# Patient Record
Sex: FEMALE | Race: WHITE | Employment: OTHER | URBAN - METROPOLITAN AREA
[De-identification: names, ages, dates, MRNs, and addresses within clinical notes are randomized per-mention and may not be internally consistent; named-entity substitution may affect disease eponyms.]

---

## 2017-02-08 ENCOUNTER — OFFICE VISIT (OUTPATIENT)
Dept: FAMILY MEDICINE CLINIC | Age: 52
End: 2017-02-08

## 2017-02-08 VITALS
SYSTOLIC BLOOD PRESSURE: 138 MMHG | BODY MASS INDEX: 41.95 KG/M2 | RESPIRATION RATE: 16 BRPM | HEIGHT: 70 IN | DIASTOLIC BLOOD PRESSURE: 78 MMHG | WEIGHT: 293 LBS | TEMPERATURE: 98.4 F

## 2017-02-08 DIAGNOSIS — J45.41 MODERATE PERSISTENT ASTHMA WITH EXACERBATION: Primary | ICD-10-CM

## 2017-02-08 DIAGNOSIS — R60.0 BILATERAL LEG EDEMA: ICD-10-CM

## 2017-02-08 DIAGNOSIS — Z98.84 S/P GASTRIC BYPASS: ICD-10-CM

## 2017-02-08 DIAGNOSIS — D50.9 MICROCYTIC ANEMIA: ICD-10-CM

## 2017-02-08 PROCEDURE — 99213 OFFICE O/P EST LOW 20 MIN: CPT | Performed by: FAMILY MEDICINE

## 2017-02-08 RX ORDER — TRIAMTERENE AND HYDROCHLOROTHIAZIDE 75; 50 MG/1; MG/1
1 TABLET ORAL DAILY
Qty: 30 TABLET | Refills: 3 | Status: SHIPPED | OUTPATIENT
Start: 2017-02-08 | End: 2017-03-07 | Stop reason: SDUPTHER

## 2017-02-08 RX ORDER — ACETAMINOPHEN 160 MG
10000 TABLET,DISINTEGRATING ORAL DAILY
Qty: 150 CAPSULE | Refills: 3 | Status: ON HOLD | OUTPATIENT
Start: 2017-02-08 | End: 2019-08-25 | Stop reason: CLARIF

## 2017-02-08 RX ORDER — DIAZEPAM 10 MG/1
TABLET ORAL
Qty: 30 TABLET | Refills: 2 | Status: SHIPPED | OUTPATIENT
Start: 2017-02-08 | End: 2017-05-10 | Stop reason: SDUPTHER

## 2017-02-08 RX ORDER — ALBUTEROL SULFATE 90 UG/1
AEROSOL, METERED RESPIRATORY (INHALATION)
Qty: 18 G | Refills: 5 | Status: SHIPPED | OUTPATIENT
Start: 2017-02-08 | End: 2017-03-06 | Stop reason: SDUPTHER

## 2017-03-06 ENCOUNTER — OFFICE VISIT (OUTPATIENT)
Dept: PULMONOLOGY | Age: 52
End: 2017-03-06

## 2017-03-06 VITALS
OXYGEN SATURATION: 98 % | WEIGHT: 293 LBS | SYSTOLIC BLOOD PRESSURE: 134 MMHG | RESPIRATION RATE: 20 BRPM | DIASTOLIC BLOOD PRESSURE: 82 MMHG | HEIGHT: 70 IN | HEART RATE: 73 BPM | BODY MASS INDEX: 41.95 KG/M2

## 2017-03-06 DIAGNOSIS — J45.40 MODERATE PERSISTENT ASTHMA WITHOUT COMPLICATION: Primary | ICD-10-CM

## 2017-03-06 DIAGNOSIS — R09.82 POST-NASAL DRAINAGE: ICD-10-CM

## 2017-03-06 PROCEDURE — 99244 OFF/OP CNSLTJ NEW/EST MOD 40: CPT | Performed by: INTERNAL MEDICINE

## 2017-03-06 RX ORDER — ALBUTEROL SULFATE 90 UG/1
AEROSOL, METERED RESPIRATORY (INHALATION)
Qty: 2 INHALER | Refills: 5 | Status: SHIPPED | OUTPATIENT
Start: 2017-03-06 | End: 2017-03-07 | Stop reason: SDUPTHER

## 2017-03-06 RX ORDER — INHALER, ASSIST DEVICES
SPACER (EA) MISCELLANEOUS
Qty: 2 EACH | Refills: 3 | Status: ON HOLD | OUTPATIENT
Start: 2017-03-06 | End: 2019-08-25 | Stop reason: CLARIF

## 2017-03-07 ENCOUNTER — TELEPHONE (OUTPATIENT)
Dept: FAMILY MEDICINE CLINIC | Age: 52
End: 2017-03-07

## 2017-03-07 DIAGNOSIS — R60.0 BILATERAL LEG EDEMA: ICD-10-CM

## 2017-03-07 DIAGNOSIS — J45.40 MODERATE PERSISTENT ASTHMA WITHOUT COMPLICATION: ICD-10-CM

## 2017-03-08 RX ORDER — ALBUTEROL SULFATE 90 UG/1
AEROSOL, METERED RESPIRATORY (INHALATION)
Qty: 2 INHALER | Refills: 5 | Status: SHIPPED | OUTPATIENT
Start: 2017-03-08 | End: 2019-06-14 | Stop reason: SDUPTHER

## 2017-03-08 RX ORDER — TRIAMTERENE AND HYDROCHLOROTHIAZIDE 75; 50 MG/1; MG/1
1 TABLET ORAL DAILY
Qty: 90 TABLET | Refills: 1 | Status: SHIPPED | OUTPATIENT
Start: 2017-03-08 | End: 2017-05-10 | Stop reason: ALTCHOICE

## 2017-03-08 RX ORDER — DIAZEPAM 10 MG/1
TABLET ORAL
Qty: 30 TABLET | Refills: 2 | OUTPATIENT
Start: 2017-03-08

## 2017-03-10 ENCOUNTER — TELEPHONE (OUTPATIENT)
Dept: PULMONOLOGY | Age: 52
End: 2017-03-10

## 2017-03-15 ENCOUNTER — TELEPHONE (OUTPATIENT)
Dept: FAMILY MEDICINE CLINIC | Age: 52
End: 2017-03-15

## 2017-03-15 DIAGNOSIS — J45.40 MODERATE PERSISTENT ASTHMA WITHOUT COMPLICATION: Primary | ICD-10-CM

## 2017-03-15 RX ORDER — ALBUTEROL SULFATE 2.5 MG/3ML
2.5 SOLUTION RESPIRATORY (INHALATION) EVERY 4 HOURS PRN
Qty: 75 VIAL | Refills: 3 | Status: SHIPPED | OUTPATIENT
Start: 2017-03-15 | End: 2017-03-15 | Stop reason: SDUPTHER

## 2017-03-15 RX ORDER — ALBUTEROL SULFATE 2.5 MG/3ML
2.5 SOLUTION RESPIRATORY (INHALATION) EVERY 4 HOURS PRN
Qty: 75 VIAL | Refills: 3 | Status: SHIPPED | OUTPATIENT
Start: 2017-03-15 | End: 2017-07-07

## 2017-03-21 ENCOUNTER — TELEPHONE (OUTPATIENT)
Dept: BARIATRICS/WEIGHT MGMT | Age: 52
End: 2017-03-21

## 2017-03-24 DIAGNOSIS — J45.40 MODERATE PERSISTENT ASTHMA WITHOUT COMPLICATION: ICD-10-CM

## 2017-03-24 DIAGNOSIS — D50.9 MICROCYTIC ANEMIA: ICD-10-CM

## 2017-03-24 LAB
A/G RATIO: 1.6 (ref 1.1–2.2)
ALBUMIN SERPL-MCNC: 4.4 G/DL (ref 3.4–5)
ALP BLD-CCNC: 101 U/L (ref 40–129)
ALT SERPL-CCNC: 14 U/L (ref 10–40)
ANION GAP SERPL CALCULATED.3IONS-SCNC: 17 MMOL/L (ref 3–16)
AST SERPL-CCNC: 15 U/L (ref 15–37)
BILIRUB SERPL-MCNC: 0.9 MG/DL (ref 0–1)
BUN BLDV-MCNC: 14 MG/DL (ref 7–20)
CALCIUM SERPL-MCNC: 9.7 MG/DL (ref 8.3–10.6)
CHLORIDE BLD-SCNC: 103 MMOL/L (ref 99–110)
CHOLESTEROL, TOTAL: 182 MG/DL (ref 0–199)
CO2: 23 MMOL/L (ref 21–32)
CREAT SERPL-MCNC: 0.8 MG/DL (ref 0.6–1.1)
FERRITIN: 107.7 NG/ML (ref 15–150)
GFR AFRICAN AMERICAN: >60
GFR NON-AFRICAN AMERICAN: >60
GLOBULIN: 2.7 G/DL
GLUCOSE BLD-MCNC: 106 MG/DL (ref 70–99)
HCT VFR BLD CALC: 43.6 % (ref 36–48)
HDLC SERPL-MCNC: 99 MG/DL (ref 40–60)
HEMOGLOBIN: 14 G/DL (ref 12–16)
LDL CHOLESTEROL CALCULATED: 71 MG/DL
MAGNESIUM: 2.3 MG/DL (ref 1.8–2.4)
MCH RBC QN AUTO: 29.7 PG (ref 26–34)
MCHC RBC AUTO-ENTMCNC: 32.2 G/DL (ref 31–36)
MCV RBC AUTO: 92.3 FL (ref 80–100)
PDW BLD-RTO: 13.6 % (ref 12.4–15.4)
PLATELET # BLD: 291 K/UL (ref 135–450)
PMV BLD AUTO: 9.4 FL (ref 5–10.5)
POTASSIUM SERPL-SCNC: 4.4 MMOL/L (ref 3.5–5.1)
RBC # BLD: 4.72 M/UL (ref 4–5.2)
SODIUM BLD-SCNC: 143 MMOL/L (ref 136–145)
TOTAL PROTEIN: 7.1 G/DL (ref 6.4–8.2)
TRIGL SERPL-MCNC: 61 MG/DL (ref 0–150)
TSH REFLEX: 3.91 UIU/ML (ref 0.27–4.2)
VITAMIN D 25-HYDROXY: 33 NG/ML
VLDLC SERPL CALC-MCNC: 12 MG/DL
WBC # BLD: 6.5 K/UL (ref 4–11)

## 2017-03-28 ENCOUNTER — OFFICE VISIT (OUTPATIENT)
Dept: BARIATRICS/WEIGHT MGMT | Age: 52
End: 2017-03-28

## 2017-03-28 VITALS
HEART RATE: 72 BPM | SYSTOLIC BLOOD PRESSURE: 134 MMHG | DIASTOLIC BLOOD PRESSURE: 84 MMHG | HEIGHT: 70 IN | BODY MASS INDEX: 41.95 KG/M2 | WEIGHT: 293 LBS

## 2017-03-28 DIAGNOSIS — E66.01 MORBID OBESITY WITH BMI OF 45.0-49.9, ADULT (HCC): Primary | ICD-10-CM

## 2017-03-28 DIAGNOSIS — Z79.899 HIGH RISK MEDICATIONS (NOT ANTICOAGULANTS) LONG-TERM USE: ICD-10-CM

## 2017-03-28 DIAGNOSIS — Z98.84 H/O GASTRIC BYPASS: ICD-10-CM

## 2017-03-28 PROCEDURE — 99204 OFFICE O/P NEW MOD 45 MIN: CPT | Performed by: FAMILY MEDICINE

## 2017-03-28 RX ORDER — FEXOFENADINE HYDROCHLORIDE 60 MG/1
60 TABLET, FILM COATED ORAL DAILY
COMMUNITY
End: 2017-11-30

## 2017-03-28 RX ORDER — LANOLIN ALCOHOL/MO/W.PET/CERES
1000 CREAM (GRAM) TOPICAL DAILY
COMMUNITY
End: 2019-07-08

## 2017-03-28 ASSESSMENT — ENCOUNTER SYMPTOMS
GASTROINTESTINAL NEGATIVE: 1
RESPIRATORY NEGATIVE: 1
EYES NEGATIVE: 1

## 2017-03-29 LAB — IGE: 71 KU/L

## 2017-03-30 ENCOUNTER — OFFICE VISIT (OUTPATIENT)
Dept: PULMONOLOGY | Age: 52
End: 2017-03-30

## 2017-03-30 VITALS
OXYGEN SATURATION: 96 % | WEIGHT: 293 LBS | BODY MASS INDEX: 41.95 KG/M2 | HEART RATE: 75 BPM | DIASTOLIC BLOOD PRESSURE: 86 MMHG | SYSTOLIC BLOOD PRESSURE: 124 MMHG | RESPIRATION RATE: 20 BRPM | HEIGHT: 70 IN

## 2017-03-30 DIAGNOSIS — R09.82 POST-NASAL DRAINAGE: ICD-10-CM

## 2017-03-30 DIAGNOSIS — J45.40 MODERATE PERSISTENT ASTHMA WITHOUT COMPLICATION: Primary | ICD-10-CM

## 2017-03-30 PROCEDURE — 99214 OFFICE O/P EST MOD 30 MIN: CPT | Performed by: INTERNAL MEDICINE

## 2017-03-30 RX ORDER — FLUTICASONE PROPIONATE 50 MCG
1 SPRAY, SUSPENSION (ML) NASAL DAILY
Qty: 1 BOTTLE | Refills: 3 | Status: SHIPPED | OUTPATIENT
Start: 2017-03-30 | End: 2018-08-07

## 2017-05-10 ENCOUNTER — OFFICE VISIT (OUTPATIENT)
Dept: FAMILY MEDICINE CLINIC | Age: 52
End: 2017-05-10

## 2017-05-10 ENCOUNTER — TELEPHONE (OUTPATIENT)
Dept: FAMILY MEDICINE CLINIC | Age: 52
End: 2017-05-10

## 2017-05-10 VITALS
OXYGEN SATURATION: 95 % | DIASTOLIC BLOOD PRESSURE: 77 MMHG | RESPIRATION RATE: 16 BRPM | SYSTOLIC BLOOD PRESSURE: 122 MMHG | WEIGHT: 293 LBS | HEART RATE: 79 BPM | TEMPERATURE: 97.4 F | BODY MASS INDEX: 48.18 KG/M2

## 2017-05-10 DIAGNOSIS — R60.0 BILATERAL LEG EDEMA: Primary | ICD-10-CM

## 2017-05-10 DIAGNOSIS — Z98.84 S/P GASTRIC BYPASS: ICD-10-CM

## 2017-05-10 DIAGNOSIS — F41.9 ANXIETY: ICD-10-CM

## 2017-05-10 PROCEDURE — 99214 OFFICE O/P EST MOD 30 MIN: CPT | Performed by: FAMILY MEDICINE

## 2017-05-10 RX ORDER — DIAZEPAM 10 MG/1
TABLET ORAL
Qty: 60 TABLET | Refills: 2 | Status: SHIPPED | OUTPATIENT
Start: 2017-05-10 | End: 2017-11-20 | Stop reason: SDUPTHER

## 2017-05-10 RX ORDER — FUROSEMIDE 20 MG/1
TABLET ORAL
Qty: 60 TABLET | Refills: 2 | Status: ON HOLD | OUTPATIENT
Start: 2017-05-10 | End: 2017-06-04 | Stop reason: HOSPADM

## 2017-05-10 ASSESSMENT — PATIENT HEALTH QUESTIONNAIRE - PHQ9
2. FEELING DOWN, DEPRESSED OR HOPELESS: 0
SUM OF ALL RESPONSES TO PHQ QUESTIONS 1-9: 0
1. LITTLE INTEREST OR PLEASURE IN DOING THINGS: 0
SUM OF ALL RESPONSES TO PHQ9 QUESTIONS 1 & 2: 0

## 2017-05-12 ENCOUNTER — PATIENT MESSAGE (OUTPATIENT)
Dept: FAMILY MEDICINE CLINIC | Age: 52
End: 2017-05-12

## 2017-05-19 ENCOUNTER — TELEPHONE (OUTPATIENT)
Dept: FAMILY MEDICINE CLINIC | Age: 52
End: 2017-05-19

## 2017-05-22 ENCOUNTER — PATIENT MESSAGE (OUTPATIENT)
Dept: FAMILY MEDICINE CLINIC | Age: 52
End: 2017-05-22

## 2017-05-22 DIAGNOSIS — R22.43 LOCALIZED SWELLING OF BOTH LOWER LEGS: Primary | ICD-10-CM

## 2017-05-24 DIAGNOSIS — R60.0 BILATERAL LEG EDEMA: ICD-10-CM

## 2017-05-24 DIAGNOSIS — E66.01 MORBID OBESITY WITH BMI OF 45.0-49.9, ADULT (HCC): ICD-10-CM

## 2017-05-24 LAB
ANION GAP SERPL CALCULATED.3IONS-SCNC: 14 MMOL/L (ref 3–16)
BUN BLDV-MCNC: 19 MG/DL (ref 7–20)
CALCIUM SERPL-MCNC: 9.3 MG/DL (ref 8.3–10.6)
CHLORIDE BLD-SCNC: 100 MMOL/L (ref 99–110)
CO2: 28 MMOL/L (ref 21–32)
CREAT SERPL-MCNC: 0.7 MG/DL (ref 0.6–1.1)
FOLATE: >20 NG/ML (ref 4.78–24.2)
GFR AFRICAN AMERICAN: >60
GFR NON-AFRICAN AMERICAN: >60
GLUCOSE BLD-MCNC: 97 MG/DL (ref 70–99)
POTASSIUM SERPL-SCNC: 4.4 MMOL/L (ref 3.5–5.1)
PRO-BNP: 210 PG/ML (ref 0–124)
SODIUM BLD-SCNC: 142 MMOL/L (ref 136–145)
VITAMIN B-12: 785 PG/ML (ref 211–911)

## 2017-05-27 LAB — VITAMIN B1, PLASMA: 20 NMOL/L (ref 4–15)

## 2017-06-02 ENCOUNTER — TELEPHONE (OUTPATIENT)
Dept: FAMILY MEDICINE CLINIC | Age: 52
End: 2017-06-02

## 2017-06-02 DIAGNOSIS — R60.0 BILATERAL LEG EDEMA: ICD-10-CM

## 2017-06-02 PROBLEM — R07.9 CHEST PAIN OF UNCERTAIN ETIOLOGY: Status: ACTIVE | Noted: 2017-06-02

## 2017-06-02 PROBLEM — I50.9 CHF WITH UNKNOWN LVEF (HCC): Status: ACTIVE | Noted: 2017-06-02

## 2017-06-02 RX ORDER — FUROSEMIDE 20 MG/1
TABLET ORAL
Qty: 60 TABLET | Refills: 2 | Status: CANCELLED | OUTPATIENT
Start: 2017-06-02

## 2017-06-05 ENCOUNTER — OFFICE VISIT (OUTPATIENT)
Dept: BARIATRICS/WEIGHT MGMT | Age: 52
End: 2017-06-05

## 2017-06-05 ENCOUNTER — TELEPHONE (OUTPATIENT)
Dept: PHARMACY | Facility: CLINIC | Age: 52
End: 2017-06-05

## 2017-06-05 VITALS
WEIGHT: 293 LBS | DIASTOLIC BLOOD PRESSURE: 88 MMHG | HEART RATE: 76 BPM | HEIGHT: 70 IN | BODY MASS INDEX: 41.95 KG/M2 | SYSTOLIC BLOOD PRESSURE: 136 MMHG

## 2017-06-05 DIAGNOSIS — Z71.3 DIETARY COUNSELING AND SURVEILLANCE: ICD-10-CM

## 2017-06-05 DIAGNOSIS — E66.01 MORBID OBESITY WITH BMI OF 45.0-49.9, ADULT (HCC): Primary | ICD-10-CM

## 2017-06-05 PROCEDURE — 99213 OFFICE O/P EST LOW 20 MIN: CPT | Performed by: FAMILY MEDICINE

## 2017-06-06 ASSESSMENT — ENCOUNTER SYMPTOMS
GASTROINTESTINAL NEGATIVE: 1
EYES NEGATIVE: 1
RESPIRATORY NEGATIVE: 1

## 2017-06-09 ENCOUNTER — OFFICE VISIT (OUTPATIENT)
Dept: FAMILY MEDICINE CLINIC | Age: 52
End: 2017-06-09

## 2017-06-09 VITALS
TEMPERATURE: 96.8 F | RESPIRATION RATE: 20 BRPM | DIASTOLIC BLOOD PRESSURE: 63 MMHG | HEART RATE: 65 BPM | BODY MASS INDEX: 48.12 KG/M2 | SYSTOLIC BLOOD PRESSURE: 115 MMHG | WEIGHT: 293 LBS

## 2017-06-09 DIAGNOSIS — R60.0 EDEMA OF LOWER EXTREMITY, UNSPECIFIED LATERALITY: Primary | ICD-10-CM

## 2017-06-09 DIAGNOSIS — N28.1 CYST OF RIGHT KIDNEY: ICD-10-CM

## 2017-06-09 DIAGNOSIS — K86.9 PANCREATIC LESION: ICD-10-CM

## 2017-06-09 DIAGNOSIS — R60.0 EDEMA OF LOWER EXTREMITY, UNSPECIFIED LATERALITY: ICD-10-CM

## 2017-06-09 DIAGNOSIS — R60.9 BODY FLUID RETENTION: ICD-10-CM

## 2017-06-09 PROCEDURE — 99214 OFFICE O/P EST MOD 30 MIN: CPT | Performed by: FAMILY MEDICINE

## 2017-06-09 RX ORDER — FUROSEMIDE 40 MG/1
TABLET ORAL
Qty: 90 TABLET | Refills: 3 | Status: SHIPPED | OUTPATIENT
Start: 2017-06-09 | End: 2017-06-09 | Stop reason: SDUPTHER

## 2017-06-09 RX ORDER — FUROSEMIDE 40 MG/1
TABLET ORAL
Qty: 90 TABLET | Refills: 3 | Status: SHIPPED | OUTPATIENT
Start: 2017-06-09 | End: 2017-11-02 | Stop reason: SDUPTHER

## 2017-06-21 ENCOUNTER — HOSPITAL ENCOUNTER (OUTPATIENT)
Dept: MAMMOGRAPHY | Age: 52
Discharge: HOME | End: 2017-06-21
Attending: FAMILY MEDICINE | Admitting: FAMILY MEDICINE

## 2017-06-21 DIAGNOSIS — R60.0 EDEMA OF LOWER EXTREMITY, UNSPECIFIED LATERALITY: ICD-10-CM

## 2017-06-21 DIAGNOSIS — Z12.31 VISIT FOR SCREENING MAMMOGRAM: ICD-10-CM

## 2017-06-21 DIAGNOSIS — R60.0 LOCALIZED EDEMA: ICD-10-CM

## 2017-06-21 DIAGNOSIS — N28.1 CYST OF RIGHT KIDNEY: ICD-10-CM

## 2017-06-21 DIAGNOSIS — R60.9 PERIPHERAL EDEMA: Primary | ICD-10-CM

## 2017-06-21 DIAGNOSIS — R60.9 BODY FLUID RETENTION: ICD-10-CM

## 2017-06-21 DIAGNOSIS — K86.9 PANCREATIC LESION: ICD-10-CM

## 2017-06-21 LAB
ALBUMIN SERPL-MCNC: 4.4 G/DL (ref 3.4–5)
ANION GAP SERPL CALCULATED.3IONS-SCNC: 18 MMOL/L (ref 3–16)
BUN BLDV-MCNC: 15 MG/DL (ref 7–20)
CALCIUM SERPL-MCNC: 9.2 MG/DL (ref 8.3–10.6)
CHLORIDE BLD-SCNC: 102 MMOL/L (ref 99–110)
CO2: 24 MMOL/L (ref 21–32)
CREAT SERPL-MCNC: 0.8 MG/DL (ref 0.6–1.1)
GFR AFRICAN AMERICAN: >60
GFR NON-AFRICAN AMERICAN: >60
GLUCOSE BLD-MCNC: 93 MG/DL (ref 70–99)
MAGNESIUM: 2.1 MG/DL (ref 1.8–2.4)
PHOSPHORUS: 3.1 MG/DL (ref 2.5–4.9)
POTASSIUM SERPL-SCNC: 3.8 MMOL/L (ref 3.5–5.1)
SODIUM BLD-SCNC: 144 MMOL/L (ref 136–145)

## 2017-07-07 ENCOUNTER — OFFICE VISIT (OUTPATIENT)
Dept: CARDIOLOGY CLINIC | Age: 52
End: 2017-07-07

## 2017-07-07 VITALS
HEART RATE: 80 BPM | SYSTOLIC BLOOD PRESSURE: 130 MMHG | WEIGHT: 293 LBS | BODY MASS INDEX: 47.35 KG/M2 | DIASTOLIC BLOOD PRESSURE: 78 MMHG

## 2017-07-07 DIAGNOSIS — E87.70 HYPERVOLEMIA, UNSPECIFIED HYPERVOLEMIA TYPE: Primary | ICD-10-CM

## 2017-07-07 DIAGNOSIS — R60.0 LOCALIZED EDEMA: ICD-10-CM

## 2017-07-07 DIAGNOSIS — R07.9 CHEST PAIN, UNSPECIFIED TYPE: ICD-10-CM

## 2017-07-07 PROCEDURE — 99214 OFFICE O/P EST MOD 30 MIN: CPT | Performed by: INTERNAL MEDICINE

## 2017-07-07 ASSESSMENT — ENCOUNTER SYMPTOMS
COUGH: 0
CHOKING: 0
CHEST TIGHTNESS: 0
SHORTNESS OF BREATH: 0

## 2017-07-14 ENCOUNTER — TELEPHONE (OUTPATIENT)
Dept: PULMONOLOGY | Age: 52
End: 2017-07-14

## 2017-09-12 ENCOUNTER — TELEPHONE (OUTPATIENT)
Dept: FAMILY MEDICINE CLINIC | Age: 52
End: 2017-09-12

## 2017-09-13 ENCOUNTER — OFFICE VISIT (OUTPATIENT)
Dept: FAMILY MEDICINE CLINIC | Age: 52
End: 2017-09-13

## 2017-09-13 VITALS
SYSTOLIC BLOOD PRESSURE: 111 MMHG | DIASTOLIC BLOOD PRESSURE: 88 MMHG | HEART RATE: 73 BPM | TEMPERATURE: 96.7 F | BODY MASS INDEX: 49.7 KG/M2 | WEIGHT: 293 LBS | OXYGEN SATURATION: 95 %

## 2017-09-13 DIAGNOSIS — F51.04 PSYCHOPHYSIOLOGICAL INSOMNIA: ICD-10-CM

## 2017-09-13 DIAGNOSIS — F43.0 STRESS REACTION CAUSING MIXED DISTURBANCE OF EMOTION AND CONDUCT: ICD-10-CM

## 2017-09-13 DIAGNOSIS — I48.91 ATRIAL FIBRILLATION, UNSPECIFIED TYPE (HCC): Primary | ICD-10-CM

## 2017-09-13 PROCEDURE — 99214 OFFICE O/P EST MOD 30 MIN: CPT | Performed by: FAMILY MEDICINE

## 2017-09-13 RX ORDER — ZOLPIDEM TARTRATE 5 MG/1
5 TABLET ORAL NIGHTLY PRN
Qty: 14 TABLET | Refills: 1 | Status: SHIPPED | OUTPATIENT
Start: 2017-09-13 | End: 2017-09-13 | Stop reason: SDUPTHER

## 2017-09-13 RX ORDER — ZOLPIDEM TARTRATE 5 MG/1
5 TABLET ORAL NIGHTLY PRN
Qty: 14 TABLET | Refills: 1 | Status: SHIPPED | OUTPATIENT
Start: 2017-09-13 | End: 2019-07-18 | Stop reason: SDUPTHER

## 2017-09-13 RX ORDER — METOPROLOL SUCCINATE 50 MG/1
50 TABLET, EXTENDED RELEASE ORAL DAILY
Qty: 30 TABLET | Refills: 3 | Status: SHIPPED | OUTPATIENT
Start: 2017-09-13 | End: 2017-10-08 | Stop reason: DRUGHIGH

## 2017-10-09 ENCOUNTER — OFFICE VISIT (OUTPATIENT)
Dept: CARDIOLOGY CLINIC | Age: 52
End: 2017-10-09

## 2017-10-09 VITALS
SYSTOLIC BLOOD PRESSURE: 98 MMHG | HEART RATE: 68 BPM | WEIGHT: 293 LBS | DIASTOLIC BLOOD PRESSURE: 64 MMHG | BODY MASS INDEX: 48.35 KG/M2

## 2017-10-09 DIAGNOSIS — R07.9 CHEST PAIN, UNSPECIFIED TYPE: ICD-10-CM

## 2017-10-09 DIAGNOSIS — I50.32 CHRONIC DIASTOLIC CONGESTIVE HEART FAILURE (HCC): ICD-10-CM

## 2017-10-09 DIAGNOSIS — I48.92 ATRIAL FLUTTER, UNSPECIFIED TYPE (HCC): Primary | ICD-10-CM

## 2017-10-09 PROCEDURE — 99214 OFFICE O/P EST MOD 30 MIN: CPT | Performed by: INTERNAL MEDICINE

## 2017-10-09 ASSESSMENT — ENCOUNTER SYMPTOMS
CHEST TIGHTNESS: 0
SHORTNESS OF BREATH: 0
COUGH: 0
CHOKING: 0

## 2017-10-12 DIAGNOSIS — R60.0 EDEMA OF LOWER EXTREMITY, UNSPECIFIED LATERALITY: ICD-10-CM

## 2017-10-12 DIAGNOSIS — I48.91 ATRIAL FIBRILLATION, UNSPECIFIED TYPE (HCC): ICD-10-CM

## 2017-10-12 DIAGNOSIS — R60.9 BODY FLUID RETENTION: ICD-10-CM

## 2017-10-12 LAB
HCT VFR BLD CALC: 46.3 % (ref 36–48)
HEMOGLOBIN: 14.7 G/DL (ref 12–16)
MCH RBC QN AUTO: 30 PG (ref 26–34)
MCHC RBC AUTO-ENTMCNC: 31.8 G/DL (ref 31–36)
MCV RBC AUTO: 94.4 FL (ref 80–100)
PDW BLD-RTO: 14.9 % (ref 12.4–15.4)
PLATELET # BLD: 282 K/UL (ref 135–450)
PMV BLD AUTO: 10.3 FL (ref 5–10.5)
RBC # BLD: 4.9 M/UL (ref 4–5.2)
WBC # BLD: 7.1 K/UL (ref 4–11)

## 2017-10-13 ENCOUNTER — OFFICE VISIT (OUTPATIENT)
Dept: CARDIOLOGY CLINIC | Age: 52
End: 2017-10-13

## 2017-10-13 VITALS
BODY MASS INDEX: 49.19 KG/M2 | DIASTOLIC BLOOD PRESSURE: 80 MMHG | HEART RATE: 92 BPM | WEIGHT: 293 LBS | SYSTOLIC BLOOD PRESSURE: 120 MMHG

## 2017-10-13 DIAGNOSIS — I48.91 ATRIAL FIBRILLATION, UNSPECIFIED TYPE (HCC): ICD-10-CM

## 2017-10-13 DIAGNOSIS — I48.92 ATRIAL FLUTTER, UNSPECIFIED TYPE (HCC): ICD-10-CM

## 2017-10-13 DIAGNOSIS — R07.9 CHEST PAIN AT REST: Primary | ICD-10-CM

## 2017-10-13 LAB
A/G RATIO: 1.5 (ref 1.1–2.2)
ALBUMIN SERPL-MCNC: 4.1 G/DL (ref 3.4–5)
ALP BLD-CCNC: 105 U/L (ref 40–129)
ALT SERPL-CCNC: 17 U/L (ref 10–40)
ANION GAP SERPL CALCULATED.3IONS-SCNC: 14 MMOL/L (ref 3–16)
AST SERPL-CCNC: 15 U/L (ref 15–37)
BILIRUB SERPL-MCNC: 0.6 MG/DL (ref 0–1)
BUN BLDV-MCNC: 21 MG/DL (ref 7–20)
CALCIUM SERPL-MCNC: 9.2 MG/DL (ref 8.3–10.6)
CHLORIDE BLD-SCNC: 106 MMOL/L (ref 99–110)
CO2: 26 MMOL/L (ref 21–32)
CREAT SERPL-MCNC: 0.8 MG/DL (ref 0.6–1.1)
GFR AFRICAN AMERICAN: >60
GFR NON-AFRICAN AMERICAN: >60
GLOBULIN: 2.7 G/DL
GLUCOSE BLD-MCNC: 96 MG/DL (ref 70–99)
MAGNESIUM: 2.6 MG/DL (ref 1.8–2.4)
POTASSIUM SERPL-SCNC: 4.8 MMOL/L (ref 3.5–5.1)
SODIUM BLD-SCNC: 146 MMOL/L (ref 136–145)
TOTAL PROTEIN: 6.8 G/DL (ref 6.4–8.2)
TSH SERPL DL<=0.05 MIU/L-ACNC: 2.41 UIU/ML (ref 0.27–4.2)

## 2017-10-13 PROCEDURE — 93000 ELECTROCARDIOGRAM COMPLETE: CPT | Performed by: INTERNAL MEDICINE

## 2017-10-13 PROCEDURE — 99204 OFFICE O/P NEW MOD 45 MIN: CPT | Performed by: INTERNAL MEDICINE

## 2017-10-13 RX ORDER — METOPROLOL SUCCINATE 50 MG/1
75 TABLET, EXTENDED RELEASE ORAL DAILY
Qty: 45 TABLET | Refills: 5 | Status: SHIPPED | OUTPATIENT
Start: 2017-10-13 | End: 2017-10-25 | Stop reason: SDUPTHER

## 2017-10-13 RX ORDER — PROPAFENONE HYDROCHLORIDE 225 MG/1
225 CAPSULE, EXTENDED RELEASE ORAL 3 TIMES DAILY
Qty: 90 CAPSULE | Refills: 3 | Status: SHIPPED | OUTPATIENT
Start: 2017-10-13 | End: 2017-12-19 | Stop reason: CLARIF

## 2017-10-13 NOTE — PROGRESS NOTES
[facility]   Cardiac Consultation    Referring Provider:  Adriane Vyas DO     Chief Concerns: new onset AF, AFL    HPI:  Javon Ball is a 46 y.o. female is a new patient referred to me by Dr Zachariah Foster for Atrial fibrillation & Atrial flutter management. She has hx of asthma, fibromyalgia, GERD, dCHF, obesity s/p gastric bypass, recent dx with AF & AFL. She stated she has family history of AF (siblings) and she is recently under a lot of stress. She gets anxiety easily. She can tell if she is in irregular AF & AFL. She has felt palpitation for few month now and quite persistent since Sep 2017. She has felt fatigue, racing heart beats, lightheadedness, SOB, nervousness, and even in her sleep. On her recent ECGs:  9/12/17 showed AF, 10/3/17 showed AFL, 3/17 showed SR. She finally went to ER in early Oct 17 and has placed on Xarelto 20 mg daily for stroke prevention. She has tolerated well and showed no S/S of bleeding. Her PASHA-VASc: 2. She is currently taking 75 mg of Toprol in the morning for HR control. Recent labs: TSH: 2.41 (9/12/17), Cr: 0.8 (9/12/17). Last Echo (6/2/17) showed est LVEF 50-55%. Annalisa Myoview on 6/3/17 showed negative for acute ischemia. In 2014 she stated she had palpitation and 30 days event monitor was ordered by was not worn by her. The patient stated her palpitation was more than likely related to her anxiety instead actual palpitation. Today her ECG showed atrial flutter with heart rate 92. She is currently [employer] employee and denied CP, SOB, and ankle edema. She recently has encountered stressful life events and taking Valium to manage her anxiety. Past Medical History:   has a past medical history of Anxiety; Arthritis; Asthma; Atrial fibrillation ([address] Edema; Fibromyalgia; GERD (gastroesophageal reflux disease); and Hiatal hernia.     Surgical History:   has a past surgical history that includes Abdomen surgery (2001); hernia repair EKG showed AFL only. 2. dCHF- Echo 6/2/17 est LVEF: 50-55%. Plan:  1. Start Rythmol 225 mg q8h.  2. Change Toprol XL 50mg in am and 25 mg in pm.  3. F/U with NP-Mao in 2 weeks and 30 days event monitor . 4. If atrial flutter is recurrent/persistent, would consider catheter ablation. 5. F/U with me after event monitor completed.     Marissa Marquez M.D.

## 2017-10-25 ENCOUNTER — OFFICE VISIT (OUTPATIENT)
Dept: CARDIOLOGY CLINIC | Age: 52
End: 2017-10-25

## 2017-10-25 VITALS
HEART RATE: 60 BPM | WEIGHT: 293 LBS | BODY MASS INDEX: 50.22 KG/M2 | DIASTOLIC BLOOD PRESSURE: 80 MMHG | SYSTOLIC BLOOD PRESSURE: 126 MMHG

## 2017-10-25 DIAGNOSIS — R07.9 CHEST PAIN, UNSPECIFIED TYPE: ICD-10-CM

## 2017-10-25 DIAGNOSIS — I48.92 ATRIAL FLUTTER, UNSPECIFIED TYPE (HCC): ICD-10-CM

## 2017-10-25 DIAGNOSIS — I50.32 CHRONIC DIASTOLIC CONGESTIVE HEART FAILURE (HCC): Primary | ICD-10-CM

## 2017-10-25 PROCEDURE — 99214 OFFICE O/P EST MOD 30 MIN: CPT | Performed by: INTERNAL MEDICINE

## 2017-10-25 RX ORDER — METOPROLOL SUCCINATE 50 MG/1
75 TABLET, EXTENDED RELEASE ORAL DAILY
Qty: 90 TABLET | Refills: 3 | Status: SHIPPED | OUTPATIENT
Start: 2017-10-25 | End: 2018-02-28 | Stop reason: SDUPTHER

## 2017-10-25 RX ORDER — FUROSEMIDE 40 MG/1
40 TABLET ORAL 2 TIMES DAILY
Qty: 60 TABLET | Refills: 5 | Status: SHIPPED | OUTPATIENT
Start: 2017-10-25 | End: 2018-04-12 | Stop reason: SDUPTHER

## 2017-10-25 ASSESSMENT — ENCOUNTER SYMPTOMS
CHEST TIGHTNESS: 0
COUGH: 0
CHOKING: 0
SHORTNESS OF BREATH: 1

## 2017-10-25 NOTE — PROGRESS NOTES
Subjective:      Patient ID: Mauri Gotti is a 46 y.o. female. Shortness of Breath   Pertinent negatives include no chest pain or leg swelling. Here for follow up for aflutter/chf/chest pain. Increase in swelling. Gain in 8lbs since last visit. Increase in sob. Watching salt. Pushes fluids. Has  pnd and orthopnea. Stopped her lasix 1 month ago since she felt swelling was gone. Past Medical History:   Diagnosis Date    Anxiety     Arthritis     Asthma     Atrial fibrillation ([address]     new dx 4 weeks ago, first of  Sept 2017    Edema     Fibromyalgia     GERD (gastroesophageal reflux disease)     reflux    Hiatal hernia      Past Surgical History:   Procedure Laterality Date    ABDOMEN SURGERY  2001    gastric bypass--[place]    HERNIA REPAIR  2001    HYSTERECTOMY  2004    ARIAS-EN-Y GASTRIC BYPASS      TONSILLECTOMY AND ADENOIDECTOMY       Social History     Social History    Marital status:      Spouse name: N/A    Number of children: N/A    Years of education: N/A     Occupational History    Not on file. Social History Main Topics    Smoking status: Never Smoker    Smokeless tobacco: Never Used    Alcohol use Yes      Comment: \"Occasionally\"    Drug use: No    Sexual activity: Yes     Partners: Male     Other Topics Concern    Not on file     Social History Narrative    No narrative on file     [facility] reviewed. Review of Systems   Constitutional: Positive for fatigue. Negative for activity change and appetite change. Respiratory: Positive for shortness of breath. Negative for cough, choking and chest tightness. Cardiovascular: Positive for palpitations. Negative for chest pain and leg swelling. Denies PND or orthopnea. No tachycardia or syncope. Neurological: Negative for dizziness, syncope and light-headedness. Psychiatric/Behavioral: Negative for agitation, behavioral problems and confusion.        Objective:   Physical Exam   Constitutional:

## 2017-10-26 ENCOUNTER — OFFICE VISIT (OUTPATIENT)
Dept: FAMILY MEDICINE CLINIC | Age: 52
End: 2017-10-26

## 2017-10-26 VITALS
HEART RATE: 88 BPM | OXYGEN SATURATION: 96 % | TEMPERATURE: 96.3 F | SYSTOLIC BLOOD PRESSURE: 126 MMHG | WEIGHT: 293 LBS | DIASTOLIC BLOOD PRESSURE: 92 MMHG | BODY MASS INDEX: 49 KG/M2 | RESPIRATION RATE: 20 BRPM

## 2017-10-26 DIAGNOSIS — R10.9 BILATERAL FLANK PAIN: Primary | ICD-10-CM

## 2017-10-26 DIAGNOSIS — R07.81 RIB PAIN ON RIGHT SIDE: ICD-10-CM

## 2017-10-26 LAB
BILIRUBIN, POC: NORMAL
BLOOD URINE, POC: NORMAL
CLARITY, POC: CLEAR
COLOR, POC: YELLOW
GLUCOSE URINE, POC: NORMAL
KETONES, POC: NORMAL
LEUKOCYTE EST, POC: NORMAL
NITRITE, POC: NORMAL
PH, POC: 5
PROTEIN, POC: NORMAL
SPECIFIC GRAVITY, POC: 1
UROBILINOGEN, POC: 0.2

## 2017-10-26 PROCEDURE — 99213 OFFICE O/P EST LOW 20 MIN: CPT | Performed by: FAMILY MEDICINE

## 2017-10-26 PROCEDURE — 81002 URINALYSIS NONAUTO W/O SCOPE: CPT | Performed by: FAMILY MEDICINE

## 2017-10-26 RX ORDER — TRAMADOL HYDROCHLORIDE 50 MG/1
50 TABLET ORAL EVERY 6 HOURS PRN
Qty: 28 TABLET | Refills: 0 | Status: SHIPPED | OUTPATIENT
Start: 2017-10-26 | End: 2017-11-02

## 2017-10-26 NOTE — PROGRESS NOTES
(VENTOLIN HFA) 108 (90 BASE) MCG/ACT inhaler INHALE 2 PUFFS BY MOUTH EVERY 4 TO 6 HOURS AS NEEDED 2 Inhaler 5    Spacer/Aero-Holding Chambers (AEROCHAMBER PLUS-FLOW SIGNAL) MISC Use with MDI as instructed 2 each 3    Cholecalciferol (VITAMIN D3) 2000 UNITS CAPS Take 5 capsules by mouth daily 150 capsule 3      Patient Active Problem List   Diagnosis    Microcytic anemia    Asthma    GERD (gastroesophageal reflux disease)    Anxiety    Edema    S/P gastric bypass    Post-nasal drainage    BMI 45.0-49.9, adult ([facility])    Chest pain at rest    CHF with unknown LVEF ([facility])    Peripheral edema      PMH, PSH, SH, Problem list reviewed. Social History   Substance Use Topics    Smoking status: Never Smoker    Smokeless tobacco: Never Used    Alcohol use Yes      Comment: \"Occasionally\"      Allergies   Allergen Reactions    Latex Anaphylaxis    Aspirin      Swelling in lower legs    Demerol     Meperidine     Nsaids Swelling    Pcn [Penicillins]     Ranitidine Hcl     Sulfa Antibiotics Rash      Review of Systems    Objective:   Physical Exam  NAD  No respiratory distress. Skin is warm and dry. Well hydrated, no rash. Chest is clear, no wheezing or rales. Normal symmetric air entry throughout both lung fields. + right lateral and anterior lower rib tenderness. No deformity or bony tenderness. Heart regular with normal rate, no murmer or gallop  The abdomen is soft without tenderness, guarding, mass, rebound or organomegaly. Bowel sounds are normal. No CVA tenderness or inguinal adenopathy noted. Aorta, femoral, DP and PT pulses intact. No pedal edema  Lab Results   Component Value Date    COLORU yellow 10/26/2017    GLUCOSEU neg 10/26/2017    KETUA neg 10/26/2017    BILIRUBINUR neg 10/26/2017      Assessment:      1. Bilateral flank pain  POCT Urinalysis no Micro    Urine Culture   2.  Rib pain on right side  Lidocaine-Menthol 4-5 % PTCH    traMADol (ULTRAM) 50 MG tablet          Plan:

## 2017-10-29 LAB
ORGANISM: ABNORMAL
URINE CULTURE, ROUTINE: ABNORMAL
URINE CULTURE, ROUTINE: ABNORMAL

## 2017-10-29 RX ORDER — NITROFURANTOIN 25; 75 MG/1; MG/1
100 CAPSULE ORAL 2 TIMES DAILY
Qty: 14 CAPSULE | Refills: 0 | Status: SHIPPED | OUTPATIENT
Start: 2017-10-29 | End: 2017-11-05

## 2017-11-02 ENCOUNTER — OFFICE VISIT (OUTPATIENT)
Dept: CARDIOLOGY CLINIC | Age: 52
End: 2017-11-02

## 2017-11-02 VITALS
BODY MASS INDEX: 48.35 KG/M2 | DIASTOLIC BLOOD PRESSURE: 70 MMHG | HEART RATE: 94 BPM | SYSTOLIC BLOOD PRESSURE: 130 MMHG | WEIGHT: 293 LBS

## 2017-11-02 DIAGNOSIS — I48.91 ATRIAL FIBRILLATION, UNSPECIFIED TYPE (HCC): ICD-10-CM

## 2017-11-02 DIAGNOSIS — I48.92 ATRIAL FLUTTER, UNSPECIFIED TYPE (HCC): ICD-10-CM

## 2017-11-02 DIAGNOSIS — R07.9 CHEST PAIN AT REST: Primary | ICD-10-CM

## 2017-11-02 PROCEDURE — 99214 OFFICE O/P EST MOD 30 MIN: CPT | Performed by: NURSE PRACTITIONER

## 2017-11-02 PROCEDURE — 93000 ELECTROCARDIOGRAM COMPLETE: CPT | Performed by: NURSE PRACTITIONER

## 2017-11-03 ENCOUNTER — TELEPHONE (OUTPATIENT)
Dept: CARDIOLOGY CLINIC | Age: 52
End: 2017-11-03

## 2017-11-10 PROCEDURE — 93270 REMOTE 30 DAY ECG REV/REPORT: CPT | Performed by: INTERNAL MEDICINE

## 2017-11-20 DIAGNOSIS — F41.9 ANXIETY: ICD-10-CM

## 2017-11-20 DIAGNOSIS — F51.02 INSOMNIA DUE TO PSYCHOLOGICAL STRESS: Primary | ICD-10-CM

## 2017-11-21 RX ORDER — DIAZEPAM 10 MG/1
TABLET ORAL
Qty: 60 TABLET | Refills: 0 | Status: SHIPPED | OUTPATIENT
Start: 2017-11-21 | End: 2018-02-07 | Stop reason: SDUPTHER

## 2017-11-21 RX ORDER — ZOLPIDEM TARTRATE 5 MG/1
TABLET ORAL
Qty: 14 TABLET | Refills: 1 | Status: ON HOLD | OUTPATIENT
Start: 2017-11-21 | End: 2018-03-15 | Stop reason: HOSPADM

## 2017-11-21 NOTE — TELEPHONE ENCOUNTER
Please let patient know prescriptions sent to pharmacy. Please review pill amounts on each.   The right denies she has had a see number of doctors including the cardiologist but I need to see her every 3 months for these medications for renewal.

## 2017-11-30 ENCOUNTER — OFFICE VISIT (OUTPATIENT)
Dept: FAMILY MEDICINE CLINIC | Age: 52
End: 2017-11-30

## 2017-11-30 VITALS
SYSTOLIC BLOOD PRESSURE: 112 MMHG | DIASTOLIC BLOOD PRESSURE: 86 MMHG | OXYGEN SATURATION: 93 % | TEMPERATURE: 95.3 F | BODY MASS INDEX: 44.41 KG/M2 | WEIGHT: 293 LBS | HEIGHT: 68 IN | HEART RATE: 119 BPM

## 2017-11-30 DIAGNOSIS — L30.9 DERMATITIS: Primary | ICD-10-CM

## 2017-11-30 PROCEDURE — 99214 OFFICE O/P EST MOD 30 MIN: CPT | Performed by: FAMILY MEDICINE

## 2017-11-30 PROCEDURE — 96372 THER/PROPH/DIAG INJ SC/IM: CPT | Performed by: FAMILY MEDICINE

## 2017-11-30 RX ORDER — TRIAMCINOLONE ACETONIDE 40 MG/ML
40 INJECTION, SUSPENSION INTRA-ARTICULAR; INTRAMUSCULAR ONCE
Status: COMPLETED | OUTPATIENT
Start: 2017-11-30 | End: 2017-11-30

## 2017-11-30 RX ADMIN — TRIAMCINOLONE ACETONIDE 40 MG: 40 INJECTION, SUSPENSION INTRA-ARTICULAR; INTRAMUSCULAR at 15:23

## 2017-12-08 NOTE — PROGRESS NOTES
by mouth daily  Yes Historical Provider, MD   fluticasone (FLONASE) 50 MCG/ACT nasal spray 1 spray by Nasal route daily Yes George Hamilton MD   vitamin B-12 (CYANOCOBALAMIN) 1000 MCG tablet Take 1,000 mcg by mouth daily Yes Historical Provider, MD   Lysine 1000 MG TABS Take 1 tablet by mouth daily Yes Historical Provider, MD   mometasone-formoterol (DULERA) 200-5 MCG/ACT inhaler Inhale 1 puff into the lungs 2 times daily Yes Valiant Left Bingcang, DO   albuterol sulfate HFA (VENTOLIN HFA) 108 (90 BASE) MCG/ACT inhaler INHALE 2 PUFFS BY MOUTH EVERY 4 TO 6 HOURS AS NEEDED Yes Valiant Left Bingcang, DO   Spacer/Aero-Holding Chambers (AEROCHAMBER PLUS-FLOW SIGNAL) MISC Use with MDI as instructed Yes George Hamilton MD   Cholecalciferol (VITAMIN D3) 2000 UNITS CAPS Take 5 capsules by mouth daily Yes Valiant Left Bingcang, DO          Vitals:    11/30/17 1450   BP: 112/86   Pulse: 119   Temp: 81.8781617104532 °F (35.2 °C)   TempSrc: Oral   SpO2: 93%   Weight: (!) 342 lb (155.1 kg)   Height: 5' 7.92\" (1.725 m)      Wt Readings from Last 3 Encounters:   11/30/17 (!) 342 lb (155.1 kg)   11/10/17 (!) 325 lb (147.4 kg)   11/02/17 (!) 337 lb (152.9 kg)     BP Readings from Last 3 Encounters:   11/30/17 112/86   11/10/17 105/77   11/02/17 130/70       Patient Active Problem List   Diagnosis    Microcytic anemia    Asthma    GERD (gastroesophageal reflux disease)    Anxiety    Edema    S/P gastric bypass    Post-nasal drainage    BMI 45.0-49.9, adult ([address]    Chest pain at rest    [facility] LVEF [facility]    Peripheral edema         Immunization History   Administered Date(s) Administered    BCG 05/28/2013    DTaP 05/28/2013    Influenza Vaccine, unspecified formulation 01/09/2017    Influenza Virus Vaccine 10/23/2017, 10/23/2017    PPD Test 06/11/2013, 05/07/2014       Past Medical History:   Diagnosis Date    Anxiety     Arthritis     Asthma     Atrial fibrillation ([facility]     new dx 4 weeks ago, first of  Sept 2017    Edema     Fibromyalgia     GERD (gastroesophageal reflux disease)     reflux    Hiatal hernia      Past Surgical History:   Procedure Laterality Date    ABDOMEN SURGERY  2001    [place]astric bypass--[place]    HERNIA REPAIR  2001    HYSTERECTOMY  2004    ARIAS-EN-Y GASTRIC BYPASS      TONSILLECTOMY AND ADENOIDECTOMY       Family History   Problem Relation Age of Onset    Cancer Mother      lung    Arthritis Mother     Cirrhosis Father     Asthma Maternal Aunt      Social History     Social History    Marital status:      Spouse name: N/A    Number of children: N/A    Years of education: N/A     Occupational History    Not on file. Social History Main Topics    Smoking status: Never Smoker    Smokeless tobacco: Never Used    Alcohol use Yes      Comment: \"Occasionally\"    Drug use: No    Sexual activity: Yes     Partners: Male     Other Topics Concern    Not on file     Social History Narrative    No narrative on file       O: /86   Pulse 119   Temp 95.2135355330688 °F (35.2 °C) (Oral)   Ht 5' 7.92\" (1.725 m)   Wt (!) 342 lb (155.1 kg)   SpO2 93%   BMI 52.12 kg/m²   Physical Exam  GEN: No acute distress, cooperative, well nourished, alert. HEENT: PEERLA, EOMI , normocephalic/atraumatic, nares and oropharynx clear. Mucus membranes normal, Tympanic membranes clear bilaterally. Neck: soft, supple, no thyromegaly, mass, no Lymphadenopathy  CV: Regular rate and rhythm, no murmur, rubs, gallops. No edema. Resp: Clear to auscultation bilaterally good air entry bilaterally  No crackles, wheeze. Breathing comfortably. Psych: normal affect. Neuro: AOx3  Skin: papular patches on upperarms. ASSESSMENT  1. Dermatitis  Crisaborole (EUCRISA) 2 % OINT    triamcinolone acetonide (KENALOG-40) injection 40 mg     The risks, benefits, potential side effects and barriers to medication use were addressed today. Understanding was acknowledged.   Patient asked

## 2017-12-11 ENCOUNTER — TELEPHONE (OUTPATIENT)
Dept: CARDIOLOGY CLINIC | Age: 52
End: 2017-12-11

## 2017-12-19 ENCOUNTER — NURSE ONLY (OUTPATIENT)
Dept: CARDIOLOGY CLINIC | Age: 52
End: 2017-12-19

## 2017-12-19 ENCOUNTER — OFFICE VISIT (OUTPATIENT)
Dept: CARDIOLOGY CLINIC | Age: 52
End: 2017-12-19

## 2017-12-19 VITALS
WEIGHT: 293 LBS | HEART RATE: 90 BPM | BODY MASS INDEX: 52.12 KG/M2 | SYSTOLIC BLOOD PRESSURE: 120 MMHG | DIASTOLIC BLOOD PRESSURE: 80 MMHG

## 2017-12-19 DIAGNOSIS — R07.9 CHEST PAIN AT REST: Primary | ICD-10-CM

## 2017-12-19 DIAGNOSIS — I48.19 PERSISTENT ATRIAL FIBRILLATION (HCC): ICD-10-CM

## 2017-12-19 DIAGNOSIS — Z01.818 PRE-OP TESTING: ICD-10-CM

## 2017-12-19 LAB
BASOPHILS ABSOLUTE: 0.1 K/UL (ref 0–0.2)
BASOPHILS RELATIVE PERCENT: 0.8 %
EOSINOPHILS ABSOLUTE: 0.2 K/UL (ref 0–0.6)
EOSINOPHILS RELATIVE PERCENT: 3.7 %
HCT VFR BLD CALC: 41 % (ref 36–48)
HEMOGLOBIN: 13.5 G/DL (ref 12–16)
INR BLD: 0.96 (ref 0.85–1.15)
LYMPHOCYTES ABSOLUTE: 2.2 K/UL (ref 1–5.1)
LYMPHOCYTES RELATIVE PERCENT: 33.1 %
MCH RBC QN AUTO: 30.1 PG (ref 26–34)
MCHC RBC AUTO-ENTMCNC: 32.9 G/DL (ref 31–36)
MCV RBC AUTO: 91.5 FL (ref 80–100)
MONOCYTES ABSOLUTE: 0.6 K/UL (ref 0–1.3)
MONOCYTES RELATIVE PERCENT: 8.6 %
NEUTROPHILS ABSOLUTE: 3.6 K/UL (ref 1.7–7.7)
NEUTROPHILS RELATIVE PERCENT: 53.8 %
PDW BLD-RTO: 14.9 % (ref 12.4–15.4)
PLATELET # BLD: 284 K/UL (ref 135–450)
PMV BLD AUTO: 9.9 FL (ref 5–10.5)
PROTHROMBIN TIME: 10.8 SEC (ref 9.6–13)
RBC # BLD: 4.48 M/UL (ref 4–5.2)
WBC # BLD: 6.7 K/UL (ref 4–11)

## 2017-12-19 PROCEDURE — 93000 ELECTROCARDIOGRAM COMPLETE: CPT | Performed by: INTERNAL MEDICINE

## 2017-12-19 PROCEDURE — 99214 OFFICE O/P EST MOD 30 MIN: CPT | Performed by: INTERNAL MEDICINE

## 2017-12-19 RX ORDER — PROPAFENONE HYDROCHLORIDE 225 MG/1
225 CAPSULE, EXTENDED RELEASE ORAL 2 TIMES DAILY
Qty: 60 CAPSULE | Refills: 3 | Status: SHIPPED | OUTPATIENT
Start: 2017-12-19 | End: 2018-04-12 | Stop reason: SDUPTHER

## 2017-12-19 NOTE — PROGRESS NOTES
[address]   Cardiac Consultation    Referring Provider:  Adriane Vyas DO     Chief Concerns: new onset AF, AFL & 30 days event monitor follow up    HPI:  Javon Ball is a 46 y.o. female who presents in the office for PAF, AFL, & 30 days event monitor follow up. She has hx of asthma, GERD, dCHF, & obesity s/p gastric bypass. She stated she has family history of AF (siblings) and she is recently under a lot of stress. She gets anxiety easily. She can tell if she is in irregular AF & AFL. She has felt palpitation for few month now and quite persistent since Sep 2017. She has felt fatigue, racing heart beats, lightheadedness, SOB, nervousness, and even in her sleep. On her recent ECGs:  9/12/17 showed AF, 10/3/17 showed AFL, 3/17 showed SR. She finally went to ER in early Oct 17 and has placed on Xarelto 20 mg daily for stroke prevention. She has tolerated well and showed no S/S of bleeding. Her PASHA-VASc: 2. She is currently taking 75 mg of Toprol in the morning for HR control. Recent labs: TSH: 2.41 (9/12/17), Cr: 0.8 (9/12/17). Last Echo (6/2/17) showed est LVEF 50-55%. Annalisa Myoview on 6/3/17 showed negative for acute ischemia. In 2014 she stated she had palpitation and 30 days event monitor was ordered by was not worn by her. The patient stated her palpitation was more than likely related to her anxiety instead actual palpitation. Rythmol 225 mg was started on 10/13/17. She stated she has not felt her palpitations improve much. She has felt her chest tight and heaviness (not pressure), SOB, and fatigue along with the palpitations. She wore 30 days event monitor 11/10/17-12/14/17. The monitor showed atrial flutter. She is also on Toprol XL 50 mg in am & 25 mg in pm.   Today her ECG showed atrial flutter with heart rate 92. She is currently [employer]. She recently has encountered stressful life events and taking Valium to manage her anxiety.       Past 5    albuterol sulfate HFA (VENTOLIN HFA) 108 (90 BASE) MCG/ACT inhaler INHALE 2 PUFFS BY MOUTH EVERY 4 TO 6 HOURS AS NEEDED 2 Inhaler 5    Spacer/Aero-Holding Chambers (AEROCHAMBER PLUS-FLOW SIGNAL) MISC Use with MDI as instructed 2 each 3    Cholecalciferol (VITAMIN D3) 2000 UNITS CAPS Take 5 capsules by mouth daily 150 capsule 3    [DISCONTINUED] propafenone (RYTHMOL SR) 225 MG extended release capsule Take 1 capsule by mouth 3 times daily 90 capsule 3     No facility-administered encounter medications on file as of 12/19/2017. Allergies:  Latex; Aspirin; Demerol; Meperidine; Nsaids; Pcn [penicillins]; Ranitidine hcl; and Sulfa antibiotics     Review of Systems   Constitutional: Negative. HENT: Negative. Eyes: Negative. Respiratory: Negative. Cardiovascular: Negative. Gastrointestinal: Negative. Genitourinary: Negative. Musculoskeletal: Negative. Skin: Negative. Neurological: Negative. Hematological: Negative. Psychiatric/Behavioral: Negative. /80   Pulse 90   Wt (!) 342 lb (155.1 kg)   BMI 52.12 kg/m²       Objective:  Physical Exam   Constitutional: She is oriented to person, place, and time. She appears well-developed and well-nourished. HENT:   Head: Normocephalic and atraumatic. Eyes: Pupils are equal, round, and reactive to light. Neck: Normal range of motion. Cardiovascular: Normal rate, irregular rhythm and +S1 & S2 heart sounds. Pulmonary/Chest: Effort normal and breath sounds normal.   Abdominal: Soft. No tenderness. Musculoskeletal: Normal range of motion. She exhibits no edema. Neurological: She is alert and oriented to person, place, and time. Skin: Skin is warm and dry. Psychiatric: She has a normal mood and affect. EKG 12/19/17. AFL     Echo 6/2/17  Left ventricular size is normal . Normal left ventricular wall thickness.   Left ventricular function is normal with ejection fraction estimated at   50-55 %.  No regional wall

## 2017-12-20 NOTE — PROGRESS NOTES
EP Procedure Patient Instructions    Procedure: External Cardioversion    Date-Procedure: 12/21/17. Procedure Time: 2 pm.    Arrival Time: 12:30 pm    Arrival at Hospital: [facility] (Main Entrance, go to 1st floor and check in at [address])    ----------------------------------------------------------------------------------------------------------  1. Patient may have clear liquid diet before 6:30 am.     2.  Take your routine medications with only a sip of water to swallow them on 12/21/17. You may brush your teeth and rinse. 3.  If you are taking blood thinner- Eliquis please continue. 4.  Please bring a list of your medications to the hospital with you. 5.  Do not apply any body lotion or powder deodorant on the day of the procedure! 6. Someone will need to be available to drive you home on [address]. It is recommended that you do not drive 24 hours after the procedure. 7.  If you are unable to make this appointment, please call [phone] [facility] office)    8. Labs---CBC, BMP, PT/INR are due on 12/20.

## 2017-12-21 ENCOUNTER — HOSPITAL ENCOUNTER (OUTPATIENT)
Dept: CARDIAC CATH/INVASIVE PROCEDURES | Age: 52
Discharge: HOME | End: 2017-12-21
Attending: INTERNAL MEDICINE | Admitting: INTERNAL MEDICINE

## 2017-12-21 LAB
EKG ATRIAL RATE: 113 BPM
EKG DIAGNOSIS: NORMAL
EKG Q-T INTERVAL: 424 MS
EKG QRS DURATION: 118 MS
EKG QTC CALCULATION (BAZETT): 589 MS
EKG R AXIS: 62 DEGREES
EKG T AXIS: 66 DEGREES
EKG VENTRICULAR RATE: 116 BPM

## 2017-12-26 DIAGNOSIS — I48.19 PERSISTENT ATRIAL FIBRILLATION (HCC): ICD-10-CM

## 2018-01-03 DIAGNOSIS — J45.40 MODERATE PERSISTENT ASTHMA WITHOUT COMPLICATION: ICD-10-CM

## 2018-01-04 ENCOUNTER — TELEPHONE (OUTPATIENT)
Dept: FAMILY MEDICINE CLINIC | Age: 53
End: 2018-01-04

## 2018-01-04 DIAGNOSIS — L30.9 DERMATITIS: Primary | ICD-10-CM

## 2018-01-12 RX ORDER — TRIAMCINOLONE ACETONIDE 1 MG/G
CREAM TOPICAL
Qty: 80 G | Refills: 0 | Status: SHIPPED | OUTPATIENT
Start: 2018-01-12 | End: 2018-04-13 | Stop reason: SDUPTHER

## 2018-01-18 ENCOUNTER — NURSE ONLY (OUTPATIENT)
Dept: CARDIOLOGY CLINIC | Age: 53
End: 2018-01-18

## 2018-01-18 ENCOUNTER — TELEPHONE (OUTPATIENT)
Dept: CARDIOLOGY CLINIC | Age: 53
End: 2018-01-18

## 2018-01-23 ENCOUNTER — OFFICE VISIT (OUTPATIENT)
Dept: FAMILY MEDICINE CLINIC | Age: 53
End: 2018-01-23

## 2018-01-23 ENCOUNTER — TELEPHONE (OUTPATIENT)
Dept: CARDIOLOGY CLINIC | Age: 53
End: 2018-01-23

## 2018-01-23 VITALS
WEIGHT: 293 LBS | BODY MASS INDEX: 44.41 KG/M2 | HEART RATE: 73 BPM | TEMPERATURE: 98.8 F | SYSTOLIC BLOOD PRESSURE: 102 MMHG | HEIGHT: 68 IN | OXYGEN SATURATION: 96 % | DIASTOLIC BLOOD PRESSURE: 72 MMHG

## 2018-01-23 DIAGNOSIS — L50.9 HIVES: ICD-10-CM

## 2018-01-23 DIAGNOSIS — R49.0 HOARSENESS: ICD-10-CM

## 2018-01-23 DIAGNOSIS — J20.9 BRONCHITIS WITH BRONCHOSPASM: Primary | ICD-10-CM

## 2018-01-23 PROCEDURE — 99214 OFFICE O/P EST MOD 30 MIN: CPT | Performed by: FAMILY MEDICINE

## 2018-01-23 RX ORDER — DOXYCYCLINE HYCLATE 100 MG/1
100 CAPSULE ORAL 2 TIMES DAILY
Qty: 14 CAPSULE | Refills: 0 | Status: SHIPPED | OUTPATIENT
Start: 2018-01-23 | End: 2018-01-30

## 2018-01-23 RX ORDER — BENZONATATE 200 MG/1
200 CAPSULE ORAL 3 TIMES DAILY PRN
Qty: 21 CAPSULE | Refills: 0 | Status: SHIPPED | OUTPATIENT
Start: 2018-01-23 | End: 2018-01-30

## 2018-01-23 RX ORDER — METHYLPREDNISOLONE 4 MG/1
TABLET ORAL
Qty: 1 KIT | Refills: 0 | Status: SHIPPED | OUTPATIENT
Start: 2018-01-23 | End: 2018-01-29

## 2018-01-25 NOTE — PROGRESS NOTES
disease)    Anxiety    Edema    S/P gastric bypass    Post-nasal drainage    BMI 45.0-49.9, adult (H[facility])    Chest pain at rest    CHF with unknown LVEF ([facility])    Peripheral edema    Persistent atrial fibrillation ([facility])         Immunization History   Administered Date(s) Administered    BCG 05/28/2013    DTaP 05/28/2013    Influenza Vaccine, unspecified formulation 01/09/2017    Influenza Virus Vaccine 10/23/2017, 10/23/2017    PPD Test 06/11/2013, 05/07/2014       Past Medical History:   Diagnosis Date    Anxiety     Arthritis     Asthma     Atrial fibrillation ([address]     new dx 4 weeks ago, first of  Sept 2017    Edema     Fibromyalgia     GERD (gastroesophageal reflux disease)     reflux    Hiatal hernia      Past Surgical History:   Procedure Laterality Date    ABDOMEN SURGERY  2001    gastric bypass--[place]    HERNIA REPAIR  2001    HYSTERECTOMY  2004    ARIAS-EN-Y GASTRIC BYPASS      TONSILLECTOMY AND ADENOIDECTOMY       Family History   Problem Relation Age of Onset    Cancer Mother      lung    Arthritis Mother     Cirrhosis Father     Asthma Maternal Aunt      Social History     Social History    Marital status:      Spouse name: N/A    Number of children: N/A    Years of education: N/A     Occupational History    Not on file. Social History Main Topics    Smoking status: Never Smoker    Smokeless tobacco: Never Used    Alcohol use Yes      Comment: \"Occasionally\"    Drug use: No    Sexual activity: Yes     Partners: Male     Other Topics Concern    Not on file     Social History Narrative    No narrative on file       O: /72 (Site: Left Arm, Position: Sitting, Cuff Size: Large Adult)   Pulse 73   Temp 98.8 °F (37.1 °C) (Oral)   Ht 5' 8\" (1.727 m)   Wt (!) 336 lb (152.4 kg)   SpO2 96% Comment: RA  BMI 51.09 kg/m²   Physical Exam  GEN: No acute distress, cooperative, well nourished, alert.  Hoarseness of voice  HEENT: CHUY, EOMI , normocephalic/atraumatic, nares and oropharynx clear. Mucus membranes normal, Tympanic membranes clear bilaterally. Neck: soft, supple, no thyromegaly, mass, no Lymphadenopathy  CV: Regular rate and rhythm, no murmur, rubs, gallops. No edema. Resp: Clear to auscultation bilaterally good air entry bilaterally  No crackles, wheeze. Breathing comfortably. Psych: normal affect. Neuro: AOx3  Skin: Hives on right anterior shoulder, and lower back and neck. ASSESSMENT  1. Bronchitis with bronchospasm  doxycycline hyclate (VIBRAMYCIN) 100 MG capsule    methylPREDNISolone (MEDROL DOSEPACK) 4 MG tablet    benzonatate (TESSALON) 200 MG capsule   2. Hoarseness  methylPREDNISolone (MEDROL DOSEPACK) 4 MG tablet   3. Hives  methylPREDNISolone (MEDROL DOSEPACK) 4 MG tablet       I called Dr. Ti Davis office to reschedule cardioversion for next week Monday Jan 29  #1: The risks, benefits, potential side effects and barriers to medication use were addressed today. Understanding was acknowledged. Patient asked to follow-up if condition(s) do not improve as anticipated. #2 and #3: The risks, benefits, potential side effects and barriers to medication use were addressed today. Understanding was acknowledged. Patient asked to follow-up if condition(s) do not improve as anticipated. PLAN          See rest of plan under patient instructions. Return if symptoms worsen or fail to improve. Patient Instructions   Be at the hospital at 11:30 am on Monday. Please note a portion of this chart was generated using dragon dictation software. Although every effort was made to ensure the accuracy of this automated transcription, some errors in transcription may have occurred.

## 2018-01-29 ENCOUNTER — HOSPITAL ENCOUNTER (OUTPATIENT)
Dept: CARDIAC CATH/INVASIVE PROCEDURES | Age: 53
Discharge: HOME | End: 2018-01-29
Admitting: INTERNAL MEDICINE

## 2018-01-29 VITALS — WEIGHT: 293 LBS | HEIGHT: 70 IN | BODY MASS INDEX: 41.95 KG/M2 | TEMPERATURE: 97.7 F

## 2018-01-29 LAB
A/G RATIO: 1.3 (ref 1.1–2.2)
ALBUMIN SERPL-MCNC: 4 G/DL (ref 3.4–5)
ALP BLD-CCNC: 98 U/L (ref 40–129)
ALT SERPL-CCNC: 21 U/L (ref 10–40)
ANION GAP SERPL CALCULATED.3IONS-SCNC: 13 MMOL/L (ref 3–16)
AST SERPL-CCNC: 17 U/L (ref 15–37)
BILIRUB SERPL-MCNC: 0.8 MG/DL (ref 0–1)
BUN BLDV-MCNC: 24 MG/DL (ref 7–20)
CALCIUM SERPL-MCNC: 8.9 MG/DL (ref 8.3–10.6)
CHLORIDE BLD-SCNC: 102 MMOL/L (ref 99–110)
CO2: 26 MMOL/L (ref 21–32)
CREAT SERPL-MCNC: 0.9 MG/DL (ref 0.6–1.1)
GFR AFRICAN AMERICAN: >60
GFR NON-AFRICAN AMERICAN: >60
GLOBULIN: 3 G/DL
GLUCOSE BLD-MCNC: 88 MG/DL (ref 70–99)
HCT VFR BLD CALC: 44.2 % (ref 36–48)
HEMOGLOBIN: 14.6 G/DL (ref 12–16)
INR BLD: 1.38 (ref 0.85–1.15)
MCH RBC QN AUTO: 30.3 PG (ref 26–34)
MCHC RBC AUTO-ENTMCNC: 33.2 G/DL (ref 31–36)
MCV RBC AUTO: 91.3 FL (ref 80–100)
PDW BLD-RTO: 14.5 % (ref 12.4–15.4)
PLATELET # BLD: 291 K/UL (ref 135–450)
PMV BLD AUTO: 9 FL (ref 5–10.5)
POTASSIUM REFLEX MAGNESIUM: 3.7 MMOL/L (ref 3.5–5.1)
POTASSIUM SERPL-SCNC: 3.7 MMOL/L (ref 3.5–5.1)
PROTHROMBIN TIME: 15.6 SEC (ref 9.6–13)
RBC # BLD: 4.83 M/UL (ref 4–5.2)
SODIUM BLD-SCNC: 141 MMOL/L (ref 136–145)
TOTAL PROTEIN: 7 G/DL (ref 6.4–8.2)
WBC # BLD: 8.1 K/UL (ref 4–11)

## 2018-01-29 PROCEDURE — 93010 ELECTROCARDIOGRAM REPORT: CPT | Performed by: INTERNAL MEDICINE

## 2018-01-29 PROCEDURE — 92960 CARDIOVERSION ELECTRIC EXT: CPT | Performed by: INTERNAL MEDICINE

## 2018-01-29 RX ORDER — SODIUM CHLORIDE 0.9 % (FLUSH) 0.9 %
10 SYRINGE (ML) INJECTION EVERY 12 HOURS SCHEDULED
Status: DISCONTINUED | OUTPATIENT
Start: 2018-01-29 | End: 2018-01-30 | Stop reason: HOSPADM

## 2018-01-29 RX ORDER — SODIUM CHLORIDE 9 MG/ML
INJECTION, SOLUTION INTRAVENOUS CONTINUOUS
Status: DISCONTINUED | OUTPATIENT
Start: 2018-01-29 | End: 2018-01-30 | Stop reason: HOSPADM

## 2018-01-29 RX ORDER — MIDAZOLAM HYDROCHLORIDE 1 MG/ML
1 INJECTION INTRAMUSCULAR; INTRAVENOUS ONCE
Status: DISCONTINUED | OUTPATIENT
Start: 2018-01-29 | End: 2018-01-30 | Stop reason: HOSPADM

## 2018-01-29 RX ORDER — SODIUM CHLORIDE 0.9 % (FLUSH) 0.9 %
10 SYRINGE (ML) INJECTION PRN
Status: DISCONTINUED | OUTPATIENT
Start: 2018-01-29 | End: 2018-01-30 | Stop reason: HOSPADM

## 2018-01-29 NOTE — H&P
past medical history of Anxiety; Arthritis; Asthma; Atrial fibrillation ([address] Edema; Fibromyalgia; GERD (gastroesophageal reflux disease); and Hiatal hernia.     Surgical History:   has a past surgical history that includes Abdomen surgery (2001); hernia repair (2001); Hysterectomy (2004); Tonsillectomy and adenoidectomy; and Romie-en-Y Gastric Bypass.      Social History:   reports that she has never smoked. She has never used smokeless tobacco. She reports that she drinks alcohol. She reports that she does not use drugs.      Family History:  family history includes Arthritis in her mother; Asthma in her maternal aunt; Cancer in her mother; Cirrhosis in her father.      Home Medications:  Encounter Medications          Outpatient Encounter Prescriptions as of 12/19/2017   Medication Sig Dispense Refill    Crisaborole (EUCRISA) 2 % OINT Apply in the morning and again in the evening.  60 g 1    diazepam (VALIUM) 10 MG tablet TAKE ONE TABLET BY MOUTH TWICE DAILY AS NEEDED FOR ANXIETY 60 tablet 0    zolpidem (AMBIEN) 5 MG tablet TAKE 1 TABLET BY MOUTH NIGHTLY AS NEEDED FOR SLEEP 14 tablet 1    apixaban (ELIQUIS) 5 MG TABS tablet Take 1 tablet by mouth 2 times daily 60 tablet 5    metoprolol succinate (TOPROL XL) 50 MG extended release tablet Take 1.5 tablets by mouth daily 50 mg in am & 25 mg in pm 90 tablet 3    furosemide (LASIX) 40 MG tablet Take 1 tablet by mouth 2 times daily 60 tablet 5    potassium chloride (KLOR-CON M) 20 MEQ extended release tablet Take 1 tablet by mouth 2 times daily 60 tablet 3    Multiple Vitamins-Minerals (CENTRUM SILVER ADULT 50+ PO) Take 1 tablet by mouth daily         fluticasone (FLONASE) 50 MCG/ACT nasal spray 1 spray by Nasal route daily 1 Bottle 3    vitamin B-12 (CYANOCOBALAMIN) 1000 MCG tablet Take 1,000 mcg by mouth daily        Lysine 1000 MG TABS Take 1 tablet by mouth daily        mometasone-formoterol (DULERA) 200-5 MCG/ACT inhaler Inhale 1 puff into the lungs 2 times daily 8.8 g 5    albuterol sulfate HFA (VENTOLIN HFA) 108 (90 BASE) MCG/ACT inhaler INHALE 2 PUFFS BY MOUTH EVERY 4 TO 6 HOURS AS NEEDED 2 Inhaler 5    Spacer/Aero-Holding Chambers (AEROCHAMBER PLUS-FLOW SIGNAL) MISC Use with MDI as instructed 2 each 3    Cholecalciferol (VITAMIN D3) 2000 UNITS CAPS Take 5 capsules by mouth daily 150 capsule 3    [DISCONTINUED] propafenone (RYTHMOL SR) 225 MG extended release capsule Take 1 capsule by mouth 3 times daily 90 capsule 3      No facility-administered encounter medications on file as of 12/19/2017.             Allergies:  Latex; Aspirin; Demerol; Meperidine; Nsaids; Pcn [penicillins]; Ranitidine hcl; and Sulfa antibiotics      Review of Systems   Constitutional: Negative. HENT: Negative. Eyes: Negative. Respiratory: Negative. Cardiovascular: Negative. Gastrointestinal: Negative. Genitourinary: Negative. Musculoskeletal: Negative. Skin: Negative. Neurological: Negative. Hematological: Negative. Psychiatric/Behavioral: Negative.     /80   Pulse 90   Wt (!) 342 lb (155.1 kg)   BMI 52.12 kg/m²         Objective:  Physical Exam   Constitutional: She is oriented to person, place, and time. She appears well-developed and well-nourished. HENT:   Head: Normocephalic and atraumatic. Eyes: Pupils are equal, round, and reactive to light. Neck: Normal range of motion. Cardiovascular: Normal rate, irregular rhythm and +S1 & S2 heart sounds. Pulmonary/Chest: Effort normal and breath sounds normal.   Abdominal: Soft. No tenderness. Musculoskeletal: Normal range of motion. She exhibits no edema. Neurological: She is alert and oriented to person, place, and time. Skin: Skin is warm and dry. Psychiatric: She has a normal mood and affect.      EKG 12/19/17.  AFL      Echo 6/2/17  Left ventricular size is normal . Normal left ventricular wall thickness.   Left ventricular function is normal with ejection fraction estimated

## 2018-01-29 NOTE — PROCEDURES
Anesthesia: versed 1.5 mg, brevital 60 mg  Complications: none apparent  Findings: successful conversion to sinus rhythm with 150 J synchronized CV shock. Uneventful recovery.

## 2018-01-31 LAB
EKG ATRIAL RATE: 234 BPM
EKG DIAGNOSIS: NORMAL
EKG P AXIS: -84 DEGREES
EKG Q-T INTERVAL: 432 MS
EKG QRS DURATION: 128 MS
EKG QTC CALCULATION (BAZETT): 602 MS
EKG R AXIS: 64 DEGREES
EKG T AXIS: 27 DEGREES
EKG VENTRICULAR RATE: 117 BPM

## 2018-02-07 DIAGNOSIS — F41.9 ANXIETY: ICD-10-CM

## 2018-02-08 RX ORDER — DIAZEPAM 10 MG/1
TABLET ORAL
Qty: 60 TABLET | Refills: 0 | Status: ON HOLD | OUTPATIENT
Start: 2018-02-08 | End: 2018-03-15 | Stop reason: HOSPADM

## 2018-02-28 ENCOUNTER — OFFICE VISIT (OUTPATIENT)
Dept: CARDIOLOGY CLINIC | Age: 53
End: 2018-02-28

## 2018-02-28 VITALS
BODY MASS INDEX: 50.77 KG/M2 | SYSTOLIC BLOOD PRESSURE: 124 MMHG | DIASTOLIC BLOOD PRESSURE: 82 MMHG | HEART RATE: 68 BPM | WEIGHT: 293 LBS

## 2018-02-28 DIAGNOSIS — I48.92 ATRIAL FLUTTER, UNSPECIFIED TYPE (HCC): Primary | ICD-10-CM

## 2018-02-28 PROCEDURE — 99214 OFFICE O/P EST MOD 30 MIN: CPT | Performed by: INTERNAL MEDICINE

## 2018-02-28 RX ORDER — METOPROLOL SUCCINATE 25 MG/1
25 TABLET, EXTENDED RELEASE ORAL DAILY
Qty: 30 TABLET | Refills: 3 | Status: ON HOLD
Start: 2018-02-28 | End: 2018-03-12 | Stop reason: HOSPADM

## 2018-02-28 NOTE — PROGRESS NOTES
Medications:  Outpatient Encounter Prescriptions as of 2/28/2018   Medication Sig Dispense Refill    diazepam (VALIUM) 10 MG tablet TAKE ONE TABLET BY MOUTH TWICE A DAY AS NEEDED FOR ANXIETY 60 tablet 0    triamcinolone (KENALOG) 0.1 % cream Apply topically 2 times daily. 80 g 0    mometasone-formoterol (DULERA) 200-5 MCG/ACT inhaler Inhale 1 puff into the lungs 2 times daily 8.8 g 5    propafenone (RYTHMOL SR) 225 MG extended release capsule Take 1 capsule by mouth 2 times daily 60 capsule 3    zolpidem (AMBIEN) 5 MG tablet TAKE 1 TABLET BY MOUTH NIGHTLY AS NEEDED FOR SLEEP 14 tablet 1    apixaban (ELIQUIS) 5 MG TABS tablet Take 1 tablet by mouth 2 times daily 60 tablet 5    metoprolol succinate (TOPROL XL) 50 MG extended release tablet Take 1.5 tablets by mouth daily 50 mg in am & 25 mg in pm (Patient taking differently: Take 25 mg by mouth 2 times daily 25 mg BID) 90 tablet 3    furosemide (LASIX) 40 MG tablet Take 1 tablet by mouth 2 times daily 60 tablet 5    potassium chloride (KLOR-CON M) 20 MEQ extended release tablet Take 1 tablet by mouth 2 times daily 60 tablet 3    Multiple Vitamins-Minerals (CENTRUM SILVER ADULT 50+ PO) Take 1 tablet by mouth daily       fluticasone (FLONASE) 50 MCG/ACT nasal spray 1 spray by Nasal route daily 1 Bottle 3    vitamin B-12 (CYANOCOBALAMIN) 1000 MCG tablet Take 1,000 mcg by mouth daily      Lysine 1000 MG TABS Take 1 tablet by mouth daily      albuterol sulfate HFA (VENTOLIN HFA) 108 (90 BASE) MCG/ACT inhaler INHALE 2 PUFFS BY MOUTH EVERY 4 TO 6 HOURS AS NEEDED 2 Inhaler 5    Spacer/Aero-Holding Chambers (AEROCHAMBER PLUS-FLOW SIGNAL) MISC Use with MDI as instructed 2 each 3    Cholecalciferol (VITAMIN D3) 2000 UNITS CAPS Take 5 capsules by mouth daily 150 capsule 3     No facility-administered encounter medications on file as of 2/28/2018. Allergies:  Latex; Aspirin; Demerol; Meperidine; Nsaids; Pcn [penicillins];  Ranitidine hcl; and Sulfa

## 2018-03-11 PROBLEM — R45.851 SUICIDAL INTENT: Status: ACTIVE | Noted: 2018-03-11

## 2018-03-12 PROBLEM — F99 PSYCHIATRIC DISORDER: Status: ACTIVE | Noted: 2018-03-12

## 2018-03-13 PROBLEM — Z98.84 S/P GASTRIC BYPASS: Chronic | Status: RESOLVED | Noted: 2017-02-08 | Resolved: 2018-03-13

## 2018-03-13 PROBLEM — I50.9 CHF WITH UNKNOWN LVEF (HCC): Status: RESOLVED | Noted: 2017-06-02 | Resolved: 2018-03-13

## 2018-03-13 PROBLEM — I48.19 PERSISTENT ATRIAL FIBRILLATION (HCC): Chronic | Status: RESOLVED | Noted: 2017-12-26 | Resolved: 2018-03-13

## 2018-03-13 PROBLEM — R07.9 CHEST PAIN AT REST: Status: RESOLVED | Noted: 2017-06-02 | Resolved: 2018-03-13

## 2018-03-13 PROBLEM — Z98.84 S/P GASTRIC BYPASS: Chronic | Status: ACTIVE | Noted: 2017-02-08

## 2018-03-13 PROBLEM — R09.82 POST-NASAL DRAINAGE: Status: RESOLVED | Noted: 2017-03-06 | Resolved: 2018-03-13

## 2018-03-13 PROBLEM — F33.2 MDD (MAJOR DEPRESSIVE DISORDER), RECURRENT SEVERE, WITHOUT PSYCHOSIS (HCC): Status: ACTIVE | Noted: 2018-03-12

## 2018-03-13 PROBLEM — R45.851 SUICIDAL INTENT: Status: RESOLVED | Noted: 2018-03-11 | Resolved: 2018-03-13

## 2018-03-13 PROBLEM — I48.19 PERSISTENT ATRIAL FIBRILLATION (HCC): Chronic | Status: ACTIVE | Noted: 2017-12-26

## 2018-03-19 ENCOUNTER — TELEPHONE (OUTPATIENT)
Dept: PSYCHIATRY | Age: 53
End: 2018-03-19

## 2018-03-19 ENCOUNTER — OFFICE VISIT (OUTPATIENT)
Dept: FAMILY MEDICINE CLINIC | Age: 53
End: 2018-03-19

## 2018-03-19 VITALS
OXYGEN SATURATION: 95 % | DIASTOLIC BLOOD PRESSURE: 79 MMHG | HEART RATE: 69 BPM | BODY MASS INDEX: 51.08 KG/M2 | SYSTOLIC BLOOD PRESSURE: 139 MMHG | WEIGHT: 293 LBS

## 2018-03-19 DIAGNOSIS — N81.10 BLADDER PROLAPSE, FEMALE, ACQUIRED: Primary | ICD-10-CM

## 2018-03-19 DIAGNOSIS — R60.0 BILATERAL LEG EDEMA: ICD-10-CM

## 2018-03-19 DIAGNOSIS — F33.2 MDD (MAJOR DEPRESSIVE DISORDER), RECURRENT SEVERE, WITHOUT PSYCHOSIS (HCC): ICD-10-CM

## 2018-03-19 PROCEDURE — 99214 OFFICE O/P EST MOD 30 MIN: CPT | Performed by: FAMILY MEDICINE

## 2018-03-19 RX ORDER — BUMETANIDE 0.5 MG/1
0.5 TABLET ORAL DAILY
Qty: 30 TABLET | Refills: 0 | Status: SHIPPED | OUTPATIENT
Start: 2018-03-19 | End: 2018-08-07
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
## 2018-03-19 NOTE — TELEPHONE ENCOUNTER
----- Message from Abby Bell MD sent at 3/19/2018 10:32 AM EDT -----  Yes, I'm happy to see her within that time frame. I'm Tejas Inman on this. Valery Link - please schedule here at either location. Thanks  Drivy  ----- Message -----  From: Tiffanie Corrigan DO  Sent: 3/16/2018  12:19 PM  To: Abby Bell MD    [facility]. Patient of mine scheduled to see me for inpatient psych hospital f/u March 19. Patient on discharge summary advised to see  [facility] psychiatry. Are you and Crystal Moore able to arrange a new patient visit for her to be seen in the next 7 to 14 days?      ALB

## 2018-04-12 DIAGNOSIS — I48.19 PERSISTENT ATRIAL FIBRILLATION (HCC): ICD-10-CM

## 2018-04-12 RX ORDER — POTASSIUM CHLORIDE 20 MEQ/1
20 TABLET, EXTENDED RELEASE ORAL 2 TIMES DAILY
Qty: 60 TABLET | Refills: 5
Start: 2018-04-12 | End: 2019-04-26 | Stop reason: ALTCHOICE

## 2018-04-13 DIAGNOSIS — L30.9 DERMATITIS: ICD-10-CM

## 2018-04-13 RX ORDER — TRIAMCINOLONE ACETONIDE 1 MG/G
CREAM TOPICAL
Qty: 80 G | Refills: 2 | Status: SHIPPED | OUTPATIENT
Start: 2018-04-13 | End: 2018-06-01 | Stop reason: SDUPTHER

## 2018-04-13 RX ORDER — FUROSEMIDE 40 MG/1
40 TABLET ORAL 2 TIMES DAILY
Qty: 60 TABLET | Refills: 5 | Status: SHIPPED | OUTPATIENT
Start: 2018-04-13 | End: 2018-12-03 | Stop reason: SDUPTHER

## 2018-04-13 RX ORDER — PROPAFENONE HYDROCHLORIDE 225 MG/1
CAPSULE, EXTENDED RELEASE ORAL
Qty: 90 CAPSULE | Refills: 5 | Status: SHIPPED | OUTPATIENT
Start: 2018-04-13 | End: 2018-05-04 | Stop reason: SDUPTHER

## 2018-04-16 ENCOUNTER — TELEPHONE (OUTPATIENT)
Dept: PULMONOLOGY | Age: 53
End: 2018-04-16

## 2018-04-18 ENCOUNTER — OFFICE VISIT (OUTPATIENT)
Dept: PSYCHIATRY | Age: 53
End: 2018-04-18

## 2018-04-18 VITALS
BODY MASS INDEX: 41.95 KG/M2 | HEIGHT: 70 IN | WEIGHT: 293 LBS | SYSTOLIC BLOOD PRESSURE: 124 MMHG | DIASTOLIC BLOOD PRESSURE: 80 MMHG | HEART RATE: 76 BPM

## 2018-04-18 DIAGNOSIS — F60.89 CLUSTER B PERSONALITY DISORDER (HCC): ICD-10-CM

## 2018-04-18 DIAGNOSIS — F33.2 MDD (MAJOR DEPRESSIVE DISORDER), RECURRENT SEVERE, WITHOUT PSYCHOSIS (HCC): Primary | ICD-10-CM

## 2018-04-18 DIAGNOSIS — F43.0 ACUTE STRESS REACTION WITH PREDOMINATELY EMOTIONAL DISTURBANCE: ICD-10-CM

## 2018-04-18 PROCEDURE — 99204 OFFICE O/P NEW MOD 45 MIN: CPT | Performed by: PSYCHIATRY & NEUROLOGY

## 2018-04-18 RX ORDER — TRAZODONE HYDROCHLORIDE 100 MG/1
100 TABLET ORAL NIGHTLY
Qty: 30 TABLET | Refills: 1 | Status: SHIPPED | OUTPATIENT
Start: 2018-04-18 | End: 2018-06-22 | Stop reason: SDUPTHER

## 2018-04-18 RX ORDER — SERTRALINE HYDROCHLORIDE 100 MG/1
100 TABLET, FILM COATED ORAL DAILY
Qty: 30 TABLET | Refills: 1 | Status: SHIPPED | OUTPATIENT
Start: 2018-04-18 | End: 2018-06-22 | Stop reason: SDUPTHER

## 2018-04-18 RX ORDER — HYDROXYZINE PAMOATE 50 MG/1
50 CAPSULE ORAL 3 TIMES DAILY PRN
Qty: 90 CAPSULE | Refills: 1 | Status: ON HOLD | OUTPATIENT
Start: 2018-04-18 | End: 2019-04-30

## 2018-04-19 ENCOUNTER — TELEPHONE (OUTPATIENT)
Dept: CARDIOLOGY CLINIC | Age: 53
End: 2018-04-19

## 2018-04-19 RX ORDER — ALBUTEROL SULFATE 90 UG/1
2 AEROSOL, METERED RESPIRATORY (INHALATION) EVERY 4 HOURS PRN
Qty: 1 INHALER | Refills: 11 | Status: SHIPPED | OUTPATIENT
Start: 2018-04-19 | End: 2018-09-07 | Stop reason: CLARIF

## 2018-04-29 RX ORDER — ALBUTEROL SULFATE 90 UG/1
2 AEROSOL, METERED RESPIRATORY (INHALATION) EVERY 4 HOURS PRN
Qty: 1 INHALER | Refills: 11 | Status: SHIPPED | OUTPATIENT
Start: 2018-04-29 | End: 2018-09-07 | Stop reason: SDUPTHER

## 2018-05-03 ENCOUNTER — TELEPHONE (OUTPATIENT)
Dept: CARDIOLOGY CLINIC | Age: 53
End: 2018-05-03

## 2018-05-03 DIAGNOSIS — Z79.899 LONG TERM CURRENT USE OF ANTIARRHYTHMIC DRUG: ICD-10-CM

## 2018-05-03 DIAGNOSIS — I48.91 ATRIAL FIBRILLATION, UNSPECIFIED TYPE (HCC): Primary | ICD-10-CM

## 2018-05-04 ENCOUNTER — NURSE ONLY (OUTPATIENT)
Dept: CARDIOLOGY CLINIC | Age: 53
End: 2018-05-04

## 2018-05-04 VITALS
SYSTOLIC BLOOD PRESSURE: 100 MMHG | HEART RATE: 64 BPM | RESPIRATION RATE: 22 BRPM | HEIGHT: 70 IN | WEIGHT: 293 LBS | BODY MASS INDEX: 41.95 KG/M2 | DIASTOLIC BLOOD PRESSURE: 66 MMHG

## 2018-05-04 DIAGNOSIS — R53.83 FATIGUE, UNSPECIFIED TYPE: ICD-10-CM

## 2018-05-04 DIAGNOSIS — I48.92 ATRIAL FLUTTER, UNSPECIFIED TYPE (HCC): Primary | ICD-10-CM

## 2018-05-04 DIAGNOSIS — R06.02 SOB (SHORTNESS OF BREATH): ICD-10-CM

## 2018-05-04 DIAGNOSIS — R60.0 BILATERAL LEG EDEMA: ICD-10-CM

## 2018-05-04 LAB
ALBUMIN SERPL-MCNC: 4 G/DL (ref 3.4–5)
ANION GAP SERPL CALCULATED.3IONS-SCNC: 10 MMOL/L (ref 3–16)
BUN BLDV-MCNC: 22 MG/DL (ref 7–20)
CALCIUM SERPL-MCNC: 8.9 MG/DL (ref 8.3–10.6)
CHLORIDE BLD-SCNC: 101 MMOL/L (ref 99–110)
CO2: 29 MMOL/L (ref 21–32)
CREAT SERPL-MCNC: 1.1 MG/DL (ref 0.6–1.1)
GFR AFRICAN AMERICAN: >60
GFR NON-AFRICAN AMERICAN: 52
GLUCOSE BLD-MCNC: 88 MG/DL (ref 70–99)
PHOSPHORUS: 3.5 MG/DL (ref 2.5–4.9)
POTASSIUM SERPL-SCNC: 4.3 MMOL/L (ref 3.5–5.1)
SODIUM BLD-SCNC: 140 MMOL/L (ref 136–145)

## 2018-05-04 PROCEDURE — 99214 OFFICE O/P EST MOD 30 MIN: CPT | Performed by: NURSE PRACTITIONER

## 2018-05-04 RX ORDER — PROPAFENONE HYDROCHLORIDE 225 MG/1
225 CAPSULE, EXTENDED RELEASE ORAL 2 TIMES DAILY
Status: ON HOLD | COMMUNITY
End: 2018-09-11 | Stop reason: HOSPADM

## 2018-05-04 RX ORDER — METOPROLOL SUCCINATE 25 MG/1
12.5 TABLET, EXTENDED RELEASE ORAL DAILY
COMMUNITY
End: 2018-05-30 | Stop reason: ALTCHOICE

## 2018-05-09 ENCOUNTER — HOSPITAL ENCOUNTER (OUTPATIENT)
Dept: NON INVASIVE DIAGNOSTICS | Age: 53
Discharge: HOME | End: 2018-05-09
Attending: NURSE PRACTITIONER | Admitting: NURSE PRACTITIONER

## 2018-05-09 LAB
LV EF: 50 %
LVEF MODALITY: NORMAL

## 2018-05-15 ENCOUNTER — TELEPHONE (OUTPATIENT)
Dept: CARDIOLOGY CLINIC | Age: 53
End: 2018-05-15

## 2018-05-30 ENCOUNTER — OFFICE VISIT (OUTPATIENT)
Dept: CARDIOLOGY CLINIC | Age: 53
End: 2018-05-30

## 2018-05-30 VITALS
BODY MASS INDEX: 49.82 KG/M2 | SYSTOLIC BLOOD PRESSURE: 120 MMHG | HEART RATE: 56 BPM | DIASTOLIC BLOOD PRESSURE: 80 MMHG | WEIGHT: 293 LBS

## 2018-05-30 DIAGNOSIS — I48.92 ATRIAL FLUTTER, UNSPECIFIED TYPE (HCC): Primary | ICD-10-CM

## 2018-05-30 PROBLEM — I10 HTN (HYPERTENSION): Status: ACTIVE | Noted: 2018-05-30

## 2018-05-30 PROCEDURE — 93000 ELECTROCARDIOGRAM COMPLETE: CPT | Performed by: INTERNAL MEDICINE

## 2018-05-30 PROCEDURE — 99214 OFFICE O/P EST MOD 30 MIN: CPT | Performed by: INTERNAL MEDICINE

## 2018-05-30 RX ORDER — DIAZEPAM 10 MG/1
TABLET ORAL
Qty: 60 TABLET | Refills: 2 | OUTPATIENT
Start: 2018-05-30

## 2018-06-01 DIAGNOSIS — L30.9 DERMATITIS: ICD-10-CM

## 2018-06-01 RX ORDER — TRIAMCINOLONE ACETONIDE 1 MG/G
CREAM TOPICAL
Qty: 80 G | Refills: 2 | Status: SHIPPED | OUTPATIENT
Start: 2018-06-01 | End: 2018-06-20

## 2018-06-04 ENCOUNTER — NURSE ONLY (OUTPATIENT)
Dept: CARDIOLOGY CLINIC | Age: 53
End: 2018-06-04

## 2018-06-06 ENCOUNTER — CLINICAL DOCUMENTATION (OUTPATIENT)
Dept: PSYCHIATRY | Age: 53
End: 2018-06-06

## 2018-06-08 ENCOUNTER — TELEPHONE (OUTPATIENT)
Dept: FAMILY MEDICINE CLINIC | Age: 53
End: 2018-06-08

## 2018-06-08 DIAGNOSIS — R21 RASH OF FACE: Primary | ICD-10-CM

## 2018-06-12 PROCEDURE — 93224 XTRNL ECG REC UP TO 48 HRS: CPT | Performed by: INTERNAL MEDICINE

## 2018-06-19 ENCOUNTER — TELEPHONE (OUTPATIENT)
Dept: DERMATOLOGY | Age: 53
End: 2018-06-19

## 2018-06-20 ENCOUNTER — OFFICE VISIT (OUTPATIENT)
Dept: DERMATOLOGY | Age: 53
End: 2018-06-20

## 2018-06-20 DIAGNOSIS — R21 SKIN ERUPTION: Primary | ICD-10-CM

## 2018-06-20 PROCEDURE — 99243 OFF/OP CNSLTJ NEW/EST LOW 30: CPT | Performed by: DERMATOLOGY

## 2018-06-20 RX ORDER — FLUOCINONIDE 0.5 MG/G
OINTMENT TOPICAL
Qty: 60 G | Refills: 1 | Status: SHIPPED | OUTPATIENT
Start: 2018-06-20 | End: 2018-08-16 | Stop reason: SDUPTHER

## 2018-06-22 ENCOUNTER — TELEPHONE (OUTPATIENT)
Dept: FAMILY MEDICINE CLINIC | Age: 53
End: 2018-06-22

## 2018-06-22 DIAGNOSIS — I48.3 TYPICAL ATRIAL FLUTTER (HCC): ICD-10-CM

## 2018-06-22 DIAGNOSIS — R94.4 KIDNEY FUNCTION STUDY ABNORMALITY: Primary | ICD-10-CM

## 2018-06-22 DIAGNOSIS — F33.2 MDD (MAJOR DEPRESSIVE DISORDER), RECURRENT SEVERE, WITHOUT PSYCHOSIS (HCC): Primary | ICD-10-CM

## 2018-06-22 PROBLEM — I48.92 ATRIAL FLUTTER (HCC): Status: ACTIVE | Noted: 2018-06-22

## 2018-06-22 NOTE — TELEPHONE ENCOUNTER
See other tel enc generated today about labwork. Regarding medication, if patient is stable, I will consider overtaking prescribing medications of the sertralin 100 mg and trazodone 100 mg; both of which I can prescribe. I do agree with what Dr Jose Boo recommended that Gage Kulkarni have a therapist through a place like [facility]. Let me know which pharmacy I am to route meds.  I can write for 30 tabs with 1 RF and I advise patient f/u with me in July or August.

## 2018-06-22 NOTE — TELEPHONE ENCOUNTER
Patient called in to say that she has been seeing Psychiatrist (According to chart, Dr Cason here in the practice) per PCP Mary. She states that she is doing well on the medications, but that she refuses to see the Psychiatrist any more, and demands that PCP Mary fill the psych meds for Dr. Cason going forward. Pt made aware that Psychiatrist would be the correct provider to manage these medications, but pt is wanting this encounter routed back to PCP for review. Making sure PCP is aware. Pt supplied return call # of [phone]. Thank you!

## 2018-06-23 RX ORDER — SERTRALINE HYDROCHLORIDE 100 MG/1
100 TABLET, FILM COATED ORAL DAILY
Qty: 30 TABLET | Refills: 1 | Status: SHIPPED | OUTPATIENT
Start: 2018-06-23 | End: 2018-09-12 | Stop reason: SDUPTHER

## 2018-06-23 RX ORDER — TRAZODONE HYDROCHLORIDE 100 MG/1
100 TABLET ORAL NIGHTLY
Qty: 30 TABLET | Refills: 1 | Status: SHIPPED | OUTPATIENT
Start: 2018-06-23 | End: 2018-09-07 | Stop reason: CLARIF

## 2018-07-11 ENCOUNTER — NURSE TRIAGE (OUTPATIENT)
Dept: OTHER | Facility: CLINIC | Age: 53
End: 2018-07-11

## 2018-07-11 NOTE — TELEPHONE ENCOUNTER
Reason for Disposition   [1] Loss of vision or double vision AND [2] present now    Answer Assessment - Initial Assessment Questions  1. MECHANISM: \"How did the injury happen? \" For falls, ask: \"What height did you fall from? \" and \"What surface did you fall against?\"       Pt was walking and tripped over rug. Pt had sandles on that caught under the rug.  2. ONSET: \"When did the injury happen? \" (Minutes or hours ago)       7/6/18  3. NEUROLOGIC SYMPTOMS: \"Was there any loss of consciousness? \" \"Are there any other neurological symptoms? \"       No LOC at time of injury, headache present at time of injury. 4. MENTAL STATUS: \"Does the person know who he is, who you are, and where he is? \"       yes  5. LOCATION: \"What part of the head was hit? \"       Right temporal area  6. SCALP APPEARANCE: \"What does the scalp look like? Is it bleeding now? \" If so, ask: \"Is it difficult to stop? \"       No active bleeding, but knot present about the size of golf ball  7. SIZE: For cuts, bruises, or swelling, ask: \"How large is it? \" (e.g., inches or centimeters)       About the size of golf ball  8. PAIN: \"Is there any pain? \" If so, ask: \"How bad is it? \"  (e.g., Scale 1-10; or mild, moderate, severe)      Only when site of injury touched - pt rates the tenderness about 4-5/10  9. TETANUS: For any breaks in the skin, ask: \"When was the last tetanus booster? \"      No break in the skin  10. OTHER SYMPTOMS: \"Do you have any other symptoms? \" (e.g., neck pain, vomiting)      Pt has chronic neck pain, otherwise no. 11. PREGNANCY: \"Is there any chance you are pregnant? \" \"When was your last menstrual period? \"      *No Answer*    Protocols used: HEAD INJURY-ADULT-    Pt currently at work, approaches this RN and requests triage, with chief complaint of head injury from a fall that occurred 7/6/18. Pt reports visual changes \"like smokey film\" noted last night and now explains that the keys on her keyboard \"go blurry. \"  Right temporal area remains tender but not as swollen as it was a couple of days ago. Pt informed of disposition, per triage protocol. Pt is going to notify charge nurse of situation and may decide to proceed to ED now or after work.

## 2018-08-07 ENCOUNTER — HOSPITAL ENCOUNTER (EMERGENCY)
Age: 53
Discharge: HOME | End: 2018-08-07
Attending: EMERGENCY MEDICINE
Payer: COMMERCIAL

## 2018-08-07 ENCOUNTER — APPOINTMENT (OUTPATIENT)
Dept: GENERAL RADIOLOGY | Age: 53
End: 2018-08-07
Payer: COMMERCIAL

## 2018-08-07 ENCOUNTER — OFFICE VISIT (OUTPATIENT)
Dept: URGENT CARE | Age: 53
End: 2018-08-07

## 2018-08-07 VITALS
HEART RATE: 62 BPM | TEMPERATURE: 98.1 F | WEIGHT: 293 LBS | OXYGEN SATURATION: 97 % | RESPIRATION RATE: 16 BRPM | HEIGHT: 70 IN | DIASTOLIC BLOOD PRESSURE: 84 MMHG | SYSTOLIC BLOOD PRESSURE: 119 MMHG | BODY MASS INDEX: 41.95 KG/M2

## 2018-08-07 VITALS
DIASTOLIC BLOOD PRESSURE: 92 MMHG | SYSTOLIC BLOOD PRESSURE: 132 MMHG | HEART RATE: 123 BPM | RESPIRATION RATE: 20 BRPM | OXYGEN SATURATION: 97 % | TEMPERATURE: 97.4 F

## 2018-08-07 DIAGNOSIS — R07.9 CHEST PAIN, UNSPECIFIED TYPE: Primary | ICD-10-CM

## 2018-08-07 DIAGNOSIS — I48.91 ATRIAL FIBRILLATION, UNSPECIFIED TYPE (HCC): ICD-10-CM

## 2018-08-07 DIAGNOSIS — Z01.89 ENCOUNTER FOR CARDIOVERSION PROCEDURE: ICD-10-CM

## 2018-08-07 DIAGNOSIS — I48.3 TYPICAL ATRIAL FLUTTER (HCC): Primary | ICD-10-CM

## 2018-08-07 LAB
ANION GAP SERPL CALCULATED.3IONS-SCNC: 12 MMOL/L (ref 3–16)
B-TYPE NATRIURETIC PEPTIDE: 217 PG/ML (ref 0–99.9)
BASOPHILS ABSOLUTE: 0.1 K/UL (ref 0–0.2)
BASOPHILS RELATIVE PERCENT: 1.2 %
BUN BLDV-MCNC: 15 MG/DL (ref 7–20)
CALCIUM SERPL-MCNC: 9.1 MG/DL (ref 8.3–10.6)
CHLORIDE BLD-SCNC: 99 MMOL/L (ref 99–110)
CO2: 23 MMOL/L (ref 21–32)
CREAT SERPL-MCNC: 1 MG/DL (ref 0.6–1.1)
EKG ATRIAL RATE: 241 BPM
EKG DIAGNOSIS: NORMAL
EKG Q-T INTERVAL: 318 MS
EKG QRS DURATION: 84 MS
EKG QTC CALCULATION (BAZETT): 432 MS
EKG R AXIS: 51 DEGREES
EKG T AXIS: 67 DEGREES
EKG VENTRICULAR RATE: 111 BPM
EOSINOPHILS ABSOLUTE: 0.4 K/UL (ref 0–0.6)
EOSINOPHILS RELATIVE PERCENT: 5.6 %
GFR AFRICAN AMERICAN: >60
GFR NON-AFRICAN AMERICAN: 58
GLUCOSE BLD-MCNC: 97 MG/DL (ref 70–99)
HCT VFR BLD CALC: 43.9 % (ref 36–48)
HEMOGLOBIN: 14 G/DL (ref 12–16)
LYMPHOCYTES ABSOLUTE: 1.6 K/UL (ref 1–5.1)
LYMPHOCYTES RELATIVE PERCENT: 22.4 %
MCH RBC QN AUTO: 27.8 PG (ref 26–34)
MCHC RBC AUTO-ENTMCNC: 31.9 G/DL (ref 31–36)
MCV RBC AUTO: 87.3 FL (ref 80–100)
MONOCYTES ABSOLUTE: 0.9 K/UL (ref 0–1.3)
MONOCYTES RELATIVE PERCENT: 12.3 %
NEUTROPHILS ABSOLUTE: 4.1 K/UL (ref 1.7–7.7)
NEUTROPHILS RELATIVE PERCENT: 58.5 %
PDW BLD-RTO: 15.2 % (ref 12.4–15.4)
PERFORMED ON: ABNORMAL
PERFORMED ON: NORMAL
PLATELET # BLD: 287 K/UL (ref 135–450)
PMV BLD AUTO: 8.9 FL (ref 5–10.5)
POC SAMPLE TYPE: ABNORMAL
POC SAMPLE TYPE: NORMAL
POC TROPONIN I: 0 NG/ML (ref 0–0.1)
POTASSIUM SERPL-SCNC: 4.6 MMOL/L (ref 3.5–5.1)
RBC # BLD: 5.02 M/UL (ref 4–5.2)
SODIUM BLD-SCNC: 134 MMOL/L (ref 136–145)
TSH REFLEX: 2.73 UIU/ML (ref 0.27–4.2)
WBC # BLD: 7.1 K/UL (ref 4–11)

## 2018-08-07 PROCEDURE — 84484 ASSAY OF TROPONIN QUANT: CPT

## 2018-08-07 PROCEDURE — 99999 PR OFFICE/OUTPT VISIT,PROCEDURE ONLY: CPT | Performed by: PHYSICIAN ASSISTANT

## 2018-08-07 PROCEDURE — 99291 CRITICAL CARE FIRST HOUR: CPT

## 2018-08-07 PROCEDURE — 83880 ASSAY OF NATRIURETIC PEPTIDE: CPT

## 2018-08-07 PROCEDURE — 2700000000 HC OXYGEN THERAPY PER DAY

## 2018-08-07 PROCEDURE — 94664 DEMO&/EVAL PT USE INHALER: CPT

## 2018-08-07 PROCEDURE — 84443 ASSAY THYROID STIM HORMONE: CPT

## 2018-08-07 PROCEDURE — 85025 COMPLETE CBC W/AUTO DIFF WBC: CPT

## 2018-08-07 PROCEDURE — 4500000026 HC ED CRITICAL CARE PROCEDURE

## 2018-08-07 PROCEDURE — 2500000003 HC RX 250 WO HCPCS: Performed by: EMERGENCY MEDICINE

## 2018-08-07 PROCEDURE — 36415 COLL VENOUS BLD VENIPUNCTURE: CPT

## 2018-08-07 PROCEDURE — 80048 BASIC METABOLIC PNL TOTAL CA: CPT

## 2018-08-07 PROCEDURE — 93005 ELECTROCARDIOGRAM TRACING: CPT | Performed by: EMERGENCY MEDICINE

## 2018-08-07 PROCEDURE — 71046 X-RAY EXAM CHEST 2 VIEWS: CPT

## 2018-08-07 PROCEDURE — 94761 N-INVAS EAR/PLS OXIMETRY MLT: CPT

## 2018-08-07 RX ORDER — ETOMIDATE 2 MG/ML
23 INJECTION INTRAVENOUS ONCE
Status: COMPLETED | OUTPATIENT
Start: 2018-08-07 | End: 2018-08-07

## 2018-08-07 RX ADMIN — ETOMIDATE 23 MG: 2 INJECTION, SOLUTION INTRAVENOUS at 15:50

## 2018-08-07 ASSESSMENT — ENCOUNTER SYMPTOMS
VOMITING: 0
NAUSEA: 0

## 2018-08-07 ASSESSMENT — PAIN DESCRIPTION - FREQUENCY: FREQUENCY: CONTINUOUS

## 2018-08-07 ASSESSMENT — PAIN DESCRIPTION - ONSET: ONSET: SUDDEN

## 2018-08-07 ASSESSMENT — PAIN DESCRIPTION - PAIN TYPE: TYPE: ACUTE PAIN

## 2018-08-07 ASSESSMENT — PAIN DESCRIPTION - DESCRIPTORS: DESCRIPTORS: DULL;HEADACHE

## 2018-08-07 ASSESSMENT — PAIN DESCRIPTION - PROGRESSION: CLINICAL_PROGRESSION: GRADUALLY WORSENING

## 2018-08-07 ASSESSMENT — PAIN SCALES - GENERAL
PAINLEVEL_OUTOF10: 2
PAINLEVEL_OUTOF10: 0

## 2018-08-07 ASSESSMENT — PAIN DESCRIPTION - LOCATION: LOCATION: HEAD

## 2018-08-07 ASSESSMENT — PAIN DESCRIPTION - ORIENTATION: ORIENTATION: LEFT

## 2018-08-07 NOTE — ED NOTES
Bed: A12-12  Expected date:   Expected time:   Means of arrival:   Comments:  [facility]  08/07/18 1228

## 2018-08-07 NOTE — ED TRIAGE NOTES
Patient states last night she felt like her heart was racing and she felt SOB. She states she took a shower to try and relax and took her prescribed Rhythmol and Eliquis. She states this morning she felt very weak and felt like her heart was racing and had SOB.

## 2018-08-07 NOTE — PROGRESS NOTES
Reason for Visit:   Chief Complaint   Patient presents with    Shortness of Breath     Ha, sob, L arm pain     Patient History     HPI: Chanelle Rivera is a 46 y.o. female who presents with palpitations, chest pain which radiates to L arm, and headache present this morning. She has a hx of afib, for which she takes Eliquis and Rythmol. She reports diagnosis of Afib was approx one year ago and she was placed on metoprolol as well. Metoprolol was stopped approx 2 months ago. She also has a hx of asthma, for which she uses Dulera and albuterol. She reports feeling palpitations in the evening last night but was able to rest. She reports continued sxs this morning along with lightheadedness. She still continued into work but states she now has chest discomfort and headache and can feel her palpitations worsen. Past Medical History:   Diagnosis Date    Anxiety     Arthritis     Asthma     Atrial fibrillation ([facility] [address]     new dx 4 weeks ago, first of  Sept 2017    Edema     Fibromyalgia     GERD (gastroesophageal reflux disease)     reflux    Hiatal hernia        Past Surgical History:   Procedure Laterality Date    ABDOMEN SURGERY  2001    gastric bypass--Cesar    HERNIA REPAIR  2001    HYSTERECTOMY  2004    ARIAS-EN-Y GASTRIC BYPASS      TONSILLECTOMY AND ADENOIDECTOMY         Allergies: Allergies   Allergen Reactions    Latex Anaphylaxis and Rash    Aspirin      Swelling in lower legs    Demerol      rash    Meperidine     Nsaids Swelling    Pcn [Penicillins] Hives    Ranitidine Hcl     Sulfa Antibiotics Rash and Hives       Review of Systems     ROS: Please refer to HPI for any pertinent positive findings, as all other systems were reviewed as negative unless otherwise listed above. Review of Systems   Constitutional: Negative for chills, diaphoresis, fever and malaise/fatigue.    Cardiovascular: Positive for chest pain and palpitations. Negative for claudication, leg swelling and PND. Gastrointestinal: Negative for nausea and vomiting. Physical Exam     Vitals:    08/07/18 1205   BP: (!) 132/92   Pulse: 123   Resp: 20   Temp: 97.4 °F (36.3 °C)   SpO2: 97%       Constitutional:  Well developed, well nourished, no acute distress, non-toxic appearance   Eyes:  PERRL, conjunctiva normal, no ciliary injection, evidence of foreign body, or discharge. HENT:  Head is normocephalic, atraumatic; external ears normal, external nose and nasal mucosa normal, oropharynx pink and moist, no pharyngeal exudates. Neck- Supple, passive and active range of motion in tact, no tenderness upon palpation along spine. No LAD  or neck masses appreciated. Respiratory:  No respiratory distress, normal breath sounds bilaterally, no rales, no wheezing. Cardiovascular:  HR is tachycardic (120s) and irregular. GI:  Abdomen soft, nontender, nondistended, normal bowel sounds, no organomegaly, no mass, no rebound, no guarding. Musculoskeletal:  No pain upon palpation along spine or musculature. No edema, no tenderness, no deformities. Integument:  Well hydrated, no lesions, cyanosis, or rashes appreciated. Lymphatic:  No lymphadenopathy noted   Neurologic:  Alert & oriented x 3, CN 2-12 in tact bilaterally, normal motor function and sensation in tact, no focal deficits noted   Psychiatric:  Speech and behavior appropriate. Appropriate mood and affect. Physical Exam    Procedure:       Assessment:    1. Chest pain, unspecified type    2. Atrial fibrillation, unspecified type [facility])        Plan:    - Patient transferred to [facility] via squad from clinic in stable condition. No orders of the defined types were placed in this encounter.

## 2018-08-07 NOTE — ED NOTES
Patient A&Ox4. Patient denies any pain at this time. HR: 66, sinus rhythm.        Silver Howard, RN  08/07/18 5676

## 2018-08-07 NOTE — ED PROVIDER NOTES
[address] [facility]          ATTENDING PHYSICIAN NOTE       Date of evaluation: 8/7/2018    Chief Complaint     Shortness of Breath      History of Present Illness     Martha Yi is a 46 y.o. female with a PMH as described below who presents to the ED today with a chief complaint of:  Shortness of breath, lightheadedness, and dizziness. The patient has a history of proximal atrial fibrillation as well as atrial flutter. She is currently on propafenone and Eliquis. She has been taking her medication as prescribed and has not missed any doses. She was last cardioverted in January 2018. She had a recent echocardiogram showing some grade 1 diastolic dysfunction and stress test in 2017 was negative. She was taken off her beta blocker a few months ago as it was causing hypotension. She states that last night she started to feel unwell and felt like she flipped into A. fib. She describes the unwell feeling as lightheadedness, dizziness, and some short of breath with exertion. She denies any chest pain. She feels like her heart rate is racing. She denies any known precipitating cause despite her prior medical history of alcohol abuse and drug overdose. Currently, she additionally denies any abdominal pain, nausea, vomiting, diarrhea, weakness, numbness, tingling to her extremities. She has baseline swelling to her lower extremities but states that it is actually improved over the past few weeks and denies any consultations or problems with her diuretics. The patient states that there were no other associated signs and symptoms or modifying factors. Patient rates pain as 0/10. Review of Systems     As described above in the HPI otherwise all the systems reviewed and negative as reported by the patient. Past Medical, Surgical, Family, and Social History     She has a past medical history of Anxiety; Arthritis; Asthma; Atrial fibrillation ([address]  Edema; Disp-60 tablet, R-5NO PRINT      mometasone-formoterol (DULERA) 200-5 MCG/ACT inhaler Inhale 1 puff into the lungs 2 times daily, Disp-8.8 g, R-5Normal      Multiple Vitamins-Minerals (CENTRUM SILVER ADULT 50+ PO) Take 1 tablet by mouth daily Historical Med      vitamin B-12 (CYANOCOBALAMIN) 1000 MCG tablet Take 1,000 mcg by mouth dailyHistorical Med      Lysine 1000 MG TABS Take 1 tablet by mouth dailyHistorical Med      !! albuterol sulfate HFA (VENTOLIN HFA) 108 (90 BASE) MCG/ACT inhaler INHALE 2 PUFFS BY MOUTH EVERY 4 TO 6 HOURS AS NEEDED, Disp-2 Inhaler, R-5Normal      Spacer/Aero-Holding Chambers (AEROCHAMBER PLUS-FLOW SIGNAL) MISC Disp-2 each, R-3, PrintUse with MDI as instructed      Cholecalciferol (VITAMIN D3) 2000 UNITS CAPS Take 5 capsules by mouth daily, Disp-150 capsule, R-3       !! - Potential duplicate medications found. Please discuss with provider. Allergies     She is allergic to latex; aspirin; demerol; meperidine; nsaids; pcn [penicillins]; ranitidine hcl; and sulfa antibiotics. Physical Exam     INITIAL VITALS: BP: (!) 127/92, Temp: 98.1 °F (36.7 °C), Pulse: 78, Resp: 16, SpO2: 97 %   Physical Exam   Constitutional: She is oriented to person, place, and time. She appears well-developed and well-nourished. No distress. HENT:   Head: Normocephalic and atraumatic. Eyes: Conjunctivae are normal. Pupils are equal, round, and reactive to light. Neck: Normal range of motion. Neck supple. Cardiovascular:   Tachycardic, irregularly irregular. Pulmonary/Chest: Effort normal and breath sounds normal. She has no wheezes. She exhibits no tenderness. Abdominal: Soft. Bowel sounds are normal. There is no tenderness. Musculoskeletal: Normal range of motion. She exhibits edema (Trace nonpitting lower extremity edema). Neurological: She is alert and oriented to person, place, and time. Skin: Skin is warm and dry. Psychiatric: She has a normal mood and affect.    Nursing note and vitals reviewed. Diagnostic Results     EKG   Indication, irregular rhythm: Ventricular rate A flutter with verbal block. No acute ST-T wave elevations or depressions concerning for acute ischemia or infarct. Interval development of a flutter and A. fib compared to prior EKG performed in May 2018. RADIOLOGY:  XR CHEST STANDARD (2 VW)   Final Result   1. No evidence of acute cardiopulmonary disease.    2. Hiatal hernia          LABS:   Results for orders placed or performed during the hospital encounter of 08/07/18   CBC auto differential   Result Value Ref Range    WBC 7.1 4.0 - 11.0 K/uL    RBC 5.02 4.00 - 5.20 M/uL    Hemoglobin 14.0 12.0 - 16.0 g/dL    Hematocrit 43.9 36.0 - 48.0 %    MCV 87.3 80.0 - 100.0 fL    MCH 27.8 26.0 - 34.0 pg    MCHC 31.9 31.0 - 36.0 g/dL    RDW 15.2 12.4 - 15.4 %    Platelets 576 716 - 828 K/uL    MPV 8.9 5.0 - 10.5 fL    Neutrophils % 58.5 %    Lymphocytes % 22.4 %    Monocytes % 12.3 %    Eosinophils % 5.6 %    Basophils % 1.2 %    Neutrophils # 4.1 1.7 - 7.7 K/uL    Lymphocytes # 1.6 1.0 - 5.1 K/uL    Monocytes # 0.9 0.0 - 1.3 K/uL    Eosinophils # 0.4 0.0 - 0.6 K/uL    Basophils # 0.1 0.0 - 0.2 K/uL   Basic Metabolic Panel   Result Value Ref Range    Sodium 134 (L) 136 - 145 mmol/L    Potassium 4.6 3.5 - 5.1 mmol/L    Chloride 99 99 - 110 mmol/L    CO2 23 21 - 32 mmol/L    Anion Gap 12 3 - 16    Glucose 97 70 - 99 mg/dL    BUN 15 7 - 20 mg/dL    CREATININE 1.0 0.6 - 1.1 mg/dL    GFR Non-African American 58 (A) >60    GFR African American >60 >60    Calcium 9.1 8.3 - 10.6 mg/dL   POCT Venous   Result Value Ref Range    POC Troponin I 0.00 0.00 - 0.10 ng/mL    Sample Type AMBROSIO     Performed on SEE BELOW    POCT Venous   Result Value Ref Range    .0 (H) 0.0 - 99.9 pg/mL    Sample Type AMBROSIO     Performed on SEE BELOW    EKG 12 Lead   Result Value Ref Range    Ventricular Rate 111 BPM    Atrial Rate 241 BPM    QRS Duration 84 ms    Q-T Interval 318 ms    QTc Calculation palpitations and racing heart. The patient has a history of paroxysmal A. fib and does appear to be in a flutter with variable block. This is not uncommon for her. She was cardioverted in January for similar symptoms. She takes propafenone twice daily. She is anticoagulated. I discussed the case with her cardiologist team who agreed with the plan to electively electrically cardiovert her in the emergency department. Consent was signed by the patient. She was sedated with Amidate and cardioverted with 150 J. Post-cardioversion she was normal sinus rhythm as demonstrated on a postconversion EKG. She had no recollection of the procedure. She tolerated it well. Her symptoms improved. She was discharged home in good condition. Critical Care:  Due to the immediate potential for life-threatening deterioration due to symptomatically a flutter/A. fib, I spent 35 minutes providing critical care. This time excludes time spent performing procedures but includes time spent on direct patient care, history retrieval, review of the chart, and discussions with patient, family, and consultant(s). Clinical Impression     1. Typical atrial flutter ([address]    2.  Encounter for cardioversion procedure        Disposition     PATIENT REFERRED TO:  Mariana Gilliland DO  [address] [facility]. [address]  [phone]            DISCHARGE MEDICATIONS:  Discharge Medication List as of 8/7/2018  4:46 PM          DISPOSITION Decision To Discharge 08/07/2018 04:34:25 PM         Michelle Hartley MD  08/07/18 [id number]

## 2018-08-08 LAB
EKG ATRIAL RATE: 72 BPM
EKG DIAGNOSIS: NORMAL
EKG P AXIS: 12 DEGREES
EKG P-R INTERVAL: 202 MS
EKG Q-T INTERVAL: 398 MS
EKG QRS DURATION: 84 MS
EKG QTC CALCULATION (BAZETT): 435 MS
EKG R AXIS: 3 DEGREES
EKG T AXIS: 18 DEGREES
EKG VENTRICULAR RATE: 72 BPM

## 2018-08-15 ENCOUNTER — TELEPHONE (OUTPATIENT)
Dept: DERMATOLOGY | Age: 53
End: 2018-08-15

## 2018-08-15 NOTE — TELEPHONE ENCOUNTER
Patient calling and would like to get fluocinonide (LIDEX) 0.05 % ointment in a larger tube. Her rash is all over her body now.     Call back # [phone]

## 2018-08-16 RX ORDER — FLUOCINONIDE 0.5 MG/G
OINTMENT TOPICAL
Qty: 60 G | Refills: 1 | Status: SHIPPED | OUTPATIENT
Start: 2018-08-16 | End: 2019-03-12 | Stop reason: SDUPTHER

## 2018-08-22 ENCOUNTER — TELEPHONE (OUTPATIENT)
Dept: FAMILY MEDICINE CLINIC | Age: 53
End: 2018-08-22

## 2018-08-22 ENCOUNTER — OFFICE VISIT (OUTPATIENT)
Dept: DERMATOLOGY | Age: 53
End: 2018-08-22

## 2018-08-22 DIAGNOSIS — M79.641 BILATERAL HAND PAIN: Primary | ICD-10-CM

## 2018-08-22 DIAGNOSIS — L30.9 DERMATITIS: Primary | ICD-10-CM

## 2018-08-22 DIAGNOSIS — M79.642 BILATERAL HAND PAIN: Primary | ICD-10-CM

## 2018-08-22 PROCEDURE — 99213 OFFICE O/P EST LOW 20 MIN: CPT | Performed by: DERMATOLOGY

## 2018-09-07 ENCOUNTER — APPOINTMENT (OUTPATIENT)
Dept: GENERAL RADIOLOGY | Age: 53
DRG: 309 | End: 2018-09-07
Payer: COMMERCIAL

## 2018-09-07 ENCOUNTER — HOSPITAL ENCOUNTER (INPATIENT)
Age: 53
LOS: 4 days | Discharge: HOME | DRG: 309 | End: 2018-09-11
Attending: EMERGENCY MEDICINE | Admitting: INTERNAL MEDICINE
Payer: COMMERCIAL

## 2018-09-07 DIAGNOSIS — R00.1 SINUS BRADYCARDIA: ICD-10-CM

## 2018-09-07 DIAGNOSIS — R07.89 OTHER CHEST PAIN: Primary | ICD-10-CM

## 2018-09-07 DIAGNOSIS — I48.0 PAROXYSMAL ATRIAL FIBRILLATION (HCC): ICD-10-CM

## 2018-09-07 DIAGNOSIS — R00.0 WIDE-COMPLEX TACHYCARDIA: ICD-10-CM

## 2018-09-07 PROBLEM — I47.10 SVT (SUPRAVENTRICULAR TACHYCARDIA): Status: ACTIVE | Noted: 2018-09-07

## 2018-09-07 PROBLEM — I47.1 SVT (SUPRAVENTRICULAR TACHYCARDIA) (HCC): Status: ACTIVE | Noted: 2018-09-07

## 2018-09-07 LAB
BASOPHILS ABSOLUTE: 0 K/UL (ref 0–0.2)
BASOPHILS RELATIVE PERCENT: 0.4 %
CALCIUM IONIZED: 1.1 MMOL/L (ref 1.12–1.32)
CO2: 25 MMOL/L (ref 21–32)
D DIMER: 261 NG/ML DDU (ref 0–229)
EKG ATRIAL RATE: 111 BPM
EKG DIAGNOSIS: NORMAL
EKG P-R INTERVAL: 232 MS
EKG Q-T INTERVAL: 344 MS
EKG QRS DURATION: 88 MS
EKG QTC CALCULATION (BAZETT): 450 MS
EKG R AXIS: 32 DEGREES
EKG T AXIS: 90 DEGREES
EKG VENTRICULAR RATE: 103 BPM
EOSINOPHILS ABSOLUTE: 0.2 K/UL (ref 0–0.6)
EOSINOPHILS RELATIVE PERCENT: 3.4 %
GFR AFRICAN AMERICAN: >60
GFR NON-AFRICAN AMERICAN: 58
GLUCOSE BLD-MCNC: 131 MG/DL (ref 70–99)
HCT VFR BLD CALC: 42.3 % (ref 36–48)
HEMOGLOBIN: 14 G/DL (ref 12–16)
LYMPHOCYTES ABSOLUTE: 1.6 K/UL (ref 1–5.1)
LYMPHOCYTES RELATIVE PERCENT: 23.2 %
MAGNESIUM: 2 MG/DL (ref 1.8–2.4)
MCH RBC QN AUTO: 28.3 PG (ref 26–34)
MCHC RBC AUTO-ENTMCNC: 33.1 G/DL (ref 31–36)
MCV RBC AUTO: 85.5 FL (ref 80–100)
MONOCYTES ABSOLUTE: 0.8 K/UL (ref 0–1.3)
MONOCYTES RELATIVE PERCENT: 11.4 %
NEUTROPHILS ABSOLUTE: 4.2 K/UL (ref 1.7–7.7)
NEUTROPHILS RELATIVE PERCENT: 61.6 %
PDW BLD-RTO: 15.6 % (ref 12.4–15.4)
PERFORMED ON: ABNORMAL
PERFORMED ON: NORMAL
PLATELET # BLD: 336 K/UL (ref 135–450)
PMV BLD AUTO: 9.2 FL (ref 5–10.5)
POC ANION GAP: 12 (ref 10–20)
POC BUN: 17 MG/DL (ref 7–18)
POC CHLORIDE: 102 MMOL/L (ref 99–110)
POC CREATININE: 1 MG/DL (ref 0.6–1.1)
POC POTASSIUM: 3.7 MMOL/L (ref 3.5–5.1)
POC SAMPLE TYPE: ABNORMAL
POC SAMPLE TYPE: NORMAL
POC SODIUM: 139 MMOL/L (ref 136–145)
POC TROPONIN I: 0 NG/ML (ref 0–0.1)
PRO-BNP: 701 PG/ML (ref 0–124)
RBC # BLD: 4.95 M/UL (ref 4–5.2)
TROPONIN: <0.01 NG/ML
TROPONIN: <0.01 NG/ML
WBC # BLD: 6.8 K/UL (ref 4–11)

## 2018-09-07 PROCEDURE — 36415 COLL VENOUS BLD VENIPUNCTURE: CPT

## 2018-09-07 PROCEDURE — 83036 HEMOGLOBIN GLYCOSYLATED A1C: CPT

## 2018-09-07 PROCEDURE — 96360 HYDRATION IV INFUSION INIT: CPT

## 2018-09-07 PROCEDURE — 93005 ELECTROCARDIOGRAM TRACING: CPT | Performed by: EMERGENCY MEDICINE

## 2018-09-07 PROCEDURE — 84484 ASSAY OF TROPONIN QUANT: CPT

## 2018-09-07 PROCEDURE — 83880 ASSAY OF NATRIURETIC PEPTIDE: CPT

## 2018-09-07 PROCEDURE — 85025 COMPLETE CBC W/AUTO DIFF WBC: CPT

## 2018-09-07 PROCEDURE — 6360000002 HC RX W HCPCS

## 2018-09-07 PROCEDURE — 2060000000 HC ICU INTERMEDIATE R&B

## 2018-09-07 PROCEDURE — 80047 BASIC METABLC PNL IONIZED CA: CPT

## 2018-09-07 PROCEDURE — 99285 EMERGENCY DEPT VISIT HI MDM: CPT

## 2018-09-07 PROCEDURE — 2580000003 HC RX 258: Performed by: STUDENT IN AN ORGANIZED HEALTH CARE EDUCATION/TRAINING PROGRAM

## 2018-09-07 PROCEDURE — 71046 X-RAY EXAM CHEST 2 VIEWS: CPT

## 2018-09-07 PROCEDURE — 2580000003 HC RX 258

## 2018-09-07 PROCEDURE — 6370000000 HC RX 637 (ALT 250 FOR IP): Performed by: STUDENT IN AN ORGANIZED HEALTH CARE EDUCATION/TRAINING PROGRAM

## 2018-09-07 PROCEDURE — 83735 ASSAY OF MAGNESIUM: CPT

## 2018-09-07 PROCEDURE — 85379 FIBRIN DEGRADATION QUANT: CPT

## 2018-09-07 RX ORDER — SERTRALINE HYDROCHLORIDE 100 MG/1
100 TABLET, FILM COATED ORAL DAILY
Status: DISCONTINUED | OUTPATIENT
Start: 2018-09-08 | End: 2018-09-11 | Stop reason: HOSPADM

## 2018-09-07 RX ORDER — SODIUM CHLORIDE 0.9 % (FLUSH) 0.9 %
10 SYRINGE (ML) INJECTION PRN
Status: DISCONTINUED | OUTPATIENT
Start: 2018-09-07 | End: 2018-09-11 | Stop reason: HOSPADM

## 2018-09-07 RX ORDER — SODIUM CHLORIDE 0.9 % (FLUSH) 0.9 %
10 SYRINGE (ML) INJECTION EVERY 12 HOURS SCHEDULED
Status: DISCONTINUED | OUTPATIENT
Start: 2018-09-07 | End: 2018-09-11 | Stop reason: HOSPADM

## 2018-09-07 RX ORDER — LANOLIN ALCOHOL/MO/W.PET/CERES
1000 CREAM (GRAM) TOPICAL DAILY
Status: DISCONTINUED | OUTPATIENT
Start: 2018-09-08 | End: 2018-09-11 | Stop reason: HOSPADM

## 2018-09-07 RX ORDER — BUDESONIDE 0.5 MG/2ML
0.5 INHALANT ORAL 2 TIMES DAILY
Status: DISCONTINUED | OUTPATIENT
Start: 2018-09-07 | End: 2018-09-08

## 2018-09-07 RX ORDER — MIDAZOLAM HYDROCHLORIDE 1 MG/ML
3 INJECTION INTRAMUSCULAR; INTRAVENOUS ONCE
Status: COMPLETED | OUTPATIENT
Start: 2018-09-07 | End: 2018-09-07

## 2018-09-07 RX ORDER — SODIUM CHLORIDE 9 MG/ML
INJECTION, SOLUTION INTRAVENOUS
Status: COMPLETED
Start: 2018-09-07 | End: 2018-09-07

## 2018-09-07 RX ORDER — MIDAZOLAM HYDROCHLORIDE 1 MG/ML
INJECTION INTRAMUSCULAR; INTRAVENOUS
Status: COMPLETED
Start: 2018-09-07 | End: 2018-09-07

## 2018-09-07 RX ORDER — 0.9 % SODIUM CHLORIDE 0.9 %
500 INTRAVENOUS SOLUTION INTRAVENOUS ONCE
Status: COMPLETED | OUTPATIENT
Start: 2018-09-07 | End: 2018-09-07

## 2018-09-07 RX ORDER — ONDANSETRON 2 MG/ML
4 INJECTION INTRAMUSCULAR; INTRAVENOUS EVERY 6 HOURS PRN
Status: DISCONTINUED | OUTPATIENT
Start: 2018-09-07 | End: 2018-09-11 | Stop reason: HOSPADM

## 2018-09-07 RX ORDER — POTASSIUM CHLORIDE 20 MEQ/1
20 TABLET, EXTENDED RELEASE ORAL 2 TIMES DAILY
Status: DISCONTINUED | OUTPATIENT
Start: 2018-09-07 | End: 2018-09-11 | Stop reason: HOSPADM

## 2018-09-07 RX ORDER — TRAZODONE HYDROCHLORIDE 100 MG/1
100 TABLET ORAL NIGHTLY PRN
Status: ON HOLD | COMMUNITY
End: 2019-04-30

## 2018-09-07 RX ORDER — TRAZODONE HYDROCHLORIDE 100 MG/1
100 TABLET ORAL NIGHTLY PRN
Status: DISCONTINUED | OUTPATIENT
Start: 2018-09-07 | End: 2018-09-11 | Stop reason: HOSPADM

## 2018-09-07 RX ORDER — ALBUTEROL SULFATE 2.5 MG/3ML
2.5 SOLUTION RESPIRATORY (INHALATION) EVERY 6 HOURS
Status: DISCONTINUED | OUTPATIENT
Start: 2018-09-07 | End: 2018-09-08

## 2018-09-07 RX ADMIN — MIDAZOLAM HYDROCHLORIDE 3 MG: 1 INJECTION INTRAMUSCULAR; INTRAVENOUS at 18:52

## 2018-09-07 RX ADMIN — POTASSIUM CHLORIDE 20 MEQ: 20 TABLET, EXTENDED RELEASE ORAL at 23:11

## 2018-09-07 RX ADMIN — SODIUM CHLORIDE 500 ML: 9 INJECTION, SOLUTION INTRAVENOUS at 17:41

## 2018-09-07 RX ADMIN — Medication 500 ML: at 17:41

## 2018-09-07 RX ADMIN — Medication 10 ML: at 23:11

## 2018-09-07 RX ADMIN — MIDAZOLAM HYDROCHLORIDE 3 MG: 1 INJECTION, SOLUTION INTRAMUSCULAR; INTRAVENOUS at 18:52

## 2018-09-07 ASSESSMENT — PAIN SCALES - GENERAL
PAINLEVEL_OUTOF10: 0
PAINLEVEL_OUTOF10: 2
PAINLEVEL_OUTOF10: 0

## 2018-09-07 ASSESSMENT — PAIN DESCRIPTION - DESCRIPTORS: DESCRIPTORS: ACHING

## 2018-09-07 ASSESSMENT — PAIN DESCRIPTION - FREQUENCY: FREQUENCY: CONTINUOUS

## 2018-09-07 ASSESSMENT — PAIN DESCRIPTION - PAIN TYPE: TYPE: ACUTE PAIN

## 2018-09-07 ASSESSMENT — ENCOUNTER SYMPTOMS
SHORTNESS OF BREATH: 1
ABDOMINAL PAIN: 0
VOMITING: 0
NAUSEA: 1

## 2018-09-07 ASSESSMENT — PAIN DESCRIPTION - LOCATION: LOCATION: ARM;NECK;CHEST

## 2018-09-07 ASSESSMENT — PAIN DESCRIPTION - ORIENTATION: ORIENTATION: LEFT

## 2018-09-07 NOTE — ED TRIAGE NOTES
Began feeling palpitations like she might be back in afib. At 16:00 she began having left chest pain radiating down left arm and left neck, SOB and nauseated. Also states that she broke a sweat.    Presently rates CP 1/10 without any intervention  Skin NWD resps unlabored

## 2018-09-07 NOTE — LETTER
[address]  [address] [facility] [address]  Phone: [phone]             September 11, 2018    Patient: Alla Del Toro   YOB: 1965   Date of Visit: 9/7/2018       To Whom It May Concern:    Marquise Concepcion was seen and treated in our facility  beginning 9/7/2018 until 9/11/2018. She may return to work on 9/13/2018.       Sincerely,       Sofy Mcallister MD         Signature:__________________________________

## 2018-09-07 NOTE — ED PROVIDER NOTES
[facility] RESIDENT NOTE       Date of evaluation: 9/7/2018    Chief Complaint     Chest Pain (Chest pressure) and Atrial Fibrillation (hx)      History of Present Illness     Juventino Dimas is a 46 y.o. female who presents To the emergency department with chest pain. Earlier today, the patient felt palpitations as she was in atrial fibrillation. She currently is on anticoagulation for her atrial fibrillation. At approximate 4 PM she developed 10 out of 10 central chest pressure with radiation down her left arm. This was associated with shortness of breath, nausea, lightheadedness, and diaphoresis. She states she felt as though she was going to die. She denies any cough or sputum production. She reports bilateral lower extremity swelling which is chronic in nature for her. She denies any previous history of heart attacks. She had a stress test performed last year which was negative for any ischemic pathology. Review of Systems     Review of Systems   Constitutional: Negative for chills and fever. HENT: Negative for congestion. Respiratory: Positive for shortness of breath. Cardiovascular: Positive for chest pain. Gastrointestinal: Positive for nausea. Negative for abdominal pain and vomiting. Genitourinary: Negative for difficulty urinating. Neurological: Positive for dizziness. All other systems reviewed and are negative. Past Medical, Surgical, Family, and Social History     She has a past medical history of Anxiety; Arthritis; Asthma; Atrial fibrillation ([address] Edema; Fibromyalgia; GERD (gastroesophageal reflux disease); and Hiatal hernia. She has a past surgical history that includes Abdomen surgery (2001); hernia repair (2001); Hysterectomy (2004); Tonsillectomy and adenoidectomy; and Romie-en-Y Gastric Bypass. Her family history includes Arthritis in her mother; Asthma in her maternal aunt;  Cancer in her mother; Constitutional: She is oriented to person, place, and time. She appears well-developed and well-nourished. No distress. HENT:   Head: Normocephalic and atraumatic. Eyes: Conjunctivae and EOM are normal.   Neck: Neck supple. Cardiovascular: Normal rate, regular rhythm, normal heart sounds and intact distal pulses. Exam reveals no gallop and no friction rub. No murmur heard. Pulmonary/Chest: Effort normal and breath sounds normal. No respiratory distress. She has no wheezes. She has no rales. Abdominal: Soft. She exhibits no distension. There is no tenderness. There is no rebound and no guarding. Musculoskeletal: She exhibits edema (BLE). Neurological: She is alert and oriented to person, place, and time. Skin: Skin is warm and dry. Nursing note and vitals reviewed. Diagnostic Results     EKG   Interpreted in conjunction with emergency department physician Chaim Orozco MD  Rhythm: sinus tachycardia  Rate: tachycardia  Axis: normal  Ectopy: none  Conduction: normal  ST Segments: nonspecific changes  T Waves: no acute change  Q Waves: none  Clinical Impression: sinus tachycardia  Comparison:  No significant changes compared to EKG obtained 8/7/18    RADIOLOGY:  XR CHEST STANDARD (2 VW)   Final Result      1. No findings for acute cardiopulmonary disease. 2.  Retrocardiac hiatal hernia unchanged.              LABS:   Results for orders placed or performed during the hospital encounter of 09/07/18   CBC Auto Differential   Result Value Ref Range    WBC 6.8 4.0 - 11.0 K/uL    RBC 4.95 4.00 - 5.20 M/uL    Hemoglobin 14.0 12.0 - 16.0 g/dL    Hematocrit 42.3 36.0 - 48.0 %    MCV 85.5 80.0 - 100.0 fL    MCH 28.3 26.0 - 34.0 pg    MCHC 33.1 31.0 - 36.0 g/dL    RDW 15.6 (H) 12.4 - 15.4 %    Platelets 146 570 - 924 K/uL    MPV 9.2 5.0 - 10.5 fL    Neutrophils % 61.6 %    Lymphocytes % 23.2 %    Monocytes % 11.4 %    Eosinophils % 3.4 %    Basophils % 0.4 %    Neutrophils # 4.2 1.7 - 7.7 K/uL syndrome. Her heart score is 4, placing her in the moderate risk category for major adverse cardiac events. The patient had a negative troponin in the emergency department and a nonischemic EKG. The patient was offered aspirin in the emergency department but she stated that she did not like the way as per made her feel and would not take it at this time. An additional troponin is drawn and currently pending. Ultimately, as long as this patient's repeat troponin comes back negative, it is not unreasonable to have her follow up with her primary cardiologist for further outpatient risk stratification. However, if the patient would prefer to stay in the hospital, it would also be reasonable to keep her for her the risk stratification on an inpatient basis. This discussion will be deferred until a repeat troponin comes back negative. The patient's chest x-ray revealed no evidence of pneumonia, pneumothorax, or aortic dissection. She has no significant risk factors for pulmonary embolism although is very mildly tachycardic. Additionally, she has currently on anticoagulation at this time from atrial fibrillation. At this time, the patient's workup is not complete. Her care will be turned over to the oncoming provider. The responsible please will be follow up the patient's repeat troponin, reassessment, and determine appropriate disposition for the patient. This patient was also evaluated by the attending physician. All care plans were discussed and agreed upon. Clinical Impression     Chest pain    Disposition     PATIENT REFERRED TO:  No follow-up provider specified.     DISCHARGE MEDICATIONS:  New Prescriptions    No medications on file       DISPOSITION         Scooter Shaw MD  09/07/18 [id number]

## 2018-09-08 LAB
ANION GAP SERPL CALCULATED.3IONS-SCNC: 10 MMOL/L (ref 3–16)
ANION GAP SERPL CALCULATED.3IONS-SCNC: 11 MMOL/L (ref 3–16)
BUN BLDV-MCNC: 16 MG/DL (ref 7–20)
BUN BLDV-MCNC: 26 MG/DL (ref 7–20)
CALCIUM SERPL-MCNC: 8.9 MG/DL (ref 8.3–10.6)
CALCIUM SERPL-MCNC: 9.1 MG/DL (ref 8.3–10.6)
CHLORIDE BLD-SCNC: 102 MMOL/L (ref 99–110)
CHLORIDE BLD-SCNC: 102 MMOL/L (ref 99–110)
CO2: 24 MMOL/L (ref 21–32)
CO2: 27 MMOL/L (ref 21–32)
CREAT SERPL-MCNC: 1 MG/DL (ref 0.6–1.1)
CREAT SERPL-MCNC: 1 MG/DL (ref 0.6–1.1)
EKG ATRIAL RATE: 54 BPM
EKG DIAGNOSIS: NORMAL
EKG P AXIS: 18 DEGREES
EKG P-R INTERVAL: 222 MS
EKG Q-T INTERVAL: 490 MS
EKG QRS DURATION: 94 MS
EKG QTC CALCULATION (BAZETT): 464 MS
EKG R AXIS: 40 DEGREES
EKG T AXIS: 50 DEGREES
EKG VENTRICULAR RATE: 54 BPM
ESTIMATED AVERAGE GLUCOSE: 108.3 MG/DL
GFR AFRICAN AMERICAN: >60
GFR AFRICAN AMERICAN: >60
GFR NON-AFRICAN AMERICAN: 58
GFR NON-AFRICAN AMERICAN: 58
GLUCOSE BLD-MCNC: 96 MG/DL (ref 70–99)
GLUCOSE BLD-MCNC: 98 MG/DL (ref 70–99)
HBA1C MFR BLD: 5.4 %
HCT VFR BLD CALC: 38 % (ref 36–48)
HEMOGLOBIN: 12.4 G/DL (ref 12–16)
MAGNESIUM: 2.2 MG/DL (ref 1.8–2.4)
MAGNESIUM: 2.3 MG/DL (ref 1.8–2.4)
MCH RBC QN AUTO: 28.4 PG (ref 26–34)
MCHC RBC AUTO-ENTMCNC: 32.7 G/DL (ref 31–36)
MCV RBC AUTO: 87 FL (ref 80–100)
PDW BLD-RTO: 15.1 % (ref 12.4–15.4)
PLATELET # BLD: 276 K/UL (ref 135–450)
PMV BLD AUTO: 8.8 FL (ref 5–10.5)
POTASSIUM SERPL-SCNC: 4.2 MMOL/L (ref 3.5–5.1)
POTASSIUM SERPL-SCNC: 4.5 MMOL/L (ref 3.5–5.1)
RBC # BLD: 4.37 M/UL (ref 4–5.2)
SODIUM BLD-SCNC: 137 MMOL/L (ref 136–145)
SODIUM BLD-SCNC: 139 MMOL/L (ref 136–145)
TROPONIN: <0.01 NG/ML
TROPONIN: <0.01 NG/ML
WBC # BLD: 6.1 K/UL (ref 4–11)

## 2018-09-08 PROCEDURE — 36415 COLL VENOUS BLD VENIPUNCTURE: CPT

## 2018-09-08 PROCEDURE — 6370000000 HC RX 637 (ALT 250 FOR IP): Performed by: INTERNAL MEDICINE

## 2018-09-08 PROCEDURE — 94664 DEMO&/EVAL PT USE INHALER: CPT

## 2018-09-08 PROCEDURE — 84484 ASSAY OF TROPONIN QUANT: CPT

## 2018-09-08 PROCEDURE — 85027 COMPLETE CBC AUTOMATED: CPT

## 2018-09-08 PROCEDURE — 80048 BASIC METABOLIC PNL TOTAL CA: CPT

## 2018-09-08 PROCEDURE — 6370000000 HC RX 637 (ALT 250 FOR IP): Performed by: STUDENT IN AN ORGANIZED HEALTH CARE EDUCATION/TRAINING PROGRAM

## 2018-09-08 PROCEDURE — 83735 ASSAY OF MAGNESIUM: CPT

## 2018-09-08 PROCEDURE — 99254 IP/OBS CNSLTJ NEW/EST MOD 60: CPT | Performed by: INTERNAL MEDICINE

## 2018-09-08 PROCEDURE — 2060000000 HC ICU INTERMEDIATE R&B

## 2018-09-08 PROCEDURE — 94150 VITAL CAPACITY TEST: CPT

## 2018-09-08 PROCEDURE — 2580000003 HC RX 258: Performed by: STUDENT IN AN ORGANIZED HEALTH CARE EDUCATION/TRAINING PROGRAM

## 2018-09-08 PROCEDURE — 94761 N-INVAS EAR/PLS OXIMETRY MLT: CPT

## 2018-09-08 PROCEDURE — 93010 ELECTROCARDIOGRAM REPORT: CPT | Performed by: INTERNAL MEDICINE

## 2018-09-08 PROCEDURE — 94640 AIRWAY INHALATION TREATMENT: CPT

## 2018-09-08 PROCEDURE — 93005 ELECTROCARDIOGRAM TRACING: CPT | Performed by: STUDENT IN AN ORGANIZED HEALTH CARE EDUCATION/TRAINING PROGRAM

## 2018-09-08 PROCEDURE — 6360000002 HC RX W HCPCS: Performed by: INTERNAL MEDICINE

## 2018-09-08 RX ORDER — ALBUTEROL SULFATE 2.5 MG/3ML
2.5 SOLUTION RESPIRATORY (INHALATION) 4 TIMES DAILY
Status: DISCONTINUED | OUTPATIENT
Start: 2018-09-08 | End: 2018-09-11 | Stop reason: HOSPADM

## 2018-09-08 RX ORDER — BUDESONIDE 0.5 MG/2ML
0.5 INHALANT ORAL 2 TIMES DAILY
Status: DISCONTINUED | OUTPATIENT
Start: 2018-09-08 | End: 2018-09-11 | Stop reason: HOSPADM

## 2018-09-08 RX ORDER — PROPAFENONE HYDROCHLORIDE 150 MG/1
225 TABLET, FILM COATED ORAL EVERY 8 HOURS SCHEDULED
Status: DISCONTINUED | OUTPATIENT
Start: 2018-09-08 | End: 2018-09-11 | Stop reason: HOSPADM

## 2018-09-08 RX ADMIN — TRAZODONE HYDROCHLORIDE 100 MG: 100 TABLET ORAL at 22:23

## 2018-09-08 RX ADMIN — SERTRALINE HYDROCHLORIDE 100 MG: 100 TABLET ORAL at 11:00

## 2018-09-08 RX ADMIN — POTASSIUM CHLORIDE 20 MEQ: 20 TABLET, EXTENDED RELEASE ORAL at 11:00

## 2018-09-08 RX ADMIN — BUDESONIDE 500 MCG: 0.5 SUSPENSION RESPIRATORY (INHALATION) at 20:21

## 2018-09-08 RX ADMIN — PROPAFENONE HYDROCHLORIDE 225 MG: 150 TABLET, FILM COATED ORAL at 22:23

## 2018-09-08 RX ADMIN — Medication 10 ML: at 22:25

## 2018-09-08 RX ADMIN — CYANOCOBALAMIN TAB 1000 MCG 1000 MCG: 1000 TAB at 11:00

## 2018-09-08 RX ADMIN — ALBUTEROL SULFATE 2.5 MG: 2.5 SOLUTION RESPIRATORY (INHALATION) at 20:21

## 2018-09-08 RX ADMIN — Medication 10 ML: at 08:17

## 2018-09-08 RX ADMIN — CHOLECALCIFEROL TAB 25 MCG (1000 UNIT) 10000 UNITS: 25 TAB at 11:01

## 2018-09-08 RX ADMIN — POTASSIUM CHLORIDE 20 MEQ: 20 TABLET, EXTENDED RELEASE ORAL at 22:23

## 2018-09-08 RX ADMIN — BUDESONIDE 500 MCG: 0.5 SUSPENSION RESPIRATORY (INHALATION) at 08:43

## 2018-09-08 RX ADMIN — PROPAFENONE HYDROCHLORIDE 225 MG: 150 TABLET, FILM COATED ORAL at 14:08

## 2018-09-08 ASSESSMENT — PAIN SCALES - GENERAL
PAINLEVEL_OUTOF10: 0

## 2018-09-08 NOTE — PROGRESS NOTES
RESPIRATORY THERAPY ASSESSMENT    Name:  Ronald Escamilla [employer] Record Number:  [id number]  Age: 46 y.o. Gender: female  : 1965  Today's Date:  2018  Room:  88/8563-40    Assessment     Is the patient being admitted for a COPD or Asthma exacerbation? No   (If yes the patient will be seen every 4 hours for the first 24 hours and then reassessed)    Patient Admission Diagnosis      Allergies  Allergies   Allergen Reactions    Latex Anaphylaxis and Rash    Aspirin      Swelling in lower legs    Demerol      rash    Meperidine     Nsaids Swelling    Pcn [Penicillins] Hives    Ranitidine Hcl     Sulfa Antibiotics Rash and Hives       Minimum Predicted Vital Capacity:     1020          Actual Vital Capacity:      1500          Pulmonary History:Asthma  Home Oxygen Therapy:  room air  Home Respiratory Therapy:Albuterol. dulera  Current Respiratory Therapy:  Albuterol hhn qid, pulmicort bid          Respiratory Severity Index(RSI)   Patients with orders for inhalation medications, oxygen, or any therapeutic treatment modality will be placed on Respiratory Protocol. They will be assessed with the first treatment and at least every 72 hours thereafter. The following severity scale will be used to determine frequency of treatment intervention.     Smoking History: No Smoking History = 0    Social History  Social History   Substance Use Topics    Smoking status: Never Smoker    Smokeless tobacco: Never Used    Alcohol use Yes      Comment: \"Occasionally\"       Recent Surgical History: None = 0  Past Surgical History  Past Surgical History:   Procedure Laterality Date    ABDOMEN SURGERY      gastric bypass--[place]    HERNIA REPAIR      HYSTERECTOMY  2004    ARIAS-EN-Y GASTRIC BYPASS      TONSILLECTOMY AND ADENOIDECTOMY         Level of Consciousness: Alert, Oriented, and Cooperative = 0    Level of Activity: Walking with assistance = 1    Respiratory Pattern: Regular Pattern; RR 8-20 = 0    Breath Sounds: Diminshed bilaterally and/or crackles = 2    Sputum   ,  ,    Cough: Strong, spontaneous, non-productive = 0    Vital Signs   /78   Pulse 54   Temp 98 °F (36.7 °C) (Oral)   Resp 18   Ht 5' 10\" (1.778 m)   Wt (!) 347 lb 3.6 oz (157.5 kg)   SpO2 92%   BMI 49.82 kg/m²   SPO2 (COPD values may differ): Greater than or equal to 92% on room air = 0    Peak Flow (asthma only): not applicable = 0    RSI: 0-4 = See once and convert to home regimen or discontinue        Plan       Goals: medication delivery, mobilize retained secretions, volume expansion and improve oxygenation    Patient/caregiver was educated on the proper method of use for Respiratory Care Devices:  Yes      Level of patient/caregiver understanding able to:   [x] Verbalize understanding   [] Demonstrate understanding       [] Teach back        [] Needs reinforcement       []  No available caregiver               []  Other:     Response to education:  Very Good     Is patient being placed on Home Treatment Regimen? Yes     Does the patient have everything they need prior to discharge? NA     Comments: pt admitteed with SVT. Plan of Care: continue hhn with albuterol qid. pulmicort bid    Electronically signed by Daniel Townsend RCP on 9/8/2018 at 4:50 AM    Respiratory Protocol Guidelines     1. Assessment and treatment by Respiratory Therapy will be initiated for medication and therapeutic interventions upon initiation of aerosolized medication. 2. Physician will be contacted for respiratory rate (RR) greater than 35 breaths per minute. Therapy will be held for heart rate (HR) greater than 140 beats per minute, pending direction from physician. 3. Bronchodilators will be administered via Metered Dose Inhaler (MDI) with spacer when the following criteria are met:  a. Alert and cooperative     b. HR < 140 bpm  c. RR < 30 bpm                d. Can demonstrate a 2-3 second inspiratory hold  4.  Bronchodilators will

## 2018-09-08 NOTE — PROGRESS NOTES
4 Eyes Skin Assessment     The patient is being assess for  Admission    I agree that 2 RN's have performed a thorough Head to Toe Skin Assessment on the patient. ALL assessment sites listed below have been assessed. Areas assessed by both nurses:   [x]   Head, Face, and Ears   [x]   Shoulders, Back, and Chest  [x]   Arms, Elbows, and Hands   [x]   Coccyx, Sacrum, and Ischum  [x]   Legs, Feet, and Heels        Does the Patient have Skin Breakdown?   No         Teodoro Prevention initiated:  NA   Wound Care Orders initiated:  NA      WO nurse consulted for Pressure Injury (Stage 3,4, Unstageable, DTI, NWPT, and Complex wounds):  NA      Nurse 1 eSignature: Electronically signed by Maribeth Cotter RN on 9/8/18 at 6:52 AM    **SHARE this note so that the co-signing nurse is able to place an eSignature**    Nurse 2 eSignature: {Esignature:[id number]}

## 2018-09-08 NOTE — CONSULTS
apixaban (ELIQUIS) 5 MG TABS tablet Take 1 tablet by mouth 2 times daily 4/16/18  Yes Hung Vines MD   furosemide (LASIX) 40 MG tablet Take 1 tablet by mouth 2 times daily 4/13/18  Yes Hung Vines MD   potassium chloride (KLOR-CON M) 20 MEQ extended release tablet Take 1 tablet by mouth 2 times daily 4/12/18  Yes Elkin Saucedo MD   mometasone-formoterol [facility]) 200-5 MCG/ACT inhaler Inhale 1 puff into the lungs 2 times daily 1/3/18  Yes Tootie Downey MD   vitamin B-12 (CYANOCOBALAMIN) 1000 MCG tablet Take 1,000 mcg by mouth daily   Yes Historical Provider, MD   albuterol sulfate HFA (VENTOLIN HFA) 108 (90 BASE) MCG/ACT inhaler INHALE 2 PUFFS BY MOUTH EVERY 4 TO 6 HOURS AS NEEDED 3/8/17  Yes Curvin Slight Bingcang, DO   hydrOXYzine (VISTARIL) 50 MG capsule Take 1 capsule by mouth 3 times daily as needed for Anxiety (sleep) 4/18/18   Lorena Burden MD   Lysine 1000 MG TABS Take 1 tablet by mouth daily as needed     Historical Provider, MD   Spacer/Aero-Holding Chambers (AEROCHAMBER PLUS-FLOW SIGNAL) MISC Use with MDI as instructed 3/6/17   Tootie Downey MD   Cholecalciferol (VITAMIN D3) 2000 UNITS CAPS Take 5 capsules by mouth daily 2/8/17   Curvin Slight Bingcang, DO        Allergies:  Latex; Aspirin; Demerol; Meperidine; Nsaids; Pcn [penicillins]; Ranitidine hcl; and Sulfa antibiotics     Review of Systems:       · Constitutional: there has been no unanticipated weight loss. There's been no change in energy level, sleep pattern, or activity level. · Eyes: No visual changes or diplopia. No scleral icterus. · ENT: No Headaches, hearing loss or vertigo. No mouth sores or sore throat. · Cardiovascular: No loss of consciousness. No hemoptysis, pleuritic pain, or phlebitis. · Respiratory: No cough or wheezing, no sputum production. No hematemesis. · Gastrointestinal: No abdominal pain, appetite loss, blood in stools.  No change in bowel or bladder

## 2018-09-08 NOTE — PROGRESS NOTES
Musculoskeletal: Normal range of motion. She exhibits edema. She exhibits no tenderness or deformity. Neurological: She is alert and oriented to person, place, and time. Skin: Skin is warm and dry. She is not diaphoretic. There is erythema. LABS:    CBC:   Recent Labs      09/07/18   1649  09/08/18   0426   WBC  6.8  6.1   HGB  14.0  12.4   HCT  42.3  38.0   MCV  85.5  87.0   PLT  336  276                                                                BMP:  Recent Labs      09/07/18   1650  09/08/18   0426   NA   --   139   K   --   4.2   CL   --   102   CO2  25  27   BUN   --   16   CREATININE  1.0  1.0   GLUCOSE   --   96        Troponin:   Recent Labs      09/07/18   1950  09/08/18   0014  09/08/18   0426   TROPONINI  <0.01  <0.01  <0.01          ABGs:   Lab Results   Component Value Date    PHART 7.39 12/17/2011    BNI6UJG 38 12/17/2011    PO2ART 83 12/17/2011       INR: No results for input(s): INR in the last 72 hours. U/A:No results for input(s): NITRITE, COLORU, PHUR, LABCAST, WBCUA, RBCUA, MUCUS, TRICHOMONAS, YEAST, BACTERIA, CLARITYU, SPECGRAV, LEUKOCYTESUR, UROBILINOGEN, BILIRUBINUR, BLOODU, GLUCOSEU, AMORPHOUS in the last 72 hours. Invalid input(s): Amanda Lazcano   -----------------------------------------------------------------  RAD:   XR CHEST STANDARD (2 VW)   Final Result      1. No findings for acute cardiopulmonary disease. 2.  Retrocardiac hiatal hernia unchanged. Assessment/Plan:     Matt Hawk is a 46 y.o. female with a past medical history of asthma, arthritis, anxiety and atrial fibrillation on Eliquis who presented to the [facility]. on 9/7/18 complaining of chest pain and shortness of breath. Admitted for:      Chest pain - Most likely related to atrial fibrillation. No evidence of acute coronary disease with negative troponin and no ischemic changed on EKG.  Low probability of PE with unremarkable dimer and patient's history of anticoagulation with apixaban. Patient not currently in chest pain.   -Continuous telemetry monitoring  -start propafenone 225mg TID  -Cardiology consulted; Dr. Radha Veronica to see patient in the AM      Atrial Fibrillation - Patient was initially in Afib in ED. Currently in normal sinus rhythm.    -Telemetry monitoring  - manage as above      Asthma - Patient not currently complaining of shortness of breath or wheezing   -hold home albuterol pending cardiology evaluation      Anxiety   -Continue home sertraline        Code Status: Full Code   FEN: DIET CARDIAC;  PPx: cardiac diet  Dispo: Floor, D/C in AM if asymptomatic     Patient will be discussed with attending, MD Liz Laws, PGY-2  Pager: [phone]  9/8/2018  3:02 PM

## 2018-09-08 NOTE — H&P
ago, first of  Sept 2017    Edema     Fibromyalgia     GERD (gastroesophageal reflux disease)     reflux    Hiatal hernia        Past Surgical History:          Procedure Laterality Date    ABDOMEN SURGERY  2001    gastric bypass--[place]    HERNIA REPAIR  2001    HYSTERECTOMY  2004    ARIAS-EN-Y GASTRIC BYPASS      TONSILLECTOMY AND ADENOIDECTOMY         Medications Prior to Admission:      Prior to Admission medications    Medication Sig Start Date End Date Taking? Authorizing Provider   traZODone (DESYREL) 100 MG tablet Take 100 mg by mouth nightly as needed for Sleep   Yes Historical Provider, MD   fluocinonide (LIDEX) 0.05 % ointment Apply sparingly to affected area(s) bid prn for flares, up to 2 weeks at a time. Do not apply on cleared skin.  8/16/18  Yes Efe Valente MD   sertraline (ZOLOFT) 100 MG tablet Take 1 tablet by mouth daily 6/23/18  Yes Jay Hernandez DO   propafenone (RYTHMOL SR) 225 MG extended release capsule Take 225 mg by mouth 2 times daily   Yes Historical Provider, MD   apixaban (ELIQUIS) 5 MG TABS tablet Take 1 tablet by mouth 2 times daily 4/16/18  Yes Ladan Fournier MD   furosemide (LASIX) 40 MG tablet Take 1 tablet by mouth 2 times daily 4/13/18  Yes Ladan Fournier MD   potassium chloride (KLOR-CON M) 20 MEQ extended release tablet Take 1 tablet by mouth 2 times daily 4/12/18  Yes Daphne Davis MD   mometasone-formoterol [facility]) 200-5 MCG/ACT inhaler Inhale 1 puff into the lungs 2 times daily 1/3/18  Yes Alphonse Peterson MD   vitamin B-12 (CYANOCOBALAMIN) 1000 MCG tablet Take 1,000 mcg by mouth daily   Yes Historical Provider, MD   albuterol sulfate HFA (VENTOLIN HFA) 108 (90 BASE) MCG/ACT inhaler INHALE 2 PUFFS BY MOUTH EVERY 4 TO 6 HOURS AS NEEDED 3/8/17  Yes Jay Hernandez DO   hydrOXYzine (VISTARIL) 50 MG capsule Take 1 capsule by mouth 3 times daily as needed for Anxiety (sleep) 4/18/18   Danice Hodgkin, MD   Lysine 1000 MG

## 2018-09-08 NOTE — PLAN OF CARE
Problem: Cardiac Output - Decreased:  Goal: Hemodynamic stability will improve  Hemodynamic stability will improve   Outcome: Ongoing  NSR/SB on monitor. Denies chest pain or shortness of breath. Will continue to monitor cardiac status. Problem: Falls - Risk of:  Goal: Absence of physical injury  Absence of physical injury   Outcome: Ongoing  Alert and oriented. Has remained free of injury. Will monitor.

## 2018-09-08 NOTE — ED NOTES
Report given to [facility] on PCU. Patient will be transported to [facility].      Paresh Flores RN  09/07/18 [id number]

## 2018-09-09 LAB
ANION GAP SERPL CALCULATED.3IONS-SCNC: 10 MMOL/L (ref 3–16)
BUN BLDV-MCNC: 22 MG/DL (ref 7–20)
CALCIUM SERPL-MCNC: 8.7 MG/DL (ref 8.3–10.6)
CHLORIDE BLD-SCNC: 105 MMOL/L (ref 99–110)
CO2: 26 MMOL/L (ref 21–32)
CREAT SERPL-MCNC: 0.9 MG/DL (ref 0.6–1.1)
GFR AFRICAN AMERICAN: >60
GFR NON-AFRICAN AMERICAN: >60
GLUCOSE BLD-MCNC: 83 MG/DL (ref 70–99)
HCT VFR BLD CALC: 35.5 % (ref 36–48)
HEMOGLOBIN: 11.6 G/DL (ref 12–16)
MAGNESIUM: 2.1 MG/DL (ref 1.8–2.4)
MCH RBC QN AUTO: 28.5 PG (ref 26–34)
MCHC RBC AUTO-ENTMCNC: 32.7 G/DL (ref 31–36)
MCV RBC AUTO: 87 FL (ref 80–100)
PDW BLD-RTO: 15.6 % (ref 12.4–15.4)
PLATELET # BLD: 257 K/UL (ref 135–450)
PMV BLD AUTO: 8.6 FL (ref 5–10.5)
POTASSIUM SERPL-SCNC: 4.2 MMOL/L (ref 3.5–5.1)
RBC # BLD: 4.08 M/UL (ref 4–5.2)
SODIUM BLD-SCNC: 141 MMOL/L (ref 136–145)
WBC # BLD: 5.9 K/UL (ref 4–11)

## 2018-09-09 PROCEDURE — 6360000002 HC RX W HCPCS: Performed by: INTERNAL MEDICINE

## 2018-09-09 PROCEDURE — 6370000000 HC RX 637 (ALT 250 FOR IP): Performed by: STUDENT IN AN ORGANIZED HEALTH CARE EDUCATION/TRAINING PROGRAM

## 2018-09-09 PROCEDURE — 2580000003 HC RX 258: Performed by: STUDENT IN AN ORGANIZED HEALTH CARE EDUCATION/TRAINING PROGRAM

## 2018-09-09 PROCEDURE — 6370000000 HC RX 637 (ALT 250 FOR IP): Performed by: INTERNAL MEDICINE

## 2018-09-09 PROCEDURE — 99233 SBSQ HOSP IP/OBS HIGH 50: CPT | Performed by: INTERNAL MEDICINE

## 2018-09-09 PROCEDURE — 94761 N-INVAS EAR/PLS OXIMETRY MLT: CPT

## 2018-09-09 PROCEDURE — 2060000000 HC ICU INTERMEDIATE R&B

## 2018-09-09 PROCEDURE — 85027 COMPLETE CBC AUTOMATED: CPT

## 2018-09-09 PROCEDURE — 94640 AIRWAY INHALATION TREATMENT: CPT

## 2018-09-09 PROCEDURE — 83735 ASSAY OF MAGNESIUM: CPT

## 2018-09-09 PROCEDURE — 80048 BASIC METABOLIC PNL TOTAL CA: CPT

## 2018-09-09 PROCEDURE — 36415 COLL VENOUS BLD VENIPUNCTURE: CPT

## 2018-09-09 RX ADMIN — Medication 10 ML: at 09:20

## 2018-09-09 RX ADMIN — BUDESONIDE 500 MCG: 0.5 SUSPENSION RESPIRATORY (INHALATION) at 07:47

## 2018-09-09 RX ADMIN — POTASSIUM CHLORIDE 20 MEQ: 20 TABLET, EXTENDED RELEASE ORAL at 20:18

## 2018-09-09 RX ADMIN — PROPAFENONE HYDROCHLORIDE 225 MG: 150 TABLET, FILM COATED ORAL at 14:11

## 2018-09-09 RX ADMIN — PROPAFENONE HYDROCHLORIDE 225 MG: 150 TABLET, FILM COATED ORAL at 23:39

## 2018-09-09 RX ADMIN — CHOLECALCIFEROL TAB 25 MCG (1000 UNIT) 10000 UNITS: 25 TAB at 10:14

## 2018-09-09 RX ADMIN — CYANOCOBALAMIN TAB 1000 MCG 1000 MCG: 1000 TAB at 09:20

## 2018-09-09 RX ADMIN — POTASSIUM CHLORIDE 20 MEQ: 20 TABLET, EXTENDED RELEASE ORAL at 09:20

## 2018-09-09 RX ADMIN — TRAZODONE HYDROCHLORIDE 100 MG: 100 TABLET ORAL at 23:41

## 2018-09-09 RX ADMIN — BUDESONIDE 500 MCG: 0.5 SUSPENSION RESPIRATORY (INHALATION) at 20:34

## 2018-09-09 RX ADMIN — Medication 10 ML: at 20:19

## 2018-09-09 RX ADMIN — APIXABAN 5 MG: 5 TABLET, FILM COATED ORAL at 20:18

## 2018-09-09 RX ADMIN — ALBUTEROL SULFATE 2.5 MG: 2.5 SOLUTION RESPIRATORY (INHALATION) at 20:34

## 2018-09-09 RX ADMIN — SERTRALINE HYDROCHLORIDE 100 MG: 100 TABLET ORAL at 09:20

## 2018-09-09 RX ADMIN — ALBUTEROL SULFATE 2.5 MG: 2.5 SOLUTION RESPIRATORY (INHALATION) at 07:47

## 2018-09-09 ASSESSMENT — ENCOUNTER SYMPTOMS
PHOTOPHOBIA: 0
BLURRED VISION: 0
ABDOMINAL PAIN: 0
BACK PAIN: 1
SHORTNESS OF BREATH: 1
SPUTUM PRODUCTION: 0
VOMITING: 0
DOUBLE VISION: 0
HEMOPTYSIS: 0
COUGH: 0
NAUSEA: 0

## 2018-09-09 ASSESSMENT — PAIN SCALES - GENERAL
PAINLEVEL_OUTOF10: 0

## 2018-09-09 NOTE — PLAN OF CARE
Problem: Cardiac Output - Decreased:  Goal: Hemodynamic stability will improve  Hemodynamic stability will improve   Outcome: Ongoing  Remains in NSR/SB. Heart rate 66 at present. Denies chest pain or shortness of breath. blood pressure stale. Will continue to monitor. Problem: Falls - Risk of:  Goal: Absence of physical injury  Absence of physical injury   Outcome: Ongoing  Alert and oriented. Has remained free of injury. Ambulates independently in room. Gait steady. Calls for assistance as needed. Will continue to monitor.

## 2018-09-09 NOTE — PROGRESS NOTES
for input(s): APTT in the last 72 hours. BNP:  No results for input(s): BNP in the last 72 hours. IMAGING:     Assessment:  Patient Active Problem List    Diagnosis Date Noted    Cluster B personality disorder 04/18/2018     Priority: Low    MD[facility]major depressive disorder), recurrent severe, without [facility] 03/12/2018     Priority: Low    Asthma 07/17/2013     Priority: Low    Paroxysmal atrial fibrillation (HCC)     [facility] tachy[facility]) [facility] 09/07/2018    Atrial flutter ([facility] 06/22/2018    HTN (hypertension) 05/30/2018    Colon polyps 04/28/2015    Sliding hiatal hernia 04/28/2015    Restless leg syndrome 04/04/2013    Overdose 04/03/2013       Plan:  1. Remains sinus. Had laurie and low bp and felt light headed. Reportedly 30s. Will monitor today with increased propafenone. Has what she says are similar sx on occasion at home. Monitor another 24 hrs. Will have EP see.        Core Measures:  · Discharge instructions:   · LVEF documented:   · ACEI for LV dysfunction:   · Smoking Cessation:    Viral Veloz MD 9/9/2018 7:51 AM

## 2018-09-09 NOTE — PLAN OF CARE
Problem: Cardiac Output - Decreased:  Goal: Hemodynamic stability will improve  Hemodynamic stability will improve   Outcome: Ongoing  Patient V.S.S. SB on telemetry overnight. Denies any dizziness or SOB at this time. Continue to monitor.

## 2018-09-10 LAB
ANION GAP SERPL CALCULATED.3IONS-SCNC: 10 MMOL/L (ref 3–16)
BUN BLDV-MCNC: 21 MG/DL (ref 7–20)
CALCIUM SERPL-MCNC: 8.8 MG/DL (ref 8.3–10.6)
CHLORIDE BLD-SCNC: 106 MMOL/L (ref 99–110)
CO2: 24 MMOL/L (ref 21–32)
CREAT SERPL-MCNC: 1 MG/DL (ref 0.6–1.1)
GFR AFRICAN AMERICAN: >60
GFR NON-AFRICAN AMERICAN: 58
GLUCOSE BLD-MCNC: 99 MG/DL (ref 70–99)
HCT VFR BLD CALC: 35.3 % (ref 36–48)
HEMOGLOBIN: 11.5 G/DL (ref 12–16)
MAGNESIUM: 2.2 MG/DL (ref 1.8–2.4)
MCH RBC QN AUTO: 28.4 PG (ref 26–34)
MCHC RBC AUTO-ENTMCNC: 32.7 G/DL (ref 31–36)
MCV RBC AUTO: 87.1 FL (ref 80–100)
PDW BLD-RTO: 15.4 % (ref 12.4–15.4)
PLATELET # BLD: 268 K/UL (ref 135–450)
PMV BLD AUTO: 8.8 FL (ref 5–10.5)
POTASSIUM SERPL-SCNC: 4.3 MMOL/L (ref 3.5–5.1)
RBC # BLD: 4.05 M/UL (ref 4–5.2)
SODIUM BLD-SCNC: 140 MMOL/L (ref 136–145)
WBC # BLD: 6 K/UL (ref 4–11)

## 2018-09-10 PROCEDURE — 2060000000 HC ICU INTERMEDIATE R&B

## 2018-09-10 PROCEDURE — 2580000003 HC RX 258: Performed by: STUDENT IN AN ORGANIZED HEALTH CARE EDUCATION/TRAINING PROGRAM

## 2018-09-10 PROCEDURE — 99255 IP/OBS CONSLTJ NEW/EST HI 80: CPT | Performed by: INTERNAL MEDICINE

## 2018-09-10 PROCEDURE — 6370000000 HC RX 637 (ALT 250 FOR IP): Performed by: INTERNAL MEDICINE

## 2018-09-10 PROCEDURE — 6360000002 HC RX W HCPCS: Performed by: INTERNAL MEDICINE

## 2018-09-10 PROCEDURE — 85027 COMPLETE CBC AUTOMATED: CPT

## 2018-09-10 PROCEDURE — 83735 ASSAY OF MAGNESIUM: CPT

## 2018-09-10 PROCEDURE — 6370000000 HC RX 637 (ALT 250 FOR IP): Performed by: STUDENT IN AN ORGANIZED HEALTH CARE EDUCATION/TRAINING PROGRAM

## 2018-09-10 PROCEDURE — 94761 N-INVAS EAR/PLS OXIMETRY MLT: CPT

## 2018-09-10 PROCEDURE — 36415 COLL VENOUS BLD VENIPUNCTURE: CPT

## 2018-09-10 PROCEDURE — APPSS45 APP SPLIT SHARED TIME 31-45 MINUTES: Performed by: NURSE PRACTITIONER

## 2018-09-10 PROCEDURE — 94640 AIRWAY INHALATION TREATMENT: CPT

## 2018-09-10 PROCEDURE — 94664 DEMO&/EVAL PT USE INHALER: CPT

## 2018-09-10 PROCEDURE — 80048 BASIC METABOLIC PNL TOTAL CA: CPT

## 2018-09-10 RX ORDER — DILTIAZEM HYDROCHLORIDE 180 MG/1
180 CAPSULE, COATED, EXTENDED RELEASE ORAL DAILY
Status: DISCONTINUED | OUTPATIENT
Start: 2018-09-10 | End: 2018-09-11

## 2018-09-10 RX ADMIN — PROPAFENONE HYDROCHLORIDE 225 MG: 150 TABLET, FILM COATED ORAL at 17:08

## 2018-09-10 RX ADMIN — Medication 10 ML: at 21:43

## 2018-09-10 RX ADMIN — DILTIAZEM HYDROCHLORIDE 180 MG: 180 CAPSULE, COATED, EXTENDED RELEASE ORAL at 18:14

## 2018-09-10 RX ADMIN — APIXABAN 5 MG: 5 TABLET, FILM COATED ORAL at 21:42

## 2018-09-10 RX ADMIN — CHOLECALCIFEROL TAB 25 MCG (1000 UNIT) 10000 UNITS: 25 TAB at 09:20

## 2018-09-10 RX ADMIN — Medication 10 ML: at 08:03

## 2018-09-10 RX ADMIN — BUDESONIDE 500 MCG: 0.5 SUSPENSION RESPIRATORY (INHALATION) at 19:57

## 2018-09-10 RX ADMIN — SERTRALINE HYDROCHLORIDE 100 MG: 100 TABLET ORAL at 09:21

## 2018-09-10 RX ADMIN — TRAZODONE HYDROCHLORIDE 100 MG: 100 TABLET ORAL at 21:42

## 2018-09-10 RX ADMIN — POTASSIUM CHLORIDE 20 MEQ: 20 TABLET, EXTENDED RELEASE ORAL at 09:21

## 2018-09-10 RX ADMIN — ALBUTEROL SULFATE 2.5 MG: 2.5 SOLUTION RESPIRATORY (INHALATION) at 19:57

## 2018-09-10 RX ADMIN — APIXABAN 5 MG: 5 TABLET, FILM COATED ORAL at 09:21

## 2018-09-10 RX ADMIN — ALBUTEROL SULFATE 2.5 MG: 2.5 SOLUTION RESPIRATORY (INHALATION) at 08:02

## 2018-09-10 RX ADMIN — CYANOCOBALAMIN TAB 1000 MCG 1000 MCG: 1000 TAB at 09:20

## 2018-09-10 RX ADMIN — PROPAFENONE HYDROCHLORIDE 225 MG: 150 TABLET, FILM COATED ORAL at 07:15

## 2018-09-10 RX ADMIN — POTASSIUM CHLORIDE 20 MEQ: 20 TABLET, EXTENDED RELEASE ORAL at 21:42

## 2018-09-10 RX ADMIN — BUDESONIDE 500 MCG: 0.5 SUSPENSION RESPIRATORY (INHALATION) at 08:02

## 2018-09-10 RX ADMIN — PROPAFENONE HYDROCHLORIDE 225 MG: 150 TABLET, FILM COATED ORAL at 21:42

## 2018-09-10 ASSESSMENT — ENCOUNTER SYMPTOMS
CONSTIPATION: 0
SPUTUM PRODUCTION: 0
NAUSEA: 0
BACK PAIN: 1
BLURRED VISION: 0
DOUBLE VISION: 0
PHOTOPHOBIA: 0
ABDOMINAL PAIN: 0
COUGH: 0
VOMITING: 0
HEMOPTYSIS: 0
DIARRHEA: 0
SHORTNESS OF BREATH: 0

## 2018-09-10 ASSESSMENT — PAIN SCALES - GENERAL
PAINLEVEL_OUTOF10: 0

## 2018-09-10 ASSESSMENT — PAIN DESCRIPTION - FREQUENCY: FREQUENCY: INTERMITTENT

## 2018-09-10 NOTE — PROGRESS NOTES
Internal Medicine PGY-1 Resident Progress Note        PCP: Willie Barbosa DO    Date of Admission: 9/7/2018    Chief Complaint:   C/Lisseth Sears 1106 Problems    Diagnosis Date Noted    Paroxysmal atrial fibrillation ([facility] [I48.0]     [facility] [I47.1] 09/07/2018       Subjective: Received morning dose propafenone HR in high 50s, recheck in the 60s, discussed cardiology wanting to keep her on it and see how she does w/ increased frequency and for EP to see her today. Pt expressed understanding. All concerns and questions addressed. No other concerns or questions except as below. Review of Systems   Constitutional: Negative for chills, diaphoresis and fever. HENT: Negative for congestion, ear pain and hearing loss. Eyes: Negative for blurred vision, double vision and photophobia. Respiratory: Negative for cough, hemoptysis, sputum production and shortness of breath. SOB earlier when SBP was low   Cardiovascular: Negative for chest pain, palpitations, leg swelling and PND. Gastrointestinal: Negative for abdominal pain, constipation, diarrhea, nausea and vomiting. Genitourinary: Negative for dysuria, frequency, hematuria and urgency. Musculoskeletal: Positive for back pain (States fibromyalgia trigger point L lower thoracic back) and myalgias (Fibromyalgia trigger pts in legs). Negative for joint pain. Skin: Negative. Neurological: Negative for dizziness (Lightheadedness), tingling, tremors, speech change, focal weakness, loss of consciousness and weakness. Psychiatric/Behavioral: Negative for hallucinations. The patient is not nervous/anxious.         Medications:  Reviewed    Infusion Medications   Scheduled Medications    apixaban  5 mg Oral BID    albuterol  2.5 mg Nebulization 4x daily    And    budesonide  0.5 mg Nebulization BID    propafenone  225 mg Oral 3 times per day    amiodarone bolus  150 mg Intravenous Once    vitamin D  10,000 Units Oral Daily    potassium chloride  20 mEq Oral BID    sertraline  100 mg Oral Daily    vitamin B-12  1,000 mcg Oral Daily    sodium chloride flush  10 mL Intravenous 2 times per day     PRN Meds: traZODone, sodium chloride flush, magnesium hydroxide, ondansetron      Intake/Output Summary (Last 24 hours) at 09/10/18 0558  Last data filed at 18 2334   Gross per 24 hour   Intake             1200 ml   Output              400 ml   Net              800 ml       Physical Exam Performed:    Temp  Av.2 °F (36.2 °C)  Min: 96.8 °F (36 °C)  Max: 97.9 °F (36.6 °C)  Pulse  Av.2  Min: 59  Max: 68  BP  Min: 90/53  Max: 150/102  SpO2  Av.9 %  Min: 92 %  Max: 97 %  No data found. BP (!) 150/102   Pulse 60   Temp 97 °F (36.1 °C) (Oral)   Resp 18   Ht 5' 10\" (1.778 m)   Wt (!) 348 lb 5.2 oz (158 kg)   SpO2 95%   BMI 49.98 kg/m²      Physical Exam   Constitutional: She is oriented to person, place, and time. She appears well-developed and well-nourished. No distress. Sitting in chair, obese body habitus, appropriate appearance for age   Eyes: EOM are normal.   Neck: Normal range of motion. Neck supple. No JVD present. No tracheal deviation present. Cardiovascular: Normal rate and regular rhythm. No murmur heard. BL radial pulses appreciated   Pulmonary/Chest: Effort normal and breath sounds normal. No respiratory distress. She has no wheezes. She has no rales. Abdominal: Soft. Bowel sounds are normal. She exhibits no distension. There is no tenderness. There is no guarding. Musculoskeletal: Normal range of motion. She exhibits edema. She exhibits no tenderness. Ambulates independently around gonzalez, swollen appearing legs, but minimal edema   Neurological: She is alert and oriented to person, place, and time. Skin: Skin is warm and dry. She is not diaphoretic. No erythema. Psychiatric: She has a normal mood and affect.  Her behavior is normal. Thought content normal.   Vitals

## 2018-09-10 NOTE — PROGRESS NOTES
[address]   Electrophysiology Nurse Practitioner  Pre-Consult Rounding    Date: 9/10/2018    Reason for Consultation/ Chief Complaint: pAF    History Obtained From: Patient and medical record. Cardiac Hx: Keith Jrenigan is a 46 y.o. woman with a h/o ASTRID (tx'd with surgery), asthma, fibromyalgia, GERD and paroxysmal AF/AFL, on Rythmol and apixaban. Admitted with c/o palpitations, CP and SOB, noted to be in AF/AFL with a spontaneous conversion to NSR. Trops <0.01. Today: Patient states she had been at work and developed palpitations. Her heart started to to race and she became SOB with severe CP. Her HR was elevated in ED (no strips available) and they were preparing to cardiovert her when she converted to NSR. Patient states this was the worst that she has felt. She had to bend over to breathe. Home medications:   No current facility-administered medications on file prior to encounter. Current Outpatient Prescriptions on File Prior to Encounter   Medication Sig Dispense Refill    fluocinonide (LIDEX) 0.05 % ointment Apply sparingly to affected area(s) bid prn for flares, up to 2 weeks at a time. Do not apply on cleared skin.  60 g 1    sertraline (ZOLOFT) 100 MG tablet Take 1 tablet by mouth daily 30 tablet 1    propafenone (RYTHMOL SR) 225 MG extended release capsule Take 225 mg by mouth 2 times daily      apixaban (ELIQUIS) 5 MG TABS tablet Take 1 tablet by mouth 2 times daily 60 tablet 5    furosemide (LASIX) 40 MG tablet Take 1 tablet by mouth 2 times daily 60 tablet 5    potassium chloride (KLOR-CON M) 20 MEQ extended release tablet Take 1 tablet by mouth 2 times daily 60 tablet 5    mometasone-formoterol (DULERA) 200-5 MCG/ACT inhaler Inhale 1 puff into the lungs 2 times daily 8.8 g 5    vitamin B-12 (CYANOCOBALAMIN) 1000 MCG tablet Take 1,000 mcg by mouth daily      albuterol sulfate HFA (VENTOLIN HFA) 108 (90 BASE) MCG/ACT inhaler INHALE 2 PUFFS BY MOUTH EVERY 4 TO 6 Arthritis in her mother; Asthma in her maternal aunt; Cancer in her mother; Cirrhosis in her father. Review of System:    · Constitutional: No fevers, chills. · Eyes: No visual changes or diplopia. No scleral icterus. · ENT: No Headaches. No mouth sores or sore throat. · Cardiovascular: Yes for chest pain, Yes for dyspnea on exertion, Yes for palpitations or No for loss of consciousness. No cough, hemoptysis, No for pleuritic pain, or phlebitis. · Respiratory: No for cough or wheezing. No hematemesis. · Gastrointestinal: No abdominal pain, blood in stools. · Musculoskeletal: No gait disturbance,    · Integumentary: No rash or pruritis. · Neurological: No headache, change in muscle strength, numbness or tingling. · Psychiatric: No anxiety, or depression. Physical Examination:  Vitals:    09/10/18 09   BP: 135/78   Pulse: 62   Resp: 18   Temp: 98.2 °F (36.8 °C)   SpO2: 92%      In: 840 [P.O.:840]  Out: 700    Wt Readings from Last 3 Encounters:   18 (!) 348 lb 5.2 oz (158 kg)   18 (!) 335 lb (152 kg)   18 (!) 347 lb 3.2 oz (157.5 kg)     Temp  Av.3 °F (36.3 °C)  Min: 96.8 °F (36 °C)  Max: 98.2 °F (36.8 °C)  Pulse  Av.1  Min: 56  Max: 68  BP  Min: 108/72  Max: 150/102  SpO2  Av.1 %  Min: 92 %  Max: 96 %    Intake/Output Summary (Last 24 hours) at 09/10/18 0918  Last data filed at 09/10/18 0909   Gross per 24 hour   Intake             1680 ml   Output              700 ml   Net              980 ml       · Constitutional: Oriented. No distress. · Head: Normocephalic and atraumatic. · Mouth/Throat: Oropharynx is clear and moist.   · Eyes: Conjunctivae clear without jaunduice. PERRL. · Neck: Neck supple. No rigidity. No JVD present. · Cardiovascular: Normal rate, regular rhythm, S1&S2. · Pulmonary/Chest: Bilateral respiratory sounds. No wheezes, No rhonchi. · Abdominal: Soft. Bowel sounds present. No distension, No tenderness.    · Musculoskeletal: No

## 2018-09-11 VITALS
HEART RATE: 52 BPM | SYSTOLIC BLOOD PRESSURE: 110 MMHG | RESPIRATION RATE: 18 BRPM | TEMPERATURE: 97.7 F | WEIGHT: 293 LBS | HEIGHT: 70 IN | OXYGEN SATURATION: 92 % | BODY MASS INDEX: 41.95 KG/M2 | DIASTOLIC BLOOD PRESSURE: 77 MMHG

## 2018-09-11 LAB
HCT VFR BLD CALC: 35.3 % (ref 36–48)
HEMOGLOBIN: 11.5 G/DL (ref 12–16)
MCH RBC QN AUTO: 28.5 PG (ref 26–34)
MCHC RBC AUTO-ENTMCNC: 32.6 G/DL (ref 31–36)
MCV RBC AUTO: 87.4 FL (ref 80–100)
PDW BLD-RTO: 15.8 % (ref 12.4–15.4)
PLATELET # BLD: 269 K/UL (ref 135–450)
PMV BLD AUTO: 8.7 FL (ref 5–10.5)
RBC # BLD: 4.04 M/UL (ref 4–5.2)
WBC # BLD: 5.8 K/UL (ref 4–11)

## 2018-09-11 PROCEDURE — 6370000000 HC RX 637 (ALT 250 FOR IP): Performed by: INTERNAL MEDICINE

## 2018-09-11 PROCEDURE — 85027 COMPLETE CBC AUTOMATED: CPT

## 2018-09-11 PROCEDURE — 36415 COLL VENOUS BLD VENIPUNCTURE: CPT

## 2018-09-11 PROCEDURE — 6370000000 HC RX 637 (ALT 250 FOR IP): Performed by: STUDENT IN AN ORGANIZED HEALTH CARE EDUCATION/TRAINING PROGRAM

## 2018-09-11 PROCEDURE — 99233 SBSQ HOSP IP/OBS HIGH 50: CPT | Performed by: NURSE PRACTITIONER

## 2018-09-11 RX ORDER — DILTIAZEM HYDROCHLORIDE 60 MG/1
60 TABLET, FILM COATED ORAL EVERY 8 HOURS PRN
Qty: 120 TABLET | Refills: 1 | Status: ON HOLD | OUTPATIENT
Start: 2018-09-11 | End: 2019-08-25 | Stop reason: CLARIF

## 2018-09-11 RX ORDER — DILTIAZEM HYDROCHLORIDE 120 MG/1
120 CAPSULE, COATED, EXTENDED RELEASE ORAL DAILY
Qty: 30 CAPSULE | Refills: 0 | Status: SHIPPED | OUTPATIENT
Start: 2018-09-12 | End: 2018-09-11

## 2018-09-11 RX ORDER — PROPAFENONE HYDROCHLORIDE 225 MG/1
225 TABLET, FILM COATED ORAL EVERY 8 HOURS SCHEDULED
Qty: 90 TABLET | Refills: 0 | Status: SHIPPED | OUTPATIENT
Start: 2018-09-11 | End: 2019-04-26

## 2018-09-11 RX ORDER — PROPAFENONE HYDROCHLORIDE 225 MG/1
225 TABLET, FILM COATED ORAL EVERY 8 HOURS SCHEDULED
Qty: 90 TABLET | Refills: 0 | Status: SHIPPED | OUTPATIENT
Start: 2018-09-11 | End: 2018-09-11

## 2018-09-11 RX ORDER — DILTIAZEM HYDROCHLORIDE 120 MG/1
120 CAPSULE, COATED, EXTENDED RELEASE ORAL DAILY
Qty: 30 CAPSULE | Refills: 0 | Status: SHIPPED | OUTPATIENT
Start: 2018-09-12 | End: 2018-10-29 | Stop reason: SDUPTHER

## 2018-09-11 RX ORDER — DILTIAZEM HYDROCHLORIDE 120 MG/1
120 CAPSULE, COATED, EXTENDED RELEASE ORAL DAILY
Status: DISCONTINUED | OUTPATIENT
Start: 2018-09-12 | End: 2018-09-11 | Stop reason: HOSPADM

## 2018-09-11 RX ADMIN — CHOLECALCIFEROL TAB 25 MCG (1000 UNIT) 10000 UNITS: 25 TAB at 09:34

## 2018-09-11 RX ADMIN — CYANOCOBALAMIN TAB 1000 MCG 1000 MCG: 1000 TAB at 09:34

## 2018-09-11 RX ADMIN — APIXABAN 5 MG: 5 TABLET, FILM COATED ORAL at 09:34

## 2018-09-11 RX ADMIN — PROPAFENONE HYDROCHLORIDE 225 MG: 150 TABLET, FILM COATED ORAL at 09:35

## 2018-09-11 RX ADMIN — PROPAFENONE HYDROCHLORIDE 225 MG: 150 TABLET, FILM COATED ORAL at 15:04

## 2018-09-11 RX ADMIN — SERTRALINE HYDROCHLORIDE 100 MG: 100 TABLET ORAL at 09:34

## 2018-09-11 RX ADMIN — POTASSIUM CHLORIDE 20 MEQ: 20 TABLET, EXTENDED RELEASE ORAL at 09:34

## 2018-09-11 RX ADMIN — PROPAFENONE HYDROCHLORIDE 225 MG: 150 TABLET, FILM COATED ORAL at 05:18

## 2018-09-11 ASSESSMENT — PAIN SCALES - GENERAL
PAINLEVEL_OUTOF10: 0
PAINLEVEL_OUTOF10: 0

## 2018-09-11 NOTE — DISCHARGE SUMMARY
to 88 systolic. She was given 3 mg of midazolam and defibrillator was charged to shock when her heart rate suddenly decreased to 129 and she went into normal sinus rhythm. Her chest pain and shortness of breath resolved at this point. Admitted to internal medicine for further cardiac workup. \"    Her chest pain was thought 2/2 to her Afib, as ACS w/u was unremarkable and PE was unlikely as she was anticoagulated on apixaban. Her chest pain resolved and her afib converted to normal sinus after an amiodarone bolus, per Cardiology consult. Her propafenone was increased to 225 TID and she was started on diltiazem both scheduled and PRN for maintenance and breakthrough symptoms per Electrophysiology. She remained in sinus rhythm through discharge. Her other chronic issues including, but not limited to her asthma and anxiety remained controlled and she was discharged on her home regimen except as below. Disposition:  Home    Physical Exam Performed:     /77   Pulse 52   Temp 97.7 °F (36.5 °C) (Oral)   Resp 18   Ht 5' 10\" (1.778 m)   Wt (!) 349 lb 10.4 oz (158.6 kg)   SpO2 92%   BMI 50.17 kg/m²     Physical Exam   Constitutional: She is oriented to person, place, and time. She appears well-developed and well-nourished. No distress. Sitting in chair, obese body habitus, appropriate appearance for age   Eyes: EOM are normal.   Neck: Normal range of motion. Neck supple. No JVD present. No tracheal deviation present. Cardiovascular: Normal rate and regular rhythm. No murmur heard. BL radial pulses appreciated   Pulmonary/Chest: Effort normal and breath sounds normal. No respiratory distress. She has no wheezes. She has no rales. Abdominal: Soft. Bowel sounds are normal. She exhibits no distension. There is no tenderness. There is no guarding. Musculoskeletal: Normal range of motion. She exhibits edema. She exhibits no tenderness.    Ambulates independently around gonzalez, swollen appearing legs, but Details   traZODone (DESYREL) 100 MG tablet Take 100 mg by mouth nightly as needed for SleepHistorical Med      fluocinonide (LIDEX) 0.05 % ointment Apply sparingly to affected area(s) bid prn for flares, up to 2 weeks at a time. Do not apply on cleared skin., Disp-60 g, R-1, Normal      sertraline (ZOLOFT) 100 MG tablet Take 1 tablet by mouth daily, Disp-30 tablet, R-1Normal      hydrOXYzine (VISTARIL) 50 MG capsule Take 1 capsule by mouth 3 times daily as needed for Anxiety (sleep), Disp-90 capsule, R-1Normal      apixaban (ELIQUIS) 5 MG TABS tablet Take 1 tablet by mouth 2 times daily, Disp-60 tablet, R-5Normal      furosemide (LASIX) 40 MG tablet Take 1 tablet by mouth 2 times daily, Disp-60 tablet, R-5Normal      potassium chloride (KLOR-CON M) 20 MEQ extended release tablet Take 1 tablet by mouth 2 times daily, Disp-60 tablet, R-5NO PRINT      mometasone-formoterol (DULERA) 200-5 MCG/ACT inhaler Inhale 1 puff into the lungs 2 times daily, Disp-8.8 g, R-5Normal      vitamin B-12 (CYANOCOBALAMIN) 1000 MCG tablet Take 1,000 mcg by mouth dailyHistorical Med      Lysine 1000 MG TABS Take 1 tablet by mouth daily as needed Historical Med      albuterol sulfate HFA (VENTOLIN HFA) 108 (90 BASE) MCG/ACT inhaler INHALE 2 PUFFS BY MOUTH EVERY 4 TO 6 HOURS AS NEEDED, Disp-2 Inhaler, R-5Normal      Spacer/Aero-Holding Chambers (AEROCHAMBER PLUS-FLOW SIGNAL) MISC Disp-2 each, R-3, PrintUse with MDI as instructed      Cholecalciferol (VITAMIN D3) 2000 UNITS CAPS Take 5 capsules by mouth daily, Disp-150 capsule, R-3             Time Spent on discharge is more than 45 minutes in the examination, evaluation, counseling and review of medications and discharge plan. Signed:    Jing Del Cid MD   9/11/2018      Thank you Minus DO Edson for the opportunity to be involved in this patient's care. If you have any questions or concerns please feel free to contact me at [phone].

## 2018-09-11 NOTE — PROGRESS NOTES
Electrophysiology - PROGRESS NOTE    Admit Date: 9/7/2018     Chief Complaint: AF/RVR     Interval History:   Patient seen and examined and notes reviewed. Patient is being followed for AF/RVR. Recurrent AF/AFL yesterday then developed RVR with worsening s/s of SOB and CP and came to the ED. HR documented in ED in the 200's however no strips are available. Patient converted on her own. Placed on long acting dilt last pm and has remained in NSR/SB overnight. HR in the 40's to 50's.      In: 240 [P.O.:240]  Out: -    Wt Readings from Last 2 Encounters:   09/11/18 (!) 349 lb 10.4 oz (158.6 kg)   08/07/18 (!) 335 lb (152 kg)         Data:   Scheduled Meds:   Scheduled Meds:   diltiazem  180 mg Oral Daily    apixaban  5 mg Oral BID    albuterol  2.5 mg Nebulization 4x daily    And    budesonide  0.5 mg Nebulization BID    propafenone  225 mg Oral 3 times per day    amiodarone bolus  150 mg Intravenous Once    vitamin D  10,000 Units Oral Daily    potassium chloride  20 mEq Oral BID    sertraline  100 mg Oral Daily    vitamin B-12  1,000 mcg Oral Daily    sodium chloride flush  10 mL Intravenous 2 times per day     Continuous Infusions:  PRN Meds:.traZODone, sodium chloride flush, magnesium hydroxide, ondansetron  Continuous Infusions:    Intake/Output Summary (Last 24 hours) at 09/11/18 0924  Last data filed at 09/11/18 0447   Gross per 24 hour   Intake              720 ml   Output                0 ml   Net              720 ml       CBC:   Lab Results   Component Value Date    WBC 5.8 09/11/2018    HGB 11.5 09/11/2018     09/11/2018     BMP:  Lab Results   Component Value Date     09/10/2018    K 4.3 09/10/2018    K 3.4 03/12/2018     09/10/2018    CO2 24 09/10/2018    BUN 21 09/10/2018    CREATININE 1.0 09/10/2018    GLUCOSE 99 09/10/2018     INR:   Lab Results   Component Value Date    INR 1.38 01/29/2018    INR 0.96 12/19/2017    INR

## 2018-09-11 NOTE — PLAN OF CARE
Problem: Cardiac Output - Decreased:  Goal: Hemodynamic stability will improve  Hemodynamic stability will improve   Outcome: Ongoing  NSR/SB on tele. Problem: Falls - Risk of:  Goal: Absence of physical injury  Absence of physical injury   Outcome: Ongoing  MEDIUM fall risk with steady gait, pt aware of needs.

## 2018-09-11 NOTE — CONSULTS
[address]                 [address]                                   CONSULTATION    PATIENT NAME: Joe Khan                   :        1965  MED REC NO:   [id number]                          ROOM:       [id number]  ACCOUNT NO:   [de-identified]                           ADMIT DATE: 2018  PROVIDER:     Reid Bruno MD    CONSULT DATE:  09/10/2018    REASON FOR CONSULTATION:  Arrhythmia. HISTORY OF PRESENT ILLNESS:  The patient is a 71-year-old woman with  history of paroxysmal atrial arrhythmias, obstructive sleep apnea, and  asthma who presents for symptomatic arrhythmias. She has a long history of  atrial fibrillation and typical-appearing atrial flutter. She has been on  long-acting propafenone since 10/2017. She reports over the last year, she  has had episodes of atrial arrhythmias, perhaps every two to three months. These are variable in duration and lasting from one to five hours. She is  aware of the regular palpitations, but does not find these terribly  uncomfortable. On 2018, she experienced her typical episode of  irregular palpitations. This changed relatively abruptly to a much more  highly symptomatic type of arrhythmia with heart pounding and  lightheadedness. Paramedics were summoned to the scene and transported her  to [facility]. She was in atrial fibrillation at  that time with heart rate in the low 100s. She got up to use the urinal  and did have another exacerbation of symptoms again with rapid palpitations  associated with lightheadedness. The ER reports mention heart rates in the  200 beat per minute category, but no arrhythmias were recorded. In  addition, the paramedics records from transport are also unavailable for  review. The patient does have history of both atrial fibrillation and typical  atrial flutter.   It is not clear that she can opportunity to assist in the care of the patient. Please  contact me if you have any questions regarding her evaluation.         Betty Lofton MD    D: 09/10/2018 17:41:28       T: 09/10/2018 17:43:28     [email or web address]  Job#: [id number]     Doc#: [id number]    CC:

## 2018-09-12 ENCOUNTER — CARE COORDINATION (OUTPATIENT)
Dept: CASE MANAGEMENT | Age: 53
End: 2018-09-12

## 2018-09-12 DIAGNOSIS — F33.2 MDD (MAJOR DEPRESSIVE DISORDER), RECURRENT SEVERE, WITHOUT PSYCHOSIS (HCC): ICD-10-CM

## 2018-09-12 NOTE — CARE COORDINATION
[address] Transitions Initial Follow Up Call    Call within 2 business days of discharge: Yes    Patient:  Viviana Garcia  Patient :  1965  MRN:  [id number]    Reason for Admission:   A-Fib / chest pain    Discharge Date:  18   RARS:  Belinda      1ST CTC attempt to reach Pt regarding recent hospital discharge. CTC left voice recording with call back number requesting a call back. Follow up appointments:    Future Appointments  Date Time Provider Department Center   2018 11:20  W [facility], DO [facility]   2018 10:00 AM STEPHANIE Chan - CNP [place] [facility]   2018 2:00 PM MD WALLY Lawson [facility]       Celeste Soto.  Ruther Kayser, BSN, RN  Care Transition Coordinator  Contact Number:  [phone]

## 2018-09-13 RX ORDER — SERTRALINE HYDROCHLORIDE 100 MG/1
TABLET, FILM COATED ORAL
Qty: 30 TABLET | Refills: 1 | Status: SHIPPED | OUTPATIENT
Start: 2018-09-13 | End: 2018-09-17 | Stop reason: SDUPTHER

## 2018-09-13 RX ORDER — TRAZODONE HYDROCHLORIDE 100 MG/1
TABLET ORAL
Qty: 30 TABLET | Refills: 1 | Status: SHIPPED | OUTPATIENT
Start: 2018-09-13 | End: 2018-09-17 | Stop reason: SDUPTHER

## 2018-09-17 ENCOUNTER — OFFICE VISIT (OUTPATIENT)
Dept: CARDIOLOGY CLINIC | Age: 53
End: 2018-09-17

## 2018-09-17 ENCOUNTER — OFFICE VISIT (OUTPATIENT)
Dept: FAMILY MEDICINE CLINIC | Age: 53
End: 2018-09-17

## 2018-09-17 VITALS
RESPIRATION RATE: 14 BRPM | OXYGEN SATURATION: 97 % | BODY MASS INDEX: 49.53 KG/M2 | DIASTOLIC BLOOD PRESSURE: 89 MMHG | WEIGHT: 293 LBS | SYSTOLIC BLOOD PRESSURE: 148 MMHG | TEMPERATURE: 97.8 F | HEART RATE: 61 BPM

## 2018-09-17 VITALS
HEART RATE: 64 BPM | BODY MASS INDEX: 49.67 KG/M2 | WEIGHT: 293 LBS | DIASTOLIC BLOOD PRESSURE: 80 MMHG | SYSTOLIC BLOOD PRESSURE: 123 MMHG

## 2018-09-17 DIAGNOSIS — I48.92 PAROXYSMAL ATRIAL FLUTTER (HCC): ICD-10-CM

## 2018-09-17 DIAGNOSIS — I50.32 CHRONIC DIASTOLIC HEART FAILURE (HCC): ICD-10-CM

## 2018-09-17 DIAGNOSIS — I48.0 PAROXYSMAL ATRIAL FIBRILLATION (HCC): Primary | ICD-10-CM

## 2018-09-17 DIAGNOSIS — R93.0 ABNORMAL CT SCAN OF HEAD: ICD-10-CM

## 2018-09-17 DIAGNOSIS — F33.2 MDD (MAJOR DEPRESSIVE DISORDER), RECURRENT SEVERE, WITHOUT PSYCHOSIS (HCC): ICD-10-CM

## 2018-09-17 DIAGNOSIS — R10.9 RIGHT FLANK PAIN: Primary | ICD-10-CM

## 2018-09-17 PROCEDURE — 99214 OFFICE O/P EST MOD 30 MIN: CPT | Performed by: NURSE PRACTITIONER

## 2018-09-17 PROCEDURE — 99214 OFFICE O/P EST MOD 30 MIN: CPT | Performed by: FAMILY MEDICINE

## 2018-09-17 PROCEDURE — 1111F DSCHRG MED/CURRENT MED MERGE: CPT | Performed by: FAMILY MEDICINE

## 2018-09-17 RX ORDER — SERTRALINE HYDROCHLORIDE 100 MG/1
TABLET, FILM COATED ORAL
Qty: 30 TABLET | Refills: 5 | Status: ON HOLD | OUTPATIENT
Start: 2018-09-17 | End: 2019-04-30

## 2018-09-17 RX ORDER — TRAZODONE HYDROCHLORIDE 100 MG/1
TABLET ORAL
Qty: 30 TABLET | Refills: 5 | Status: ON HOLD | OUTPATIENT
Start: 2018-09-17 | End: 2019-04-30

## 2018-09-17 NOTE — PROGRESS NOTES
atrial fibrillation (HCC)    Sinus bradycardia       Immunization History   Administered Date(s) Administered    BCG 05/28/2013    DTaP 05/28/2013    Influenza Vaccine, unspecified formulation 01/09/2017    Influenza Virus Vaccine 10/23/2017, 10/23/2017    PPD Test 06/11/2013, 05/07/2014    Tdap (Boostrix, Adacel) 05/28/2013       Past Medical History:   Diagnosis Date    Anxiety     Arthritis     Asthma     Atrial fibrillation ([address]     new dx 4 weeks ago, first of  Sept 2017    Edema     Fibromyalgia     GERD (gastroesophageal reflux disease)     reflux    Hiatal hernia      Past Surgical History:   Procedure Laterality Date    ABDOMEN SURGERY  2001    gastric bypass--[place]    HERNIA REPAIR  2001    HYSTERECTOMY  2004    ARIAS-EN-Y GASTRIC BYPASS      TONSILLECTOMY AND ADENOIDECTOMY       Family History   Problem Relation Age of Onset    Cancer Mother         lung    Arthritis Mother     Cirrhosis Father     Asthma Maternal Aunt      Social History     Social History    Marital status:      Spouse name: N/A    Number of children: 3    Years of education: 15.5     Occupational History    Not on file. Social History Main Topics    Smoking status: Never Smoker    Smokeless tobacco: Never Used    Alcohol use Yes      Comment: \"Occasionally\"    Drug use: No    Sexual activity: Yes     Partners: Male     Other Topics Concern    Not on file     Social History Narrative    No narrative on file       O: BP (!) 148/89   Pulse 61   Temp 97.8 °F (36.6 °C)   Resp 14   Wt (!) 345 lb 3.2 oz (156.6 kg)   SpO2 97%   Breastfeeding? No   BMI 49.53 kg/m²   Physical Exam  GEN: No acute distress, cooperative, well nourished, alert. HEENT: PEERLA, EOMI , normocephalic/atraumatic, nares and oropharynx clear. Mucus membranes normal, Tympanic membranes clear bilaterally.     Neck: soft, supple, no thyromegaly, mass, no Lymphadenopathy  CV: Regular rate and rhythm, no murmur, rubs,

## 2018-09-17 NOTE — PATIENT INSTRUCTIONS
1) Keep f/u plans with cardiodiology. 2) Keep up a good job controlling your stress. 3) Continue the trazodone and sertraline. 4) Call [address] [place] [address] to schedule the kidney ultrasound and MRI of brain. If you do the MRI anytime after September, you would need to do a renal function panel.

## 2018-09-17 NOTE — PROGRESS NOTES
[address]   Electrophysiology  Date: 9/17/2018    Chief Complaint   Patient presents with    Atrial Fibrillation       Cardiac HX: Sarah Randolph is a 46 y.o. woman with a h/o ASTRID (tx'd with surgery), asthma, fibromyalgia, GERD and paroxysmal AF/AFL, on Rythmol and apixaban. Admitted 9/7/18 with c/o palpitations, CP and SOB, noted to be in AF/AFL with a spontaneous conversion to NSR. Today: C/o dizziness when bends over. Can tell when she is going to have an episode of AF. Had an epsidoe of AF on Wednesday evening. Took an extra dose of short acting dilt and it took about an hour for it to go away. HR had been low on 180 mg dilt in the hospital and was d/c'd on 120 mg daily. HR 64 in NSR today. No CP, occasional SOB. Home medications:   Current Outpatient Prescriptions on File Prior to Visit   Medication Sig Dispense Refill    traZODone (DESYREL) 100 MG tablet TAKE 1 TABLET BY MOUTH EVERY NIGHT 30 tablet 1    sertraline (ZOLOFT) 100 MG tablet TAKE 1 TABLET BY MOUTH ONE TIME DAILY 30 tablet 1    diltiazem (CARDIZEM CD) 120 MG extended release capsule Take 1 capsule by mouth daily 30 capsule 0    propafenone (RYTHMOL) 225 MG tablet Take 1 tablet by mouth every 8 hours 90 tablet 0    diltiazem (CARDIZEM) 60 MG tablet Take 1 tablet by mouth every 8 hours as needed (Palpitations) 120 tablet 1    traZODone (DESYREL) 100 MG tablet Take 100 mg by mouth nightly as needed for Sleep      fluocinonide (LIDEX) 0.05 % ointment Apply sparingly to affected area(s) bid prn for flares, up to 2 weeks at a time. Do not apply on cleared skin.  60 g 1    hydrOXYzine (VISTARIL) 50 MG capsule Take 1 capsule by mouth 3 times daily as needed for Anxiety (sleep) 90 capsule 1    apixaban (ELIQUIS) 5 MG TABS tablet Take 1 tablet by mouth 2 times daily 60 tablet 5    furosemide (LASIX) 40 MG tablet Take 1 tablet by mouth 2 times daily 60 tablet 5    potassium chloride (KLOR-CON M) 20 MEQ extended release tablet Take 1 tablet by mouth 2 times daily 60 tablet 5    mometasone-formoterol (DULERA) 200-5 MCG/ACT inhaler Inhale 1 puff into the lungs 2 times daily 8.8 g 5    vitamin B-12 (CYANOCOBALAMIN) 1000 MCG tablet Take 1,000 mcg by mouth daily      Lysine 1000 MG TABS Take 1 tablet by mouth daily as needed       albuterol sulfate HFA (VENTOLIN HFA) 108 (90 BASE) MCG/ACT inhaler INHALE 2 PUFFS BY MOUTH EVERY 4 TO 6 HOURS AS NEEDED 2 Inhaler 5    Spacer/Aero-Holding Chambers (AEROCHAMBER PLUS-FLOW SIGNAL) MISC Use with MDI as instructed 2 each 3    Cholecalciferol (VITAMIN D3) 2000 UNITS CAPS Take 5 capsules by mouth daily 150 capsule 3     No current facility-administered medications on file prior to visit. Past Medical History:   Diagnosis Date    Anxiety     Arthritis     Asthma     Atrial [address]     new dx 4 weeks ago, first of  Sept 2017    Edema     Fibromyalgia     GERD (gastroesophageal reflux disease)     reflux    Hiatal hernia         Past Surgical History:   Procedure Laterality Date    ABDOMEN SURGERY  2001    gastric bypass--[place]    HERNIA REPAIR  2001    HYSTERECTOMY  2004    ARIAS-EN-Y GASTRIC BYPASS      TONSILLECTOMY AND ADENOIDECTOMY         Allergies   Allergen Reactions    Latex Anaphylaxis and Rash    Aspirin      Swelling in lower legs    Demerol      rash    Meperidine     Nsaids Swelling    Pcn [Penicillins] Hives    Ranitidine Hcl     Sulfa Antibiotics Rash and Hives       Social History:  Reviewed. reports that she has never smoked. She has never used smokeless tobacco. She reports that she drinks alcohol. She reports that she does not use drugs. Family History:  Reviewed. family history includes Arthritis in her mother; Asthma in her maternal aunt; Cancer in her mother; Cirrhosis in her father. Review of System:    · Constitutional: No fevers, chills. · Eyes: No visual changes or diplopia. No scleral icterus. · ENT: No Headaches.  No mouth Chronic diastolic heart failure (HCC)        Cardiac HX: Daphne Kraft a 46 y.o. woman with a h/o ASTRID (tx'd with surgery), asthma, fibromyalgia, GERD and paroxysmal symptomatic AF/AFL, on Rythmol and apixaban. Admitted 9/7/18 with c/o palpitations, CP and SOB, noted to be in AF/AFL with a spontaneous conversion to NSR.   [id number] 1. TSH 2.41 (10/12/2017).     AF/AFL  - In NSR  - On Rythmol 225 mg Q 8  - Dilt  mg daily with dilt IR 60 mg Q6 prn   - On Eliquis - no s/s bleeding - continue. - 30 day monitor for AF burden  - Consider alternative AAD vs PVI if recurrent   - ECG ordered and results personally reviewed      Chronic diastolic HF  - Echo - EF 30%, grade I DD, LA 2.3, vol 68  - Lifestyle modification - weight loss, exercise  - On lasix 80 mg in the am       STOP-Bang ASTRID Screening:   Do you snore loudly  No  Do you often feel tired, fatigued, or sleepy during the daytime No   Has anyone observed that you stop breathing, or choke or gasp during your sleep  No  Do you have or are you being treated for high blood pressure  No  BMI>35kg/m2   Yes  Age >50   Yes  Neck circumference >40cm (15.75in)   No  Male   No    Total Score:    >3=ASTRID referral recommended       EF of 50-55%  No known HF  No known CAD  Anticoagulation for AF   No Tobacco use.      All questions and concerns were addressed to the patient/family. Alternatives to my treatment were discussed. The note was completed using EMR. Every effort was made to ensure accuracy; however, inadvertent computerized transcription errors may be present. Patient received education regarding their diagnosis, treatment and medications while in the office today.       Porfirio Cowart [address]

## 2018-09-21 ENCOUNTER — CARE COORDINATION (OUTPATIENT)
Dept: CASE MANAGEMENT | Age: 53
End: 2018-09-21

## 2018-09-26 NOTE — CARE COORDINATION
[facility] Transitions Follow Up Call    2018    Patient: Sarah Knee  Patient : 1965   MRN: [id number]   Reason for Admission:  A- Fib / chest pain    Discharge Date: 18 RARS: Readmission Risk Score: 18       Spoke with: Angelic Ruiz - significant other     Summary  CTC spoke with Nedbelgica Dec, significant other, who states that Sarah Randolph is \"doing great\" and has no needs or concerns at this time. CTC provided education on s/s that require medical attention and when to seek medical attention. CTC advised Pt of use urgent care or physicians 24 hr access line if assistance is needed after hours or on the weekend. CT calls have concluded.      Follow Up  Future Appointments  Date Time Provider Wil Chatterjee   10[facility]/2018 11:30 [facility]   2018 2:00 PM Ebenezer Echols DO [facility]   2018 3:15 PM Camilla Merino MD [facility]       Mario London RN

## 2018-10-04 ENCOUNTER — HOSPITAL ENCOUNTER (OUTPATIENT)
Age: 53
Discharge: HOME | End: 2018-10-04
Payer: COMMERCIAL

## 2018-10-04 ENCOUNTER — HOSPITAL ENCOUNTER (OUTPATIENT)
Dept: ULTRASOUND IMAGING | Age: 53
Discharge: HOME | End: 2018-10-04
Payer: COMMERCIAL

## 2018-10-04 ENCOUNTER — HOSPITAL ENCOUNTER (OUTPATIENT)
Dept: MRI IMAGING | Age: 53
Discharge: HOME | End: 2018-10-04
Payer: COMMERCIAL

## 2018-10-04 DIAGNOSIS — R93.0 ABNORMAL CT SCAN OF HEAD: ICD-10-CM

## 2018-10-04 DIAGNOSIS — R10.9 RIGHT FLANK PAIN: ICD-10-CM

## 2018-10-04 LAB
ALBUMIN SERPL-MCNC: 3.7 G/DL (ref 3.4–5)
ANION GAP SERPL CALCULATED.3IONS-SCNC: 12 MMOL/L (ref 3–16)
BILIRUBIN URINE: NEGATIVE
BLOOD, URINE: NEGATIVE
BUN BLDV-MCNC: 21 MG/DL (ref 7–20)
CALCIUM SERPL-MCNC: 8.7 MG/DL (ref 8.3–10.6)
CHLORIDE BLD-SCNC: 103 MMOL/L (ref 99–110)
CLARITY: CLEAR
CO2: 26 MMOL/L (ref 21–32)
COLOR: NORMAL
CREAT SERPL-MCNC: 1 MG/DL (ref 0.6–1.1)
GFR AFRICAN AMERICAN: >60
GFR NON-AFRICAN AMERICAN: 58
GLUCOSE BLD-MCNC: 101 MG/DL (ref 70–99)
GLUCOSE URINE: NEGATIVE MG/DL
KETONES, URINE: NEGATIVE MG/DL
LEUKOCYTE ESTERASE, URINE: NEGATIVE
MICROSCOPIC EXAMINATION: NORMAL
NITRITE, URINE: NEGATIVE
PH UA: 5.5
PHOSPHORUS: 4.1 MG/DL (ref 2.5–4.9)
POTASSIUM SERPL-SCNC: 4.1 MMOL/L (ref 3.5–5.1)
PROTEIN UA: NEGATIVE MG/DL
SODIUM BLD-SCNC: 141 MMOL/L (ref 136–145)
SPECIFIC GRAVITY UA: <=1.005
URINE TYPE: NORMAL
UROBILINOGEN, URINE: 0.2 E.U./DL

## 2018-10-04 PROCEDURE — 36415 COLL VENOUS BLD VENIPUNCTURE: CPT

## 2018-10-04 PROCEDURE — 6360000004 HC RX CONTRAST MEDICATION: Performed by: FAMILY MEDICINE

## 2018-10-04 PROCEDURE — 76775 US EXAM ABDO BACK WALL LIM: CPT

## 2018-10-04 PROCEDURE — A9579 GAD-BASE MR CONTRAST NOS,1ML: HCPCS | Performed by: FAMILY MEDICINE

## 2018-10-04 PROCEDURE — 80069 RENAL FUNCTION PANEL: CPT

## 2018-10-04 PROCEDURE — 70553 MRI BRAIN STEM W/O & W/DYE: CPT

## 2018-10-04 PROCEDURE — 81003 URINALYSIS AUTO W/O SCOPE: CPT

## 2018-10-04 RX ADMIN — GADOTERIDOL 30 ML: 279.3 INJECTION, SOLUTION INTRAVENOUS at 17:13

## 2018-10-09 ENCOUNTER — TELEPHONE (OUTPATIENT)
Dept: FAMILY MEDICINE CLINIC | Age: 53
End: 2018-10-09

## 2018-10-09 NOTE — TELEPHONE ENCOUNTER
PA DENIED MRI Head w/wo Contrast. 10/8/18. Pt had this test completed on 10/5/18. Please appeal and backdate this. Scanned document attached.

## 2018-10-10 ENCOUNTER — TELEPHONE (OUTPATIENT)
Dept: FAMILY MEDICINE CLINIC | Age: 53
End: 2018-10-10

## 2018-10-10 NOTE — TELEPHONE ENCOUNTER
I appreciate patient providing us the important jury numbers and group numbers. I think her conditions make it difficult to be sitting for long periods of time. Please create a letter excusing her from jury duty indefinitely due to her medical conditions. Include the numbers. I will sign, then she can pick-up.

## 2018-10-10 NOTE — TELEPHONE ENCOUNTER
Letter printed and put at Dr. Gonzalez Hand station [facility]

## 2018-10-10 NOTE — TELEPHONE ENCOUNTER
Pt would like to be excused from jury duty because of her heart and arthritis issues.  Yifan Stewart number [phone] [place] [phone]. [phone]

## 2018-10-15 ENCOUNTER — OFFICE VISIT (OUTPATIENT)
Dept: RHEUMATOLOGY | Age: 53
End: 2018-10-15
Payer: COMMERCIAL

## 2018-10-15 VITALS
WEIGHT: 293 LBS | DIASTOLIC BLOOD PRESSURE: 86 MMHG | SYSTOLIC BLOOD PRESSURE: 124 MMHG | HEART RATE: 82 BPM | BODY MASS INDEX: 41.95 KG/M2 | TEMPERATURE: 98.5 F | HEIGHT: 70 IN

## 2018-10-15 DIAGNOSIS — M15.9 PRIMARY OSTEOARTHRITIS INVOLVING MULTIPLE JOINTS: ICD-10-CM

## 2018-10-15 DIAGNOSIS — M79.7 FIBROMYALGIA: Primary | ICD-10-CM

## 2018-10-15 PROCEDURE — 99244 OFF/OP CNSLTJ NEW/EST MOD 40: CPT | Performed by: INTERNAL MEDICINE

## 2018-10-15 RX ORDER — TRAMADOL HYDROCHLORIDE 50 MG/1
50 TABLET ORAL 2 TIMES DAILY PRN
Qty: 60 TABLET | Refills: 0 | Status: SHIPPED | OUTPATIENT
Start: 2018-10-15 | End: 2018-11-14

## 2018-10-15 NOTE — PROGRESS NOTES
improve physical function. The goal is primarily achieved by physical reconditioning utilizing various kinds of exercise programs, especially in her case as she is unable to tolerate FDA approved meds, she will definitely benefit from aquatic therapy long-term. She already tried FDA approved treatments for fibromyalgia and osteoarthritis, was unable to tolerate. I gave her tramadol prescription for pain control, side effects were discussed mainly narcotic addiction, sedation, constipation. She was advised to use it in as-needed basis. Call me with worsening symptoms, reassessment in 3 months. Patient indicates understanding and agrees with the management plan. I reviewed patient's history, referral documents and electronic medical records. Copy of consult note is being routed electronically/faxed to referring physician. #######################################################################    REASON FOR CONSULTATION:  Patient is being seen at the request of  Amparo Wood DO for evaluation of chronic musculoskeletal pain. HISTORY OF PRESENT ILLNESS:  46 y.o. female with known history of fibromyalgia, osteoarthritis, asthma, atrial fibrillation, GERD, morbid obesity, has had chronic generalized pain for last 25-30 years, has been getting worse to the point that she can hardly get out of the bed. She describes pain as chronic dull pain, very sensitive to touch and pressure, even light touch, pressure causes excruciating pain. All areas are affected from head to toe, worse in the upper, lower extremities and back. She does not have any swollen or inflamed joints in upper or lower extremities or focal muscle weakness. States that her legs swell intermittently. She tried Lyrica, gabapentin, Cymbalta, could not tolerate because of drowsiness and visual hallucinations with Lyrica. She is taking Zoloft for depression.   She wonders if there are any other medications that can help with her pain

## 2018-10-16 ENCOUNTER — TELEPHONE (OUTPATIENT)
Dept: FAMILY MEDICINE CLINIC | Age: 53
End: 2018-10-16

## 2018-10-16 DIAGNOSIS — M47.816 LUMBAR SPONDYLOSIS: ICD-10-CM

## 2018-10-16 DIAGNOSIS — G89.4 CHRONIC PAIN SYNDROME: Primary | ICD-10-CM

## 2018-10-29 PROCEDURE — 93228 REMOTE 30 DAY ECG REV/REPORT: CPT | Performed by: INTERNAL MEDICINE

## 2018-10-29 RX ORDER — DILTIAZEM HYDROCHLORIDE 120 MG/1
120 CAPSULE, COATED, EXTENDED RELEASE ORAL DAILY
Qty: 30 CAPSULE | Refills: 5 | Status: SHIPPED | OUTPATIENT
Start: 2018-10-29 | End: 2019-04-26 | Stop reason: SINTOL

## 2018-11-02 DIAGNOSIS — I48.3 TYPICAL ATRIAL FLUTTER (HCC): ICD-10-CM

## 2018-12-03 RX ORDER — APIXABAN 5 MG/1
TABLET, FILM COATED ORAL
Qty: 180 TABLET | Refills: 3 | Status: SHIPPED | OUTPATIENT
Start: 2018-12-03 | End: 2019-04-26 | Stop reason: SDUPTHER

## 2018-12-03 RX ORDER — FUROSEMIDE 40 MG/1
TABLET ORAL
Qty: 180 TABLET | Refills: 3 | Status: SHIPPED | OUTPATIENT
Start: 2018-12-03 | End: 2019-04-26

## 2018-12-03 NOTE — TELEPHONE ENCOUNTER
Requested Prescriptions     Pending Prescriptions Disp Refills    furosemide (LASIX) 40 MG tablet [Pharmacy Med Name: FUROSEMIDE 40MG TABS] 180 tablet 3     Sig: TAKE 1 TABLET BY MOUTH TWICE DAILY    ELIQUIS 5 MG TABS tablet [Pharmacy Med Name: ELIQUIS 5MG TABS] 180 tablet 5     Sig: TAKE ONE TABLET BY MOUTH TWICE A DAY     Last Fill         Last seen        Next appointment      Last labs     Ordering Physician        4/13/18 9/17/18 1/15/19  10/4/18 wood

## 2019-02-08 DIAGNOSIS — J45.40 MODERATE PERSISTENT ASTHMA WITHOUT COMPLICATION: ICD-10-CM

## 2019-02-11 ENCOUNTER — APPOINTMENT (OUTPATIENT)
Dept: GENERAL RADIOLOGY | Age: 54
End: 2019-02-11
Payer: COMMERCIAL

## 2019-02-11 ENCOUNTER — APPOINTMENT (OUTPATIENT)
Dept: CT IMAGING | Age: 54
End: 2019-02-11
Payer: COMMERCIAL

## 2019-02-11 ENCOUNTER — HOSPITAL ENCOUNTER (EMERGENCY)
Age: 54
Discharge: HOME | End: 2019-02-11
Attending: EMERGENCY MEDICINE
Payer: COMMERCIAL

## 2019-02-11 VITALS
RESPIRATION RATE: 20 BRPM | SYSTOLIC BLOOD PRESSURE: 130 MMHG | HEART RATE: 85 BPM | OXYGEN SATURATION: 96 % | BODY MASS INDEX: 39.68 KG/M2 | TEMPERATURE: 97.8 F | WEIGHT: 293 LBS | DIASTOLIC BLOOD PRESSURE: 99 MMHG | HEIGHT: 72 IN

## 2019-02-11 DIAGNOSIS — S80.11XA CONTUSION OF RIGHT KNEE AND LOWER LEG, INITIAL ENCOUNTER: Primary | ICD-10-CM

## 2019-02-11 DIAGNOSIS — S80.01XA CONTUSION OF RIGHT KNEE AND LOWER LEG, INITIAL ENCOUNTER: Primary | ICD-10-CM

## 2019-02-11 PROCEDURE — 73560 X-RAY EXAM OF KNEE 1 OR 2: CPT

## 2019-02-11 PROCEDURE — 73700 CT LOWER EXTREMITY W/O DYE: CPT

## 2019-02-11 PROCEDURE — 6370000000 HC RX 637 (ALT 250 FOR IP): Performed by: EMERGENCY MEDICINE

## 2019-02-11 PROCEDURE — 99284 EMERGENCY DEPT VISIT MOD MDM: CPT

## 2019-02-11 PROCEDURE — 73590 X-RAY EXAM OF LOWER LEG: CPT

## 2019-02-11 RX ORDER — OXYCODONE HYDROCHLORIDE 5 MG/1
10 TABLET ORAL ONCE
Status: COMPLETED | OUTPATIENT
Start: 2019-02-11 | End: 2019-02-11

## 2019-02-11 RX ORDER — OXYCODONE HYDROCHLORIDE 5 MG/1
5 TABLET ORAL EVERY 6 HOURS PRN
Qty: 10 TABLET | Refills: 0 | Status: SHIPPED | OUTPATIENT
Start: 2019-02-11 | End: 2019-02-12

## 2019-02-11 RX ORDER — MOMETASONE FUROATE AND FORMOTEROL FUMARATE DIHYDRATE 200; 5 UG/1; UG/1
AEROSOL RESPIRATORY (INHALATION)
Qty: 13 G | Refills: 5 | Status: SHIPPED | OUTPATIENT
Start: 2019-02-11 | End: 2020-02-25

## 2019-02-11 RX ADMIN — OXYCODONE HYDROCHLORIDE 10 MG: 5 TABLET ORAL at 05:12

## 2019-02-11 RX ADMIN — OXYCODONE HYDROCHLORIDE 10 MG: 5 TABLET ORAL at 04:06

## 2019-02-11 ASSESSMENT — PAIN SCALES - GENERAL
PAINLEVEL_OUTOF10: 10
PAINLEVEL_OUTOF10: 7
PAINLEVEL_OUTOF10: 10

## 2019-02-11 ASSESSMENT — PAIN DESCRIPTION - PAIN TYPE: TYPE: ACUTE PAIN

## 2019-02-11 ASSESSMENT — PAIN DESCRIPTION - LOCATION: LOCATION: LEG;KNEE

## 2019-02-11 ASSESSMENT — PAIN DESCRIPTION - FREQUENCY: FREQUENCY: CONTINUOUS

## 2019-02-13 ENCOUNTER — TELEPHONE (OUTPATIENT)
Dept: FAMILY MEDICINE CLINIC | Age: 54
End: 2019-02-13

## 2019-02-13 ENCOUNTER — OFFICE VISIT (OUTPATIENT)
Dept: FAMILY MEDICINE CLINIC | Age: 54
End: 2019-02-13
Payer: COMMERCIAL

## 2019-02-13 ENCOUNTER — NURSE TRIAGE (OUTPATIENT)
Dept: OTHER | Facility: CLINIC | Age: 54
End: 2019-02-13

## 2019-02-13 VITALS
RESPIRATION RATE: 20 BRPM | SYSTOLIC BLOOD PRESSURE: 113 MMHG | OXYGEN SATURATION: 95 % | HEART RATE: 70 BPM | TEMPERATURE: 96.8 F | DIASTOLIC BLOOD PRESSURE: 72 MMHG

## 2019-02-13 DIAGNOSIS — M13.861 ALLERGIC ARTHRITIS OF RIGHT KNEE: ICD-10-CM

## 2019-02-13 DIAGNOSIS — S80.11XA CONTUSION OF RIGHT KNEE AND LOWER LEG, INITIAL ENCOUNTER: ICD-10-CM

## 2019-02-13 DIAGNOSIS — S80.01XA CONTUSION OF RIGHT KNEE AND LOWER LEG, INITIAL ENCOUNTER: ICD-10-CM

## 2019-02-13 DIAGNOSIS — M25.561 ACUTE PAIN OF RIGHT KNEE: Primary | ICD-10-CM

## 2019-02-13 PROCEDURE — 99214 OFFICE O/P EST MOD 30 MIN: CPT | Performed by: FAMILY MEDICINE

## 2019-02-13 RX ORDER — OXYCODONE HYDROCHLORIDE AND ACETAMINOPHEN 5; 325 MG/1; MG/1
1 TABLET ORAL EVERY 6 HOURS PRN
Qty: 25 TABLET | Refills: 0 | Status: SHIPPED | OUTPATIENT
Start: 2019-02-13 | End: 2019-02-22 | Stop reason: SDUPTHER

## 2019-02-13 RX ORDER — METHYLPREDNISOLONE 4 MG/1
TABLET ORAL
Qty: 1 KIT | Refills: 0 | Status: SHIPPED | OUTPATIENT
Start: 2019-02-13 | End: 2019-03-12 | Stop reason: SDUPTHER

## 2019-02-13 RX ORDER — OXYCODONE HYDROCHLORIDE AND ACETAMINOPHEN 5; 325 MG/1; MG/1
1 TABLET ORAL EVERY 6 HOURS PRN
Qty: 25 TABLET | Refills: 0 | Status: SHIPPED | OUTPATIENT
Start: 2019-02-13 | End: 2019-02-13 | Stop reason: SDUPTHER

## 2019-02-19 ENCOUNTER — OFFICE VISIT (OUTPATIENT)
Dept: FAMILY MEDICINE CLINIC | Age: 54
End: 2019-02-19
Payer: COMMERCIAL

## 2019-02-19 VITALS
OXYGEN SATURATION: 96 % | SYSTOLIC BLOOD PRESSURE: 110 MMHG | RESPIRATION RATE: 20 BRPM | HEART RATE: 74 BPM | TEMPERATURE: 96.7 F | DIASTOLIC BLOOD PRESSURE: 56 MMHG

## 2019-02-19 DIAGNOSIS — M25.561 ACUTE PAIN OF RIGHT KNEE: Primary | ICD-10-CM

## 2019-02-19 PROCEDURE — 99214 OFFICE O/P EST MOD 30 MIN: CPT | Performed by: FAMILY MEDICINE

## 2019-02-21 ENCOUNTER — TELEPHONE (OUTPATIENT)
Dept: FAMILY MEDICINE CLINIC | Age: 54
End: 2019-02-21

## 2019-02-21 NOTE — TELEPHONE ENCOUNTER
Please inform pt that the MRI Knee wo Contrast has been approved from 2/19/19 to 4/5/19 by [facility].    Document scanned and attached.

## 2019-02-22 ENCOUNTER — TELEPHONE (OUTPATIENT)
Dept: FAMILY MEDICINE CLINIC | Age: 54
End: 2019-02-22

## 2019-02-22 DIAGNOSIS — S80.01XA CONTUSION OF RIGHT KNEE AND LOWER LEG, INITIAL ENCOUNTER: ICD-10-CM

## 2019-02-22 DIAGNOSIS — M25.561 ACUTE PAIN OF RIGHT KNEE: ICD-10-CM

## 2019-02-22 DIAGNOSIS — M79.661 PAIN OF RIGHT LOWER LEG: Primary | ICD-10-CM

## 2019-02-22 DIAGNOSIS — S80.11XA CONTUSION OF RIGHT KNEE AND LOWER LEG, INITIAL ENCOUNTER: ICD-10-CM

## 2019-02-22 RX ORDER — OXYCODONE HYDROCHLORIDE AND ACETAMINOPHEN 5; 325 MG/1; MG/1
1 TABLET ORAL EVERY 6 HOURS PRN
Qty: 20 TABLET | Refills: 0 | Status: SHIPPED | OUTPATIENT
Start: 2019-02-22 | End: 2019-03-01

## 2019-02-23 ENCOUNTER — HOSPITAL ENCOUNTER (OUTPATIENT)
Dept: MRI IMAGING | Age: 54
Discharge: HOME | End: 2019-02-23
Payer: COMMERCIAL

## 2019-02-23 DIAGNOSIS — M25.561 ACUTE PAIN OF RIGHT KNEE: ICD-10-CM

## 2019-02-23 PROCEDURE — 73721 MRI JNT OF LWR EXTRE W/O DYE: CPT

## 2019-02-25 DIAGNOSIS — S83.281A ACUTE TEAR LATERAL MENISCUS, RIGHT, INITIAL ENCOUNTER: ICD-10-CM

## 2019-02-25 DIAGNOSIS — S83.241A TEAR OF MEDIAL MENISCUS OF RIGHT KNEE, CURRENT, UNSPECIFIED TEAR TYPE, INITIAL ENCOUNTER: Primary | ICD-10-CM

## 2019-02-27 ENCOUNTER — OFFICE VISIT (OUTPATIENT)
Dept: ORTHOPEDIC SURGERY | Age: 54
End: 2019-02-27
Payer: COMMERCIAL

## 2019-02-27 VITALS — HEIGHT: 70 IN | BODY MASS INDEX: 41.95 KG/M2 | WEIGHT: 293 LBS

## 2019-02-27 DIAGNOSIS — M25.561 RIGHT KNEE PAIN, UNSPECIFIED CHRONICITY: Primary | ICD-10-CM

## 2019-02-27 DIAGNOSIS — S80.01XA TRAUMATIC HEMATOMA OF RIGHT KNEE, INITIAL ENCOUNTER: ICD-10-CM

## 2019-02-27 DIAGNOSIS — M17.11 PRIMARY OSTEOARTHRITIS OF RIGHT KNEE: ICD-10-CM

## 2019-02-27 PROCEDURE — 99243 OFF/OP CNSLTJ NEW/EST LOW 30: CPT | Performed by: ORTHOPAEDIC SURGERY

## 2019-03-04 ENCOUNTER — HOSPITAL ENCOUNTER (OUTPATIENT)
Dept: PHYSICAL THERAPY | Age: 54
Setting detail: THERAPIES SERIES
Discharge: HOME | End: 2019-03-04
Payer: COMMERCIAL

## 2019-03-04 PROCEDURE — 97110 THERAPEUTIC EXERCISES: CPT | Performed by: PHYSICAL THERAPIST

## 2019-03-04 PROCEDURE — 97161 PT EVAL LOW COMPLEX 20 MIN: CPT | Performed by: PHYSICAL THERAPIST

## 2019-03-04 PROCEDURE — G8978 MOBILITY CURRENT STATUS: HCPCS | Performed by: PHYSICAL THERAPIST

## 2019-03-04 PROCEDURE — G8979 MOBILITY GOAL STATUS: HCPCS | Performed by: PHYSICAL THERAPIST

## 2019-03-12 DIAGNOSIS — S80.11XA CONTUSION OF RIGHT KNEE AND LOWER LEG, INITIAL ENCOUNTER: ICD-10-CM

## 2019-03-12 DIAGNOSIS — S80.01XA CONTUSION OF RIGHT KNEE AND LOWER LEG, INITIAL ENCOUNTER: ICD-10-CM

## 2019-03-12 DIAGNOSIS — M25.561 ACUTE PAIN OF RIGHT KNEE: ICD-10-CM

## 2019-03-12 RX ORDER — FLUOCINONIDE 0.5 MG/G
OINTMENT TOPICAL
Qty: 60 G | Refills: 1 | Status: SHIPPED | OUTPATIENT
Start: 2019-03-12 | End: 2019-06-11 | Stop reason: SDUPTHER

## 2019-03-25 RX ORDER — METHYLPREDNISOLONE 4 MG/1
TABLET ORAL
Qty: 21 TABLET | Refills: 0 | Status: SHIPPED | OUTPATIENT
Start: 2019-03-25 | End: 2019-04-26

## 2019-04-11 RX ORDER — ROPINIROLE 0.5 MG/1
1.5 TABLET, FILM COATED ORAL EVERY EVENING
Qty: 90 TABLET | Refills: 5 | Status: SHIPPED | OUTPATIENT
Start: 2019-04-11 | End: 2019-06-14 | Stop reason: SDUPTHER

## 2019-04-11 NOTE — TELEPHONE ENCOUNTER
Patient requesting a medication refill. Medication Requip  [place] [id number] [address]  Last office visit:2/19/19  Next office visit: 4/30/2019

## 2019-04-15 ENCOUNTER — TELEPHONE (OUTPATIENT)
Dept: FAMILY MEDICINE CLINIC | Age: 54
End: 2019-04-15

## 2019-04-15 NOTE — TELEPHONE ENCOUNTER
There is another phone message to make my 4 pm today available as an option for Farzaneh Barlow (for another patient). Or you can make exception and use a 10 min same day/hospital f/u appt for her.

## 2019-04-15 NOTE — TELEPHONE ENCOUNTER
Pt called to reschedule appointment that was scheduled on 4-30. Pt is coming in for restless less, cant sleep for over a month, has a continuing rash and neck pain. Pt wants to be seen sooner with dr. Juhi Burch only. Pt stated she does not want to be rushed bc she has a lot of issues going on. Can pt be fit in? Pt can not do 4/26 at 820am or 840.  Those options were already offered Please patrick back at [phone]

## 2019-04-22 ENCOUNTER — TELEPHONE (OUTPATIENT)
Dept: CARDIOLOGY CLINIC | Age: 54
End: 2019-04-22

## 2019-04-22 ENCOUNTER — OFFICE VISIT (OUTPATIENT)
Dept: FAMILY MEDICINE CLINIC | Age: 54
End: 2019-04-22
Payer: COMMERCIAL

## 2019-04-22 VITALS
BODY MASS INDEX: 52.4 KG/M2 | DIASTOLIC BLOOD PRESSURE: 72 MMHG | HEART RATE: 78 BPM | TEMPERATURE: 98.7 F | SYSTOLIC BLOOD PRESSURE: 134 MMHG | OXYGEN SATURATION: 98 % | WEIGHT: 293 LBS

## 2019-04-22 DIAGNOSIS — Z00.00 ROUTINE GENERAL MEDICAL EXAMINATION AT A HEALTH CARE FACILITY: ICD-10-CM

## 2019-04-22 DIAGNOSIS — R60.9 EDEMA, UNSPECIFIED TYPE: Primary | ICD-10-CM

## 2019-04-22 DIAGNOSIS — M47.816 LUMBAR SPONDYLOSIS: ICD-10-CM

## 2019-04-22 DIAGNOSIS — M79.661 PAIN OF RIGHT LOWER LEG: Primary | ICD-10-CM

## 2019-04-22 DIAGNOSIS — G89.4 CHRONIC PAIN SYNDROME: ICD-10-CM

## 2019-04-22 DIAGNOSIS — I51.89 DIASTOLIC DYSFUNCTION: ICD-10-CM

## 2019-04-22 PROCEDURE — 99214 OFFICE O/P EST MOD 30 MIN: CPT | Performed by: FAMILY MEDICINE

## 2019-04-22 RX ORDER — HYDROCODONE BITARTRATE AND ACETAMINOPHEN 5; 325 MG/1; MG/1
1 TABLET ORAL 2 TIMES DAILY PRN
Qty: 60 TABLET | Refills: 0 | Status: SHIPPED | OUTPATIENT
Start: 2019-04-22 | End: 2019-05-17

## 2019-04-22 NOTE — PROGRESS NOTES
ipid  [facility]  Progress Note  Mookie HanDO Betty  1965    04/22/19    Chief Complaint:   Betty Hamilton is a 48 y.o. female who is here for chronic knee spine and leg pain    HPI:   Her to this practice from previous primary care was prescribing Norco 5/3/25 twice a day for her chronic spine pain. She had a significant stressful time in her life in 2015 when her son was in legal trouble and incarcerated. She had a suicidal attempt with overdose then. She finds her stress is better tolerated but the physical pain makes it difficult for her to keep up with her 1year-old grandchild. She's found will benefit with other pain medications such as codeine and tramadol. She continues to work for [employer] [facility] [employer] [facility] [employer]) at the [facility] location with [employer] and is making her appointment time. Needs to arrange her yearly appointment May 1. Additionally she feels that her electrolytes may be contributing to her leg cramps and leg pain. She is asking me whether it would be worth changing her current Lasix to a potassium sparing diuretic. This is currently managed through [id number] [facility] cardiology    ROS negative for headache, visionchanges, chest pain, shortness of breath, abdominal pain, urinary sx, bowel changes. Past medical, surgical, and social history reviewed. Medications and allergies reviewed. Allergies   Allergen Reactions    Latex Anaphylaxis and Rash    Aspirin      Swelling in lower legs    Demerol      rash    Meperidine     Nsaids Swelling    Pcn [Penicillins] Hives    Ranitidine Hcl     Sulfa Antibiotics Rash and Hives     Prior to Visit Medications    Medication Sig Taking? Authorizing Provider   HYDROcodone-acetaminophen (NORCO) 5-325 MG per tablet Take 1 tablet by mouth 2 times daily as needed for Pain for up to 30 days.  Take lowest dose possible to manage pain Yes Stefanie Hernandez, DO   rOPINIRole (REQUIP) 0.5 MG tablet hydrOXYzine (VISTARIL) 50 MG capsule Take 1 capsule by mouth 3 times daily as needed for Anxiety (sleep)  Nadya Drew MD   potassium chloride (KLOR-CON M) 20 MEQ extended release tablet Take 1 tablet by mouth 2 times daily  Elle Tenorio MD          Vitals:    04/22/19 1243 04/22/19 1314   BP: (!) 153/89 134/72   Pulse: 78    Temp: 98.7 °F (37.1 °C)    TempSrc: Oral    SpO2: 98%    Weight: (!) 365 lb 3.2 oz (165.7 kg)       Wt Readings from Last 3 Encounters:   04/22/19 (!) 365 lb 3.2 oz (165.7 kg)   02/27/19 (!) 345 lb (156.5 kg)   02/11/19 (!) 355 lb (161 kg)     BP Readings from Last 3 Encounters:   04/22/19 134/72   02/19/19 (!) 110/56   02/13/19 113/72       Patient Active Problem List   Diagnosis    Asthma    MDD (major depressive disorder), recurrent severe, without psychosis ([address]    Cluster B personality disorder ([address]    Colon polyps    Overdose    Restless leg syndrome    Sliding hiatal hernia    HTN (hypertension)    Atrial flutter (HCC)    SVT (supraventricular tachycardia) (HCC)    Paroxysmal atrial fibrillation (HCC)    Sinus bradycardia    Acute lateral meniscus tear of right knee    Acute medial meniscus tear of right knee       Immunization History   Administered Date(s) Administered    BCG 05/28/2013    DTaP 05/28/2013    Influenza Vaccine, unspecified formulation 01/09/2017, 11/26/2018    Influenza Virus Vaccine 10/23/2017, 10/23/2017, 11/26/2018    PPD Test 06/11/2013, 05/07/2014    Tdap (Boostrix, Adacel) 05/28/2013       Past Medical History:   Diagnosis Date    Acute lateral meniscus tear of right knee 02/2019    Acute medial meniscus tear of right knee 02/2019    Anxiety     Arthritis     Asthma     Atrial fibrillation ([address]     new dx 4 weeks ago, first of  Sept 2017    Edema     Fibromyalgia     GERD (gastroesophageal reflux disease)     reflux    Hiatal hernia      Past Surgical History:   Procedure Laterality Date    ABDOMEN SURGERY  2001 gastric bypass--[place]    HERNIA REPAIR  2001    HYSTERECTOMY  2004    ARIAS-EN-Y GASTRIC BYPASS      TONSILLECTOMY AND ADENOIDECTOMY       Family History   Problem Relation Age of Onset    Cancer Mother         lung    Arthritis Mother     Cirrhosis Father     Asthma Maternal Aunt      Social History     Socioeconomic History    Marital status:      Spouse name: Not on file    Number of children: 3    Years of education: 15.5    Highest education level: Not on file   Occupational History    Not on file   Social Needs    Financial resource strain: Not on file    Food insecurity:     Worry: Not on file     Inability: Not on file    Transportation needs:     Medical: Not on file     Non-medical: Not on file   Tobacco Use    Smoking status: Never Smoker    Smokeless tobacco: Never Used   Substance and Sexual Activity    Alcohol use: Yes     Comment: \"Occasionally\"    Drug use: No    Sexual activity: Yes     Partners: Male   Lifestyle    Physical activity:     Days per week: Not on file     Minutes per session: Not on file    Stress: Not on file   Relationships    Social connections:     Talks on phone: Not on file     Gets together: Not on file     Attends [employer] service: Not on file     Active member of club or organization: Not on file     Attends meetings of clubs or organizations: Not on file     Relationship status: Not on file    Intimate partner violence:     Fear of current or ex partner: Not on file     Emotionally abused: Not on file     Physically abused: Not on file     Forced sexual activity: Not on file   Other Topics Concern    Not on file   Social History Narrative    Not on file       O: /72   Pulse 78   Temp 98.7 °F (37.1 °C) (Oral)   Wt (!) 365 lb 3.2 oz (165.7 kg)   SpO2 98%   Breastfeeding? No   BMI 52.40 kg/m²   Physical Exam  GEN: No acute distress,cooperative, well nourished, alert.    HEENT: PEERLA, EOMI , normocephalic/atraumatic, nares and oropharynx clear. Mucus membranes normal, Tympanic membranes clear bilaterally. Neck: soft, supple, no thyromegaly,mass, no Lymphadenopathy  CV: Regular rate and rhythm, no murmur, rubs, gallops. 1+ distal leg edema. Resp: Clear to auscultation bilaterally good air entry bilaterally  No crackles, wheeze. Breathing comfortably. Psych:normal affect. Neuro: AOx3  MSK: Gait is antalgic favoring the left side. She is able to extend her knees 180° bilaterally. There is TTP of the lower back bilaterally. ASSESSMENT   Diagnosis Orders   1. Pain of right lower leg  Fouzia Sanderson MD, Pain Management, [place]    HYDROcodone-acetaminophen (NORCO) 5-325 MG per tablet   2. Chronic pain syndrome  Hayden Briseno MD, Pain Management, [place]    HYDROcodone-acetaminophen (NORCO) 5-325 MG per tablet   3. Lumbar spondylosis  Hayden Briseno MD, Pain Management, [place]    HYDROcodone-acetaminophen (NORCO) 5-325 MG per tablet   4. Routine general medical examination at a health care facility  CBC    VITAMIN D 25 HYDROXY    VITAMIN B12 & FOLATE    MAGNESIUM    COMPREHENSIVE METABOLIC PANEL    TSH with Reflex    LIPID PANEL     #1-3: I will prescribe 60 tablets of Norco today. For whatever reason she was waiting to hear back from the other [facility] pain management clinic but tells me that she does not hear back from them. Will refer to Dr. Alicia Rodriguez and the nurse practitioner in [place]. Will also send message to the [facility] ([facility] cardiology nurse practitioner about the patient's question on diuretics. PLAN          See rest of plan under patient instructions. Return in about 9 days (around 5/1/2019). There are no Patient Instructions on file for this visit. Please note a portion of this chart was generated using dragon dictation software.  Although every effort was made to ensure the accuracy of this automated transcription,some errors in transcription may have occurred.

## 2019-04-22 NOTE — TELEPHONE ENCOUNTER
Left message on patient 's VM and left result message, advised patient to call office to set up appt with Maria M Nina and Dr. Marcus Merida

## 2019-04-24 NOTE — PROGRESS NOTES
oz/day  Sodium and fluid restriction compliance: good    Pt Education: The patient has received education on the following topics: dietary sodium restriction, heart failure medications, the importance of physical activity, symptom management and weight monitoring     ASTRID No Treated: Yes -surgical  Referral:  No      Past Medical History:   has a past medical history of Acute lateral meniscus tear of right knee, Acute medial meniscus tear of right knee, Anxiety, Arthritis, Asthma, Atrial fibrillation ([address] Edema, Fibromyalgia, GERD (gastroesophageal reflux disease), and Hiatal hernia. Surgical History:   has a past surgical history that includes Abdomen surgery (2001); hernia repair (2001); Hysterectomy (2004); Tonsillectomy and adenoidectomy; and Romie-en-Y Gastric Bypass. Social History:   reports that she has never smoked. She has never used smokeless tobacco. She reports that she drinks alcohol. She reports that she does not use drugs. Family History:   Family History   Problem Relation Age of Onset   [facility] Cancer Mother         lung    Arthritis Mother     Cirrhosis Father     Asthma Maternal Aunt        HomeMedications:  Prior to Admission medications    Medication Sig Start Date End Date Taking? Authorizing Provider   spironolactone (ALDACTONE) 25 MG tablet Take 0.5 tablets by mouth daily 4/26/19  Yes STEPHANIE Diallo CNP   apixaban (ELIQUIS) 5 MG TABS tablet TAKE ONE TABLET BY MOUTH TWICE A DAY 4/26/19  Yes STEPHANIE Diallo CNP   torsemide (DEMADEX) 20 MG tablet Take 1 tablet by mouth 2 times daily 4/26/19  Yes STEPHANIE Diallo CNP   HYDROcodone-acetaminophen (NORCO) 5-325 MG per tablet Take 1 tablet by mouth 2 times daily as needed for Pain for up to 30 days.  Take lowest dose possible to manage pain 4/22/19 5/22/19 Yes Dorie Hernandez DO   rOPINIRole (REQUIP) 0.5 MG tablet Take 3 tablets by mouth every evening 4/11/19  Yes Luis Cotton MD   fluocinonide (LIDEX) 0.05 % ointment APPLY SPARINGLY TO AFFECTED AREA(S) TWO TIMES A DAY AS NEEDED FOR FLARES, UP TO 2 WEEKS AT A TIME. DO NOT APPLY ON CLEAR SKIN. 3/12/19  Yes Magdiel Srivastava MD   Handicap Placard MISC by Does not apply route 2/13/19  Yes Shilpi Hansen MD   DULERA 200-5 MCG/ACT inhaler INHALE ONE PUFF INTO THE LUNGS TWO TIMES A DAY 2/11/19  Yes Chel Daly MD   sertraline (ZOLOFT) 100 MG tablet TAKE 1 TABLET BY MOUTH ONE TIME DAILY 9/17/18  Yes Carlos Alberto Hussein DO   traZODone (DESYREL) 100 MG tablet TAKE 1 TABLET BY MOUTH EVERY NIGHT 9/17/18  Yes Carlos Alberto Hussein DO   traZODone (DESYREL) 100 MG tablet Take 100 mg by mouth nightly as needed for Sleep   Yes Historical Provider, MD   hydrOXYzine (VISTARIL) 50 MG capsule Take 1 capsule by mouth 3 times daily as needed for Anxiety (sleep) 4/18/18  Yes Dallas Mcknight MD   vitamin B-12 (CYANOCOBALAMIN) 1000 MCG tablet Take 1,000 mcg by mouth daily   Yes Historical Provider, MD   Lysine 1000 MG TABS Take 1 tablet by mouth daily as needed    Yes Historical Provider, MD   albuterol sulfate HFA (VENTOLIN HFA) 108 (90 BASE) MCG/ACT inhaler INHALE 2 PUFFS BY MOUTH EVERY 4 TO 6 HOURS AS NEEDED 3/8/17  Yes RichSporthold Grain Mary, DO   Spacer/Aero-Holding Chambers (AEROCHAMBER PLUS-FLOW SIGNAL) MISC Use with MDI as instructed 3/6/17  Yes Chel Daly MD   Cholecalciferol (VITAMIN D3) 2000 UNITS CAPS Take 5 capsules by mouth daily 2/8/17  Yes Richerin Grain Mary, DO   diltiazem (CARDIZEM) 60 MG tablet Take 1 tablet by mouth every 8 hours as needed (Palpitations) 9/11/18   Creasie Eisenmenger, MD        Allergies:  Latex; Aspirin; Demerol; Meperidine; Nsaids; Pcn [penicillins]; Ranitidine hcl; and Sulfa antibiotics     ROS:   Review of Systems   Constitutional: Positive for fatigue. Respiratory: Positive for cough, shortness of breath and wheezing. Cardiovascular: Positive for palpitations and leg swelling. Negative for chest pain. Gastrointestinal: Negative. PHOS 3.1 06/21/2017    BUN 21 10/04/2018    BUN 21 09/10/2018    BUN 22 09/09/2018    CREATININE 1.0 10/04/2018    CREATININE 1.0 09/10/2018    CREATININE 0.9 09/09/2018     BNP:   Lab Results   Component Value Date    PROBNP 701 09/07/2018    PROBNP 773 11/10/2017    PROBNP 214 06/02/2017     Iron Studies:  No components found for: FE,  TIBC,  FERRITIN  Iron Deficiency Anemia:  Yes IV Iron Therapy:  Yes  2017 ACC/AHA HF Guidelines:   intravenous iron replacement in patients with New York Heart Association (NYHA) class II and III HF and iron deficiency (ferritin <100 ng/ml or 100-300 ng/ml if transferrin saturation <20%), to improve functional status and QoL. Assessment/Plan:    1. Chronic diastolic heart failure (HCC) - start torsemide 20mg bid, linda 12.5mg/day, labs today, call Monday if no wt loss, call when wt back to baseline for diuretic adjustment   2. Paroxysmal atrial fibrillation (HCC) - restart AC, use cardizem prn tachy, EP f/u   3. SOB (shortness of breath) - diurese   4. Localized edema - diurese         Instructions:   1. Medications: continue cardizem as needed, restart eliquis, start torsemide 20mg twice a day, start spironolactone 12.5mg once a day  2. Labs: today   3. Lifestyle Recommendations: Weigh yourself every day in the morning after urination, call [facility] if wt increases 2-3lb in one day or 5lb in one week, Limit sodium to 2000mg/day and fluids to 2L or 64oz/day. Add a fish oil supplement if you are not already taking one. 4. Follow up: with Gorge Anderson next week    Heart Failure Hotline: [phone]      I appreciate the opportunity of cooperating in the care of this individual.    Donavan Patrick CNP, 4/24/2019,12:47 PM    QUALITY MEASURES  1. Tobacco Cessation Counseling: NA  2. Retake of BP if >140/90:   NA  3. Documentation to PCP/referring for new patient:  Sent to PCP at close of office visit  4. CAD patient on anti-platelet: NA  5. CAD patient on STATIN therapy:  NA  6.  Patient with

## 2019-04-26 ENCOUNTER — OFFICE VISIT (OUTPATIENT)
Dept: CARDIOLOGY CLINIC | Age: 54
End: 2019-04-26
Payer: COMMERCIAL

## 2019-04-26 VITALS
HEART RATE: 78 BPM | BODY MASS INDEX: 53.26 KG/M2 | WEIGHT: 293 LBS | DIASTOLIC BLOOD PRESSURE: 80 MMHG | SYSTOLIC BLOOD PRESSURE: 130 MMHG

## 2019-04-26 DIAGNOSIS — R06.02 SOB (SHORTNESS OF BREATH): ICD-10-CM

## 2019-04-26 DIAGNOSIS — I48.0 PAROXYSMAL ATRIAL FIBRILLATION (HCC): ICD-10-CM

## 2019-04-26 DIAGNOSIS — I50.32 CHRONIC DIASTOLIC HEART FAILURE (HCC): Primary | ICD-10-CM

## 2019-04-26 DIAGNOSIS — R60.0 LOCALIZED EDEMA: ICD-10-CM

## 2019-04-26 DIAGNOSIS — Z00.00 ROUTINE GENERAL MEDICAL EXAMINATION AT A HEALTH CARE FACILITY: ICD-10-CM

## 2019-04-26 DIAGNOSIS — I50.32 CHRONIC DIASTOLIC HEART FAILURE (HCC): ICD-10-CM

## 2019-04-26 LAB
A/G RATIO: 1.3 (ref 1.1–2.2)
ALBUMIN SERPL-MCNC: 3.9 G/DL (ref 3.4–5)
ALP BLD-CCNC: 103 U/L (ref 40–129)
ALT SERPL-CCNC: 16 U/L (ref 10–40)
ANION GAP SERPL CALCULATED.3IONS-SCNC: 10 MMOL/L (ref 3–16)
AST SERPL-CCNC: 16 U/L (ref 15–37)
BILIRUB SERPL-MCNC: 0.4 MG/DL (ref 0–1)
BUN BLDV-MCNC: 12 MG/DL (ref 7–20)
CALCIUM SERPL-MCNC: 8.9 MG/DL (ref 8.3–10.6)
CHLORIDE BLD-SCNC: 107 MMOL/L (ref 99–110)
CO2: 26 MMOL/L (ref 21–32)
CREAT SERPL-MCNC: 0.9 MG/DL (ref 0.6–1.1)
FERRITIN: 24.2 NG/ML (ref 15–150)
FOLATE: 10.84 NG/ML (ref 4.78–24.2)
GFR AFRICAN AMERICAN: >60
GFR NON-AFRICAN AMERICAN: >60
GLOBULIN: 3.1 G/DL
GLUCOSE BLD-MCNC: 102 MG/DL (ref 70–99)
HCT VFR BLD CALC: 31.3 % (ref 36–48)
HEMOGLOBIN: 9.6 G/DL (ref 12–16)
IRON SATURATION: 4 % (ref 15–50)
IRON: 15 UG/DL (ref 37–145)
MAGNESIUM: 2.5 MG/DL (ref 1.8–2.4)
MCH RBC QN AUTO: 22.8 PG (ref 26–34)
MCHC RBC AUTO-ENTMCNC: 30.8 G/DL (ref 31–36)
MCV RBC AUTO: 74.2 FL (ref 80–100)
PDW BLD-RTO: 18.4 % (ref 12.4–15.4)
PLATELET # BLD: 315 K/UL (ref 135–450)
PMV BLD AUTO: 8.6 FL (ref 5–10.5)
POTASSIUM SERPL-SCNC: 5.2 MMOL/L (ref 3.5–5.1)
PRO-BNP: 267 PG/ML (ref 0–124)
RBC # BLD: 4.22 M/UL (ref 4–5.2)
SODIUM BLD-SCNC: 143 MMOL/L (ref 136–145)
TOTAL IRON BINDING CAPACITY: 378 UG/DL (ref 260–445)
TOTAL PROTEIN: 7 G/DL (ref 6.4–8.2)
VITAMIN B-12: 524 PG/ML (ref 211–911)
VITAMIN D 25-HYDROXY: 28.4 NG/ML
WBC # BLD: 4.6 K/UL (ref 4–11)

## 2019-04-26 PROCEDURE — 99214 OFFICE O/P EST MOD 30 MIN: CPT | Performed by: NURSE PRACTITIONER

## 2019-04-26 RX ORDER — SPIRONOLACTONE 25 MG/1
12.5 TABLET ORAL DAILY
Qty: 30 TABLET | Refills: 3 | Status: SHIPPED | OUTPATIENT
Start: 2019-04-26 | End: 2019-07-08

## 2019-04-26 RX ORDER — TORSEMIDE 20 MG/1
20 TABLET ORAL 2 TIMES DAILY
Qty: 60 TABLET | Refills: 3 | Status: ON HOLD | OUTPATIENT
Start: 2019-04-26 | End: 2019-08-25 | Stop reason: CLARIF

## 2019-04-26 RX ORDER — TORSEMIDE 20 MG/1
20 TABLET ORAL DAILY
Qty: 30 TABLET | Refills: 3 | Status: SHIPPED | OUTPATIENT
Start: 2019-04-26 | End: 2019-04-26 | Stop reason: SDUPTHER

## 2019-04-26 ASSESSMENT — ENCOUNTER SYMPTOMS
GASTROINTESTINAL NEGATIVE: 1
SHORTNESS OF BREATH: 1
COUGH: 1
WHEEZING: 1

## 2019-04-26 NOTE — PATIENT INSTRUCTIONS
Instructions:   1. Medications: continue cardizem as needed, restart eliquis, start torsemide 20mg twice a day, start spironolactone 12.5mg once a day  2. Labs: today   3. Lifestyle Recommendations: Weigh yourself every day in the morning after urination, call Governor Cassette if wt increases 2-3lb in one day or 5lb in one week, Limit sodium to 2000mg/day and fluids to 2L or 64oz/day. Add a fish oil supplement if you are not already taking one.    4. Follow up: with Prudence Alfreda next week    Heart Failure Hotline: [phone]

## 2019-04-29 ENCOUNTER — TELEPHONE (OUTPATIENT)
Dept: PAIN MANAGEMENT | Age: 54
End: 2019-04-29

## 2019-04-29 ENCOUNTER — APPOINTMENT (OUTPATIENT)
Dept: GENERAL RADIOLOGY | Age: 54
DRG: 555 | End: 2019-04-29
Payer: COMMERCIAL

## 2019-04-29 ENCOUNTER — HOSPITAL ENCOUNTER (INPATIENT)
Age: 54
LOS: 1 days | Discharge: HOME | DRG: 555 | End: 2019-05-01
Attending: EMERGENCY MEDICINE | Admitting: INTERNAL MEDICINE
Payer: COMMERCIAL

## 2019-04-29 DIAGNOSIS — I50.33 ACUTE ON CHRONIC DIASTOLIC CONGESTIVE HEART FAILURE, NYHA CLASS 2 (HCC): ICD-10-CM

## 2019-04-29 DIAGNOSIS — I48.92 ATRIAL FLUTTER BY ELECTROCARDIOGRAM (HCC): ICD-10-CM

## 2019-04-29 DIAGNOSIS — M25.561 ACUTE PAIN OF RIGHT KNEE: Primary | ICD-10-CM

## 2019-04-29 LAB
ANION GAP SERPL CALCULATED.3IONS-SCNC: 14 MMOL/L (ref 3–16)
BASE EXCESS VENOUS: 4 (ref -3–3)
BASOPHILS ABSOLUTE: 0 K/UL (ref 0–0.2)
BASOPHILS RELATIVE PERCENT: 0.8 %
BUN BLDV-MCNC: 18 MG/DL (ref 7–20)
CALCIUM SERPL-MCNC: 8.8 MG/DL (ref 8.3–10.6)
CHLORIDE BLD-SCNC: 98 MMOL/L (ref 99–110)
CO2: 27 MMOL/L (ref 21–32)
CREAT SERPL-MCNC: 1.3 MG/DL (ref 0.6–1.1)
EOSINOPHILS ABSOLUTE: 0.4 K/UL (ref 0–0.6)
EOSINOPHILS RELATIVE PERCENT: 6.5 %
GFR AFRICAN AMERICAN: 52
GFR NON-AFRICAN AMERICAN: 43
GLUCOSE BLD-MCNC: 112 MG/DL (ref 70–99)
HCO3 VENOUS: 27.6 MMOL/L (ref 23–29)
HCT VFR BLD CALC: 32.2 % (ref 36–48)
HEMOGLOBIN: 10 G/DL (ref 12–16)
LACTATE: 0.91 MMOL/L (ref 0.4–2)
LYMPHOCYTES ABSOLUTE: 1.5 K/UL (ref 1–5.1)
LYMPHOCYTES RELATIVE PERCENT: 26.5 %
MCH RBC QN AUTO: 22.1 PG (ref 26–34)
MCHC RBC AUTO-ENTMCNC: 30.9 G/DL (ref 31–36)
MCV RBC AUTO: 71.4 FL (ref 80–100)
MONOCYTES ABSOLUTE: 0.8 K/UL (ref 0–1.3)
MONOCYTES RELATIVE PERCENT: 13.2 %
NEUTROPHILS ABSOLUTE: 3.1 K/UL (ref 1.7–7.7)
NEUTROPHILS RELATIVE PERCENT: 53 %
O2 SAT, VEN: 88 %
PCO2, VEN: 35.3 MM HG (ref 40–50)
PDW BLD-RTO: 18.6 % (ref 12.4–15.4)
PERFORMED ON: ABNORMAL
PH VENOUS: 7.5 (ref 7.35–7.45)
PLATELET # BLD: 342 K/UL (ref 135–450)
PMV BLD AUTO: 8 FL (ref 5–10.5)
PO2, VEN: 50 MM HG
POC SAMPLE TYPE: ABNORMAL
POTASSIUM SERPL-SCNC: 4 MMOL/L (ref 3.5–5.1)
PRO-BNP: 2866 PG/ML (ref 0–124)
RBC # BLD: 4.51 M/UL (ref 4–5.2)
SODIUM BLD-SCNC: 139 MMOL/L (ref 136–145)
TCO2 CALC VENOUS: 29 MMOL/L
TROPONIN: <0.01 NG/ML
WBC # BLD: 5.8 K/UL (ref 4–11)

## 2019-04-29 PROCEDURE — 84484 ASSAY OF TROPONIN QUANT: CPT

## 2019-04-29 PROCEDURE — 99285 EMERGENCY DEPT VISIT HI MDM: CPT

## 2019-04-29 PROCEDURE — 80048 BASIC METABOLIC PNL TOTAL CA: CPT

## 2019-04-29 PROCEDURE — 96375 TX/PRO/DX INJ NEW DRUG ADDON: CPT

## 2019-04-29 PROCEDURE — 82803 BLOOD GASES ANY COMBINATION: CPT

## 2019-04-29 PROCEDURE — 73560 X-RAY EXAM OF KNEE 1 OR 2: CPT

## 2019-04-29 PROCEDURE — 83880 ASSAY OF NATRIURETIC PEPTIDE: CPT

## 2019-04-29 PROCEDURE — 85025 COMPLETE CBC W/AUTO DIFF WBC: CPT

## 2019-04-29 PROCEDURE — 83605 ASSAY OF LACTIC ACID: CPT

## 2019-04-29 PROCEDURE — 6360000002 HC RX W HCPCS: Performed by: STUDENT IN AN ORGANIZED HEALTH CARE EDUCATION/TRAINING PROGRAM

## 2019-04-29 PROCEDURE — 71046 X-RAY EXAM CHEST 2 VIEWS: CPT

## 2019-04-29 PROCEDURE — 93005 ELECTROCARDIOGRAM TRACING: CPT | Performed by: EMERGENCY MEDICINE

## 2019-04-29 RX ADMIN — HYDROMORPHONE HYDROCHLORIDE 1 MG: 1 INJECTION, SOLUTION INTRAMUSCULAR; INTRAVENOUS; SUBCUTANEOUS at 22:05

## 2019-04-29 ASSESSMENT — PAIN DESCRIPTION - LOCATION: LOCATION: LEG

## 2019-04-29 ASSESSMENT — ENCOUNTER SYMPTOMS
NAUSEA: 0
WHEEZING: 1
DIARRHEA: 0
ABDOMINAL PAIN: 1
VOMITING: 0
RHINORRHEA: 0
COUGH: 0
BACK PAIN: 1
CONSTIPATION: 0
CHEST TIGHTNESS: 1
SHORTNESS OF BREATH: 1
SORE THROAT: 0

## 2019-04-29 ASSESSMENT — PAIN SCALES - GENERAL
PAINLEVEL_OUTOF10: 6
PAINLEVEL_OUTOF10: 7
PAINLEVEL_OUTOF10: 10

## 2019-04-29 ASSESSMENT — PAIN DESCRIPTION - ORIENTATION: ORIENTATION: RIGHT;LEFT

## 2019-04-29 NOTE — TELEPHONE ENCOUNTER
This patients referral has been approved for scheduling with [facility]. She has been scheduled for a new pt appt. Informed her that the first appointment may only be rescheduled once and no showing or giving less than 24 hour notice of inability to come to first visit will result in a cancellation of the referral. Advised her that if she arrives to the appt without completed paperwork, or if she does not arrive early enough to complete it prior to being seen, her appointment may be rescheduled for the next available new patient appt. Also advised her to prepare to be in the office for at least 2-3 hours and that once established with one of our 2 providers, she may not switch to the other.      She requested her new patient packet to be sent to him via: Mail (address confirmed, appt card included)

## 2019-04-30 LAB
ALBUMIN SERPL-MCNC: 3.7 G/DL (ref 3.4–5)
AMORPHOUS: ABNORMAL /HPF
ANION GAP SERPL CALCULATED.3IONS-SCNC: 11 MMOL/L (ref 3–16)
BACTERIA: ABNORMAL /HPF
BILIRUBIN URINE: NEGATIVE
BLOOD, URINE: ABNORMAL
BUN BLDV-MCNC: 21 MG/DL (ref 7–20)
CALCIUM SERPL-MCNC: 9 MG/DL (ref 8.3–10.6)
CASTS: ABNORMAL /LPF
CHLORIDE BLD-SCNC: 99 MMOL/L (ref 99–110)
CLARITY: CLEAR
CO2: 26 MMOL/L (ref 21–32)
COLOR: YELLOW
CREAT SERPL-MCNC: 1.3 MG/DL (ref 0.6–1.1)
EKG ATRIAL RATE: 135 BPM
EKG DIAGNOSIS: NORMAL
EKG P-R INTERVAL: 88 MS
EKG Q-T INTERVAL: 334 MS
EKG QRS DURATION: 94 MS
EKG QTC CALCULATION (BAZETT): 501 MS
EKG R AXIS: 35 DEGREES
EKG T AXIS: 68 DEGREES
EKG VENTRICULAR RATE: 135 BPM
EPITHELIAL CELLS, UA: ABNORMAL /HPF
GFR AFRICAN AMERICAN: 52
GFR NON-AFRICAN AMERICAN: 43
GLUCOSE BLD-MCNC: 110 MG/DL (ref 70–99)
GLUCOSE URINE: NEGATIVE MG/DL
KETONES, URINE: NEGATIVE MG/DL
LEUKOCYTE ESTERASE, URINE: ABNORMAL
MICROSCOPIC EXAMINATION: YES
NITRITE, URINE: POSITIVE
PH UA: 6 (ref 5–8)
PHOSPHORUS: 4 MG/DL (ref 2.5–4.9)
POTASSIUM SERPL-SCNC: 4.1 MMOL/L (ref 3.5–5.1)
PROTEIN UA: NEGATIVE MG/DL
RBC UA: ABNORMAL /HPF (ref 0–2)
SODIUM BLD-SCNC: 136 MMOL/L (ref 136–145)
SPECIFIC GRAVITY UA: 1.01 (ref 1–1.03)
T4 FREE: 1.6 NG/DL (ref 0.9–1.8)
TSH REFLEX: 5.89 UIU/ML (ref 0.27–4.2)
URIC ACID, SERUM: 7.6 MG/DL (ref 2.6–6)
URINE TYPE: ABNORMAL
UROBILINOGEN, URINE: 0.2 E.U./DL
WBC UA: ABNORMAL /HPF (ref 0–5)

## 2019-04-30 PROCEDURE — APPSS30 APP SPLIT SHARED TIME 16-30 MINUTES: Performed by: NURSE PRACTITIONER

## 2019-04-30 PROCEDURE — 81001 URINALYSIS AUTO W/SCOPE: CPT

## 2019-04-30 PROCEDURE — 1200000000 HC SEMI PRIVATE

## 2019-04-30 PROCEDURE — 2580000003 HC RX 258: Performed by: STUDENT IN AN ORGANIZED HEALTH CARE EDUCATION/TRAINING PROGRAM

## 2019-04-30 PROCEDURE — 99223 1ST HOSP IP/OBS HIGH 75: CPT | Performed by: INTERNAL MEDICINE

## 2019-04-30 PROCEDURE — 6360000002 HC RX W HCPCS: Performed by: STUDENT IN AN ORGANIZED HEALTH CARE EDUCATION/TRAINING PROGRAM

## 2019-04-30 PROCEDURE — 6360000002 HC RX W HCPCS: Performed by: INTERNAL MEDICINE

## 2019-04-30 PROCEDURE — 6370000000 HC RX 637 (ALT 250 FOR IP): Performed by: INTERNAL MEDICINE

## 2019-04-30 PROCEDURE — 36415 COLL VENOUS BLD VENIPUNCTURE: CPT

## 2019-04-30 PROCEDURE — 6370000000 HC RX 637 (ALT 250 FOR IP): Performed by: EMERGENCY MEDICINE

## 2019-04-30 PROCEDURE — 2500000003 HC RX 250 WO HCPCS: Performed by: EMERGENCY MEDICINE

## 2019-04-30 PROCEDURE — 94761 N-INVAS EAR/PLS OXIMETRY MLT: CPT

## 2019-04-30 PROCEDURE — 96366 THER/PROPH/DIAG IV INF ADDON: CPT

## 2019-04-30 PROCEDURE — 6360000002 HC RX W HCPCS: Performed by: EMERGENCY MEDICINE

## 2019-04-30 PROCEDURE — 2500000003 HC RX 250 WO HCPCS: Performed by: STUDENT IN AN ORGANIZED HEALTH CARE EDUCATION/TRAINING PROGRAM

## 2019-04-30 PROCEDURE — 80069 RENAL FUNCTION PANEL: CPT

## 2019-04-30 PROCEDURE — 96376 TX/PRO/DX INJ SAME DRUG ADON: CPT

## 2019-04-30 PROCEDURE — 94664 DEMO&/EVAL PT USE INHALER: CPT

## 2019-04-30 PROCEDURE — 84443 ASSAY THYROID STIM HORMONE: CPT

## 2019-04-30 PROCEDURE — 84439 ASSAY OF FREE THYROXINE: CPT

## 2019-04-30 PROCEDURE — 84550 ASSAY OF BLOOD/URIC ACID: CPT

## 2019-04-30 PROCEDURE — 6370000000 HC RX 637 (ALT 250 FOR IP): Performed by: STUDENT IN AN ORGANIZED HEALTH CARE EDUCATION/TRAINING PROGRAM

## 2019-04-30 PROCEDURE — 96375 TX/PRO/DX INJ NEW DRUG ADDON: CPT

## 2019-04-30 PROCEDURE — 94640 AIRWAY INHALATION TREATMENT: CPT

## 2019-04-30 PROCEDURE — 6370000000 HC RX 637 (ALT 250 FOR IP): Performed by: NURSE PRACTITIONER

## 2019-04-30 PROCEDURE — 96365 THER/PROPH/DIAG IV INF INIT: CPT

## 2019-04-30 RX ORDER — ACETAMINOPHEN 325 MG/1
650 TABLET ORAL EVERY 4 HOURS PRN
Status: DISCONTINUED | OUTPATIENT
Start: 2019-04-30 | End: 2019-05-01 | Stop reason: HOSPADM

## 2019-04-30 RX ORDER — ONDANSETRON 2 MG/ML
4 INJECTION INTRAMUSCULAR; INTRAVENOUS EVERY 6 HOURS PRN
Status: DISCONTINUED | OUTPATIENT
Start: 2019-04-30 | End: 2019-05-01 | Stop reason: HOSPADM

## 2019-04-30 RX ORDER — FUROSEMIDE 10 MG/ML
40 INJECTION INTRAMUSCULAR; INTRAVENOUS 2 TIMES DAILY
Status: DISCONTINUED | OUTPATIENT
Start: 2019-04-30 | End: 2019-05-01 | Stop reason: HOSPADM

## 2019-04-30 RX ORDER — HYDROXYZINE PAMOATE 25 MG/1
50 CAPSULE ORAL 3 TIMES DAILY PRN
Status: DISCONTINUED | OUTPATIENT
Start: 2019-04-30 | End: 2019-04-30

## 2019-04-30 RX ORDER — IPRATROPIUM BROMIDE AND ALBUTEROL SULFATE 2.5; .5 MG/3ML; MG/3ML
1 SOLUTION RESPIRATORY (INHALATION) EVERY 4 HOURS PRN
Status: DISCONTINUED | OUTPATIENT
Start: 2019-04-30 | End: 2019-05-01 | Stop reason: HOSPADM

## 2019-04-30 RX ORDER — DIAPER,BRIEF,INFANT-TODD,DISP
EACH MISCELLANEOUS 2 TIMES DAILY
Status: DISCONTINUED | OUTPATIENT
Start: 2019-04-30 | End: 2019-05-01 | Stop reason: HOSPADM

## 2019-04-30 RX ORDER — FUROSEMIDE 10 MG/ML
40 INJECTION INTRAMUSCULAR; INTRAVENOUS ONCE
Status: COMPLETED | OUTPATIENT
Start: 2019-04-30 | End: 2019-04-30

## 2019-04-30 RX ORDER — DILTIAZEM HYDROCHLORIDE 5 MG/ML
10 INJECTION INTRAVENOUS ONCE
Status: COMPLETED | OUTPATIENT
Start: 2019-04-30 | End: 2019-04-30

## 2019-04-30 RX ORDER — CIPROFLOXACIN 2 MG/ML
400 INJECTION, SOLUTION INTRAVENOUS EVERY 12 HOURS
Status: DISCONTINUED | OUTPATIENT
Start: 2019-04-30 | End: 2019-05-01 | Stop reason: HOSPADM

## 2019-04-30 RX ORDER — CALAMINE
LOTION (ML) TOPICAL PRN
Status: DISCONTINUED | OUTPATIENT
Start: 2019-04-30 | End: 2019-05-01 | Stop reason: HOSPADM

## 2019-04-30 RX ORDER — FUROSEMIDE 10 MG/ML
60 INJECTION INTRAMUSCULAR; INTRAVENOUS ONCE
Status: DISCONTINUED | OUTPATIENT
Start: 2019-04-30 | End: 2019-04-30

## 2019-04-30 RX ORDER — BUDESONIDE 0.5 MG/2ML
0.5 INHALANT ORAL 2 TIMES DAILY
Status: DISCONTINUED | OUTPATIENT
Start: 2019-04-30 | End: 2019-05-01 | Stop reason: HOSPADM

## 2019-04-30 RX ORDER — HYDROCODONE BITARTRATE AND ACETAMINOPHEN 5; 325 MG/1; MG/1
1 TABLET ORAL EVERY 4 HOURS PRN
Status: DISCONTINUED | OUTPATIENT
Start: 2019-04-30 | End: 2019-05-01 | Stop reason: HOSPADM

## 2019-04-30 RX ORDER — SODIUM CHLORIDE 0.9 % (FLUSH) 0.9 %
10 SYRINGE (ML) INJECTION EVERY 12 HOURS SCHEDULED
Status: DISCONTINUED | OUTPATIENT
Start: 2019-04-30 | End: 2019-05-01 | Stop reason: HOSPADM

## 2019-04-30 RX ORDER — SODIUM CHLORIDE 0.9 % (FLUSH) 0.9 %
10 SYRINGE (ML) INJECTION PRN
Status: DISCONTINUED | OUTPATIENT
Start: 2019-04-30 | End: 2019-05-01 | Stop reason: HOSPADM

## 2019-04-30 RX ORDER — DIGOXIN 0.25 MG/ML
125 INJECTION INTRAMUSCULAR; INTRAVENOUS ONCE
Status: COMPLETED | OUTPATIENT
Start: 2019-04-30 | End: 2019-04-30

## 2019-04-30 RX ORDER — FLUOCINONIDE 0.5 MG/G
OINTMENT TOPICAL 2 TIMES DAILY
Status: DISCONTINUED | OUTPATIENT
Start: 2019-04-30 | End: 2019-05-01 | Stop reason: HOSPADM

## 2019-04-30 RX ORDER — FORMOTEROL FUMARATE 20 UG/2ML
20 SOLUTION RESPIRATORY (INHALATION) 2 TIMES DAILY
Status: DISCONTINUED | OUTPATIENT
Start: 2019-04-30 | End: 2019-05-01 | Stop reason: HOSPADM

## 2019-04-30 RX ORDER — DIPHENHYDRAMINE HCL 25 MG
25 TABLET ORAL ONCE
Status: COMPLETED | OUTPATIENT
Start: 2019-04-30 | End: 2019-04-30

## 2019-04-30 RX ADMIN — FORMOTEROL FUMARATE DIHYDRATE 20 MCG: 20 SOLUTION RESPIRATORY (INHALATION) at 08:52

## 2019-04-30 RX ADMIN — METOPROLOL TARTRATE 12.5 MG: 25 TABLET ORAL at 20:35

## 2019-04-30 RX ADMIN — DILTIAZEM HYDROCHLORIDE 10 MG: 5 INJECTION INTRAVENOUS at 01:43

## 2019-04-30 RX ADMIN — HYDROCODONE BITARTRATE AND ACETAMINOPHEN 1 TABLET: 5; 325 TABLET ORAL at 08:35

## 2019-04-30 RX ADMIN — HYDROMORPHONE HYDROCHLORIDE 1 MG: 1 INJECTION, SOLUTION INTRAMUSCULAR; INTRAVENOUS; SUBCUTANEOUS at 01:43

## 2019-04-30 RX ADMIN — HYDROCODONE BITARTRATE AND ACETAMINOPHEN 1 TABLET: 5; 325 TABLET ORAL at 12:08

## 2019-04-30 RX ADMIN — APIXABAN 5 MG: 5 TABLET, FILM COATED ORAL at 10:10

## 2019-04-30 RX ADMIN — HYDROCORTISONE: 1 CREAM TOPICAL at 20:35

## 2019-04-30 RX ADMIN — DIGOXIN 125 MCG: 0.25 INJECTION INTRAMUSCULAR; INTRAVENOUS at 03:13

## 2019-04-30 RX ADMIN — HYDROCORTISONE: 1 CREAM TOPICAL at 10:21

## 2019-04-30 RX ADMIN — FORMOTEROL FUMARATE DIHYDRATE 20 MCG: 20 SOLUTION RESPIRATORY (INHALATION) at 21:04

## 2019-04-30 RX ADMIN — BUDESONIDE 500 MCG: 0.5 SUSPENSION RESPIRATORY (INHALATION) at 08:52

## 2019-04-30 RX ADMIN — HYDROCODONE BITARTRATE AND ACETAMINOPHEN 1 TABLET: 5; 325 TABLET ORAL at 20:35

## 2019-04-30 RX ADMIN — CIPROFLOXACIN 400 MG: 2 INJECTION, SOLUTION INTRAVENOUS at 16:54

## 2019-04-30 RX ADMIN — FLUOCINONIDE: 0.05 OINTMENT TOPICAL at 10:22

## 2019-04-30 RX ADMIN — AMIODARONE HYDROCHLORIDE 1 MG/MIN: 50 INJECTION, SOLUTION INTRAVENOUS at 07:49

## 2019-04-30 RX ADMIN — DILTIAZEM HYDROCHLORIDE 5 MG/ML: 5 INJECTION INTRAVENOUS at 02:41

## 2019-04-30 RX ADMIN — METOPROLOL TARTRATE 12.5 MG: 25 TABLET ORAL at 16:53

## 2019-04-30 RX ADMIN — DICLOFENAC 2 G: 10 GEL TOPICAL at 13:14

## 2019-04-30 RX ADMIN — FLUOCINONIDE: 0.05 OINTMENT TOPICAL at 20:35

## 2019-04-30 RX ADMIN — FUROSEMIDE 40 MG: 10 INJECTION, SOLUTION INTRAMUSCULAR; INTRAVENOUS at 18:54

## 2019-04-30 RX ADMIN — DILTIAZEM HYDROCHLORIDE 10 MG: 5 INJECTION INTRAVENOUS at 02:37

## 2019-04-30 RX ADMIN — AMIODARONE HYDROCHLORIDE 0.5 MG/MIN: 50 INJECTION, SOLUTION INTRAVENOUS at 13:16

## 2019-04-30 RX ADMIN — FUROSEMIDE 40 MG: 10 INJECTION, SOLUTION INTRAMUSCULAR; INTRAVENOUS at 05:25

## 2019-04-30 RX ADMIN — DIPHENHYDRAMINE HCL 25 MG: 25 TABLET ORAL at 03:12

## 2019-04-30 RX ADMIN — BUDESONIDE 500 MCG: 0.5 SUSPENSION RESPIRATORY (INHALATION) at 21:04

## 2019-04-30 RX ADMIN — AMIODARONE HYDROCHLORIDE 150 MG: 50 INJECTION, SOLUTION INTRAVENOUS at 07:28

## 2019-04-30 RX ADMIN — METOPROLOL TARTRATE 12.5 MG: 25 TABLET ORAL at 10:10

## 2019-04-30 ASSESSMENT — ENCOUNTER SYMPTOMS
TROUBLE SWALLOWING: 0
SORE THROAT: 0
ABDOMINAL PAIN: 1
ABDOMINAL DISTENTION: 0
WHEEZING: 0
DIARRHEA: 0
NAUSEA: 1
COUGH: 1
VOICE CHANGE: 0
SHORTNESS OF BREATH: 1
VOMITING: 0

## 2019-04-30 ASSESSMENT — PAIN DESCRIPTION - FREQUENCY
FREQUENCY: CONTINUOUS
FREQUENCY: CONTINUOUS
FREQUENCY: INTERMITTENT
FREQUENCY: INTERMITTENT

## 2019-04-30 ASSESSMENT — PAIN DESCRIPTION - DESCRIPTORS
DESCRIPTORS: ACHING;SHARP;SHOOTING
DESCRIPTORS: SHARP;SHOOTING;STABBING
DESCRIPTORS: SHARP;SHOOTING
DESCRIPTORS: ACHING;SHOOTING;SHARP

## 2019-04-30 ASSESSMENT — PAIN DESCRIPTION - LOCATION
LOCATION: KNEE
LOCATION: KNEE
LOCATION: LEG;KNEE
LOCATION: LEG;KNEE

## 2019-04-30 ASSESSMENT — PAIN SCALES - GENERAL
PAINLEVEL_OUTOF10: 6
PAINLEVEL_OUTOF10: 5
PAINLEVEL_OUTOF10: 10
PAINLEVEL_OUTOF10: 9
PAINLEVEL_OUTOF10: 6
PAINLEVEL_OUTOF10: 8

## 2019-04-30 ASSESSMENT — PAIN DESCRIPTION - ONSET
ONSET: ON-GOING

## 2019-04-30 ASSESSMENT — PAIN DESCRIPTION - PAIN TYPE
TYPE: ACUTE PAIN
TYPE: ACUTE PAIN

## 2019-04-30 ASSESSMENT — PAIN DESCRIPTION - PROGRESSION
CLINICAL_PROGRESSION: NOT CHANGED

## 2019-04-30 ASSESSMENT — PAIN DESCRIPTION - ORIENTATION
ORIENTATION: RIGHT

## 2019-04-30 NOTE — ED PROVIDER NOTES
ED Attending Attestation Note     Date of evaluation: 4/29/2019    This patient was seen by the resident. I have seen and examined the patient, agree with the workup, evaluation, management and diagnosis. The care plan has been discussed. I have reviewed the ECG and concur with the resident's interpretation. My assessment reveals a 59-year-old female who presents with chief complaint of leg pain. After the resident left I was asked to follow-up on the patient. She was found to be in a-flutter with some signs of mild heart failure with elevated BNP. We were unsuccessful in controlling her a flutter even after 20 mg of IV diltiazem and diltiazem gtt at 10. Patient given digoxin. Blood pressures remained stable but will need to go to PCU for continued titration of dilt gtt and HR management.         Sarah Meyer MD  04/30/19 0943

## 2019-04-30 NOTE — CARE COORDINATION
(2001); Hysterectomy (2004); Tonsillectomy and adenoidectomy; and Romie-en-Y Gastric Bypass.     Restrictions  Position Activity Restriction  Other position/activity restrictions: WBAT; communication to nursing: Please apply a wraparound knee brace to her right knee. Check with social work to see who you get DME through for this. Subjective  General  Chart Reviewed: Yes  Additional Pertinent Hx: Adm 4/29- 48 y.o. female who presents with shortness of breath and knee pain. PMH of chronic diastolic heart failure (NYHA grade II), pAF, chronic knee pain with injuries including lateral, medial meniscus tear of the right knee, trace acute fracture of the right knee 2/2019, spinal pain and lumbar spondylosis on chronic Norco, ASTRID, asthma, and GERD. XR of the knee that showed mild degenerative changes. Ortho consulted & recommended conservative treatment- WBAT, brace. Cardiology following & scheduling an ablation. Referring Practitioner: FLORIDA Mata  Diagnosis: Atrial flutter  Follows Commands: Within Functional Limits  Subjective  Subjective: Found in bed, agreeable to PT. Going for heart ablation soon. Reports R knee has been an issue since fall in February. Shilpi Pastorles it pop the other day when just moving it in sitting and has increased pain since. \"I can't believe they don't think anything is wrong. \"    Pain Screening  Patient Currently in Pain:  Yes     Orientation  Orientation  Overall Orientation Status: Within Normal Limits  Social/Functional History  Social/Functional History  Lives With: Family(sister & pt's boyfriend)  Type of Home: House  Home Layout: One level  Home Access: Stairs to enter without rails  Entrance Stairs - Number of Steps: 1  Bathroom Shower/Tub: Tub/Shower unit  Bathroom Toilet: Standard  Home Equipment: Standard walker  ADL Assistance: Independent  Homemaking Assistance: Needs assistance(sister & boyfriend do most)  Ambulation Assistance: Independent(started using walker after Equipment at Discharge: walker  Destination: home [facility]    Patient Self-Management:   Working scale at home: Yes  Reliable transportation: Yes  PCP: Yes  Advance Directive: No  Palliative Care Consult: No    Discharge Instructions:   Daily Weights: Weigh each morning at the same time and write down weights to bring to clinic visit  Goal Weight: 340 LB    Follow Up Instructions: 7 day hospital f/u scheduled  STEPHANIE Gillette CNP  Go on 5/9/2019  hospital follow up @ 10:30 am  [address] [facility], APRN - CNP  Go on 5/3/2019  CHF Clinic hospital follow up @ [address]  [phone]         I approve the established CHF interdisciplinary plan of care as documented within the medical record of Neda Francois.        Signature Murlean Bernheim, RN  EXT [id number]

## 2019-04-30 NOTE — H&P
Internal Medicine PGY-1 Resident History & Physical      PCP: Charlie Horvath DO    Date of Admission: 4/29/2019    Date of Service: Pt seen/examined on 04/30/2019 and Admitted to Inpatient with expected LOS greater than two midnights due to medical therapy. Chief Complaint:  Palpitations    Of Present Illness: Koffi Cuellar is a 47 yo F with a PMHx of Asthma, HTN, atrial Fib/flutter that presents with palpitations. Patient has had palpitations on and off for the last 2 years. Over the last couple weeks patient states it has been really bad. The palpitations become uncomfortable and feel like they radiate into her bilateral arms. Patient has been noncompliant with her medications for almost 2 years as they make her feel bad. Recently she was convinced by her cardiologist to start taking xeralto, Spironolactone and torsemide. Patient had the spironolactone stopped due to hyperkalemia. Patient notes Bilateral ankle swelling that has been getting worse over the last couple weeks and 25 lbs weight gain. Patient notes some episodes of nausea associated with abdominal pain that last for a short time and pass, not associated with food. Sometimes associated with palpitations. She has episodes of dizziness in the morning when she gets up that pas with time and occur when she has bee sitting for a long time and gets up. Denies any syncope or falls at home. Patient has R knee pain that has been chronic, but recently worsened when she tried to get up a couple days ago and felt like something \"popped\" in her knee. Since then she has not been to walk on her knee and it hurts with movement. Patient has generalized itching. A warm shower followed by cold and then hydrocortisone cream seem to help. Nothing makes it worse. Patient does not known any trigger and thinks it is her medications. Patient denies any rash or hives, but states she has internal hives that she is unable to scratch.     Past Medical History: of breath. Negative for wheezing. Chronic cough with white sputum production, no change   Cardiovascular: Positive for palpitations and leg swelling. Negative for chest pain. Gastrointestinal: Positive for abdominal pain and nausea. Negative for abdominal distention, diarrhea and vomiting. Genitourinary: Negative for difficulty urinating and dysuria. Musculoskeletal: Positive for arthralgias, gait problem and joint swelling. Negative for neck pain and neck stiffness. Skin: Negative for pallor and rash. Neurological: Positive for weakness. Negative for dizziness, tremors, seizures, syncope, light-headedness and headaches. PHYSICAL EXAM PERFORMED:    BP (!) 121/104   Pulse 131   Temp 97.1 °F (36.2 °C) (Oral)   Resp 22   Ht 5' 10\" (1.778 m)   Wt (!) 343 lb 7.6 oz (155.8 kg)   SpO2 98%   BMI 49.28 kg/m²     Physical Exam   Constitutional: She is oriented to person, place, and time. She appears well-developed and well-nourished. No distress. HENT:   Head: Normocephalic and atraumatic. Eyes: Pupils are equal, round, and reactive to light. EOM are normal.   Neck: Normal range of motion. Neck supple. Cardiovascular: Normal heart sounds and intact distal pulses. Exam reveals no gallop and no friction rub. No murmur heard. Irregularly regular on tele, tachycardic   Pulmonary/Chest: Effort normal and breath sounds normal. No stridor. She has no wheezes. She has no rales. Abdominal: Soft. Bowel sounds are normal. She exhibits no distension and no mass. There is no tenderness. Musculoskeletal: She exhibits tenderness. She exhibits no deformity. TTP, patient not willing to Move R leg 2/2 pain, Edematous BLE w/ trace pitting edema BLE   Neurological: She is alert and oriented to person, place, and time. Skin: She is not diaphoretic.      Labs:     Recent Labs     04/29/19 2159   WBC 5.8   HGB 10.0*   HCT 32.2*        Recent Labs     04/29/19 2159      K 4.0   CL 98*

## 2019-04-30 NOTE — CONSULTS
[id number]   BRAIN NATRIURETIC PEPTIDE - Abnormal; Notable for the following components:     Pro-BNP 2,866 (*)       All other components within normal limits     Narrative:      Performed at: [facility]  [address], [facility] [address]   Phone [phone]   POCT VENOUS - Abnormal; Notable for the following components:     pH, Karl 7.502 (*)       pCO2, Karl 35.3 (*)       Base Excess, Karl 4 (*)       All other components within normal limits     Narrative:      Performed at: [facility]  [address], [facility] [address]   Phone [phone]   TROPONIN     Narrative:      Performed at: [facility]  [address], [facility] [address]   Phone [phone]   URINALYSIS   POCT BLOOD GASES   POCT LACTIC ACID (LACTATE)         RECENT VITALS:  BP: 117/80,  , Pulse: 139, Resp: 19      Disposition/Plan      The patient is experiencing pain related to osteoarthritis and chronic medial and lateral meniscus tears in her right knee. Since she has not tried much in the way of conservative treatment, I have recommended we begin with conservative treatment. I discussed with her the possibility of a steroid injection in her knee, but she would like to hold off for now since anti-inflammatory medications and steroids cause significant swelling in her lower extremities. She may continue to weight bear as tolerated. We will order PT/OT. We are also going to try to get her a wraparound knee brace to help support the knee as well. She is going to follow up with Dr. Darius Fine on an outpatient basis for further discuss of steroid injection if she would like to proceed with this.

## 2019-04-30 NOTE — PROGRESS NOTES
(DEMADEX) 20 MG tablet Take 1 tablet by mouth 2 times daily 60 tablet 3    HYDROcodone-acetaminophen (NORCO) 5-325 MG per tablet Take 1 tablet by mouth 2 times daily as needed for Pain for up to 30 days. Take lowest dose possible to manage pain 60 tablet 0    fluocinonide (LIDEX) 0.05 % ointment APPLY SPARINGLY TO AFFECTED AREA(S) TWO TIMES A DAY AS NEEDED FOR FLARES, UP TO 2 WEEKS AT A TIME. DO NOT APPLY ON CLEAR SKIN.  60 g 1    DULERA 200-5 MCG/ACT inhaler INHALE ONE PUFF INTO THE LUNGS TWO TIMES A DAY 13 g 5    diltiazem (CARDIZEM) 60 MG tablet Take 1 tablet by mouth every 8 hours as needed (Palpitations) 120 tablet 1    vitamin B-12 (CYANOCOBALAMIN) 1000 MCG tablet Take 1,000 mcg by mouth daily      Lysine 1000 MG TABS Take 1 tablet by mouth daily as needed       albuterol sulfate HFA (VENTOLIN HFA) 108 (90 BASE) MCG/ACT inhaler INHALE 2 PUFFS BY MOUTH EVERY 4 TO 6 HOURS AS NEEDED 2 Inhaler 5    Cholecalciferol (VITAMIN D3) 2000 UNITS CAPS Take 5 capsules by mouth daily 150 capsule 3    rOPINIRole (REQUIP) 0.5 MG tablet Take 3 tablets by mouth every evening 90 tablet 5    Handicap Placard MISC by Does not apply route 1 each 0    Spacer/Aero-Holding Chambers (AEROCHAMBER PLUS-FLOW SIGNAL) MISC Use with MDI as instructed 2 each 3       Scheduled Meds:   furosemide  40 mg Intravenous BID    apixaban  5 mg Oral BID    fluocinonide   Topical BID    sodium chloride flush  10 mL Intravenous 2 times per day    hydrocortisone   Topical BID    budesonide  0.5 mg Nebulization BID    And    formoterol  20 mcg Nebulization BID     Continuous Infusions:   amiodarone 1 mg/min (04/30/19 0749)    Followed by   Yesica Grajeda amiodarone       PRN Meds:ipratropium-albuterol, HYDROcodone-acetaminophen, sodium chloride flush, magnesium hydroxide, ondansetron, acetaminophen       Past Medical History:   Diagnosis Date    Acute lateral meniscus tear of right knee 02/2019    Acute medial meniscus tear of right knee 02/2019    Anxiety     Arthritis     Asthma     Atrial fibrillation ([facility] [address]     new dx 4 weeks ago, first of  Sept 2017    Edema     Fibromyalgia     GERD (gastroesophageal reflux disease)     reflux    Hiatal hernia         Past Surgical History:   Procedure Laterality Date    ABDOMEN SURGERY  2001    gastric bypass--[place]    HERNIA REPAIR  2001    HYSTERECTOMY  2004    ARIAS-EN-Y GASTRIC BYPASS      TONSILLECTOMY AND ADENOIDECTOMY         Allergies   Allergen Reactions    Latex Anaphylaxis and Rash    Aspirin      Swelling in lower legs    Demerol      rash    Meperidine     Nsaids Swelling    Pcn [Penicillins] Hives    Ranitidine Hcl     Sulfa Antibiotics Rash and Hives       Social History:  Reviewed. reports that she has never smoked. She has never used smokeless tobacco. She reports that she drinks alcohol. She reports that she does not use drugs. Family History:  Reviewed. family history includes Arthritis in her mother; Asthma in her maternal aunt; Cancer in her mother; Cirrhosis in her father. Review of System:    · Constitutional: No fevers, chills. · Eyes: No visual changes or diplopia. No scleral icterus. · ENT: No Headaches. No mouth sores or sore throat. · Cardiovascular: Yes for chest pain, Yes for dyspnea on exertion, Yes for palpitations or No for loss of consciousness. No cough, hemoptysis, No for pleuritic pain, or phlebitis. · Respiratory: No for cough or wheezing. No hematemesis. · Gastrointestinal: No abdominal pain, blood in stools. · Musculoskeletal: Yes gait disturbance,    · Integumentary: No rash or pruritis. · Neurological: No headache, change in muscle strength, numbness or tingling. · Psychiatric: No anxiety, or depression.     Physical Examination:  Vitals:    04/30/19 0801   BP: 118/88   Pulse: 128   Resp: 16   Temp: 97.6 °F (36.4 °C)   SpO2: 96%      In: 0   Out: 500    Wt Readings from Last 3 Encounters:   04/30/19 (!) 343 lb 7.6 oz (155.8 kg) 19 (!) 371 lb 3.2 oz (168.4 kg)   19 (!) 365 lb 3.2 oz (165.7 kg)     Temp  Av.4 °F (36.3 °C)  Min: 97.1 °F (36.2 °C)  Max: 97.6 °F (36.4 °C)  Pulse  Av  Min: 127  Max: 144  BP  Min: 82/59  Max: 128/81  SpO2  Av.1 %  Min: 94 %  Max: 100 %    Intake/Output Summary (Last 24 hours) at 2019 0805  Last data filed at 2019 6119  Gross per 24 hour   Intake 0 ml   Output 500 ml   Net -500 ml       · Constitutional: Oriented. No distress. · Head: Normocephalic and atraumatic. · Mouth/Throat: Oropharynx is clear and moist.   · Eyes: Conjunctivae clear without jaunduice. PERRL. · Neck: Neck supple. No rigidity. No JVD present. · Cardiovascular: Normal rate, regular rhythm, S1&S2. · Pulmonary/Chest: Bilateral respiratory sounds. No wheezes, No rhonchi. · Abdominal: Soft. Bowel sounds present. No distension, No tenderness. · Musculoskeletal: No tenderness. No edema    · Lymphadenopathy: Has no cervical adenopathy. · Neurological: Alert and oriented. Cranial nerve appears intact, No Gross deficit   · Skin: Skin is warm and dry. No rash noted. · Psychiatric: Has a normal mood, affect and behavior     Labs:  Reviewed.    Recent Labs     19  2159      K 4.0   CL 98*   CO2 27   BUN 18   CREATININE 1.3*     Recent Labs     19   WBC 5.8   HGB 10.0*   HCT 32.2*   MCV 71.4*        Lab Results   Component Value Date    CKTOTAL 80 2014    TROPONINI <0.01 2019     Lab Results   Component Value Date    .0 2018    .0 10/08/2017     Lab Results   Component Value Date    PROTIME 15.6 2018    PROTIME 10.8 2017    PROTIME 13.1 11/10/2017    INR 1.38 2018    INR 0.96 2017    INR 1.16 11/10/2017     Lab Results   Component Value Date    CHOL 182 2017    HDL 99 2017    TRIG 61 2017       Telemetry: Personally reviewed: AF/AFL/RVR - -140's    Radiography: Personally reviewed: CXR  No

## 2019-05-01 ENCOUNTER — TELEPHONE (OUTPATIENT)
Dept: CARDIOLOGY CLINIC | Age: 54
End: 2019-05-01

## 2019-05-01 VITALS
WEIGHT: 293 LBS | SYSTOLIC BLOOD PRESSURE: 98 MMHG | RESPIRATION RATE: 16 BRPM | DIASTOLIC BLOOD PRESSURE: 64 MMHG | BODY MASS INDEX: 41.95 KG/M2 | OXYGEN SATURATION: 98 % | HEIGHT: 70 IN | HEART RATE: 65 BPM | TEMPERATURE: 98.3 F

## 2019-05-01 LAB
ALBUMIN SERPL-MCNC: 3.4 G/DL (ref 3.4–5)
ANION GAP SERPL CALCULATED.3IONS-SCNC: 11 MMOL/L (ref 3–16)
BASOPHILS ABSOLUTE: 0 K/UL (ref 0–0.2)
BASOPHILS RELATIVE PERCENT: 0.9 %
BUN BLDV-MCNC: 22 MG/DL (ref 7–20)
CALCIUM SERPL-MCNC: 8.7 MG/DL (ref 8.3–10.6)
CHLORIDE BLD-SCNC: 102 MMOL/L (ref 99–110)
CO2: 26 MMOL/L (ref 21–32)
CREAT SERPL-MCNC: 1.1 MG/DL (ref 0.6–1.1)
EOSINOPHILS ABSOLUTE: 0.4 K/UL (ref 0–0.6)
EOSINOPHILS RELATIVE PERCENT: 6.8 %
GFR AFRICAN AMERICAN: >60
GFR NON-AFRICAN AMERICAN: 52
GLUCOSE BLD-MCNC: 99 MG/DL (ref 70–99)
HCT VFR BLD CALC: 29.6 % (ref 36–48)
HEMOGLOBIN: 8.9 G/DL (ref 12–16)
LYMPHOCYTES ABSOLUTE: 1.6 K/UL (ref 1–5.1)
LYMPHOCYTES RELATIVE PERCENT: 28.7 %
MAGNESIUM: 2.1 MG/DL (ref 1.8–2.4)
MCH RBC QN AUTO: 22.1 PG (ref 26–34)
MCHC RBC AUTO-ENTMCNC: 30.2 G/DL (ref 31–36)
MCV RBC AUTO: 73.1 FL (ref 80–100)
MONOCYTES ABSOLUTE: 0.8 K/UL (ref 0–1.3)
MONOCYTES RELATIVE PERCENT: 14 %
NEUTROPHILS ABSOLUTE: 2.7 K/UL (ref 1.7–7.7)
NEUTROPHILS RELATIVE PERCENT: 49.6 %
PDW BLD-RTO: 18.6 % (ref 12.4–15.4)
PHOSPHORUS: 3.7 MG/DL (ref 2.5–4.9)
PLATELET # BLD: 288 K/UL (ref 135–450)
PMV BLD AUTO: 8 FL (ref 5–10.5)
POTASSIUM SERPL-SCNC: 3.5 MMOL/L (ref 3.5–5.1)
RBC # BLD: 4.05 M/UL (ref 4–5.2)
SODIUM BLD-SCNC: 139 MMOL/L (ref 136–145)
WBC # BLD: 5.4 K/UL (ref 4–11)

## 2019-05-01 PROCEDURE — 83735 ASSAY OF MAGNESIUM: CPT

## 2019-05-01 PROCEDURE — 93005 ELECTROCARDIOGRAM TRACING: CPT

## 2019-05-01 PROCEDURE — 6360000002 HC RX W HCPCS: Performed by: INTERNAL MEDICINE

## 2019-05-01 PROCEDURE — 97530 THERAPEUTIC ACTIVITIES: CPT

## 2019-05-01 PROCEDURE — 85025 COMPLETE CBC W/AUTO DIFF WBC: CPT

## 2019-05-01 PROCEDURE — 94640 AIRWAY INHALATION TREATMENT: CPT

## 2019-05-01 PROCEDURE — 36415 COLL VENOUS BLD VENIPUNCTURE: CPT

## 2019-05-01 PROCEDURE — 97535 SELF CARE MNGMENT TRAINING: CPT

## 2019-05-01 PROCEDURE — 6370000000 HC RX 637 (ALT 250 FOR IP): Performed by: STUDENT IN AN ORGANIZED HEALTH CARE EDUCATION/TRAINING PROGRAM

## 2019-05-01 PROCEDURE — 97116 GAIT TRAINING THERAPY: CPT

## 2019-05-01 PROCEDURE — 97162 PT EVAL MOD COMPLEX 30 MIN: CPT

## 2019-05-01 PROCEDURE — 80069 RENAL FUNCTION PANEL: CPT

## 2019-05-01 PROCEDURE — 94761 N-INVAS EAR/PLS OXIMETRY MLT: CPT

## 2019-05-01 PROCEDURE — 97165 OT EVAL LOW COMPLEX 30 MIN: CPT

## 2019-05-01 RX ORDER — PROPAFENONE HYDROCHLORIDE 225 MG/1
225 CAPSULE, EXTENDED RELEASE ORAL 3 TIMES DAILY
Qty: 90 CAPSULE | Refills: 2 | Status: ON HOLD | OUTPATIENT
Start: 2019-05-01 | End: 2019-05-08 | Stop reason: SDUPTHER

## 2019-05-01 RX ORDER — CIPROFLOXACIN 500 MG/1
500 TABLET, FILM COATED ORAL 2 TIMES DAILY
Qty: 10 TABLET | Refills: 0 | Status: SHIPPED | OUTPATIENT
Start: 2019-05-01 | End: 2019-05-28 | Stop reason: SDUPTHER

## 2019-05-01 RX ADMIN — BUDESONIDE 500 MCG: 0.5 SUSPENSION RESPIRATORY (INHALATION) at 09:12

## 2019-05-01 RX ADMIN — FORMOTEROL FUMARATE DIHYDRATE 20 MCG: 20 SOLUTION RESPIRATORY (INHALATION) at 09:12

## 2019-05-01 RX ADMIN — HYDROCODONE BITARTRATE AND ACETAMINOPHEN 1 TABLET: 5; 325 TABLET ORAL at 02:26

## 2019-05-01 RX ADMIN — CIPROFLOXACIN 400 MG: 2 INJECTION, SOLUTION INTRAVENOUS at 02:27

## 2019-05-01 ASSESSMENT — PAIN DESCRIPTION - PROGRESSION: CLINICAL_PROGRESSION: GRADUALLY WORSENING

## 2019-05-01 ASSESSMENT — PAIN DESCRIPTION - FREQUENCY: FREQUENCY: CONTINUOUS

## 2019-05-01 ASSESSMENT — PAIN SCALES - GENERAL
PAINLEVEL_OUTOF10: 3
PAINLEVEL_OUTOF10: 9
PAINLEVEL_OUTOF10: 5

## 2019-05-01 ASSESSMENT — PAIN DESCRIPTION - LOCATION: LOCATION: KNEE

## 2019-05-01 ASSESSMENT — PAIN DESCRIPTION - ONSET: ONSET: ON-GOING

## 2019-05-01 ASSESSMENT — PAIN DESCRIPTION - ORIENTATION: ORIENTATION: RIGHT

## 2019-05-01 ASSESSMENT — PAIN DESCRIPTION - PAIN TYPE: TYPE: ACUTE PAIN

## 2019-05-01 ASSESSMENT — PAIN DESCRIPTION - DESCRIPTORS: DESCRIPTORS: ACHING;SHARP

## 2019-05-01 NOTE — DISCHARGE SUMMARY
INTERNAL MEDICINE DEPARTMENT AT [address] [place] [address]  Discharge Summary At the [facility]    Patient ID: Michael Washburn                                             Discharge Date: 5/1/2019   Patient's PCP: Kiera Jo DO                                          Discharge Physician: Keeley Aceves MD  Admit Date: 4/29/2019   Admitting Physician: Isaac Oliver MD    PROBLEMS DURING HOSPITALIZATION:  Present on Admission:   Atrial flutter ([address]      DISCHARGE DIAGNOSES:  Atrial fibrillation  Right knee pain  Acute on chronic left ventricular diastolic chf     Hospital Course:    Per HPI: Vasiliy Sam is a 47 yo F with a PMHx of Asthma, HTN, atrial Fib/flutter that presents with palpitations. Patient has had palpitations on and off for the last 2 years. Over the last couple weeks patient states it has been really bad. The palpitations become uncomfortable and feel like they radiate into her bilateral arms. Patient has been noncompliant with her medications for almost 2 years as they make her feel bad. Recently she was convinced by her cardiologist to start taking xeralto, Spironolactone and torsemide. Patient had the spironolactone stopped due to hyperkalemia. Patient notes Bilateral ankle swelling that has been getting worse over the last couple weeks and 25 lbs weight gain. Patient notes some episodes of nausea associated with abdominal pain that last for a short time and pass, not associated with food. Sometimes associated with palpitations. She has episodes of dizziness in the morning when she gets up that pas with time and occur when she has bee sitting for a long time and gets up. Denies any syncope or falls at home.     Patient has R knee pain that has been chronic, but recently worsened when she tried to get up a couple days ago and felt like something \"popped\" in her knee. Since then she has not been to walk on her knee and it hurts with movement.     Patient has generalized itching.  A warm

## 2019-05-01 NOTE — TELEPHONE ENCOUNTER
Called to schedule Atrial flutter ablation. Patient wants this done as soon as possible, said she is willing to have procedure done at [facility] next week. Can you schedule her?

## 2019-05-01 NOTE — PROGRESS NOTES
Shower/Tub: Tub/Shower unit  Bathroom Toilet: Standard  Home Equipment: Standard walker  ADL Assistance: Independent  Homemaking Assistance: Needs assistance(sister & boyfriend do most)  Ambulation Assistance: Independent(started using walker after she heard the \"pop\")  Transfer Assistance: Independent  Active : Yes  Occupation: Full time employment  Type of occupation: works at [employer]: would like to get back to walking  Additional Comments: Most recent fall was in February- knee gave out & caused her to fall. Cognition        Objective          AROM RLE (degrees)  RLE General AROM: lacking few degrees of knee extension actively  AROM LLE (degrees)  LLE AROM : WNL  Strength RLE  Comment: NT due to pain           Transfers  Sit to Stand: Stand by assistance(pulls up on walker inappropriately rather than pushing from bed; cues given)  Stand to sit: Stand by assistance(cues to place RLE out in front of her prior to sitting to avoid pain- demo understanding)  Ambulation  Ambulation?: Yes  Ambulation 1  Device: Rolling Walker(Bariatric)  Assistance: Stand by assistance  Quality of Gait: steady gait, slight limp due to RLE pain, RLE did not give out at all  Distance: 10', 60'  Comments: Pt reports it felt good to get up & walk. Stairs/Curb  Stairs?: No     Balance  Sitting - Static: Good  Sitting - Dynamic: Good  Standing - Static: Good(with walker)  Standing - Dynamic: Good(with walker)  Exercises  Comments: Pt verbalized she has been doing HEP for PT since unable to pay for OP visits anymore. Does AP, QS, HS, ankle circles. Cannot recall the others but has papers at home. Ice encouraged & use of walker upon return home. Ice pack given after PT session today. Treatment included gait/transfer training, pt education.     Plan   Plan  Times per week: 2-5  Current Treatment Recommendations: Transfer Training, Balance Training, Gait Training, Functional Mobility Training, Safety

## 2019-05-01 NOTE — PROGRESS NOTES
acute OT - no further acute OT needs indicated.                                                  AM-PAC Score        AM-PAC Inpatient Daily Activity Raw Score: 21  AM-PAC Inpatient ADL T-Scale Score : 44.27  ADL Inpatient CMS 0-100% Score: 32.79  ADL Inpatient CMS G-Code Modifier : CJ              Therapy Time   Individual Concurrent Group Co-treatment   Time In 1000         Time Out 1038         Minutes 38           Timed Code Treatment Minutes:   23    Total Treatment Minutes:  [facility] OTR/L #[id number]

## 2019-05-02 ENCOUNTER — CARE COORDINATION (OUTPATIENT)
Dept: CASE MANAGEMENT | Age: 54
End: 2019-05-02

## 2019-05-02 ENCOUNTER — TELEPHONE (OUTPATIENT)
Dept: CARDIOLOGY CLINIC | Age: 54
End: 2019-05-02

## 2019-05-02 LAB
EKG ATRIAL RATE: 66 BPM
EKG DIAGNOSIS: NORMAL
EKG P AXIS: 61 DEGREES
EKG P-R INTERVAL: 216 MS
EKG Q-T INTERVAL: 446 MS
EKG QRS DURATION: 96 MS
EKG QTC CALCULATION (BAZETT): 467 MS
EKG R AXIS: 48 DEGREES
EKG T AXIS: 65 DEGREES
EKG VENTRICULAR RATE: 66 BPM

## 2019-05-02 PROCEDURE — 93010 ELECTROCARDIOGRAM REPORT: CPT | Performed by: INTERNAL MEDICINE

## 2019-05-02 NOTE — TELEPHONE ENCOUNTER
Spoke with patient and gave medication instructions. Stop diltiazem and Rythmol now, hold Eliquis for 24 hours prior to and the day of the procedure, hold torsemide and spironolactone the day of. Reminded her to stay NPO after midnight and no lotions or creams on skin the day of the procedure. She verbalized understanding of all instructions.

## 2019-05-02 NOTE — TELEPHONE ENCOUNTER
Spoke with pt. Christin Almaraz had called her earlier. She is set up for ablation on 5/7/19 at 0800 at [place]. Advised that, per [facility], she does not need to hold propafenone or diltiazem prior to the procedure. Reviewed all other instructions given my Dee Fears.

## 2019-05-02 NOTE — TELEPHONE ENCOUNTER
Spoke with patient. Patient is scheduled with Dr. Emmy Smalls for Aflutter Ablation on 5/7/19 at [facility], arrival time of 6:30am to the Cath Lab. Please have patient arrive to the main entrance of [facility] at 6:15am and check in with the registration desk. Please call patient regarding medication instructions. Remind patient to be NPO after midnight (8 hours prior). Do not apply lotions/creams on skin the day of procedure. EVGENY, would you like [employer] or another rep?

## 2019-05-02 NOTE — TELEPHONE ENCOUNTER
----- Message from STEPHANIE Dunlap CNP sent at 4/22/2019  1:51 PM EDT -----  Mary Burdick,   She needs to have a BMP - I have ordered - will you advise patient it needs to be done? She saw her PCP today and he sent me a note. Can you arrange an appmt with Lyric Higuera next week for edema and an appmt with Dr. Beti Lawson in 3 months? She had an appmt with him in December and it was cancelled on our end and never rescheduled. .... Thx,   FW  ----- Message -----  From: Oralia Lam DO  Sent: 4/22/2019   1:15 PM  To: STEPHANIE Dunlap CNP    I saw the patient in office today. She continues to experience edema that is relieved with the current Lasix dosing but with her chronic knee and leg pain she is concerned that she has inadequate potassium. Is there an equivalent potassium sparing diuretic that you would prescribe instead of the Lasix or in addition to? Should patient contact your clinic instead?

## 2019-05-02 NOTE — TELEPHONE ENCOUNTER
Spoke with patient, she states that her BMP has already been drawn ( see results in Epic) and she has follow up appt with Brad Orantes on 5/3/19 and one with you on 5/9/19. However, she would like an update on her upcoming ablation (when is it going to be) so she can inform her work. She mentioned that it is supposed to be in [facility] but she never heard back from anyone.  Please advise

## 2019-05-02 NOTE — CARE COORDINATION
[facility] Transitions Initial Follow Up Call    Call within 2 business days of discharge: Yes    Patient:  Na Downs  Patient :  1965  MRN:  [id number]    Reason for Admission:  Right knee pain / HF / A-Flutter  Discharge Date:  19   RARS:  [place]      1ST   CTC attempt to reach Pt regarding recent hospital discharge. CTC left voice recording with call back number requesting a call back. Follow up appointments:    Future Appointments   Date Time Provider Wil Chatterjee   5/3/2019  3:00 PM Verlena Stalls, APRN - CNP [place] [facility]   2019  8:00 AM SCHEDULE, [facility] CATH LAB ROOM 3 [facility] CATH None   2019 10:30 AM STEPHANIE Qureshi CNP   2019  8:20 AM STEPHANIE Santiago CNP K[facility] PAIN JENNY KolbN, RN  Care Transition Coordinator  Contact Number:  [phone]

## 2019-05-03 NOTE — CARE COORDINATION
[facility] Transitions Initial Follow Up Call    Call within 2 business days of discharge: Yes    Patient:  Melanie Nails Patient :  1965  MRN:  [id number]   Reason for Admission:    Discharge Date:  19  RARS:  [place]      2ND CTC attempt to reach Pt regarding recent hospital discharge. CTC left voice recording with call back number requesting a call back. Follow up appointments:    Future Appointments   Date Time Provider Wil Chatterjee   2019  8:00 AM SCHEDULE, [facility] CATH LAB ROOM 3 [facility] CATH None   2019 10:30 AM Caitlin Funes APRN - CNP [place] [facility]   2019  8:20 AM Ananya López APRN - CNP [facility] K[facility] PAIN [facility]       JENNY BensonN, RN  Care Transition Coordinator  Contact Number:  [phone]

## 2019-05-04 NOTE — CARE COORDINATION
[facility] Transitions Initial Follow Up Call    Call within 2 business days of discharge: Yes    Patient: Betina Vilchis Patient : 1965   MRN: [id number]  Reason for Admission: A Fib, CHF  Discharge Date: 19 RARS: Readmission Risk Score: 18      Last Discharge [facility]       Complaint Diagnosis Description Type Department Provider    19 Leg Pain Acute pain of right knee . .. ED to Hosp-Admission (Discharged) (ADMITTED) Anuj Parks 4 PCU Toni Zuleta MD; Nelly Rahman. .. Facility: [address] [place] [address]    3rd/FINAL attempt to reach patient for post 24h discharge care transition call. Message left stating purpose of call, contact information and request for return call.        Follow Up  Future Appointments   Date Time Provider Wil Chatterjee   2019  8:00 AM SCHEDULE, [facility] CATH LAB ROOM 3 [facility] CATH None   2019 10:30 AM STEPHANIE Morrison CNP [place] [facility]   2019  8:20 AM Levon Boxer, APRN - CNP [facility] KW PAIN [facility] Lrema RN

## 2019-05-07 ENCOUNTER — HOSPITAL ENCOUNTER (OUTPATIENT)
Dept: CARDIAC CATH/INVASIVE PROCEDURES | Age: 54
Discharge: HOME | End: 2019-05-08
Attending: INTERNAL MEDICINE | Admitting: INTERNAL MEDICINE
Payer: COMMERCIAL

## 2019-05-07 PROBLEM — I48.3 TYPICAL ATRIAL FLUTTER (HCC): Status: ACTIVE | Noted: 2019-05-07

## 2019-05-07 LAB
ANION GAP SERPL CALCULATED.3IONS-SCNC: 12 MMOL/L (ref 3–16)
BUN BLDV-MCNC: 21 MG/DL (ref 7–20)
CALCIUM SERPL-MCNC: 8.5 MG/DL (ref 8.3–10.6)
CHLORIDE BLD-SCNC: 98 MMOL/L (ref 99–110)
CO2: 25 MMOL/L (ref 21–32)
CREAT SERPL-MCNC: 0.9 MG/DL (ref 0.6–1.1)
EKG ATRIAL RATE: 70 BPM
EKG ATRIAL RATE: 77 BPM
EKG DIAGNOSIS: NORMAL
EKG DIAGNOSIS: NORMAL
EKG P AXIS: 32 DEGREES
EKG P AXIS: 70 DEGREES
EKG P-R INTERVAL: 190 MS
EKG P-R INTERVAL: 200 MS
EKG Q-T INTERVAL: 390 MS
EKG Q-T INTERVAL: 416 MS
EKG QRS DURATION: 88 MS
EKG QRS DURATION: 92 MS
EKG QTC CALCULATION (BAZETT): 441 MS
EKG QTC CALCULATION (BAZETT): 449 MS
EKG R AXIS: 40 DEGREES
EKG R AXIS: 53 DEGREES
EKG T AXIS: 39 DEGREES
EKG T AXIS: 40 DEGREES
EKG VENTRICULAR RATE: 70 BPM
EKG VENTRICULAR RATE: 77 BPM
GFR AFRICAN AMERICAN: >60
GFR NON-AFRICAN AMERICAN: >60
GLUCOSE BLD-MCNC: 132 MG/DL (ref 70–99)
HCT VFR BLD CALC: 28.7 % (ref 36–48)
HEMOGLOBIN: 9.3 G/DL (ref 12–16)
INR BLD: 1.07 (ref 0.86–1.14)
MCH RBC QN AUTO: 22.7 PG (ref 26–34)
MCHC RBC AUTO-ENTMCNC: 32.3 G/DL (ref 31–36)
MCV RBC AUTO: 70.4 FL (ref 80–100)
PDW BLD-RTO: 18.3 % (ref 12.4–15.4)
PLATELET # BLD: 298 K/UL (ref 135–450)
PMV BLD AUTO: 7.9 FL (ref 5–10.5)
POTASSIUM SERPL-SCNC: 3.9 MMOL/L (ref 3.5–5.1)
PROTHROMBIN TIME: 12.2 SEC (ref 9.8–13)
RBC # BLD: 4.08 M/UL (ref 4–5.2)
SODIUM BLD-SCNC: 135 MMOL/L (ref 136–145)
WBC # BLD: 5.5 K/UL (ref 4–11)

## 2019-05-07 PROCEDURE — 93653 COMPRE EP EVAL TX SVT: CPT

## 2019-05-07 PROCEDURE — 2709999900 HC NON-CHARGEABLE SUPPLY

## 2019-05-07 PROCEDURE — 94640 AIRWAY INHALATION TREATMENT: CPT

## 2019-05-07 PROCEDURE — C1730 CATH, EP, 19 OR FEW ELECT: HCPCS

## 2019-05-07 PROCEDURE — 93010 ELECTROCARDIOGRAM REPORT: CPT | Performed by: INTERNAL MEDICINE

## 2019-05-07 PROCEDURE — C1894 INTRO/SHEATH, NON-LASER: HCPCS

## 2019-05-07 PROCEDURE — 2500000003 HC RX 250 WO HCPCS

## 2019-05-07 PROCEDURE — 93621 COMP EP EVL L PAC&REC C SINS: CPT

## 2019-05-07 PROCEDURE — 6370000000 HC RX 637 (ALT 250 FOR IP): Performed by: INTERNAL MEDICINE

## 2019-05-07 PROCEDURE — 80048 BASIC METABOLIC PNL TOTAL CA: CPT

## 2019-05-07 PROCEDURE — 2580000003 HC RX 258: Performed by: INTERNAL MEDICINE

## 2019-05-07 PROCEDURE — C1769 GUIDE WIRE: HCPCS

## 2019-05-07 PROCEDURE — 94150 VITAL CAPACITY TEST: CPT

## 2019-05-07 PROCEDURE — 93613 INTRACARDIAC EPHYS 3D MAPG: CPT

## 2019-05-07 PROCEDURE — 99153 MOD SED SAME PHYS/QHP EA: CPT

## 2019-05-07 PROCEDURE — 85610 PROTHROMBIN TIME: CPT

## 2019-05-07 PROCEDURE — C1733 CATH, EP, OTHR THAN COOL-TIP: HCPCS

## 2019-05-07 PROCEDURE — 6360000002 HC RX W HCPCS

## 2019-05-07 PROCEDURE — 85027 COMPLETE CBC AUTOMATED: CPT

## 2019-05-07 PROCEDURE — 93005 ELECTROCARDIOGRAM TRACING: CPT | Performed by: INTERNAL MEDICINE

## 2019-05-07 PROCEDURE — 2060000000 HC ICU INTERMEDIATE R&B

## 2019-05-07 PROCEDURE — 2580000003 HC RX 258

## 2019-05-07 PROCEDURE — 99152 MOD SED SAME PHYS/QHP 5/>YRS: CPT

## 2019-05-07 RX ORDER — MIDAZOLAM HYDROCHLORIDE 5 MG/ML
INJECTION INTRAMUSCULAR; INTRAVENOUS
Status: COMPLETED | OUTPATIENT
Start: 2019-05-07 | End: 2019-05-07

## 2019-05-07 RX ORDER — FENTANYL CITRATE 50 UG/ML
INJECTION, SOLUTION INTRAMUSCULAR; INTRAVENOUS
Status: COMPLETED | OUTPATIENT
Start: 2019-05-07 | End: 2019-05-07

## 2019-05-07 RX ORDER — SODIUM CHLORIDE 0.9 % (FLUSH) 0.9 %
10 SYRINGE (ML) INJECTION PRN
Status: DISCONTINUED | OUTPATIENT
Start: 2019-05-07 | End: 2019-05-08 | Stop reason: HOSPADM

## 2019-05-07 RX ORDER — PROPAFENONE HYDROCHLORIDE 225 MG/1
225 CAPSULE, EXTENDED RELEASE ORAL 3 TIMES DAILY
Status: DISCONTINUED | OUTPATIENT
Start: 2019-05-07 | End: 2019-05-08

## 2019-05-07 RX ORDER — TORSEMIDE 20 MG/1
20 TABLET ORAL 2 TIMES DAILY
Status: DISCONTINUED | OUTPATIENT
Start: 2019-05-07 | End: 2019-05-08 | Stop reason: HOSPADM

## 2019-05-07 RX ORDER — CHOLECALCIFEROL (VITAMIN D3) 125 MCG
1000 CAPSULE ORAL DAILY
Status: DISCONTINUED | OUTPATIENT
Start: 2019-05-07 | End: 2019-05-08 | Stop reason: HOSPADM

## 2019-05-07 RX ORDER — ALBUTEROL SULFATE 90 UG/1
1 AEROSOL, METERED RESPIRATORY (INHALATION) EVERY 4 HOURS PRN
Status: DISCONTINUED | OUTPATIENT
Start: 2019-05-07 | End: 2019-05-08 | Stop reason: HOSPADM

## 2019-05-07 RX ORDER — SODIUM CHLORIDE 0.9 % (FLUSH) 0.9 %
10 SYRINGE (ML) INJECTION EVERY 12 HOURS SCHEDULED
Status: DISCONTINUED | OUTPATIENT
Start: 2019-05-07 | End: 2019-05-08 | Stop reason: HOSPADM

## 2019-05-07 RX ORDER — OXYCODONE HYDROCHLORIDE AND ACETAMINOPHEN 5; 325 MG/1; MG/1
1 TABLET ORAL EVERY 4 HOURS PRN
Status: DISCONTINUED | OUTPATIENT
Start: 2019-05-07 | End: 2019-05-08 | Stop reason: HOSPADM

## 2019-05-07 RX ORDER — ROPINIROLE 0.5 MG/1
1.5 TABLET, FILM COATED ORAL EVERY EVENING
Status: DISCONTINUED | OUTPATIENT
Start: 2019-05-07 | End: 2019-05-08 | Stop reason: HOSPADM

## 2019-05-07 RX ORDER — ACETAMINOPHEN 325 MG/1
650 TABLET ORAL EVERY 4 HOURS PRN
Status: DISCONTINUED | OUTPATIENT
Start: 2019-05-07 | End: 2019-05-08 | Stop reason: HOSPADM

## 2019-05-07 RX ORDER — SODIUM CHLORIDE 9 MG/ML
INJECTION, SOLUTION INTRAVENOUS CONTINUOUS
Status: DISCONTINUED | OUTPATIENT
Start: 2019-05-07 | End: 2019-05-08

## 2019-05-07 RX ORDER — DILTIAZEM HYDROCHLORIDE 60 MG/1
60 TABLET, FILM COATED ORAL EVERY 8 HOURS PRN
Status: DISCONTINUED | OUTPATIENT
Start: 2019-05-07 | End: 2019-05-08

## 2019-05-07 RX ORDER — SPIRONOLACTONE 25 MG/1
12.5 TABLET ORAL DAILY
Status: DISCONTINUED | OUTPATIENT
Start: 2019-05-07 | End: 2019-05-08 | Stop reason: HOSPADM

## 2019-05-07 RX ADMIN — MIDAZOLAM HYDROCHLORIDE 2 MG: 5 INJECTION INTRAMUSCULAR; INTRAVENOUS at 10:44

## 2019-05-07 RX ADMIN — MIDAZOLAM HYDROCHLORIDE 1 MG: 5 INJECTION INTRAMUSCULAR; INTRAVENOUS at 12:21

## 2019-05-07 RX ADMIN — FENTANYL CITRATE 50 MCG: 50 INJECTION, SOLUTION INTRAMUSCULAR; INTRAVENOUS at 13:21

## 2019-05-07 RX ADMIN — SPIRONOLACTONE 12.5 MG: 25 TABLET ORAL at 15:07

## 2019-05-07 RX ADMIN — FENTANYL CITRATE 50 MCG: 50 INJECTION, SOLUTION INTRAMUSCULAR; INTRAVENOUS at 10:32

## 2019-05-07 RX ADMIN — FENTANYL CITRATE 50 MCG: 50 INJECTION, SOLUTION INTRAMUSCULAR; INTRAVENOUS at 12:21

## 2019-05-07 RX ADMIN — FENTANYL CITRATE 50 MCG: 50 INJECTION, SOLUTION INTRAMUSCULAR; INTRAVENOUS at 11:17

## 2019-05-07 RX ADMIN — SODIUM CHLORIDE: 9 INJECTION, SOLUTION INTRAVENOUS at 06:40

## 2019-05-07 RX ADMIN — MIDAZOLAM HYDROCHLORIDE 2 MG: 5 INJECTION INTRAMUSCULAR; INTRAVENOUS at 11:03

## 2019-05-07 RX ADMIN — MIDAZOLAM HYDROCHLORIDE 1 MG: 5 INJECTION INTRAMUSCULAR; INTRAVENOUS at 13:46

## 2019-05-07 RX ADMIN — FENTANYL CITRATE 50 MCG: 50 INJECTION, SOLUTION INTRAMUSCULAR; INTRAVENOUS at 09:30

## 2019-05-07 RX ADMIN — PROPAFENONE HYDROCHLORIDE 225 MG: 225 CAPSULE, EXTENDED RELEASE ORAL at 21:34

## 2019-05-07 RX ADMIN — FENTANYL CITRATE 50 MCG: 50 INJECTION, SOLUTION INTRAMUSCULAR; INTRAVENOUS at 12:57

## 2019-05-07 RX ADMIN — Medication 1 PUFF: at 20:02

## 2019-05-07 RX ADMIN — FENTANYL CITRATE 50 MCG: 50 INJECTION, SOLUTION INTRAMUSCULAR; INTRAVENOUS at 11:28

## 2019-05-07 RX ADMIN — TORSEMIDE 20 MG: 20 TABLET ORAL at 17:37

## 2019-05-07 RX ADMIN — Medication 1000 MCG: at 15:07

## 2019-05-07 RX ADMIN — FENTANYL CITRATE 50 MCG: 50 INJECTION, SOLUTION INTRAMUSCULAR; INTRAVENOUS at 08:10

## 2019-05-07 RX ADMIN — FENTANYL CITRATE 50 MCG: 50 INJECTION, SOLUTION INTRAMUSCULAR; INTRAVENOUS at 12:32

## 2019-05-07 RX ADMIN — MIDAZOLAM HYDROCHLORIDE 2 MG: 5 INJECTION INTRAMUSCULAR; INTRAVENOUS at 08:33

## 2019-05-07 RX ADMIN — MIDAZOLAM HYDROCHLORIDE 1 MG: 5 INJECTION INTRAMUSCULAR; INTRAVENOUS at 12:32

## 2019-05-07 RX ADMIN — FENTANYL CITRATE 50 MCG: 50 INJECTION, SOLUTION INTRAMUSCULAR; INTRAVENOUS at 11:02

## 2019-05-07 RX ADMIN — PROPAFENONE HYDROCHLORIDE 225 MG: 225 CAPSULE, EXTENDED RELEASE ORAL at 17:37

## 2019-05-07 RX ADMIN — OXYCODONE HYDROCHLORIDE AND ACETAMINOPHEN 1 TABLET: 5; 325 TABLET ORAL at 19:13

## 2019-05-07 RX ADMIN — FENTANYL CITRATE 50 MCG: 50 INJECTION, SOLUTION INTRAMUSCULAR; INTRAVENOUS at 13:45

## 2019-05-07 RX ADMIN — MIDAZOLAM HYDROCHLORIDE 1 MG: 5 INJECTION INTRAMUSCULAR; INTRAVENOUS at 11:53

## 2019-05-07 RX ADMIN — FENTANYL CITRATE 50 MCG: 50 INJECTION, SOLUTION INTRAMUSCULAR; INTRAVENOUS at 10:44

## 2019-05-07 RX ADMIN — ROPINIROLE HYDROCHLORIDE 1.5 MG: 0.5 TABLET, FILM COATED ORAL at 17:37

## 2019-05-07 RX ADMIN — MIDAZOLAM HYDROCHLORIDE 2 MG: 5 INJECTION INTRAMUSCULAR; INTRAVENOUS at 08:10

## 2019-05-07 RX ADMIN — MIDAZOLAM HYDROCHLORIDE 1 MG: 5 INJECTION INTRAMUSCULAR; INTRAVENOUS at 10:32

## 2019-05-07 RX ADMIN — FENTANYL CITRATE 50 MCG: 50 INJECTION, SOLUTION INTRAMUSCULAR; INTRAVENOUS at 11:53

## 2019-05-07 RX ADMIN — MIDAZOLAM HYDROCHLORIDE 1 MG: 5 INJECTION INTRAMUSCULAR; INTRAVENOUS at 11:17

## 2019-05-07 RX ADMIN — MIDAZOLAM HYDROCHLORIDE 1 MG: 5 INJECTION INTRAMUSCULAR; INTRAVENOUS at 12:07

## 2019-05-07 RX ADMIN — OXYCODONE HYDROCHLORIDE AND ACETAMINOPHEN 1 TABLET: 5; 325 TABLET ORAL at 15:07

## 2019-05-07 RX ADMIN — MIDAZOLAM HYDROCHLORIDE 1 MG: 5 INJECTION INTRAMUSCULAR; INTRAVENOUS at 11:39

## 2019-05-07 RX ADMIN — MIDAZOLAM HYDROCHLORIDE 1 MG: 5 INJECTION INTRAMUSCULAR; INTRAVENOUS at 12:57

## 2019-05-07 RX ADMIN — MIDAZOLAM HYDROCHLORIDE 1 MG: 5 INJECTION INTRAMUSCULAR; INTRAVENOUS at 13:21

## 2019-05-07 RX ADMIN — OXYCODONE HYDROCHLORIDE AND ACETAMINOPHEN 1 TABLET: 5; 325 TABLET ORAL at 23:11

## 2019-05-07 RX ADMIN — MIDAZOLAM HYDROCHLORIDE 1 MG: 5 INJECTION INTRAMUSCULAR; INTRAVENOUS at 11:28

## 2019-05-07 RX ADMIN — FENTANYL CITRATE 50 MCG: 50 INJECTION, SOLUTION INTRAMUSCULAR; INTRAVENOUS at 12:07

## 2019-05-07 RX ADMIN — MIDAZOLAM HYDROCHLORIDE 1 MG: 5 INJECTION INTRAMUSCULAR; INTRAVENOUS at 09:30

## 2019-05-07 RX ADMIN — SODIUM CHLORIDE: 9 INJECTION, SOLUTION INTRAVENOUS at 15:07

## 2019-05-07 RX ADMIN — FENTANYL CITRATE 50 MCG: 50 INJECTION, SOLUTION INTRAMUSCULAR; INTRAVENOUS at 11:39

## 2019-05-07 RX ADMIN — FENTANYL CITRATE 50 MCG: 50 INJECTION, SOLUTION INTRAMUSCULAR; INTRAVENOUS at 08:33

## 2019-05-07 ASSESSMENT — PAIN SCALES - GENERAL
PAINLEVEL_OUTOF10: 6
PAINLEVEL_OUTOF10: 5
PAINLEVEL_OUTOF10: 7
PAINLEVEL_OUTOF10: 5
PAINLEVEL_OUTOF10: 0
PAINLEVEL_OUTOF10: 6

## 2019-05-07 ASSESSMENT — PAIN DESCRIPTION - ORIENTATION: ORIENTATION: RIGHT;LEFT

## 2019-05-07 ASSESSMENT — PAIN DESCRIPTION - LOCATION: LOCATION: BACK;CHEST;ARM;LEG

## 2019-05-07 ASSESSMENT — PAIN DESCRIPTION - PAIN TYPE: TYPE: ACUTE PAIN

## 2019-05-07 NOTE — PROGRESS NOTES
Allen catheter removed. Pt tolerated well.  Bilateral groin site remain soft without sign of hematoma

## 2019-05-07 NOTE — H&P
history of cardiac rhythm disturbance, syncope, or sudden cardiac  death.     REVIEW OF SYSTEMS:  Demonstrates no recent change in appetite or change  in weight. The patient has not had recent fever or chills. She does  describe the above-mentioned palpitations. She also describes some  exercise intolerance and shortness of breath with activity. All other  components of the 10-system review are negative.     PHYSICAL EXAMINATION:  VITAL SIGNS:  Blood pressure is 107/77, heart rate is 128 beats per  minute and regular. GENERAL:  The patient is awake, alert, oriented, and in no discomfort. HEENT:  Demonstrates normocephalic, atraumatic head. There is no  scleral icterus. Pupils are round and reactive. NECK:  Supple without thyromegaly. LUNGS:  Clear to auscultation. CARDIOVASCULAR:  Reveals a rapid regular rhythm. The apical impulse is  discrete. S1, S2 are distant. There is no audible murmur or gallop. The jugular venous pressure is difficult to assess. ABDOMEN:  Very obese, soft, nontender. EXTREMITIES:  Demonstrate no pitting pretibial dependent edema. There  is no cyanosis. There is no evidence for chronic venous stasis. SKIN:  Otherwise warm, dry, without skin rash.     DIAGNOSTIC DATA:  A 12-lead ECG from 04/29/2019 demonstrates atrial  flutter with 2:1 AV conduction, heart rate 135 beats per minute.     IMPRESSION:  1. Recurrent atrial flutter. 2.  Obstructive sleep apnea. 3.  Fibromyalgia. 4.  Right knee meniscus tear. 5.  Morbid obesity.     The patient presents with recurrent atrial flutter. She is highly  symptomatic and does not wish to continue taking medicines. Catheter  ablation was offered as a treatment option and she has expressed an  interest to proceed with this. We have explained the procedure-related  risks and she has expressed her willingness to undergo catheter ablation  as a means of permanent cure of the typical-appearing atrial flutter.    All questions were answered.     Thanks for the opportunity to assist in the care of the patient.   Please  contact me if you have any questions regarding her evaluation.  Alla Smith MD

## 2019-05-07 NOTE — PROGRESS NOTES
RESPIRATORY THERAPY ASSESSMENT    Name:  Ronald Jimenez Record Number:  [id number]  Age: 48 y.o. Gender: female  : 1965  Today's Date:  2019  Room:  [facility]0422-01    Assessment     Is the patient being admitted for a COPD or Asthma exacerbation? No   (If yes the patient will be seen every 4 hours for the first 24 hours and then reassessed)    Patient Admission Diagnosis      Allergies  Allergies   Allergen Reactions    Latex Anaphylaxis and Rash    Aspirin      Swelling in lower legs    Demerol      rash    Meperidine     Nsaids Swelling    Pcn [Penicillins] Hives    Ranitidine Hcl     Sulfa Antibiotics Rash and Hives       Minimum Predicted Vital Capacity:     1000          Actual Vital Capacity:      2500              Pulmonary History:Asthma  Home Oxygen Therapy:  room air  Home Respiratory Therapy:Albuterol and Mometasone/Formoterol   Current Respiratory Therapy:  Albuterol inhaler prn , dulera  200 1P BID          Respiratory Severity Index(RSI)   Patients with orders for inhalation medications, oxygen, or any therapeutic treatment modality will be placed on Respiratory Protocol. They will be assessed with the first treatment and at least every 72 hours thereafter. The following severity scale will be used to determine frequency of treatment intervention.     Smoking History: No Smoking History = 0    Social History  Social History     Tobacco Use    Smoking status: Never Smoker    Smokeless tobacco: Never Used   Substance Use Topics    Alcohol use: Yes     Comment: \"Occasionally\"    Drug use: No       Recent Surgical History: None = 0  Past Surgical History  Past Surgical History:   Procedure Laterality Date    ABDOMEN SURGERY      gastric bypass[place]    HERNIA REPAIR      HYSTERECTOMY      ARIAS-EN-Y GASTRIC BYPASS      TONSILLECTOMY AND ADENOIDECTOMY         Level of Consciousness: Alert, Oriented, and Cooperative = 0    Level of Activity: Mostly sedentary, minimal walking = 2    Respiratory Pattern: Regular Pattern; RR 8-20 = 0    Breath Sounds: Clear = 0    Sputum   ,  ,    Cough: Strong, spontaneous, non-productive = 0    Vital Signs   /77   Pulse 77   Temp 98.2 °F (36.8 °C) (Oral)   Resp 18   Ht 5' 10\" (1.778 m)   Wt (!) 343 lb (155.6 kg)   SpO2 96%   BMI 49.22 kg/m²   SPO2 (COPD values may differ): Greater than or equal to 92% on room air = 0    Peak Flow (asthma only): not applicable = 0    RSI: 0-4 = See once and convert to home regimen or discontinue        Plan       Goals: medication delivery    Patient/caregiver was educated on the proper method of use for Respiratory Care Devices:  Yes      Level of patient/caregiver understanding able to:   ? Verbalize understanding   ? Demonstrate understanding       ? Teach back        ? Needs reinforcement       ? No available caregiver               ? Other:     Response to education:  Good     Is patient being placed on Home Treatment Regimen? Yes     Does the patient have everything they need prior to discharge? NA     Comments: chart reviewed patient assessed    Plan of Care: albuterol mdi prn, dulera 200 1P bid    Electronically signed by Dwaine Langford RCP on 5/7/2019 at 4:05 PM    Respiratory Protocol Guidelines     1. Assessment and treatment by Respiratory Therapy will be initiated for medication and therapeutic interventions upon initiation of aerosolized medication. 2. Physician will be contacted for respiratory rate (RR) greater than 35 breaths per minute. Therapy will be held for heart rate (HR) greater than 140 beats per minute, pending direction from physician. 3. Bronchodilators will be administered via Metered Dose Inhaler (MDI) with spacer when the following criteria are met:  a. Alert and cooperative     b. HR < 140 bpm  c. RR < 30 bpm                d. Can demonstrate a 2-3 second inspiratory hold  4.  Bronchodilators will be administered via Hand Held Nebulizer RUEL [facility]) to patients when ANY of the following criteria are met  a. Incognizant or uncooperative          b. Patients treated with HHN at Home        c. Unable to demonstrate proper use of MDI with spacer     d. RR > 30 bpm   5. Bronchodilators will be delivered via Metered Dose Inhaler (MDI), HHN, Aerogen to intubated patients on mechanical ventilation. 6. Inhalation medication orders will be delivered and/or substituted as outlined below. Aerosolized Medications Ordering and Administration Guidelines:    1. All Medications will be ordered by a physician, and their frequency and/or modality will be adjusted as defined by the patients Respiratory Severity Index (RSI) score. 2. If the patient does not have documented COPD, consider discontinuing anticholinergics when RSI is less than 9.  3. If the bronchospasm worsens (increased RSI), then the bronchodilator frequency can be increased to a maximum of every 4 hours. If greater than every 4 hours is required, the physician will be contacted. 4. If the bronchospasm improves, the frequency of the bronchodilator can be decreased, based on the patient's RSI, but not less than home treatment regimen frequency. 5. Bronchodilator(s) will be discontinued if patient has a RSI less than 9 and has received no scheduled or as needed treatment for 72  Hrs. Patients Ordered on a Mucolytic Agent:    1. Must always be administered with a bronchodilator. 2. Discontinue if patient experiences worsened bronchospasm, or secretions have lessened to the point that the patient is able to clear them with a cough. Anti-inflammatory and Combination Medications:    1. If the patient lacks prior history of lung disease, is not using inhaled anti-inflammatory medication at home, and lacks wheezing by examination or by history for at least 24 hours, contact physician for possible discontinuation.

## 2019-05-08 ENCOUNTER — TELEPHONE (OUTPATIENT)
Dept: CARDIOLOGY CLINIC | Age: 54
End: 2019-05-08

## 2019-05-08 VITALS
HEART RATE: 73 BPM | RESPIRATION RATE: 16 BRPM | HEIGHT: 70 IN | OXYGEN SATURATION: 95 % | BODY MASS INDEX: 41.95 KG/M2 | SYSTOLIC BLOOD PRESSURE: 147 MMHG | WEIGHT: 293 LBS | DIASTOLIC BLOOD PRESSURE: 79 MMHG | TEMPERATURE: 97.8 F

## 2019-05-08 PROCEDURE — 99217 PR OBSERVATION CARE DISCHARGE MANAGEMENT: CPT | Performed by: NURSE PRACTITIONER

## 2019-05-08 PROCEDURE — 2580000003 HC RX 258: Performed by: INTERNAL MEDICINE

## 2019-05-08 PROCEDURE — 93613 INTRACARDIAC EPHYS 3D MAPG: CPT | Performed by: INTERNAL MEDICINE

## 2019-05-08 PROCEDURE — 6370000000 HC RX 637 (ALT 250 FOR IP): Performed by: INTERNAL MEDICINE

## 2019-05-08 PROCEDURE — 94640 AIRWAY INHALATION TREATMENT: CPT

## 2019-05-08 PROCEDURE — 6370000000 HC RX 637 (ALT 250 FOR IP): Performed by: NURSE PRACTITIONER

## 2019-05-08 PROCEDURE — 93621 COMP EP EVL L PAC&REC C SINS: CPT | Performed by: INTERNAL MEDICINE

## 2019-05-08 PROCEDURE — 93653 COMPRE EP EVAL TX SVT: CPT | Performed by: INTERNAL MEDICINE

## 2019-05-08 PROCEDURE — 93623 PRGRMD STIMJ&PACG IV RX NFS: CPT | Performed by: INTERNAL MEDICINE

## 2019-05-08 RX ORDER — PROPAFENONE HYDROCHLORIDE 225 MG/1
225 CAPSULE, EXTENDED RELEASE ORAL 2 TIMES DAILY
Qty: 90 CAPSULE | Refills: 2 | Status: SHIPPED
Start: 2019-05-08 | End: 2019-05-16

## 2019-05-08 RX ORDER — DILTIAZEM HYDROCHLORIDE 120 MG/1
120 CAPSULE, COATED, EXTENDED RELEASE ORAL DAILY
Qty: 30 CAPSULE | Refills: 5 | Status: SHIPPED | OUTPATIENT
Start: 2019-05-08 | End: 2019-05-16

## 2019-05-08 RX ORDER — PROPAFENONE HYDROCHLORIDE 225 MG/1
225 CAPSULE, EXTENDED RELEASE ORAL EVERY 12 HOURS SCHEDULED
Status: DISCONTINUED | OUTPATIENT
Start: 2019-05-08 | End: 2019-05-08 | Stop reason: HOSPADM

## 2019-05-08 RX ORDER — DILTIAZEM HYDROCHLORIDE 120 MG/1
120 CAPSULE, COATED, EXTENDED RELEASE ORAL DAILY
Status: DISCONTINUED | OUTPATIENT
Start: 2019-05-08 | End: 2019-05-08 | Stop reason: HOSPADM

## 2019-05-08 RX ADMIN — PROPAFENONE HYDROCHLORIDE 225 MG: 225 CAPSULE, EXTENDED RELEASE ORAL at 09:11

## 2019-05-08 RX ADMIN — DILTIAZEM HYDROCHLORIDE 120 MG: 120 CAPSULE, COATED, EXTENDED RELEASE ORAL at 10:48

## 2019-05-08 RX ADMIN — Medication 1 PUFF: at 07:30

## 2019-05-08 RX ADMIN — VITAMIN D, TAB 1000IU (100/BT) 10000 UNITS: 25 TAB at 08:34

## 2019-05-08 RX ADMIN — Medication 1000 MCG: at 08:34

## 2019-05-08 RX ADMIN — OXYCODONE HYDROCHLORIDE AND ACETAMINOPHEN 1 TABLET: 5; 325 TABLET ORAL at 03:36

## 2019-05-08 RX ADMIN — APIXABAN 5 MG: 5 TABLET, FILM COATED ORAL at 10:48

## 2019-05-08 RX ADMIN — SPIRONOLACTONE 12.5 MG: 25 TABLET ORAL at 08:34

## 2019-05-08 RX ADMIN — TORSEMIDE 20 MG: 20 TABLET ORAL at 08:34

## 2019-05-08 RX ADMIN — SODIUM CHLORIDE, PRESERVATIVE FREE 10 ML: 5 INJECTION INTRAVENOUS at 08:34

## 2019-05-08 ASSESSMENT — PAIN SCALES - GENERAL: PAINLEVEL_OUTOF10: 6

## 2019-05-08 NOTE — DISCHARGE INSTR - COC
Continuity of Care Form    Patient Name: Digna Garcia   :  1965  MRN:  [id number]    Admit date:  2019  Discharge date:  ***    Code Status Order: Full Code   Advance Directives:   Advance Care Flowsheet Documentation     Date/Time Healthcare Directive Type of Healthcare Directive Copy in [address] Agent's Name Healthcare Agent's Phone Number    19 [phone]  No, patient does not have an advance directive for healthcare treatment -- -- -- -- --          Admitting Physician:  Anthony Mars MD  PCP: Makayla Gamino DO    Discharging Nurse: [facility] Unit/Room#: 3508/7340-82  Discharging Unit Phone Number: ***    Emergency Contact:   Extended Emergency Contact Information  [address] Emergency Contact[address] Phone: [phone]  Relation: Child  Secondary Emergency Contact: Christin Mcelroy   [facility] [address] Phone: [phone]  Relation: Brother/Sister    Past Surgical History:  Past Surgical History:   Procedure Laterality Date    ABDOMEN SURGERY  2001    gastric bypass--[place]    HERNIA REPAIR  2001    HYSTERECTOMY  2004    ARIAS-EN-Y GASTRIC BYPASS      TONSILLECTOMY AND ADENOIDECTOMY         Immunization History:   Immunization History   Administered Date(s) Administered    BCG 2013    DTaP 2013    Influenza Vaccine, unspecified formulation 2017, 2018    Influenza Virus Vaccine 10/23/2017, 10/23/2017, 2018    PPD Test 2013, 2014    Tdap (Boostrix, Adacel) 2013       Active Problems:  Patient Active Problem List   Diagnosis Code    Asthma J45.909    MDD (major depressive disorder), recurrent severe, without psychosis ([facility] [address] F33.2    Cluster B personality disorder ([facility] [address] F60.9    Colon polyps K63.5    Overdose T50.901A    Restless leg syndrome G25.81    Sliding hiatal hernia K44.9    HTN (hypertension) I10    Atrial flutter (HCC) I48.92    SVT (supraventricular tachycardia) (HCC) I47.1    Paroxysmal atrial fibrillation (HCC) I48.0    Sinus bradycardia R00.1    Acute lateral meniscus tear of right knee S83.281A    Acute medial meniscus tear of right knee S83.241A    Localized edema R60.0    Acute pain of right knee M25.561    Typical atrial flutter (HCC) I48.3       Isolation/Infection:   Isolation          No Isolation            Nurse Assessment:  Last Vital Signs: BP (!) 147/79   Pulse 73   Temp 97.8 °F (36.6 °C) (Oral)   Resp 16   Ht 5' 10\" (1.778 m)   Wt (!) 357 lb 12.8 oz (162.3 kg)   SpO2 95%   BMI 51.34 kg/m²     Last documented pain score (0-10 scale): Pain Level: 6  Last Weight:   Wt Readings from Last 1 Encounters:   05/08/19 (!) 357 lb 12.8 oz (162.3 kg)     Mental Status:  {IP PT MENTAL STATUS:66428}    IV Access:  { BLOSSOM IV ACCESS:797284922}    Nursing Mobility/ADLs:  Walking   {[facility] DME DZJF:464237528}  Transfer  {[facility] DME HVLF:983117218}  Bathing  {[facility] DME SWPM:335971810}  Dressing  {[facility] DME BGCT:007798840}  Toileting  {[facility] DME URYF:430666626}  Feeding  {[facility] DME BAQX:083297241}  Med Admin  {[facility] DME KZRY:623838556}  Med Delivery   {[facility] [place] Delivery:776676527}    Wound Care Documentation and Therapy:        Elimination:  Continence:   · Bowel: {YES / VT:65301}  · Bladder: {YES / MEDINA:83064}  Urinary Catheter: {Urinary Catheter:266880427}   Colostomy/Ileostomy/Ileal Conduit: {YES / BECKER:30462}       Date of Last BM: ***    Intake/Output Summary (Last 24 hours) at 5/8/2019 1027  Last data filed at 5/8/2019 0433  Gross per 24 hour   Intake 1200 ml   Output 2200 ml   Net -1000 ml     I/O last 3 completed shifts:   In: 1200 [P.O.:1200]  Out: 2200 [Urine:2200]    Safety Concerns:     [address] [employer] Safety Concerns:965701257}    Impairments/Disabilities:      [address] [employer] Impairments/Disabilities:636652862}    Nutrition Therapy:  Current Nutrition Therapy:   [address] BLOSSOM Diet List:166563510}    Routes of Feeding: {P DME Other Feedings:422486711}  Liquids: {Slp liquid thickness:17256}  Daily Fluid Restriction: {CHP DME Yes amt example:525076575}  Last Modified Barium Swallow with Video (Video Swallowing Test): {Done Not Done LO:731016795}    Treatments at the Time of Hospital Discharge:   Respiratory Treatments: ***  Oxygen Therapy:  {Therapy; copd oxygen:36624}  Ventilator:    {[facility] Vent QQYQ:047341589}    Rehab Therapies: {THERAPEUTIC INTERVENTION:7892639723}  Weight Bearing Status/Restrictions: { CC Weight Bearin}  Other Medical Equipment (for information only, NOT a DME order):  {EQUIPMENT:799453761}  Other Treatments: ***    Patient's personal belongings (please select all that are sent with patient):  {CHP DME Belongings:979970854}    RN SIGNATURE:  {Esignature:836261139}    CASE MANAGEMENT/SOCIAL WORK SECTION    Inpatient Status Date: ***    Readmission Risk Assessment Score:  Readmission Risk              Risk of Unplanned Readmission:        16           Discharging to Facility/ Agency   · Name:   · Address:  · Phone:  · Fax:    Dialysis Facility (if applicable)   · Name:  · Address:  · Dialysis Schedule:  · Phone:  · Fax:    / signature: {Esignature:775198548}    PHYSICIAN SECTION    Prognosis: {Prognosis:5773572444}    Condition at Discharge: [address] Patient Condition:272270467}    Rehab Potential (if transferring to Rehab): {Prognosis:7348455529}    Recommended Labs or Other Treatments After Discharge: ***    Physician Certification: I certify the above information and transfer of Lacey Lopez  is necessary for the continuing treatment of the diagnosis listed and that she requires {Admit to Appropriate Level of Care:07350} for {GREATER/LESS:470732444} 30 days.      Update Admission H&P: {CHP DME Changes in AATGB:117256359}    PHYSICIAN SIGNATURE:  {Esignature:911756536}

## 2019-05-08 NOTE — PROGRESS NOTES
Pt. Sitting up in bed. A&Ox4. Denies pain. Lungs cta. NSR on the monitor. Bilateral groin sites soft, no hematoma. Removed dressings. BLE edema. No distress noted.

## 2019-05-08 NOTE — TELEPHONE ENCOUNTER
I spoke with patient today. She states she just got out of the hospital and that she spoke with you.  I made an appt for her with Gurjit Ch on 5/16/19 but she would like to wait to make appt with Erika till she sees you in 6/6/19 [facility]

## 2019-05-08 NOTE — DISCHARGE SUMMARY
Patient ID:  Marry Martini  [id number]  29 y.o.  1965    Admit date: 5/7/2019    Discharge date and time: 5/8/2019    Admitting Physician: Anum Srivastava MD     Discharge NP: Quincy Rahman CNP    Admission Diagnoses: Unspecified atrial flutter [I48.92]  Typical atrial flutter ([facility] [address] [I48.3]  Typical atrial flutter [facility]) [I48.3]    Discharge Diagnoses: SAME    Admission Condition: fair    Discharged Condition: good    Hospital Course: Marry Martini was admitted on 5/7/2019 and had a complex EP study and RFCA for typical atrial flutter. Rhythm has been sinus. She is feeling well and denies chest pain, palpitations, shortness of breath, and dizziness. She has eaten, ambulated, and voided. Assessment:   Typical atrial flutter: improved   -s/p RFCA on 5/7/2019   -[id number] score 2 (gender and CHF)  Reported history of PAF  Chronic diastolic CHF: compensated  Obesity: diet, exercise, and weight loss is recommended   Fibromyalgia   ASTRID: s/p surgical repair    Plan:   1. Change Rythmol SR to 225mg po BID (There are varying prescriptions noted in Epic, short acting and long acting. The patient was instructed to call the office after looking at her prescription bottle at home to verify she has Rythmol SR). 2. Stop PRN Cardizem and start Cardizem 120mg po QD  3. Continue Eliquis, torsemide, and spironolactone  4. Post procedure instructions reviewed  5.  Follow up in 4 weeks with Abner Dumont CNP    Discharge Exam:  BP (!) 147/79   Pulse 73   Temp 97.8 °F (36.6 °C) (Oral)   Resp 16   Ht 5' 10\" (1.778 m)   Wt (!) 357 lb 12.8 oz (162.3 kg)   SpO2 95%   BMI 51.34 kg/m²     General Appearance:    Alert, cooperative, no distress, appears stated age   Head:    Normocephalic, without obvious abnormality, atraumatic   Eyes:    PERRL, conjunctiva/corneas clear, EOM's intact        Ears:    deferred   Nose:   Nares normal, septum midline, mucosa normal, no drainage    or sinus tenderness   Throat: Lips, mucosa, and tongue normal; teeth and gums normal   Neck:   Supple, symmetrical, trachea midline, no adenopathy;        thyroid:  No enlargement/tenderness/nodules; no carotid    bruit or JVD   Back:     Symmetric, no curvature, ROM normal, no CVA tenderness   Lungs:     Clear to auscultation bilaterally, respirations unlabored   Chest wall:    No tenderness or deformity   Heart:    Regular rate and rhythm, S1 and S2 normal, no murmur, rub   or gallop; NSR on tele   Abdomen:     Soft, non-tender, bowel sounds active all four quadrants,     no masses, no organomegaly   Genitalia:    deferred   Rectal:    deferred    Extremities:   Extremities normal, atraumatic, no cyanosis or edema; bilateral groin access sites stable (no sutures present)   Pulses:   2+ and symmetric all extremities   Skin:   Skin color, texture, turgor normal, no rashes or lesions   Lymph nodes:   Cervical, supraclavicular, and axillary nodes normal   Neurologic:   CNII-XII intact.  Normal strength, sensation and reflexes       throughout     Disposition: home    Patient Instructions:   Current Discharge Medication List      START taking these medications    Details   diltiazem (CARDIZEM CD) 120 MG extended release capsule Take 1 capsule by mouth daily  Qty: 30 capsule, Refills: 5         CONTINUE these medications which have CHANGED    Details   propafenone (RYTHMOL SR) 225 MG extended release capsule Take 1 capsule by mouth 2 times daily  Qty: 90 capsule, Refills: 2         CONTINUE these medications which have NOT CHANGED    Details   spironolactone (ALDACTONE) 25 MG tablet Take 0.5 tablets by mouth daily  Qty: 30 tablet, Refills: 3      apixaban (ELIQUIS) 5 MG TABS tablet TAKE ONE TABLET BY MOUTH TWICE A DAY  Qty: 60 tablet, Refills: 3      torsemide (DEMADEX) 20 MG tablet Take 1 tablet by mouth 2 times daily  Qty: 60 tablet, Refills: 3      HYDROcodone-acetaminophen (NORCO) 5-325 MG per tablet Take 1 tablet by mouth 2 times daily as needed for Pain for up to 30 days. Take lowest dose possible to manage pain  Qty: 60 tablet, Refills: 0    Comments: Reduce doses taken as pain becomes manageable  Associated Diagnoses: Pain of right lower leg; Chronic pain syndrome; Lumbar spondylosis      fluocinonide (LIDEX) 0.05 % ointment APPLY SPARINGLY TO AFFECTED AREA(S) TWO TIMES A DAY AS NEEDED FOR FLARES, UP TO 2 WEEKS AT A TIME. DO NOT APPLY ON CLEAR SKIN. Qty: 60 g, Refills: 1      DULERA 200-5 MCG/ACT inhaler INHALE ONE PUFF INTO THE LUNGS TWO TIMES A DAY  Qty: 13 g, Refills: 5    Associated Diagnoses: Moderate persistent asthma without complication      diltiazem (CARDIZEM) 60 MG tablet Take 1 tablet by mouth every 8 hours as needed (Palpitations)  Qty: 120 tablet, Refills: 1      albuterol sulfate HFA (VENTOLIN HFA) 108 (90 BASE) MCG/ACT inhaler INHALE 2 PUFFS BY MOUTH EVERY 4 TO 6 HOURS AS NEEDED  Qty: 2 Inhaler, Refills: 5    Associated Diagnoses: Moderate persistent asthma without complication      Cholecalciferol (VITAMIN D3) 2000 UNITS CAPS Take 5 capsules by mouth daily  Qty: 150 capsule, Refills: 3      rOPINIRole (REQUIP) 0.5 MG tablet Take 3 tablets by mouth every evening  Qty: 90 tablet, Refills: 5      Handicap Placard MISC by Does not apply route  Qty: 1 each, Refills: 0    Associated Diagnoses: Acute pain of right knee; Contusion of right knee and lower leg, initial encounter      vitamin B-12 (CYANOCOBALAMIN) 1000 MCG tablet Take 1,000 mcg by mouth daily      Lysine 1000 MG TABS Take 1 tablet by mouth daily as needed       Spacer/Aero-Holding Chambers (AEROCHAMBER PLUS-FLOW SIGNAL) MISC Use with MDI as instructed  Qty: 2 each, Refills: 3    Associated Diagnoses:  Moderate persistent asthma without complication             Post procedure instructions given  Activity: as tolerated  Diet: cardiac diet    Viraj Arias, [address]  [phone]     Time spent on discharge of patient: 35 minutes, including plan of care, patient

## 2019-05-08 NOTE — PLAN OF CARE
Patient instructed to call for assistance as needed when up as tolerated. Pt verbalizes understanding.

## 2019-05-08 NOTE — TELEPHONE ENCOUNTER
Patient was in hospital and is to be scheduled for a catheter ablation for atrial flutter with Dr. Marcus Merida so that old message is null and void. Patient should have been scheduled by now. She had stopped all of her meds which is why she had LE edema. Can we get her scheduled ASAP?

## 2019-05-08 NOTE — PROCEDURES
[address], [place] [address]                            CARDIAC CATHETERIZATION    PATIENT NAME: Bobby Carlson                   :        1965  MED REC NO:   [id number]                          ROOM:       0422  ACCOUNT NO:   [de-identified]                           ADMIT DATE: 2019  PROVIDER:     Shea Ireland MD    DATE OF PROCEDURE:  2019    CARDIAC ELECTROPHYSIOLOGY PROCEDURE NOTE    PROCEDURES:  1. Electrophysiology study with left atrial recording. 2.  Three-dimensional electroanatomic intracardiac mapping. 3.  Electrophysiology study post drug infusion. 4.  Catheter ablation of SVT. INDICATION:  Typical appearing atrial flutter. FINDINGS:  1. Sinus rhythm at baseline. 2.  Inducible tricuspid annular flutter. 3.  Spontaneous transition to atrial fibrillation, which was persistent. 4.  IV procainamide required to suppress atrial fibrillation. 5.  Normal AV node function post ablation. RESULTS OF RADIOFREQUENCY CATHETER ABLATION:  1. Successful bidirectional cavotricuspid isthmus ablation. 2.  No isthmus conduction 30 minutes post ablation. PROCEDURE DESCRIPTION:  After informed consent was obtained, the patient  was brought to the cardiac electrophysiology laboratory in a fasting  state on 2019. She was prepared for the procedure by application  of ECG electrodes and an automated blood pressure cuff. Pacing and  defibrillation patches were applied to the chest in the  anterior-posterior orientation. The right and left groins were shaved  and prepared with antibacterial soap and the patient was draped in a  sterile fashion. She received IV midazolam and IV fentanyl for  sedation. After adequate sedation was achieved, local anesthetic was  infiltrated into the skin underlying the right and the left femoral  veins.   The right and the left femoral veins were cannulated with a  thin-walled 18-gauge needle through which a J-tipped guidewire was  passed. Three guidewires were placed in the left femoral vein and two  guidewires were placed on the right femoral vein. A three 7-Cameroonian  introducers were advanced over the guidewires in the left femoral vein. Two 8-Cameroonian introducers were advanced over the guidewires in the right  femoral vein. The guidewires and dilators were removed. The introducer  was aspirated and flushed carefully and placed on a continuous infusion  of heparinized saline. Two standard quadripolar electrode catheters were introduced into the  left femoral vein and advanced into the heart under fluoroscopic  guidance. These catheters were placed in the right ventricular apex and  across the tricuspid valve to record a HIS bundle potential.    A steerable decapolar electrode catheter was introduced into the left  femoral vein and advanced into the heart under fluoroscopic guidance. This was positioned along the posterolateral aspect of the tricuspid  valve annulus. A steerable quadripolar electrode catheter was  introduced into the right femoral vein and advanced into the heart under  fluoroscopic guidance. This catheter was placed in the proximal  coronary sinus. After the catheters were determined to be stable  position, baseline recordings were obtained, the programmed stimulation  protocol was begun. The programmed stimulation protocol consisted of  rapid atrial pacing from the coronary sinus os and from the  posterolateral tricuspid valve annulus in an attempt to induce atrial  flutter. Pacing from the coronary sinus os did not successfully induce  atrial flutter. Pacing from the posterolateral tricuspid valve annulus  did induce atrial flutter.   The atrial flutter was counterclockwise and  tricuspid annular entrainment mapping was performed from the coronary  sinus os, which demonstrated return cycle length which is slightly  longer than the tachycardia cycle length. Entrainment mapping from the  posterolateral tricuspid valve annulus demonstrated return cycle length  which is equal to the tachycardia cycle length. All preparations were  made for further intracardiac mapping. The patient transitioned to  atrial fibrillation, which was persistent. After an appropriate period  of observation, cardioversion was performed using Brevital for deep  sedation. Cardioversion was performed twice. After each cardioversion,  sinus rhythm was maintained for only a few seconds prior to resumption  of atrial fibrillation. The patient then received IV procainamide for a  total dose of 700 mg. Cardioversion was then repeated and sinus rhythm  was maintained. At that time, no further attempts were made to induce  atrial flutter. The decision was made to proceed with cavotricuspid  isthmus ablation. A steerable quadripolar electrode catheter with a 5-mm distal electrode  was introduced into the right femoral vein and advanced to the heart  under fluoroscopic guidance. This catheter was used to map the  posterior aspect of the tricuspid valve annulus. Using electroanatomic  mapping, the site of the tricuspid valve annulus as well as the IVC were  marked. A series of radiofrequency energy lesions was then applied from  the tricuspid valve annulus and the posterior orientation. Toward the  IVC, stability was poor as the patient had difficult time holding still  and created excessive negative inspiratory force during respirations. After completion of this line using constant pacing from the coronary  sinus os, the digital conduction through the cavotricuspid isthmus. A  second line was placed from the posteroseptal tricuspid valve annulus to  the coronary sinus os then down to the IVC. This did not successfully  terminate the cavotricuspid isthmus conduction.   Lesions were then  placed along the posterolateral tricuspid valve annulus which did  terminate isthmus conduction. Post ablation EP studies demonstrated  some residual tachycardia, which is probably related to resumption of  conduction to the isthmus. Several additional consolidation lesions  were placed at that site, which did interrupt cavotricuspid isthmus  conduction. This ultimately required introduction of a ____ sheath for  catheter stability at the tricuspid valve annulus. Ultimately,  bidirectional cavotricuspid isthmus german was proven the cavotricuspid  isthmus conduction time increased from 66 to 160 milliseconds post  ablation. Thirty minutes later, there is still no evidence of bidirectional  isthmus conduction. Procedure was considered completed at that time. The catheters were removed. The venous introducers were removed and  firm manual pressure was applied over the venous puncture sites until  adequate hemostasis was achieved. The patient tolerated the procedure  well. There were no apparent complications. She was returned to the  recovery area in good condition. TEST RESULTS:  Baseline intervals, baseline rhythm is sinus. The VT  interval was 210 milliseconds. The QRS duration is 90 milliseconds. QT  interval is 443 milliseconds, AH interval is 85 milliseconds, HV  interval is 46 milliseconds. The AV block cycle length is 390  milliseconds. The AV node effective refractory period with pace cycle  length is 600 milliseconds with S2 320 milliseconds at 290 milliseconds. The VA block cycle length is greater than 700 milliseconds.         Rossy Pappas MD    D: 05/08/2019 11:25:53       T: 05/08/2019 13:51:49     [email or web address] MARIE  Job#: [id number]     Doc#: [id number]    CC:

## 2019-05-08 NOTE — PROGRESS NOTES
Gave pt. Dc instruction. Stated understanding.   Waiting for a work excuse from Gerald, [address]

## 2019-05-08 NOTE — TELEPHONE ENCOUNTER
----- Message from STEPHANIE Mijares CNP sent at 4/22/2019  1:51 PM EDT -----  Jojo Pod,   She needs to have a BMP - I have ordered - will you advise patient it needs to be done? She saw her PCP today and he sent me a note. Can you arrange an appmt with Governor Torres next week for edema and an appmt with Dr. Marcus Merida in 3 months? She had an appmt with him in December and it was cancelled on our end and never rescheduled. .... Thx,   FW  ----- Message -----  From: Alondra Marx DO  Sent: 4/22/2019   1:15 PM  To: STEPHANIE Mijares CNP    I saw the patient in office today. She continues to experience edema that is relieved with the current Lasix dosing but with her chronic knee and leg pain she is concerned that she has inadequate potassium. Is there an equivalent potassium sparing diuretic that you would prescribe instead of the Lasix or in addition to? Should patient contact your clinic instead?

## 2019-05-15 ASSESSMENT — ENCOUNTER SYMPTOMS
GASTROINTESTINAL NEGATIVE: 1
WHEEZING: 1

## 2019-05-15 NOTE — PROGRESS NOTES
compliance: good    Pt Education: The patient has received education on the following topics: dietary sodium restriction, heart failure medications, the importance of physical activity, symptom management and weight monitoring     ASTRID No Treated: Yes -surgical  Referral:  No      Past Medical History:   has a past medical history of Acute lateral meniscus tear of right knee, Acute medial meniscus tear of right knee, Anxiety, Arthritis, Asthma, Atrial fibrillation ([address] Edema, Fibromyalgia, GERD (gastroesophageal reflux disease), and Hiatal hernia. Surgical History:   has a past surgical history that includes Abdomen surgery (2001); hernia repair (2001); Hysterectomy (2004); Tonsillectomy and adenoidectomy; and Romie-en-Y Gastric Bypass. Social History:   reports that she has never smoked. She has never used smokeless tobacco. She reports that she drinks alcohol. She reports that she does not use drugs. Family History:   Family History   Problem Relation Age of Onset   Hernandez Cancer Mother         lung    Arthritis Mother     Cirrhosis Father     Asthma Maternal Aunt        HomeMedications:  Prior to Admission medications    Medication Sig Start Date End Date Taking? Authorizing Provider   propafenone (RYTHMOL SR) 225 MG extended release capsule Take 1 capsule by mouth 2 times daily 5/8/19   STEPHANIE Low CNP   diltiazem (CARDIZEM CD) 120 MG extended release capsule Take 1 capsule by mouth daily 5/8/19   STEPHANIE Low CNP   spironolactone (ALDACTONE) 25 MG tablet Take 0.5 tablets by mouth daily 4/26/19   STEPHANIE Hunt CNP   apixaban (ELIQUIS) 5 MG TABS tablet TAKE ONE TABLET BY MOUTH TWICE A DAY 4/26/19   STEPHANIE Hunt CNP   torsemide (DEMADEX) 20 MG tablet Take 1 tablet by mouth 2 times daily 4/26/19   STEPHANIE Hunt CNP   HYDROcodone-acetaminophen (NORCO) 5-325 MG per tablet Take 1 tablet by mouth 2 times daily as needed for Pain for up to 30 days.  Take lowest dose possible to manage pain 4/22/19 5/22/19  [facility] DO Mary   rOPINIRole (REQUIP) 0.5 MG tablet Take 3 tablets by mouth every evening 4/11/19   Lotus Chapa MD   fluocinonide (LIDEX) 0.05 % ointment APPLY SPARINGLY TO AFFECTED AREA(S) TWO TIMES A DAY AS NEEDED FOR FLARES, UP TO 2 WEEKS AT A TIME. DO NOT APPLY ON CLEAR SKIN. 3/12/19   Francisco Javier Barry MD   Handicap Placard MISC by Does not apply route 2/13/19   Jaylan Michael MD   DULERA 200-5 MCG/ACT inhaler INHALE ONE PUFF INTO THE LUNGS TWO TIMES A DAY 2/11/19   Iftikhar Cuevas MD   diltiazem (CARDIZEM) 60 MG tablet Take 1 tablet by mouth every 8 hours as needed (Palpitations) 9/11/18   Vernanikki Clark MD   vitamin B-12 (CYANOCOBALAMIN) 1000 MCG tablet Take 1,000 mcg by mouth daily    Historical Provider, MD   Lysine 1000 MG TABS Take 1 tablet by mouth daily as needed     Historical Provider, MD   albuterol sulfate HFA (VENTOLIN HFA) 108 (90 BASE) MCG/ACT inhaler INHALE 2 PUFFS BY MOUTH EVERY 4 TO 6 HOURS AS NEEDED 3/8/17   [facility] DO Mary   Spacer/Aero-Holding Chambers (AEROCHAMBER PLUS-FLOW SIGNAL) MISC Use with MDI as instructed 3/6/17   Iftikhar Cuevas MD   Cholecalciferol (VITAMIN D3) 2000 UNITS CAPS Take 5 capsules by mouth daily 2/8/17   [facility] DO Mary        Allergies:  Latex; Aspirin; Demerol; Meperidine; Nsaids; Pcn [penicillins]; Ranitidine hcl; and Sulfa antibiotics     ROS:   Review of Systems   Constitutional: Negative. Respiratory: Positive for wheezing. Negative for cough and shortness of breath. Cardiovascular: Positive for palpitations and leg swelling. Negative for chest pain. Taking cardizem prn   Gastrointestinal: Negative. Genitourinary: Negative. Musculoskeletal: Negative. Skin: Negative. Neurological: Negative. Hematological: Negative. Psychiatric/Behavioral: Negative.         Physical Examination:    Vitals:    05/16/19 1502   BP: 130/66   Pulse: 80   Weight: (!) 348 lb 6.4 oz (158 kg)           Physical Exam   Constitutional: She is oriented to person, place, and time. She appears well-developed and well-nourished. HENT:   Head: Normocephalic and atraumatic. Eyes: Pupils are equal, round, and reactive to light. EOM are normal.   Neck: Normal range of motion. Neck supple. Cardiovascular: Normal rate, regular rhythm, normal heart sounds and intact distal pulses. Pulmonary/Chest: Effort normal and breath sounds normal.   Abdominal: Soft. Musculoskeletal: Normal range of motion. Trace bilaterally   Neurological: She is alert and oriented to person, place, and time. Skin: Skin is warm and dry. Psychiatric: She has a normal mood and affect.  Her behavior is normal. Judgment and thought content normal.       Lab Data:    CBC:   Lab Results   Component Value Date    WBC 5.5 05/07/2019    WBC 5.4 05/01/2019    WBC 5.8 04/29/2019    RBC 4.08 05/07/2019    RBC 4.05 05/01/2019    RBC 4.51 04/29/2019    HGB 9.3 05/07/2019    HGB 8.9 05/01/2019    HGB 10.0 04/29/2019    HCT 28.7 05/07/2019    HCT 29.6 05/01/2019    HCT 32.2 04/29/2019    MCV 70.4 05/07/2019    MCV 73.1 05/01/2019    MCV 71.4 04/29/2019    RDW 18.3 05/07/2019    RDW 18.6 05/01/2019    RDW 18.6 04/29/2019     05/07/2019     05/01/2019     04/29/2019     BMP:  Lab Results   Component Value Date     05/07/2019     05/01/2019     04/30/2019    K 3.9 05/07/2019    K 3.5 05/01/2019    K 4.1 04/30/2019    K 3.4 03/12/2018    K 3.7 01/29/2018    CL 98 05/07/2019     05/01/2019    CL 99 04/30/2019    CO2 25 05/07/2019    CO2 26 05/01/2019    CO2 26 04/30/2019    PHOS 3.7 05/01/2019    PHOS 4.0 04/30/2019    PHOS 4.1 10/04/2018    BUN 21 05/07/2019    BUN 22 05/01/2019    BUN 21 04/30/2019    CREATININE 0.9 05/07/2019    CREATININE 1.1 05/01/2019    CREATININE 1.3 04/30/2019     BNP:   Lab Results   Component Value Date    PROBNP 2,866 04/29/2019    PROBNP 267 04/26/2019    PROBNP 701 09/07/2018     Iron Studies:  No components found for: FE,  TIBC,  FERRITIN  Iron Deficiency Anemia:  Yes IV Iron Therapy:  Yes  2017 ACC/AHA HF Guidelines:   intravenous iron replacement in patients with New York Heart Association (NYHA) class II and III HF and iron deficiency (ferritin <100 ng/ml or 100-300 ng/ml if transferrin saturation <20%), to improve functional status and QoL. Assessment/Plan:    1. Chronic diastolic heart failure ([address] - compensated, continue current diuretics, taper down torsemide when wt back to baseline   2. Paroxysmal atrial fibrillation (HCC) - HR regular today, taking AC   3. SOB (shortness of breath) - improved   4. Localized edema - improved         Instructions:   1. Medications: continue current medications and when wt gets back to baseline then decrease torsemide to daily and take afternoon dose as needed for wt gain 3lb  2. Lifestyle Recommendations: Weigh yourself every day in the morning after urination, call Tony Madison if wt increases 2-3lb in one day or 5lb in one week, Limit sodium to 2000mg/day and fluids to 2L or 64oz/day. Add a fish oil supplement if you are not already taking one. 3. Follow up: with Jazmin Stiles 2 weeks    Heart Failure Hotline: 643.643.1631      I appreciate the opportunity of cooperating in the care of this individual.    Nithin Guardado CNP, 5/15/2019,1:36 PM    QUALITY MEASURES  1. Tobacco Cessation Counseling: NA  2. Retake of BP if >140/90:   NA  3. Documentation to PCP/referring for new patient:  Sent to PCP at close of office visit  4. CAD patient on anti-platelet: NA  5. CAD patient on STATIN therapy:  NA  6. Patient with CHF and aFib on anticoagulation:  yes  7. Patient Education:Yes   8. BB for LVSD :  NA   9. ACE/ARB for LVSD:  NA   10.  Left Ventricular Ejection Fraction (LVEF) Assessment:  Yes

## 2019-05-16 ENCOUNTER — OFFICE VISIT (OUTPATIENT)
Dept: CARDIOLOGY CLINIC | Age: 54
End: 2019-05-16
Payer: COMMERCIAL

## 2019-05-16 VITALS
DIASTOLIC BLOOD PRESSURE: 66 MMHG | SYSTOLIC BLOOD PRESSURE: 130 MMHG | WEIGHT: 293 LBS | HEART RATE: 80 BPM | BODY MASS INDEX: 49.99 KG/M2

## 2019-05-16 DIAGNOSIS — I50.31 ACUTE DIASTOLIC CONGESTIVE HEART FAILURE (HCC): Primary | ICD-10-CM

## 2019-05-16 DIAGNOSIS — I48.3 TYPICAL ATRIAL FLUTTER (HCC): ICD-10-CM

## 2019-05-16 DIAGNOSIS — I15.9 SECONDARY HYPERTENSION: ICD-10-CM

## 2019-05-16 DIAGNOSIS — R60.0 LOCALIZED EDEMA: ICD-10-CM

## 2019-05-16 PROCEDURE — 99214 OFFICE O/P EST MOD 30 MIN: CPT | Performed by: NURSE PRACTITIONER

## 2019-05-16 ASSESSMENT — ENCOUNTER SYMPTOMS
SHORTNESS OF BREATH: 0
COUGH: 0

## 2019-05-16 NOTE — PATIENT INSTRUCTIONS
Instructions:   1. Medications: continue current medications and when wt gets back to baseline then decrease torsemide to daily and take afternoon dose as needed for wt gain 3lb  2. Lifestyle Recommendations: Weigh yourself every day in the morning after urination, call Natalia Mckinnon if wt increases 2-3lb in one day or 5lb in one week, Limit sodium to 2000mg/day and fluids to 2L or 64oz/day. Add a fish oil supplement if you are not already taking one.    3. Follow up: with Shiva Montague 2 weeks    Heart Failure Hotline: 139.768.9912

## 2019-05-17 ENCOUNTER — OFFICE VISIT (OUTPATIENT)
Dept: PAIN MANAGEMENT | Age: 54
End: 2019-05-17
Payer: COMMERCIAL

## 2019-05-17 VITALS
OXYGEN SATURATION: 95 % | BODY MASS INDEX: 49.79 KG/M2 | DIASTOLIC BLOOD PRESSURE: 79 MMHG | HEART RATE: 89 BPM | WEIGHT: 293 LBS | SYSTOLIC BLOOD PRESSURE: 121 MMHG

## 2019-05-17 DIAGNOSIS — S83.281A ACUTE LATERAL MENISCUS TEAR OF RIGHT KNEE, INITIAL ENCOUNTER: ICD-10-CM

## 2019-05-17 DIAGNOSIS — M79.7 FIBROMYALGIA: ICD-10-CM

## 2019-05-17 DIAGNOSIS — E66.01 CLASS 3 SEVERE OBESITY DUE TO EXCESS CALORIES WITH SERIOUS COMORBIDITY AND BODY MASS INDEX (BMI) OF 40.0 TO 44.9 IN ADULT (HCC): ICD-10-CM

## 2019-05-17 DIAGNOSIS — M47.817 LUMBOSACRAL SPONDYLOSIS WITHOUT MYELOPATHY: ICD-10-CM

## 2019-05-17 DIAGNOSIS — G89.29 CHRONIC BILATERAL LOW BACK PAIN WITH BILATERAL SCIATICA: ICD-10-CM

## 2019-05-17 DIAGNOSIS — G89.4 CHRONIC PAIN SYNDROME: Primary | ICD-10-CM

## 2019-05-17 DIAGNOSIS — M54.42 CHRONIC BILATERAL LOW BACK PAIN WITH BILATERAL SCIATICA: ICD-10-CM

## 2019-05-17 DIAGNOSIS — R53.83 FATIGUE, UNSPECIFIED TYPE: ICD-10-CM

## 2019-05-17 DIAGNOSIS — M54.41 CHRONIC BILATERAL LOW BACK PAIN WITH BILATERAL SCIATICA: ICD-10-CM

## 2019-05-17 DIAGNOSIS — M51.36 DDD (DEGENERATIVE DISC DISEASE), LUMBAR: ICD-10-CM

## 2019-05-17 DIAGNOSIS — F32.A DEPRESSION, UNSPECIFIED DEPRESSION TYPE: ICD-10-CM

## 2019-05-17 DIAGNOSIS — M54.2 CERVICALGIA: ICD-10-CM

## 2019-05-17 DIAGNOSIS — M25.561 ACUTE PAIN OF RIGHT KNEE: ICD-10-CM

## 2019-05-17 DIAGNOSIS — F51.01 PRIMARY INSOMNIA: ICD-10-CM

## 2019-05-17 PROCEDURE — 99244 OFF/OP CNSLTJ NEW/EST MOD 40: CPT | Performed by: NURSE PRACTITIONER

## 2019-05-17 RX ORDER — OXCARBAZEPINE 150 MG/1
150 TABLET, FILM COATED ORAL NIGHTLY
Qty: 30 TABLET | Refills: 0 | Status: SHIPPED | OUTPATIENT
Start: 2019-05-17 | End: 2019-06-17 | Stop reason: SDUPTHER

## 2019-05-17 RX ORDER — HYDROCODONE BITARTRATE AND ACETAMINOPHEN 5; 325 MG/1; MG/1
1 TABLET ORAL 2 TIMES DAILY PRN
Qty: 60 TABLET | Refills: 0 | Status: SHIPPED | OUTPATIENT
Start: 2019-05-17 | End: 2019-06-17 | Stop reason: SDUPTHER

## 2019-05-17 RX ORDER — ACETAMINOPHEN 500 MG
1000 TABLET ORAL DAILY
Qty: 30 TABLET | Refills: 1 | Status: SHIPPED | OUTPATIENT
Start: 2019-05-17 | End: 2019-07-08

## 2019-05-17 NOTE — PATIENT INSTRUCTIONS
Patient Education        Neck Arthritis: Exercises  Your Care Instructions  Here are some examples of typical rehabilitation exercises for your condition. Start each exercise slowly. Ease off the exercise if you start to have pain. Your doctor or physical therapist will tell you when you can start these exercises and which ones will work best for you. How to do the exercises  Neck stretches to the side    1. This stretch works best if you keep your shoulder down as you lean away from it. To help you remember to do this, start by relaxing your shoulders and lightly holding on to your thighs or your chair. 2. Tilt your head toward your shoulder and hold for 15 to 30 seconds. Let the weight of your head stretch your muscles. 3. Repeat 2 to 4 times toward each shoulder. Chin tuck    1. Lie on the floor with a rolled-up towel under your neck. Your head should be touching the floor. 2. Slowly bring your chin toward your chest.  3. Hold for a count of 6, and then relax for up to 10 seconds. 4. Repeat 8 to 12 times. Active cervical rotation    1. Sit in a firm chair, or stand up straight. 2. Keeping your chin level, turn your head to the right, and hold for 15 to 30 seconds. 3. Turn your head to the left and hold for 15 to 30 seconds. 4. Repeat 2 to 4 times to each side. Shoulder blade squeeze    1. While standing, squeeze your shoulder blades together. 2. Do not raise your shoulders up as you are squeezing. 3. Hold for 6 seconds. 4. Repeat 8 to 12 times. Shoulder rolls    1. Sit comfortably with your feet shoulder-width apart. You can also do this exercise standing up. 2. Roll your shoulders up, then back, and then down in a smooth, circular motion. 3. Repeat 2 to 4 times. Follow-up care is a key part of your treatment and safety. Be sure to make and go to all appointments, and call your doctor if you are having problems.  It's also a good idea to know your test results and keep a list of the medicines you take. Where can you learn more? Go to [email or web address] and sign in to your [employer] account. Enter S927 in the [facility] box to learn more about \"Neck Arthritis: Exercises. \"     If you do not have an account, please click on the \"Sign Up Now\" link. Current as of: September 20, 2018  Content Version: 12.0  © 0252-4614 [employer]. Care instructions adapted under license by [facility] ([facility]). If you have questions about a medical condition or this instruction, always ask your healthcare professional. [place] [address] any warranty or liability for your use of this information. Patient Education        Back Stretches: Exercises  Your Care Instructions  Here are some examples of exercises for stretching your back. Start each exercise slowly. Ease off the exercise if you start to have pain. Your doctor or physical therapist will tell you when you can start these exercises and which ones will work best for you. How to do the exercises  Overhead stretch    4. Stand comfortably with your feet shoulder-width apart. 5. Looking straight ahead, raise both arms over your head and reach toward the ceiling. Do not allow your head to tilt back. 6. Hold for 15 to 30 seconds, then lower your arms to your sides. 7. Repeat 2 to 4 times. Side stretch    5. Stand comfortably with your feet shoulder-width apart. 6. Raise one arm over your head, and then lean to the other side. 7. Slide your hand down your leg as you let the weight of your arm gently stretch your side muscles. Hold for 15 to 30 seconds. 8. Repeat 2 to 4 times on each side. Press-up    5. Lie on your stomach, supporting your body with your forearms. 6. Press your elbows down into the floor to raise your upper back. As you do this, relax your stomach muscles and allow your back to arch without using your back muscles.  As your press up, do not let your hips or pelvis come off the floor. 7. Hold for 15 to 30 seconds, then relax. 8. Repeat 2 to 4 times. Relax and rest    5. Lie on your back with a rolled towel under your neck and a pillow under your knees. Extend your arms comfortably to your sides. 6. Relax and breathe normally. 7. Remain in this position for about 10 minutes. 8. If you can, do this 2 or 3 times each day. Follow-up care is a key part of your treatment and safety. Be sure to make and go to all appointments, and call your doctor if you are having problems. It's also a good idea to know your test results and keep a list of the medicines you take. Where can you learn more? Go to https://[employer]olve.[employer]. org and sign in to your [employer] account. Enter H982 in the [employer] box to learn more about \"Back Stretches: Exercises. \"     If you do not have an account, please click on the \"Sign Up Now\" link. Current as of: September 20, 2018  Content Version: 12.0  © 8561-4785 [employer]. Care instructions adapted under license by [facility] ([facility]). If you have questions about a medical condition or this instruction, always ask your healthcare professional. [address] any warranty or liability for your use of this information.
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
## 2019-05-28 ENCOUNTER — TELEPHONE (OUTPATIENT)
Dept: FAMILY MEDICINE CLINIC | Age: 54
End: 2019-05-28

## 2019-05-28 RX ORDER — CIPROFLOXACIN 500 MG/1
500 TABLET, FILM COATED ORAL 2 TIMES DAILY
Qty: 14 TABLET | Refills: 0 | Status: SHIPPED | OUTPATIENT
Start: 2019-05-28 | End: 2019-06-04

## 2019-06-03 ENCOUNTER — TELEPHONE (OUTPATIENT)
Dept: PAIN MANAGEMENT | Age: 54
End: 2019-06-03

## 2019-06-03 NOTE — TELEPHONE ENCOUNTER
Note regarding overdue X-Ray ordered on 5/17/19. Per PKA , this testing was requested but not urgent. We will follow up with patient at their next office visit, no follow up is necessary at this time.

## 2019-06-11 DIAGNOSIS — J45.40 MODERATE PERSISTENT ASTHMA WITHOUT COMPLICATION: ICD-10-CM

## 2019-06-11 RX ORDER — ALBUTEROL SULFATE 90 UG/1
AEROSOL, METERED RESPIRATORY (INHALATION)
Qty: 2 INHALER | Refills: 5 | Status: CANCELLED | OUTPATIENT
Start: 2019-06-11

## 2019-06-11 RX ORDER — FLUOCINONIDE 0.5 MG/G
OINTMENT TOPICAL
Qty: 60 G | Refills: 1 | Status: ON HOLD | OUTPATIENT
Start: 2019-06-11 | End: 2019-08-25 | Stop reason: CLARIF

## 2019-06-11 NOTE — TELEPHONE ENCOUNTER
Last visit 8-22-18  Last refill 3-12-19 60 gram with 1 refill. Will check to see if another provider can refill for patient while  is out of the office.

## 2019-06-11 NOTE — TELEPHONE ENCOUNTER
Called and spoke with patient. She is currently having an eczema flare all over body. She has used all of the fluocinonide given. She was advised on how to use, dry skin care instructions and not to use on the same affected area for more than 2 weeks at a time. She has an appointment with an allergist next week to see if she is allergic to something? Donn Robb Advised her that I will see if another provider can refill for now.

## 2019-06-12 ENCOUNTER — TELEPHONE (OUTPATIENT)
Dept: CARDIOLOGY CLINIC | Age: 54
End: 2019-06-12

## 2019-06-14 ENCOUNTER — HOSPITAL ENCOUNTER (OUTPATIENT)
Dept: GENERAL RADIOLOGY | Age: 54
Discharge: HOME | End: 2019-06-14
Payer: COMMERCIAL

## 2019-06-14 ENCOUNTER — HOSPITAL ENCOUNTER (OUTPATIENT)
Age: 54
Discharge: HOME | End: 2019-06-14
Payer: COMMERCIAL

## 2019-06-14 ENCOUNTER — OFFICE VISIT (OUTPATIENT)
Dept: FAMILY MEDICINE CLINIC | Age: 54
End: 2019-06-14
Payer: COMMERCIAL

## 2019-06-14 VITALS
HEIGHT: 68 IN | DIASTOLIC BLOOD PRESSURE: 86 MMHG | RESPIRATION RATE: 16 BRPM | OXYGEN SATURATION: 98 % | WEIGHT: 293 LBS | BODY MASS INDEX: 44.41 KG/M2 | HEART RATE: 87 BPM | TEMPERATURE: 97.8 F | SYSTOLIC BLOOD PRESSURE: 118 MMHG

## 2019-06-14 DIAGNOSIS — E61.1 IRON DEFICIENCY: ICD-10-CM

## 2019-06-14 DIAGNOSIS — F51.04 INSOMNIA, PSYCHOPHYSIOLOGICAL: ICD-10-CM

## 2019-06-14 DIAGNOSIS — G89.29 CHRONIC BILATERAL LOW BACK PAIN WITH BILATERAL SCIATICA: ICD-10-CM

## 2019-06-14 DIAGNOSIS — M51.36 DDD (DEGENERATIVE DISC DISEASE), LUMBAR: ICD-10-CM

## 2019-06-14 DIAGNOSIS — G89.4 CHRONIC PAIN SYNDROME: ICD-10-CM

## 2019-06-14 DIAGNOSIS — D64.9 CHRONIC ANEMIA: ICD-10-CM

## 2019-06-14 DIAGNOSIS — K62.5 BRBPR (BRIGHT RED BLOOD PER RECTUM): ICD-10-CM

## 2019-06-14 DIAGNOSIS — G25.81 RESTLESS LEG SYNDROME: ICD-10-CM

## 2019-06-14 DIAGNOSIS — Z00.00 ROUTINE GENERAL MEDICAL EXAMINATION AT A HEALTH CARE FACILITY: Primary | ICD-10-CM

## 2019-06-14 DIAGNOSIS — M54.2 CERVICALGIA: ICD-10-CM

## 2019-06-14 DIAGNOSIS — M47.817 LUMBOSACRAL SPONDYLOSIS WITHOUT MYELOPATHY: ICD-10-CM

## 2019-06-14 DIAGNOSIS — M54.42 CHRONIC BILATERAL LOW BACK PAIN WITH BILATERAL SCIATICA: ICD-10-CM

## 2019-06-14 DIAGNOSIS — M54.41 CHRONIC BILATERAL LOW BACK PAIN WITH BILATERAL SCIATICA: ICD-10-CM

## 2019-06-14 DIAGNOSIS — J45.40 MODERATE PERSISTENT ASTHMA WITHOUT COMPLICATION: ICD-10-CM

## 2019-06-14 PROCEDURE — 72100 X-RAY EXAM L-S SPINE 2/3 VWS: CPT

## 2019-06-14 PROCEDURE — 99396 PREV VISIT EST AGE 40-64: CPT | Performed by: FAMILY MEDICINE

## 2019-06-14 PROCEDURE — 72040 X-RAY EXAM NECK SPINE 2-3 VW: CPT

## 2019-06-14 RX ORDER — ALBUTEROL SULFATE 90 UG/1
AEROSOL, METERED RESPIRATORY (INHALATION)
Qty: 1 INHALER | Refills: 0 | Status: SHIPPED | OUTPATIENT
Start: 2019-06-14 | End: 2020-02-25

## 2019-06-14 RX ORDER — ROPINIROLE 0.5 MG/1
1.5 TABLET, FILM COATED ORAL EVERY EVENING
Qty: 90 TABLET | Refills: 5 | Status: SHIPPED | OUTPATIENT
Start: 2019-06-14 | End: 2019-08-24 | Stop reason: ALTCHOICE

## 2019-06-14 RX ORDER — TORSEMIDE 20 MG/1
20 TABLET ORAL 2 TIMES DAILY
Qty: 60 TABLET | Refills: 3 | Status: CANCELLED | OUTPATIENT
Start: 2019-06-14

## 2019-06-14 NOTE — PROGRESS NOTES
[facility]  Progress Note  DO Reema Belle  1965    06/16/19    Chief Complaint:   Reema Mcintyre is a 48 y.o. female who is here for yearly checkup. HPI:   This patient presents to the office today for a preoperative consultation at the request of surgeon, Dr. Mayte Dillard who plans on performingVIDEO ARTHROSCOPY RIGHT KNEE, PARTIAL MEDIAL AND LATERAL MENISCECTOMY, CHONDROPLASTY OR DEBRIDEMENT AS NEEDED   on July 11 at [facility].  The current problem began > 3  months ago, and symptoms have been worsening with time. Conservative therapy: Yes: imaging and therapy, which has been not very effective. .    Planned anesthesia: General   Known anesthesia problems: None   Bleeding risk: Anticoagulant therapy- Eliquis  Personal or FH of DVT/PE: No    Patient objection to receiving blood products: No    Experiencing insomnia. Has tried melatonin in the past without relief  Also mentions hx of hysterecomty and eventual need of bladder sling by Dr. Cesar Jules. Over time she feels she has had  bladder prolapse and which she needs to place back intravaginally. Also has hx of colon polyps and recent rectal bleeding and rectal prolapse. Alveda Slipper continues to be effective for her insomnia. ROS negative for headache, visionchanges, chest pain, shortness of breath, abdominal pain, urinary sx, bowel changes. Past medical, surgical, and social history reviewed. and allergies reviewed. Allergies   Allergen Reactions    Latex Anaphylaxis and Rash    Aspirin      Swelling in lower legs    Demerol      rash    Meperidine     Nsaids Swelling    Pcn [Penicillins] Hives    Ranitidine Hcl     Sulfa Antibiotics Rash and Hives     Prior to Visit Medications    Medication Sig Taking?  Authorizing Provider   albuterol sulfate HFA (VENTOLIN HFA) 108 (90 Base) MCG/ACT inhaler INHALE 2 PUFFS BY MOUTH EVERY 4 TO 6 HOURS AS NEEDED Yes Kathy Neal Chemo Velázquez MD   rOPINIRole (REQUIP) 0.5 MG tablet Take 3 tablets by mouth every evening Yes Shaista Hernandez DO   Suvorexant (BELSOMRA) 10 MG TABS Take 10 mg by mouth nightly for 10 doses. Yes Shaista Hernandez DO   fluocinonide (LIDEX) 0.05 % ointment APPLY SPARINGLY TO AFFECTED AREA(S) TWO TIMES A DAY AS NEEDED FOR FLARES, UP TO 2 WEEKS AT A TIME. DO NOT APPLY ON CLEAR SKIN. Yes Dominick Shanks MD   HYDROcodone-acetaminophen (NORCO) 5-325 MG per tablet Take 1 tablet by mouth 2 times daily as needed for Pain for up to 30 days.  Take 1-2 tabs orally prn daily Yes Levon Boxer, APRN - CNP   OXcarbazepine (TRILEPTAL) 150 MG tablet Take 1 tablet by mouth nightly Yes Levon Boxer, APRN - CNP   apixaban (ELIQUIS) 5 MG TABS tablet TAKE ONE TABLET BY MOUTH TWICE A DAY Yes STEPHANIE Bazzi CNP   torsemide (DEMADEX) 20 MG tablet Take 1 tablet by mouth 2 times daily Yes STEPHANIE Bazzi CNP   Handicap Placard MISC by Does not apply route Yes Bianka Howell MD   DULERA 200-5 MCG/ACT inhaler INHALE ONE PUFF INTO THE LUNGS TWO TIMES A DAY Yes Joana Sánchez MD   Lysine 1000 MG TABS Take 1 tablet by mouth daily as needed  Yes Historical Provider, MD   Spacer/Aero-Holding Chambers (AEROCHAMBER PLUS-FLOW SIGNAL) MISC Use with MDI as instructed Yes Joana Sánchez MD   Cholecalciferol (VITAMIN D3) 2000 UNITS CAPS Take 5 capsules by mouth daily Yes Shaista Hernandez DO   acetaminophen (APAP EXTRA STRENGTH) 500 MG tablet Take 2 tablets by mouth daily  Levon Boxer, APRN - CNP   spironolactone (ALDACTONE) 25 MG tablet Take 0.5 tablets by mouth daily  STEPHANIE Bazzi CNP   diltiazem (CARDIZEM) 60 MG tablet Take 1 tablet by mouth every 8 hours as needed (Palpitations)  Amy Terry MD   vitamin B-12 (CYANOCOBALAMIN) 1000 MCG tablet Take 1,000 mcg by mouth daily  Historical Provider, MD          Vitals:    06/14/19 1529   BP: 118/86   Pulse: 87   Resp: 16   Temp: 97.8 °F (36.6 °C)   TempSrc: Oral   SpO2: 98%   Weight: (!) 350 lb (158.8 kg)   Height: 5' 7.5\" (1.715 m)      Wt Readings from Last 3 Encounters:   06/14/19 (!) 350 lb (158.8 kg)   05/17/19 (!) 347 lb (157.4 kg)   05/16/19 (!) 348 lb 6.4 oz (158 kg)     BP Readings from Last 3 Encounters:   06/14/19 118/86   05/17/19 121/79   05/16/19 130/66       Patient Active Problem List   Diagnosis    Asthma    MDD (major depressive disorder), recurrent severe, without psychosis ([address]    Cluster B personality disorder ([address]    Colon polyps    Overdose    Restless leg syndrome    Sliding hiatal hernia    HTN (hypertension)    Atrial flutter (HCC)    SVT (supraventricular tachycardia) (HCC)    Paroxysmal atrial fibrillation (HCC)    Sinus bradycardia    Acute lateral meniscus tear of right knee    Acute medial meniscus tear of right knee    Localized edema    Acute pain of right knee    Acute diastolic congestive heart failure (HCC)       Immunization History   Administered Date(s) Administered    BCG 05/28/2013    DTaP 05/28/2013    Influenza Vaccine, unspecified formulation 01/09/2017, 11/26/2018    Influenza Virus Vaccine 10/23/2017, 10/23/2017, 11/26/2018    PPD Test 06/11/2013, 05/07/2014    Tdap (Boostrix, Adacel) 05/28/2013       Past Medical History:   Diagnosis Date    Acute lateral meniscus tear of right knee 02/2019    Acute medial meniscus tear of right knee 02/2019    Anxiety     Arthritis     Asthma     Atrial fibrillation [address] [address]     new dx 4 weeks ago, first of  Sept 2017    Edema     Fibromyalgia     GERD (gastroesophageal reflux disease)     reflux    Hiatal hernia      Past Surgical History:   Procedure Laterality Date    ABDOMEN SURGERY  2001    gastric bypass--[place]    HERNIA REPAIR  2001    HYSTERECTOMY  2004    ARIAS-EN-Y GASTRIC BYPASS      TONSILLECTOMY AND ADENOIDECTOMY       Family History   Problem Relation Age of Onset    Cancer Mother         lung    Arthritis Mother     Cirrhosis Father  Asthma Maternal Aunt      Social History     Socioeconomic History    Marital status:      Spouse name: Not on file    Number of children: 3    Years of education: 15.5    Highest education level: Not on file   Occupational History    Not on file   Social Needs    Financial resource strain: Not on file    Food insecurity:     Worry: Not on file     Inability: Not on file    Transportation needs:     Medical: Not on file     Non-medical: Not on file   Tobacco Use    Smoking status: Never Smoker    Smokeless tobacco: Never Used   Substance and Sexual Activity    Alcohol use: Yes     Comment: \"Occasionally\"    Drug use: No    Sexual activity: Yes     Partners: Male   Lifestyle    Physical activity:     Days per week: Not on file     Minutes per session: Not on file    Stress: Not on file   Relationships    Social connections:     Talks on phone: Not on file     Gets together: Not on file     Attends Mosque service: Not on file     Active member of club or organization: Not on file     Attends meetings of clubs or organizations: Not on file     Relationship status: Not on file    Intimate partner violence:     Fear of current or ex partner: Not on file     Emotionally abused: Not on file     Physically abused: Not on file     Forced sexual activity: Not on file   Other Topics Concern    Not on file   Social History Narrative    Not on file       O: /86   Pulse 87   Temp 97.8 °F (36.6 °C) (Oral)   Resp 16   Ht 5' 7.5\" (1.715 m)   Wt (!) 350 lb (158.8 kg)   SpO2 98%   Breastfeeding? No   BMI 54.01 kg/m²   Physical Exam  GEN: No acute distress,cooperative, well nourished, alert. HEENT: PEERLA, EOMI , normocephalic/atraumatic, nares and oropharynx clear. Mucus membranes normal, Tympanic membranes clear bilaterally. Neck: soft, supple, no thyromegaly,mass, no Lymphadenopathy  CV: Regular rate and rhythm, no murmur, rubs, gallops. No edema.   Resp: Clear to auscultation higher-dose, long-acting metoprolol in beta-blocker-naïve patients on the day of surgery, and in the absence of dose titration is associated with an overall increase in mortality. Beta-blockers should be started days to weeks prior to surgery and titrated to pulse < 70.  4. Deep vein thrombosis prophylaxis: regimen to be chosen by surgical team  5. No contraindications to planned surgery                  If applicable, see additional patient information and instructions under \"Patient Instructions. \"    Return in about 1 year (around 6/14/2020) for Wellness/Health Maintenance. Patient Instructions   1) Call to meet Dr. Padmini Moore and Dr. Blank Barone. 2) Try Belsomra. 3) Meet with the allergist.    4) Get non-fasting labwork done. Please note a portion of this chart was generated using dragon dictation software. Although every effort was made to ensure the accuracy of this automated transcription,some errors in transcription may have occurred.

## 2019-06-14 NOTE — PATIENT INSTRUCTIONS
1) Call to meet Dr. Kathy Haro and Dr. Aleja Baird. 2) Try Belsomra. 3) Meet with the allergist.    4) Get non-fasting labwork done.

## 2019-06-14 NOTE — TELEPHONE ENCOUNTER
DR Mary Paul [facility] called asking for refill on Ventolin for pt. She has not been seen since 2017 but Dr David Chung has sent in some refills for this pt. I told pharmacy Dr David Chung is out of town but I would send to one of his colleagues and see if they would send in for 1 inhaler. Please send pending refill if appropriate.  Thank you

## 2019-06-17 ENCOUNTER — OFFICE VISIT (OUTPATIENT)
Dept: PAIN MANAGEMENT | Age: 54
End: 2019-06-17
Payer: COMMERCIAL

## 2019-06-17 VITALS — OXYGEN SATURATION: 96 % | HEART RATE: 76 BPM | SYSTOLIC BLOOD PRESSURE: 143 MMHG | DIASTOLIC BLOOD PRESSURE: 82 MMHG

## 2019-06-17 DIAGNOSIS — G89.4 CHRONIC PAIN SYNDROME: ICD-10-CM

## 2019-06-17 DIAGNOSIS — S83.281A ACUTE LATERAL MENISCUS TEAR OF RIGHT KNEE, INITIAL ENCOUNTER: ICD-10-CM

## 2019-06-17 DIAGNOSIS — M79.7 FIBROMYALGIA: ICD-10-CM

## 2019-06-17 DIAGNOSIS — M51.36 DDD (DEGENERATIVE DISC DISEASE), LUMBAR: ICD-10-CM

## 2019-06-17 DIAGNOSIS — E66.01 CLASS 3 SEVERE OBESITY DUE TO EXCESS CALORIES WITH SERIOUS COMORBIDITY AND BODY MASS INDEX (BMI) OF 40.0 TO 44.9 IN ADULT (HCC): ICD-10-CM

## 2019-06-17 DIAGNOSIS — M54.41 CHRONIC BILATERAL LOW BACK PAIN WITH BILATERAL SCIATICA: ICD-10-CM

## 2019-06-17 DIAGNOSIS — M54.2 CERVICALGIA: ICD-10-CM

## 2019-06-17 DIAGNOSIS — G89.29 CHRONIC BILATERAL LOW BACK PAIN WITH BILATERAL SCIATICA: ICD-10-CM

## 2019-06-17 DIAGNOSIS — F32.A DEPRESSION, UNSPECIFIED DEPRESSION TYPE: ICD-10-CM

## 2019-06-17 DIAGNOSIS — M54.42 CHRONIC BILATERAL LOW BACK PAIN WITH BILATERAL SCIATICA: ICD-10-CM

## 2019-06-17 DIAGNOSIS — M47.817 LUMBOSACRAL SPONDYLOSIS WITHOUT MYELOPATHY: ICD-10-CM

## 2019-06-17 DIAGNOSIS — F51.01 PRIMARY INSOMNIA: ICD-10-CM

## 2019-06-17 DIAGNOSIS — M25.561 ACUTE PAIN OF RIGHT KNEE: ICD-10-CM

## 2019-06-17 DIAGNOSIS — R53.83 FATIGUE, UNSPECIFIED TYPE: ICD-10-CM

## 2019-06-17 PROCEDURE — 90732 PPSV23 VACC 2 YRS+ SUBQ/IM: CPT | Performed by: FAMILY MEDICINE

## 2019-06-17 PROCEDURE — 99213 OFFICE O/P EST LOW 20 MIN: CPT | Performed by: NURSE PRACTITIONER

## 2019-06-17 PROCEDURE — 90471 IMMUNIZATION ADMIN: CPT | Performed by: FAMILY MEDICINE

## 2019-06-17 RX ORDER — OXCARBAZEPINE 150 MG/1
150 TABLET, FILM COATED ORAL NIGHTLY
Qty: 30 TABLET | Refills: 0 | Status: SHIPPED | OUTPATIENT
Start: 2019-06-17 | End: 2019-07-15 | Stop reason: SDUPTHER

## 2019-06-17 RX ORDER — HYDROCODONE BITARTRATE AND ACETAMINOPHEN 5; 325 MG/1; MG/1
1 TABLET ORAL 2 TIMES DAILY PRN
Qty: 60 TABLET | Refills: 0 | Status: ON HOLD | OUTPATIENT
Start: 2019-06-17 | End: 2019-07-11 | Stop reason: HOSPADM

## 2019-06-17 NOTE — PATIENT INSTRUCTIONS
Patient Education        Back Stretches: Exercises  Your Care Instructions  Here are some examples of exercises for stretching your back. Start each exercise slowly. Ease off the exercise if you start to have pain. Your doctor or physical therapist will tell you when you can start these exercises and which ones will work best for you. How to do the exercises  Overhead stretch    1. Stand comfortably with your feet shoulder-width apart. 2. Looking straight ahead, raise both arms over your head and reach toward the ceiling. Do not allow your head to tilt back. 3. Hold for 15 to 30 seconds, then lower your arms to your sides. 4. Repeat 2 to 4 times. Side stretch    1. Stand comfortably with your feet shoulder-width apart. 2. Raise one arm over your head, and then lean to the other side. 3. Slide your hand down your leg as you let the weight of your arm gently stretch your side muscles. Hold for 15 to 30 seconds. 4. Repeat 2 to 4 times on each side. Press-up    1. Lie on your stomach, supporting your body with your forearms. 2. Press your elbows down into the floor to raise your upper back. As you do this, relax your stomach muscles and allow your back to arch without using your back muscles. As your press up, do not let your hips or pelvis come off the floor. 3. Hold for 15 to 30 seconds, then relax. 4. Repeat 2 to 4 times. Relax and rest    1. Lie on your back with a rolled towel under your neck and a pillow under your knees. Extend your arms comfortably to your sides. 2. Relax and breathe normally. 3. Remain in this position for about 10 minutes. 4. If you can, do this 2 or 3 times each day. Follow-up care is a key part of your treatment and safety. Be sure to make and go to all appointments, and call your doctor if you are having problems. It's also a good idea to know your test results and keep a list of the medicines you take. Where can you learn more?   Go to [email or web address] and sign in to your [employer] account. Enter S448 in the [employer] box to learn more about \"Back Stretches: Exercises. \"     If you do not have an account, please click on the \"Sign Up Now\" link. Current as of: September 20, 2018  Content Version: 12.0  © 2018-9232 [employer], [employer]. Care instructions adapted under license by [facility] ([facility]). If you have questions about a medical condition or this instruction, always ask your healthcare professional. [address] any warranty or liability for your use of this information.

## 2019-06-17 NOTE — PROGRESS NOTES
Spencer Goldman  1965  [id number]      HISTORY OF PRESENT ILLNESS:  Ms. Casa Moore is a 48 y.o. female returns for a follow up visit for pain management  She has a diagnosis of   1. Chronic pain syndrome    2. Fibromyalgia    3. Lumbosacral spondylosis without myelopathy    4. DDD (degenerative disc disease), lumbar    5. Chronic bilateral low back pain with bilateral sciatica    6. Cervicalgia    7. Acute pain of right knee    8. Acute lateral meniscus tear of right knee, initial encounter    9. Depression, unspecified depression type    10. Primary insomnia    11. Fatigue, unspecified type    12. Class 3 severe obesity due to excess calories with serious comorbidity and body mass index (BMI) of 40.0 to 44.9 in [facility])      On the Patients Pain Assessment form:  She complains of pain in the both arm(s): upper, mid, both buttocks, both knee(s), both leg(s): lower, mid, both shoulder(s): entire area and bilateral upper back She rates the pain 3/10 and describes it as sharp, aching. Current treatment regimen has helped relieve about 60% of the pain. She denies any side effects from the current pain regimen. Patient reports that since the last follow up visit the physical functioning is better, family/social relationships are unchanged, mood is better sleep patterns are worse, and that the overall functioning is better. Patient denies misusing/abusing her narcotic pain medications or using any illegal drugs. There are No indicators for possible drug abuse, addiction or diversion problems. Upon obtaining medical history from Ms. Casa Moore states that pain is manageable on current pain therapy. Admits to getting relief of pain with the Trileptal. Reports having walked with her sister last weekend, which may have caused some tenderness to the joints. She is scheduled to f/u with sleep medicine next moth. Sleep is poor on Belsomra with an average of 1-2 hours.  Plans to call her PCP to adjust the dosage as nightly 30 tablet 0     No facility-administered medications prior to visit. SOCIAL/FAMILY/PAST MEDICAL HISTORY: Ms. Ayala Payer, family and past medical history was reviewed. REVIEW OF SYSTEMS:    Respiratory: Negative for apnea, chest tightness and shortness of breath or change in baseline breathing. Gastrointestinal: Negative for nausea, vomiting, abdominal pain, diarrhea, constipation, blood in stool and abdominal distention. PHYSICAL EXAM:   Nursing note and vitals reviewed. BP (!) 143/82   Pulse 76   SpO2 96%   Constitutional: She appears well-developed and well-nourished. No acute distress. Skin: Skin is warm and dry, good turgor. No rash noted. She is not diaphoretic. Cardiovascular: Normal rate, regular rhythm, normal heart sounds, and does not have murmur. Pulmonary/Chest: Effort normal. No respiratory distress. She does not have wheezes in the lung fields. She has no rales. Neurological/Psychiatric:She is alert and oriented to person, place, and time. Coordination is  normal.  Her mood isAppropriate and affect is Flat/blunted . IMPRESSION:   1. Chronic pain syndrome    2. Fibromyalgia    3. Lumbosacral spondylosis without myelopathy    4. DDD (degenerative disc disease), lumbar    5. Chronic bilateral low back pain with bilateral sciatica    6. Cervicalgia    7. Acute pain of right knee    8. Acute lateral meniscus tear of right knee, initial encounter    9. Depression, unspecified depression type    10. Primary insomnia    11. Fatigue, unspecified type    12. Class 3 severe obesity due to excess calories with serious comorbidity and body mass index (BMI) of 40.0 to 44.9 in adult [facility])        PLAN:  Informed verbal consent was obtained  -Continue with Norco, Tylenol, Trileptal  -Plans to start PT next week.  Flako exercises  -Recommended psychological counseling, I believe patient could greatly benefit from psychological counseling related to stress  -Maintain f/u with sleep medicine for insomnia  -CBT techniques- relaxation therapies such as biofeedback, mindfulness based stress reduction, imagery, cognitive restructuring, problem solving discussed with patient  -She was advised weight reduction, diet changes- 800-1200 patrick diet, diet diary, exercising, nutritional  consult increased physical activity as tolerated  -Last UDS consistent  -Return in about 1 month (around 7/15/2019). Current Outpatient Medications   Medication Sig Dispense Refill    OXcarbazepine (TRILEPTAL) 150 MG tablet Take 1 tablet by mouth nightly 30 tablet 0    HYDROcodone-acetaminophen (NORCO) 5-325 MG per tablet Take 1 tablet by mouth 2 times daily as needed for Pain for up to 30 days. Take 1-2 tabs orally prn daily 60 tablet 0    albuterol sulfate HFA (VENTOLIN HFA) 108 (90 Base) MCG/ACT inhaler INHALE 2 PUFFS BY MOUTH EVERY 4 TO 6 HOURS AS NEEDED 1 Inhaler 0    rOPINIRole (REQUIP) 0.5 MG tablet Take 3 tablets by mouth every evening 90 tablet 5    Suvorexant (BELSOMRA) 10 MG TABS Take 10 mg by mouth nightly for 10 doses. 10 tablet 0    fluocinonide (LIDEX) 0.05 % ointment APPLY SPARINGLY TO AFFECTED AREA(S) TWO TIMES A DAY AS NEEDED FOR FLARES, UP TO 2 WEEKS AT A TIME. DO NOT APPLY ON CLEAR SKIN.  60 g 1    spironolactone (ALDACTONE) 25 MG tablet Take 0.5 tablets by mouth daily 30 tablet 3    apixaban (ELIQUIS) 5 MG TABS tablet TAKE ONE TABLET BY MOUTH TWICE A DAY 60 tablet 3    torsemide (DEMADEX) 20 MG tablet Take 1 tablet by mouth 2 times daily 60 tablet 3    Handicap Placard MISC by Does not apply route 1 each 0    DULERA 200-5 MCG/ACT inhaler INHALE ONE PUFF INTO THE LUNGS TWO TIMES A DAY 13 g 5    diltiazem (CARDIZEM) 60 MG tablet Take 1 tablet by mouth every 8 hours as needed (Palpitations) 120 tablet 1    vitamin B-12 (CYANOCOBALAMIN) 1000 MCG tablet Take 1,000 mcg by mouth daily      Lysine 1000 MG TABS Take 1 tablet by mouth daily as needed       Spacer/Aero-Holding [employer] (AEROCHAMBER PLUS-FLOW SIGNAL) MISC Use with MDI as instructed 2 each 3    Cholecalciferol (VITAMIN D3) 2000 UNITS CAPS Take 5 capsules by mouth daily 150 capsule 3    acetaminophen (APAP EXTRA STRENGTH) 500 MG tablet Take 2 tablets by mouth daily 30 tablet 1     No current facility-administered medications for this visit. I will continue her current medication regimen  which is part of the above treatment schedule. It has been helping with Ms. Cresencio Lundberg chronic  medical problems which for this visit include:   Diagnoses of Chronic pain syndrome, Fibromyalgia, Lumbosacral spondylosis without myelopathy, DDD (degenerative disc disease), lumbar, Chronic bilateral low back pain with bilateral sciatica, Cervicalgia, Acute pain of right knee, Acute lateral meniscus tear of right knee, initial encounter, Depression, unspecified depression type, Primary insomnia, Fatigue, unspecified type, and Class 3 severe obesity due to excess calories with serious comorbidity and body mass index (BMI) of 40.0 to 44.9 in [facility]) were pertinent to this visit. Risks and benefits of the medications and other alternative treatments  including no treatment were discussed with the patient. The common side effects of these medications were also explained to the patient. Informed verbal consent was obtained. Goals of current treatment regimen include improvement in pain, restoration of functioning- with focus on improvement in physical performance, general activity, work or disability,emotional distress, health care utilization and  decreased medication consumption. Will continue to monitor progress towards achieving/maintaining therapeutic goals with special emphasis on  1. Improvement in perceived interfernce  of pain with ADL's. Ability to do home exercises independently. Ability to do household chores indoor and/or outdoor work and social and leisure activities. Improve psychosocial and physical functioning. - she is showing progression towards this treatment goal with the current regimen. 2. Ability to focus/concentrate at work and perform the duties required of her at work  Sit through a workday without lower extremity symptoms. Stand 30-60 minutes without lower extremity symptoms. Ability to lift up to 10-20 lbs. Ability to go up and down stairs. Sit 30-60 minutes  Without having to stand up frequently. - she is maintaining/progressing towards her work related goals with the current regimen. She was advised against drinking alcohol with the narcotic pain medicines, advised against driving or handling machinery while adjusting the dose of medicines or if having cognitive  issues related to the current medications. Risk of overdose and death, if medicines not taken as prescribed, were also discussed. If the patient develops new symptoms or if the symptoms worsen, the patient should call the office. While transcribing every attempt was made to maintain the accuracy of the note in terms of it's contents,there may have been some errors made inadvertently. Thank you for allowing me to participate in the care of this patient.     Masoud Tanner CNP    Cc: Claudeen Catching,

## 2019-06-20 ENCOUNTER — OFFICE VISIT (OUTPATIENT)
Dept: FAMILY MEDICINE CLINIC | Age: 54
End: 2019-06-20
Payer: COMMERCIAL

## 2019-06-20 ENCOUNTER — HOSPITAL ENCOUNTER (OUTPATIENT)
Dept: VASCULAR LAB | Age: 54
Discharge: HOME | End: 2019-06-20
Payer: COMMERCIAL

## 2019-06-20 VITALS
HEART RATE: 82 BPM | RESPIRATION RATE: 16 BRPM | SYSTOLIC BLOOD PRESSURE: 130 MMHG | DIASTOLIC BLOOD PRESSURE: 82 MMHG | OXYGEN SATURATION: 98 % | TEMPERATURE: 97.7 F

## 2019-06-20 DIAGNOSIS — M79.605 LEFT LEG PAIN: Primary | ICD-10-CM

## 2019-06-20 DIAGNOSIS — S86.912A MUSCLE STRAIN OF LEFT LOWER LEG, INITIAL ENCOUNTER: ICD-10-CM

## 2019-06-20 PROCEDURE — 93971 EXTREMITY STUDY: CPT

## 2019-06-20 PROCEDURE — 99213 OFFICE O/P EST LOW 20 MIN: CPT | Performed by: FAMILY MEDICINE

## 2019-06-20 NOTE — PROGRESS NOTES
[facility]  Progress Note  DO Lizet Zepeda  1965    06/20/19    Chief Complaint:   Lizet Shrestha is a 48 y.o. female who is here for left leg pain    HPI:   can barely move left leg, cramps in legs, tues morn woke up and couldnt walk on it, cannot put pressure or weight on it. Vicodin is helping. Sudden non work pain on left leg this week. No fever. Using  A walker. ROS negative for headache, visionchanges, chest pain, shortness of breath, abdominal pain, urinary sx, bowel changes. Past medical, surgical, and social history reviewed. and allergies reviewed. Allergies   Allergen Reactions    Latex Anaphylaxis and Rash    Aspirin      Swelling in lower legs    Demerol      rash    Meperidine     Nsaids Swelling    Pcn [Penicillins] Hives    Ranitidine Hcl     Sulfa Antibiotics Rash and Hives     Prior to Visit Medications    Medication Sig Taking? Authorizing Provider   OXcarbazepine (TRILEPTAL) 150 MG tablet Take 1 tablet by mouth nightly  STEPHANIE Smith CNP   HYDROcodone-acetaminophen (NORCO) 5-325 MG per tablet Take 1 tablet by mouth 2 times daily as needed for Pain for up to 30 days. Take 1-2 tabs orally prn daily  STEPHANIE Smith CNP   albuterol sulfate HFA (VENTOLIN HFA) 108 (90 Base) MCG/ACT inhaler INHALE 2 PUFFS BY MOUTH EVERY 4 TO 6 HOURS AS NEEDED  Lázaro Stevens MD   rOPINIRole (REQUIP) 0.5 MG tablet Take 3 tablets by mouth every evening  Charity Hernandez,    Suvorexant (BELSOMRA) 10 MG TABS Take 10 mg by mouth nightly for 10 doses. Lucas Hernandez, DO   fluocinonide (LIDEX) 0.05 % ointment APPLY SPARINGLY TO AFFECTED AREA(S) TWO TIMES A DAY AS NEEDED FOR FLARES, UP TO 2 WEEKS AT A TIME. DO NOT APPLY ON CLEAR SKIN.   Morenita Foster MD   acetaminophen (APAP EXTRA STRENGTH) 500 MG tablet Take 2 tablets by mouth daily  STEPHANIE Smith CNP   spironolactone (ALDACTONE) 25 MG tablet Take 0.5 tablets by mouth daily  STEPHANIE Bearden CNP   apixaban (ELIQUIS) 5 MG TABS tablet TAKE ONE TABLET BY MOUTH TWICE A DAY  STEPHANIE Bearden CNP   torsemide (DEMADEX) 20 MG tablet Take 1 tablet by mouth 2 times daily  STEPHANIE Bearden CNP   Handicap Placard MISC by Does not apply route  Franko Dubose MD   DULERA 200-5 MCG/ACT inhaler INHALE ONE PUFF INTO THE LUNGS TWO TIMES A DAY  Mallory Hernandez MD   diltiazem (CARDIZEM) 60 MG tablet Take 1 tablet by mouth every 8 hours as needed (Palpitations)  Liana Cedeno MD   vitamin B-12 (CYANOCOBALAMIN) 1000 MCG tablet Take 1,000 mcg by mouth daily  Historical Provider, MD   Lysine 1000 MG TABS Take 1 tablet by mouth daily as needed   Historical Provider, MD   Spacer/Aero-Holding [employer] (AEROCHAMBER PLUS-FLOW SIGNAL) MISC Use with MDI as instructed  Mallory Hernandez MD   Cholecalciferol (VITAMIN D3) 2000 UNITS CAPS Take 5 capsules by mouth daily  Al Route, DO          Vitals:    06/20/19 1058   BP: 130/82   Pulse: 82   Resp: 16   Temp: 97.7 °F (36.5 °C)   TempSrc: Oral   SpO2: 98%      Wt Readings from Last 3 Encounters:   06/14/19 (!) 350 lb (158.8 kg)   05/17/19 (!) 347 lb (157.4 kg)   05/16/19 (!) 348 lb 6.4 oz (158 kg)     BP Readings from Last 3 Encounters:   06/20/19 130/82   06/17/19 (!) 143/82   06/14/19 118/86       Patient Active Problem List   Diagnosis    Asthma    MDD (major depressive disorder), recurrent severe, without psychosis ([address]    Cluster B personality disorder ([address]    Colon polyps    Overdose    Restless leg syndrome    Sliding hiatal hernia    HTN (hypertension)    Atrial flutter (HCC)    SVT (supraventricular tachycardia) (HCC)    Paroxysmal atrial fibrillation (HCC)    Sinus bradycardia    Acute lateral meniscus tear of right knee    Acute medial meniscus tear of right knee    Localized edema    Acute pain of right knee    Acute diastolic congestive heart failure ([address]       Immunization History Administered Date(s) Administered    BCG (Juan BCG) 05/28/2013    DTaP 05/28/2013    Influenza Vaccine, unspecified formulation 01/09/2017, 11/26/2018    Influenza Virus Vaccine 10/23/2017, 10/23/2017, 11/26/2018    PPD Test 06/11/2013, 05/07/2014    Pneumococcal Polysaccharide ([id number]) 06/17/2019    Tdap (Boostrix, Adacel) 05/28/2013       Past Medical History:   Diagnosis Date    Acute lateral meniscus tear of right knee 02/2019    Acute medial meniscus tear of right knee 02/2019    Anxiety     Arthritis     Asthma     Atrial fibrillation ([facility]     new dx 4 weeks ago, first of  Sept 2017    Edema     Fibromyalgia     GERD (gastroesophageal reflux disease)     reflux    Hiatal hernia      Past Surgical History:   Procedure Laterality Date    ABDOMEN SURGERY  2001    gastric bypass--Cesar    HERNIA REPAIR  2001    HYSTERECTOMY  2004    ARIAS-EN-Y GASTRIC BYPASS      TONSILLECTOMY AND ADENOIDECTOMY       Family History   Problem Relation Age of Onset    Cancer Mother         lung    Arthritis Mother     Cirrhosis Father     Asthma Maternal Aunt      Social History     Socioeconomic History    Marital status:      Spouse name: Not on file    Number of children: 3    Years of education: 15.5    Highest education level: Not on file   Occupational History    Not on file   Social Needs    Financial resource strain: Not on file    Food insecurity:     Worry: Not on file     Inability: Not on file    Transportation needs:     Medical: Not on file     Non-medical: Not on file   Tobacco Use    Smoking status: Never Smoker    Smokeless tobacco: Never Used   Substance and Sexual Activity    Alcohol use: Yes     Comment: \"Occasionally\"    Drug use: No    Sexual activity: Yes     Partners: Male   Lifestyle    Physical activity:     Days per week: Not on file     Minutes per session: Not on file    Stress: Not on file   Relationships    Social connections:     Talks on phone: Not on file     Gets together: Not on file     Attends Restorationism service: Not on file     Active member of club or organization: Not on file     Attends meetings of clubs or organizations: Not on file     Relationship status: Not on file    Intimate partner violence:     Fear of current or ex partner: Not on file     Emotionally abused: Not on file     Physically abused: Not on file     Forced sexual activity: Not on file   Other Topics Concern    Not on file   Social History Narrative    Not on file       O: /82   Pulse 82   Temp 97.7 °F (36.5 °C) (Oral)   Resp 16   SpO2 98%   Physical Exam  GEN: No acute distress,cooperative, well nourished, alert. HEENT: PEERLA, EOMI , normocephalic/atraumatic, nares and oropharynx clear. Mucus membranes normal, Tympanic membranes clear bilaterally. Neck: soft, supple, no thyromegaly,mass, no Lymphadenopathy  CV: Regular rate and rhythm, no murmur, rubs, gallops. No edema. Resp: Clear to auscultation bilaterally good air entry bilaterally  No crackles, wheeze. Breathing comfortably. Psych:normal affect. Neuro: AOx3  MSK: Antalgic gait. Partial weight bearing favoring righ tside. TTP of the proximal leg of left leg. ASSESSMENT   Diagnosis Orders   1. Left leg pain  VL DUP LOWER EXTREMITY VENOUS LEFT    DME Order for (Specify) as OP   2. Muscle strain of left lower leg, initial encounter  VL DUP LOWER EXTREMITY VENOUS LEFT    DME Order for (Specify) as OP     Possible soleus tear versus VTE. Work note written to ask for temporary disability for this non-work related injury  She decilens non-opioid rx. The risks, benefits, potential side effects and barriers to medication use were addressed today. Understanding was acknowledged. Patient asked to follow-up if condition(s) do not improve as anticipated. Cane use. Schedule ultrasound today.       PLAN          If applicable, see additional patient information and instructions under \"Patient

## 2019-06-20 NOTE — LETTER
[facility]  [address] [address]  Phone: [phone]  Fax: Niall Gomes,         June 20, 2019     Patient: Noy Rosales   YOB: 1965   Date of Visit: 6/20/2019       To Whom It May Concern: It is my medical opinion that Earnest Machado should remain out of work until she is cleared by a physician. She is experiencing a non-work related injury that prevents her from continuing with her present job. I anticipate she will need up to 4 weeks before she can return to work. If you have any questions or concerns, please don't hesitate to call.     Sincerely,        Brian Mansfield, DO

## 2019-06-21 ENCOUNTER — TELEPHONE (OUTPATIENT)
Dept: FAMILY MEDICINE CLINIC | Age: 54
End: 2019-06-21

## 2019-06-21 DIAGNOSIS — M79.605 LEFT LEG PAIN: Primary | ICD-10-CM

## 2019-06-21 NOTE — TELEPHONE ENCOUNTER
6. 20.19 venous us  Pt called for results, given AB message  Pt stated that testing was tender but never told tech to not press hard  Pt is okay to start PT but this never helps her - would like to use [facility]  Pt has so much pain and cannot walk, something is not right  Has elevated and iced and does not help  What other testing can be done?

## 2019-06-24 NOTE — TELEPHONE ENCOUNTER
Per WANDA it is ok to leave message on patient voicemail  [facility] informing pt of Dr. Catrachita Layne message.  Gave her Dr. Sage Hilton phone number at [phone]

## 2019-06-28 ENCOUNTER — TELEPHONE (OUTPATIENT)
Dept: FAMILY MEDICINE CLINIC | Age: 54
End: 2019-06-28

## 2019-06-28 ENCOUNTER — HOSPITAL ENCOUNTER (OUTPATIENT)
Dept: PHYSICAL THERAPY | Age: 54
Setting detail: THERAPIES SERIES
Discharge: HOME | End: 2019-06-28
Payer: COMMERCIAL

## 2019-06-28 DIAGNOSIS — Z00.00 ROUTINE GENERAL MEDICAL EXAMINATION AT A HEALTH CARE FACILITY: ICD-10-CM

## 2019-06-28 DIAGNOSIS — E61.1 IRON DEFICIENCY: ICD-10-CM

## 2019-06-28 DIAGNOSIS — D64.9 CHRONIC ANEMIA: ICD-10-CM

## 2019-06-28 LAB
CHOLESTEROL, TOTAL: 188 MG/DL (ref 0–199)
FERRITIN: 26 NG/ML (ref 15–150)
HDLC SERPL-MCNC: 87 MG/DL (ref 40–60)
IRON SATURATION: 7 % (ref 15–50)
IRON: 31 UG/DL (ref 37–145)
LDL CHOLESTEROL CALCULATED: 80 MG/DL
TOTAL IRON BINDING CAPACITY: 423 UG/DL (ref 260–445)
TRIGL SERPL-MCNC: 104 MG/DL (ref 0–150)
VLDLC SERPL CALC-MCNC: 21 MG/DL

## 2019-06-28 PROCEDURE — 97112 NEUROMUSCULAR REEDUCATION: CPT

## 2019-06-28 PROCEDURE — 97162 PT EVAL MOD COMPLEX 30 MIN: CPT

## 2019-06-28 NOTE — TELEPHONE ENCOUNTER
Pt stopped by , dropping off blank FMLA forms needing filled out + signed by PCP Mary. Once complete, we are okay to fax to her [facility] Provider at [phone]. Form is scanned and attached to encounter, but original was placed in a AB's MA basket in a green folder for review. Patient is requesting return call when update is possible, to [phone]. Thank you!

## 2019-06-28 NOTE — PROGRESS NOTES
majority of motion occurring TLJ or above  Special Tests: reflexes 2+ throughout B LE, (-) Babinski (-) clonus    Strength RLE  Strength RLE: Exception  R Hip Flexion: 4/5  R Hip Internal Rotation: 4-/5  R Hip External Rotation: 4-/5  R Knee Flexion: 4/5  R Knee Extension: 4-/5  R Ankle Dorsiflexion: 4+/5  R Ankle Eversion: 4+/5  R Great Toe Extension: 4+/5  Strength LLE  Strength LLE: Exception  L Hip Flexion: 4/5  L Hip Internal Rotation: 4-/5  L Hip External Rotation: 3+/5  L Knee Flexion: 4/5  L Knee Extension: 4/5  L Ankle Dorsiflexion: 4+/5  L Ankle Eversion: 4+/5  L Great Toe Extension: 4+/5     Additional Measures  Special Tests: TUG 15 seconds - stiff knee motion throughout gait cycle, absent propulsion B, increased thoracic rotation and arm swing   Sensation  Overall Sensation Status: (decreased L3 and S1, S2 on R (surrounding R knee) )      Assessment   Conditions Requiring Skilled Therapeutic Intervention  Body structures, Functions, Activity limitations: Decreased functional mobility ; Decreased endurance;Decreased sensation;Decreased strength; Increased Pain  Assessment: Pt is a 48 y.o. female who presents with fibromyaglia resulting in decreased muscular strength, endurance and sleep/activity tolerance. Pt also presents with decreased sensation in the R knee (11 month old injury). Pt would benefit from skilled aquatic physical therapy in order to improve activity tolerance and endurance. A weight-reduced program would be beneficial to patient due to previous failed attempts at overground exercise.     Prognosis: Good  Decision Making: Medium Complexity  REQUIRES PT FOLLOW UP: Yes  Activity Tolerance  Activity Tolerance: Patient Tolerated treatment well         Plan   Plan  Times per week: 2x/week for 6 weeks   Current Treatment Recommendations: Strengthening, Aquatics, Endurance Training, Home Exercise Program    G-Code   Modified Oswestry 61%, LEFS 21/80    Goals  Long term goals  Time Frame for Long term goals : 6 weeks   Long term goal 1: Pt will be independent in HEP. Long term goal 2: Pt will report overall improvement 50% frequency/intensity of pain   Long term goal 3: Pt will demonstrate improvement in TUG by 3 seconds (significant change)   Long term goal 4: Pt will report tolerating 10-15 minute walk without limitation to pain   Long term goal 5: Pt will tolerate 30 minute aquatic PT        Therapy Time   Individual Concurrent Group Co-treatment   Time In 1400         Time Out 1500         Minutes 60         Timed Code Treatment Minutes: 15 Minutes       Criss Lyn, PT , DPT [id number]  Gaye Luciano, SPT      If you have any questions or concerns, please don't hesitate to call. Thank you for your referral.    Physician Signature:________________________________Date:__________________  By signing above, therapists plan is approved by physician    Please return by fax to [phone]       Physical therapist was present, directed the patient's care, made skilled judgement, and was responsible for the assessment and treatment of the patient.

## 2019-06-28 NOTE — FLOWSHEET NOTE
Physical Therapy Aquatic Flow Sheet  Date:  6/28/2019    Patient Name:  Jess Kent    Restrictions:    Medical/Treatment Diagnosis Information:  · Diagnosis: Fibromyalgia   Insurance/Certification information:  PT Insurance Information: [employer] [place]   Physician Information:  Referring Practitioner: Cm Burroughs NP  Plan of care signed (Y/N):  N  Visit# / total visits:  1/12         Pain level: /10   Electronically signed by:  Rachana Powers PT    Medicare Cap (if applicable):  n/a = total amount used, updated 6/28/2019    Key  B= Belt DB= Dumbells T= Theratube   H= Hydrotone N= Noodles W= Weights   P= Paddles S= Speedo equipment K= Kickboard     Exercises/Activities   Warm-up/Amb    Exercises      Slow forward  nv  HR/TR  nv    Slow sideways  nv  Marches  nv    Slow backwards  nv  Mini-squats  nv    Medium forward    4-way SLR  nv    Medium sideways    Hip circles/fig 8  nv    Small shuffle    Hamstring curls  nv    Jog    Knee extension  nv    Braiding    Pelvic tilts  nv    Bicycling  nv  Scap squeezes  nv        Shoulder flex/ext  nv    Functional    Shoulder abd/add  nv    Step  nv  Shoulder H. abd/add  nv    Lifting    Shoulder IR/ER      Hand to opp knee    Rowing      Push down squat    Bilateral pull down      UE PNF    Push/pull      LE PNF    Push downs      Wall push ups    Arm circles      SLS    Elbow flex/ext          Chin tuck      Stretching    UT shrugs/rolls      Gastroc/Soleus  nv  Rocking horse      Hamstring          SKTC    Other      Piriformis          Hip flexor          Ladder pull          Pec stretch          Post deltoid           Time In:      Timed Code Treatment Minutes:       Total Treatment Minutes:      Treatment/Activity Tolerance:   [] Patient tolerated treatment well [] Patient limited by fatigue   [] Patient limited by pain [] Patient limited by other medical complications  [] Other:     Prognosis: [x] Good [] Fair  [] Poor    Patient Requires Follow-up:  [x] Yes  [] No    Plan: [x] Continue per plan of care [] Alter current plan (see comments)   [] Plan of care initiated [] Hold pending MD visit [] Discharge    See Weekly Progress Note: [] Yes  [x] No  Next due:

## 2019-06-29 LAB
HEPATITIS C ANTIBODY INTERPRETATION: NORMAL
HIV AG/AB: NORMAL
HIV ANTIGEN: NORMAL
HIV-1 ANTIBODY: NORMAL
HIV-2 AB: NORMAL

## 2019-07-02 ENCOUNTER — OFFICE VISIT (OUTPATIENT)
Dept: ORTHOPEDIC SURGERY | Age: 54
End: 2019-07-02
Payer: COMMERCIAL

## 2019-07-02 VITALS — WEIGHT: 293 LBS | HEIGHT: 68 IN | BODY MASS INDEX: 44.41 KG/M2

## 2019-07-02 DIAGNOSIS — S83.281A ACUTE LATERAL MENISCUS TEAR OF RIGHT KNEE, INITIAL ENCOUNTER: Chronic | ICD-10-CM

## 2019-07-02 DIAGNOSIS — S83.241A ACUTE MEDIAL MENISCUS TEAR OF RIGHT KNEE, INITIAL ENCOUNTER: Chronic | ICD-10-CM

## 2019-07-02 DIAGNOSIS — S86.812A STRAIN OF CALF MUSCLE, LEFT, INITIAL ENCOUNTER: ICD-10-CM

## 2019-07-02 DIAGNOSIS — S86.112A STRAIN OF GASTROCNEMIUS MUSCLE OF LEFT LOWER EXTREMITY, INITIAL ENCOUNTER: ICD-10-CM

## 2019-07-02 DIAGNOSIS — S80.11XA CONTUSION OF MULTIPLE SITES OF RIGHT LOWER EXTREMITY, INITIAL ENCOUNTER: ICD-10-CM

## 2019-07-02 DIAGNOSIS — M17.11 PRIMARY OSTEOARTHRITIS OF RIGHT KNEE: ICD-10-CM

## 2019-07-02 DIAGNOSIS — R20.0 RIGHT LEG NUMBNESS: Primary | ICD-10-CM

## 2019-07-02 PROCEDURE — 99213 OFFICE O/P EST LOW 20 MIN: CPT | Performed by: ORTHOPAEDIC SURGERY

## 2019-07-03 ENCOUNTER — OFFICE VISIT (OUTPATIENT)
Dept: PULMONOLOGY | Age: 54
End: 2019-07-03
Payer: COMMERCIAL

## 2019-07-03 ENCOUNTER — TELEPHONE (OUTPATIENT)
Dept: ORTHOPEDIC SURGERY | Age: 54
End: 2019-07-03

## 2019-07-03 ENCOUNTER — OFFICE VISIT (OUTPATIENT)
Dept: CARDIOLOGY CLINIC | Age: 54
End: 2019-07-03
Payer: COMMERCIAL

## 2019-07-03 VITALS
SYSTOLIC BLOOD PRESSURE: 132 MMHG | DIASTOLIC BLOOD PRESSURE: 82 MMHG | BODY MASS INDEX: 41.95 KG/M2 | OXYGEN SATURATION: 98 % | WEIGHT: 293 LBS | HEIGHT: 70 IN | HEART RATE: 81 BPM

## 2019-07-03 VITALS
HEIGHT: 70 IN | BODY MASS INDEX: 41.95 KG/M2 | DIASTOLIC BLOOD PRESSURE: 80 MMHG | HEART RATE: 69 BPM | SYSTOLIC BLOOD PRESSURE: 110 MMHG | WEIGHT: 293 LBS

## 2019-07-03 DIAGNOSIS — G47.33 OBSTRUCTIVE SLEEP APNEA SYNDROME: Primary | ICD-10-CM

## 2019-07-03 DIAGNOSIS — I48.3 TYPICAL ATRIAL FLUTTER (HCC): Primary | ICD-10-CM

## 2019-07-03 DIAGNOSIS — I48.0 PAROXYSMAL ATRIAL FIBRILLATION (HCC): Chronic | ICD-10-CM

## 2019-07-03 DIAGNOSIS — I48.0 PAROXYSMAL ATRIAL FIBRILLATION (HCC): ICD-10-CM

## 2019-07-03 DIAGNOSIS — F33.2 MDD (MAJOR DEPRESSIVE DISORDER), RECURRENT SEVERE, WITHOUT PSYCHOSIS (HCC): Chronic | ICD-10-CM

## 2019-07-03 DIAGNOSIS — E66.01 MORBID OBESITY, UNSPECIFIED OBESITY TYPE (HCC): Chronic | ICD-10-CM

## 2019-07-03 DIAGNOSIS — Z00.00 ROUTINE GENERAL MEDICAL EXAMINATION AT A HEALTH CARE FACILITY: ICD-10-CM

## 2019-07-03 DIAGNOSIS — I10 ESSENTIAL HYPERTENSION: Chronic | ICD-10-CM

## 2019-07-03 DIAGNOSIS — J45.40 MODERATE PERSISTENT ASTHMA WITHOUT COMPLICATION: Chronic | ICD-10-CM

## 2019-07-03 DIAGNOSIS — I50.32 DIASTOLIC DYSFUNCTION WITH CHRONIC HEART FAILURE (HCC): Chronic | ICD-10-CM

## 2019-07-03 DIAGNOSIS — E61.1 IRON DEFICIENCY: ICD-10-CM

## 2019-07-03 DIAGNOSIS — I47.1 SVT (SUPRAVENTRICULAR TACHYCARDIA) (HCC): ICD-10-CM

## 2019-07-03 DIAGNOSIS — F11.20 CHRONIC NARCOTIC DEPENDENCE (HCC): Chronic | ICD-10-CM

## 2019-07-03 LAB
A/G RATIO: 1.2 (ref 1.1–2.2)
ALBUMIN SERPL-MCNC: 4.3 G/DL (ref 3.4–5)
ALP BLD-CCNC: 109 U/L (ref 40–129)
ALT SERPL-CCNC: 14 U/L (ref 10–40)
ANION GAP SERPL CALCULATED.3IONS-SCNC: 15 MMOL/L (ref 3–16)
AST SERPL-CCNC: 18 U/L (ref 15–37)
BILIRUB SERPL-MCNC: 0.5 MG/DL (ref 0–1)
BUN BLDV-MCNC: 18 MG/DL (ref 7–20)
CALCIUM SERPL-MCNC: 9.1 MG/DL (ref 8.3–10.6)
CHLORIDE BLD-SCNC: 101 MMOL/L (ref 99–110)
CO2: 24 MMOL/L (ref 21–32)
CREAT SERPL-MCNC: 0.9 MG/DL (ref 0.6–1.1)
GFR AFRICAN AMERICAN: >60
GFR NON-AFRICAN AMERICAN: >60
GLOBULIN: 3.6 G/DL
GLUCOSE BLD-MCNC: 90 MG/DL (ref 70–99)
HCT VFR BLD CALC: 33 % (ref 36–48)
HEMATOLOGY PATH CONSULT: YES
HEMOGLOBIN: 9.8 G/DL (ref 12–16)
MCH RBC QN AUTO: 20.7 PG (ref 26–34)
MCHC RBC AUTO-ENTMCNC: 29.9 G/DL (ref 31–36)
MCV RBC AUTO: 69.5 FL (ref 80–100)
PDW BLD-RTO: 20.4 % (ref 12.4–15.4)
PLATELET # BLD: 344 K/UL (ref 135–450)
PMV BLD AUTO: 8.3 FL (ref 5–10.5)
POTASSIUM SERPL-SCNC: 4.3 MMOL/L (ref 3.5–5.1)
RBC # BLD: 4.74 M/UL (ref 4–5.2)
SODIUM BLD-SCNC: 140 MMOL/L (ref 136–145)
TOTAL PROTEIN: 7.9 G/DL (ref 6.4–8.2)
WBC # BLD: 6.4 K/UL (ref 4–11)

## 2019-07-03 PROCEDURE — 99214 OFFICE O/P EST MOD 30 MIN: CPT | Performed by: NURSE PRACTITIONER

## 2019-07-03 PROCEDURE — 93000 ELECTROCARDIOGRAM COMPLETE: CPT | Performed by: NURSE PRACTITIONER

## 2019-07-03 PROCEDURE — 99244 OFF/OP CNSLTJ NEW/EST MOD 40: CPT | Performed by: INTERNAL MEDICINE

## 2019-07-03 ASSESSMENT — ENCOUNTER SYMPTOMS
SHORTNESS OF BREATH: 0
BACK PAIN: 1
ABDOMINAL DISTENTION: 0
APNEA: 0
VOMITING: 0
RHINORRHEA: 0
PHOTOPHOBIA: 0
ALLERGIC/IMMUNOLOGIC NEGATIVE: 1
ABDOMINAL PAIN: 0
CHEST TIGHTNESS: 0
EYE PAIN: 0
CHOKING: 0
NAUSEA: 0

## 2019-07-03 ASSESSMENT — SLEEP AND FATIGUE QUESTIONNAIRES
HOW LIKELY ARE YOU TO NOD OFF OR FALL ASLEEP WHILE LYING DOWN TO REST IN THE AFTERNOON WHEN CIRCUMSTANCES PERMIT: 0
HOW LIKELY ARE YOU TO NOD OFF OR FALL ASLEEP IN A CAR, WHILE STOPPED FOR A FEW MINUTES IN TRAFFIC: 0
NECK CIRCUMFERENCE (INCHES): 16.5
HOW LIKELY ARE YOU TO NOD OFF OR FALL ASLEEP WHILE SITTING AND READING: 0
HOW LIKELY ARE YOU TO NOD OFF OR FALL ASLEEP WHILE SITTING AND TALKING TO SOMEONE: 0
HOW LIKELY ARE YOU TO NOD OFF OR FALL ASLEEP WHILE SITTING INACTIVE IN A PUBLIC PLACE: 0
HOW LIKELY ARE YOU TO NOD OFF OR FALL ASLEEP WHILE SITTING QUIETLY AFTER LUNCH WITHOUT ALCOHOL: 0
HOW LIKELY ARE YOU TO NOD OFF OR FALL ASLEEP WHILE WATCHING TV: 0
HOW LIKELY ARE YOU TO NOD OFF OR FALL ASLEEP WHEN YOU ARE A PASSENGER IN A CAR FOR AN HOUR WITHOUT A BREAK: 0
ESS TOTAL SCORE: 0

## 2019-07-03 NOTE — PROGRESS NOTES
[facility], [place]   Electrophysiology  Date: 7/3/2019    Chief Complaint   Patient presents with    Atrial Fibrillation    Atrial Flutter       Cardiac HX: Dagmar Nguyen is a 48 y.o. woman with a h/o anxiety, GERD, fibromyalgia, morbid obesity, ASTRID (tx'd with surgery), asthma,  and paroxysmal AF/AFL, had been on Rythmol and apixaban. Admitted 9/7/18 with c/o palpitations, CP and SOB, noted to be in AF/AFL with a spontaneous conversion to NSR. S/p RFA for AFL on 5/7/19 (Dr. Nathaly Isbell), now on Eliquis and dilt prn. Today: Patient is here for f/u. She is doing well from a cardiac standpoint. She has had no breakthrough from her AF. She denies HR and palpitations. No CP or SOB. C/o generalized pain, having some issues with sleeping and has gone for sleep eval this am.      Home medications:   Current Outpatient Medications on File Prior to Visit   Medication Sig Dispense Refill    OXcarbazepine (TRILEPTAL) 150 MG tablet Take 1 tablet by mouth nightly 30 tablet 0    HYDROcodone-acetaminophen (NORCO) 5-325 MG per tablet Take 1 tablet by mouth 2 times daily as needed for Pain for up to 30 days. Take 1-2 tabs orally prn daily 60 tablet 0    albuterol sulfate HFA (VENTOLIN HFA) 108 (90 Base) MCG/ACT inhaler INHALE 2 PUFFS BY MOUTH EVERY 4 TO 6 HOURS AS NEEDED 1 Inhaler 0    rOPINIRole (REQUIP) 0.5 MG tablet Take 3 tablets by mouth every evening 90 tablet 5    fluocinonide (LIDEX) 0.05 % ointment APPLY SPARINGLY TO AFFECTED AREA(S) TWO TIMES A DAY AS NEEDED FOR FLARES, UP TO 2 WEEKS AT A TIME. DO NOT APPLY ON CLEAR SKIN.  60 g 1    apixaban (ELIQUIS) 5 MG TABS tablet TAKE ONE TABLET BY MOUTH TWICE A DAY 60 tablet 3    torsemide (DEMADEX) 20 MG tablet Take 1 tablet by mouth 2 times daily 60 tablet 3    Handicap Placard MISC by Does not apply route 1 each 0    DULERA 200-5 MCG/ACT inhaler INHALE ONE PUFF INTO THE LUNGS TWO TIMES A DAY 13 g 5    vitamin B-12 (CYANOCOBALAMIN) 1000 MCG tablet Take 1,000 mcg by mouth daily      Lysine 1000 MG TABS Take 1 tablet by mouth daily as needed       Spacer/Aero-Holding Chambers (AEROCHAMBER PLUS-FLOW SIGNAL) MISC Use with MDI as instructed 2 each 3    Cholecalciferol (VITAMIN D3) 2000 UNITS CAPS Take 5 capsules by mouth daily 150 capsule 3    acetaminophen (APAP EXTRA STRENGTH) 500 MG tablet Take 2 tablets by mouth daily 30 tablet 1    spironolactone (ALDACTONE) 25 MG tablet Take 0.5 tablets by mouth daily 30 tablet 3    diltiazem (CARDIZEM) 60 MG tablet Take 1 tablet by mouth every 8 hours as needed (Palpitations) 120 tablet 1     No current facility-administered medications on file prior to visit. Past Medical History:   Diagnosis Date    Acute lateral meniscus tear of right knee 02/2019    Acute medial meniscus tear of right knee 02/2019    Anxiety     Arthritis     Asthma     Atrial fibrillation ([facility] [address]     new dx 4 weeks ago, first of  Sept 2017    Edema     Fibromyalgia     GERD (gastroesophageal reflux disease)     reflux    Hiatal hernia         Past Surgical History:   Procedure Laterality Date    ABDOMEN SURGERY  2001    gastric bypass--[place]    HERNIA REPAIR  2001    HYSTERECTOMY  2004    ARIAS-EN-Y GASTRIC BYPASS      TONSILLECTOMY AND ADENOIDECTOMY      UPPP UVULOPALATOPHARYGOPLASTY         Allergies   Allergen Reactions    Latex Anaphylaxis and Rash    Aspirin      Swelling in lower legs    Demerol      rash    Meperidine     Nsaids Swelling    Pcn [Penicillins] Hives    Ranitidine Hcl     Sulfa Antibiotics Rash and Hives       Social History:  Reviewed. reports that she has never smoked. She has never used smokeless tobacco. She reports that she drinks alcohol. She reports that she does not use drugs. Family History:  Reviewed. family history includes Arthritis in her mother; Asthma in her maternal aunt; Cancer in her mother; Cirrhosis in her father. Review of System:    · Constitutional: No fevers, chills.    · Eyes: hours.    Invalid input(s): CA,  TSH  No results for input(s): WBC, HGB, HCT, MCV, PLT in the last 72 hours. Lab Results   Component Value Date    CKTOTAL 80 01/12/2014    TROPONINI <0.01 04/29/2019     Lab Results   Component Value Date    .0 08/07/2018    .0 10/08/2017     Lab Results   Component Value Date    PROTIME 12.2 05/07/2019    PROTIME 15.6 01/29/2018    PROTIME 10.8 12/19/2017    INR 1.07 05/07/2019    INR 1.38 01/29/2018    INR 0.96 12/19/2017     Lab Results   Component Value Date    CHOL 188 06/28/2019    HDL 87 06/28/2019    TRIG 104 06/28/2019       ECG: Personally reviewed: NSR, HR 69  , QRS 94, QTc 419    ECHO:  5/9/18   Summary   Left ventricular cavity size is normal. There is mild concentric left   ventricular hypertrophy.   Left ventricular function is low normal with ejection fraction estimated at   50%.   No diagnostic regional wall motion abnormalities.   Diastolic filling parameters suggest grade I diastolic dysfunction.   Mild mitral regurgitation is present.   Bi-atrial enlargement.   Estimated pulmonary artery systolic pressure is at 43 mmHg assuming a right   atrial pressure of 3 mmHg.     Stress Test: 6/2017  1. Left ventricular ejection fraction of 57 %. 2. SPECT Myocardial Perfusion Imaging results are considered   negative for acute ischemia.        Problem List:   Patient Active Problem List    Diagnosis Date Noted    Cluster B personality disorder ([facility] 04/18/2018     Priority: Low    MDD (major depressive disorder), recurrent severe, without psychosis ([facility] 03/12/2018     Priority: Low    Asthma 07/17/2013     Priority: Low    Primary osteoarthritis of right knee 07/02/2019    Contusion of multiple sites of right leg 07/02/2019    Gastrocnemius strain, left 07/02/2019    Acute diastolic congestive heart failure ([facility] 05/16/2019    Acute pain of right knee     Localized edema 04/26/2019    Acute lateral meniscus tear of right knee 02/01/2019    Acute medial meniscus tear of right knee 02/01/2019    Sinus bradycardia     Paroxysmal atrial fibrillation (HCC)     SVT (supraventricular tachycardia) ([facility] 09/07/2018    Atrial flutter ([address] 06/22/2018    HTN (hypertension) 05/30/2018    Colon polyps 04/28/2015    Sliding hiatal hernia 04/28/2015    Restless leg syndrome 04/04/2013    Overdose 04/03/2013        Assessment:   1. Typical atrial flutter (HCC)    2. Paroxysmal atrial fibrillation ([address]    3. SVT (supraventricular tachycardia) ([facility]         Cardiac HX: Sohail Yadav is a 48 y.o. woman with a h/o anxiety, GERD, fibromyalgia, morbid obesity, ASTRID (tx'd with surgery), asthma,  and paroxysmal AF/AFL, had been on Rythmol and apixaban, s/p DCCV to NSR 8/2018. Admitted 9/7/18 with c/o palpitations, CP and SOB, noted to be in AF/AFL with a spontaneous conversion to NSR. S/p RFA for AFL on 5/7/19 (Dr. Claudia Momin), now on Eliquis and dilt prn. [id number] 1. TSH 2.41 (10/12/2017). 30-day monitor worn 9/17/2018-10/17/2018 showed a min HR of 49, average 68, max 118, NSR, PACs, PVCs. No AF.    AF/AFL  - In NSR  - S/p RFA for typical AFL on 5/7/19  - On dilt IR 60 mg Q6 prn - has not needed to use  - On Eliquis - no s/s bleeding - continue. Having CBC drawn  - Sleep study - eval done today  - ECG ordered and results personally reviewed      Chronic diastolic HF  - Echo - EF 06%, grade I DD, LA 2.3, vol 68  - Lifestyle modification - weight loss, exercise  - On torsemide 20 mg BID      EF of 50-55%  No known HF  No known CAD  Anticoagulation for AF   No Tobacco use.      All questions and concerns were addressed to the patient/family. Alternatives to my treatment were discussed. The note was completed using EMR. Every effort was made to ensure accuracy; however, inadvertent computerized transcription errors may be present. Patient received education regarding their diagnosis, treatment and medications while in the office today.       Taryn Boyd

## 2019-07-05 LAB — HEMATOLOGY PATH CONSULT: NORMAL

## 2019-07-08 ENCOUNTER — PATIENT MESSAGE (OUTPATIENT)
Dept: FAMILY MEDICINE CLINIC | Age: 54
End: 2019-07-08

## 2019-07-08 ENCOUNTER — TELEPHONE (OUTPATIENT)
Dept: CARDIOLOGY CLINIC | Age: 54
End: 2019-07-08

## 2019-07-08 RX ORDER — ACETAMINOPHEN 500 MG
500 TABLET ORAL EVERY 6 HOURS PRN
COMMUNITY
End: 2019-08-24 | Stop reason: ALTCHOICE

## 2019-07-08 NOTE — TELEPHONE ENCOUNTER
This is a duplicate request.  Please see telephone encounter from this morning. Already routed to [facility] for clearance.

## 2019-07-08 NOTE — PROGRESS NOTES
Obstructive Sleep Apnea (ASTRID) Screening     Patient:  Severiano Rose    YOB: 1965      Medical Record #:  [id number]                     Date:  7/8/2019     1. Are you a loud and/or regular snorer? []  Yes       [x] No    2. Have you been observed to gasp or stop breathing during sleep? []  Yes       [x] No    3. Do you feel tired or groggy upon awakening or do you awaken with a headache?           []  Yes       [] No    4. Are you often tired or fatigued during the wake time hours? []  Yes       [] No    5. Do you fall asleep sitting, reading, watching TV or driving? []  Yes       [] No    6. Do you often have problems with memory or concentration? []  Yes       [] No    **If patient's score is ? 3 they are considered high risk for ASTRID. Notify the anesthesiologist of the high risk and document in focus note. Note:  If the patient's BMI is more than 35 kg m¯² , has neck circumference > 40 cm, and/or high blood pressure the risk is greater (© American Sleep Apnea Association, 2006).

## 2019-07-08 NOTE — PROGRESS NOTES
Cloria Blew    Age 48 y.o.    female    1965    MRN [id number]    Date___________   Arrival Time_____________  OR Time____________Duration____     Procedure(s):  VIDEO ARTHROSCOPY RIGHT KNEE, PARTIAL MEDIAL AND LATERAL MENISCECTOMY, CHONDROPLASTY OR DEBRIDEMENT AS NEEDED    Surgeon  ________________________________  [facility]   General   [place]       Phone [phone] ([place])       [address]  Cell Work  ______________________________________________________________________________________________________________________________________________________________________________________________________________________________________________________________________________________________________________________________________________________________    PCP__________________________Phone__________________________________      H&P__________________Bringing      Chart              Epic    DOS           Called_______  EKG__________________Bringing      Chart              Epic    DOS           Called_______  LAB__________________ Bringing      Chart              Epic    DOS           Called_______  CardiacClearance _______Bringing      Chart              Epic    DOS           Called_______      Cardiologist________________________ Phone___________________________      ? Gnosticism concerns / Waiver on Chart            [employer]________________  ? Pre-op Instructions Given [place]          _________________________________  ? Directions to Surgery Center                          _________________________________  ? Transportation Home_______________      _________________________________  ?  Crutches/Walker__________________        _________________________________      ________Pre-op Orders   _______Transcribed    _______Wt.  ________Pharmacy          _______SCD  ______VTE     ______Beta Blocker  ________Consent             ________KARL Salazar

## 2019-07-09 ENCOUNTER — HOSPITAL ENCOUNTER (OUTPATIENT)
Dept: PHYSICAL THERAPY | Age: 54
Setting detail: THERAPIES SERIES
Discharge: HOME | End: 2019-07-09
Payer: COMMERCIAL

## 2019-07-09 PROCEDURE — 97150 GROUP THERAPEUTIC PROCEDURES: CPT

## 2019-07-09 PROCEDURE — 97113 AQUATIC THERAPY/EXERCISES: CPT

## 2019-07-10 ENCOUNTER — HOSPITAL ENCOUNTER (OUTPATIENT)
Dept: PHYSICAL THERAPY | Age: 54
Setting detail: THERAPIES SERIES
Discharge: HOME | End: 2019-07-10
Payer: COMMERCIAL

## 2019-07-10 ENCOUNTER — ANESTHESIA EVENT (OUTPATIENT)
Dept: OPERATING ROOM | Age: 54
End: 2019-07-10
Payer: COMMERCIAL

## 2019-07-10 PROCEDURE — 97161 PT EVAL LOW COMPLEX 20 MIN: CPT

## 2019-07-10 PROCEDURE — 97110 THERAPEUTIC EXERCISES: CPT

## 2019-07-10 ASSESSMENT — LIFESTYLE VARIABLES: SMOKING_STATUS: 0

## 2019-07-10 NOTE — ANESTHESIA PRE PROCEDURE
medial meniscus tear of right knee S83.241A    Localized edema R60.0    Acute pain of right knee M25.561    Acute diastolic congestive heart failure (HCC) I50.31    Primary osteoarthritis of right knee M17.11    Contusion of multiple sites of right leg S80.11XA    Gastrocnemius strain, left E95.009V       Past Medical History:        Diagnosis Date    Acute lateral meniscus tear of right knee 02/2019    Acute medial meniscus tear of right knee 02/2019    Anesthesia complication     woke up during one surgery    Anxiety     Arthritis     Asthma     Atrial fibrillation ([address]     new dx 4 weeks ago, first of  Sept 2017    Edema     Fibromyalgia     GERD (gastroesophageal reflux disease)     reflux    Hiatal hernia        Past Surgical History:        Procedure Laterality Date    ABLATION OF DYSRHYTHMIC FOCUS  04/2019    afib    HERNIA REPAIR  2001    HYSTERECTOMY  2004    OVARIAN CYST REMOVAL      ARIAS-EN-Y GASTRIC BYPASS  2011    TONSILLECTOMY AND ADENOIDECTOMY      UPPP UVULOPALATOPHARYGOPLASTY         Social History:    Social History     Tobacco Use    Smoking status: Never Smoker    Smokeless tobacco: Never Used   Substance Use Topics    Alcohol use: Yes     Comment: 0-1 drinks per month                                Counseling given: Not Answered      Vital Signs (Current):   Vitals:    07/08/19 1120 07/11/19 0825   BP:  (!) 142/83   Pulse:  83   Resp:  16   Temp:  97.8 °F (36.6 °C)   SpO2:  97%   Weight: (!) 352 lb (159.7 kg) (!) 350 lb (158.8 kg)   Height: 5' 10\" (1.778 m) 5' 10\" (1.778 m)                                              BP Readings from Last 3 Encounters:   07/11/19 (!) 142/83   07/03/19 110/80   07/03/19 132/82       NPO Status: Time of last liquid consumption: 2200                        Time of last solid consumption: 2000                        Date of last liquid consumption: 07/10/19                        Date of last solid food consumption: 07/10/19    BMI:   Wt

## 2019-07-10 NOTE — PLAN OF CARE
[facility] [address]  Phone [phone]  Fax [phone]      Physical Therapy Certification    Dear Referring Practitioner: William Pacheco MD,    We had the pleasure of evaluating the following patient for physical therapy services at [address] [place] [address]. A summary of our findings can be found in the initial assessment below. This includes our plan of care. If you have any questions or concerns regarding these findings, please do not hesitate to contact me at the office phone number checked above. Thank you for the referral.       Physician Signature:_______________________________Date:__________________  By signing above (or electronic signature), therapists plan is approved by physician      Patient: Flash Cross   : 1965   MRN: [id number]  Referring Physician: Referring Practitioner: William Pacheco MD      Evaluation Date: 7/10/2019      Medical Diagnosis Information:  Diagnosis: U85.426C (ICD-10-CM) - Strain of calf muscle, left, initial encounter   Treatment Diagnosis: S86.812A L Calf strain, M79.662 L lower leg pain                                         Insurance information: PT Insurance Information: [employer]      Precautions/ Contra-indications: Atrial Fibrillation treated 2019, Fibromyalgia   Latex Allergy:  [x]NO      []YES  Preferred Language for Healthcare:   [x]English       []other:    SUBJECTIVE: Patient arrived to physical therapy with c/o L calf pain. Symptoms started 19 and experienced severe \"anahi horse\" and when she went to get up and go the bathroom she felt a pull/ snap in L calf. Was not able to walk on it d/t pain. Symptoms have slowly improved with rest but continues to have pain described as pulling sensation that goes from mid calf to mid thigh.  Had an ultrasound to rule out See intake form. Review Of Systems (ROS):  [x]Performed Review of systems (Integumentary, CardioPulmonary, Neurological) by intake and observation. Intake form has been scanned into medical record. Patient has been instructed to contact their primary care physician regarding ROS issues if not already being addressed at this time. Co-morbidities/Complexities (which will affect course of rehabilitation):   []None           Arthritic conditions   []Rheumatoid arthritis (M05.9)  []Osteoarthritis (M19.91)   Cardiovascular conditions   []Hypertension (I10)  []Hyperlipidemia (E78.5)  []Angina pectoris (I20)  []Atherosclerosis (I70)   Musculoskeletal conditions   []Disc pathology   []Congenital spine pathologies   []Prior surgical intervention  []Osteoporosis (M81.8)  []Osteopenia (M85.8)   Endocrine conditions   []Hypothyroid (E03.9)  []Hyperthyroid Gastrointestinal conditions   []Constipation (J69.83)   Metabolic conditions   []Morbid obesity (E66.01)  []Diabetes type 1(E10.65) or 2 (E11.65)   []Neuropathy (G60.9)     Pulmonary conditions   [x]Asthma (J45)  []Coughing   []COPD (J44.9)   Psychological Disorders  []Anxiety (F41.9)  []Depression (F32.9)   []Other:   [x]Other:     Melyssa Grant  R knee surgery 7/11/19  Atrial Fibrilation Jan 2019     Barriers to/and or personal factors that will affect rehab potential:              []Age  []Sex              []Motivation/Lack of Motivation                        [x]Co-Morbidities              []Cognitive Function, education/learning barriers              []Environmental, home barriers              []profession/work barriers  []past PT/medical experience  []other:  Justification: Pt having R knee surgery 7/11/19 is a potential barrier to treatment    Falls Risk Assessment (30 days):   [x] Falls Risk assessed and no intervention required.   [] Falls Risk assessed and Patient requires intervention due to being higher risk   TUG score (>12s at risk):     [] Falls education

## 2019-07-11 ENCOUNTER — HOSPITAL ENCOUNTER (OUTPATIENT)
Age: 54
Setting detail: OUTPATIENT SURGERY
Discharge: HOME | End: 2019-07-11
Attending: ORTHOPAEDIC SURGERY | Admitting: ORTHOPAEDIC SURGERY
Payer: COMMERCIAL

## 2019-07-11 ENCOUNTER — ANESTHESIA (OUTPATIENT)
Dept: OPERATING ROOM | Age: 54
End: 2019-07-11
Payer: COMMERCIAL

## 2019-07-11 ENCOUNTER — APPOINTMENT (OUTPATIENT)
Dept: PHYSICAL THERAPY | Age: 54
End: 2019-07-11
Payer: COMMERCIAL

## 2019-07-11 VITALS
WEIGHT: 293 LBS | TEMPERATURE: 98.1 F | RESPIRATION RATE: 13 BRPM | BODY MASS INDEX: 41.95 KG/M2 | HEART RATE: 69 BPM | DIASTOLIC BLOOD PRESSURE: 84 MMHG | SYSTOLIC BLOOD PRESSURE: 133 MMHG | OXYGEN SATURATION: 96 % | HEIGHT: 70 IN

## 2019-07-11 VITALS
DIASTOLIC BLOOD PRESSURE: 78 MMHG | OXYGEN SATURATION: 96 % | SYSTOLIC BLOOD PRESSURE: 127 MMHG | RESPIRATION RATE: 16 BRPM

## 2019-07-11 DIAGNOSIS — S83.281A ACUTE LATERAL MENISCUS TEAR OF RIGHT KNEE, INITIAL ENCOUNTER: Chronic | ICD-10-CM

## 2019-07-11 DIAGNOSIS — S83.241A ACUTE MEDIAL MENISCUS TEAR OF RIGHT KNEE, INITIAL ENCOUNTER: Primary | Chronic | ICD-10-CM

## 2019-07-11 DIAGNOSIS — M17.11 PRIMARY OSTEOARTHRITIS OF RIGHT KNEE: Chronic | ICD-10-CM

## 2019-07-11 PROCEDURE — 3600000004 HC SURGERY LEVEL 4 BASE: Performed by: ORTHOPAEDIC SURGERY

## 2019-07-11 PROCEDURE — 2580000003 HC RX 258: Performed by: ANESTHESIOLOGY

## 2019-07-11 PROCEDURE — 3700000000 HC ANESTHESIA ATTENDED CARE: Performed by: ORTHOPAEDIC SURGERY

## 2019-07-11 PROCEDURE — 2500000003 HC RX 250 WO HCPCS: Performed by: ORTHOPAEDIC SURGERY

## 2019-07-11 PROCEDURE — 2500000003 HC RX 250 WO HCPCS: Performed by: NURSE ANESTHETIST, CERTIFIED REGISTERED

## 2019-07-11 PROCEDURE — 2580000003 HC RX 258: Performed by: ORTHOPAEDIC SURGERY

## 2019-07-11 PROCEDURE — 7100000001 HC PACU RECOVERY - ADDTL 15 MIN: Performed by: ORTHOPAEDIC SURGERY

## 2019-07-11 PROCEDURE — 7100000010 HC PHASE II RECOVERY - FIRST 15 MIN: Performed by: ORTHOPAEDIC SURGERY

## 2019-07-11 PROCEDURE — 3700000001 HC ADD 15 MINUTES (ANESTHESIA): Performed by: ORTHOPAEDIC SURGERY

## 2019-07-11 PROCEDURE — 7100000011 HC PHASE II RECOVERY - ADDTL 15 MIN: Performed by: ORTHOPAEDIC SURGERY

## 2019-07-11 PROCEDURE — 6370000000 HC RX 637 (ALT 250 FOR IP): Performed by: ANESTHESIOLOGY

## 2019-07-11 PROCEDURE — 2580000003 HC RX 258: Performed by: NURSE ANESTHETIST, CERTIFIED REGISTERED

## 2019-07-11 PROCEDURE — 3600000014 HC SURGERY LEVEL 4 ADDTL 15MIN: Performed by: ORTHOPAEDIC SURGERY

## 2019-07-11 PROCEDURE — 7100000000 HC PACU RECOVERY - FIRST 15 MIN: Performed by: ORTHOPAEDIC SURGERY

## 2019-07-11 PROCEDURE — 6360000002 HC RX W HCPCS: Performed by: ANESTHESIOLOGY

## 2019-07-11 PROCEDURE — 6360000002 HC RX W HCPCS: Performed by: NURSE ANESTHETIST, CERTIFIED REGISTERED

## 2019-07-11 PROCEDURE — 2709999900 HC NON-CHARGEABLE SUPPLY: Performed by: ORTHOPAEDIC SURGERY

## 2019-07-11 RX ORDER — ONDANSETRON 2 MG/ML
4 INJECTION INTRAMUSCULAR; INTRAVENOUS
Status: DISCONTINUED | OUTPATIENT
Start: 2019-07-11 | End: 2019-07-11 | Stop reason: HOSPADM

## 2019-07-11 RX ORDER — MIDAZOLAM HYDROCHLORIDE 1 MG/ML
INJECTION INTRAMUSCULAR; INTRAVENOUS PRN
Status: DISCONTINUED | OUTPATIENT
Start: 2019-07-11 | End: 2019-07-11 | Stop reason: SDUPTHER

## 2019-07-11 RX ORDER — SODIUM CHLORIDE 0.9 % (FLUSH) 0.9 %
10 SYRINGE (ML) INJECTION PRN
Status: DISCONTINUED | OUTPATIENT
Start: 2019-07-11 | End: 2019-07-11 | Stop reason: HOSPADM

## 2019-07-11 RX ORDER — ROCURONIUM BROMIDE 10 MG/ML
INJECTION, SOLUTION INTRAVENOUS PRN
Status: DISCONTINUED | OUTPATIENT
Start: 2019-07-11 | End: 2019-07-11 | Stop reason: SDUPTHER

## 2019-07-11 RX ORDER — FENTANYL CITRATE 50 UG/ML
INJECTION, SOLUTION INTRAMUSCULAR; INTRAVENOUS PRN
Status: DISCONTINUED | OUTPATIENT
Start: 2019-07-11 | End: 2019-07-11 | Stop reason: SDUPTHER

## 2019-07-11 RX ORDER — OXYCODONE HYDROCHLORIDE AND ACETAMINOPHEN 5; 325 MG/1; MG/1
1 TABLET ORAL PRN
Status: COMPLETED | OUTPATIENT
Start: 2019-07-11 | End: 2019-07-11

## 2019-07-11 RX ORDER — BUPIVACAINE HYDROCHLORIDE AND EPINEPHRINE 2.5; 5 MG/ML; UG/ML
INJECTION, SOLUTION EPIDURAL; INFILTRATION; INTRACAUDAL; PERINEURAL PRN
Status: DISCONTINUED | OUTPATIENT
Start: 2019-07-11 | End: 2019-07-11 | Stop reason: ALTCHOICE

## 2019-07-11 RX ORDER — ONDANSETRON 2 MG/ML
INJECTION INTRAMUSCULAR; INTRAVENOUS PRN
Status: DISCONTINUED | OUTPATIENT
Start: 2019-07-11 | End: 2019-07-11 | Stop reason: SDUPTHER

## 2019-07-11 RX ORDER — MEPERIDINE HYDROCHLORIDE 50 MG/ML
12.5 INJECTION INTRAMUSCULAR; INTRAVENOUS; SUBCUTANEOUS EVERY 5 MIN PRN
Status: DISCONTINUED | OUTPATIENT
Start: 2019-07-11 | End: 2019-07-11 | Stop reason: HOSPADM

## 2019-07-11 RX ORDER — PROPOFOL 10 MG/ML
INJECTION, EMULSION INTRAVENOUS PRN
Status: DISCONTINUED | OUTPATIENT
Start: 2019-07-11 | End: 2019-07-11 | Stop reason: SDUPTHER

## 2019-07-11 RX ORDER — LIDOCAINE HYDROCHLORIDE 20 MG/ML
INJECTION, SOLUTION INFILTRATION; PERINEURAL PRN
Status: DISCONTINUED | OUTPATIENT
Start: 2019-07-11 | End: 2019-07-11 | Stop reason: SDUPTHER

## 2019-07-11 RX ORDER — SODIUM CHLORIDE, SODIUM LACTATE, POTASSIUM CHLORIDE, CALCIUM CHLORIDE 600; 310; 30; 20 MG/100ML; MG/100ML; MG/100ML; MG/100ML
INJECTION, SOLUTION INTRAVENOUS CONTINUOUS PRN
Status: DISCONTINUED | OUTPATIENT
Start: 2019-07-11 | End: 2019-07-11 | Stop reason: SDUPTHER

## 2019-07-11 RX ORDER — SODIUM CHLORIDE 0.9 % (FLUSH) 0.9 %
10 SYRINGE (ML) INJECTION EVERY 12 HOURS SCHEDULED
Status: DISCONTINUED | OUTPATIENT
Start: 2019-07-11 | End: 2019-07-11 | Stop reason: HOSPADM

## 2019-07-11 RX ORDER — SODIUM CHLORIDE 9 MG/ML
INJECTION, SOLUTION INTRAVENOUS CONTINUOUS
Status: DISCONTINUED | OUTPATIENT
Start: 2019-07-11 | End: 2019-07-11 | Stop reason: HOSPADM

## 2019-07-11 RX ORDER — FENTANYL CITRATE 50 UG/ML
25 INJECTION, SOLUTION INTRAMUSCULAR; INTRAVENOUS EVERY 5 MIN PRN
Status: DISCONTINUED | OUTPATIENT
Start: 2019-07-11 | End: 2019-07-11 | Stop reason: HOSPADM

## 2019-07-11 RX ORDER — DEXAMETHASONE SODIUM PHOSPHATE 4 MG/ML
INJECTION, SOLUTION INTRA-ARTICULAR; INTRALESIONAL; INTRAMUSCULAR; INTRAVENOUS; SOFT TISSUE PRN
Status: DISCONTINUED | OUTPATIENT
Start: 2019-07-11 | End: 2019-07-11 | Stop reason: SDUPTHER

## 2019-07-11 RX ORDER — MORPHINE SULFATE 2 MG/ML
2 INJECTION, SOLUTION INTRAMUSCULAR; INTRAVENOUS EVERY 5 MIN PRN
Status: DISCONTINUED | OUTPATIENT
Start: 2019-07-11 | End: 2019-07-11 | Stop reason: HOSPADM

## 2019-07-11 RX ORDER — MORPHINE SULFATE 2 MG/ML
1 INJECTION, SOLUTION INTRAMUSCULAR; INTRAVENOUS EVERY 5 MIN PRN
Status: DISCONTINUED | OUTPATIENT
Start: 2019-07-11 | End: 2019-07-11 | Stop reason: HOSPADM

## 2019-07-11 RX ORDER — FENTANYL CITRATE 50 UG/ML
50 INJECTION, SOLUTION INTRAMUSCULAR; INTRAVENOUS EVERY 5 MIN PRN
Status: DISCONTINUED | OUTPATIENT
Start: 2019-07-11 | End: 2019-07-11 | Stop reason: HOSPADM

## 2019-07-11 RX ORDER — OXYCODONE HYDROCHLORIDE AND ACETAMINOPHEN 5; 325 MG/1; MG/1
2 TABLET ORAL PRN
Status: COMPLETED | OUTPATIENT
Start: 2019-07-11 | End: 2019-07-11

## 2019-07-11 RX ORDER — SODIUM CHLORIDE, SODIUM LACTATE, POTASSIUM CHLORIDE, AND CALCIUM CHLORIDE .6; .31; .03; .02 G/100ML; G/100ML; G/100ML; G/100ML
IRRIGANT IRRIGATION PRN
Status: DISCONTINUED | OUTPATIENT
Start: 2019-07-11 | End: 2019-07-11 | Stop reason: ALTCHOICE

## 2019-07-11 RX ORDER — HYDROCODONE BITARTRATE AND ACETAMINOPHEN 5; 325 MG/1; MG/1
1-2 TABLET ORAL
Qty: 20 TABLET | Refills: 0 | Status: SHIPPED | OUTPATIENT
Start: 2019-07-11 | End: 2019-07-15

## 2019-07-11 RX ADMIN — OXYCODONE HYDROCHLORIDE AND ACETAMINOPHEN 2 TABLET: 5; 325 TABLET ORAL at 10:56

## 2019-07-11 RX ADMIN — MORPHINE SULFATE 2 MG: 2 INJECTION, SOLUTION INTRAMUSCULAR; INTRAVENOUS at 10:45

## 2019-07-11 RX ADMIN — LIDOCAINE HYDROCHLORIDE 60 MG: 20 INJECTION, SOLUTION INFILTRATION; PERINEURAL at 10:02

## 2019-07-11 RX ADMIN — DEXAMETHASONE SODIUM PHOSPHATE 8 MG: 4 INJECTION, SOLUTION INTRAMUSCULAR; INTRAVENOUS at 10:05

## 2019-07-11 RX ADMIN — FENTANYL CITRATE 50 MCG: 50 INJECTION INTRAMUSCULAR; INTRAVENOUS at 10:02

## 2019-07-11 RX ADMIN — PROPOFOL 200 MG: 10 INJECTION, EMULSION INTRAVENOUS at 10:02

## 2019-07-11 RX ADMIN — ONDANSETRON 4 MG: 2 INJECTION INTRAMUSCULAR; INTRAVENOUS at 10:05

## 2019-07-11 RX ADMIN — SUGAMMADEX 400 MG: 100 INJECTION, SOLUTION INTRAVENOUS at 10:31

## 2019-07-11 RX ADMIN — MIDAZOLAM HYDROCHLORIDE 2 MG: 2 INJECTION, SOLUTION INTRAMUSCULAR; INTRAVENOUS at 09:58

## 2019-07-11 RX ADMIN — MORPHINE SULFATE 2 MG: 2 INJECTION, SOLUTION INTRAMUSCULAR; INTRAVENOUS at 11:02

## 2019-07-11 RX ADMIN — ROCURONIUM BROMIDE 50 MG: 10 SOLUTION INTRAVENOUS at 10:02

## 2019-07-11 RX ADMIN — Medication 900 MG: at 09:58

## 2019-07-11 RX ADMIN — SODIUM CHLORIDE: 9 INJECTION, SOLUTION INTRAVENOUS at 08:36

## 2019-07-11 RX ADMIN — SODIUM CHLORIDE, POTASSIUM CHLORIDE, SODIUM LACTATE AND CALCIUM CHLORIDE: 600; 310; 30; 20 INJECTION, SOLUTION INTRAVENOUS at 09:58

## 2019-07-11 ASSESSMENT — PAIN SCALES - GENERAL
PAINLEVEL_OUTOF10: 8
PAINLEVEL_OUTOF10: 5
PAINLEVEL_OUTOF10: 7
PAINLEVEL_OUTOF10: 5
PAINLEVEL_OUTOF10: 7
PAINLEVEL_OUTOF10: 8
PAINLEVEL_OUTOF10: 8
PAINLEVEL_OUTOF10: 5
PAINLEVEL_OUTOF10: 8
PAINLEVEL_OUTOF10: 8

## 2019-07-11 ASSESSMENT — PULMONARY FUNCTION TESTS
PIF_VALUE: 27
PIF_VALUE: 5
PIF_VALUE: 28
PIF_VALUE: 26
PIF_VALUE: 30
PIF_VALUE: 0
PIF_VALUE: 3
PIF_VALUE: 28
PIF_VALUE: 28
PIF_VALUE: 0
PIF_VALUE: 28
PIF_VALUE: 1
PIF_VALUE: 28
PIF_VALUE: 1
PIF_VALUE: 26
PIF_VALUE: 20
PIF_VALUE: 28
PIF_VALUE: 28
PIF_VALUE: 27
PIF_VALUE: 28
PIF_VALUE: 27
PIF_VALUE: 32
PIF_VALUE: 26
PIF_VALUE: 27
PIF_VALUE: 27
PIF_VALUE: 28
PIF_VALUE: 28
PIF_VALUE: 26
PIF_VALUE: 28
PIF_VALUE: 27
PIF_VALUE: 33
PIF_VALUE: 27
PIF_VALUE: 27
PIF_VALUE: 0
PIF_VALUE: 28
PIF_VALUE: 28

## 2019-07-11 ASSESSMENT — PAIN - FUNCTIONAL ASSESSMENT: PAIN_FUNCTIONAL_ASSESSMENT: 0-10

## 2019-07-11 ASSESSMENT — ENCOUNTER SYMPTOMS: SHORTNESS OF BREATH: 1

## 2019-07-11 NOTE — PROGRESS NOTES
Family to bedside. Discharge instructions reviewed with patient and responsible adult. Discharge instructions signed and copy given with no additional questions. Patient to be discharged home with belongings. Norco script given. Patient has crutches and knows how to use them.

## 2019-07-12 DIAGNOSIS — M17.11 PRIMARY OSTEOARTHRITIS OF RIGHT KNEE: ICD-10-CM

## 2019-07-12 DIAGNOSIS — M25.561 RIGHT KNEE PAIN, UNSPECIFIED CHRONICITY: ICD-10-CM

## 2019-07-12 DIAGNOSIS — S83.281A ACUTE LATERAL MENISCUS TEAR OF RIGHT KNEE, INITIAL ENCOUNTER: Primary | ICD-10-CM

## 2019-07-12 DIAGNOSIS — S83.241A ACUTE MEDIAL MENISCUS TEAR OF RIGHT KNEE, INITIAL ENCOUNTER: ICD-10-CM

## 2019-07-14 RX ORDER — EPINEPHRINE 1 MG/ML
0.3 INJECTION, SOLUTION INTRAMUSCULAR; SUBCUTANEOUS PRN
Status: CANCELLED | OUTPATIENT
Start: 2019-07-15

## 2019-07-14 RX ORDER — METHYLPREDNISOLONE SODIUM SUCCINATE 125 MG/2ML
125 INJECTION, POWDER, LYOPHILIZED, FOR SOLUTION INTRAMUSCULAR; INTRAVENOUS ONCE
Status: CANCELLED | OUTPATIENT
Start: 2019-07-15

## 2019-07-14 RX ORDER — DIPHENHYDRAMINE HYDROCHLORIDE 50 MG/ML
50 INJECTION INTRAMUSCULAR; INTRAVENOUS ONCE
Status: CANCELLED | OUTPATIENT
Start: 2019-07-15

## 2019-07-14 RX ORDER — SODIUM CHLORIDE 9 MG/ML
INJECTION, SOLUTION INTRAVENOUS CONTINUOUS
Status: CANCELLED | OUTPATIENT
Start: 2019-07-15

## 2019-07-15 ENCOUNTER — TELEPHONE (OUTPATIENT)
Dept: FAMILY MEDICINE CLINIC | Age: 54
End: 2019-07-15

## 2019-07-15 ENCOUNTER — OFFICE VISIT (OUTPATIENT)
Dept: PAIN MANAGEMENT | Age: 54
End: 2019-07-15
Payer: COMMERCIAL

## 2019-07-15 VITALS — OXYGEN SATURATION: 95 % | HEART RATE: 81 BPM | SYSTOLIC BLOOD PRESSURE: 148 MMHG | DIASTOLIC BLOOD PRESSURE: 87 MMHG

## 2019-07-15 DIAGNOSIS — M17.11 PRIMARY OSTEOARTHRITIS OF RIGHT KNEE: Chronic | ICD-10-CM

## 2019-07-15 DIAGNOSIS — S83.241A ACUTE MEDIAL MENISCUS TEAR OF RIGHT KNEE, INITIAL ENCOUNTER: Chronic | ICD-10-CM

## 2019-07-15 DIAGNOSIS — M51.36 DDD (DEGENERATIVE DISC DISEASE), LUMBAR: ICD-10-CM

## 2019-07-15 DIAGNOSIS — G89.4 CHRONIC PAIN SYNDROME: ICD-10-CM

## 2019-07-15 DIAGNOSIS — E66.01 CLASS 3 SEVERE OBESITY DUE TO EXCESS CALORIES WITH SERIOUS COMORBIDITY AND BODY MASS INDEX (BMI) OF 40.0 TO 44.9 IN ADULT (HCC): ICD-10-CM

## 2019-07-15 DIAGNOSIS — F32.A DEPRESSION, UNSPECIFIED DEPRESSION TYPE: ICD-10-CM

## 2019-07-15 DIAGNOSIS — M54.2 CERVICALGIA: ICD-10-CM

## 2019-07-15 DIAGNOSIS — G89.29 CHRONIC BILATERAL LOW BACK PAIN WITH BILATERAL SCIATICA: ICD-10-CM

## 2019-07-15 DIAGNOSIS — M54.41 CHRONIC BILATERAL LOW BACK PAIN WITH BILATERAL SCIATICA: ICD-10-CM

## 2019-07-15 DIAGNOSIS — M79.7 FIBROMYALGIA: ICD-10-CM

## 2019-07-15 DIAGNOSIS — M54.42 CHRONIC BILATERAL LOW BACK PAIN WITH BILATERAL SCIATICA: ICD-10-CM

## 2019-07-15 DIAGNOSIS — M25.561 ACUTE PAIN OF RIGHT KNEE: ICD-10-CM

## 2019-07-15 DIAGNOSIS — M47.817 LUMBOSACRAL SPONDYLOSIS WITHOUT MYELOPATHY: ICD-10-CM

## 2019-07-15 DIAGNOSIS — F51.01 PRIMARY INSOMNIA: ICD-10-CM

## 2019-07-15 DIAGNOSIS — S83.281A ACUTE LATERAL MENISCUS TEAR OF RIGHT KNEE, INITIAL ENCOUNTER: ICD-10-CM

## 2019-07-15 DIAGNOSIS — Z98.890 S/P RIGHT KNEE SURGERY: ICD-10-CM

## 2019-07-15 DIAGNOSIS — R53.83 FATIGUE, UNSPECIFIED TYPE: ICD-10-CM

## 2019-07-15 PROCEDURE — 99213 OFFICE O/P EST LOW 20 MIN: CPT | Performed by: NURSE PRACTITIONER

## 2019-07-15 RX ORDER — OXCARBAZEPINE 150 MG/1
150 TABLET, FILM COATED ORAL NIGHTLY
Qty: 30 TABLET | Refills: 0 | Status: SHIPPED | OUTPATIENT
Start: 2019-07-15 | End: 2019-08-22 | Stop reason: SINTOL

## 2019-07-15 RX ORDER — HYDROCODONE BITARTRATE AND ACETAMINOPHEN 5; 325 MG/1; MG/1
1 TABLET ORAL 2 TIMES DAILY PRN
Qty: 60 TABLET | Refills: 0 | Status: SHIPPED | OUTPATIENT
Start: 2019-07-15 | End: 2019-08-22 | Stop reason: SDUPTHER

## 2019-07-15 NOTE — PROGRESS NOTES
[facility]  1965  [id number]      HISTORY OF PRESENT ILLNESS:  Ms. Teresa Padgett is a 48 y.o. female returns for a follow up visit for pain management  She has a diagnosis of   1. Chronic pain syndrome    2. S/P right knee surgery, 7/11/19 with Dr. Vern Garza    3. Fibromyalgia    4. Lumbosacral spondylosis without myelopathy    5. DDD (degenerative disc disease), lumbar    6. Chronic bilateral low back pain with bilateral sciatica    7. Cervicalgia    8. Acute pain of right knee    9. Acute lateral meniscus tear of right knee, initial encounter    10. Depression, unspecified depression type    11. Primary insomnia    12. Fatigue, unspecified type    13. Class 3 severe obesity due to excess calories with serious comorbidity and body mass index (BMI) of 40.0 to 44.9 in adult ([facility]    14. Acute medial meniscus tear of right knee, initial encounter    15. Primary osteoarthritis of right knee      On the Patients Pain Assessment form:  She complains of pain in the abdomen, both arm(s): entire limb, both buttocks and bilateral lower back She rates the pain 3/10 and describes it as sharp, aching. Current treatment regimen has helped relieve about 80% of the pain. She denies any side effects from the current pain regimen. Patient reports that since the last follow up visit the physical functioning is better, family/social relationships are unchanged, mood is unchanged sleep patterns are unchanged, and that the overall functioning is unchanged. Patient denies misusing/abusing her narcotic pain medications or using any illegal drugs. There are No indicators for possible drug abuse, addiction or diversion problems. Upon obtaining medical history from Ms. Teresa Padgett states that pain is manageable on current pain therapy. She had right medial meniscus tear repair on 7/11/19 with Dr. Vern Garza. She was prescribed Norco 5-325 mg tabs for 5 days, recovering well.  She had started aquatic PT a month ago, she enjoyed it, plans to return next week. Mood is stable without anxiety. Sleep is fair with an average of 5-6 hours. Denies to having issues of constipation. Tolerating activities/house chores with moderate tenderness to the lower back/right knee. ALLERGIES: Patients list of allergies were reviewed     MEDICATIONS: Ms. Singletary Favorite list of medications were reviewed. Her current medications are   Outpatient Medications Prior to Visit   Medication Sig Dispense Refill    acetaminophen (TYLENOL) 500 MG tablet Take 500 mg by mouth every 6 hours as needed for Pain      albuterol sulfate HFA (VENTOLIN HFA) 108 (90 Base) MCG/ACT inhaler INHALE 2 PUFFS BY MOUTH EVERY 4 TO 6 HOURS AS NEEDED 1 Inhaler 0    rOPINIRole (REQUIP) 0.5 MG tablet Take 3 tablets by mouth every evening (Patient taking differently: Take 1.5 mg by mouth as needed ) 90 tablet 5    fluocinonide (LIDEX) 0.05 % ointment APPLY SPARINGLY TO AFFECTED AREA(S) TWO TIMES A DAY AS NEEDED FOR FLARES, UP TO 2 WEEKS AT A TIME. DO NOT APPLY ON CLEAR SKIN. 60 g 1    apixaban (ELIQUIS) 5 MG TABS tablet TAKE ONE TABLET BY MOUTH TWICE A DAY 60 tablet 3    torsemide (DEMADEX) 20 MG tablet Take 1 tablet by mouth 2 times daily 60 tablet 3    Handicap Placard MISC by Does not apply route 1 each 0    DULERA 200-5 MCG/ACT inhaler INHALE ONE PUFF INTO THE LUNGS TWO TIMES A DAY 13 g 5    diltiazem (CARDIZEM) 60 MG tablet Take 1 tablet by mouth every 8 hours as needed (Palpitations) 120 tablet 1    Lysine 1000 MG TABS Take 1 tablet by mouth daily as needed       Spacer/Aero-Holding Chambers (AEROCHAMBER PLUS-FLOW SIGNAL) MISC Use with MDI as instructed 2 each 3    Cholecalciferol (VITAMIN D3) 2000 UNITS CAPS Take 5 capsules by mouth daily 150 capsule 3    HYDROcodone-acetaminophen (NORCO) 5-325 MG per tablet Take 1-2 tablets by mouth every 6-8 hours as needed for Pain for up to 5 days.  20 tablet 0    OXcarbazepine (TRILEPTAL) 150 MG tablet Take 1 tablet by mouth nightly 30 tablet 0     No leisure activities. Improve psychosocial and physical functioning. - she is showing progression towards this treatment goal with the current regimen. She was advised against drinking alcohol with the narcotic pain medicines, advised against driving or handling machinery while adjusting the dose of medicines or if having cognitive  issues related to the current medications. Risk of overdose and death, if medicines not taken as prescribed, were also discussed. If the patient develops new symptoms or if the symptoms worsen, the patient should call the office. While transcribing every attempt was made to maintain the accuracy of the note in terms of it's contents,there may have been some errors made inadvertently. Thank you for allowing me to participate in the care of this patient.     Makayla Rose CNP    Cc: Srikanth Cui DO

## 2019-07-16 ENCOUNTER — OFFICE VISIT (OUTPATIENT)
Dept: ORTHOPEDIC SURGERY | Age: 54
End: 2019-07-16

## 2019-07-16 ENCOUNTER — HOSPITAL ENCOUNTER (OUTPATIENT)
Dept: PHYSICAL THERAPY | Age: 54
Setting detail: THERAPIES SERIES
Discharge: HOME | End: 2019-07-16
Payer: COMMERCIAL

## 2019-07-16 ENCOUNTER — APPOINTMENT (OUTPATIENT)
Dept: PHYSICAL THERAPY | Age: 54
End: 2019-07-16
Payer: COMMERCIAL

## 2019-07-16 DIAGNOSIS — S83.281A ACUTE LATERAL MENISCUS TEAR OF RIGHT KNEE, INITIAL ENCOUNTER: Primary | ICD-10-CM

## 2019-07-16 PROCEDURE — 97110 THERAPEUTIC EXERCISES: CPT | Performed by: PHYSICAL THERAPIST

## 2019-07-16 PROCEDURE — 99024 POSTOP FOLLOW-UP VISIT: CPT | Performed by: ORTHOPAEDIC SURGERY

## 2019-07-16 NOTE — FLOWSHEET NOTE
increased symptoms or restriction. Progression Towards Functional goals:  [] Patient is progressing as expected towards functional goals listed. [] Progression is slowed due to complexities listed. [] Progression has been slowed due to co-morbidities. [x] Plan just implemented, too soon to assess goals progression  [] Other:     ASSESSMENT:  Tolerated progression on treatment without increase in knee symptoms. Treatment/Activity Tolerance:  [x] Patient tolerated treatment well [] Patient limited by fatique  [] Patient limited by pain  [] Patient limited by other medical complications  [] Other:     Prognosis: [x] Good [] Fair  [] Poor    Patient Requires Follow-up: [x] Yes  [] No    PLAN: See eval  [x] Continue per plan of care [] Alter current plan (see comments)  [] Plan of care initiated [] Hold pending MD visit [] Discharge    *If patient does not return for further follow ups after this date. Please consider this as the patients discharge from physical therapy.      Electronically signed by: Clemencia Steven PT, MATTEO

## 2019-07-18 ENCOUNTER — TELEPHONE (OUTPATIENT)
Dept: FAMILY MEDICINE CLINIC | Age: 54
End: 2019-07-18

## 2019-07-18 ENCOUNTER — APPOINTMENT (OUTPATIENT)
Dept: PHYSICAL THERAPY | Age: 54
End: 2019-07-18
Payer: COMMERCIAL

## 2019-07-18 DIAGNOSIS — F51.04 PSYCHOPHYSIOLOGICAL INSOMNIA: ICD-10-CM

## 2019-07-18 RX ORDER — ZOLPIDEM TARTRATE 5 MG/1
5-10 TABLET ORAL NIGHTLY PRN
Qty: 20 TABLET | Refills: 0 | Status: SHIPPED | OUTPATIENT
Start: 2019-07-18 | End: 2019-07-26 | Stop reason: ALTCHOICE

## 2019-07-18 RX ORDER — ZOLPIDEM TARTRATE 5 MG/1
5-10 TABLET ORAL NIGHTLY PRN
Qty: 20 TABLET | Refills: 0 | Status: SHIPPED | OUTPATIENT
Start: 2019-07-18 | End: 2019-07-18 | Stop reason: SDUPTHER

## 2019-07-23 ENCOUNTER — HOSPITAL ENCOUNTER (OUTPATIENT)
Dept: PHYSICAL THERAPY | Age: 54
Setting detail: THERAPIES SERIES
Discharge: HOME | End: 2019-07-23
Payer: COMMERCIAL

## 2019-07-23 PROCEDURE — 97110 THERAPEUTIC EXERCISES: CPT | Performed by: PHYSICAL THERAPIST

## 2019-07-23 NOTE — FLOWSHEET NOTE
posture, motor skill, proprioception of core, proximal hip and LE for self care, mobility, lifting, and ambulation/stair navigation      Manual Treatments:  PROM / STM / Oscillations-Mobs:  G-I, II, III, IV (PA's, Inf., Post.)  [] (67053) Provided manual therapy to mobilize LE, proximal hip and/or LS spine soft tissue/joints for the purpose of modulating pain, promoting relaxation,  increasing ROM, reducing/eliminating soft tissue swelling/inflammation/restriction, improving soft tissue extensibility and allowing for proper ROM for normal function with self care, mobility, lifting and ambulation. Modalities: CP x15 ( No EGS A-Fib)    Charges:  Timed Code Treatment Minutes: 30   Total Treatment Minutes: 45     [] EVAL (LOW) 40650 (typically 20 minutes face-to-face)  [] EVAL (MOD) 91370 (typically 30 minutes face-to-face)  [] EVAL (HIGH) 82222 (typically 45 minutes face-to-face)  [] RE-EVAL     [x] GM(30378) x2     [] IONTO  [] NMR (93583) x     [] VASO  [] Manual (96281) x      [] Other:  [] TA x      [] Mech Traction (86178)  [] ES(attended) (37135)      [] ES (un) (05315):     GOALS:   1. Independent in HEP and progression per patient tolerance, in order to prevent re-injury. 2. Patient will have a decrease in pain to facilitate improvement in movement, function, and ADLs as indicated by Functional Deficits. Long Term Goals: To be achieved in: 4 weeks  1. Disability index score of 25% or less for the LEFS to assist with reaching prior level of function. 2. Patient will demonstrate increased AROM to 0-115 to allow for proper joint functioning as indicated by patients Functional Deficits. 3. Patient will demonstrate an increase in Strength to 4+/5 right knee flex/ext and hip abduction to allow for proper functional mobility as indicated by patients Functional Deficits. 4. Patient will return to normal stair ambulation without increased symptoms or restriction.    5.  Patient will be able to walk for 1

## 2019-07-25 ENCOUNTER — HOSPITAL ENCOUNTER (OUTPATIENT)
Dept: PHYSICAL THERAPY | Age: 54
Setting detail: THERAPIES SERIES
Discharge: HOME | End: 2019-07-25
Payer: COMMERCIAL

## 2019-07-25 ENCOUNTER — APPOINTMENT (OUTPATIENT)
Dept: PHYSICAL THERAPY | Age: 54
End: 2019-07-25
Payer: COMMERCIAL

## 2019-07-25 PROCEDURE — 97112 NEUROMUSCULAR REEDUCATION: CPT | Performed by: SPECIALIST/TECHNOLOGIST

## 2019-07-25 PROCEDURE — 97110 THERAPEUTIC EXERCISES: CPT | Performed by: SPECIALIST/TECHNOLOGIST

## 2019-07-26 ENCOUNTER — OFFICE VISIT (OUTPATIENT)
Dept: FAMILY MEDICINE CLINIC | Age: 54
End: 2019-07-26
Payer: COMMERCIAL

## 2019-07-26 VITALS
SYSTOLIC BLOOD PRESSURE: 148 MMHG | HEART RATE: 60 BPM | WEIGHT: 293 LBS | BODY MASS INDEX: 41.95 KG/M2 | OXYGEN SATURATION: 96 % | HEIGHT: 70 IN | DIASTOLIC BLOOD PRESSURE: 93 MMHG

## 2019-07-26 DIAGNOSIS — G47.00 INSOMNIA, UNSPECIFIED TYPE: ICD-10-CM

## 2019-07-26 DIAGNOSIS — E61.1 IRON DEFICIENCY: ICD-10-CM

## 2019-07-26 DIAGNOSIS — I10 ESSENTIAL HYPERTENSION: Primary | ICD-10-CM

## 2019-07-26 PROCEDURE — 1111F DSCHRG MED/CURRENT MED MERGE: CPT | Performed by: FAMILY MEDICINE

## 2019-07-26 PROCEDURE — 99214 OFFICE O/P EST MOD 30 MIN: CPT | Performed by: FAMILY MEDICINE

## 2019-07-26 RX ORDER — CIPROFLOXACIN 500 MG/1
500 TABLET, FILM COATED ORAL 2 TIMES DAILY
Qty: 10 TABLET | Refills: 0 | Status: SHIPPED | OUTPATIENT
Start: 2019-07-26 | End: 2019-07-31

## 2019-07-26 RX ORDER — LORAZEPAM 1 MG/1
1 TABLET ORAL EVERY 8 HOURS PRN
Qty: 30 TABLET | Refills: 0 | Status: ON HOLD | OUTPATIENT
Start: 2019-07-26 | End: 2019-08-25 | Stop reason: CLARIF

## 2019-07-26 NOTE — PROGRESS NOTES
DYSRHYTHMIC FOCUS  04/2019    afib    BLADDER SUSPENSION  2004    HERNIA REPAIR  2001    HYSTERECTOMY  2004    KNEE ARTHROSCOPY Right 7/11/2019    VIDEO ARTHROSCOPY RIGHT KNEE, PARTIAL MEDIAL AND LATERAL MENISCECTOMY,, MEDIAL MICRO FRACTURE CHONDROPLASTY performed by Thi Hickman MD at [address] [facility] REMOVAL      ARIAS-EN-Y GASTRIC BYPASS  2011    TONSILLECTOMY AND ADENOIDECTOMY      UPPP UVULOPALATOPHARYGOPLASTY       Family History   Problem Relation Age of Onset    Cancer Mother         lung    Arthritis Mother     Cirrhosis Father     Asthma Maternal Aunt      Social History     Socioeconomic History    Marital status:      Spouse name: Not on file    Number of children: 3    Years of education: 15.5    Highest education level: Not on file   Occupational History    Not on file   Social Needs    Financial resource strain: Not on file    Food insecurity:     Worry: Not on file     Inability: Not on file    Transportation needs:     Medical: Not on file     Non-medical: Not on file   Tobacco Use    Smoking status: Never Smoker    Smokeless tobacco: Never Used   Substance and Sexual Activity    Alcohol use: Yes     Comment: 0-1 drinks per month    Drug use: No    Sexual activity: Yes     Partners: Male   Lifestyle    Physical activity:     Days per week: Not on file     Minutes per session: Not on file    Stress: Not on file   Relationships    Social connections:     Talks on phone: Not on file     Gets together: Not on file     Attends [employer] service: Not on file     Active member of club or organization: Not on file     Attends meetings of clubs or organizations: Not on file     Relationship status: Not on file    Intimate partner violence:     Fear of current or ex partner: Not on file     Emotionally abused: Not on file     Physically abused: Not on file     Forced sexual activity: Not on file   Other Topics Concern    Not on file   Social History

## 2019-07-26 NOTE — Clinical Note
Elizabeth Peterson. FYI, I was okay with 30 tabs of lorazepam for her panic attacks and insomnia. She failed zolpidem. You likely know she experiences chronic anxiety.  For your FYI

## 2019-07-26 NOTE — PATIENT INSTRUCTIONS
1) With the gastric bypass, will have to avoid iron pills and elixir. 2) Recheck your CBC anytime in 30 days. If Hgb and iron no better, your PCP will work with you to coordinate iron transfusion.

## 2019-07-29 ENCOUNTER — HOSPITAL ENCOUNTER (OUTPATIENT)
Dept: PHYSICAL THERAPY | Age: 54
Setting detail: THERAPIES SERIES
Discharge: HOME | End: 2019-07-29
Payer: COMMERCIAL

## 2019-07-29 PROCEDURE — 97112 NEUROMUSCULAR REEDUCATION: CPT | Performed by: PHYSICAL THERAPIST

## 2019-07-29 PROCEDURE — 97110 THERAPEUTIC EXERCISES: CPT | Performed by: PHYSICAL THERAPIST

## 2019-07-29 NOTE — FLOWSHEET NOTE
core, proximal hip and LE for self care, mobility, lifting, and ambulation/stair navigation      Manual Treatments:  PROM / STM / Oscillations-Mobs:  G-I, II, III, IV (PA's, Inf., Post.)  [] (19644) Provided manual therapy to mobilize LE, proximal hip and/or LS spine soft tissue/joints for the purpose of modulating pain, promoting relaxation,  increasing ROM, reducing/eliminating soft tissue swelling/inflammation/restriction, improving soft tissue extensibility and allowing for proper ROM for normal function with self care, mobility, lifting and ambulation. Modalities: CP x15 ( No EGS A-Fib)    Charges:  Timed Code Treatment Minutes: 40   Total Treatment Minutes: 55     [] EVAL (LOW) 77264 (typically 20 minutes face-to-face)  [] EVAL (MOD) 09613 (typically 30 minutes face-to-face)  [] EVAL (HIGH) 33675 (typically 45 minutes face-to-face)  [] RE-EVAL     [x] NO(88041) x2     [] IONTO  [x] NMR (98400) x  1   [] VASO  [] Manual (60154) x      [] Other:  [] TA x      [] Mech Traction (44394)  [] ES(attended) (91068)      [] ES (un) (20074):     GOALS:   1. Independent in HEP and progression per patient tolerance, in order to prevent re-injury. 2. Patient will have a decrease in pain to facilitate improvement in movement, function, and ADLs as indicated by Functional Deficits. Long Term Goals: To be achieved in: 4 weeks  1. Disability index score of 25% or less for the LEFS to assist with reaching prior level of function. 2. Patient will demonstrate increased AROM to 0-115 to allow for proper joint functioning as indicated by patients Functional Deficits. 3. Patient will demonstrate an increase in Strength to 4+/5 right knee flex/ext and hip abduction to allow for proper functional mobility as indicated by patients Functional Deficits. 4. Patient will return to normal stair ambulation without increased symptoms or restriction.    5.  Patient will be able to walk for 1 mile without increased symptoms or

## 2019-07-30 ENCOUNTER — APPOINTMENT (OUTPATIENT)
Dept: PHYSICAL THERAPY | Age: 54
End: 2019-07-30
Payer: COMMERCIAL

## 2019-08-01 ENCOUNTER — APPOINTMENT (OUTPATIENT)
Dept: PHYSICAL THERAPY | Age: 54
End: 2019-08-01
Payer: COMMERCIAL

## 2019-08-01 ENCOUNTER — HOSPITAL ENCOUNTER (OUTPATIENT)
Dept: PHYSICAL THERAPY | Age: 54
Setting detail: THERAPIES SERIES
Discharge: HOME | End: 2019-08-01
Payer: COMMERCIAL

## 2019-08-01 PROCEDURE — 97112 NEUROMUSCULAR REEDUCATION: CPT | Performed by: SPECIALIST/TECHNOLOGIST

## 2019-08-01 PROCEDURE — 97110 THERAPEUTIC EXERCISES: CPT | Performed by: SPECIALIST/TECHNOLOGIST

## 2019-08-01 NOTE — FLOWSHEET NOTE
improving balance, coordination, kinesthetic sense, posture, motor skill, proprioception of core, proximal hip and LE for self care, mobility, lifting, and ambulation/stair navigation      Manual Treatments:  PROM / STM / Oscillations-Mobs:  G-I, II, III, IV (PA's, Inf., Post.)  [] (76116) Provided manual therapy to mobilize LE, proximal hip and/or LS spine soft tissue/joints for the purpose of modulating pain, promoting relaxation,  increasing ROM, reducing/eliminating soft tissue swelling/inflammation/restriction, improving soft tissue extensibility and allowing for proper ROM for normal function with self care, mobility, lifting and ambulation. Modalities: CP x15 ( No EGS A-Fib)     Charges:  Timed Code Treatment Minutes: 40   Total Treatment Minutes: 54   Pt. Was in a hour time slot   [] EVAL (LOW) 84282 (typically 20 minutes face-to-face)  [] EVAL (MOD) 90211 (typically 30 minutes face-to-face)  [] EVAL (HIGH) 97469 (typically 45 minutes face-to-face)  [] RE-EVAL     [x] IZ(97667) x2     [] IONTO  [x] NMR (11447) x  1   [] VASO  [] Manual (56883) x      [] Other:  [] TA x      [] Mech Traction (92366)  [] ES(attended) (42546)      [] ES (un) (00699):     GOALS:   1. Independent in HEP and progression per patient tolerance, in order to prevent re-injury. 2. Patient will have a decrease in pain to facilitate improvement in movement, function, and ADLs as indicated by Functional Deficits. Long Term Goals: To be achieved in: 4 weeks  1. Disability index score of 25% or less for the LEFS to assist with reaching prior level of function. 2. Patient will demonstrate increased AROM to 0-115 to allow for proper joint functioning as indicated by patients Functional Deficits. 3. Patient will demonstrate an increase in Strength to 4+/5 right knee flex/ext and hip abduction to allow for proper functional mobility as indicated by patients Functional Deficits.    4. Patient will return to normal stair ambulation

## 2019-08-05 ENCOUNTER — TELEPHONE (OUTPATIENT)
Dept: FAMILY MEDICINE CLINIC | Age: 54
End: 2019-08-05

## 2019-08-05 ENCOUNTER — HOSPITAL ENCOUNTER (OUTPATIENT)
Dept: PHYSICAL THERAPY | Age: 54
Setting detail: THERAPIES SERIES
Discharge: HOME | End: 2019-08-05
Payer: COMMERCIAL

## 2019-08-05 DIAGNOSIS — E61.1 IRON DEFICIENCY: ICD-10-CM

## 2019-08-05 DIAGNOSIS — G47.00 INSOMNIA, UNSPECIFIED TYPE: Primary | ICD-10-CM

## 2019-08-05 PROCEDURE — 97110 THERAPEUTIC EXERCISES: CPT | Performed by: SPECIALIST/TECHNOLOGIST

## 2019-08-05 PROCEDURE — 97112 NEUROMUSCULAR REEDUCATION: CPT | Performed by: SPECIALIST/TECHNOLOGIST

## 2019-08-05 RX ORDER — QUETIAPINE FUMARATE 50 MG/1
50 TABLET, FILM COATED ORAL NIGHTLY
Qty: 30 TABLET | Refills: 2 | Status: SHIPPED | OUTPATIENT
Start: 2019-08-05 | End: 2019-08-05 | Stop reason: SINTOL

## 2019-08-05 RX ORDER — ZOLPIDEM TARTRATE 10 MG/1
10 TABLET ORAL NIGHTLY PRN
Qty: 14 TABLET | Refills: 0 | Status: SHIPPED | OUTPATIENT
Start: 2019-08-05 | End: 2019-08-19

## 2019-08-05 NOTE — TELEPHONE ENCOUNTER
There is not much else I have to offer. Prescription for Zolpidem 10 mg sent to her [facility] with 14 tabs. Let her know.

## 2019-08-05 NOTE — TELEPHONE ENCOUNTER
Pt called and stated she has had 4-5 hours of sleep since Friday. Pt stated she \"feels like shes going to die\" if she does not get more sleep. Pt has been taking meds as prescribed and not working. Pt would like to know what to do.  Please call back as soon as possible [phone]

## 2019-08-06 ENCOUNTER — HOSPITAL ENCOUNTER (OUTPATIENT)
Dept: PHYSICAL THERAPY | Age: 54
Setting detail: THERAPIES SERIES
Discharge: HOME | End: 2019-08-06
Payer: COMMERCIAL

## 2019-08-06 LAB
BASOPHILS ABSOLUTE: 0.1 K/UL (ref 0–0.2)
BASOPHILS RELATIVE PERCENT: 0.8 %
EOSINOPHILS ABSOLUTE: 0.3 K/UL (ref 0–0.6)
EOSINOPHILS RELATIVE PERCENT: 5.2 %
HCT VFR BLD CALC: 29.7 % (ref 36–48)
HEMATOLOGY PATH CONSULT: NO
HEMOGLOBIN: 8.7 G/DL (ref 12–16)
LYMPHOCYTES ABSOLUTE: 1.9 K/UL (ref 1–5.1)
LYMPHOCYTES RELATIVE PERCENT: 28.2 %
MCH RBC QN AUTO: 20 PG (ref 26–34)
MCHC RBC AUTO-ENTMCNC: 29.4 G/DL (ref 31–36)
MCV RBC AUTO: 68.2 FL (ref 80–100)
MONOCYTES ABSOLUTE: 0.8 K/UL (ref 0–1.3)
MONOCYTES RELATIVE PERCENT: 12.4 %
NEUTROPHILS ABSOLUTE: 3.5 K/UL (ref 1.7–7.7)
NEUTROPHILS RELATIVE PERCENT: 53.4 %
PDW BLD-RTO: 21.1 % (ref 12.4–15.4)
PLATELET # BLD: 415 K/UL (ref 135–450)
PMV BLD AUTO: 8.7 FL (ref 5–10.5)
RBC # BLD: 4.35 M/UL (ref 4–5.2)
WBC # BLD: 6.6 K/UL (ref 4–11)

## 2019-08-06 PROCEDURE — 97164 PT RE-EVAL EST PLAN CARE: CPT

## 2019-08-06 PROCEDURE — 97150 GROUP THERAPEUTIC PROCEDURES: CPT

## 2019-08-06 PROCEDURE — 97113 AQUATIC THERAPY/EXERCISES: CPT

## 2019-08-06 NOTE — FLOWSHEET NOTE
Physical Therapy Aquatic Flow Sheet  Date:  8/6/2019    Patient Name:  Dagmar Nguyen    Restrictions:    Medical/Treatment Diagnosis Information:  · Diagnosis: Fibromyalgia   Insurance/Certification information:  PT Insurance Information: [employer]   Physician Information:  Referring Practitioner: Eliana Parham NP  Plan of care signed (Y/N):  N  Visit# / total visits:  3/12         Pain level: 0/10   Electronically signed by:  Dwain Adam PT    Medicare Cap (if applicable):  n/a = total amount used, updated 8/6/2019    Key  B= Belt DB= Dumbells T= Theratube   H= Hydrotone N= Noodles W= Weights   P= Paddles S= Speedo equipment K= Kickboard     Exercises/Activities   Warm-up/Amb    Exercises      Slow forward  x2 laps  HR/TR  x10    Slow sideways  x2 laps  Marches  x2min    Slow backwards  x2 laps  Mini-squats  x10    Medium forward    3-way SLR  x10B    Medium sideways    Hip circles/fig 8  x10B    Small shuffle    Hamstring curls  nvB    Jog    Knee extension      Braiding    Pelvic tilts  5sec hold x10    Bicycling  x2min  Scap squeezes         Shoulder flex/ext  x15B w/ TA    Functional    Shoulder abd/add  x15B w/ TA    Step  nv  Shoulder H. abd/add  x15B w/ TA    Lifting    Shoulder IR/ER      Hand to opp knee    Rowing      Push down squat    Bilateral pull down      UE PNF    Push/pull      LE PNF    Push downs      Wall push ups    Arm circles      SLS    Elbow flex/ext          Chin tuck      Stretching    UT shrugs/rolls      Gastroc/Soleus  [id number] (B)  Rocking horse      Hamstring  [id number] (B)        SKTC    Other      Piriformis          Hip flexor          Ladder pull          Pec stretch          Post deltoid           Time In: 2:30pm     Timed Code Treatment Minutes:  15min. Total Treatment Minutes:  30min.     Treatment/Activity Tolerance:   [x] Patient tolerated treatment well [] Patient limited by fatigue   [] Patient limited by pain [] Patient limited by other medical

## 2019-08-07 ENCOUNTER — TELEPHONE (OUTPATIENT)
Dept: FAMILY MEDICINE CLINIC | Age: 54
End: 2019-08-07

## 2019-08-07 DIAGNOSIS — D50.8 IRON DEFICIENCY ANEMIA SECONDARY TO INADEQUATE DIETARY IRON INTAKE: Primary | ICD-10-CM

## 2019-08-08 ENCOUNTER — APPOINTMENT (OUTPATIENT)
Dept: PHYSICAL THERAPY | Age: 54
End: 2019-08-08
Payer: COMMERCIAL

## 2019-08-09 ENCOUNTER — HOSPITAL ENCOUNTER (OUTPATIENT)
Dept: PHYSICAL THERAPY | Age: 54
Setting detail: THERAPIES SERIES
Discharge: HOME | End: 2019-08-09
Payer: COMMERCIAL

## 2019-08-12 ENCOUNTER — TELEPHONE (OUTPATIENT)
Dept: FAMILY MEDICINE CLINIC | Age: 54
End: 2019-08-12

## 2019-08-12 LAB
HCT VFR BLD CALC: NORMAL % (ref 36–46)
HEMOGLOBIN: NORMAL G/DL (ref 12–16)
NEUTROPHILS ABSOLUTE: NORMAL /ΜL
PLATELET # BLD: NORMAL K/ΜL
WBC # BLD: NORMAL 10^3/ML

## 2019-08-13 ENCOUNTER — NURSE ONLY (OUTPATIENT)
Dept: FAMILY MEDICINE CLINIC | Age: 54
End: 2019-08-13
Payer: COMMERCIAL

## 2019-08-13 ENCOUNTER — HOSPITAL ENCOUNTER (OUTPATIENT)
Dept: PHYSICAL THERAPY | Age: 54
Setting detail: THERAPIES SERIES
Discharge: HOME | End: 2019-08-13
Payer: COMMERCIAL

## 2019-08-13 DIAGNOSIS — Z11.1 PPD SCREENING TEST: Primary | ICD-10-CM

## 2019-08-13 PROCEDURE — 86580 TB INTRADERMAL TEST: CPT | Performed by: FAMILY MEDICINE

## 2019-08-16 ENCOUNTER — TELEPHONE (OUTPATIENT)
Dept: FAMILY MEDICINE CLINIC | Age: 54
End: 2019-08-16

## 2019-08-16 ENCOUNTER — APPOINTMENT (OUTPATIENT)
Dept: PHYSICAL THERAPY | Age: 54
End: 2019-08-16
Payer: COMMERCIAL

## 2019-08-16 DIAGNOSIS — K44.9 HIATAL HERNIA: Primary | ICD-10-CM

## 2019-08-16 NOTE — TELEPHONE ENCOUNTER
Dr. Radha Smith is the surgeon who specializes in hiatal hernia surgical correction. Referral is placed. Please share with her by phone or my chart.

## 2019-08-20 ENCOUNTER — HOSPITAL ENCOUNTER (OUTPATIENT)
Dept: PHYSICAL THERAPY | Age: 54
Setting detail: THERAPIES SERIES
Discharge: HOME | End: 2019-08-20
Payer: COMMERCIAL

## 2019-08-20 PROCEDURE — 97113 AQUATIC THERAPY/EXERCISES: CPT

## 2019-08-20 PROCEDURE — 97150 GROUP THERAPEUTIC PROCEDURES: CPT

## 2019-08-21 ENCOUNTER — CARE COORDINATION (OUTPATIENT)
Dept: CARE COORDINATION | Age: 54
End: 2019-08-21

## 2019-08-22 ENCOUNTER — HOSPITAL ENCOUNTER (OUTPATIENT)
Dept: PHYSICAL THERAPY | Age: 54
Setting detail: THERAPIES SERIES
Discharge: HOME | End: 2019-08-22
Payer: COMMERCIAL

## 2019-08-22 ENCOUNTER — OFFICE VISIT (OUTPATIENT)
Dept: PAIN MANAGEMENT | Age: 54
End: 2019-08-22
Payer: COMMERCIAL

## 2019-08-22 VITALS — SYSTOLIC BLOOD PRESSURE: 141 MMHG | OXYGEN SATURATION: 95 % | HEART RATE: 76 BPM | DIASTOLIC BLOOD PRESSURE: 89 MMHG

## 2019-08-22 DIAGNOSIS — M47.817 LUMBOSACRAL SPONDYLOSIS WITHOUT MYELOPATHY: ICD-10-CM

## 2019-08-22 DIAGNOSIS — M54.42 CHRONIC BILATERAL LOW BACK PAIN WITH BILATERAL SCIATICA: ICD-10-CM

## 2019-08-22 DIAGNOSIS — Z98.890 S/P RIGHT KNEE SURGERY: ICD-10-CM

## 2019-08-22 DIAGNOSIS — M51.36 DDD (DEGENERATIVE DISC DISEASE), LUMBAR: ICD-10-CM

## 2019-08-22 DIAGNOSIS — M79.7 FIBROMYALGIA: ICD-10-CM

## 2019-08-22 DIAGNOSIS — M54.41 CHRONIC BILATERAL LOW BACK PAIN WITH BILATERAL SCIATICA: ICD-10-CM

## 2019-08-22 DIAGNOSIS — F51.01 PRIMARY INSOMNIA: ICD-10-CM

## 2019-08-22 DIAGNOSIS — G89.4 CHRONIC PAIN SYNDROME: ICD-10-CM

## 2019-08-22 DIAGNOSIS — E66.01 CLASS 3 SEVERE OBESITY DUE TO EXCESS CALORIES WITH SERIOUS COMORBIDITY AND BODY MASS INDEX (BMI) OF 40.0 TO 44.9 IN ADULT (HCC): ICD-10-CM

## 2019-08-22 DIAGNOSIS — G89.29 CHRONIC BILATERAL LOW BACK PAIN WITH BILATERAL SCIATICA: ICD-10-CM

## 2019-08-22 DIAGNOSIS — M54.2 CERVICALGIA: ICD-10-CM

## 2019-08-22 DIAGNOSIS — M25.561 ACUTE PAIN OF RIGHT KNEE: ICD-10-CM

## 2019-08-22 DIAGNOSIS — R53.83 FATIGUE, UNSPECIFIED TYPE: ICD-10-CM

## 2019-08-22 DIAGNOSIS — S83.281A ACUTE LATERAL MENISCUS TEAR OF RIGHT KNEE, INITIAL ENCOUNTER: ICD-10-CM

## 2019-08-22 DIAGNOSIS — F32.A DEPRESSION, UNSPECIFIED DEPRESSION TYPE: ICD-10-CM

## 2019-08-22 PROCEDURE — 97113 AQUATIC THERAPY/EXERCISES: CPT

## 2019-08-22 PROCEDURE — 99213 OFFICE O/P EST LOW 20 MIN: CPT | Performed by: NURSE PRACTITIONER

## 2019-08-22 PROCEDURE — 97150 GROUP THERAPEUTIC PROCEDURES: CPT

## 2019-08-22 RX ORDER — HYDROCODONE BITARTRATE AND ACETAMINOPHEN 5; 325 MG/1; MG/1
1 TABLET ORAL EVERY 8 HOURS PRN
Qty: 90 TABLET | Refills: 0 | Status: SHIPPED | OUTPATIENT
Start: 2019-08-22 | End: 2019-09-20 | Stop reason: SDUPTHER

## 2019-08-22 NOTE — PROGRESS NOTES
stable. Pain medications adequately manage her pain, requests for a dose she can take mid day. She does not take Trileptal due to adverse reactions of shivering. Maintain compliance with aquatic PT. Mood is stable without anxiety. Sleep is fair with an average of 5-6 hours. Denies to having issues of constipation. Tolerating activities/house chores with moderate tenderness to the lower back. ALLERGIES: Patients list of allergies were reviewed     MEDICATIONS: Ms. Mak Malloy list of medications were reviewed. Her current medications are   Outpatient Medications Prior to Visit   Medication Sig Dispense Refill    LORazepam (ATIVAN) 1 MG tablet Take 1 tablet by mouth every 8 hours as needed for Anxiety (insomnia) for up to 30 days. 30 tablet 0    acetaminophen (TYLENOL) 500 MG tablet Take 500 mg by mouth every 6 hours as needed for Pain      albuterol sulfate HFA (VENTOLIN HFA) 108 (90 Base) MCG/ACT inhaler INHALE 2 PUFFS BY MOUTH EVERY 4 TO 6 HOURS AS NEEDED 1 Inhaler 0    rOPINIRole (REQUIP) 0.5 MG tablet Take 3 tablets by mouth every evening (Patient taking differently: Take 1.5 mg by mouth as needed ) 90 tablet 5    fluocinonide (LIDEX) 0.05 % ointment APPLY SPARINGLY TO AFFECTED AREA(S) TWO TIMES A DAY AS NEEDED FOR FLARES, UP TO 2 WEEKS AT A TIME. DO NOT APPLY ON CLEAR SKIN.  60 g 1    apixaban (ELIQUIS) 5 MG TABS tablet TAKE ONE TABLET BY MOUTH TWICE A DAY 60 tablet 3    torsemide (DEMADEX) 20 MG tablet Take 1 tablet by mouth 2 times daily 60 tablet 3    Handicap Placard MISC by Does not apply route 1 each 0    DULERA 200-5 MCG/ACT inhaler INHALE ONE PUFF INTO THE LUNGS TWO TIMES A DAY 13 g 5    diltiazem (CARDIZEM) 60 MG tablet Take 1 tablet by mouth every 8 hours as needed (Palpitations) 120 tablet 1    Lysine 1000 MG TABS Take 1 tablet by mouth daily as needed       Spacer/Aero-Holding Chambers (AEROCHAMBER PLUS-FLOW SIGNAL) MISC Use with MDI as instructed 2 each 3    Cholecalciferol (VITAMIN D3) 2000 UNITS CAPS Take 5 capsules by mouth daily 150 capsule 3    OXcarbazepine (TRILEPTAL) 150 MG tablet Take 1 tablet by mouth nightly 30 tablet 0     No facility-administered medications prior to visit. SOCIAL/FAMILY/PAST MEDICAL HISTORY: Ms. Marli Coates, family and past medical history was reviewed. REVIEW OF SYSTEMS:    Respiratory: Negative for apnea, chest tightness and shortness of breath or change in baseline breathing. Gastrointestinal: Negative for nausea, vomiting, abdominal pain, diarrhea, constipation, blood in stool and abdominal distention. PHYSICAL EXAM:   Nursing note and vitals reviewed. BP (!) 141/89   Pulse 76   SpO2 95%   Constitutional: She appears well-developed and well-nourished. No acute distress. Skin: Skin is warm and dry, good turgor. No rash noted. She is not diaphoretic. Cardiovascular: Normal rate, regular rhythm, normal heart sounds, and does not have murmur. Pulmonary/Chest: Effort normal. No respiratory distress. She does not have wheezes in the lung fields. She has no rales. Neurological/Psychiatric:She is alert and oriented to person, place, and time. Coordination is  normal.  Her mood isAppropriate and affect is Neutral/Euthymic(normal) . IMPRESSION:   1. Chronic pain syndrome    2. S/P right knee surgery, 7/11/19 with Dr. Candy Bates    3. Fibromyalgia    4. Lumbosacral spondylosis without myelopathy    5. DDD (degenerative disc disease), lumbar    6. Chronic bilateral low back pain with bilateral sciatica    7. Cervicalgia    8. Acute pain of right knee    9. Acute lateral meniscus tear of right knee, initial encounter    10. Depression, unspecified depression type    11. Primary insomnia    12. Fatigue, unspecified type    13. Class 3 severe obesity due to excess calories with serious comorbidity and body mass index (BMI) of 40.0 to 44.9 in adult ([facility] [address]    14. Acute medial meniscus tear of right knee, initial encounter    15.  Primary MG tablet Take 1 tablet by mouth every 8 hours as needed (Palpitations) 120 tablet 1    Spacer/Aero-Holding Chambers (AEROCHAMBER PLUS-FLOW SIGNAL) MISC Use with MDI as instructed 2 each 3    Cholecalciferol (VITAMIN D3) 2000 UNITS CAPS Take 5 capsules by mouth daily 150 capsule 3     No current facility-administered medications for this visit. I will continue her current medication regimen  which is part of the above treatment schedule. It has been helping with Ms. Estela Moura chronic  medical problems which for this visit include:   Diagnoses of Chronic pain syndrome, S/P right knee surgery, 7/11/19 with Dr. Jerson Medley, Fibromyalgia, Lumbosacral spondylosis without myelopathy, DDD (degenerative disc disease), lumbar, Chronic bilateral low back pain with bilateral sciatica, Cervicalgia, Acute pain of right knee, Acute lateral meniscus tear of right knee, initial encounter, Depression, unspecified depression type, Primary insomnia, Fatigue, unspecified type, Class 3 severe obesity due to excess calories with serious comorbidity and body mass index (BMI) of 40.0 to 44.9 in [facility]), Acute medial meniscus tear of right knee, initial encounter, and Primary osteoarthritis of right knee were pertinent to this visit. Risks and benefits of the medications and other alternative treatments  including no treatment were discussed with the patient. The common side effects of these medications were also explained to the patient. Informed verbal consent was obtained. Goals of current treatment regimen include improvement in pain, restoration of functioning- with focus on improvement in physical performance, general activity, work or disability,emotional distress, health care utilization and  decreased medication consumption. Will continue to monitor progress towards achieving/maintaining therapeutic goals with special emphasis on  1. Improvement in perceived interfernce  of pain with ADL's.  Ability to do home exercises

## 2019-08-22 NOTE — PATIENT INSTRUCTIONS
[email or web address] and sign in to your [employer] account. Enter F796 in the [employer] box to learn more about \"Back Stretches: Exercises. \"     If you do not have an account, please click on the \"Sign Up Now\" link. Current as of: September 20, 2018  Content Version: 12.1  © 6268-6403 Healthwise, Incorporated. Care instructions adapted under license by [facility] ([facility]). If you have questions about a medical condition or this instruction, always ask your healthcare professional. [address] any warranty or liability for your use of this information.

## 2019-08-23 ENCOUNTER — NURSE TRIAGE (OUTPATIENT)
Dept: OTHER | Facility: CLINIC | Age: 54
End: 2019-08-23

## 2019-08-23 ENCOUNTER — CARE COORDINATION (OUTPATIENT)
Dept: CARE COORDINATION | Age: 54
End: 2019-08-23

## 2019-08-23 ENCOUNTER — HOSPITAL ENCOUNTER (OUTPATIENT)
Age: 54
Setting detail: OBSERVATION
Discharge: HOME | DRG: 336 | End: 2019-08-24
Attending: EMERGENCY MEDICINE | Admitting: SURGERY
Payer: COMMERCIAL

## 2019-08-23 DIAGNOSIS — R10.10 PAIN OF UPPER ABDOMEN: Primary | ICD-10-CM

## 2019-08-23 DIAGNOSIS — K43.9 VENTRAL HERNIA WITHOUT OBSTRUCTION OR GANGRENE: ICD-10-CM

## 2019-08-23 DIAGNOSIS — Z79.01 ANTICOAGULATED: ICD-10-CM

## 2019-08-23 PROCEDURE — 99285 EMERGENCY DEPT VISIT HI MDM: CPT

## 2019-08-23 ASSESSMENT — PAIN SCALES - GENERAL: PAINLEVEL_OUTOF10: 8

## 2019-08-23 ASSESSMENT — PAIN DESCRIPTION - DESCRIPTORS: DESCRIPTORS: CRAMPING

## 2019-08-23 ASSESSMENT — PAIN DESCRIPTION - LOCATION: LOCATION: ABDOMEN

## 2019-08-23 ASSESSMENT — PAIN DESCRIPTION - PAIN TYPE: TYPE: ACUTE PAIN

## 2019-08-23 ASSESSMENT — PAIN DESCRIPTION - FREQUENCY: FREQUENCY: CONTINUOUS

## 2019-08-24 ENCOUNTER — APPOINTMENT (OUTPATIENT)
Dept: GENERAL RADIOLOGY | Age: 54
DRG: 336 | End: 2019-08-24
Payer: COMMERCIAL

## 2019-08-24 ENCOUNTER — APPOINTMENT (OUTPATIENT)
Dept: CT IMAGING | Age: 54
DRG: 336 | End: 2019-08-24
Payer: COMMERCIAL

## 2019-08-24 VITALS
TEMPERATURE: 97.5 F | DIASTOLIC BLOOD PRESSURE: 82 MMHG | HEART RATE: 77 BPM | BODY MASS INDEX: 41.95 KG/M2 | SYSTOLIC BLOOD PRESSURE: 128 MMHG | HEIGHT: 70 IN | OXYGEN SATURATION: 95 % | WEIGHT: 293 LBS | RESPIRATION RATE: 16 BRPM

## 2019-08-24 PROBLEM — K43.2 VENTRAL INCISIONAL HERNIA: Status: ACTIVE | Noted: 2019-08-24

## 2019-08-24 LAB
ALBUMIN SERPL-MCNC: 4 G/DL (ref 3.4–5)
ALP BLD-CCNC: 112 U/L (ref 40–129)
ALT SERPL-CCNC: 14 U/L (ref 10–40)
ANION GAP SERPL CALCULATED.3IONS-SCNC: 13 MMOL/L (ref 3–16)
AST SERPL-CCNC: 19 U/L (ref 15–37)
BASOPHILS ABSOLUTE: 0 K/UL (ref 0–0.2)
BASOPHILS RELATIVE PERCENT: 0.4 %
BILIRUB SERPL-MCNC: 0.5 MG/DL (ref 0–1)
BILIRUBIN DIRECT: <0.2 MG/DL (ref 0–0.3)
BILIRUBIN, INDIRECT: NORMAL MG/DL (ref 0–1)
BUN BLDV-MCNC: 19 MG/DL (ref 7–20)
CALCIUM SERPL-MCNC: 9.4 MG/DL (ref 8.3–10.6)
CHLORIDE BLD-SCNC: 102 MMOL/L (ref 99–110)
CO2: 21 MMOL/L (ref 21–32)
CREAT SERPL-MCNC: 0.8 MG/DL (ref 0.6–1.1)
EOSINOPHILS ABSOLUTE: 0 K/UL (ref 0–0.6)
EOSINOPHILS RELATIVE PERCENT: 0 %
GFR AFRICAN AMERICAN: >60
GFR NON-AFRICAN AMERICAN: >60
GLUCOSE BLD-MCNC: 128 MG/DL (ref 70–99)
HCT VFR BLD CALC: 34.3 % (ref 36–48)
HEMOGLOBIN: 10.3 G/DL (ref 12–16)
LACTIC ACID: 0.9 MMOL/L (ref 0.4–2)
LIPASE: 23 U/L (ref 13–60)
LYMPHOCYTES ABSOLUTE: 1 K/UL (ref 1–5.1)
LYMPHOCYTES RELATIVE PERCENT: 13 %
MCH RBC QN AUTO: 21.3 PG (ref 26–34)
MCHC RBC AUTO-ENTMCNC: 29.9 G/DL (ref 31–36)
MCV RBC AUTO: 71.2 FL (ref 80–100)
MONOCYTES ABSOLUTE: 0.4 K/UL (ref 0–1.3)
MONOCYTES RELATIVE PERCENT: 5.5 %
NEUTROPHILS ABSOLUTE: 6.3 K/UL (ref 1.7–7.7)
NEUTROPHILS RELATIVE PERCENT: 81.1 %
PDW BLD-RTO: 21.6 % (ref 12.4–15.4)
PLATELET # BLD: 328 K/UL (ref 135–450)
PMV BLD AUTO: 8.5 FL (ref 5–10.5)
POTASSIUM SERPL-SCNC: 4.8 MMOL/L (ref 3.5–5.1)
RBC # BLD: 4.82 M/UL (ref 4–5.2)
SODIUM BLD-SCNC: 136 MMOL/L (ref 136–145)
TOTAL PROTEIN: 7.5 G/DL (ref 6.4–8.2)
WBC # BLD: 7.7 K/UL (ref 4–11)

## 2019-08-24 PROCEDURE — 74177 CT ABD & PELVIS W/CONTRAST: CPT

## 2019-08-24 PROCEDURE — G0378 HOSPITAL OBSERVATION PER HR: HCPCS

## 2019-08-24 PROCEDURE — 6360000002 HC RX W HCPCS: Performed by: STUDENT IN AN ORGANIZED HEALTH CARE EDUCATION/TRAINING PROGRAM

## 2019-08-24 PROCEDURE — 96376 TX/PRO/DX INJ SAME DRUG ADON: CPT

## 2019-08-24 PROCEDURE — 2580000003 HC RX 258: Performed by: STUDENT IN AN ORGANIZED HEALTH CARE EDUCATION/TRAINING PROGRAM

## 2019-08-24 PROCEDURE — 83690 ASSAY OF LIPASE: CPT

## 2019-08-24 PROCEDURE — 6360000002 HC RX W HCPCS: Performed by: PHYSICIAN ASSISTANT

## 2019-08-24 PROCEDURE — 74019 RADEX ABDOMEN 2 VIEWS: CPT

## 2019-08-24 PROCEDURE — 80076 HEPATIC FUNCTION PANEL: CPT

## 2019-08-24 PROCEDURE — 80048 BASIC METABOLIC PNL TOTAL CA: CPT

## 2019-08-24 PROCEDURE — 83605 ASSAY OF LACTIC ACID: CPT

## 2019-08-24 PROCEDURE — 99223 1ST HOSP IP/OBS HIGH 75: CPT | Performed by: SURGERY

## 2019-08-24 PROCEDURE — 96375 TX/PRO/DX INJ NEW DRUG ADDON: CPT

## 2019-08-24 PROCEDURE — 85025 COMPLETE CBC W/AUTO DIFF WBC: CPT

## 2019-08-24 PROCEDURE — 6370000000 HC RX 637 (ALT 250 FOR IP): Performed by: EMERGENCY MEDICINE

## 2019-08-24 PROCEDURE — 96374 THER/PROPH/DIAG INJ IV PUSH: CPT

## 2019-08-24 PROCEDURE — 6360000004 HC RX CONTRAST MEDICATION: Performed by: EMERGENCY MEDICINE

## 2019-08-24 RX ORDER — SODIUM CHLORIDE 0.9 % (FLUSH) 0.9 %
10 SYRINGE (ML) INJECTION EVERY 12 HOURS SCHEDULED
Status: DISCONTINUED | OUTPATIENT
Start: 2019-08-24 | End: 2019-08-24 | Stop reason: HOSPADM

## 2019-08-24 RX ORDER — SODIUM CHLORIDE, SODIUM LACTATE, POTASSIUM CHLORIDE, CALCIUM CHLORIDE 600; 310; 30; 20 MG/100ML; MG/100ML; MG/100ML; MG/100ML
INJECTION, SOLUTION INTRAVENOUS CONTINUOUS
Status: DISCONTINUED | OUTPATIENT
Start: 2019-08-24 | End: 2019-08-24

## 2019-08-24 RX ORDER — SODIUM CHLORIDE 0.9 % (FLUSH) 0.9 %
10 SYRINGE (ML) INJECTION PRN
Status: DISCONTINUED | OUTPATIENT
Start: 2019-08-24 | End: 2019-08-24 | Stop reason: HOSPADM

## 2019-08-24 RX ORDER — OXYCODONE HYDROCHLORIDE AND ACETAMINOPHEN 5; 325 MG/1; MG/1
1 TABLET ORAL ONCE
Status: COMPLETED | OUTPATIENT
Start: 2019-08-24 | End: 2019-08-24

## 2019-08-24 RX ORDER — ONDANSETRON 2 MG/ML
4 INJECTION INTRAMUSCULAR; INTRAVENOUS ONCE
Status: COMPLETED | OUTPATIENT
Start: 2019-08-24 | End: 2019-08-24

## 2019-08-24 RX ORDER — PANTOPRAZOLE SODIUM 40 MG/1
40 TABLET, DELAYED RELEASE ORAL
Status: DISCONTINUED | OUTPATIENT
Start: 2019-08-25 | End: 2019-08-24 | Stop reason: HOSPADM

## 2019-08-24 RX ORDER — ONDANSETRON 2 MG/ML
4 INJECTION INTRAMUSCULAR; INTRAVENOUS EVERY 6 HOURS PRN
Status: DISCONTINUED | OUTPATIENT
Start: 2019-08-24 | End: 2019-08-24 | Stop reason: HOSPADM

## 2019-08-24 RX ORDER — ALBUTEROL SULFATE 2.5 MG/3ML
2.5 SOLUTION RESPIRATORY (INHALATION) EVERY 4 HOURS PRN
Status: DISCONTINUED | OUTPATIENT
Start: 2019-08-24 | End: 2019-08-24 | Stop reason: HOSPADM

## 2019-08-24 RX ORDER — DILTIAZEM HYDROCHLORIDE 60 MG/1
60 TABLET, FILM COATED ORAL EVERY 8 HOURS PRN
Status: DISCONTINUED | OUTPATIENT
Start: 2019-08-24 | End: 2019-08-24 | Stop reason: HOSPADM

## 2019-08-24 RX ORDER — MORPHINE SULFATE 2 MG/ML
2 INJECTION, SOLUTION INTRAMUSCULAR; INTRAVENOUS EVERY 4 HOURS PRN
Status: DISCONTINUED | OUTPATIENT
Start: 2019-08-24 | End: 2019-08-24 | Stop reason: HOSPADM

## 2019-08-24 RX ORDER — MORPHINE SULFATE 4 MG/ML
4 INJECTION, SOLUTION INTRAMUSCULAR; INTRAVENOUS ONCE
Status: COMPLETED | OUTPATIENT
Start: 2019-08-24 | End: 2019-08-24

## 2019-08-24 RX ADMIN — ONDANSETRON 4 MG: 2 INJECTION INTRAMUSCULAR; INTRAVENOUS at 00:34

## 2019-08-24 RX ADMIN — IOHEXOL 50 ML: 240 INJECTION, SOLUTION INTRATHECAL; INTRAVASCULAR; INTRAVENOUS; ORAL at 01:55

## 2019-08-24 RX ADMIN — MORPHINE SULFATE 2 MG: 2 INJECTION, SOLUTION INTRAMUSCULAR; INTRAVENOUS at 17:40

## 2019-08-24 RX ADMIN — SODIUM CHLORIDE, POTASSIUM CHLORIDE, SODIUM LACTATE AND CALCIUM CHLORIDE: 600; 310; 30; 20 INJECTION, SOLUTION INTRAVENOUS at 05:18

## 2019-08-24 RX ADMIN — MORPHINE SULFATE 2 MG: 2 INJECTION, SOLUTION INTRAMUSCULAR; INTRAVENOUS at 13:34

## 2019-08-24 RX ADMIN — Medication 10 ML: at 09:23

## 2019-08-24 RX ADMIN — MORPHINE SULFATE 4 MG: 4 INJECTION INTRAVENOUS at 00:34

## 2019-08-24 RX ADMIN — MORPHINE SULFATE 2 MG: 2 INJECTION, SOLUTION INTRAMUSCULAR; INTRAVENOUS at 09:28

## 2019-08-24 RX ADMIN — MORPHINE SULFATE 2 MG: 2 INJECTION, SOLUTION INTRAMUSCULAR; INTRAVENOUS at 05:18

## 2019-08-24 RX ADMIN — OXYCODONE HYDROCHLORIDE AND ACETAMINOPHEN 1 TABLET: 5; 325 TABLET ORAL at 03:58

## 2019-08-24 RX ADMIN — IOPAMIDOL 80 ML: 755 INJECTION, SOLUTION INTRAVENOUS at 01:56

## 2019-08-24 ASSESSMENT — PAIN SCALES - GENERAL
PAINLEVEL_OUTOF10: 8
PAINLEVEL_OUTOF10: 8
PAINLEVEL_OUTOF10: 6
PAINLEVEL_OUTOF10: 8
PAINLEVEL_OUTOF10: 8
PAINLEVEL_OUTOF10: 7
PAINLEVEL_OUTOF10: 5
PAINLEVEL_OUTOF10: 7

## 2019-08-24 ASSESSMENT — PAIN DESCRIPTION - ONSET
ONSET: ON-GOING

## 2019-08-24 ASSESSMENT — PAIN DESCRIPTION - FREQUENCY
FREQUENCY: CONTINUOUS

## 2019-08-24 ASSESSMENT — PAIN DESCRIPTION - PAIN TYPE
TYPE: ACUTE PAIN

## 2019-08-24 ASSESSMENT — ENCOUNTER SYMPTOMS
RHINORRHEA: 0
VOMITING: 0
CONSTIPATION: 1
NAUSEA: 1
SHORTNESS OF BREATH: 0
ABDOMINAL PAIN: 1
COUGH: 0
COLOR CHANGE: 0
SORE THROAT: 0

## 2019-08-24 ASSESSMENT — PAIN DESCRIPTION - ORIENTATION
ORIENTATION: MID

## 2019-08-24 ASSESSMENT — PAIN DESCRIPTION - DESCRIPTORS
DESCRIPTORS: ACHING;CONSTANT
DESCRIPTORS: ACHING;CRAMPING
DESCRIPTORS: ACHING;CRAMPING

## 2019-08-24 ASSESSMENT — PAIN DESCRIPTION - LOCATION
LOCATION: ABDOMEN

## 2019-08-24 ASSESSMENT — PAIN DESCRIPTION - PROGRESSION
CLINICAL_PROGRESSION: NOT CHANGED
CLINICAL_PROGRESSION: NOT CHANGED

## 2019-08-24 ASSESSMENT — PAIN - FUNCTIONAL ASSESSMENT
PAIN_FUNCTIONAL_ASSESSMENT: ACTIVITIES ARE NOT PREVENTED
PAIN_FUNCTIONAL_ASSESSMENT: ACTIVITIES ARE NOT PREVENTED

## 2019-08-24 NOTE — TELEPHONE ENCOUNTER
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 Reports severe mid abdominal pain 10/10    Has been told she has a very large hernia that is non reducible. Surgery consult pending-not until 9/12  Nothing helps   Small BM's only, has to push to relieve  Pain scale 10/10  Worry of incarcerated hernia  Currently working and is concerned about leaving work. Had a small amount to eat around 2025 this sonia. Food makes it worse.   Sounds in obvious distress    Reason for Disposition   [1] SEVERE pain (e.g., excruciating) AND [2] present > 1 hour    Protocols used: ABDOMINAL PAIN - [facility]

## 2019-08-24 NOTE — PROGRESS NOTES
Patient admitted to unit and oriented to room. Alert and oriented x4. VSS. Patient complains of abdomen pain, given IV morphine. NPO currently. Fall precautions in place, will continue to monitor.

## 2019-08-24 NOTE — ED PROVIDER NOTES
female presenting with complaints of a ventral hernia that she has been unable to reduce over the last couple of weeks with associated pain, specifically after eating. IV access was established. She was given IV morphine with Zofran for pain control. Labs included unremarkable CBC, renal panel, liver panel and lipase. CT of the abdomen and pelvis shows a large hiatal hernia. Also just superior to the previously repaired hernia site, there is a 9.3 cm hernia containing short segment of the transverse colon with fat stranding within the hernia sac, incarceration cannot be ruled out. No definite bowel obstruction. Surgery was consulted regarding the patient's presentation and CT findings. She will be admitted for observation, it was determined that her hernia was reducible but would pop back out. Given that she is anticoagulated, surgery will likely be planned as an outpatient after she holds her Eliquis. She is in stable condition upon waiting transport to the floor. This patient was also evaluated by the attending physician. All care plans were discussed and agreed upon. Clinical Impression     1. Pain of upper abdomen    2. Ventral hernia without obstruction or gangrene    3.  Anticoagulated        Disposition       DISPOSITION  Admitted     Taj [place], [place]  08/24/19 2115

## 2019-08-24 NOTE — H&P
08/24/2019    BILITOT 0.5 08/24/2019    ALKPHOS 112 08/24/2019     Lab Results   Component Value Date    CHOL 188 06/28/2019    HDL 87 06/28/2019    TRIG 104 06/28/2019     UA: Lab Results   Component Value Date    NITRITE neg 10/26/2017    COLORU Yellow 04/30/2019    PHUR 6.0 04/30/2019    LABCAST 0-1 Hyaline 04/30/2019    WBCUA 3-5 04/30/2019    RBCUA 0-2 04/30/2019    BACTERIA 4+ 04/30/2019    CLARITYU Clear 04/30/2019    SPECGRAV 1.015 04/30/2019    LEUKOCYTESUR TRACE 04/30/2019    UROBILINOGEN 0.2 04/30/2019    BILIRUBINUR Negative 04/30/2019    BILIRUBINUR neg 10/26/2017    BLOODU LARGE 04/30/2019    GLUCOSEU Negative 04/30/2019    AMORPHOUS 1+ 04/30/2019     Imaging:   CT ABDOMEN PELVIS W IV CONTRAST Additional Contrast? Oral   Final Result       1. Just superior to the previously repaired hernia site is a 9.3 cm    hernia containing short segment of the transverse colon. There is fat    stranding within the hernia sac, cannot rule out incarceration. No    definite bowel obstruction. 2.  Large hiatal hernia. Assessment/Plan: This is a 48 y.o. female with past medical history of morbid obesity status post Romie-en-Y in 2003 at an outside facility, atrial fibrillation for which she takes Eliquis (last dose yesterday) and is status post ablation, GERD, hiatal hernia, and previous ventral hernia status post IPOM at an outside facility in 2004, who presents with a reducible ventral hernia in the upper abdomen. Patient has an appointment scheduled with Dr. Reuben Franco on 9/12 for evaluation for elective repair. Patient's history is consistent with an incarcerated hernia; however, the hernia is reducible relatively easily in the ED at bedside without any significant tenderness. She has no overlying skin changes, no leukocytosis, normal serum lactic acid, and no significant electrolyte derangements.  There are also no signs of strangulation on imaging.     - Will admit for observation   - Ideally will await

## 2019-08-25 ENCOUNTER — APPOINTMENT (OUTPATIENT)
Dept: GENERAL RADIOLOGY | Age: 54
DRG: 336 | End: 2019-08-25
Payer: COMMERCIAL

## 2019-08-25 ENCOUNTER — HOSPITAL ENCOUNTER (INPATIENT)
Age: 54
LOS: 2 days | Discharge: HOME | DRG: 336 | End: 2019-08-27
Attending: EMERGENCY MEDICINE | Admitting: SURGERY
Payer: COMMERCIAL

## 2019-08-25 DIAGNOSIS — R10.13 EPIGASTRIC PAIN: Primary | ICD-10-CM

## 2019-08-25 DIAGNOSIS — K43.9 VENTRAL HERNIA WITHOUT OBSTRUCTION OR GANGRENE: ICD-10-CM

## 2019-08-25 PROBLEM — K43.0 INCISIONAL HERNIA WITH OBSTRUCTION, WITHOUT GANGRENE: Status: ACTIVE | Noted: 2019-08-25

## 2019-08-25 LAB
ABO/RH: NORMAL
ALBUMIN SERPL-MCNC: 3.8 G/DL (ref 3.4–5)
ANION GAP SERPL CALCULATED.3IONS-SCNC: 11 MMOL/L (ref 3–16)
ANTIBODY SCREEN: NORMAL
BASOPHILS ABSOLUTE: 0 K/UL (ref 0–0.2)
BASOPHILS RELATIVE PERCENT: 0.2 %
BUN BLDV-MCNC: 18 MG/DL (ref 7–20)
CALCIUM SERPL-MCNC: 9 MG/DL (ref 8.3–10.6)
CHLORIDE BLD-SCNC: 103 MMOL/L (ref 99–110)
CO2: 25 MMOL/L (ref 21–32)
CREAT SERPL-MCNC: 0.8 MG/DL (ref 0.6–1.1)
EOSINOPHILS ABSOLUTE: 0.1 K/UL (ref 0–0.6)
EOSINOPHILS RELATIVE PERCENT: 1.3 %
GFR AFRICAN AMERICAN: >60
GFR NON-AFRICAN AMERICAN: >60
GLUCOSE BLD-MCNC: 90 MG/DL (ref 70–99)
HCT VFR BLD CALC: 34.5 % (ref 36–48)
HEMOGLOBIN: 10.5 G/DL (ref 12–16)
INR BLD: 0.95 (ref 0.86–1.14)
LYMPHOCYTES ABSOLUTE: 1.3 K/UL (ref 1–5.1)
LYMPHOCYTES RELATIVE PERCENT: 13.8 %
MAGNESIUM: 2.1 MG/DL (ref 1.8–2.4)
MCH RBC QN AUTO: 21.9 PG (ref 26–34)
MCHC RBC AUTO-ENTMCNC: 30.6 G/DL (ref 31–36)
MCV RBC AUTO: 71.5 FL (ref 80–100)
MONOCYTES ABSOLUTE: 0.7 K/UL (ref 0–1.3)
MONOCYTES RELATIVE PERCENT: 7.4 %
NEUTROPHILS ABSOLUTE: 7 K/UL (ref 1.7–7.7)
NEUTROPHILS RELATIVE PERCENT: 77.3 %
PDW BLD-RTO: 23.4 % (ref 12.4–15.4)
PHOSPHORUS: 4.3 MG/DL (ref 2.5–4.9)
PLATELET # BLD: 285 K/UL (ref 135–450)
PMV BLD AUTO: 8.8 FL (ref 5–10.5)
POTASSIUM SERPL-SCNC: 4.4 MMOL/L (ref 3.5–5.1)
PROTHROMBIN TIME: 10.8 SEC (ref 9.8–13)
RBC # BLD: 4.82 M/UL (ref 4–5.2)
SODIUM BLD-SCNC: 139 MMOL/L (ref 136–145)
WBC # BLD: 9.1 K/UL (ref 4–11)

## 2019-08-25 PROCEDURE — 2580000003 HC RX 258: Performed by: STUDENT IN AN ORGANIZED HEALTH CARE EDUCATION/TRAINING PROGRAM

## 2019-08-25 PROCEDURE — 85025 COMPLETE CBC W/AUTO DIFF WBC: CPT

## 2019-08-25 PROCEDURE — 1200000000 HC SEMI PRIVATE

## 2019-08-25 PROCEDURE — 6360000002 HC RX W HCPCS

## 2019-08-25 PROCEDURE — 96376 TX/PRO/DX INJ SAME DRUG ADON: CPT

## 2019-08-25 PROCEDURE — 93005 ELECTROCARDIOGRAM TRACING: CPT | Performed by: STUDENT IN AN ORGANIZED HEALTH CARE EDUCATION/TRAINING PROGRAM

## 2019-08-25 PROCEDURE — 6370000000 HC RX 637 (ALT 250 FOR IP): Performed by: INTERNAL MEDICINE

## 2019-08-25 PROCEDURE — 83735 ASSAY OF MAGNESIUM: CPT

## 2019-08-25 PROCEDURE — 85610 PROTHROMBIN TIME: CPT

## 2019-08-25 PROCEDURE — 86900 BLOOD TYPING SEROLOGIC ABO: CPT

## 2019-08-25 PROCEDURE — 6360000002 HC RX W HCPCS: Performed by: STUDENT IN AN ORGANIZED HEALTH CARE EDUCATION/TRAINING PROGRAM

## 2019-08-25 PROCEDURE — 96374 THER/PROPH/DIAG INJ IV PUSH: CPT

## 2019-08-25 PROCEDURE — 86901 BLOOD TYPING SEROLOGIC RH(D): CPT

## 2019-08-25 PROCEDURE — 99223 1ST HOSP IP/OBS HIGH 75: CPT | Performed by: SURGERY

## 2019-08-25 PROCEDURE — 80069 RENAL FUNCTION PANEL: CPT

## 2019-08-25 PROCEDURE — 94799 UNLISTED PULMONARY SVC/PX: CPT

## 2019-08-25 PROCEDURE — 99285 EMERGENCY DEPT VISIT HI MDM: CPT

## 2019-08-25 PROCEDURE — 86850 RBC ANTIBODY SCREEN: CPT

## 2019-08-25 PROCEDURE — 71046 X-RAY EXAM CHEST 2 VIEWS: CPT

## 2019-08-25 PROCEDURE — 96361 HYDRATE IV INFUSION ADD-ON: CPT

## 2019-08-25 PROCEDURE — 96375 TX/PRO/DX INJ NEW DRUG ADDON: CPT

## 2019-08-25 RX ORDER — DILTIAZEM HYDROCHLORIDE 60 MG/1
60 TABLET, FILM COATED ORAL EVERY 8 HOURS PRN
Status: DISCONTINUED | OUTPATIENT
Start: 2019-08-25 | End: 2019-08-25

## 2019-08-25 RX ORDER — TORSEMIDE 20 MG/1
20 TABLET ORAL 2 TIMES DAILY
Status: DISCONTINUED | OUTPATIENT
Start: 2019-08-25 | End: 2019-08-27 | Stop reason: HOSPADM

## 2019-08-25 RX ORDER — LANOLIN ALCOHOL/MO/W.PET/CERES
20 CREAM (GRAM) TOPICAL NIGHTLY PRN
COMMUNITY
End: 2020-12-18

## 2019-08-25 RX ORDER — UREA 10 %
20 LOTION (ML) TOPICAL NIGHTLY PRN
Status: DISCONTINUED | OUTPATIENT
Start: 2019-08-25 | End: 2019-08-27 | Stop reason: HOSPADM

## 2019-08-25 RX ORDER — ALBUTEROL SULFATE 2.5 MG/3ML
2.5 SOLUTION RESPIRATORY (INHALATION) EVERY 4 HOURS PRN
Status: DISCONTINUED | OUTPATIENT
Start: 2019-08-25 | End: 2019-08-27 | Stop reason: HOSPADM

## 2019-08-25 RX ORDER — ONDANSETRON 2 MG/ML
8 INJECTION INTRAMUSCULAR; INTRAVENOUS ONCE
Status: COMPLETED | OUTPATIENT
Start: 2019-08-25 | End: 2019-08-25

## 2019-08-25 RX ORDER — SODIUM CHLORIDE, SODIUM LACTATE, POTASSIUM CHLORIDE, CALCIUM CHLORIDE 600; 310; 30; 20 MG/100ML; MG/100ML; MG/100ML; MG/100ML
1000 INJECTION, SOLUTION INTRAVENOUS ONCE
Status: COMPLETED | OUTPATIENT
Start: 2019-08-25 | End: 2019-08-25

## 2019-08-25 RX ORDER — HYDROCODONE BITARTRATE AND ACETAMINOPHEN 5; 325 MG/1; MG/1
1 TABLET ORAL EVERY 8 HOURS PRN
Status: DISCONTINUED | OUTPATIENT
Start: 2019-08-25 | End: 2019-08-26

## 2019-08-25 RX ORDER — TORSEMIDE 20 MG/1
20 TABLET ORAL 2 TIMES DAILY
COMMUNITY
End: 2020-06-26 | Stop reason: SDUPTHER

## 2019-08-25 RX ORDER — FORMOTEROL FUMARATE 20 UG/2ML
20 SOLUTION RESPIRATORY (INHALATION) 2 TIMES DAILY
Status: DISCONTINUED | OUTPATIENT
Start: 2019-08-25 | End: 2019-08-27 | Stop reason: HOSPADM

## 2019-08-25 RX ORDER — BUDESONIDE 0.5 MG/2ML
0.5 INHALANT ORAL 2 TIMES DAILY
Status: DISCONTINUED | OUTPATIENT
Start: 2019-08-25 | End: 2019-08-27 | Stop reason: HOSPADM

## 2019-08-25 RX ADMIN — SODIUM CHLORIDE, POTASSIUM CHLORIDE, SODIUM LACTATE AND CALCIUM CHLORIDE 1000 ML: 600; 310; 30; 20 INJECTION, SOLUTION INTRAVENOUS at 12:36

## 2019-08-25 RX ADMIN — HYDROMORPHONE HYDROCHLORIDE 1 MG: 1 INJECTION, SOLUTION INTRAMUSCULAR; INTRAVENOUS; SUBCUTANEOUS at 12:37

## 2019-08-25 RX ADMIN — HYDROCODONE BITARTRATE AND ACETAMINOPHEN 1 TABLET: 5; 325 TABLET ORAL at 21:54

## 2019-08-25 RX ADMIN — ONDANSETRON 8 MG: 2 INJECTION INTRAMUSCULAR; INTRAVENOUS at 12:37

## 2019-08-25 RX ADMIN — HYDROMORPHONE HYDROCHLORIDE 1 MG: 1 INJECTION, SOLUTION INTRAMUSCULAR; INTRAVENOUS; SUBCUTANEOUS at 16:59

## 2019-08-25 RX ADMIN — Medication 1 MG: at 16:59

## 2019-08-25 ASSESSMENT — PAIN SCALES - GENERAL
PAINLEVEL_OUTOF10: 5
PAINLEVEL_OUTOF10: 4
PAINLEVEL_OUTOF10: 2
PAINLEVEL_OUTOF10: 0
PAINLEVEL_OUTOF10: 9
PAINLEVEL_OUTOF10: 7
PAINLEVEL_OUTOF10: 5

## 2019-08-25 ASSESSMENT — PAIN DESCRIPTION - DESCRIPTORS
DESCRIPTORS: STABBING;PRESSURE
DESCRIPTORS: PRESSURE

## 2019-08-25 ASSESSMENT — PAIN DESCRIPTION - LOCATION
LOCATION: ABDOMEN

## 2019-08-25 ASSESSMENT — PAIN DESCRIPTION - PAIN TYPE
TYPE: ACUTE PAIN

## 2019-08-25 ASSESSMENT — PAIN DESCRIPTION - PROGRESSION
CLINICAL_PROGRESSION: GRADUALLY WORSENING
CLINICAL_PROGRESSION: NOT CHANGED

## 2019-08-25 ASSESSMENT — PAIN DESCRIPTION - ONSET
ONSET: ON-GOING
ONSET: GRADUAL

## 2019-08-25 ASSESSMENT — PAIN DESCRIPTION - FREQUENCY
FREQUENCY: CONTINUOUS
FREQUENCY: INTERMITTENT

## 2019-08-25 ASSESSMENT — PAIN - FUNCTIONAL ASSESSMENT: PAIN_FUNCTIONAL_ASSESSMENT: ACTIVITIES ARE NOT PREVENTED

## 2019-08-25 ASSESSMENT — PAIN DESCRIPTION - ORIENTATION
ORIENTATION: MID
ORIENTATION: MID

## 2019-08-25 NOTE — CONSULTS
[penicillins]; Ranitidine hcl; and Sulfa antibiotics    Social History:   · TOBACCO:   reports that she has never smoked. She has never used smokeless tobacco.  · ETOH:   reports that she drinks alcohol. · DRUGS : none  · Patient currently lives alone  ·   Family History:       Problem Relation Age of Onset    Cancer Mother         lung    Arthritis Mother     Cirrhosis Father     Asthma Maternal Aunt    ·     Review of Systems:  Constitutional: No lack of energy, unexplained weight loss or gain, fever, chills, diaphoresis; Reduced appetite  HENT: No congestion, dental problems, sinus pain, rhinorrhea, dysphagia, odynophagia, hearing loss  Eyes: No eye discharge, redness, pain. No visual changes, photophobia  Respiratory: No chest tightness, cough, shortness of breath, choking; Mild wheezing  Cardio: No chest pain, palpitations; Leg swelling  GI: No blood in stool, constipation, diarrhea, vomiting; Abdominal pain and distention, nausea, dry-heaving  Endocrine: No heat or cold intolerance, no polyuria  : No difficulty urinating, dysuria, increased urinary frequency or urgency  MSK: No arthralgias, myalgias, back pain, neck pain or stiffness  Skin: No rash, wound, pallor, color change  Immuno: Not immunocompromised  Neuro: No dizziness, lightheadedness, numbness, headache, weakness, seizures, tremors  Heme/Lymph: Does not bruise or bleed easily, no lymphadenopathy  Psych: No agitation, behavioral problems, confusion, anxiety, depression, sleep disturbance, hyperactivity        ROS: A 10 point review of systems was conducted, significant findings as noted in HPI. Physical Exam:  Const: Well nourished, not distressed, not diaphoretic, appears stated age;  Morbidly obese  Eyes: PERRL, EOMI, no conjunctival injection, no discharge  HENT: Normocephalic, atraumatic, oropharynx not erythematous, no postnasal drip  Neck: Supple, no JVD appreciated, no thyromegaly, trachea midline  CV: Regular rate, regular rhythm,

## 2019-08-25 NOTE — H&P
orally prn daily 90 tablet 0    LORazepam (ATIVAN) 1 MG tablet Take 1 tablet by mouth every 8 hours as needed for Anxiety (insomnia) for up to 30 days. 30 tablet 0    albuterol sulfate HFA (VENTOLIN HFA) 108 (90 Base) MCG/ACT inhaler INHALE 2 PUFFS BY MOUTH EVERY 4 TO 6 HOURS AS NEEDED 1 Inhaler 0    fluocinonide (LIDEX) 0.05 % ointment APPLY SPARINGLY TO AFFECTED AREA(S) TWO TIMES A DAY AS NEEDED FOR FLARES, UP TO 2 WEEKS AT A TIME. DO NOT APPLY ON CLEAR SKIN. 60 g 1    apixaban (ELIQUIS) 5 MG TABS tablet TAKE ONE TABLET BY MOUTH TWICE A DAY 60 tablet 3    torsemide (DEMADEX) 20 MG tablet Take 1 tablet by mouth 2 times daily 60 tablet 3    Handicap Placard MISC by Does not apply route 1 each 0    DULERA 200-5 MCG/ACT inhaler INHALE ONE PUFF INTO THE LUNGS TWO TIMES A DAY 13 g 5    diltiazem (CARDIZEM) 60 MG tablet Take 1 tablet by mouth every 8 hours as needed (Palpitations) 120 tablet 1    Spacer/Aero-Holding Chambers (AEROCHAMBER PLUS-FLOW SIGNAL) MISC Use with MDI as instructed 2 each 3    Cholecalciferol (VITAMIN D3) 2000 UNITS CAPS Take 5 capsules by mouth daily 150 capsule 3     Current Meds    ondansetron (ZOFRAN) injection 8 mg Once   HYDROmorphone (DILAUDID) injection 1 mg Once   lactated ringers infusion 1,000 mL Once       Family History:   Family History   Problem Relation Age of Onset    Cancer Mother         lung    Arthritis Mother     Cirrhosis Father     Asthma Maternal Aunt        Social History:   TOBACCO:   reports that she has never smoked. She has never used smokeless tobacco.  ETOH:   reports that she drinks alcohol. DRUGS:   reports that she does not use drugs. ROS: A 10 point review of systems was conducted, significant findings as noted in HPI.     Physical exam:    Vitals:    08/25/19 1110   BP: (!) 142/87   Pulse: 78   Resp: 16   Temp: 98.2 °F (36.8 °C)   SpO2: 96%       General appearance: alert, resting in bed  Neuro: Sensory-motor grossly intact   HEENT: Mucous

## 2019-08-25 NOTE — ED PROVIDER NOTES
mg/dL    BUN 18 7 - 20 mg/dL    CREATININE 0.8 0.6 - 1.1 mg/dL    GFR Non-African American >60 >60    GFR African American >60 >60    Calcium 9.0 8.3 - 10.6 mg/dL    Phosphorus 4.3 2.5 - 4.9 mg/dL    Alb 3.8 3.4 - 5.0 g/dL   Magnesium   Result Value Ref Range    Magnesium 2.10 1.80 - 2.40 mg/dL   TYPE AND SCREEN   Result Value Ref Range    ABO/Rh O POS     Antibody Screen NEG          RECENT VITALS:  BP: 129/70, Temp: 98.2 °F (36.8 °C), Pulse: 70,Resp: 18, SpO2: 98 %     Procedures         ED Course     Nursing Notes, Past Medical Hx, Past Surgical Hx, Social Hx, Allergies, and Family Hx were reviewed. The patient was given the followingmedications:  Orders Placed This Encounter   Medications    ondansetron (ZOFRAN) injection 8 mg    HYDROmorphone (DILAUDID) injection 1 mg    lactated ringers infusion 1,000 mL       CONSULTS:  C/ Yuval Sloan 19 / ASSESSMENT / Flaca Magnus is a 48 y.o. female complaining of abdominal pain and ventral hernia. On initial presentation, the patient's vital signs were significant for blood pressure of 142/87, otherwise afebrile and hemodynamically stable. On exam, patient is in mild distress. There is an approximately 3 cm hernia just right of the midline that is reducible and associated with tenderness. She had one small volume bowel movement this a.m. and has been passing gas. Patient spoke with her surgeon prior to arrival in the emergency department, who told her he would see her in the emergency department and perform emergent surgery. On presentation, surgery was consulted. Preoperative labs, chest x-ray, and EKG were obtained. The patient has been n.p.o. since 1900 last p.m. She is on Xarelto for atrial fibrillation, however she states that she no longer has atrial fibrillation. She has forgotten her doses of Xarelto for the last 2 days and has not taken it since 8-23-19.   Her symptoms were treated with Dilaudid

## 2019-08-26 ENCOUNTER — ANESTHESIA EVENT (OUTPATIENT)
Dept: OPERATING ROOM | Age: 54
DRG: 336 | End: 2019-08-26
Payer: COMMERCIAL

## 2019-08-26 ENCOUNTER — ANESTHESIA (OUTPATIENT)
Dept: OPERATING ROOM | Age: 54
DRG: 336 | End: 2019-08-26
Payer: COMMERCIAL

## 2019-08-26 VITALS — SYSTOLIC BLOOD PRESSURE: 156 MMHG | TEMPERATURE: 96.3 F | OXYGEN SATURATION: 99 % | DIASTOLIC BLOOD PRESSURE: 74 MMHG

## 2019-08-26 LAB
ALBUMIN SERPL-MCNC: 3.7 G/DL (ref 3.4–5)
ANION GAP SERPL CALCULATED.3IONS-SCNC: 8 MMOL/L (ref 3–16)
BASOPHILS ABSOLUTE: 0.1 K/UL (ref 0–0.2)
BASOPHILS RELATIVE PERCENT: 1.3 %
BUN BLDV-MCNC: 14 MG/DL (ref 7–20)
CALCIUM SERPL-MCNC: 8.7 MG/DL (ref 8.3–10.6)
CHLORIDE BLD-SCNC: 106 MMOL/L (ref 99–110)
CO2: 29 MMOL/L (ref 21–32)
CREAT SERPL-MCNC: 0.9 MG/DL (ref 0.6–1.1)
EKG ATRIAL RATE: 63 BPM
EKG DIAGNOSIS: NORMAL
EKG P AXIS: 33 DEGREES
EKG P-R INTERVAL: 202 MS
EKG Q-T INTERVAL: 416 MS
EKG QRS DURATION: 94 MS
EKG QTC CALCULATION (BAZETT): 425 MS
EKG R AXIS: 31 DEGREES
EKG T AXIS: 28 DEGREES
EKG VENTRICULAR RATE: 63 BPM
EOSINOPHILS ABSOLUTE: 0.2 K/UL (ref 0–0.6)
EOSINOPHILS RELATIVE PERCENT: 3.5 %
GFR AFRICAN AMERICAN: >60
GFR NON-AFRICAN AMERICAN: >60
GLUCOSE BLD-MCNC: 92 MG/DL (ref 70–99)
HCT VFR BLD CALC: 31.3 % (ref 36–48)
HEMOGLOBIN: 9.6 G/DL (ref 12–16)
LYMPHOCYTES ABSOLUTE: 1.3 K/UL (ref 1–5.1)
LYMPHOCYTES RELATIVE PERCENT: 27.5 %
MAGNESIUM: 2.1 MG/DL (ref 1.8–2.4)
MCH RBC QN AUTO: 22.3 PG (ref 26–34)
MCHC RBC AUTO-ENTMCNC: 30.7 G/DL (ref 31–36)
MCV RBC AUTO: 72.8 FL (ref 80–100)
MONOCYTES ABSOLUTE: 0.6 K/UL (ref 0–1.3)
MONOCYTES RELATIVE PERCENT: 12.8 %
NEUTROPHILS ABSOLUTE: 2.7 K/UL (ref 1.7–7.7)
NEUTROPHILS RELATIVE PERCENT: 54.9 %
PDW BLD-RTO: 22.9 % (ref 12.4–15.4)
PHOSPHORUS: 4.9 MG/DL (ref 2.5–4.9)
PLATELET # BLD: 265 K/UL (ref 135–450)
PMV BLD AUTO: 8.9 FL (ref 5–10.5)
POTASSIUM SERPL-SCNC: 4.2 MMOL/L (ref 3.5–5.1)
RBC # BLD: 4.31 M/UL (ref 4–5.2)
SODIUM BLD-SCNC: 143 MMOL/L (ref 136–145)
WBC # BLD: 4.8 K/UL (ref 4–11)

## 2019-08-26 PROCEDURE — 2580000003 HC RX 258: Performed by: STUDENT IN AN ORGANIZED HEALTH CARE EDUCATION/TRAINING PROGRAM

## 2019-08-26 PROCEDURE — 7100000000 HC PACU RECOVERY - FIRST 15 MIN: Performed by: SURGERY

## 2019-08-26 PROCEDURE — 6360000002 HC RX W HCPCS: Performed by: STUDENT IN AN ORGANIZED HEALTH CARE EDUCATION/TRAINING PROGRAM

## 2019-08-26 PROCEDURE — 0DNU4ZZ RELEASE OMENTUM, PERCUTANEOUS ENDOSCOPIC APPROACH: ICD-10-PCS | Performed by: SURGERY

## 2019-08-26 PROCEDURE — 36415 COLL VENOUS BLD VENIPUNCTURE: CPT

## 2019-08-26 PROCEDURE — 94640 AIRWAY INHALATION TREATMENT: CPT

## 2019-08-26 PROCEDURE — 80069 RENAL FUNCTION PANEL: CPT

## 2019-08-26 PROCEDURE — 3700000000 HC ANESTHESIA ATTENDED CARE: Performed by: SURGERY

## 2019-08-26 PROCEDURE — 2500000003 HC RX 250 WO HCPCS: Performed by: NURSE ANESTHETIST, CERTIFIED REGISTERED

## 2019-08-26 PROCEDURE — 2580000003 HC RX 258: Performed by: SURGERY

## 2019-08-26 PROCEDURE — 83735 ASSAY OF MAGNESIUM: CPT

## 2019-08-26 PROCEDURE — 6370000000 HC RX 637 (ALT 250 FOR IP): Performed by: STUDENT IN AN ORGANIZED HEALTH CARE EDUCATION/TRAINING PROGRAM

## 2019-08-26 PROCEDURE — 1200000000 HC SEMI PRIVATE

## 2019-08-26 PROCEDURE — 2780000010 HC IMPLANT OTHER: Performed by: SURGERY

## 2019-08-26 PROCEDURE — 2709999900 HC NON-CHARGEABLE SUPPLY: Performed by: SURGERY

## 2019-08-26 PROCEDURE — 85025 COMPLETE CBC W/AUTO DIFF WBC: CPT

## 2019-08-26 PROCEDURE — 6360000002 HC RX W HCPCS: Performed by: NURSE ANESTHETIST, CERTIFIED REGISTERED

## 2019-08-26 PROCEDURE — 3600000014 HC SURGERY LEVEL 4 ADDTL 15MIN: Performed by: SURGERY

## 2019-08-26 PROCEDURE — L0450 TLSO FLEX TRUNK/THOR PRE OTS: HCPCS | Performed by: SURGERY

## 2019-08-26 PROCEDURE — 49657 PR LAP, RECURRENT INCISIONAL HERNIA REPAIR,INCARCERATED: CPT | Performed by: SURGERY

## 2019-08-26 PROCEDURE — 7100000001 HC PACU RECOVERY - ADDTL 15 MIN: Performed by: SURGERY

## 2019-08-26 PROCEDURE — 3700000001 HC ADD 15 MINUTES (ANESTHESIA): Performed by: SURGERY

## 2019-08-26 PROCEDURE — 3600000004 HC SURGERY LEVEL 4 BASE: Performed by: SURGERY

## 2019-08-26 PROCEDURE — 6360000002 HC RX W HCPCS: Performed by: ANESTHESIOLOGY

## 2019-08-26 PROCEDURE — 2500000003 HC RX 250 WO HCPCS: Performed by: SURGERY

## 2019-08-26 PROCEDURE — C1781 MESH (IMPLANTABLE): HCPCS | Performed by: SURGERY

## 2019-08-26 PROCEDURE — 2720000010 HC SURG SUPPLY STERILE: Performed by: SURGERY

## 2019-08-26 PROCEDURE — 94761 N-INVAS EAR/PLS OXIMETRY MLT: CPT

## 2019-08-26 PROCEDURE — 0WUF4JZ SUPPLEMENT ABDOMINAL WALL WITH SYNTHETIC SUBSTITUTE, PERCUTANEOUS ENDOSCOPIC APPROACH: ICD-10-PCS | Performed by: SURGERY

## 2019-08-26 DEVICE — MESH SURG DIA4.5IN CIR SEPRA TECHNOLOGY VENTRALIGHT: Type: IMPLANTABLE DEVICE | Status: FUNCTIONAL

## 2019-08-26 RX ORDER — SODIUM CHLORIDE, SODIUM LACTATE, POTASSIUM CHLORIDE, CALCIUM CHLORIDE 600; 310; 30; 20 MG/100ML; MG/100ML; MG/100ML; MG/100ML
INJECTION, SOLUTION INTRAVENOUS CONTINUOUS
Status: DISCONTINUED | OUTPATIENT
Start: 2019-08-26 | End: 2019-08-26

## 2019-08-26 RX ORDER — GLYCOPYRROLATE 1 MG/5 ML
SYRINGE (ML) INTRAVENOUS PRN
Status: DISCONTINUED | OUTPATIENT
Start: 2019-08-26 | End: 2019-08-26 | Stop reason: SDUPTHER

## 2019-08-26 RX ORDER — ALBUTEROL SULFATE 2.5 MG/3ML
2.5 SOLUTION RESPIRATORY (INHALATION) EVERY 4 HOURS PRN
Status: DISCONTINUED | OUTPATIENT
Start: 2019-08-26 | End: 2019-08-27 | Stop reason: HOSPADM

## 2019-08-26 RX ORDER — FENTANYL CITRATE 50 UG/ML
INJECTION, SOLUTION INTRAMUSCULAR; INTRAVENOUS PRN
Status: DISCONTINUED | OUTPATIENT
Start: 2019-08-26 | End: 2019-08-26 | Stop reason: SDUPTHER

## 2019-08-26 RX ORDER — SUCCINYLCHOLINE CHLORIDE 20 MG/ML
INJECTION INTRAMUSCULAR; INTRAVENOUS PRN
Status: DISCONTINUED | OUTPATIENT
Start: 2019-08-26 | End: 2019-08-26 | Stop reason: SDUPTHER

## 2019-08-26 RX ORDER — LABETALOL 20 MG/4 ML (5 MG/ML) INTRAVENOUS SYRINGE
5 EVERY 10 MIN PRN
Status: DISCONTINUED | OUTPATIENT
Start: 2019-08-26 | End: 2019-08-26 | Stop reason: HOSPADM

## 2019-08-26 RX ORDER — OXYCODONE HYDROCHLORIDE 5 MG/1
2.5 TABLET ORAL EVERY 4 HOURS PRN
Status: DISCONTINUED | OUTPATIENT
Start: 2019-08-26 | End: 2019-08-27 | Stop reason: HOSPADM

## 2019-08-26 RX ORDER — OXYCODONE HYDROCHLORIDE AND ACETAMINOPHEN 5; 325 MG/1; MG/1
1 TABLET ORAL ONCE
Status: DISCONTINUED | OUTPATIENT
Start: 2019-08-26 | End: 2019-08-26 | Stop reason: HOSPADM

## 2019-08-26 RX ORDER — HYDROMORPHONE HCL 110MG/55ML
PATIENT CONTROLLED ANALGESIA SYRINGE INTRAVENOUS PRN
Status: DISCONTINUED | OUTPATIENT
Start: 2019-08-26 | End: 2019-08-26 | Stop reason: SDUPTHER

## 2019-08-26 RX ORDER — CEFAZOLIN SODIUM 1 G/3ML
INJECTION, POWDER, FOR SOLUTION INTRAMUSCULAR; INTRAVENOUS PRN
Status: DISCONTINUED | OUTPATIENT
Start: 2019-08-26 | End: 2019-08-26 | Stop reason: SDUPTHER

## 2019-08-26 RX ORDER — OXYCODONE HYDROCHLORIDE AND ACETAMINOPHEN 5; 325 MG/1; MG/1
0.5 TABLET ORAL EVERY 4 HOURS PRN
Status: DISCONTINUED | OUTPATIENT
Start: 2019-08-26 | End: 2019-08-26

## 2019-08-26 RX ORDER — ONDANSETRON 2 MG/ML
INJECTION INTRAMUSCULAR; INTRAVENOUS PRN
Status: DISCONTINUED | OUTPATIENT
Start: 2019-08-26 | End: 2019-08-26 | Stop reason: SDUPTHER

## 2019-08-26 RX ORDER — LIDOCAINE HYDROCHLORIDE 20 MG/ML
INJECTION, SOLUTION INTRAVENOUS PRN
Status: DISCONTINUED | OUTPATIENT
Start: 2019-08-26 | End: 2019-08-26 | Stop reason: SDUPTHER

## 2019-08-26 RX ORDER — ONDANSETRON 2 MG/ML
4 INJECTION INTRAMUSCULAR; INTRAVENOUS EVERY 30 MIN PRN
Status: DISCONTINUED | OUTPATIENT
Start: 2019-08-26 | End: 2019-08-26 | Stop reason: HOSPADM

## 2019-08-26 RX ORDER — OXYCODONE HYDROCHLORIDE AND ACETAMINOPHEN 5; 325 MG/1; MG/1
1 TABLET ORAL EVERY 4 HOURS PRN
Status: DISCONTINUED | OUTPATIENT
Start: 2019-08-26 | End: 2019-08-26

## 2019-08-26 RX ORDER — DIPHENHYDRAMINE HYDROCHLORIDE 50 MG/ML
12.5 INJECTION INTRAMUSCULAR; INTRAVENOUS
Status: DISCONTINUED | OUTPATIENT
Start: 2019-08-26 | End: 2019-08-26 | Stop reason: HOSPADM

## 2019-08-26 RX ORDER — PROMETHAZINE HYDROCHLORIDE 25 MG/ML
6.25 INJECTION, SOLUTION INTRAMUSCULAR; INTRAVENOUS
Status: DISCONTINUED | OUTPATIENT
Start: 2019-08-26 | End: 2019-08-26 | Stop reason: HOSPADM

## 2019-08-26 RX ORDER — MAGNESIUM HYDROXIDE 1200 MG/15ML
LIQUID ORAL CONTINUOUS PRN
Status: COMPLETED | OUTPATIENT
Start: 2019-08-26 | End: 2019-08-26

## 2019-08-26 RX ORDER — NEOSTIGMINE METHYLSULFATE 5 MG/5 ML
SYRINGE (ML) INTRAVENOUS PRN
Status: DISCONTINUED | OUTPATIENT
Start: 2019-08-26 | End: 2019-08-26 | Stop reason: SDUPTHER

## 2019-08-26 RX ORDER — PROPOFOL 10 MG/ML
INJECTION, EMULSION INTRAVENOUS PRN
Status: DISCONTINUED | OUTPATIENT
Start: 2019-08-26 | End: 2019-08-26 | Stop reason: SDUPTHER

## 2019-08-26 RX ORDER — 0.9 % SODIUM CHLORIDE 0.9 %
500 INTRAVENOUS SOLUTION INTRAVENOUS
Status: DISCONTINUED | OUTPATIENT
Start: 2019-08-26 | End: 2019-08-26 | Stop reason: HOSPADM

## 2019-08-26 RX ORDER — HYDRALAZINE HYDROCHLORIDE 20 MG/ML
5 INJECTION INTRAMUSCULAR; INTRAVENOUS EVERY 10 MIN PRN
Status: DISCONTINUED | OUTPATIENT
Start: 2019-08-26 | End: 2019-08-26 | Stop reason: HOSPADM

## 2019-08-26 RX ORDER — HEPARIN SODIUM 5000 [USP'U]/.5ML
INJECTION, SOLUTION INTRAVENOUS; SUBCUTANEOUS PRN
Status: DISCONTINUED | OUTPATIENT
Start: 2019-08-26 | End: 2019-08-26 | Stop reason: SDUPTHER

## 2019-08-26 RX ORDER — FENTANYL CITRATE 50 UG/ML
25 INJECTION, SOLUTION INTRAMUSCULAR; INTRAVENOUS EVERY 5 MIN PRN
Status: DISCONTINUED | OUTPATIENT
Start: 2019-08-26 | End: 2019-08-26 | Stop reason: HOSPADM

## 2019-08-26 RX ORDER — MIDAZOLAM HYDROCHLORIDE 1 MG/ML
INJECTION INTRAMUSCULAR; INTRAVENOUS PRN
Status: DISCONTINUED | OUTPATIENT
Start: 2019-08-26 | End: 2019-08-26 | Stop reason: SDUPTHER

## 2019-08-26 RX ORDER — ROCURONIUM BROMIDE 10 MG/ML
INJECTION, SOLUTION INTRAVENOUS PRN
Status: DISCONTINUED | OUTPATIENT
Start: 2019-08-26 | End: 2019-08-26 | Stop reason: SDUPTHER

## 2019-08-26 RX ORDER — OXYCODONE HYDROCHLORIDE 5 MG/1
5 TABLET ORAL EVERY 4 HOURS PRN
Status: DISCONTINUED | OUTPATIENT
Start: 2019-08-26 | End: 2019-08-27 | Stop reason: HOSPADM

## 2019-08-26 RX ORDER — DOCUSATE SODIUM 100 MG/1
100 CAPSULE, LIQUID FILLED ORAL 2 TIMES DAILY
Status: DISCONTINUED | OUTPATIENT
Start: 2019-08-26 | End: 2019-08-27 | Stop reason: HOSPADM

## 2019-08-26 RX ORDER — DILTIAZEM HYDROCHLORIDE 60 MG/1
60 TABLET, FILM COATED ORAL EVERY 8 HOURS PRN
Status: DISCONTINUED | OUTPATIENT
Start: 2019-08-26 | End: 2019-08-27 | Stop reason: HOSPADM

## 2019-08-26 RX ORDER — ACETAMINOPHEN 500 MG
1000 TABLET ORAL EVERY 6 HOURS
Status: DISCONTINUED | OUTPATIENT
Start: 2019-08-26 | End: 2019-08-27 | Stop reason: HOSPADM

## 2019-08-26 RX ADMIN — SODIUM CHLORIDE, SODIUM LACTATE, POTASSIUM CHLORIDE, AND CALCIUM CHLORIDE: 600; 310; 30; 20 INJECTION, SOLUTION INTRAVENOUS at 00:10

## 2019-08-26 RX ADMIN — ROCURONIUM BROMIDE 5 MG: 10 INJECTION, SOLUTION INTRAVENOUS at 09:45

## 2019-08-26 RX ADMIN — DOCUSATE SODIUM 100 MG: 100 CAPSULE, LIQUID FILLED ORAL at 21:10

## 2019-08-26 RX ADMIN — FORMOTEROL FUMARATE DIHYDRATE 20 MCG: 20 SOLUTION RESPIRATORY (INHALATION) at 20:12

## 2019-08-26 RX ADMIN — HYDROMORPHONE HYDROCHLORIDE 0.5 MG: 2 INJECTION, SOLUTION INTRAMUSCULAR; INTRAVENOUS; SUBCUTANEOUS at 11:36

## 2019-08-26 RX ADMIN — HYDROMORPHONE HYDROCHLORIDE 0.5 MG: 1 INJECTION, SOLUTION INTRAMUSCULAR; INTRAVENOUS; SUBCUTANEOUS at 12:22

## 2019-08-26 RX ADMIN — HYDROMORPHONE HYDROCHLORIDE 0.5 MG: 2 INJECTION, SOLUTION INTRAMUSCULAR; INTRAVENOUS; SUBCUTANEOUS at 10:31

## 2019-08-26 RX ADMIN — OXYCODONE HYDROCHLORIDE 5 MG: 5 TABLET ORAL at 19:32

## 2019-08-26 RX ADMIN — BUDESONIDE 500 MCG: 0.5 SUSPENSION RESPIRATORY (INHALATION) at 20:12

## 2019-08-26 RX ADMIN — HYDROMORPHONE HYDROCHLORIDE 0.5 MG: 2 INJECTION, SOLUTION INTRAMUSCULAR; INTRAVENOUS; SUBCUTANEOUS at 11:51

## 2019-08-26 RX ADMIN — SODIUM CHLORIDE, SODIUM LACTATE, POTASSIUM CHLORIDE, AND CALCIUM CHLORIDE: 600; 310; 30; 20 INJECTION, SOLUTION INTRAVENOUS at 09:38

## 2019-08-26 RX ADMIN — OXYCODONE HYDROCHLORIDE 5 MG: 5 TABLET ORAL at 23:53

## 2019-08-26 RX ADMIN — PROPOFOL 250 MG: 10 INJECTION, EMULSION INTRAVENOUS at 09:45

## 2019-08-26 RX ADMIN — SODIUM CHLORIDE, SODIUM LACTATE, POTASSIUM CHLORIDE, AND CALCIUM CHLORIDE: 600; 310; 30; 20 INJECTION, SOLUTION INTRAVENOUS at 12:52

## 2019-08-26 RX ADMIN — HYDROMORPHONE HYDROCHLORIDE 0.5 MG: 1 INJECTION, SOLUTION INTRAMUSCULAR; INTRAVENOUS; SUBCUTANEOUS at 12:29

## 2019-08-26 RX ADMIN — BUDESONIDE 500 MCG: 0.5 SUSPENSION RESPIRATORY (INHALATION) at 08:53

## 2019-08-26 RX ADMIN — LIDOCAINE HYDROCHLORIDE 100 MG: 20 INJECTION, SOLUTION INTRAVENOUS at 09:45

## 2019-08-26 RX ADMIN — HYDROMORPHONE HYDROCHLORIDE 0.5 MG: 2 INJECTION, SOLUTION INTRAMUSCULAR; INTRAVENOUS; SUBCUTANEOUS at 10:51

## 2019-08-26 RX ADMIN — ROCURONIUM BROMIDE 55 MG: 10 INJECTION, SOLUTION INTRAVENOUS at 10:10

## 2019-08-26 RX ADMIN — SUCCINYLCHOLINE CHLORIDE 140 MG: 20 INJECTION, SOLUTION INTRAMUSCULAR; INTRAVENOUS; PARENTERAL at 09:45

## 2019-08-26 RX ADMIN — FENTANYL CITRATE 25 MCG: 50 INJECTION INTRAMUSCULAR; INTRAVENOUS at 12:56

## 2019-08-26 RX ADMIN — FORMOTEROL FUMARATE DIHYDRATE 20 MCG: 20 SOLUTION RESPIRATORY (INHALATION) at 08:53

## 2019-08-26 RX ADMIN — ACETAMINOPHEN 1000 MG: 500 TABLET ORAL at 21:10

## 2019-08-26 RX ADMIN — HYDROMORPHONE HYDROCHLORIDE 0.25 MG: 1 INJECTION, SOLUTION INTRAMUSCULAR; INTRAVENOUS; SUBCUTANEOUS at 08:37

## 2019-08-26 RX ADMIN — FENTANYL CITRATE 25 MCG: 50 INJECTION INTRAMUSCULAR; INTRAVENOUS at 12:51

## 2019-08-26 RX ADMIN — HEPARIN SODIUM 5000 UNITS: 10000 INJECTION, SOLUTION INTRAVENOUS; SUBCUTANEOUS at 09:50

## 2019-08-26 RX ADMIN — ROCURONIUM BROMIDE 20 MG: 10 INJECTION, SOLUTION INTRAVENOUS at 10:47

## 2019-08-26 RX ADMIN — HYDROMORPHONE HYDROCHLORIDE 0.5 MG: 1 INJECTION, SOLUTION INTRAMUSCULAR; INTRAVENOUS; SUBCUTANEOUS at 04:26

## 2019-08-26 RX ADMIN — Medication 5 MG: at 11:49

## 2019-08-26 RX ADMIN — OXYCODONE HYDROCHLORIDE 5 MG: 5 TABLET ORAL at 15:22

## 2019-08-26 RX ADMIN — FENTANYL CITRATE 50 MCG: 50 INJECTION INTRAMUSCULAR; INTRAVENOUS at 10:12

## 2019-08-26 RX ADMIN — Medication 0.8 MG: at 11:49

## 2019-08-26 RX ADMIN — MIDAZOLAM HYDROCHLORIDE 2 MG: 2 INJECTION, SOLUTION INTRAMUSCULAR; INTRAVENOUS at 09:38

## 2019-08-26 RX ADMIN — Medication 0.2 MG: at 10:12

## 2019-08-26 RX ADMIN — ONDANSETRON 4 MG: 2 INJECTION INTRAMUSCULAR; INTRAVENOUS at 11:08

## 2019-08-26 RX ADMIN — FENTANYL CITRATE 50 MCG: 50 INJECTION INTRAMUSCULAR; INTRAVENOUS at 09:45

## 2019-08-26 RX ADMIN — CEFAZOLIN SODIUM 1000 MG: 1 POWDER, FOR SOLUTION INTRAMUSCULAR; INTRAVENOUS at 11:51

## 2019-08-26 RX ADMIN — HYDROMORPHONE HYDROCHLORIDE 0.5 MG: 1 INJECTION, SOLUTION INTRAMUSCULAR; INTRAVENOUS; SUBCUTANEOUS at 17:31

## 2019-08-26 RX ADMIN — ACETAMINOPHEN 1000 MG: 500 TABLET ORAL at 14:27

## 2019-08-26 RX ADMIN — DOCUSATE SODIUM 100 MG: 100 CAPSULE, LIQUID FILLED ORAL at 14:28

## 2019-08-26 ASSESSMENT — PAIN - FUNCTIONAL ASSESSMENT
PAIN_FUNCTIONAL_ASSESSMENT: ACTIVITIES ARE NOT PREVENTED
PAIN_FUNCTIONAL_ASSESSMENT: PREVENTS OR INTERFERES SOME ACTIVE ACTIVITIES AND ADLS
PAIN_FUNCTIONAL_ASSESSMENT: ACTIVITIES ARE NOT PREVENTED
PAIN_FUNCTIONAL_ASSESSMENT: PREVENTS OR INTERFERES SOME ACTIVE ACTIVITIES AND ADLS
PAIN_FUNCTIONAL_ASSESSMENT: ACTIVITIES ARE NOT PREVENTED
PAIN_FUNCTIONAL_ASSESSMENT: PREVENTS OR INTERFERES SOME ACTIVE ACTIVITIES AND ADLS

## 2019-08-26 ASSESSMENT — PAIN DESCRIPTION - FREQUENCY
FREQUENCY: INTERMITTENT
FREQUENCY: CONTINUOUS
FREQUENCY: INTERMITTENT
FREQUENCY: CONTINUOUS
FREQUENCY: INTERMITTENT

## 2019-08-26 ASSESSMENT — PAIN DESCRIPTION - ONSET
ONSET: GRADUAL
ONSET: ON-GOING
ONSET: AWAKENED FROM SLEEP
ONSET: ON-GOING
ONSET: ON-GOING
ONSET: GRADUAL
ONSET: ON-GOING
ONSET: GRADUAL

## 2019-08-26 ASSESSMENT — PULMONARY FUNCTION TESTS
PIF_VALUE: 32
PIF_VALUE: 31
PIF_VALUE: 32
PIF_VALUE: 21
PIF_VALUE: 31
PIF_VALUE: 26
PIF_VALUE: 22
PIF_VALUE: 25
PIF_VALUE: 25
PIF_VALUE: 24
PIF_VALUE: 31
PIF_VALUE: 24
PIF_VALUE: 32
PIF_VALUE: 25
PIF_VALUE: 25
PIF_VALUE: 33
PIF_VALUE: 21
PIF_VALUE: 30
PIF_VALUE: 32
PIF_VALUE: 27
PIF_VALUE: 31
PIF_VALUE: 27
PIF_VALUE: 4
PIF_VALUE: 25
PIF_VALUE: 34
PIF_VALUE: 3
PIF_VALUE: 25
PIF_VALUE: 32
PIF_VALUE: 22
PIF_VALUE: 24
PIF_VALUE: 33
PIF_VALUE: 21
PIF_VALUE: 32
PIF_VALUE: 25
PIF_VALUE: 33
PIF_VALUE: 27
PIF_VALUE: 32
PIF_VALUE: 26
PIF_VALUE: 32
PIF_VALUE: 25
PIF_VALUE: 32
PIF_VALUE: 26
PIF_VALUE: 30
PIF_VALUE: 25
PIF_VALUE: 31
PIF_VALUE: 25
PIF_VALUE: 1
PIF_VALUE: 32
PIF_VALUE: 25
PIF_VALUE: 32
PIF_VALUE: 32
PIF_VALUE: 25
PIF_VALUE: 28
PIF_VALUE: 32
PIF_VALUE: 31
PIF_VALUE: 32
PIF_VALUE: 28
PIF_VALUE: 1
PIF_VALUE: 31
PIF_VALUE: 25
PIF_VALUE: 24
PIF_VALUE: 33
PIF_VALUE: 31
PIF_VALUE: 32
PIF_VALUE: 32
PIF_VALUE: 31
PIF_VALUE: 24
PIF_VALUE: 31
PIF_VALUE: 27
PIF_VALUE: 0
PIF_VALUE: 32
PIF_VALUE: 32
PIF_VALUE: 25
PIF_VALUE: 26
PIF_VALUE: 4
PIF_VALUE: 32
PIF_VALUE: 26
PIF_VALUE: 32
PIF_VALUE: 34
PIF_VALUE: 26
PIF_VALUE: 1
PIF_VALUE: 22
PIF_VALUE: 32
PIF_VALUE: 24
PIF_VALUE: 32
PIF_VALUE: 24
PIF_VALUE: 26
PIF_VALUE: 30
PIF_VALUE: 31
PIF_VALUE: 29
PIF_VALUE: 31
PIF_VALUE: 20
PIF_VALUE: 35
PIF_VALUE: 26
PIF_VALUE: 34
PIF_VALUE: 32
PIF_VALUE: 26
PIF_VALUE: 31
PIF_VALUE: 3
PIF_VALUE: 31
PIF_VALUE: 0
PIF_VALUE: 32
PIF_VALUE: 22
PIF_VALUE: 22
PIF_VALUE: 32
PIF_VALUE: 32
PIF_VALUE: 31
PIF_VALUE: 33
PIF_VALUE: 26
PIF_VALUE: 31
PIF_VALUE: 32
PIF_VALUE: 30
PIF_VALUE: 33
PIF_VALUE: 32
PIF_VALUE: 28
PIF_VALUE: 31
PIF_VALUE: 0
PIF_VALUE: 25
PIF_VALUE: 24
PIF_VALUE: 30
PIF_VALUE: 0
PIF_VALUE: 26
PIF_VALUE: 33
PIF_VALUE: 31
PIF_VALUE: 27
PIF_VALUE: 1
PIF_VALUE: 4
PIF_VALUE: 27
PIF_VALUE: 32
PIF_VALUE: 0
PIF_VALUE: 0
PIF_VALUE: 31
PIF_VALUE: 32
PIF_VALUE: 33
PIF_VALUE: 31
PIF_VALUE: 30
PIF_VALUE: 21
PIF_VALUE: 21
PIF_VALUE: 25
PIF_VALUE: 32
PIF_VALUE: 24

## 2019-08-26 ASSESSMENT — PAIN SCALES - GENERAL
PAINLEVEL_OUTOF10: 3
PAINLEVEL_OUTOF10: 8
PAINLEVEL_OUTOF10: 8
PAINLEVEL_OUTOF10: 7
PAINLEVEL_OUTOF10: 5
PAINLEVEL_OUTOF10: 6
PAINLEVEL_OUTOF10: 0
PAINLEVEL_OUTOF10: 2
PAINLEVEL_OUTOF10: 8
PAINLEVEL_OUTOF10: 4
PAINLEVEL_OUTOF10: 6
PAINLEVEL_OUTOF10: 8
PAINLEVEL_OUTOF10: 7
PAINLEVEL_OUTOF10: 4
PAINLEVEL_OUTOF10: 6
PAINLEVEL_OUTOF10: 7
PAINLEVEL_OUTOF10: 8
PAINLEVEL_OUTOF10: 6

## 2019-08-26 ASSESSMENT — PAIN DESCRIPTION - DESCRIPTORS
DESCRIPTORS: PRESSURE;STABBING
DESCRIPTORS: PRESSURE;SHARP
DESCRIPTORS: ACHING;DISCOMFORT
DESCRIPTORS: PRESSURE
DESCRIPTORS: DISCOMFORT;ACHING
DESCRIPTORS: DISCOMFORT;PRESSURE
DESCRIPTORS: ACHING;DISCOMFORT
DESCRIPTORS: ACHING;CRAMPING
DESCRIPTORS: DISCOMFORT;JABBING
DESCRIPTORS: ACHING;DISCOMFORT
DESCRIPTORS: PRESSURE
DESCRIPTORS: ACHING;DISCOMFORT

## 2019-08-26 ASSESSMENT — PAIN DESCRIPTION - PAIN TYPE
TYPE: SURGICAL PAIN
TYPE: ACUTE PAIN
TYPE: SURGICAL PAIN
TYPE: SURGICAL PAIN
TYPE: ACUTE PAIN
TYPE: ACUTE PAIN
TYPE: SURGICAL PAIN

## 2019-08-26 ASSESSMENT — PAIN DESCRIPTION - PROGRESSION
CLINICAL_PROGRESSION: NOT CHANGED
CLINICAL_PROGRESSION: GRADUALLY WORSENING
CLINICAL_PROGRESSION: GRADUALLY IMPROVING
CLINICAL_PROGRESSION: NOT CHANGED
CLINICAL_PROGRESSION: NOT CHANGED
CLINICAL_PROGRESSION: GRADUALLY WORSENING
CLINICAL_PROGRESSION: GRADUALLY IMPROVING

## 2019-08-26 ASSESSMENT — PAIN DESCRIPTION - LOCATION
LOCATION: ABDOMEN

## 2019-08-26 ASSESSMENT — PAIN DESCRIPTION - ORIENTATION
ORIENTATION: MID

## 2019-08-26 ASSESSMENT — LIFESTYLE VARIABLES: SMOKING_STATUS: 0

## 2019-08-26 ASSESSMENT — ENCOUNTER SYMPTOMS: SHORTNESS OF BREATH: 1

## 2019-08-26 NOTE — PROGRESS NOTES
PACU Transfer Note    Vitals:    08/26/19 1330   BP: (!) 142/83 B/P meets nathaly discharge criteria   Pulse: 66   Resp: 22   Temp: 97 °F (36.1 °C)   SpO2: 98%       In: 908 [I.V.:908]  Out: 200 [Urine:150]    Pain assessment:  receiving treatment  Pain Level: 3    Report given to Receiving unit JARON Lawler    8/26/2019 1:40 PM

## 2019-08-26 NOTE — PLAN OF CARE
Problem: Falls - Risk of:  Goal: Will remain free from falls  Description  Will remain free from falls  8/26/2019 0401 by Jerome Shea RN  Outcome: Met This Shift     Problem: Pain:  Description  Pain management should include both nonpharmacologic and pharmacologic interventions. Goal: Pain level will decrease  Description  Pain level will decrease  8/26/2019 1550 by Román Crowder RN  Outcome: Ongoing  Note:   Patient with abdominal pain. Dilaudid given earlier in shift with benefit, and oxycodone given more recently, will monitor benefits of that. Ice applied to abdomen. Will continue to monitor. 8/26/2019 0401 by Jerome Shea RN  Outcome: Ongoing     Problem: HEMODYNAMIC STATUS  Goal: Patient has stable vital signs and fluid balance  Note:   Vitals stable. LR infusing at 75 ml /hr. Chronic edema hasn't increased. Will monitor. Problem: OXYGENATION/RESPIRATORY FUNCTION  Goal: Patient will achieve/maintain normal respiratory rate/effort  Note:   Respirations easy. Lung sounds diminished. Encouraged cough and deep breathing. Patient ambulated in [place]. Will monitor. Problem: MOBILITY  Goal: Early mobilization is achieved  Note:   Patient ambulated length of halls. Up to chair. Problem: ELIMINATION  Goal: Elimination patterns are normal or improving  Description  Elimination patterns return to pre-surgery normal patterns  Note:   Bowel sounds absent. Abdomen soft, non-distended.

## 2019-08-26 NOTE — ANESTHESIA PRE PROCEDURE
 HYDROmorphone (DILAUDID) injection 0.5 mg  0.5 mg Intravenous Q3H PRN Jane Morillo DO   0.5 mg at 08/26/19 0426    albuterol (PROVENTIL) nebulizer solution 2.5 mg  2.5 mg Nebulization Q4H PRN Cherie Hu MD        torsemide [facility]) tablet 20 mg  20 mg Oral BID Cherie Hu MD        HYDROcodone-acetaminophen [facility]) 5-325 MG per tablet 1 tablet  1 tablet Oral Q8H PRN Rodrigoqueta Truong MD   1 tablet at 08/25/19 2154    melatonin tablet 20 mg  20 mg Oral Nightly PRN Rodrigo Solorzano MD        budesonide (PULMICORT) nebulizer suspension 500 mcg  0.5 mg Nebulization BID Cherie Hu MD        And    formoterol (PERFOROMIST) nebulizer solution 20 mcg  20 mcg Nebulization BID Cherie Hu MD        ceFAZolin (ANCEF) 2 g in dextrose 5 % 50 mL IVPB  2 g Intravenous On Call to Eleazar 1205, DO           Allergies:     Allergies   Allergen Reactions    Latex Anaphylaxis and Rash    Aspirin      Swelling in lower legs    Demerol      rash    Meperidine     Nsaids Swelling    Pcn [Penicillins] Hives    Ranitidine Hcl     Sulfa Antibiotics Rash and Hives       Problem List:    Patient Active Problem List   Diagnosis Code    Asthma J45.909    MDD (major depressive disorder), recurrent severe, without psychosis ([facility] [address] F33.2    Cluster B personality disorder ([facility] [address] F60.89    Colon polyps K63.5    Overdose T50.901A    Restless leg syndrome G25.81    Sliding hiatal hernia K44.9    HTN (hypertension) I10    Atrial flutter (HCC) I48.92    SVT (supraventricular tachycardia) (HCC) I47.1    Paroxysmal atrial fibrillation (HCC) I48.0    Sinus bradycardia R00.1    Acute lateral meniscus tear of right knee S83.281A    Acute medial meniscus tear of right knee S83.241A    Localized edema R60.0    Acute pain of right knee M25.561    Acute diastolic congestive heart failure (HCC) I50.31    Primary osteoarthritis of right knee M17.11    Contusion of multiple sites of right leg Beta Blocker      ROS comment: Echo (May 2018):  Left ventricular cavity size is normal. There is mild concentric left   ventricular hypertrophy.   Left ventricular function is low normal with ejection fraction estimated at   50%.   No diagnostic regional wall motion abnormalities.   Diastolic filling parameters suggest grade I diastolic dysfunction.   Mild mitral regurgitation is present.   Bi-atrial enlargement.   Estimated pulmonary artery systolic pressure is at 43 mmHg assuming a right   atrial pressure of 3 mmHg.        Neuro/Psych:   (+) neuromuscular disease (FIBROMY / RLS):, depression/anxiety    (-) seizures, TIA and CVA           GI/Hepatic/Renal:   (+) hiatal hernia, GERD:, morbid obesity     (-) liver disease and no renal disease       Endo/Other:    (+) blood dyscrasia: anticoagulation therapy and anemia, arthritis:., no malignancy/cancer. (-) diabetes mellitus, hypothyroidism, hyperthyroidism, no malignancy/cancer               Abdominal:   (+) obese,     Abdomen: soft. Vascular: negative vascular ROS. Anesthesia Plan      general     ASA 3       Induction: intravenous. MIPS: Postoperative opioids intended and Prophylactic antiemetics administered. Anesthetic plan and risks discussed with patient. Plan discussed with CRNA. Attending anesthesiologist reviewed and agrees with Pre Eval content      This pre-anesthesia assessment may be used as a history and physical.    DOS STAFF ADDENDUM:    Pt seen and examined, chart reviewed (including anesthesia, drug and allergy history). No interval changes to history and physical examination. Anesthetic plan, risks, benefits, alternatives, and personnel involved discussed with patient. Patient verbalized an understanding and agrees to proceed.       Miky Carter MD  August 26, 2019  8:09 AM          Miky Carter MD   8/26/2019

## 2019-08-26 NOTE — CARE COORDINATION
ACM attempted to reach patient for introduction to ACM. HIPAA compliant message left requesting a return phone call at patients convenience. Owingot Message also sent. Will continue to follow.
well-being? (include current or anticipated unemployment, work, caregiving, access to transportation or other):  No identified problems or perceived positive benefits   How would you rate their social network (family, work, friends)?:  Adequate participation with social networks   Suggested Interventions and Community Resources                  Prior to Admission medications    Medication Sig Start Date End Date Taking? Authorizing Provider   torsemide (DEMADEX) 20 MG tablet Take 20 mg by mouth 2 times daily Prescribed 20 mg BID, but patient takes 20 mg daily +/- additional dose if needed for swelling    Historical Provider, MD   melatonin 3 MG TABS tablet Take 20 mg by mouth nightly as needed    Historical Provider, MD   HYDROcodone-acetaminophen (NORCO) 5-325 MG per tablet Take 1 tablet by mouth every 8 hours as needed for Pain for up to 30 days.  Take 1-2 tabs orally prn daily 8/22/19 9/21/19  STEPHANIE Gan CNP   albuterol sulfate HFA (VENTOLIN HFA) 108 (90 Base) MCG/ACT inhaler INHALE 2 PUFFS BY MOUTH EVERY 4 TO 6 HOURS AS NEEDED 6/14/19   Ladarius Soriano MD   apixaban (ELIQUIS) 5 MG TABS tablet TAKE ONE TABLET BY MOUTH TWICE A DAY 4/26/19   STEPHANIE Arora CNP   DULERA 200-5 MCG/ACT inhaler INHALE ONE PUFF INTO THE LUNGS TWO TIMES A DAY 2/11/19   Esperanza Mark MD       Future Appointments   Date Time Provider Wil Chatterjee   8/27/2019  2:30 PM SCHEDULE, STIVEN WOOD  STIVEN PT [facility]   8/29/2019  1:30 PM WILFRIDO Beauchamp PT [facility]   8/29/2019  2:30 PM SCHEDULE, STIVEN WOOD RM TUANAZ PT [facility]   9/12/2019 10:00 AM DO ALEX Roe WT [facility]   9/20/2019 11:40 AM STEPHANIE Gan CNP [facility]   1/6/2020 10:45 AM MD Alex Irene Card [facility]

## 2019-08-27 ENCOUNTER — CARE COORDINATION (OUTPATIENT)
Dept: CASE MANAGEMENT | Age: 54
End: 2019-08-27

## 2019-08-27 ENCOUNTER — TELEPHONE (OUTPATIENT)
Dept: SURGERY | Age: 54
End: 2019-08-27

## 2019-08-27 VITALS
WEIGHT: 293 LBS | HEIGHT: 70 IN | RESPIRATION RATE: 16 BRPM | BODY MASS INDEX: 41.95 KG/M2 | TEMPERATURE: 98.4 F | HEART RATE: 66 BPM | OXYGEN SATURATION: 95 % | SYSTOLIC BLOOD PRESSURE: 117 MMHG | DIASTOLIC BLOOD PRESSURE: 69 MMHG

## 2019-08-27 LAB
ALBUMIN SERPL-MCNC: 3.6 G/DL (ref 3.4–5)
ANION GAP SERPL CALCULATED.3IONS-SCNC: 10 MMOL/L (ref 3–16)
BASOPHILS ABSOLUTE: 0.1 K/UL (ref 0–0.2)
BASOPHILS RELATIVE PERCENT: 1.2 %
BUN BLDV-MCNC: 13 MG/DL (ref 7–20)
CALCIUM SERPL-MCNC: 8.6 MG/DL (ref 8.3–10.6)
CHLORIDE BLD-SCNC: 103 MMOL/L (ref 99–110)
CO2: 26 MMOL/L (ref 21–32)
CREAT SERPL-MCNC: 0.8 MG/DL (ref 0.6–1.1)
EOSINOPHILS ABSOLUTE: 0.1 K/UL (ref 0–0.6)
EOSINOPHILS RELATIVE PERCENT: 2.2 %
GFR AFRICAN AMERICAN: >60
GFR NON-AFRICAN AMERICAN: >60
GLUCOSE BLD-MCNC: 110 MG/DL (ref 70–99)
HCT VFR BLD CALC: 30.6 % (ref 36–48)
HEMOGLOBIN: 9.3 G/DL (ref 12–16)
LYMPHOCYTES ABSOLUTE: 0.9 K/UL (ref 1–5.1)
LYMPHOCYTES RELATIVE PERCENT: 15.1 %
MAGNESIUM: 1.9 MG/DL (ref 1.8–2.4)
MCH RBC QN AUTO: 22.2 PG (ref 26–34)
MCHC RBC AUTO-ENTMCNC: 30.4 G/DL (ref 31–36)
MCV RBC AUTO: 72.8 FL (ref 80–100)
MONOCYTES ABSOLUTE: 0.6 K/UL (ref 0–1.3)
MONOCYTES RELATIVE PERCENT: 10.8 %
NEUTROPHILS ABSOLUTE: 4.1 K/UL (ref 1.7–7.7)
NEUTROPHILS RELATIVE PERCENT: 70.7 %
PDW BLD-RTO: 22.2 % (ref 12.4–15.4)
PHOSPHORUS: 4 MG/DL (ref 2.5–4.9)
PLATELET # BLD: 265 K/UL (ref 135–450)
PMV BLD AUTO: 8.4 FL (ref 5–10.5)
POTASSIUM SERPL-SCNC: 3.9 MMOL/L (ref 3.5–5.1)
RBC # BLD: 4.21 M/UL (ref 4–5.2)
SODIUM BLD-SCNC: 139 MMOL/L (ref 136–145)
WBC # BLD: 5.8 K/UL (ref 4–11)

## 2019-08-27 PROCEDURE — 6370000000 HC RX 637 (ALT 250 FOR IP): Performed by: STUDENT IN AN ORGANIZED HEALTH CARE EDUCATION/TRAINING PROGRAM

## 2019-08-27 PROCEDURE — 83735 ASSAY OF MAGNESIUM: CPT

## 2019-08-27 PROCEDURE — 99024 POSTOP FOLLOW-UP VISIT: CPT | Performed by: SURGERY

## 2019-08-27 PROCEDURE — 36415 COLL VENOUS BLD VENIPUNCTURE: CPT

## 2019-08-27 PROCEDURE — 6360000002 HC RX W HCPCS: Performed by: STUDENT IN AN ORGANIZED HEALTH CARE EDUCATION/TRAINING PROGRAM

## 2019-08-27 PROCEDURE — 85025 COMPLETE CBC W/AUTO DIFF WBC: CPT

## 2019-08-27 PROCEDURE — 80069 RENAL FUNCTION PANEL: CPT

## 2019-08-27 RX ORDER — PSEUDOEPHEDRINE HCL 30 MG
100 TABLET ORAL 2 TIMES DAILY
Qty: 60 CAPSULE | Refills: 1 | Status: SHIPPED | OUTPATIENT
Start: 2019-08-27 | End: 2021-01-15

## 2019-08-27 RX ADMIN — ACETAMINOPHEN 1000 MG: 500 TABLET ORAL at 03:45

## 2019-08-27 RX ADMIN — DOCUSATE SODIUM 100 MG: 100 CAPSULE, LIQUID FILLED ORAL at 08:06

## 2019-08-27 RX ADMIN — TORSEMIDE 20 MG: 20 TABLET ORAL at 08:08

## 2019-08-27 RX ADMIN — OXYCODONE HYDROCHLORIDE 5 MG: 5 TABLET ORAL at 08:06

## 2019-08-27 RX ADMIN — ACETAMINOPHEN 1000 MG: 500 TABLET ORAL at 08:06

## 2019-08-27 RX ADMIN — ENOXAPARIN SODIUM 40 MG: 40 INJECTION SUBCUTANEOUS at 08:06

## 2019-08-27 RX ADMIN — OXYCODONE HYDROCHLORIDE 5 MG: 5 TABLET ORAL at 03:46

## 2019-08-27 ASSESSMENT — PAIN SCALES - GENERAL
PAINLEVEL_OUTOF10: 4
PAINLEVEL_OUTOF10: 0
PAINLEVEL_OUTOF10: 7
PAINLEVEL_OUTOF10: 0
PAINLEVEL_OUTOF10: 7

## 2019-08-27 ASSESSMENT — PAIN DESCRIPTION - FREQUENCY
FREQUENCY: INTERMITTENT
FREQUENCY: CONTINUOUS

## 2019-08-27 ASSESSMENT — PAIN DESCRIPTION - ORIENTATION
ORIENTATION: MID
ORIENTATION: MID;LEFT

## 2019-08-27 ASSESSMENT — PAIN DESCRIPTION - LOCATION
LOCATION: ABDOMEN
LOCATION: ABDOMEN

## 2019-08-27 ASSESSMENT — PAIN DESCRIPTION - PAIN TYPE
TYPE: SURGICAL PAIN
TYPE: SURGICAL PAIN

## 2019-08-27 ASSESSMENT — PAIN DESCRIPTION - DESCRIPTORS
DESCRIPTORS: ACHING;THROBBING
DESCRIPTORS: ACHING;CRAMPING

## 2019-08-27 ASSESSMENT — PAIN DESCRIPTION - ONSET
ONSET: ON-GOING
ONSET: ON-GOING

## 2019-08-27 ASSESSMENT — PAIN DESCRIPTION - PROGRESSION: CLINICAL_PROGRESSION: NOT CHANGED

## 2019-08-27 ASSESSMENT — PAIN - FUNCTIONAL ASSESSMENT: PAIN_FUNCTIONAL_ASSESSMENT: PREVENTS OR INTERFERES SOME ACTIVE ACTIVITIES AND ADLS

## 2019-08-27 NOTE — ANESTHESIA POSTPROCEDURE EVALUATION
Department of Anesthesiology  Postprocedure Note    Patient: Soumya Diego  MRN: [id number]  YOB: 1965  Date of evaluation: 8/26/2019  Time:  8:05 PM     Procedure Summary     Date:  08/26/19 Room / Location:  [facility] OR [address] [place] [address] OR    Anesthesia Start:  2807 Anesthesia Stop:  1218    Procedure:  DIAGNOSTIC LAPAROSCOPY, LAPAROSCOPIC LYSIS OF ADHESIONS, LAPAROSCOPIC REDUCTION OF RECURRENT INCISIONAL HERNIA WITH MESH (N/A ) Diagnosis:  (RECURRENT INCISIONAL HERNIA)    Surgeon:  Samina Trivedi MD Responsible Provider:  Fitz Funes MD    Anesthesia Type:  general ASA Status:  3          Anesthesia Type: general    Savita Phase I: Savita Score: 9    Savita Phase II:      Last vitals: Reviewed and per EMR flowsheets. Anesthesia Post Evaluation    Patient location during evaluation: PACU  Patient participation: complete - patient participated  Level of consciousness: awake and alert  Pain scale: please refer to nursing notes. Airway patency: patent  Nausea & Vomiting: no nausea and no vomiting  Complications: no  Cardiovascular status: hemodynamically stable  Respiratory status: spontaneous ventilation  Hydration status: stable  Comments: No phone calls received from PACU RN regarding patient.

## 2019-08-27 NOTE — OP NOTE
[address]                [address]                                OPERATIVE REPORT    PATIENT NAME: Sandy Waters                   :        1965  MED REC NO:   [id number]                          ROOM:       2509  ACCOUNT NO:   [de-identified]                           ADMIT DATE: 2019  PROVIDER:     Nirali Babcock. Rita Loja MD    DATE OF PROCEDURE:  2019    PREOPERATIVE DIAGNOSIS:  Recurrent incisional incarcerated hernia  containing transverse colon. POSTOPERATIVE DIAGNOSIS:  Recurrent incisional incarcerated hernia  containing transverse colon. OPERATIONS PERFORMED:  1. Laparoscopic extensive lysis of adhesions over 60 minutes. 2.  Laparoscopic reduction and repair of Swiss cheese recurrent  incarcerated incisional hernia with placement of 9 cm Ventralight ST  mesh. A 22-modifier billed due to extensive adhesiolysis greater than 60  minutes as well as Swiss cheese defect and much more difficult operation  given the adhesions and her severe morbid obesity (BMI > 50) requiring much additional  time and effort. SURGEON:  Nirali Babcock. Rita Loja MD    ASSISTANT:  Fred Mendoza, PGY-5, resident. ANESTHESIA:  General endotracheal.    COMPLICATIONS:  None. ESTIMATED BLOOD LOSS:  100 mL. INDICATIONS:  The patient is a 80-year-old female. She has a history of  an open Romie-en-Y gastric bypass in the year . This was followed  with a laparoscopic intraperitoneal onlay mesh repair in . Over the  last several months, she has a known recurrent incisional hernia of the  upper abdomen. This was reducible until she was admitted to the  hospital recently. This was then again reduced and she was recommended  to have outpatient evaluation. She then presented back to the ER again  with abdominal pain and at this point, the hernia was difficult to  reduce and she continued to have discomfort.   I recommended adhesions of the omentum to the previous mesh. This was done using  a combination of blunt and cautery dissection using the LigaSure device. This took approximately 75 minutes in total and this was quite  extensive. It took a fair amount of time and effort as well. Eventually, we were able to locate the recurrent incisional hernia which  was just above the old mesh which had not quite covered all of the  previous fascial defect. At this time, we noted that there was  transverse colon that was incarcerated into the hernia. After clearing  all the tissue from completely around it, I slowly was able to reduce  laparoscopically the transverse colon out of the hernia. There was some  serous fluid caught in the hernia sac, but otherwise, no compromise to  the bowel itself. I then looked down at the omentum and there was a  fair amount of oozing from all the takedown of the adhesions. I used  Arixtra hemostatic potato starch spray over the entirety of the omentum  with good hemostasis noted though there was still very small oozing from  the takedown. This was then inspected later which appeared to be  completely hemostatic. After reduction of the hernia and the hernia sac, we noted that there  was actually a Swiss cheese defect including the 3 cm hernia defect and  two very small holes just medial to this, each less than 5 mm. At this  time, given the size, we would perform a primary suture repair and a  mesh placement. We located the skin side of the hernia defect and made  a 12 mm incision. A 12 mm port was then placed through the hernia in  order to accommodate the mesh. I chose an 11.4 cm Ventralight ST mesh  which was then trimmed down to 9 cm diameter. This was passed through  the trocar into the abdomen after placing a suture and confirming the  correct orientation. The Prolene suture was used as a handle.   After  the mesh was in the abdominal cavity, an 0 Vicryl suture passer was then  used to

## 2019-08-27 NOTE — TELEPHONE ENCOUNTER
Called patient to clarify call. Patient had a hernia repair with MD on 8/26/19. Will need to understand if she is retuning, will lit be light duty? Patient needs a letter or she is dropping off paperwork? Requested patient call back.

## 2019-08-27 NOTE — PHYSICIAN ADVISORY
Letter of Status Determination:   Recommend hospitalization status upgraded from   Observation  to INPATIENT  Status     Pt Name:  Dorethea Meigs   MR#   [address] [facility] # [id number] /  Judd Jennings  Payor: Sofía Hunt / Plan: [facility] Elle Krish[facility]ur[facility] / Product Type: *No Product type* /    CSN#  [id number]   Room and Hospital  8552/8344-11  @ [facility]   Hospitalization date  8/23/2019 11:35 PM   Current Attending Physician  No att. providers found   Principal diagnosis  Abd pain    Clinicals  Dorethea Meigs is a 48 y.o. female with past medical history of morbid obesity status post Romie-en-Y in 2003 at an outside facility, atrial fibrillation for which she takes Eliquis (last dose yesterday) and is status post ablation, GERD, hiatal hernia, and previous ventral hernia status post IPOM at an outside facility in 2004. Patient complains of vague abdominal pain of approximately 1 month duration. She states that she has had a new/recurrent ventral hernia for multiple months now to the right of the midline in the upper hemiabdomen, though the bulge was easily reducible by herself and she had no obstructive symptoms previously-- reporting daily-to-twice daily bowel movements. Over the past week or so, however, the patient states that she has been unable to fully reduce the hernia, which is associated with irregular bowel movement frequency and limited flatus in addition to moderate-to-severe abdominal pain over the past couple days.  Patient was scheduled to see Dr. Antonio Schneider as an outpatient to discuss elective repair on 9/12, but could no longer endure the pain at home;   Pt underwent a semi urgent LAPAROSCOPY, LAPAROSCOPIC LYSIS OF ADHESIONS, LAPAROSCOPIC REDUCTION OF RECURRENT INCISIONAL HERNIA WITH MESH, needs post op optimization      Milliman (MCG) criteria   Does  NOT apply    STATUS DETERMINATION  INPATIENT    The final decision of the patient's hospitalization status depends on the attending

## 2019-08-27 NOTE — PLAN OF CARE
Problem: Pain:  Goal: Pain level will decrease  Description  Pain level will decrease  Pt c/o abd pain. Describes as crampy at times. Pt medicated with oxycodone with good relief. Ice pack to abdomen. Problem: Falls - Risk of:  Goal: Will remain free from falls  Description  Will remain free from falls  Outcome: Ongoing   Pt is ambulating independently with steady gait. Pt is now up ad chaz. Problem: HEMODYNAMIC STATUS  Goal: Patient has stable vital signs and fluid balance  VSS. Pt is tolerating diet without nausea. Pt has removed hat from BR twice, but reports voiding in the toilet. Problem: OXYGENATION/RESPIRATORY FUNCTION  Goal: Patient will achieve/maintain normal respiratory rate/effort  Pt is on Ra with sats in the mid 90's. Lungs clear, diminished. Pt encouraged to use IS and can get up to 1000. Problem: MOBILITY  Goal: Early mobilization is achieved  Pt has been ambulating up ad chaz. Pt is up to the BR and repositions self often in bed. Problem: ELIMINATION  Goal: Elimination patterns are normal or improving  Description  Pt is now reporting flatus. ABdomen is soft, rotund with hypoactive bowel sounds. No bm yet.

## 2019-08-27 NOTE — DISCHARGE SUMMARY
Physician Discharge Summary     Patient ID:  Toshia White  [id number]  35 y.o.  1965    Admit date: 8/25/2019    Discharge date and time: 8/27/2019 10:49 AM     Admitting Physician: Bertha Harper MD     Discharge Physician: same    Admission Diagnoses: Incisional hernia with obstruction, without gangrene [K43.0]  Incisional hernia with obstruction, without gangrene [K43.0]  Patient Active Problem List    Diagnosis Date Noted    Incisional hernia with obstruction, without gangrene 08/25/2019    Ventral incisional hernia 08/24/2019    Recurrent incisional hernia     Pain of upper abdomen     Primary osteoarthritis of right knee 07/02/2019    Contusion of multiple sites of right leg 07/02/2019    Gastrocnemius strain, left 07/02/2019    Acute diastolic congestive heart failure ([facility] [address] 05/16/2019    Acute pain of right knee     Localized edema 04/26/2019    Acute lateral meniscus tear of right knee 02/01/2019    Acute medial meniscus tear of right knee 02/01/2019    Sinus bradycardia     Paroxysmal atrial fibrillation (HCC)     SVT (supraventricular tachycardia) ([facility] [address] 09/07/2018    Atrial flutter ([facility] [address] 06/22/2018    HTN (hypertension) 05/30/2018    Cluster B personality disorder ([facility] [address] 04/18/2018    MDD (major depressive disorder), recurrent severe, without psychosis ([facility] [address] 03/12/2018    Colon polyps 04/28/2015    Sliding hiatal hernia 04/28/2015    Asthma 07/17/2013    Restless leg syndrome 04/04/2013    Overdose 04/03/2013       Discharge Diagnoses: same    Admission Condition: fair    Discharged Condition: stable    Indication for Admission: surgery and post op care    Hospital Course: 48year old female admitted with recurrent incisional hernia. She underwent laparoscopic lysis of adhesions, laparoscopic reduction of recurrent incisional hernia with mesh. Her surgery was uneventful and she was admitted to post op surgical floor in stable condition.   The next day her pain was

## 2019-08-29 ENCOUNTER — HOSPITAL ENCOUNTER (OUTPATIENT)
Dept: PHYSICAL THERAPY | Age: 54
Setting detail: THERAPIES SERIES
End: 2019-08-29
Payer: COMMERCIAL

## 2019-08-30 ENCOUNTER — CARE COORDINATION (OUTPATIENT)
Dept: CASE MANAGEMENT | Age: 54
End: 2019-08-30

## 2019-09-06 ENCOUNTER — CARE COORDINATION (OUTPATIENT)
Dept: CASE MANAGEMENT | Age: 54
End: 2019-09-06

## 2019-09-12 ENCOUNTER — OFFICE VISIT (OUTPATIENT)
Dept: BARIATRICS/WEIGHT MGMT | Age: 54
End: 2019-09-12
Payer: COMMERCIAL

## 2019-09-12 VITALS
DIASTOLIC BLOOD PRESSURE: 82 MMHG | SYSTOLIC BLOOD PRESSURE: 128 MMHG | WEIGHT: 293 LBS | HEART RATE: 80 BPM | BODY MASS INDEX: 51.37 KG/M2

## 2019-09-12 DIAGNOSIS — K44.9 SLIDING HIATAL HERNIA: Primary | ICD-10-CM

## 2019-09-12 PROCEDURE — 99203 OFFICE O/P NEW LOW 30 MIN: CPT | Performed by: SURGERY

## 2019-09-12 NOTE — PROGRESS NOTES
[facility]) Physicians   General & Laparoscopic Surgery  [employer]       HPI:  Jon Reyes is a very pleasant 48 y.o. female  who is referred for consultation by Dr. Charlie Horvath with regards to abdominal pain  Patient has history of RYGB   Had a recurrent hernia that was urgently repaired byt Dr. Isidro Lopez a couple of weeks ago laparoscopically  Patient feels much better since then with no abdominal pain    Past Medical History:   Diagnosis Date    Acute lateral meniscus tear of right knee 02/2019    Acute medial meniscus tear of right knee 02/2019    Anesthesia complication     woke up during one surgery    Anxiety     Arthritis     Asthma     Atrial fibrillation ([address]     new dx 4 weeks ago, first of  Sept 2017    Edema     Fibromyalgia     GERD (gastroesophageal reflux disease)     reflux    Hiatal hernia      Past Surgical History:   Procedure Laterality Date    ABLATION OF DYSRHYTHMIC FOCUS  04/2019    afib    BLADDER SUSPENSION  2004    HERNIA REPAIR  2001    HYSTERECTOMY  2004    KNEE ARTHROSCOPY Right 7/11/2019    VIDEO ARTHROSCOPY RIGHT KNEE, PARTIAL MEDIAL AND LATERAL MENISCECTOMY,, MEDIAL MICRO FRACTURE CHONDROPLASTY performed by Kasie Quigley MD at [facility] GASTRIC BYPASS  2011    TONSILLECTOMY AND ADENOIDECTOMY      UPPP UVULOPALATOPHARYGOPLASTY      [facility] N/A 8/26/2019    DIAGNOSTIC LAPAROSCOPY, LAPAROSCOPIC LYSIS OF ADHESIONS, LAPAROSCOPIC REDUCTION OF RECURRENT INCISIONAL HERNIA WITH MESH performed by Rikki Kee MD at [address] [place] [address] History   Problem Relation Age of Onset    Cancer Mother         lung    Arthritis Mother     Cirrhosis Father     Asthma Maternal Aunt      Social History     Tobacco Use    Smoking status: Never Smoker    Smokeless tobacco: Never Used   Substance Use Topics    Alcohol use: Yes     Comment: 0-1 drinks per month     I counseled the patient on

## 2019-09-13 ENCOUNTER — HOSPITAL ENCOUNTER (OUTPATIENT)
Dept: ONCOLOGY | Age: 54
Setting detail: INFUSION SERIES
End: 2019-09-13
Payer: COMMERCIAL

## 2019-09-13 ENCOUNTER — CARE COORDINATION (OUTPATIENT)
Dept: CASE MANAGEMENT | Age: 54
End: 2019-09-13

## 2019-09-13 NOTE — TELEPHONE ENCOUNTER
Noted, thank you. Thank you,   Yara Field, RN   Associate Care Manager   [phone]  [email or web address]

## 2019-09-14 NOTE — PROGRESS NOTES
[address]   Electrophysiology  Date: 11/2/2017    Chief Complaint   Patient presents with    Atrial Flutter       Cardiac HX: Mauri Gotti is a 46 y.o. woman with a h/o ASTRID (tx'd with surgery), asthma, fibromyalgia, GERD and recent dx of AF/AFL. Had seen Dr. Sherlyn Suggs 2 weeks ago and was started on Rhythmol. Still has s/s being out of rhythm. Today: S/s started about 2 months ago when she had issues with her daughter. C/o SOB and palpitations since being out of rhythm with minimal exertion which is not her baseline. Generally very active. C/o burning in her stomach after taking Xarelto. She is not that crazy about taking the Rhythmol TID and would prefer it changed to BID. Overall she does not feel well and would like to feel normal again. Denies snoring. Home medications:   Current Outpatient Prescriptions on File Prior to Visit   Medication Sig Dispense Refill    nitrofurantoin, macrocrystal-monohydrate, (MACROBID) 100 MG capsule Take 1 capsule by mouth 2 times daily for 7 days 14 capsule 0    Lidocaine-Menthol 4-5 % PTCH Apply 1 patch topically daily 30 patch     traMADol (ULTRAM) 50 MG tablet Take 1 tablet by mouth every 6 hours as needed for Pain 28 tablet 0    metoprolol succinate (TOPROL XL) 50 MG extended release tablet Take 1.5 tablets by mouth daily 50 mg in am & 25 mg in pm 90 tablet 3    furosemide (LASIX) 40 MG tablet Take 1 tablet by mouth 2 times daily 60 tablet 5    propafenone (RYTHMOL SR) 225 MG extended release capsule Take 1 capsule by mouth 3 times daily 90 capsule 3    potassium chloride (KLOR-CON M) 20 MEQ extended release tablet Take 1 tablet by mouth 2 times daily 60 tablet 3    diazepam (VALIUM) 10 MG tablet Take 1 tab po BID anxiety.  60 tablet 2    Multiple Vitamins-Minerals (CENTRUM SILVER ADULT 50+ PO) Take 1 tablet by mouth daily       fluticasone (FLONASE) 50 MCG/ACT nasal spray 1 spray by Nasal route daily 1 Bottle 3    vitamin B-12 hours. Lab Results   Component Value Date    CKTOTAL 80 01/12/2014    TROPONINI 0.00 10/08/2017    TROPONINI <0.01 06/02/2017     Lab Results   Component Value Date    .0 10/08/2017     Lab Results   Component Value Date    PROTIME 10.3 06/02/2017    INR 0.91 06/02/2017     Lab Results   Component Value Date    CHOL 182 03/24/2017    HDL 99 03/24/2017    TRIG 61 03/24/2017       ECG: Personally reviewed: AFL, HR 90, QRS 94, QTc 436    ECHO:  6/2/2107  Summary   Left ventricular size is normal . Normal left ventricular wall thickness.   Left ventricular function is normal with ejection fraction estimated at   50-55 %. No regional wall motion abnormalities are noted. Normal diastolic   filling pattern for age. Mild mitral regurgitation is present. There is mild   tricuspid regurgitation with RVSP estimated at 34 mmHg. Stress Test: 6/3/2017  1. Left ventricular ejection fraction of 57 %. 2. SPECT Myocardial Perfusion Imaging results are considered   negative for acute ischemia. Problem List:   Patient Active Problem List    Diagnosis Date Noted    Chest pain at rest 06/02/2017     Priority: High    CHF with unknown LVEF ([facility] [address] 06/02/2017     Priority: High    Peripheral edema      Priority: High    BMI 45.0-49.9, adult ([facility] [address] 05/10/2017     Priority: Low    Post-nasal drainage 03/06/2017     Priority: Low    S/P gastric bypass 02/08/2017     Priority: Low    Edema      Priority: Low    Asthma 07/17/2013     Priority: Low    GERD (gastroesophageal reflux disease) 07/17/2013     Priority: Low    Anxiety 07/17/2013     Priority: Low    Microcytic anemia 12/17/2011     Priority: Low        Assessment:   1. Chest pain at rest    2. Atrial fibrillation, unspecified type ([facility] [address] [facility]    3. Atrial flutter, unspecified type [facility])      Cardiac HX: Amanda Sosa is a 46 y.o. woman with a h/o ASTRID (tx'd surgically), asthma, fibromyalgia, GERD and recent dx of AF/AFL.  Had seen Dr. Michel Nye 2 weeks ago and Inadequate energy/protein intake + increased energy/protein needs related to stage IV pressure ulcer, lung Ca

## 2019-09-17 ENCOUNTER — HOSPITAL ENCOUNTER (OUTPATIENT)
Dept: ONCOLOGY | Age: 54
Setting detail: INFUSION SERIES
Discharge: HOME | End: 2019-09-17
Payer: COMMERCIAL

## 2019-09-17 VITALS
TEMPERATURE: 97.6 F | DIASTOLIC BLOOD PRESSURE: 87 MMHG | RESPIRATION RATE: 19 BRPM | SYSTOLIC BLOOD PRESSURE: 139 MMHG | HEART RATE: 73 BPM

## 2019-09-17 DIAGNOSIS — J45.40 MODERATE PERSISTENT ASTHMA WITHOUT COMPLICATION: Primary | ICD-10-CM

## 2019-09-17 PROCEDURE — 6360000002 HC RX W HCPCS: Performed by: ALLERGY & IMMUNOLOGY

## 2019-09-17 PROCEDURE — 96372 THER/PROPH/DIAG INJ SC/IM: CPT

## 2019-09-17 PROCEDURE — 99201 HC NEW PT, OUTPT VISIT LEVEL 1: CPT

## 2019-09-17 RX ORDER — SODIUM CHLORIDE 9 MG/ML
INJECTION, SOLUTION INTRAVENOUS CONTINUOUS
Status: CANCELLED | OUTPATIENT
Start: 2019-10-15

## 2019-09-17 RX ORDER — METHYLPREDNISOLONE SODIUM SUCCINATE 125 MG/2ML
125 INJECTION, POWDER, LYOPHILIZED, FOR SOLUTION INTRAMUSCULAR; INTRAVENOUS ONCE
Status: CANCELLED | OUTPATIENT
Start: 2019-10-15

## 2019-09-17 RX ORDER — PYRIDOXINE HCL (VITAMIN B6) 100 MG
TABLET ORAL NIGHTLY
COMMUNITY
End: 2020-12-18

## 2019-09-17 RX ORDER — DILTIAZEM HYDROCHLORIDE 60 MG/1
60 TABLET, FILM COATED ORAL DAILY PRN
COMMUNITY
End: 2020-06-26 | Stop reason: SDUPTHER

## 2019-09-17 RX ORDER — EPINEPHRINE 0.3 MG/.3ML
0.3 INJECTION, SOLUTION INTRAMUSCULAR PRN
COMMUNITY
End: 2021-11-15 | Stop reason: ALTCHOICE

## 2019-09-17 RX ORDER — MAGNESIUM OXIDE 400 MG/1
400 TABLET ORAL NIGHTLY
COMMUNITY
End: 2020-12-18

## 2019-09-17 RX ORDER — DIPHENHYDRAMINE HYDROCHLORIDE 50 MG/ML
50 INJECTION INTRAMUSCULAR; INTRAVENOUS ONCE
Status: CANCELLED | OUTPATIENT
Start: 2019-10-15

## 2019-09-17 RX ADMIN — OMALIZUMAB 300 MG: 150 INJECTION, SOLUTION SUBCUTANEOUS at 13:30

## 2019-09-17 NOTE — PLAN OF CARE
Problem: SAFETY  Goal: Free from accidental physical injury  Outcome: Ongoing  Note:   Pt is a Low fall risk. Explained fall risk precautions to pt and rationale behind their use to keep the patient safe. Belongings are in reach. Pt encouraged to notify staff for any and all assistance. Staff present in tx suite throughout entirety of pts treatment to monitor and protect from falls. Assistance provided when ambulating to restroom utilizing Stay With Me. Problem: KNOWLEDGE DEFICIT  Goal: Patient/S.O. demonstrates understanding of disease process, treatment plan, medications, and discharge instructions. Outcome: Ongoing  Note:   Pt seen in [facility] for Xolair 300 Mg subcutaneous, 1  injection to L arm and 1 injection to R arm. Pt carrying Epi pen on person, expiration date of 07/11/2020 noted, LOT [id number]. Pt provided with written pamphlet regarding administration of subcutaneous Xolair injections, pt verbalizes understanding. Pt's baseline vitals stable, vital signs taken 30 minutes post injection, pt tolerated well and without incident. Pt ambulatory with steady gait, discharged to home per self.

## 2019-09-19 ENCOUNTER — CARE COORDINATION (OUTPATIENT)
Dept: OTHER | Facility: CLINIC | Age: 54
End: 2019-09-19

## 2019-09-20 ENCOUNTER — OFFICE VISIT (OUTPATIENT)
Dept: PAIN MANAGEMENT | Age: 54
End: 2019-09-20
Payer: COMMERCIAL

## 2019-09-20 VITALS — DIASTOLIC BLOOD PRESSURE: 82 MMHG | OXYGEN SATURATION: 95 % | SYSTOLIC BLOOD PRESSURE: 114 MMHG | HEART RATE: 75 BPM

## 2019-09-20 DIAGNOSIS — M54.2 CERVICALGIA: ICD-10-CM

## 2019-09-20 DIAGNOSIS — F32.A DEPRESSION, UNSPECIFIED DEPRESSION TYPE: ICD-10-CM

## 2019-09-20 DIAGNOSIS — G89.29 CHRONIC BILATERAL LOW BACK PAIN WITH BILATERAL SCIATICA: ICD-10-CM

## 2019-09-20 DIAGNOSIS — M51.36 DDD (DEGENERATIVE DISC DISEASE), LUMBAR: ICD-10-CM

## 2019-09-20 DIAGNOSIS — F51.01 PRIMARY INSOMNIA: ICD-10-CM

## 2019-09-20 DIAGNOSIS — Z98.890 S/P LAPAROSCOPIC HERNIA REPAIR: ICD-10-CM

## 2019-09-20 DIAGNOSIS — M54.41 CHRONIC BILATERAL LOW BACK PAIN WITH BILATERAL SCIATICA: ICD-10-CM

## 2019-09-20 DIAGNOSIS — M54.42 CHRONIC BILATERAL LOW BACK PAIN WITH BILATERAL SCIATICA: ICD-10-CM

## 2019-09-20 DIAGNOSIS — R53.83 FATIGUE, UNSPECIFIED TYPE: ICD-10-CM

## 2019-09-20 DIAGNOSIS — S83.281A ACUTE LATERAL MENISCUS TEAR OF RIGHT KNEE, INITIAL ENCOUNTER: ICD-10-CM

## 2019-09-20 DIAGNOSIS — E66.01 CLASS 3 SEVERE OBESITY DUE TO EXCESS CALORIES WITH SERIOUS COMORBIDITY AND BODY MASS INDEX (BMI) OF 40.0 TO 44.9 IN ADULT (HCC): ICD-10-CM

## 2019-09-20 DIAGNOSIS — M47.817 LUMBOSACRAL SPONDYLOSIS WITHOUT MYELOPATHY: ICD-10-CM

## 2019-09-20 DIAGNOSIS — M79.7 FIBROMYALGIA: ICD-10-CM

## 2019-09-20 DIAGNOSIS — Z87.19 S/P LAPAROSCOPIC HERNIA REPAIR: ICD-10-CM

## 2019-09-20 DIAGNOSIS — Z98.890 S/P RIGHT KNEE SURGERY: ICD-10-CM

## 2019-09-20 DIAGNOSIS — G89.4 CHRONIC PAIN SYNDROME: ICD-10-CM

## 2019-09-20 PROCEDURE — 99213 OFFICE O/P EST LOW 20 MIN: CPT | Performed by: NURSE PRACTITIONER

## 2019-09-20 RX ORDER — HYDROCODONE BITARTRATE AND ACETAMINOPHEN 5; 325 MG/1; MG/1
1 TABLET ORAL EVERY 8 HOURS PRN
Qty: 90 TABLET | Refills: 0 | Status: SHIPPED | OUTPATIENT
Start: 2019-09-20 | End: 2019-10-20

## 2019-09-20 NOTE — PROGRESS NOTES
Felicita Womack  1965  [id number]      HISTORY OF PRESENT ILLNESS:  Ms. Jayne Kathleen is a 48 y.o. female returns for a follow up visit for pain management  She has a diagnosis of   1. Chronic pain syndrome    2. Fibromyalgia    3. Lumbosacral spondylosis without myelopathy    4. DDD (degenerative disc disease), lumbar    5. Chronic bilateral low back pain with bilateral sciatica    6. Cervicalgia    7. S/P right knee surgery, 7/11/19 with Dr. Michael Davey    8. Acute lateral meniscus tear of right knee, initial encounter    9. Depression, unspecified depression type    10. Primary insomnia    11. Fatigue, unspecified type    12. Class 3 severe obesity due to excess calories with serious comorbidity and body mass index (BMI) of 40.0 to 44.9 in adult ([facility]    13. S/P laparoscopic hernia repair      On the Patients Pain Assessment form:  She complains of pain in the both arm(s): upper, both knee(s), both leg(s): lower and bilateral upper back She rates the pain 4/10 and describes it as sharp, aching. Current treatment regimen has helped relieve about 40% of the pain. She denies any side effects from the current pain regimen. Patient reports that since the last follow up visit the physical functioning is unchanged, family/social relationships are unchanged, mood is unchanged sleep patterns are better, and that the overall functioning is unchanged. Patient denies misusing/abusing her narcotic pain medications or using any illegal drugs. There are No indicators for possible drug abuse, addiction or diversion problems. Upon obtaining medical history from Ms. Jayne Kathleen states that pain is manageable on current pain therapy. She had hernia repair with mesh on 8/26/29 with Dr. Bashir Corley. Symptoms of abdominal pain has since stabilized. Continues to f/u with bariatrics post weight loss. She was not prescribed opioids post op. Mood is stable without anxiety. Sleep is fair with an average of 5-6 hours.  Denies to having issues of PHYSICAL EXAM:   Nursing note and vitals reviewed. /82   Pulse 75   SpO2 95%   Constitutional: She appears well-developed and well-nourished. No acute distress. Skin: Skin is warm and dry, good turgor. No rash noted. She is not diaphoretic. Cardiovascular: Normal rate, regular rhythm, normal heart sounds, and does not have murmur. Pulmonary/Chest: Effort normal. No respiratory distress. She does not have wheezes in the lung fields. She has no rales. Neurological/Psychiatric:She is alert and oriented to person, place, and time. Coordination is  normal.  Her mood isAppropriate and affect is Neutral/Euthymic(normal) . IMPRESSION:   1. Chronic pain syndrome    2. Fibromyalgia    3. Lumbosacral spondylosis without myelopathy    4. DDD (degenerative disc disease), lumbar    5. Chronic bilateral low back pain with bilateral sciatica    6. Cervicalgia    7. S/P right knee surgery, 7/11/19 with Dr. Jerson Medley    8. Acute lateral meniscus tear of right knee, initial encounter    9. Depression, unspecified depression type    10. Primary insomnia    11. Fatigue, unspecified type    12. Class 3 severe obesity due to excess calories with serious comorbidity and body mass index (BMI) of 40.0 to 44.9 in adult ([facility] [address]    13. S/P laparoscopic hernia repair        PLAN:  Informed verbal consent was obtained  -Continue with Parth Rubio exercises  -Maintain f/u with Dr. Serena Ardon post hernia repair  -CBT techniques- relaxation therapies such as biofeedback, mindfulness based stress reduction, imagery, cognitive restructuring, problem solving discussed with patient  -She was advised weight reduction, diet changes- 800-1200 patrick diet, diet diary, exercising, nutritional  consult increased physical activity as tolerated  -Last UDS 5/17/19 Consistent  -Return in about 4 weeks (around 10/18/2019).      Current Outpatient Medications   Medication Sig Dispense Refill    HYDROcodone-acetaminophen (NORCO) 5-325 MG

## 2019-09-24 ENCOUNTER — CARE COORDINATION (OUTPATIENT)
Dept: OTHER | Facility: CLINIC | Age: 54
End: 2019-09-24

## 2019-10-01 ENCOUNTER — CARE COORDINATION (OUTPATIENT)
Dept: OTHER | Facility: CLINIC | Age: 54
End: 2019-10-01

## 2019-10-15 ENCOUNTER — HOSPITAL ENCOUNTER (OUTPATIENT)
Dept: ONCOLOGY | Age: 54
Setting detail: INFUSION SERIES
End: 2019-10-15
Payer: COMMERCIAL

## 2019-10-17 ENCOUNTER — TELEPHONE (OUTPATIENT)
Dept: FAMILY MEDICINE CLINIC | Age: 54
End: 2019-10-17

## 2019-11-18 ENCOUNTER — HOSPITAL ENCOUNTER (OUTPATIENT)
Dept: ONCOLOGY | Age: 54
Setting detail: INFUSION SERIES
Discharge: HOME | End: 2019-11-18
Payer: COMMERCIAL

## 2019-11-18 VITALS
SYSTOLIC BLOOD PRESSURE: 129 MMHG | RESPIRATION RATE: 16 BRPM | HEART RATE: 80 BPM | TEMPERATURE: 98 F | DIASTOLIC BLOOD PRESSURE: 83 MMHG

## 2019-11-18 DIAGNOSIS — J45.40 MODERATE PERSISTENT ASTHMA WITHOUT COMPLICATION: Primary | ICD-10-CM

## 2019-11-18 PROCEDURE — 96372 THER/PROPH/DIAG INJ SC/IM: CPT

## 2019-11-18 PROCEDURE — 6360000002 HC RX W HCPCS: Performed by: ALLERGY & IMMUNOLOGY

## 2019-11-18 RX ORDER — SODIUM CHLORIDE 9 MG/ML
INJECTION, SOLUTION INTRAVENOUS CONTINUOUS
Status: CANCELLED | OUTPATIENT
Start: 2019-12-09

## 2019-11-18 RX ORDER — METHYLPREDNISOLONE SODIUM SUCCINATE 125 MG/2ML
125 INJECTION, POWDER, LYOPHILIZED, FOR SOLUTION INTRAMUSCULAR; INTRAVENOUS ONCE
Status: CANCELLED | OUTPATIENT
Start: 2019-12-09

## 2019-11-18 RX ORDER — DIPHENHYDRAMINE HYDROCHLORIDE 50 MG/ML
50 INJECTION INTRAMUSCULAR; INTRAVENOUS ONCE
Status: CANCELLED | OUTPATIENT
Start: 2019-12-09

## 2019-11-18 RX ADMIN — OMALIZUMAB 300 MG: 150 INJECTION, SOLUTION SUBCUTANEOUS at 11:49

## 2019-11-18 NOTE — PLAN OF CARE
Problem: KNOWLEDGE DEFICIT  Goal: Patient/S.O. demonstrates understanding of disease process, treatment plan, medications, and discharge instructions. Outcome: Met This Shift     Pt seen and assessed [address] today for Xolair injection per orders from MD Salter  Pt tolerated infusion well and without incident. Pt verbalizes understanding of discharge instructions. Discharged ambulatory to home with self.

## 2019-12-16 ENCOUNTER — HOSPITAL ENCOUNTER (OUTPATIENT)
Dept: ONCOLOGY | Age: 54
Setting detail: INFUSION SERIES
Discharge: HOME | End: 2019-12-16
Payer: COMMERCIAL

## 2019-12-16 VITALS
DIASTOLIC BLOOD PRESSURE: 73 MMHG | RESPIRATION RATE: 16 BRPM | HEART RATE: 62 BPM | TEMPERATURE: 98 F | SYSTOLIC BLOOD PRESSURE: 117 MMHG

## 2019-12-16 DIAGNOSIS — J45.40 MODERATE PERSISTENT ASTHMA WITHOUT COMPLICATION: Primary | ICD-10-CM

## 2019-12-16 PROCEDURE — 6360000002 HC RX W HCPCS: Performed by: ALLERGY & IMMUNOLOGY

## 2019-12-16 PROCEDURE — 96372 THER/PROPH/DIAG INJ SC/IM: CPT

## 2019-12-16 RX ORDER — DIPHENHYDRAMINE HYDROCHLORIDE 50 MG/ML
50 INJECTION INTRAMUSCULAR; INTRAVENOUS ONCE
Status: CANCELLED | OUTPATIENT
Start: 2020-01-13

## 2019-12-16 RX ORDER — SODIUM CHLORIDE 9 MG/ML
INJECTION, SOLUTION INTRAVENOUS CONTINUOUS
Status: CANCELLED | OUTPATIENT
Start: 2020-01-13

## 2019-12-16 RX ORDER — METHYLPREDNISOLONE SODIUM SUCCINATE 125 MG/2ML
125 INJECTION, POWDER, LYOPHILIZED, FOR SOLUTION INTRAMUSCULAR; INTRAVENOUS ONCE
Status: CANCELLED | OUTPATIENT
Start: 2020-01-13

## 2019-12-16 RX ADMIN — OMALIZUMAB 300 MG: 75 INJECTION, SOLUTION SUBCUTANEOUS at 15:46

## 2019-12-17 NOTE — PLAN OF CARE
Problem: KNOWLEDGE DEFICIT  Goal: Patient/S.O. demonstrates understanding of disease process, treatment plan, medications, and discharge instructions. Note:   Pt seen and assessed [address] today for Xolair injection per orders from Rose Stokes. Epi Pen with patient, expires July 2020. Pt tolerated infusion well and without incident. Pt verbalizes understanding of discharge instructions. Discharged ambulatory to home with self. Problem: SAFETY  Goal: Free from accidental physical injury  Note:     Explained fall risk precautions to pt and rationale behind their use to keep the patient safe. Belongings are in reach. Pt encouraged to notify staff for any and all assistance. Staff present in tx suite throughout entirety of pts treatment to monitor and protect from falls. Assistance provided when ambulating to restroom utilizing Stay With Me.

## 2019-12-27 ENCOUNTER — NURSE TRIAGE (OUTPATIENT)
Dept: OTHER | Facility: CLINIC | Age: 54
End: 2019-12-27

## 2020-01-27 ENCOUNTER — OFFICE VISIT (OUTPATIENT)
Dept: PRIMARY CARE CLINIC | Age: 55
End: 2020-01-27
Payer: COMMERCIAL

## 2020-01-27 VITALS
TEMPERATURE: 95.6 F | SYSTOLIC BLOOD PRESSURE: 120 MMHG | DIASTOLIC BLOOD PRESSURE: 70 MMHG | RESPIRATION RATE: 16 BRPM | BODY MASS INDEX: 41.95 KG/M2 | OXYGEN SATURATION: 96 % | HEART RATE: 83 BPM | HEIGHT: 70 IN | WEIGHT: 293 LBS

## 2020-01-27 PROCEDURE — 99212 OFFICE O/P EST SF 10 MIN: CPT | Performed by: PHYSICIAN ASSISTANT

## 2020-01-27 ASSESSMENT — ENCOUNTER SYMPTOMS
VOMITING: 0
ABDOMINAL DISTENTION: 0
DIARRHEA: 1
ANAL BLEEDING: 0
NAUSEA: 1
BLOOD IN STOOL: 0
ABDOMINAL PAIN: 1

## 2020-01-27 NOTE — PROGRESS NOTES
Reason for Visit:   Chief Complaint   Patient presents with    Diarrhea     have had 2 weeks/ from being sick does have A fib is affecting heart, feels weak very dehydrated      Patient History     HPI: Jonathan Tovar is a 47 y.o. female who c/o diarrhea. She states she had a hx of URI sxs as well as gastroenteritis sxs present in variations of severity over the past 2-3 weeks. She reports URI sxs have since resolved, but she still has GI sxs. She reports transient nausea but no vomiting and she is tolerating a solid po diet and she is back to eating regularly. She denies any fever but endorses fatigue, exhaustion, and chills at night. She endorses mild bloating and abdominal upset. She reports stools are loose, watery and seem to come within minutes of eating and/or drinking. She reports average 5-6 loose stools per day. She denies any blood in stool. She has been taking Immodium and Pepto bismol for sxs with little relief.             Past Medical History:   Diagnosis Date    Acute lateral meniscus tear of right knee 02/2019    Acute medial meniscus tear of right knee 02/2019    Anesthesia complication     woke up during one surgery    Anxiety     Arthritis     Asthma     Atrial fibrillation ([facility] [address]     new dx 4 weeks ago, first of  Sept 2017    Edema     Fibromyalgia     GERD (gastroesophageal reflux disease)     reflux    Hiatal hernia        Past Surgical History:   Procedure Laterality Date    ABLATION OF DYSRHYTHMIC FOCUS  04/2019    afib    BLADDER SUSPENSION  2004    HERNIA REPAIR  2001    HYSTERECTOMY  2004    KNEE ARTHROSCOPY Right 7/11/2019    VIDEO ARTHROSCOPY RIGHT KNEE, PARTIAL MEDIAL AND LATERAL MENISCECTOMY,, MEDIAL MICRO FRACTURE CHONDROPLASTY performed by Renata Tolentino MD at [facility] GASTRIC BYPASS  2011    TONSILLECTOMY AND ADENOIDECTOMY      UPPP [id number]     

## 2020-02-03 ENCOUNTER — HOSPITAL ENCOUNTER (OUTPATIENT)
Dept: ONCOLOGY | Age: 55
Setting detail: INFUSION SERIES
Discharge: HOME | End: 2020-02-03
Payer: COMMERCIAL

## 2020-02-03 VITALS
TEMPERATURE: 98.1 F | DIASTOLIC BLOOD PRESSURE: 85 MMHG | RESPIRATION RATE: 18 BRPM | HEART RATE: 69 BPM | SYSTOLIC BLOOD PRESSURE: 119 MMHG

## 2020-02-03 DIAGNOSIS — J45.40 MODERATE PERSISTENT ASTHMA WITHOUT COMPLICATION: Primary | ICD-10-CM

## 2020-02-03 PROCEDURE — 6360000002 HC RX W HCPCS: Performed by: ALLERGY & IMMUNOLOGY

## 2020-02-03 PROCEDURE — 96372 THER/PROPH/DIAG INJ SC/IM: CPT

## 2020-02-03 RX ORDER — SODIUM CHLORIDE 9 MG/ML
INJECTION, SOLUTION INTRAVENOUS CONTINUOUS
Status: CANCELLED | OUTPATIENT
Start: 2020-02-10

## 2020-02-03 RX ORDER — DIPHENHYDRAMINE HYDROCHLORIDE 50 MG/ML
50 INJECTION INTRAMUSCULAR; INTRAVENOUS ONCE
Status: CANCELLED | OUTPATIENT
Start: 2020-02-10

## 2020-02-03 RX ORDER — METHYLPREDNISOLONE SODIUM SUCCINATE 125 MG/2ML
125 INJECTION, POWDER, LYOPHILIZED, FOR SOLUTION INTRAMUSCULAR; INTRAVENOUS ONCE
Status: CANCELLED | OUTPATIENT
Start: 2020-02-10

## 2020-02-03 RX ADMIN — OMALIZUMAB 300 MG: 150 INJECTION, SOLUTION SUBCUTANEOUS at 14:57

## 2020-02-03 NOTE — PLAN OF CARE
Problem: SAFETY  Goal: Free from accidental physical injury  Note:     Explained fall risk precautions to pt and rationale behind their use to keep the patient safe. Belongings are in reach. Pt encouraged to notify staff for any and all assistance. Staff present in tx suite throughout entirety of pts treatment to monitor and protect from falls. Assistance provided when ambulating to restroom utilizing Stay With Me. Problem: KNOWLEDGE DEFICIT  Goal: Patient/S.O. demonstrates understanding of disease process, treatment plan, medications, and discharge instructions. Note:   Pt seen and assessed [address] [facility] today for Xolair injection per orders from [place]. Pt tolerated injections well and without incident. Pt verbalizes understanding of discharge instructions. Discharged ambulatory to home with .

## 2020-02-11 ENCOUNTER — OFFICE VISIT (OUTPATIENT)
Dept: FAMILY MEDICINE CLINIC | Age: 55
End: 2020-02-11
Payer: COMMERCIAL

## 2020-02-11 VITALS
WEIGHT: 293 LBS | HEIGHT: 70 IN | RESPIRATION RATE: 18 BRPM | DIASTOLIC BLOOD PRESSURE: 78 MMHG | SYSTOLIC BLOOD PRESSURE: 136 MMHG | BODY MASS INDEX: 41.95 KG/M2 | OXYGEN SATURATION: 98 % | HEART RATE: 78 BPM

## 2020-02-11 PROCEDURE — 99214 OFFICE O/P EST MOD 30 MIN: CPT | Performed by: FAMILY MEDICINE

## 2020-02-11 RX ORDER — LORAZEPAM 1 MG/1
1 TABLET ORAL 3 TIMES DAILY PRN
Qty: 90 TABLET | Refills: 0 | Status: SHIPPED | OUTPATIENT
Start: 2020-02-11 | End: 2020-03-10 | Stop reason: SDUPTHER

## 2020-02-11 RX ORDER — MELOXICAM 7.5 MG/1
7.5 TABLET ORAL DAILY
Qty: 30 TABLET | Refills: 5 | Status: SHIPPED | OUTPATIENT
Start: 2020-02-11 | End: 2020-08-21 | Stop reason: ALTCHOICE

## 2020-02-11 NOTE — TELEPHONE ENCOUNTER
Patient of Dr Nicci Sosa, needs refill but has not been seen in office since 2018. Patient wants refill on pending medication, LOV 04/19/18, no upcoming appts,  Please refill if appropriate.

## 2020-02-11 NOTE — PROGRESS NOTES
[facility]  Progress Note  Nora Vann DO          Vita Nunez  1965    02/15/20    Chief Complaint:   Vita Nunez is a 47 y.o. female who is here for anxiety        HPI:   Stress in her life. Daughter lives in home and does not get along with her daughter's live-in boyfriend who brings in [employer] bookts and dogs. She feels like she is going to lose her temper. She requests renewal of lorazepam.  She finds she needs an NSAID for her chronic pain and arthritis pain. ROS negative for headache, visionchanges, chest pain, shortness of breath, abdominal pain, urinary sx, bowel changes. Past medical, surgical, and social history reviewed. and allergies reviewed. Allergies   Allergen Reactions    Latex Anaphylaxis and Rash    Aspirin      Swelling in lower legs    Demerol      rash    Meperidine     Nsaids Swelling    Pcn [Penicillins] Hives    Ranitidine Hcl     Sulfa Antibiotics Rash and Hives     Prior to Visit Medications    Medication Sig Taking? Authorizing Provider   LORazepam (ATIVAN) 1 MG tablet Take 1 tablet by mouth 3 times daily as needed for Anxiety (insomnia) for up to 30 days.  Yes Jaja Hernandez DO   meloxicam (MOBIC) 7.5 MG tablet Take 1 tablet by mouth daily Yes Jaja Hernandez DO   EPINEPHrine (AUVI-Q) 0.3 MG/0.3ML SOAJ injection Inject 0.3 mg into the muscle as needed Use as directed for allergic reaction Yes Historical Provider, MD   magnesium oxide (MAG-OX) 400 MG tablet Take 400 mg by mouth nightly Yes Historical Provider, MD   diltiazem (CARDIZEM) 60 MG tablet Take 60 mg by mouth daily as needed (Per pt \"when my heart rate starts to increase\") Yes Historical Provider, MD   torsemide (DEMADEX) 20 MG tablet Take 20 mg by mouth 2 times daily Prescribed 20 mg BID, but patient takes 20 mg daily +/- additional dose if needed for swelling Yes Historical Provider, MD   melatonin 3 MG TABS tablet Take 20 mg by mouth nightly as needed Yes obstruction, without gangrene       Immunization History   Administered Date(s) Administered    BCG (Juan BCG) 05/28/2013    DTaP 05/28/2013    Influenza Vaccine, unspecified formulation 01/09/2017, 11/26/2018    Influenza Virus Vaccine 10/23/2017, 10/23/2017, 11/26/2018    PPD Test 06/11/2013, 05/07/2014, 08/13/2019    Pneumococcal Polysaccharide ([id number]) 06/17/2019    Tdap (Boostrix, Adacel) 05/28/2013       Past Medical History:   Diagnosis Date    Acute lateral meniscus tear of right knee 02/2019    Acute medial meniscus tear of right knee 02/2019    Anesthesia complication     woke up during one surgery    Anxiety     Arthritis     Asthma     Atrial fibrillation ([address]     new dx 4 weeks ago, first of  Sept 2017    Edema     Fibromyalgia     GERD (gastroesophageal reflux disease)     reflux    Hiatal hernia      Past Surgical History:   Procedure Laterality Date    ABLATION OF DYSRHYTHMIC FOCUS  04/2019    afib    BLADDER SUSPENSION  2004    HERNIA REPAIR  2001    HYSTERECTOMY  2004    KNEE ARTHROSCOPY Right 7/11/2019    VIDEO ARTHROSCOPY RIGHT KNEE, PARTIAL MEDIAL AND LATERAL MENISCECTOMY,, MEDIAL MICRO FRACTURE CHONDROPLASTY performed by Nery Faustin MD at [facility] GASTRIC BYPASS  2011    TONSILLECTOMY AND ADENOIDECTOMY      UPPP UVULOPALATOPHARYGOPLASTY      [facility] N/A 8/26/2019    DIAGNOSTIC LAPAROSCOPY, LAPAROSCOPIC LYSIS OF ADHESIONS, LAPAROSCOPIC REDUCTION OF RECURRENT INCISIONAL HERNIA WITH MESH performed by Ella Sellers MD at [address] [place] [address] History   Problem Relation Age of Onset    Cancer Mother         lung    Arthritis Mother     Cirrhosis Father     Asthma Maternal Aunt      Social History     Socioeconomic History    Marital status:      Spouse name: Not on file    Number of children: 3    Years of education: 15.5    Highest education level: Not on file   Occupational History    Not on file   Social Needs    Financial resource strain: Not on file    Food insecurity:     Worry: Not on file     Inability: Not on file    Transportation needs:     Medical: Not on file     Non-medical: Not on file   Tobacco Use    Smoking status: Never Smoker    Smokeless tobacco: Never Used   Substance and Sexual Activity    Alcohol use: Yes     Comment: 0-1 drinks per month    Drug use: No    Sexual activity: Yes     Partners: Male   Lifestyle    Physical activity:     Days per week: Not on file     Minutes per session: Not on file    Stress: Not on file   Relationships    Social connections:     Talks on phone: Not on file     Gets together: Not on file     Attends Zoroastrian service: Not on file     Active member of club or organization: Not on file     Attends meetings of clubs or organizations: Not on file     Relationship status: Not on file    Intimate partner violence:     Fear of current or ex partner: Not on file     Emotionally abused: Not on file     Physically abused: Not on file     Forced sexual activity: Not on file   Other Topics Concern    Not on file   Social History Narrative    Not on file       O: /78 (Site: Left Upper Arm, Position: Sitting, Cuff Size: Large Adult)   Pulse 78   Resp 18   Ht 5' 10\" (1.778 m)   Wt (!) 368 lb (166.9 kg)   SpO2 98%   BMI 52.80 kg/m²   Physical Exam  GEN: No acute distress,cooperative, well nourished, alert. HEENT: PEERLA, EOMI , normocephalic/atraumatic, nares and oropharynx clear. Mucus membranes normal, Tympanic membranes clear bilaterally. Neck: soft, supple, no thyromegaly,mass, no Lymphadenopathy  CV: Regular rate and rhythm, no murmur, rubs, gallops. No edema. Resp: Clear to auscultation bilaterally good air entry bilaterally  No crackles, wheeze. Breathing comfortably. Psych: anxious affect. Denies SI/HI  Neuro: AOx3          ASSESSMENT   Diagnosis Orders   1.  Muscle strain of left lower leg, initial encounter  meloxicam (MOBIC) 7.5 MG tablet   2. Insomnia, unspecified type  LORazepam (ATIVAN) 1 MG tablet   3. Lumbar spondylosis  meloxicam (MOBIC) 7.5 MG tablet     #1 and #3: The risks, benefits, potential side effects and barriers to medication use were addressed today. Understanding was acknowledged. Patient asked to follow-up if condition(s) do not improve as anticipated. #2: Pt aware of need for every 3 month medication followup appointments, and that medication refills for benzodiazepines, narcotics and/or stimulants will only be given at appointment. The risks, benefits, potential side effects and barriers to medication use were addressed today. Understanding was acknowledged. Patient asked to follow-up if condition(s) do not improve as anticipated. The current medical regimen is effective;  continue present plan and medications. PLAN          If applicable, see additional patient information and instructions under \"Patient Instructions. \"    Return in about 3 months (around 5/11/2020) for Anxiety. There are no Patient Instructions on file for this visit. Please note a portion of this chart was generated using dragon dictation software. Although every effort was made to ensure the accuracy of this automated transcription,some errors in transcription may have occurred.

## 2020-02-18 RX ORDER — MOMETASONE FUROATE AND FORMOTEROL FUMARATE DIHYDRATE 200; 5 UG/1; UG/1
AEROSOL RESPIRATORY (INHALATION)
Qty: 1 INHALER | Refills: 0 | OUTPATIENT
Start: 2020-02-18

## 2020-02-18 NOTE — TELEPHONE ENCOUNTER
Received fax request for pending Rx. Patient has not been seen since 2018. She has no future appts scheduled. Would you like to refill Dulera for 30 days and advise pt she needs appt?

## 2020-02-25 ENCOUNTER — NURSE TRIAGE (OUTPATIENT)
Dept: OTHER | Facility: CLINIC | Age: 55
End: 2020-02-25

## 2020-02-25 ENCOUNTER — APPOINTMENT (OUTPATIENT)
Dept: GENERAL RADIOLOGY | Age: 55
End: 2020-02-25
Payer: COMMERCIAL

## 2020-02-25 ENCOUNTER — APPOINTMENT (OUTPATIENT)
Dept: CT IMAGING | Age: 55
End: 2020-02-25
Payer: COMMERCIAL

## 2020-02-25 ENCOUNTER — HOSPITAL ENCOUNTER (EMERGENCY)
Age: 55
Discharge: HOME | End: 2020-02-25
Attending: EMERGENCY MEDICINE
Payer: COMMERCIAL

## 2020-02-25 VITALS
TEMPERATURE: 97.7 F | HEIGHT: 70 IN | OXYGEN SATURATION: 96 % | WEIGHT: 293 LBS | BODY MASS INDEX: 41.95 KG/M2 | HEART RATE: 80 BPM | DIASTOLIC BLOOD PRESSURE: 86 MMHG | SYSTOLIC BLOOD PRESSURE: 126 MMHG | RESPIRATION RATE: 16 BRPM

## 2020-02-25 LAB
ANION GAP SERPL CALCULATED.3IONS-SCNC: 12 MMOL/L (ref 3–16)
APTT: 30.6 SEC (ref 24.2–36.2)
BASOPHILS ABSOLUTE: 0.1 K/UL (ref 0–0.2)
BASOPHILS RELATIVE PERCENT: 0.8 %
BUN BLDV-MCNC: 18 MG/DL (ref 7–20)
CALCIUM SERPL-MCNC: 8.7 MG/DL (ref 8.3–10.6)
CHLORIDE BLD-SCNC: 111 MMOL/L (ref 99–110)
CO2: 23 MMOL/L (ref 21–32)
CREAT SERPL-MCNC: 0.9 MG/DL (ref 0.6–1.1)
EKG ATRIAL RATE: 78 BPM
EKG DIAGNOSIS: NORMAL
EKG P AXIS: 40 DEGREES
EKG P-R INTERVAL: 178 MS
EKG Q-T INTERVAL: 374 MS
EKG QRS DURATION: 80 MS
EKG QTC CALCULATION (BAZETT): 426 MS
EKG R AXIS: 20 DEGREES
EKG T AXIS: 25 DEGREES
EKG VENTRICULAR RATE: 78 BPM
EOSINOPHILS ABSOLUTE: 0.4 K/UL (ref 0–0.6)
EOSINOPHILS RELATIVE PERCENT: 5.2 %
GFR AFRICAN AMERICAN: >60
GFR NON-AFRICAN AMERICAN: >60
GLUCOSE BLD-MCNC: 79 MG/DL (ref 70–99)
HCT VFR BLD CALC: 35 % (ref 36–48)
HEMOGLOBIN: 11.3 G/DL (ref 12–16)
INR BLD: 0.91 (ref 0.86–1.14)
LYMPHOCYTES ABSOLUTE: 1.9 K/UL (ref 1–5.1)
LYMPHOCYTES RELATIVE PERCENT: 27.6 %
MCH RBC QN AUTO: 26.9 PG (ref 26–34)
MCHC RBC AUTO-ENTMCNC: 32.3 G/DL (ref 31–36)
MCV RBC AUTO: 83.1 FL (ref 80–100)
MONOCYTES ABSOLUTE: 0.6 K/UL (ref 0–1.3)
MONOCYTES RELATIVE PERCENT: 9.4 %
NEUTROPHILS ABSOLUTE: 3.9 K/UL (ref 1.7–7.7)
NEUTROPHILS RELATIVE PERCENT: 57 %
PDW BLD-RTO: 19.3 % (ref 12.4–15.4)
PLATELET # BLD: 283 K/UL (ref 135–450)
PMV BLD AUTO: 8.7 FL (ref 5–10.5)
POTASSIUM REFLEX MAGNESIUM: 3.6 MMOL/L (ref 3.5–5.1)
PRO-BNP: 209 PG/ML (ref 0–124)
PROTHROMBIN TIME: 10.6 SEC (ref 10–13.2)
RAPID INFLUENZA  B AGN: NEGATIVE
RAPID INFLUENZA A AGN: NEGATIVE
RBC # BLD: 4.22 M/UL (ref 4–5.2)
SODIUM BLD-SCNC: 146 MMOL/L (ref 136–145)
TROPONIN: <0.01 NG/ML
WBC # BLD: 6.8 K/UL (ref 4–11)

## 2020-02-25 PROCEDURE — 6360000002 HC RX W HCPCS: Performed by: EMERGENCY MEDICINE

## 2020-02-25 PROCEDURE — 96375 TX/PRO/DX INJ NEW DRUG ADDON: CPT

## 2020-02-25 PROCEDURE — 93005 ELECTROCARDIOGRAM TRACING: CPT | Performed by: EMERGENCY MEDICINE

## 2020-02-25 PROCEDURE — 96374 THER/PROPH/DIAG INJ IV PUSH: CPT

## 2020-02-25 PROCEDURE — 71046 X-RAY EXAM CHEST 2 VIEWS: CPT

## 2020-02-25 PROCEDURE — 85730 THROMBOPLASTIN TIME PARTIAL: CPT

## 2020-02-25 PROCEDURE — 70450 CT HEAD/BRAIN W/O DYE: CPT

## 2020-02-25 PROCEDURE — 80048 BASIC METABOLIC PNL TOTAL CA: CPT

## 2020-02-25 PROCEDURE — 83880 ASSAY OF NATRIURETIC PEPTIDE: CPT

## 2020-02-25 PROCEDURE — 74177 CT ABD & PELVIS W/CONTRAST: CPT

## 2020-02-25 PROCEDURE — 85610 PROTHROMBIN TIME: CPT

## 2020-02-25 PROCEDURE — 84484 ASSAY OF TROPONIN QUANT: CPT

## 2020-02-25 PROCEDURE — 6370000000 HC RX 637 (ALT 250 FOR IP): Performed by: EMERGENCY MEDICINE

## 2020-02-25 PROCEDURE — 36415 COLL VENOUS BLD VENIPUNCTURE: CPT

## 2020-02-25 PROCEDURE — 6360000004 HC RX CONTRAST MEDICATION: Performed by: EMERGENCY MEDICINE

## 2020-02-25 PROCEDURE — 85025 COMPLETE CBC W/AUTO DIFF WBC: CPT

## 2020-02-25 PROCEDURE — 99285 EMERGENCY DEPT VISIT HI MDM: CPT

## 2020-02-25 PROCEDURE — 87804 INFLUENZA ASSAY W/OPTIC: CPT

## 2020-02-25 RX ORDER — ACETAMINOPHEN 325 MG/1
650 TABLET ORAL ONCE
Status: COMPLETED | OUTPATIENT
Start: 2020-02-25 | End: 2020-02-25

## 2020-02-25 RX ORDER — CYCLOBENZAPRINE HCL 5 MG
5 TABLET ORAL 2 TIMES DAILY PRN
Qty: 10 TABLET | Refills: 0 | Status: SHIPPED | OUTPATIENT
Start: 2020-02-25 | End: 2020-04-14 | Stop reason: SDUPTHER

## 2020-02-25 RX ORDER — ALBUTEROL SULFATE 90 UG/1
AEROSOL, METERED RESPIRATORY (INHALATION)
Qty: 18 G | Refills: 11 | Status: SHIPPED | OUTPATIENT
Start: 2020-02-25 | End: 2021-01-15

## 2020-02-25 RX ORDER — PROCHLORPERAZINE EDISYLATE 5 MG/ML
10 INJECTION INTRAMUSCULAR; INTRAVENOUS ONCE
Status: COMPLETED | OUTPATIENT
Start: 2020-02-25 | End: 2020-02-25

## 2020-02-25 RX ORDER — MOMETASONE FUROATE AND FORMOTEROL FUMARATE DIHYDRATE 200; 5 UG/1; UG/1
AEROSOL RESPIRATORY (INHALATION)
Qty: 13 G | Refills: 5 | Status: SHIPPED | OUTPATIENT
Start: 2020-02-25 | End: 2021-05-29

## 2020-02-25 RX ORDER — ALBUTEROL SULFATE 90 UG/1
2 AEROSOL, METERED RESPIRATORY (INHALATION) EVERY 4 HOURS PRN
Qty: 1 INHALER | Refills: 0 | Status: SHIPPED | OUTPATIENT
Start: 2020-02-25 | End: 2021-03-22 | Stop reason: SDUPTHER

## 2020-02-25 RX ORDER — DIPHENHYDRAMINE HYDROCHLORIDE 50 MG/ML
12.5 INJECTION INTRAMUSCULAR; INTRAVENOUS ONCE
Status: COMPLETED | OUTPATIENT
Start: 2020-02-25 | End: 2020-02-25

## 2020-02-25 RX ADMIN — DIPHENHYDRAMINE HYDROCHLORIDE 12.5 MG: 50 INJECTION, SOLUTION INTRAMUSCULAR; INTRAVENOUS at 08:34

## 2020-02-25 RX ADMIN — PROCHLORPERAZINE EDISYLATE 10 MG: 5 INJECTION INTRAMUSCULAR; INTRAVENOUS at 08:34

## 2020-02-25 RX ADMIN — IOPAMIDOL 80 ML: 755 INJECTION, SOLUTION INTRAVENOUS at 08:22

## 2020-02-25 RX ADMIN — ACETAMINOPHEN 650 MG: 325 TABLET ORAL at 07:32

## 2020-02-25 ASSESSMENT — ENCOUNTER SYMPTOMS
NAUSEA: 0
SHORTNESS OF BREATH: 1
BACK PAIN: 1
FACIAL SWELLING: 0
WHEEZING: 0
COUGH: 0
RHINORRHEA: 0
EYE DISCHARGE: 0
CHEST TIGHTNESS: 1
SORE THROAT: 0
VOMITING: 0
ABDOMINAL PAIN: 0
EYE PAIN: 0
DIARRHEA: 0
EYE ITCHING: 0
PHOTOPHOBIA: 0

## 2020-02-25 ASSESSMENT — PAIN SCALES - GENERAL
PAINLEVEL_OUTOF10: 5
PAINLEVEL_OUTOF10: 0

## 2020-02-25 ASSESSMENT — PAIN DESCRIPTION - DESCRIPTORS: DESCRIPTORS: CONSTANT;PRESSURE;TIGHTNESS

## 2020-02-25 ASSESSMENT — PAIN DESCRIPTION - FREQUENCY: FREQUENCY: INTERMITTENT

## 2020-02-25 NOTE — TELEPHONE ENCOUNTER
Reason for Disposition   [1] MODERATE difficulty breathing (e.g., speaks in phrases, SOB even at rest, pulse 100-120) AND [2] NEW-onset or WORSE than normal    Answer Assessment - Initial Assessment Questions  1. RESPIRATORY STATUS: \"Describe your breathing? \" (e.g., wheezing, shortness of breath, unable to speak, severe coughing)       Shortness of breath and wheezing present  2. ONSET: \"When did this breathing problem begin? \"       About a month ago  3. PATTERN \"Does the difficult breathing come and go, or has it been constant since it started? \"       Constant and getting worse. 4. SEVERITY: \"How bad is your breathing? \" (e.g., mild, moderate, severe)     - MILD: No SOB at rest, mild SOB with walking, speaks normally in sentences, can lay down, no retractions, pulse < 100.     - MODERATE: SOB at rest, SOB with minimal exertion and prefers to sit, cannot lie down flat, speaks in phrases, mild retractions, audible wheezing, pulse 100-120.     - SEVERE: Very SOB at rest, speaks in single words, struggling to breathe, sitting hunched forward, retractions, pulse > 120       SOB at rest   5. RECURRENT SYMPTOM: \"Have you had difficulty breathing before? \" If so, ask: \"When was the last time? \" and \"What happened that time? \"       Yes - patient has cardiac and lung history   6. CARDIAC HISTORY: \"Do you have any history of heart disease? \" (e.g., heart attack, angina, bypass surgery, angioplasty)       Has history of CHF, afib, aflutter, SVT  7. LUNG HISTORY: \"Do you have any history of lung disease? \"  (e.g., pulmonary embolus, asthma, emphysema)      Patient has asthma  8. CAUSE: \"What do you think is causing the breathing problem? \"       Patient believes she is in fluid overload. 9. OTHER SYMPTOMS: \"Do you have any other symptoms? (e.g., dizziness, runny nose, cough, chest pain, fever)      Patient is having chest pain and congestion.     Protocols used: BREATHING DIFFICULTY-ADULT-AH    Please do not respond to the triage nurse through this encounter. Any subsequent communication should be directly with the patient.

## 2020-02-25 NOTE — ED NOTES
Discharge instructions reviewed with pt. She verbalized understanding. Pt left the unit ambulatory to return home via private car accompanied by spouse.       Andres Hensley RN  02/25/20 0298

## 2020-02-25 NOTE — ED PROVIDER NOTES
[address] [facility] ENCOUNTER          ATTENDING PHYSICIAN NOTE       Date of evaluation: 2/25/2020    Chief Complaint     No chief complaint on file. Chest pain, headache    History of Present Illness     Darci Delcid is a 47 y.o. female who presents with a chief complaint of chest pain, headache. Patient states that she has been feeling short of breath for the past few weeks, and having intermittent chest pain. On Saturday she fell in the tub and has pain in her right kidney. She is also had headaches on and off for the last few months that feel like pressure and like there is \"too much in my head\". Patient is tearful, states she has had increased stress at home, and last night could not sleep because of the headache and therefore came in for evaluation of all of her complaints today. States she went to see her primary care doctor a few weeks ago but forgot to mention the headaches because she had \"so much else going on\". In terms of shortness of breath, patient states she feels like she is \"getting fluid back on me\". She does have a history of atrial fibrillation but does not know if she has a history of heart failure. States she had to be admitted once to have fluid diuresed off of her. She does state that the shortness of breath is usually all the time but worse intermittently and feels like a pressure on her chest like someone is choking her. In terms of the chest pain, patient has intermittent chest pain that often wakes her up from sleep and causes pressure and again a feeling of being choked. States this happens for a few minutes and then resolves on its own. Chest pain is not specifically exertional.  States she has a history of A. fib and had an ablation sometime ago, takes Eliquis. In terms of headache, patient has had a headache daily for several months, she is worried because she has never had headaches before.   States it feels like pressure mostly in the front of her head. Occasionally when the headache gets really bad patient feels slightly dizzy which she specifies is more like being lightheaded, and she has blurry vision for a few seconds that goes away. Denies neurologic deficits such as focal weakness of arm or leg. Denies dysarthria. Denies ataxia. Additionally, patient states she had a mechanical fall on Saturday where she slipped on some soap in the bathtub and fell and landed on her right flank area. She did hit her head but did not lose consciousness. Her headaches have not worsened since that time. She does state that she feels like her right kidney hurts, and she wants to be checked out for this. Ef 50% 2018 echo     Review of Systems     Review of Systems   Constitutional: Positive for appetite change. Negative for activity change, chills and fever. HENT: Negative for congestion, ear pain, facial swelling, nosebleeds, rhinorrhea and sore throat. Eyes: Negative for photophobia, pain, discharge, itching and visual disturbance. Respiratory: Positive for chest tightness and shortness of breath. Negative for cough and wheezing. Cardiovascular: Positive for chest pain and leg swelling. Negative for palpitations. Gastrointestinal: Negative for abdominal pain, diarrhea, nausea and vomiting. Endocrine: Negative for cold intolerance, heat intolerance, polydipsia and polyuria. Genitourinary: Positive for flank pain. Negative for dysuria, hematuria, pelvic pain, vaginal bleeding and vaginal discharge. Musculoskeletal: Positive for back pain. Negative for arthralgias, joint swelling, myalgias and neck pain. Skin: Negative for rash and wound. Allergic/Immunologic: Negative for environmental allergies. Neurological: Positive for dizziness and headaches. Negative for tremors, seizures, syncope, weakness and light-headedness. Hematological: Negative for adenopathy. Does not bruise/bleed easily.    Psychiatric/Behavioral: Negative mouth nightly as needed    MELOXICAM (MOBIC) 7.5 MG TABLET    Take 1 tablet by mouth daily    TORSEMIDE (DEMADEX) 20 MG TABLET    Take 20 mg by mouth 2 times daily Prescribed 20 mg BID, but patient takes 20 mg daily +/- additional dose if needed for swelling    VALERIAN 250 MG CAPS    Take by mouth nightly       Allergies     She is allergic to latex; aspirin; demerol; meperidine; nsaids; pcn [penicillins]; ranitidine hcl; and sulfa antibiotics. Physical Exam     INITIAL VITALS: BP: 126/86, Temp: 97.7 °F (36.5 °C), Pulse: 80, Resp: 16, SpO2: 96 %   Physical Exam  Constitutional:       General: She is not in acute distress. Appearance: She is well-developed. She is not diaphoretic. HENT:      Head: Normocephalic and atraumatic. Mouth/Throat:      Pharynx: No oropharyngeal exudate. Eyes:      General:         Right eye: No discharge. Left eye: No discharge. Conjunctiva/sclera: Conjunctivae normal.      Pupils: Pupils are equal, round, and reactive to light. Neck:      Musculoskeletal: Normal range of motion and neck supple. Thyroid: No thyromegaly. Vascular: No JVD. Trachea: No tracheal deviation. Cardiovascular:      Rate and Rhythm: Normal rate and regular rhythm. Heart sounds: Normal heart sounds. No murmur. No friction rub. No gallop. Pulmonary:      Effort: Pulmonary effort is normal. No respiratory distress. Breath sounds: Normal breath sounds. No stridor. No wheezing or rales. Chest:      Chest wall: No tenderness. Abdominal:      General: Bowel sounds are normal. There is no distension. Palpations: Abdomen is soft. Tenderness: There is no abdominal tenderness. There is no guarding or rebound. Musculoskeletal: Normal range of motion. General: No tenderness or deformity. Lymphadenopathy:      Cervical: No cervical adenopathy. Skin:     General: Skin is warm and dry. Findings: No erythema or rash.    Neurological: Mental Status: She is alert and oriented to person, place, and time. Cranial Nerves: No cranial nerve deficit. Coordination: Coordination normal.   Psychiatric:         Behavior: Behavior normal.         Diagnostic Results     EKG   Indication: Chest pain. Heart rate 78, EKG machine states accelerated junctional rhythm but I feel like especially in V1 I do see P waves that are at regular intervals before every QRS, and QRS is not wide, consistent with it coming from the sinus/atria as opposed to coming from the ventricles. I do not believe that it is an accelerated junctional rhythm as stated on the EKG. Intervals normal, axis normal.  No signs of ST elevation or depression, no T wave inversions. Comparison to prior EKG with no changes. Impression: Normal sinus rhythm, normal EKG without ischemia. RADIOLOGY:  CT ABDOMEN PELVIS W IV CONTRAST Additional Contrast? None   Final Result      Stable large hiatal hernia      Previously described ventral abdominal hernia originating superior to the abdominal wall mesh has been reduced. Stable right renal cyst.      No signs of laceration or subcapsular hematoma involving the visceral organs. No hemoperitoneum      CT Head WO Contrast   Final Result      Unremarkable CT head without IV.  No acute intracranial hemorrhage or signs of mass effect      XR CHEST STANDARD (2 VW)   Final Result      No acute finding          LABS:   Results for orders placed or performed during the hospital encounter of 02/25/20   Rapid Flu Swab   Result Value Ref Range    Rapid Influenza A Ag Negative Negative    Rapid Influenza B Ag Negative Negative   CBC Auto Differential   Result Value Ref Range    WBC 6.8 4.0 - 11.0 K/uL    RBC 4.22 4.00 - 5.20 M/uL    Hemoglobin 11.3 (L) 12.0 - 16.0 g/dL    Hematocrit 35.0 (L) 36.0 - 48.0 %    MCV 83.1 80.0 - 100.0 fL    MCH 26.9 26.0 - 34.0 pg    MCHC 32.3 31.0 - 36.0 g/dL    RDW 19.3 (H) 12.4 - 15.4 %    Platelets 535 834 - 966 K/uL MPV 8.7 5.0 - 10.5 fL    Neutrophils % 57.0 %    Lymphocytes % 27.6 %    Monocytes % 9.4 %    Eosinophils % 5.2 %    Basophils % 0.8 %    Neutrophils Absolute 3.9 1.7 - 7.7 K/uL    Lymphocytes Absolute 1.9 1.0 - 5.1 K/uL    Monocytes Absolute 0.6 0.0 - 1.3 K/uL    Eosinophils Absolute 0.4 0.0 - 0.6 K/uL    Basophils Absolute 0.1 0.0 - 0.2 K/uL   Basic Metabolic Panel w/ Reflex to MG   Result Value Ref Range    Sodium 146 (H) 136 - 145 mmol/L    Potassium reflex Magnesium 3.6 3.5 - 5.1 mmol/L    Chloride 111 (H) 99 - 110 mmol/L    CO2 23 21 - 32 mmol/L    Anion Gap 12 3 - 16    Glucose 79 70 - 99 mg/dL    BUN 18 7 - 20 mg/dL    CREATININE 0.9 0.6 - 1.1 mg/dL    GFR Non-African American >60 >60    GFR African American >60 >60    Calcium 8.7 8.3 - 10.6 mg/dL   Troponin   Result Value Ref Range    Troponin <0.01 <0.01 ng/mL   Protime-INR   Result Value Ref Range    Protime 10.6 10.0 - 13.2 sec    INR 0.91 0.86 - 1.14   APTT   Result Value Ref Range    aPTT 30.6 24.2 - 36.2 sec   Brain Natriuretic Peptide   Result Value Ref Range    Pro- (H) 0 - 124 pg/mL       ED BEDSIDE ULTRASOUND:  none    RECENT VITALS:  BP: 126/86,Temp: 97.7 °F (36.5 °C), Pulse: 80, Resp: 16, SpO2: 96 %     Procedures     none    ED Course     Nursing Notes, Past Medical Hx, Past Surgical Hx, Social Hx,Allergies, and Family Hx were reviewed. patient was given the following medications:  Orders Placed This Encounter   Medications    acetaminophen (TYLENOL) tablet 650 mg    iopamidol (ISOVUE-370) 76 % injection 80 mL    prochlorperazine (COMPAZINE) injection 10 mg    diphenhydrAMINE (BENADRYL) injection 12.5 mg    albuterol sulfate HFA (PROVENTIL HFA) 108 (90 Base) MCG/ACT inhaler     Sig: Inhale 2 puffs into the lungs every 4 hours as needed for Wheezing or Shortness of Breath (Space out to every 6 hours as symptoms improve) Space out to every 6 hours as symptoms improve.      Dispense:  1 Inhaler     Refill:  0    work-up indicated. No concern for PE at this time. Headache most likely related to stress versus migraines. Patient given Compazine, Tylenol, Benadryl with full relief and headache. Appropriate for discharge home with PCP follow-up. Clinical Impression     1. Nonintractable headache, unspecified chronicity pattern, unspecified headache type    2. Chest pain, unspecified type        Disposition     PATIENT REFERRED TO:  Rosalinda Rodriguez DO  [address]  [phone]    In 1 week  for ED follow up visit    Klever Lu MD  [address] [address]. [facility]. [address]  [phone]      neurology follow up for headaches      DISCHARGE MEDICATIONS:  New Prescriptions    ALBUTEROL SULFATE HFA (PROVENTIL HFA) 108 (90 BASE) MCG/ACT INHALER    Inhale 2 puffs into the lungs every 4 hours as needed for Wheezing or Shortness of Breath (Space out to every 6 hours as symptoms improve) Space out to every 6 hours as symptoms improve.     CYCLOBENZAPRINE (FLEXERIL) 5 MG TABLET    Take 1 tablet by mouth 2 times daily as needed for Muscle spasms    MOMETASONE-FORMOTEROL (DULERA) 200-5 MCG/ACT INHALER    Inhale 2 puffs into the lungs 2 times daily       DISPOSITION  - discharge       Fernando Mclain MD  02/25/20 7909

## 2020-02-26 ENCOUNTER — CARE COORDINATION (OUTPATIENT)
Dept: OTHER | Facility: CLINIC | Age: 55
End: 2020-02-26

## 2020-02-28 ENCOUNTER — CARE COORDINATION (OUTPATIENT)
Dept: OTHER | Facility: CLINIC | Age: 55
End: 2020-02-28

## 2020-02-28 NOTE — CARE COORDINATION
Ambulatory Care Coordination Note  2/28/2020  CM Risk Score: 11  Charlson 10 Year Mortality Risk Score: 23%     ACC: Kathy Crowe LPN    Summary Note: Enrolled patient into [facility]. Patient states her recent ED visit was stress related due to multiple issues going on with her adult children. Discussed some techniques to help with stress levels. Discussed making the first priority to get stress under control and then we will begin working on disease processes. Patient agreed this needs to happen first.    General: Set goal.  Completed CC protocols. Provided contact information. Encouraged patient to call with any concerns. Plan: Will follow up with patient weekly and as needed. Assess goal.  Update CC protocols. Will follow up with patient next week. Ambulatory Care Coordination Assessment    Care Coordination Protocol  Program Enrollment:  Complex Care  Referral from Primary Care Provider:  No  Week 1 - Initial Assessment     Do you have all of your prescriptions and are they filled?:  Yes  Barriers to medication adherence:  None  Are you able to afford your medications?:  Yes  How often do you have trouble taking your medications the way you have been told to take them?:  I always take them as prescribed. Do you have Home O2 Therapy?:  No      Ability to seek help/take action for Emergent Urgent situations i.e. fire, crime, inclement weather or health crisis. :  Independent  Ability to ambulate to restroom:  Independent  Ability handle personal hygeine needs (bathing/dressing/grooming): Independent  Ability to manage Medications: Independent  Ability to prepare Food Preparation:  Independent  Ability to maintain home (clean home, laundry): Independent  Ability to drive and/or has transportation:  Independent  Ability to do shopping:  Independent  Ability to manage finances:   Independent  Is patient able to live independently?:  Yes     Current Housing:  Private Residence        Per the Fall directed for allergic reaction    Historical Provider, MD   magnesium oxide (MAG-OX) 400 MG tablet Take 400 mg by mouth nightly    Historical Provider, MD   Valerian 250 MG CAPS Take by mouth nightly    Historical Provider, MD   diltiazem (CARDIZEM) 60 MG tablet Take 60 mg by mouth daily as needed (Per pt \"when my heart rate starts to increase\")    Historical Provider, MD   docusate sodium (COLACE, DULCOLAX) 100 MG CAPS Take 100 mg by mouth 2 times daily  Patient not taking: Reported on 2/11/2020 8/27/19   STEPHANIE Deras CNP   torsemide (DEMADEX) 20 MG tablet Take 20 mg by mouth 2 times daily Prescribed 20 mg BID, but patient takes 20 mg daily +/- additional dose if needed for swelling    Historical Provider, MD   melatonin 3 MG TABS tablet Take 20 mg by mouth nightly as needed    Historical Provider, MD   apixaban (ELIQUIS) 5 MG TABS tablet TAKE ONE TABLET BY MOUTH TWICE A DAY 4/26/19   STEPHANIE Álvarez CNP       Future Appointments   Date Time Provider Wil Chatterjee   3/6/2020 10:30 AM MD Alex Zepeda P/CC [facility]   3/6/2020  2:30 PM CHAIR 01 [facility]

## 2020-03-06 ENCOUNTER — CARE COORDINATION (OUTPATIENT)
Dept: OTHER | Facility: CLINIC | Age: 55
End: 2020-03-06

## 2020-03-06 ENCOUNTER — OFFICE VISIT (OUTPATIENT)
Dept: PULMONOLOGY | Age: 55
End: 2020-03-06
Payer: COMMERCIAL

## 2020-03-06 ENCOUNTER — HOSPITAL ENCOUNTER (OUTPATIENT)
Dept: ONCOLOGY | Age: 55
Setting detail: INFUSION SERIES
Discharge: HOME | End: 2020-03-06
Payer: COMMERCIAL

## 2020-03-06 ENCOUNTER — TELEPHONE (OUTPATIENT)
Dept: FAMILY MEDICINE CLINIC | Age: 55
End: 2020-03-06

## 2020-03-06 VITALS
HEART RATE: 70 BPM | HEIGHT: 70 IN | SYSTOLIC BLOOD PRESSURE: 126 MMHG | BODY MASS INDEX: 51.65 KG/M2 | DIASTOLIC BLOOD PRESSURE: 82 MMHG | OXYGEN SATURATION: 97 %

## 2020-03-06 VITALS
DIASTOLIC BLOOD PRESSURE: 92 MMHG | OXYGEN SATURATION: 98 % | SYSTOLIC BLOOD PRESSURE: 144 MMHG | TEMPERATURE: 98.2 F | RESPIRATION RATE: 16 BRPM | HEART RATE: 80 BPM

## 2020-03-06 DIAGNOSIS — J45.40 MODERATE PERSISTENT ASTHMA WITHOUT COMPLICATION: Primary | ICD-10-CM

## 2020-03-06 PROBLEM — J38.3 VOCAL CORD DYSFUNCTION: Status: ACTIVE | Noted: 2020-03-06

## 2020-03-06 PROBLEM — J31.0 RHINITIS, CHRONIC: Status: ACTIVE | Noted: 2020-03-06

## 2020-03-06 PROCEDURE — 6360000002 HC RX W HCPCS: Performed by: ALLERGY & IMMUNOLOGY

## 2020-03-06 PROCEDURE — 99203 OFFICE O/P NEW LOW 30 MIN: CPT | Performed by: INTERNAL MEDICINE

## 2020-03-06 PROCEDURE — 96372 THER/PROPH/DIAG INJ SC/IM: CPT

## 2020-03-06 RX ORDER — DIPHENHYDRAMINE HYDROCHLORIDE 50 MG/ML
50 INJECTION INTRAMUSCULAR; INTRAVENOUS ONCE
Status: CANCELLED | OUTPATIENT
Start: 2020-04-03

## 2020-03-06 RX ORDER — SODIUM CHLORIDE 9 MG/ML
INJECTION, SOLUTION INTRAVENOUS CONTINUOUS
Status: CANCELLED | OUTPATIENT
Start: 2020-04-03

## 2020-03-06 RX ORDER — IPRATROPIUM BROMIDE 21 UG/1
2 SPRAY, METERED NASAL 3 TIMES DAILY
Qty: 1 BOTTLE | Refills: 3 | Status: SHIPPED | OUTPATIENT
Start: 2020-03-06 | End: 2020-12-18

## 2020-03-06 RX ORDER — METHYLPREDNISOLONE SODIUM SUCCINATE 125 MG/2ML
125 INJECTION, POWDER, LYOPHILIZED, FOR SOLUTION INTRAMUSCULAR; INTRAVENOUS ONCE
Status: CANCELLED | OUTPATIENT
Start: 2020-04-03

## 2020-03-06 RX ORDER — INHALER, ASSIST DEVICES
SPACER (EA) MISCELLANEOUS
Qty: 1 EACH | Refills: 0 | Status: SHIPPED | OUTPATIENT
Start: 2020-03-06

## 2020-03-06 RX ADMIN — OMALIZUMAB 300 MG: 150 INJECTION, SOLUTION SUBCUTANEOUS at 14:43

## 2020-03-06 NOTE — PROGRESS NOTES
Subjective:      Patient ID: Amy Romero is a 47 y.o. female. HPI [facility]. [facility] is seen after 3-year absence, for ongoing care for asthma. On the whole, she finds exercise tolerance to be very similar to what she had had in the past.  She has no limitations to usual daily activities, has difficulty climbing up stairs or moving quickly. She has increased chest symptoms associated with cold air, air pollutants, and respiratory infections. She has not had any viral respiratory infections in the past year. No nocturnal chest symptoms disturbing sleep. She has been treated with mometasone-formoterol inhaler, which she uses with a spacer device. She also complains of posterior nasal drainage and mucus accumulating in her throat. She does not take any treatment for this, having tried numerous nasal steroid sprays (precipitating nosebleeds) and antihistamines. She periodically has episodes of a tightness in her throat that keeps her from breathing comfortably. It is not clear that this is necessarily associated with accumulation of secretions. She tends to lump this with problems with her asthma, but it occurs more frequently than chest tightness. She does not complain of esophageal reflux and does not have any dysphagia symptoms. She has been known to have a hiatal hernia. Her weight remains elevated, most recently recorded at 368 pounds at her primary care physician's office. She is being treated for a mast cell disorder with omental assume AB injections monthly. This has controlled development of skin rash and urticaria quite well. It has had no effect on her asthma or upper respiratory drainage. .  She remains a non-smoker. Review of Systems    Objective:   Physical Exam  Vitals signs reviewed. Constitutional:       Appearance: She is well-developed. She is obese. HENT:      Head: Normocephalic and atraumatic. Nose: Mucosal edema and rhinorrhea present.       Right Turbinates: Not enlarged. Left Turbinates: Not enlarged. Right Sinus: No maxillary sinus tenderness or frontal sinus tenderness. Left Sinus: No maxillary sinus tenderness or frontal sinus tenderness. Mouth/Throat:      Mouth: Mucous membranes are moist.      Pharynx: No posterior oropharyngeal erythema. Comments: Uvula surgically absent. Mild cobblestoning of the posterior pharyngeal mucosa  Eyes:      General: No scleral icterus. Right eye: No discharge. Left eye: No discharge. Neck:      Thyroid: No thyromegaly. Trachea: No tracheal deviation. Cardiovascular:      Rate and Rhythm: Normal rate and regular rhythm. Pulses: Normal pulses. Heart sounds: Normal heart sounds. No murmur. Pulmonary:      Effort: Pulmonary effort is normal.      Breath sounds: Examination of the right-lower field reveals decreased breath sounds. Examination of the left-lower field reveals decreased breath sounds. Decreased breath sounds present. No wheezing, rhonchi or rales. Musculoskeletal:      Right lower leg: No edema. Left lower leg: No edema. Lymphadenopathy:      Cervical: No cervical adenopathy. Skin:     General: Skin is warm and dry. Neurological:      Mental Status: She is alert and oriented to person, place, and time. Psychiatric:         Mood and Affect: Mood normal.         Behavior: Behavior normal.         Thought Content: Thought content normal.         Judgment: Judgment normal.         Assessment:      Mild persistent asthma, remains fairly well controlled with ICS/LABA. Tightness in her throat is likely related to vocal cord dysfunction, and is occurring fairly frequently. Is questionable whether this may be related to other problems such as esophageal reflux or posterior nasal drainage from chronic rhinitis. Chronic rhinitis may be allergic in origin.   She has a number environmental allergies, but has not responded well to standard treatment for this.  Obesity unchanged, this has impact on her exercise tolerance      Plan:      Continue Dulera inhaler twice daily with spacer. Atrovent nasal spray may help limit mucus drainage. Refer to otolaryngology for chronic rhinitis and likely vocal cord dysfunction.         Vonnie Muñoz MD

## 2020-03-06 NOTE — PLAN OF CARE
Problem: SAFETY  Goal: Free from accidental physical injury  3/6/2020 1554 by Chico Arteaga RN  Outcome: Met This Shift  Note:   Pt is a fall risk. Explained fall risk precautions to pt & and rationale behind their use to keep the patient safe. Belongings are in reach. Pt encouraged to notify staff for any and all assistance. Staff present in tx suite throughout entirety of pts treatment to monitor and protect from falls. Assistance provided when ambulating to restroom utilizing Stay With Me. Problem: KNOWLEDGE DEFICIT  Goal: Patient/S.O. demonstrates understanding of disease process, treatment plan, medications, and discharge instructions. 3/6/2020 1554 by Chico Arteaga RN  Outcome: Met This Shift  Note:   Pt seen and assessed [address] today for Xolair injection per orders from Dr. Dori Merlos. Pt tolerated infusion well and without incident. Pt verbalizes understanding of discharge instructions. EpiPen with patient. Expiration date 11/2020. Discharged/ ambulatory to home.

## 2020-03-06 NOTE — PLAN OF CARE
Problem: SAFETY  Goal: Free from accidental physical injury  Outcome: Met This Shift  Note:   Pt is a  fall risk. Explained fall risk precautions to pt and rationale behind their use to keep the patient safe. Belongings are in reach. Pt encouraged to notify staff for any and all assistance. Staff present in tx suite throughout entirety of pts treatment to monitor and protect from falls. Assistance provided when ambulating to restroom utilizing Stay With Me. Problem: KNOWLEDGE DEFICIT  Goal: Patient/S.O. demonstrates understanding of disease process, treatment plan, medications, and discharge instructions. Outcome: Met This Shift  Note:   Pt seen and assessed [facility] today for Oxlair injection per orders from Dr. Shanika Sanchez. Pt tolerated injection well and without incident. Pt verbalizes understanding of discharge instructions. Discharged ambulatory to home.

## 2020-03-09 NOTE — TELEPHONE ENCOUNTER
PA submitted via CMM for LORazepam 1MG tablets. Key: [id number]    Per CMM: Prior Authorization is not required for this medication dosage form and strength at the quantity and days supply requested. Please advise patient. Thank you.

## 2020-03-10 ENCOUNTER — OFFICE VISIT (OUTPATIENT)
Dept: FAMILY MEDICINE CLINIC | Age: 55
End: 2020-03-10
Payer: COMMERCIAL

## 2020-03-10 VITALS
SYSTOLIC BLOOD PRESSURE: 140 MMHG | RESPIRATION RATE: 12 BRPM | OXYGEN SATURATION: 95 % | DIASTOLIC BLOOD PRESSURE: 88 MMHG | HEART RATE: 76 BPM | TEMPERATURE: 97.2 F

## 2020-03-10 PROCEDURE — 99213 OFFICE O/P EST LOW 20 MIN: CPT | Performed by: FAMILY MEDICINE

## 2020-03-10 RX ORDER — CYCLOBENZAPRINE HCL 10 MG
10 TABLET ORAL 3 TIMES DAILY PRN
Qty: 21 TABLET | Refills: 0 | Status: SHIPPED | OUTPATIENT
Start: 2020-03-10 | End: 2020-03-17

## 2020-03-10 RX ORDER — LORAZEPAM 1 MG/1
1 TABLET ORAL 3 TIMES DAILY PRN
Qty: 90 TABLET | Refills: 2 | Status: SHIPPED | OUTPATIENT
Start: 2020-03-10 | End: 2020-08-21 | Stop reason: SDUPTHER

## 2020-03-10 RX ORDER — HYDROCODONE BITARTRATE AND ACETAMINOPHEN 5; 325 MG/1; MG/1
1 TABLET ORAL EVERY 8 HOURS PRN
Qty: 12 TABLET | Refills: 0 | Status: SHIPPED | OUTPATIENT
Start: 2020-03-10 | End: 2020-03-13

## 2020-03-10 NOTE — PROGRESS NOTES
 Hiatal hernia      Past Surgical History:   Procedure Laterality Date    ABLATION OF DYSRHYTHMIC FOCUS  04/2019    afib    BLADDER SUSPENSION  2004    HERNIA REPAIR  2001    HYSTERECTOMY  2004    KNEE ARTHROSCOPY Right 7/11/2019    VIDEO ARTHROSCOPY RIGHT KNEE, PARTIAL MEDIAL AND LATERAL MENISCECTOMY,, MEDIAL MICRO FRACTURE CHONDROPLASTY performed by Jermain Birch MD at [place] [address] ARIAS-EN-Y GASTRIC BYPASS  2011    TONSILLECTOMY AND ADENOIDECTOMY      UPPP UVULOPALATOPHARYGOPLASTY      VENTRAL HERNIA REPAIR N/A 8/26/2019    DIAGNOSTIC LAPAROSCOPY, LAPAROSCOPIC LYSIS OF ADHESIONS, LAPAROSCOPIC REDUCTION OF RECURRENT INCISIONAL HERNIA WITH MESH performed by Jeanette Pérez MD at [address] [place] [address] History   Problem Relation Age of Onset    Cancer Mother         lung    Arthritis Mother     Cirrhosis Father     Asthma Maternal Aunt      Social History     Socioeconomic History    Marital status:      Spouse name: Not on file    Number of children: 3    Years of education: 15.5    Highest education level: Not on file   Occupational History    Not on file   Social Needs    Financial resource strain: Not on file    Food insecurity     Worry: Not on file     Inability: Not on file   [employer] needs     Medical: Not on file     Non-medical: Not on file   Tobacco Use    Smoking status: Never Smoker    Smokeless tobacco: Never Used   Substance and Sexual Activity    Alcohol use: Yes     Comment: 0-1 drinks per month    Drug use: No    Sexual activity: Yes     Partners: Male   Lifestyle    Physical activity     Days per week: Not on file     Minutes per session: Not on file    Stress: Not on file   Relationships    Social connections     Talks on phone: Not on file     Gets together: Not on file     Attends Holiness service: Not on file     Active member of club or organization: Not on file     Attends meetings of clubs or organizations: Not on

## 2020-03-13 ENCOUNTER — HOSPITAL ENCOUNTER (OUTPATIENT)
Dept: GENERAL RADIOLOGY | Age: 55
End: 2020-03-13
Payer: COMMERCIAL

## 2020-03-13 ENCOUNTER — HOSPITAL ENCOUNTER (OUTPATIENT)
Dept: GENERAL RADIOLOGY | Age: 55
Discharge: HOME | End: 2020-03-13
Payer: COMMERCIAL

## 2020-03-13 PROCEDURE — 72110 X-RAY EXAM L-2 SPINE 4/>VWS: CPT

## 2020-04-06 ENCOUNTER — HOSPITAL ENCOUNTER (OUTPATIENT)
Dept: ONCOLOGY | Age: 55
Setting detail: INFUSION SERIES
Discharge: HOME | End: 2020-04-06
Payer: COMMERCIAL

## 2020-04-06 VITALS
SYSTOLIC BLOOD PRESSURE: 156 MMHG | HEART RATE: 72 BPM | TEMPERATURE: 97.8 F | DIASTOLIC BLOOD PRESSURE: 83 MMHG | RESPIRATION RATE: 16 BRPM

## 2020-04-06 DIAGNOSIS — J45.40 MODERATE PERSISTENT ASTHMA WITHOUT COMPLICATION: Primary | ICD-10-CM

## 2020-04-06 PROCEDURE — 96372 THER/PROPH/DIAG INJ SC/IM: CPT

## 2020-04-06 PROCEDURE — 6360000002 HC RX W HCPCS: Performed by: NURSE PRACTITIONER

## 2020-04-06 RX ORDER — SODIUM CHLORIDE 9 MG/ML
INJECTION, SOLUTION INTRAVENOUS CONTINUOUS
Status: CANCELLED | OUTPATIENT
Start: 2020-04-27

## 2020-04-06 RX ORDER — DIPHENHYDRAMINE HYDROCHLORIDE 50 MG/ML
50 INJECTION INTRAMUSCULAR; INTRAVENOUS ONCE
Status: CANCELLED | OUTPATIENT
Start: 2020-04-27

## 2020-04-06 RX ORDER — METHYLPREDNISOLONE SODIUM SUCCINATE 125 MG/2ML
125 INJECTION, POWDER, LYOPHILIZED, FOR SOLUTION INTRAMUSCULAR; INTRAVENOUS ONCE
Status: CANCELLED | OUTPATIENT
Start: 2020-04-27

## 2020-04-06 RX ADMIN — OMALIZUMAB 150 MG: 202.5 INJECTION, SOLUTION SUBCUTANEOUS at 13:27

## 2020-04-06 NOTE — PLAN OF CARE
Problem: SAFETY  Goal: Free from accidental physical injury  Outcome: Ongoing   Pt is a fall risk. Explained fall risk precautions to pt  and rationale behind their use to keep the patient safe. Belongings are in reach. Pt encouraged to notify staff for any and all assistance. Staff present in tx suite throughout entirety of pts treatment to monitor and protect from falls. Assistance provided when ambulating to restroom utilizing Stay With Me. Problem: KNOWLEDGE DEFICIT  Goal: Patient/S.O. demonstrates understanding of disease process, treatment plan, medications, and discharge instructions. Outcome: Ongoing     Epi pen: exp: July 11, 2020, rian: [id number], lot: [id number]    Pt seen and assessed at [address] [place] [address] for xolair injection per orders from MD. Both injections were in left arm. Administered per [facility] policy. Pt tolerated injection well and without incident. Pt verbalizes understanding of discharge instructions. Discharged ambulatory.

## 2020-04-09 ENCOUNTER — CARE COORDINATION (OUTPATIENT)
Dept: OTHER | Facility: CLINIC | Age: 55
End: 2020-04-09

## 2020-04-16 RX ORDER — CYCLOBENZAPRINE HCL 5 MG
5 TABLET ORAL 2 TIMES DAILY PRN
Qty: 24 TABLET | Refills: 1 | Status: SHIPPED | OUTPATIENT
Start: 2020-04-16 | End: 2020-08-21 | Stop reason: SDUPTHER

## 2020-04-16 NOTE — TELEPHONE ENCOUNTER
Pt called to request update, aware pending AB's review. She is asking for return call with update once available, at [phone].

## 2020-04-16 NOTE — TELEPHONE ENCOUNTER
Due to the public health emergency there has been a surge of telephone messages, mychart messages, and requests for prescriptions. Please inform patient of this and I think him/her for her patience. Flexeril is e-scribed for 24 tabs and 1 RF. Would she wish to see a physical therapist by video conference if any of them are open?

## 2020-04-22 NOTE — TELEPHONE ENCOUNTER
NIA  Spoke with pt. Informed of pcp AB message below. Pt states she does not want to see PT via video conference. She will wait until the pandemic is over.

## 2020-05-26 ENCOUNTER — HOSPITAL ENCOUNTER (OUTPATIENT)
Dept: ONCOLOGY | Age: 55
Setting detail: INFUSION SERIES
Discharge: HOME | End: 2020-05-26
Payer: COMMERCIAL

## 2020-05-26 VITALS
DIASTOLIC BLOOD PRESSURE: 79 MMHG | TEMPERATURE: 97.4 F | HEART RATE: 65 BPM | SYSTOLIC BLOOD PRESSURE: 136 MMHG | RESPIRATION RATE: 19 BRPM | OXYGEN SATURATION: 98 %

## 2020-05-26 DIAGNOSIS — J45.40 MODERATE PERSISTENT ASTHMA WITHOUT COMPLICATION: Primary | ICD-10-CM

## 2020-05-26 PROCEDURE — 6360000002 HC RX W HCPCS: Performed by: ALLERGY & IMMUNOLOGY

## 2020-05-26 PROCEDURE — 96372 THER/PROPH/DIAG INJ SC/IM: CPT

## 2020-05-26 RX ORDER — SODIUM CHLORIDE 9 MG/ML
INJECTION, SOLUTION INTRAVENOUS CONTINUOUS
Status: CANCELLED | OUTPATIENT
Start: 2020-06-02

## 2020-05-26 RX ORDER — DIPHENHYDRAMINE HYDROCHLORIDE 50 MG/ML
50 INJECTION INTRAMUSCULAR; INTRAVENOUS ONCE
Status: CANCELLED | OUTPATIENT
Start: 2020-06-02

## 2020-05-26 RX ORDER — METHYLPREDNISOLONE SODIUM SUCCINATE 125 MG/2ML
125 INJECTION, POWDER, LYOPHILIZED, FOR SOLUTION INTRAMUSCULAR; INTRAVENOUS ONCE
Status: CANCELLED | OUTPATIENT
Start: 2020-06-02

## 2020-05-26 RX ADMIN — OMALIZUMAB 150 MG: 150 INJECTION, SOLUTION SUBCUTANEOUS at 13:11

## 2020-05-26 RX ADMIN — OMALIZUMAB 150 MG: 150 INJECTION, SOLUTION SUBCUTANEOUS at 13:12

## 2020-05-26 NOTE — TELEPHONE ENCOUNTER
Requested Prescriptions     Pending Prescriptions Disp Refills    apixaban (ELIQUIS) 5 MG TABS tablet [Pharmacy Med Name: ELIQUIS 5MG TABS] 60 tablet 0     Sig: TAKE 1 TABLET BY MOUTH 2 TIMES A DAY     Last Office Visit: 7/3/19    Next Office Visit: 6/5/20

## 2020-06-09 ENCOUNTER — APPOINTMENT (OUTPATIENT)
Dept: GENERAL RADIOLOGY | Age: 55
End: 2020-06-09
Payer: COMMERCIAL

## 2020-06-09 ENCOUNTER — HOSPITAL ENCOUNTER (EMERGENCY)
Age: 55
Discharge: HOME | End: 2020-06-09
Attending: EMERGENCY MEDICINE
Payer: COMMERCIAL

## 2020-06-09 VITALS
OXYGEN SATURATION: 97 % | DIASTOLIC BLOOD PRESSURE: 102 MMHG | RESPIRATION RATE: 17 BRPM | HEART RATE: 88 BPM | SYSTOLIC BLOOD PRESSURE: 164 MMHG | TEMPERATURE: 98.6 F

## 2020-06-09 LAB
ANION GAP SERPL CALCULATED.3IONS-SCNC: 12 MMOL/L (ref 3–16)
BASOPHILS ABSOLUTE: 0.1 K/UL (ref 0–0.2)
BASOPHILS RELATIVE PERCENT: 0.8 %
BUN BLDV-MCNC: 16 MG/DL (ref 7–20)
CALCIUM SERPL-MCNC: 8.6 MG/DL (ref 8.3–10.6)
CHLORIDE BLD-SCNC: 103 MMOL/L (ref 99–110)
CO2: 23 MMOL/L (ref 21–32)
CREAT SERPL-MCNC: 0.9 MG/DL (ref 0.6–1.1)
EKG ATRIAL RATE: 83 BPM
EKG DIAGNOSIS: NORMAL
EKG P AXIS: 59 DEGREES
EKG P-R INTERVAL: 194 MS
EKG Q-T INTERVAL: 358 MS
EKG QRS DURATION: 82 MS
EKG QTC CALCULATION (BAZETT): 420 MS
EKG R AXIS: 21 DEGREES
EKG T AXIS: 40 DEGREES
EKG VENTRICULAR RATE: 83 BPM
EOSINOPHILS ABSOLUTE: 0.3 K/UL (ref 0–0.6)
EOSINOPHILS RELATIVE PERCENT: 3.5 %
GFR AFRICAN AMERICAN: >60
GFR NON-AFRICAN AMERICAN: >60
GLUCOSE BLD-MCNC: 98 MG/DL (ref 70–99)
HCT VFR BLD CALC: 36.8 % (ref 36–48)
HEMOGLOBIN: 11.7 G/DL (ref 12–16)
LYMPHOCYTES ABSOLUTE: 1.6 K/UL (ref 1–5.1)
LYMPHOCYTES RELATIVE PERCENT: 23.1 %
MCH RBC QN AUTO: 26.4 PG (ref 26–34)
MCHC RBC AUTO-ENTMCNC: 31.9 G/DL (ref 31–36)
MCV RBC AUTO: 82.7 FL (ref 80–100)
MONOCYTES ABSOLUTE: 0.6 K/UL (ref 0–1.3)
MONOCYTES RELATIVE PERCENT: 9 %
NEUTROPHILS ABSOLUTE: 4.5 K/UL (ref 1.7–7.7)
NEUTROPHILS RELATIVE PERCENT: 63.6 %
PDW BLD-RTO: 15.9 % (ref 12.4–15.4)
PLATELET # BLD: 275 K/UL (ref 135–450)
PMV BLD AUTO: 8.6 FL (ref 5–10.5)
POTASSIUM REFLEX MAGNESIUM: 4.1 MMOL/L (ref 3.5–5.1)
PRO-BNP: 183 PG/ML (ref 0–124)
RBC # BLD: 4.45 M/UL (ref 4–5.2)
SODIUM BLD-SCNC: 138 MMOL/L (ref 136–145)
TROPONIN: <0.01 NG/ML
WBC # BLD: 7.1 K/UL (ref 4–11)

## 2020-06-09 PROCEDURE — 83880 ASSAY OF NATRIURETIC PEPTIDE: CPT

## 2020-06-09 PROCEDURE — 93005 ELECTROCARDIOGRAM TRACING: CPT | Performed by: EMERGENCY MEDICINE

## 2020-06-09 PROCEDURE — 84484 ASSAY OF TROPONIN QUANT: CPT

## 2020-06-09 PROCEDURE — 80048 BASIC METABOLIC PNL TOTAL CA: CPT

## 2020-06-09 PROCEDURE — 85025 COMPLETE CBC W/AUTO DIFF WBC: CPT

## 2020-06-09 PROCEDURE — 99285 EMERGENCY DEPT VISIT HI MDM: CPT

## 2020-06-09 PROCEDURE — 71046 X-RAY EXAM CHEST 2 VIEWS: CPT

## 2020-06-09 ASSESSMENT — ENCOUNTER SYMPTOMS
ABDOMINAL PAIN: 0
BACK PAIN: 0
PHOTOPHOBIA: 0
VOMITING: 1
DIARRHEA: 0
FACIAL SWELLING: 0
EYE ITCHING: 0
WHEEZING: 0
SORE THROAT: 0
CHEST TIGHTNESS: 0
RHINORRHEA: 0
COUGH: 0
EYE DISCHARGE: 0
EYE PAIN: 0
SHORTNESS OF BREATH: 1
NAUSEA: 1

## 2020-06-09 ASSESSMENT — PAIN DESCRIPTION - LOCATION: LOCATION: HEAD

## 2020-06-09 ASSESSMENT — PAIN DESCRIPTION - PAIN TYPE: TYPE: ACUTE PAIN

## 2020-06-09 ASSESSMENT — PAIN DESCRIPTION - DESCRIPTORS: DESCRIPTORS: HEADACHE

## 2020-06-09 ASSESSMENT — PAIN SCALES - GENERAL: PAINLEVEL_OUTOF10: 4

## 2020-06-09 NOTE — ED NOTES
Bed: B25-25  Expected date:   Expected time:   Means of arrival:   Comments:  Azeb Melendez RN  06/09/20 8048

## 2020-06-10 ENCOUNTER — CARE COORDINATION (OUTPATIENT)
Dept: OTHER | Facility: CLINIC | Age: 55
End: 2020-06-10

## 2020-06-10 NOTE — TELEPHONE ENCOUNTER
Miladis, can you please call Ms Ally Yaimamarvinwaqra regarding her eliquis. She is a patient of Dr Andrew Mendes and apparently she hasn't been taking her eliquis d/t cost. If she can't afford eliquis please start her on warfarin 5 mg po daily and set her up with the coumadin clinic.

## 2020-06-16 NOTE — TELEPHONE ENCOUNTER
Pt called back, stating that she has tried the Eliquis $10 copay card twice and has been declined twice. Explained the risks, benefits, and alternatives to Eliquis. She does not want to take warfarin and did not tolerate Xarelto. She refused to try the copay card again. Offered samples, but she said, \"What's the point if I can't afford it? \"  She cancelled her appt with CG today. She started crying and with a raised voice stated that she doesn't care anymore. She denied wanting to harm herself, but stated that she doesn't care if she dies or has a stroke. Attempted to schedule a f/u appt; however, she refused.

## 2020-06-19 ENCOUNTER — VIRTUAL VISIT (OUTPATIENT)
Dept: PULMONOLOGY | Age: 55
End: 2020-06-19
Payer: COMMERCIAL

## 2020-06-19 PROCEDURE — 99213 OFFICE O/P EST LOW 20 MIN: CPT | Performed by: INTERNAL MEDICINE

## 2020-06-19 NOTE — PROGRESS NOTES
this.    Assessment: Persistent asthma has remained fairly well controlled with use of ICS/LABA. No acute exacerbations and no daily limitations. Chronic rhinitis symptoms improved with use of topical Atrovent nasal spray.     Plan: Continue Dulera 2 puffs twice daily and Atrovent nasal spray as required  Follow-up in 4 months    Time on this video visit is 11:30 minutes

## 2020-06-22 NOTE — TELEPHONE ENCOUNTER
Pt gave permission and this writer attempted to activate the co-pay card online; however, their website is not working. Spoke with Eliquis rep, [place], and only the pt or family member can activate the card over the phone. [address] [place] [address] also tried to activate the card online with no success. However, Eliquis and pharmacy state that the card should work as she has commercial insurance. Spoke with pt again and she is agreeable to trying one more time. Provided all the numbers on the card for the pt. Pt will call back to let us know if she can get it activated. Pt is also agreeable to scheduling a f/u OV at this time as she has c/o ongoing SOB. Scheduled OV with KV this Fri.

## 2020-06-23 NOTE — TELEPHONE ENCOUNTER
Called [phone] and they would not let me activate the card or discuss why the ID was invalid without the pt's permission. Attempted to 3 way call the pt to get permission, but she did not answer. LVM asking the pt to call back.

## 2020-06-24 ASSESSMENT — ENCOUNTER SYMPTOMS: GASTROINTESTINAL NEGATIVE: 1

## 2020-06-24 NOTE — PROGRESS NOTES
[facility]   Congestive Heart Failure    PrimaryCare Doctor:  Damaris Richardson DO  Primary Cardiologist: Erika    Chief Complaint:  edema    History of Present Illness:  Abhishek Ritchie is a 47 y.o. female with PMH ASTRID, asthma, GERD, PAF who presents today for CHF f/u. Today: She is here to f/u on ED visit for AF 2 weeks ago. She reports acute onset of CP, palpitations and SOB, EKD showed SR in ED, labs all wnl and she has not has repeat episode since. She has been taking cardizem prn, has been off AC due to cost, now has it covered and is getting it filled today. Otherwise she has had more fatigue and SOB, eating ice, will check iron, has been one year since last IV iron. She is gaining wt and c/o edema but BNP good at ED visit. EKG today shows NSR      EF: 50%  Cardiac Imaging: Echo 5/9/18  Left ventricular cavity size is normal. There is mild concentric left   ventricular hypertrophy.   Left ventricular function is low normal with ejection fraction estimated at   50%.   No diagnostic regional wall motion abnormalities.   Diastolic filling parameters suggest grade I diastolic dysfunction.   Mild mitral regurgitation is present.   Bi-atrial enlargement.   Estimated pulmonary artery systolic pressure is at 43 mmHg assuming a right   atrial pressure of 3 mmHg. Device: No     Activity: below baseline  Can you walk 1-2 blocks or do a moderate amount of house/yard work? No      NYHA Class: II     Sodium Restrictions: 2g  Fluid Restrictions: 48-64 oz/day  Sodium and fluid restriction compliance: good    Pt Education: The patient has received education on the following topics: dietary sodium restriction, heart failure medications, the importance of physical activity, symptom management and weight monitoring     ASTRID No Treated: Yes -surgical  Referral:  No      Past Medical History:   has a past medical history of Acute lateral meniscus tear of right knee, Acute medial meniscus tear of right knee, Anesthesia complication, Anxiety, Arthritis, Asthma, Atrial fibrillation ([address] CHF (congestive heart failure) ([address] Edema, Fibromyalgia, GERD (gastroesophageal reflux disease), and Hiatal hernia. Surgical History:   has a past surgical history that includes hernia repair (2001); Hysterectomy (2004); Tonsillectomy and adenoidectomy; Romie-en-Y Gastric Bypass (2011); UPPP; ovarian cyst removal; ablation of dysrhythmic focus (04/2019); Knee arthroscopy (Right, 7/11/2019); bladder suspension (2004); and ventral hernia repair (N/A, 8/26/2019). Social History:   reports that she has never smoked. She has never used smokeless tobacco. She reports current alcohol use. She reports current drug use. Drugs: Marijuana and Other-see comments. Family History:   Family History   Problem Relation Age of Onset   Wally Vidal Cancer Mother         lung    Arthritis Mother     Cirrhosis Father     Asthma Maternal Aunt        HomeMedications:  Prior to Admission medications    Medication Sig Start Date End Date Taking?  Authorizing Provider   apixaban (ELIQUIS) 5 MG TABS tablet TAKE 1 TABLET BY MOUTH 2 TIMES A DAY 5/27/20   STEPHANIE Cruz - CNP   cyclobenzaprine (FLEXERIL) 5 MG tablet Take 1 tablet by mouth 2 times daily as needed for Muscle spasms 4/16/20   Indra Hernandez,    ipratropium (ATROVENT) 0.03 % nasal spray 2 sprays by Nasal route 3 times daily 2 sprays each side of nose 3/6/20 3/6/21  Glroia Street MD   Spacer/Aero-Holding Chambers (AEROCHAMBER MV) [place] Use with inhaler as directed 3/6/20   Gloria Street MD   mometasone-formoterol [facility]) 200-5 MCG/ACT inhaler Inhale 2 puffs into the lungs 2 times daily 3/6/20   Gloria Street MD   albuterol sulfate  (90 Base) MCG/ACT inhaler INHALE 2 PUFFS BY MOUTH EVERY 4 TO 6 HOURS AS NEEDED 2/25/20   Gloria Street MD   DULERA 200-5 MCG/ACT inhaler INHALE 1 PUFF INTO THE LUNGS TWICE DAILY 2/25/20   Gloria Street MD   albuterol 4.0 08/27/2019    PHOS 4.9 08/26/2019    PHOS 4.3 08/25/2019    BUN 16 06/09/2020    BUN 18 02/25/2020    BUN 13 08/27/2019    CREATININE 0.9 06/09/2020    CREATININE 0.9 02/25/2020    CREATININE 0.8 08/27/2019     BNP:   Lab Results   Component Value Date    PROBNP 183 06/09/2020    PROBNP 209 02/25/2020    PROBNP 2,866 04/29/2019     Iron Studies:  No components found for: FE,  TIBC,  FERRITIN  Iron Deficiency Anemia:  Yes IV Iron Therapy:  Yes  2017 ACC/AHA HF Guidelines:   intravenous iron replacement in patients with New York Heart Association (NYHA) class II and III HF and iron deficiency (ferritin <100 ng/ml or 100-300 ng/ml if transferrin saturation <20%), to improve functional status and QoL. Assessment/Plan:    1. Chronic diastolic heart failure ([address] - compensated, continue torsemide once a day with prn BID dosing   2. Paroxysmal atrial fibrillation (HCC) - SR today, continue cardizem prn and restart AC                 Instructions:   1. Medications: continue current medications   2. Follow Up: EP in 6 months    Heart Failure Hotline: [phone]      I appreciate the opportunity of cooperating in the care of this individual.    Kenny Corey CNP, 6/24/2020,2:53 PM    QUALITY MEASURES  1. Tobacco Cessation Counseling: NA  2. Retake of BP if >140/90:   NA  3. Documentation to PCP/referring for new patient:  Sent to PCP at close of office visit  4. CAD patient on anti-platelet: NA  5. CAD patient on STATIN therapy:  NA  6. Patient with CHF and aFib on anticoagulation:  yes  7. Patient Education:Yes   8. BB for LVSD :  NA   9. ACE/ARB for LVSD:  NA   10.  Left Ventricular Ejection Fraction (LVEF) Assessment:  Yes

## 2020-06-26 ENCOUNTER — OFFICE VISIT (OUTPATIENT)
Dept: CARDIOLOGY CLINIC | Age: 55
End: 2020-06-26
Payer: COMMERCIAL

## 2020-06-26 VITALS
BODY MASS INDEX: 53.84 KG/M2 | DIASTOLIC BLOOD PRESSURE: 68 MMHG | SYSTOLIC BLOOD PRESSURE: 130 MMHG | TEMPERATURE: 97.5 F | HEART RATE: 85 BPM | WEIGHT: 293 LBS

## 2020-06-26 DIAGNOSIS — I48.0 PAROXYSMAL ATRIAL FIBRILLATION (HCC): ICD-10-CM

## 2020-06-26 PROBLEM — I50.32 CHRONIC DIASTOLIC CONGESTIVE HEART FAILURE (HCC): Status: ACTIVE | Noted: 2019-05-16

## 2020-06-26 LAB
FERRITIN: 11.6 NG/ML (ref 15–150)
IRON SATURATION: 8 % (ref 15–50)
IRON: 33 UG/DL (ref 37–145)
TOTAL IRON BINDING CAPACITY: 428 UG/DL (ref 260–445)

## 2020-06-26 PROCEDURE — 93000 ELECTROCARDIOGRAM COMPLETE: CPT | Performed by: NURSE PRACTITIONER

## 2020-06-26 PROCEDURE — 99213 OFFICE O/P EST LOW 20 MIN: CPT | Performed by: NURSE PRACTITIONER

## 2020-06-26 RX ORDER — TORSEMIDE 20 MG/1
20 TABLET ORAL 2 TIMES DAILY
Qty: 90 TABLET | Refills: 2 | Status: SHIPPED | OUTPATIENT
Start: 2020-06-26 | End: 2021-01-15

## 2020-06-26 RX ORDER — DILTIAZEM HYDROCHLORIDE 60 MG/1
60 TABLET, FILM COATED ORAL DAILY PRN
Qty: 120 TABLET | Refills: 1 | Status: SHIPPED | OUTPATIENT
Start: 2020-06-26 | End: 2021-03-23 | Stop reason: ALTCHOICE

## 2020-06-26 ASSESSMENT — ENCOUNTER SYMPTOMS: RESPIRATORY NEGATIVE: 1

## 2020-07-09 ENCOUNTER — CARE COORDINATION (OUTPATIENT)
Dept: OTHER | Facility: CLINIC | Age: 55
End: 2020-07-09

## 2020-07-09 NOTE — CARE COORDINATION
Spacer/Aero-Holding Chambers (AEROCHAMBER MV) [facility] Use with inhaler as directed 3/6/20   Roxane Salmeron MD   mometasone-formoterol [facility]) 200-5 MCG/ACT inhaler Inhale 2 puffs into the lungs 2 times daily 3/6/20   Roxane Salmeron MD   albuterol sulfate  (90 Base) MCG/ACT inhaler INHALE 2 PUFFS BY MOUTH EVERY 4 TO 6 HOURS AS NEEDED 2/25/20   Roxane Salmeron MD   DULERA 200-5 MCG/ACT inhaler INHALE 1 PUFF INTO THE LUNGS TWICE DAILY 2/25/20   Roxane Salmeron MD   albuterol sulfate HFA (PROVENTIL HFA) 108 (90 Base) MCG/ACT inhaler Inhale 2 puffs into the lungs every 4 hours as needed for Wheezing or Shortness of Breath (Space out to every 6 hours as symptoms improve) Space out to every 6 hours as symptoms improve.  2/25/20   Ted Fernandes MD   meloxicam [facility]) 7.5 MG tablet Take 1 tablet by mouth daily 2/11/20   Alfonso Hernandez DO   EPINEPHrine (AUVI-Q) 0.3 MG/0.3ML SOAJ injection Inject 0.3 mg into the muscle as needed Use as directed for allergic reaction     Historical Provider, MD   magnesium oxide (MAG-OX) 400 MG tablet Take 400 mg by mouth nightly    Historical Provider, MD   Valerian 250 MG CAPS Take by mouth nightly    Historical Provider, MD   docusate sodium (COLACE, DULCOLAX) 100 MG CAPS Take 100 mg by mouth 2 times daily 8/27/19   STEPHANIE Bautista CNP   melatonin 3 MG TABS tablet Take 20 mg by mouth nightly as needed    Historical Provider, MD       Future Appointments   Date Time Provider Wil Chatterjee   7/27/2020 12:00 PM CHAIR 01 [facility]   12/28/2020 10:00 AM STEPHANIE Stewart CNP [facility]

## 2020-07-16 ENCOUNTER — CARE COORDINATION (OUTPATIENT)
Dept: OTHER | Facility: CLINIC | Age: 55
End: 2020-07-16

## 2020-07-16 DIAGNOSIS — D50.9 IRON DEFICIENCY ANEMIA, UNSPECIFIED IRON DEFICIENCY ANEMIA TYPE: ICD-10-CM

## 2020-07-16 RX ORDER — SODIUM CHLORIDE 0.9 % (FLUSH) 0.9 %
10 SYRINGE (ML) INJECTION PRN
Status: CANCELLED | OUTPATIENT
Start: 2020-07-21

## 2020-07-16 RX ORDER — SODIUM CHLORIDE 0.9 % (FLUSH) 0.9 %
5 SYRINGE (ML) INJECTION PRN
Status: CANCELLED | OUTPATIENT
Start: 2020-07-21

## 2020-07-16 RX ORDER — SODIUM CHLORIDE 9 MG/ML
INJECTION, SOLUTION INTRAVENOUS CONTINUOUS
Status: CANCELLED | OUTPATIENT
Start: 2020-07-21

## 2020-07-16 RX ORDER — DIPHENHYDRAMINE HYDROCHLORIDE 50 MG/ML
50 INJECTION INTRAMUSCULAR; INTRAVENOUS ONCE
Status: CANCELLED | OUTPATIENT
Start: 2020-07-21

## 2020-07-16 RX ORDER — EPINEPHRINE 1 MG/ML
0.3 INJECTION, SOLUTION INTRAMUSCULAR; SUBCUTANEOUS PRN
Status: CANCELLED | OUTPATIENT
Start: 2020-07-21

## 2020-07-16 RX ORDER — HEPARIN SODIUM (PORCINE) LOCK FLUSH IV SOLN 100 UNIT/ML 100 UNIT/ML
500 SOLUTION INTRAVENOUS PRN
Status: CANCELLED | OUTPATIENT
Start: 2020-07-21

## 2020-07-16 RX ORDER — METHYLPREDNISOLONE SODIUM SUCCINATE 125 MG/2ML
125 INJECTION, POWDER, LYOPHILIZED, FOR SOLUTION INTRAMUSCULAR; INTRAVENOUS ONCE
Status: CANCELLED | OUTPATIENT
Start: 2020-07-21

## 2020-07-16 NOTE — CARE COORDINATION
of Breath (Space out to every 6 hours as symptoms improve) Space out to every 6 hours as symptoms improve.  2/25/20   Evy Ann MD   meloxicam [facility]MAEL L[facility]PEZ [facility]) 7.5 MG tablet Take 1 tablet by mouth daily 2/11/20   Kamran Hernandez DO   EPINEPHrine (AUVI-Q) 0.3 MG/0.3ML SOAJ injection Inject 0.3 mg into the muscle as needed Use as directed for allergic reaction     Historical Provider, MD   magnesium oxide (MAG-OX) 400 MG tablet Take 400 mg by mouth nightly    Historical Provider, MD   Valerian 250 MG CAPS Take by mouth nightly    Historical Provider, MD   docusate sodium (COLACE, DULCOLAX) 100 MG CAPS Take 100 mg by mouth 2 times daily 8/27/19   STEPHANIE England CNP   melatonin 3 MG TABS tablet Take 20 mg by mouth nightly as needed    Historical Provider, MD       Future Appointments   Date Time Provider Wil Chatterjee   7/27/2020 12:00 PM CHAIR 01 [facility]   12/28/2020 10:00 AM STEPHANIE Taylor CNP [facility]

## 2020-07-27 ENCOUNTER — CARE COORDINATION (OUTPATIENT)
Dept: OTHER | Facility: CLINIC | Age: 55
End: 2020-07-27

## 2020-07-27 NOTE — CARE COORDINATION
Ambulatory Care Coordination Note  7/27/2020  CM Risk Score: 10  Charlson 10 Year Mortality Risk Score: 23%     ACC: Abner Reyez RN    Summary Note: Spoke with patient who said she received her first iron infusion last week, has the 2nd one this Friday. She has a total of 3 ordered. Pt said she is doing a little better but it usually takes a couple of weeks before she can really tell a difference. Pt denies any changes in her breathing, is normally a little short of breath with exertion she said, She works from home and also watches her grand daughter sometimes, she was watching her today. Pt denies any bleeding from Eliquis. Pt is usually unable to speak very long when I call, level of interest varies, will continue to work with patient to increase engagement. Pt is agreeable to follow up calls         Care Coordination Interventions    Program Enrollment:  Complex Care  Referral from Primary Care Provider:  No  Suggested Interventions and Community Resources         Goals Addressed                 This Visit's Progress     COMPLETED: Patient Stated (pt-stated)        Appropriate boundaries for mental well being    Barriers: lack of support and overwhelmed by complexity of regimen  Plan for overcoming my barriers: Working with ACM  Confidence: 8/10  Anticipated Goal Completion Date: 5/28/20       Patient Stated (pt-stated)        Pt will respond pcp for iron infusion through [facility]     Barriers: none  Plan for overcoming my barriers: N/A  Confidence: 10/10  Anticipated Goal Completion Date: 7/10/2020    Pt has received first infusion , 2nd one is for Friday 7/31/2020. Total of 3 infusions are scheduled.         Patient Stated (pt-stated)        Pt will notify pcp of any edema in lower extremities     Barriers: overwhelmed by complexity of regimen  Plan for overcoming my barriers: Work with [facility] and PCP for ongoing support   Confidence: 8/10  Anticipated Goal Completion Date: 8/30/2020       Patient Stated (pt-stated)        Pt will notify pcp of any signs or symptoms of bleeding due to eliquis    Barriers: none  Plan for overcoming my barriers: N/A  Confidence: 10/10  Anticipated Goal Completion Date: 8/30/2020            Prior to Admission medications    Medication Sig Start Date End Date Taking? Authorizing Provider   dilTIAZem (CARDIZEM) 60 MG tablet Take 1 tablet by mouth daily as needed (Per pt \"when my heart rate starts to increase\") 6/26/20   STEPHANIE Hodge CNP   torsemide (DEMADEX) 20 MG tablet Take 1 tablet by mouth 2 times daily 6/26/20   STEPHANIE Hodge CNP   apixaban (ELIQUIS) 5 MG TABS tablet TAKE 1 TABLET BY MOUTH 2 TIMES A DAY 5/27/20   STEPHANIE Garcia CNP   cyclobenzaprine (FLEXERIL) 5 MG tablet Take 1 tablet by mouth 2 times daily as needed for Muscle spasms 4/16/20   Tierra Hernandez DO   ipratropium (ATROVENT) 0.03 % nasal spray 2 sprays by Nasal route 3 times daily 2 sprays each side of nose 3/6/20 3/6/21  Isabella Friedman MD   Spacer/[facility]ero-Holding Chambers [facility]) [facility]er as directed 3/6/20   Isabella Friedman MD   mometasone-formoterol [facility]) 200-5 MCG/ACT inhaler Inhale 2 puffs into the lungs 2 times daily 3/6/20   Isabella Friedman MD   albuterol sulfate  (90 Base) MCG/ACT inhaler INHALE 2 PUFFS BY MOUTH EVERY 4 TO 6 HOURS AS NEEDED 2/25/20   Isabella Friedman MD   DULERA 200-5 MCG/ACT inhaler INHALE 1 PUFF INTO THE LUNGS TWICE DAILY 2/25/20   Isabella Friedman MD   albuterol sulfate HFA (PROVENTIL HFA) 108 (90 Base) MCG/ACT inhaler Inhale 2 puffs into the lungs every 4 hours as needed for Wheezing or Shortness of Breath (Space out to every 6 hours as symptoms improve) Space out to every 6 hours as symptoms improve.  2/25/20   Wanda Edmonds MD   meloxicam [facility]) 7.5 MG tablet Take 1 tablet by mouth daily 2/11/20   Tierra Hernandez DO   EPINEPHrine (AUVI-Q) 0.3 MG/0.3ML SOAJ injection Inject 0.3 mg into the muscle as needed Use as directed for allergic reaction     Historical Provider, MD   magnesium oxide (MAG-OX) 400 MG tablet Take 400 mg by mouth nightly    Historical Provider, MD   Valerian 250 MG CAPS Take by mouth nightly    Historical Provider, MD   docusate sodium (COLACE, DULCOLAX) 100 MG CAPS Take 100 mg by mouth 2 times daily 8/27/19   STEPHANIE Felipe CNP   melatonin 3 MG TABS tablet Take 20 mg by mouth nightly as needed    Historical Provider, MD       Future Appointments   Date Time Provider Wil Chatterjee   12/28/2020 10:00 AM Unruly Baton, APRN - CNP [facility]

## 2020-08-06 ENCOUNTER — HOSPITAL ENCOUNTER (EMERGENCY)
Age: 55
Discharge: HOME | End: 2020-08-07
Attending: EMERGENCY MEDICINE
Payer: COMMERCIAL

## 2020-08-06 ENCOUNTER — APPOINTMENT (OUTPATIENT)
Dept: CT IMAGING | Age: 55
End: 2020-08-06
Payer: COMMERCIAL

## 2020-08-06 LAB
ANION GAP SERPL CALCULATED.3IONS-SCNC: 14 MMOL/L (ref 3–16)
BASE EXCESS VENOUS: -2.1 MMOL/L (ref -2–3)
BASOPHILS ABSOLUTE: 0.1 K/UL (ref 0–0.2)
BASOPHILS RELATIVE PERCENT: 1.3 %
BUN BLDV-MCNC: 15 MG/DL (ref 7–20)
CALCIUM SERPL-MCNC: 9.2 MG/DL (ref 8.3–10.6)
CARBOXYHEMOGLOBIN: 1.7 % (ref 0–1.5)
CHLORIDE BLD-SCNC: 100 MMOL/L (ref 99–110)
CO2: 20 MMOL/L (ref 21–32)
CREAT SERPL-MCNC: 0.8 MG/DL (ref 0.6–1.1)
EOSINOPHILS ABSOLUTE: 0.3 K/UL (ref 0–0.6)
EOSINOPHILS RELATIVE PERCENT: 3.7 %
GFR AFRICAN AMERICAN: >60
GFR NON-AFRICAN AMERICAN: >60
GLUCOSE BLD-MCNC: 105 MG/DL (ref 70–99)
GLUCOSE BLD-MCNC: 105 MG/DL (ref 70–99)
GLUCOSE BLD-MCNC: 99 MG/DL (ref 70–99)
HCO3 VENOUS: 22.3 MMOL/L (ref 24–28)
HCT VFR BLD CALC: 38.8 % (ref 36–48)
HEMOGLOBIN, VEN, REDUCED: 15.4 %
HEMOGLOBIN: 12.3 G/DL (ref 12–16)
LYMPHOCYTES ABSOLUTE: 2.7 K/UL (ref 1–5.1)
LYMPHOCYTES RELATIVE PERCENT: 32.4 %
MCH RBC QN AUTO: 25.7 PG (ref 26–34)
MCHC RBC AUTO-ENTMCNC: 31.7 G/DL (ref 31–36)
MCV RBC AUTO: 81.2 FL (ref 80–100)
METHEMOGLOBIN VENOUS: 1 % (ref 0–1.5)
MONOCYTES ABSOLUTE: 0.6 K/UL (ref 0–1.3)
MONOCYTES RELATIVE PERCENT: 6.8 %
NEUTROPHILS ABSOLUTE: 4.7 K/UL (ref 1.7–7.7)
NEUTROPHILS RELATIVE PERCENT: 55.8 %
O2 SAT, VEN: 84 %
PCO2, VEN: 39.4 MMHG (ref 41–51)
PDW BLD-RTO: 18.3 % (ref 12.4–15.4)
PERFORMED ON: ABNORMAL
PERFORMED ON: NORMAL
PH VENOUS: 7.37 (ref 7.35–7.45)
PLATELET # BLD: 340 K/UL (ref 135–450)
PMV BLD AUTO: 8.7 FL (ref 5–10.5)
PO2, VEN: 55.2 MMHG (ref 25–40)
POTASSIUM REFLEX MAGNESIUM: 3.9 MMOL/L (ref 3.5–5.1)
RBC # BLD: 4.78 M/UL (ref 4–5.2)
SODIUM BLD-SCNC: 134 MMOL/L (ref 136–145)
TCO2 CALC VENOUS: 24 MMOL/L
TROPONIN: <0.01 NG/ML
WBC # BLD: 8.4 K/UL (ref 4–11)

## 2020-08-06 PROCEDURE — 84484 ASSAY OF TROPONIN QUANT: CPT

## 2020-08-06 PROCEDURE — 96360 HYDRATION IV INFUSION INIT: CPT

## 2020-08-06 PROCEDURE — 80048 BASIC METABOLIC PNL TOTAL CA: CPT

## 2020-08-06 PROCEDURE — 82803 BLOOD GASES ANY COMBINATION: CPT

## 2020-08-06 PROCEDURE — 2580000003 HC RX 258: Performed by: PHYSICIAN ASSISTANT

## 2020-08-06 PROCEDURE — 99284 EMERGENCY DEPT VISIT MOD MDM: CPT

## 2020-08-06 PROCEDURE — 70450 CT HEAD/BRAIN W/O DYE: CPT

## 2020-08-06 PROCEDURE — 93005 ELECTROCARDIOGRAM TRACING: CPT | Performed by: PHYSICIAN ASSISTANT

## 2020-08-06 PROCEDURE — 85025 COMPLETE CBC W/AUTO DIFF WBC: CPT

## 2020-08-06 RX ORDER — SODIUM CHLORIDE, SODIUM LACTATE, POTASSIUM CHLORIDE, CALCIUM CHLORIDE 600; 310; 30; 20 MG/100ML; MG/100ML; MG/100ML; MG/100ML
1000 INJECTION, SOLUTION INTRAVENOUS ONCE
Status: COMPLETED | OUTPATIENT
Start: 2020-08-06 | End: 2020-08-06

## 2020-08-06 RX ADMIN — SODIUM CHLORIDE, POTASSIUM CHLORIDE, SODIUM LACTATE AND CALCIUM CHLORIDE 1000 ML: 600; 310; 30; 20 INJECTION, SOLUTION INTRAVENOUS at 21:59

## 2020-08-07 VITALS
OXYGEN SATURATION: 96 % | RESPIRATION RATE: 24 BRPM | SYSTOLIC BLOOD PRESSURE: 143 MMHG | HEART RATE: 61 BPM | DIASTOLIC BLOOD PRESSURE: 76 MMHG

## 2020-08-07 LAB
BACTERIA: ABNORMAL /HPF
BILIRUBIN URINE: NEGATIVE
BLOOD, URINE: ABNORMAL
CLARITY: CLEAR
COLOR: YELLOW
EKG ATRIAL RATE: 77 BPM
EKG DIAGNOSIS: NORMAL
EKG P AXIS: 30 DEGREES
EKG P-R INTERVAL: 226 MS
EKG Q-T INTERVAL: 402 MS
EKG QRS DURATION: 90 MS
EKG QTC CALCULATION (BAZETT): 454 MS
EKG R AXIS: 38 DEGREES
EKG T AXIS: 41 DEGREES
EKG VENTRICULAR RATE: 77 BPM
EPITHELIAL CELLS, UA: ABNORMAL /HPF (ref 0–5)
GLUCOSE URINE: NEGATIVE MG/DL
KETONES, URINE: ABNORMAL MG/DL
LEUKOCYTE ESTERASE, URINE: ABNORMAL
MICROSCOPIC EXAMINATION: YES
MUCUS: ABNORMAL /LPF
NITRITE, URINE: POSITIVE
PH UA: 6 (ref 5–8)
PROTEIN UA: NEGATIVE MG/DL
RBC UA: ABNORMAL /HPF (ref 0–4)
SPECIFIC GRAVITY UA: 1.01 (ref 1–1.03)
URINE TYPE: ABNORMAL
UROBILINOGEN, URINE: 0.2 E.U./DL
WBC UA: ABNORMAL /HPF (ref 0–5)

## 2020-08-07 PROCEDURE — 6370000000 HC RX 637 (ALT 250 FOR IP): Performed by: PHYSICIAN ASSISTANT

## 2020-08-07 PROCEDURE — 87086 URINE CULTURE/COLONY COUNT: CPT

## 2020-08-07 PROCEDURE — 87077 CULTURE AEROBIC IDENTIFY: CPT

## 2020-08-07 PROCEDURE — 94664 DEMO&/EVAL PT USE INHALER: CPT

## 2020-08-07 PROCEDURE — 87186 SC STD MICRODIL/AGAR DIL: CPT

## 2020-08-07 PROCEDURE — 94640 AIRWAY INHALATION TREATMENT: CPT

## 2020-08-07 PROCEDURE — 81001 URINALYSIS AUTO W/SCOPE: CPT

## 2020-08-07 RX ORDER — ACETAMINOPHEN 325 MG/1
650 TABLET ORAL ONCE
Status: COMPLETED | OUTPATIENT
Start: 2020-08-07 | End: 2020-08-07

## 2020-08-07 RX ORDER — IPRATROPIUM BROMIDE AND ALBUTEROL SULFATE 2.5; .5 MG/3ML; MG/3ML
1 SOLUTION RESPIRATORY (INHALATION) ONCE
Status: COMPLETED | OUTPATIENT
Start: 2020-08-07 | End: 2020-08-07

## 2020-08-07 RX ADMIN — ACETAMINOPHEN 650 MG: 325 TABLET ORAL at 01:00

## 2020-08-07 RX ADMIN — IPRATROPIUM BROMIDE AND ALBUTEROL SULFATE 1 AMPULE: .5; 3 SOLUTION RESPIRATORY (INHALATION) at 00:10

## 2020-08-07 ASSESSMENT — PAIN SCALES - GENERAL: PAINLEVEL_OUTOF10: 4

## 2020-08-07 NOTE — ED NOTES
Patient discharged to home with significant other. Patient verbalized understanding of discharge instructions. Advised of when to return to ED and when to call 911. Advised of follow up with PCP. No questions from patient to this RN.  Patient left ED without incident     Omaira Herman RN  08/07/20 [id number]

## 2020-08-07 NOTE — ED PROVIDER NOTES
[address] ENCOUNTER          PHYSICIAN ASSISTANT NOTE       Date of evaluation: 8/6/2020    Chief Complaint     Drug Overdose (Cardizem)      History of Present Illness     Chula Gann is a 47 y.o. female with a past medical history as noted below who presents to the Emergency Department with a complaint of possible calcium channel blocker overdose. The patient arrives via EMS, in a state of altered mental status. EMS reports that approximately 3 to 4 minutes prior to their arrival in the emergency department, the patient had been awake and lucid, but became unconscious and painfully responsive. They report that the patient is still breathing and is maintaining stable hemodynamics. They are not exactly sure what the underlying cause of the mental status change is. They report that they received a call for the patient he was concerned about possible overdose of medication. They report that the patient states that she was feeling some palpitations earlier in the day and that she only takes her diltiazem when she experiences that sensation. They report that she said she took a 60 mg tablet every 1/2 hour over a period of approximately 6 hours. She did endorse to them that she took 11 or 12 total diltiazem. She reports that she became concerned, so she activated EMS. On their arrival, the patient was not complaining of chest pain, but was acting somewhat \"odd. \"  During transport is when she developed altered mental status. On arrival to the emergency department, the patient is moaning and moving about on the stretcher, but is not initially responding to history taking questions. Review of Systems     Review of Systems   Unable to perform ROS: Mental status change   Constitutional: Negative for chills, diaphoresis, fever and unexpected weight change. HENT: Negative for congestion, drooling, mouth sores, postnasal drip, rhinorrhea, sinus pressure and sore throat. Eyes: Negative for pain, redness and itching. Respiratory: Negative for cough, chest tightness, shortness of breath and wheezing. Cardiovascular: Negative for chest pain, palpitations and leg swelling. Gastrointestinal: Negative for abdominal distention, abdominal pain, diarrhea, nausea and vomiting. Genitourinary: Negative for dysuria and hematuria. Musculoskeletal: Negative for arthralgias, back pain, gait problem, myalgias, neck pain and neck stiffness. Skin: Negative for color change, pallor and rash. Neurological: Negative for dizziness, syncope, weakness, light-headedness, numbness and headaches. Hematological: Does not bruise/bleed easily. Psychiatric/Behavioral: Negative for agitation, hallucinations, self-injury, sleep disturbance and suicidal ideas. The patient is not nervous/anxious. All other systems reviewed and are negative. Past Medical, Surgical, Family, and Social History     She has a past medical history of Acute lateral meniscus tear of right knee, Acute medial meniscus tear of right knee, Anesthesia complication, Anxiety, Arthritis, Asthma, Atrial fibrillation ([address] CHF (congestive heart failure) ([facility] [address] Edema, Fibromyalgia, GERD (gastroesophageal reflux disease), and Hiatal hernia. She has a past surgical history that includes hernia repair (2001); Hysterectomy (2004); Tonsillectomy and adenoidectomy; Romie-en-Y Gastric Bypass (2011); UPPP; ovarian cyst removal; ablation of dysrhythmic focus (04/2019); Knee arthroscopy (Right, 7/11/2019); bladder suspension (2004); and ventral hernia repair (N/A, 8/26/2019). Her family history includes Arthritis in her mother; Asthma in her maternal aunt; Cancer in her mother; Cirrhosis in her father. She reports that she has never smoked. She has never used smokeless tobacco. She reports current alcohol use. She reports current drug use. Drugs: Marijuana and Other-see comments.     Medications     Current Discharge Medication List CONTINUE these medications which have NOT CHANGED    Details   dilTIAZem (CARDIZEM) 60 MG tablet Take 1 tablet by mouth daily as needed (Per pt \"when my heart rate starts to increase\")  Qty: 120 tablet, Refills: 1      torsemide (DEMADEX) 20 MG tablet Take 1 tablet by mouth 2 times daily  Qty: 90 tablet, Refills: 2      apixaban (ELIQUIS) 5 MG TABS tablet TAKE 1 TABLET BY MOUTH 2 TIMES A DAY  Qty: 60 tablet, Refills: 0      cyclobenzaprine (FLEXERIL) 5 MG tablet Take 1 tablet by mouth 2 times daily as needed for Muscle spasms  Qty: 24 tablet, Refills: 1    Associated Diagnoses: Thoracolumbar back pain      ipratropium (ATROVENT) 0.03 % nasal spray 2 sprays by Nasal route 3 times daily 2 sprays each side of nose  Qty: 1 Bottle, Refills: 3      Spacer/Aero-Holding Chambers (AEROCHAMBER MV) MISC Use with inhaler as directed  Qty: 1 each, Refills: 0    Associated Diagnoses: Moderate persistent asthma without complication      !! mometasone-formoterol (DULERA) 200-5 MCG/ACT inhaler Inhale 2 puffs into the lungs 2 times daily  Qty: 1 Inhaler, Refills: 11    Associated Diagnoses: Moderate persistent asthma without complication      !! albuterol sulfate  (90 Base) MCG/ACT inhaler INHALE 2 PUFFS BY MOUTH EVERY 4 TO 6 HOURS AS NEEDED  Qty: 18 g, Refills: 11    Associated Diagnoses: Moderate persistent asthma without complication      !! DULERA 200-5 MCG/ACT inhaler INHALE 1 PUFF INTO THE LUNGS TWICE DAILY  Qty: 13 g, Refills: 5    Associated Diagnoses: Moderate persistent asthma without complication      !! albuterol sulfate HFA (PROVENTIL HFA) 108 (90 Base) MCG/ACT inhaler Inhale 2 puffs into the lungs every 4 hours as needed for Wheezing or Shortness of Breath (Space out to every 6 hours as symptoms improve) Space out to every 6 hours as symptoms improve.   Qty: 1 Inhaler, Refills: 0      meloxicam (MOBIC) 7.5 MG tablet Take 1 tablet by mouth daily  Qty: 30 tablet, Refills: 5    Comments: Has done well with NSAIDs recently. Associated Diagnoses: Muscle strain of left lower leg, initial encounter; Lumbar spondylosis      EPINEPHrine (AUVI-Q) 0.3 MG/0.3ML SOAJ injection Inject 0.3 mg into the muscle as needed Use as directed for allergic reaction       magnesium oxide (MAG-OX) 400 MG tablet Take 400 mg by mouth nightly      Valerian 250 MG CAPS Take by mouth nightly      docusate sodium (COLACE, DULCOLAX) 100 MG CAPS Take 100 mg by mouth 2 times daily  Qty: 60 capsule, Refills: 1      melatonin 3 MG TABS tablet Take 20 mg by mouth nightly as needed       !! - Potential duplicate medications found. Please discuss with provider. Allergies     She is allergic to latex; aspirin; demerol; meperidine; nsaids; pcn [penicillins]; ranitidine hcl; and sulfa antibiotics. Physical Exam     INITIAL VITALS: BP: 122/82,  , Pulse: 69, Resp: 18, SpO2: 96 %  Physical Exam  Vitals signs and nursing note reviewed. Constitutional:       General: She is not in acute distress. Appearance: She is well-developed. She is not diaphoretic. HENT:      Head: Normocephalic and atraumatic. Eyes:      General: No scleral icterus. Pupils: Pupils are equal, round, and reactive to light. Neck:      Musculoskeletal: Normal range of motion. Vascular: No JVD. Cardiovascular:      Rate and Rhythm: Regular rhythm. Bradycardia present. Pulmonary:      Effort: Pulmonary effort is normal. No respiratory distress. Breath sounds: Normal breath sounds. No stridor. Abdominal:      Palpations: Abdomen is soft. Tenderness: There is no abdominal tenderness. Musculoskeletal: Normal range of motion. Skin:     General: Skin is warm and dry. Findings: No rash. Neurological:      GCS: GCS eye subscore is 1. GCS verbal subscore is 2. GCS motor subscore is 5. Cranial Nerves: No facial asymmetry. Sensory: Sensation is intact. Motor: Motor function is intact.       Comments: Patient initially responded to painful stimuli with localization, but would perform volitional acts such as looking at the corner of her eye from my presence during history taking, and degeneration of upper airway wheezing sounds on physical examination only. The patient has an intact startle and corneal reflex. The patient moans, but does not attempt to answer questions in the initial phases of her physical examination. Psychiatric:         Behavior: Behavior is agitated and aggressive. Thought Content: Thought content is not paranoid or delusional. Thought content does not include homicidal or suicidal ideation. Thought content does not include homicidal or suicidal plan. Diagnostic Results     EKG   Interpreted in conjunction with emergency department physician Cruz Jcainto MD  Rhythm: normal sinus   Rate: normal  Axis: normal  Ectopy: none  Conduction: normal  ST Segments: normal  T Waves: normal  Q Waves:none  Clinical Impression: Sinus rhythm, no ectopy, normal QTC, normal QRS duration, no evidence of ischemia, injury or infarct. Comparison: Consistent with prior ECGs    RADIOLOGY:  CT Head WO Contrast   Final Result      1. No acute intracranial process.           LABS:   Results for orders placed or performed during the hospital encounter of 08/06/20   CBC Auto Differential   Result Value Ref Range    WBC 8.4 4.0 - 11.0 K/uL    RBC 4.78 4.00 - 5.20 M/uL    Hemoglobin 12.3 12.0 - 16.0 g/dL    Hematocrit 38.8 36.0 - 48.0 %    MCV 81.2 80.0 - 100.0 fL    MCH 25.7 (L) 26.0 - 34.0 pg    MCHC 31.7 31.0 - 36.0 g/dL    RDW 18.3 (H) 12.4 - 15.4 %    Platelets 426 731 - 269 K/uL    MPV 8.7 5.0 - 10.5 fL    Neutrophils % 55.8 %    Lymphocytes % 32.4 %    Monocytes % 6.8 %    Eosinophils % 3.7 %    Basophils % 1.3 %    Neutrophils Absolute 4.7 1.7 - 7.7 K/uL    Lymphocytes Absolute 2.7 1.0 - 5.1 K/uL    Monocytes Absolute 0.6 0.0 - 1.3 K/uL    Eosinophils Absolute 0.3 0.0 - 0.6 K/uL    Basophils Absolute 0.1 0.0 - 0.2 K/uL   Basic Metabolic Panel w/ Reflex to MG   Result Value Ref Range    Sodium 134 (L) 136 - 145 mmol/L    Potassium reflex Magnesium 3.9 3.5 - 5.1 mmol/L    Chloride 100 99 - 110 mmol/L    CO2 20 (L) 21 - 32 mmol/L    Anion Gap 14 3 - 16    Glucose 105 (H) 70 - 99 mg/dL    BUN 15 7 - 20 mg/dL    CREATININE 0.8 0.6 - 1.1 mg/dL    GFR Non-African American >60 >60    GFR African American >60 >60    Calcium 9.2 8.3 - 10.6 mg/dL   Urinalysis, reflex to microscopic   Result Value Ref Range    Color, UA Yellow Straw/Yellow    Clarity, UA Clear Clear    Glucose, Ur Negative Negative mg/dL    Bilirubin Urine Negative Negative    Ketones, Urine TRACE (A) Negative mg/dL    Specific Gravity, UA 1.015 1.005 - 1.030    Blood, Urine TRACE-LYSED (A) Negative    pH, UA 6.0 5.0 - 8.0    Protein, UA Negative Negative mg/dL    Urobilinogen, Urine 0.2 <2.0 E.U./dL    Nitrite, Urine POSITIVE (A) Negative    Leukocyte Esterase, Urine SMALL (A) Negative    Microscopic Examination YES     Urine Type Voided    Blood Gas, Venous   Result Value Ref Range    pH, Karl 7.372 7.350 - 7.450    pCO2, Karl 39.4 (L) 41.0 - 51.0 mmHg    pO2, Karl 55.2 (H) 25.0 - 40.0 mmHg    HCO3, Venous 22.3 (L) 24.0 - 28.0 mmol/L    Base Excess, Karl -2.1 (L) -2.0 - 3.0 mmol/L    O2 Sat, Karl 84 Not established %    Carboxyhemoglobin 1.7 (H) 0.0 - 1.5 %    MetHgb, Karl 1.0 0.0 - 1.5 %    TC02 (Calc), Karl 24 mmol/L    Hemoglobin, Karl, Reduced 15.40 %   Troponin   Result Value Ref Range    Troponin <0.01 <0.01 ng/mL   Microscopic Urinalysis   Result Value Ref Range    Mucus, UA 1+ (A) None Seen /LPF    WBC, UA 21-50 (A) 0 - 5 /HPF    RBC, UA 3-4 0 - 4 /HPF    Epithelial Cells, UA 11-20 (A) 0 - 5 /HPF    Bacteria, UA 3+ (A) None Seen /HPF   POCT Glucose   Result Value Ref Range    POC Glucose 105 (H) 70 - 99 mg/dl    Performed on ACCU-CHEK    POCT Glucose   Result Value Ref Range    POC Glucose 99 70 - 99 mg/dl    Performed on ACCU-CHEK        ED BEDSIDE through the peak period of absorption for extended release diltiazem. At no time was the patient hypotensive. A repeat blood glucose level was 99 mg/dL. No evidence of developing hypoglycemia. Regular checks of the patient's mental status demonstrate continued moaning and moving about in the bed, but localizing pain and continued reflexes and findings suggestive of volitional altered mental status. At about the 4-hour german of observation in the emergency department, the patient abruptly became conscious and was interactive with nursing staff. She was initially aggressive and agitated, and was demanding to be discharged immediately, not understanding why she was still in the hospital.  I explained to the patient the concern for a calcium channel blocker overdose, at which time she confirmed that she had taken 11-12 diltiazem earlier in the day. She confirmed that she was not doing this in an attempt to harm herself, but was experiencing some \"mild palpitations\" and thought that she might be going into atrial fibrillation. The patient endorses being a nurse, and states that although she knew that this medication had concerns associated with higher dosing, she did not want to go into atrial fibrillation especially with a rapid ventricular response. She denies any complaints at this time. After this discussion, the patient was accepted of remaining in the emergency department the full 6 hours for observation to be sure that she does not demonstrate any continued effects of calcium channel blocker overdose. At the time of turnover, the patient continues to be conscious awake and oriented. She is hemodynamically stable and within normal limits. She is demonstrated no new complaints, including no further palpitations or the development of chest pain or dyspnea. She does note that she has an asthma history and does receive regular infusions of Xolair because of a possible allergic component to her asthma. She is not complaining of any difficulty breathing and multiple reassessments of her lung sounds demonstrated no adventitious sounds to include wheezing or diminished breath sounds. However, the patient stated that subjectively, she felt as if her breathing was getting \"a little tight\" and preferred to receive a DuoNeb nebulizer treatment, which was performed by respiratory therapy. Additionally, she reported a slight headache and requested Tylenol. A repeat evaluation of her neurological examination demonstrates a GCS of 15 with no gross or focal neurological deficits. The patient will be turned over to the attending physician for continued monitoring and evaluation of the patient with disposition to come at approximately 3 AM. I discussed this plan at length the patient who verbalizes understanding and is in agreement. The patient was seen and evaluated by myself and the attending physician, Amparo Merritt MD, who agrees with my assessment, treatment and plan. Critical Care:  Due to the immediate potential for life-threatening deterioration due to possible calcium channel blocker overdose with unexplained altered mental status, I spent 33 minutes providing critical care. This time excludes time spent performing procedures but includes time spent on direct patient care, history retrieval, review of the chart, and discussions with patient, family, and consultant(s). Clinical Impression     1.  Calcium channel blocker overdose, accidental or unintentional, initial encounter        Disposition     DISPOSITION pending     [address] [place] [address]  08/10/20 [id number]

## 2020-08-07 NOTE — ED PROVIDER NOTES
ED Attending Attestation Note     Date of evaluation: 8/6/2020    This patient was seen by the advance practice provider. I have seen and examined the patient, agree with the workup, evaluation, management and diagnosis. The care plan has been discussed. I have reviewed the ECG and concur with the AMAURY's interpretation. My assessment reveals patient who is sleepy but arousable with normal vital signs at this time. Questionable overdose on diltiazem. EMS report states that the patient refused vital signs and became \"unresponsive\" just prior to arrival at the hospital.  She opens her eyes and responds to stimulus but will not communicate with us.      Nisa Dumont MD  08/06/20 1292

## 2020-08-07 NOTE — ED TRIAGE NOTES
EMS states she took 15 Cardizem, states she took 1 every 30 minutes for the last 6 hours. Responsive to pain only upon arrival with corneal reflexes. PA at bedside talking to patient with some responses from patient.

## 2020-08-08 LAB
ORGANISM: ABNORMAL
URINE CULTURE, ROUTINE: ABNORMAL

## 2020-08-10 ASSESSMENT — ENCOUNTER SYMPTOMS
DIARRHEA: 0
EYE ITCHING: 0
WHEEZING: 0
ABDOMINAL DISTENTION: 0
COLOR CHANGE: 0
BACK PAIN: 0
NAUSEA: 0
COUGH: 0
EYE REDNESS: 0
CHEST TIGHTNESS: 0
SORE THROAT: 0
ABDOMINAL PAIN: 0
RHINORRHEA: 0
SINUS PRESSURE: 0
SHORTNESS OF BREATH: 0
VOMITING: 0
EYE PAIN: 0

## 2020-08-12 ENCOUNTER — CARE COORDINATION (OUTPATIENT)
Dept: OTHER | Facility: CLINIC | Age: 55
End: 2020-08-12

## 2020-08-12 NOTE — CARE COORDINATION
Ambulatory Care Coordination Note  8/12/2020  CM Risk Score: 10  Charlson 10 Year Mortality Risk Score: 23%     ACC: Kunal Tran, RN    Summary Note: Spoke with patient briefly as she is working from home, she received a call during our call so she had to go, will follow up. Pt did say she is calling Dr. Renata Suresh for an appt, she feels she is having some depression and her kids are stressing her and upset her, which then causes her to feel like she is going into a fib. Pt said the cardiazem doesn't not really help her. Pt agreed for me to follow up and call back another time         Care Coordination Interventions    Program Enrollment:  Complex Care  Referral from Primary Care Provider:  No  Suggested Interventions and Community Resources  Disease Specific Clinic:  Completed (Comment: Cardiology - Alex )         Goals Addressed    None         Prior to Admission medications    Medication Sig Start Date End Date Taking?  Authorizing Provider   dilTIAZem (CARDIZEM) 60 MG tablet Take 1 tablet by mouth daily as needed (Per pt \"when my heart rate starts to increase\") 6/26/20   STEPHANIE Gaspar CNP   torsemide (DEMADEX) 20 MG tablet Take 1 tablet by mouth 2 times daily 6/26/20   STEPHANIE Gaspar CNP   apixaban (ELIQUIS) 5 MG TABS tablet TAKE 1 TABLET BY MOUTH 2 TIMES A DAY 5/27/20   STEPHANIE Bryant CNP   cyclobenzaprine (FLEXERIL) 5 MG tablet Take 1 tablet by mouth 2 times daily as needed for Muscle spasms 4/16/20   Wash Begin Bingcang, DO   ipratropium (ATROVENT) 0.03 % nasal spray 2 sprays by Nasal route 3 times daily 2 sprays each side of nose 3/6/20 3/6/21  Lana Fontaine MD   Spacer/Aero-Holding Chambers (AEROCHAMBER MV) [facility] [place] with inhaler as directed 3/6/20   Lana Fontaine MD   mometasone-formoterol [facility]) 200-5 MCG/ACT inhaler Inhale 2 puffs into the lungs 2 times daily 3/6/20   Lana Fontaine MD   albuterol sulfate  (90 Base) MCG/ACT inhaler INHALE 2 PUFFS BY MOUTH EVERY 4 TO 6 HOURS AS NEEDED 2/25/20   Fernando Winkler MD   DULERA 200-5 MCG/ACT inhaler INHALE 1 PUFF INTO THE LUNGS TWICE DAILY 2/25/20   Fernando Winkler MD   albuterol sulfate HFA (PROVENTIL HFA) 108 (90 Base) MCG/ACT inhaler Inhale 2 puffs into the lungs every 4 hours as needed for Wheezing or Shortness of Breath (Space out to every 6 hours as symptoms improve) Space out to every 6 hours as symptoms improve.  2/25/20   Donte Neves MD   meloxicam ISHMAEL LÓPEZ [facility]) 7.5 MG tablet Take 1 tablet by mouth daily 2/11/20   Everett Hernandez DO   EPINEPHrine (AUVI-Q) 0.3 MG/0.3ML SOAJ injection Inject 0.3 mg into the muscle as needed Use as directed for allergic reaction     Historical Provider, MD   magnesium oxide (MAG-OX) 400 MG tablet Take 400 mg by mouth nightly    Historical Provider, MD   Valerian 250 MG CAPS Take by mouth nightly    Historical Provider, MD   docusate sodium (COLACE, DULCOLAX) 100 MG CAPS Take 100 mg by mouth 2 times daily 8/27/19   STEPHANIE Hightower CNP   melatonin 3 MG TABS tablet Take 20 mg by mouth nightly as needed    Historical Provider, MD       Future Appointments   Date Time Provider Wil Chatterjee   8/21/2020  1:40 PM Barbra Keller DO [address] [facility]   12/28/2020 10:00 AM STEPHANIE Akins CNP [facility]

## 2020-08-14 ENCOUNTER — CARE COORDINATION (OUTPATIENT)
Dept: OTHER | Facility: CLINIC | Age: 55
End: 2020-08-14

## 2020-08-14 NOTE — CARE COORDINATION
Ambulatory Care Coordination Note  8/14/2020  CM Risk Score: 10  Charlson 10 Year Mortality Risk Score: 23%     ACC: Denton Paula RN    Summary Note: Spoke with patient today who said she is feeling some better today, says her shortness of breath has improved, she feels it is due to the iron infusions. Pt denies any chest pain today. Has a follow up with Dr. Alisson Chapman on 8/21/2020. Pt said she is having relationship issues and is interested in calling [facility]. Number provided to patient. Said she has used them before and found it helpful. Pt said her son had been in detention in the past and she was very stressed about it. Pt requested I call her on her day off on 8/24/2020. She is off work til next Wednesday and said that is making her feel better. Will follow         Care Coordination Interventions    Program Enrollment:  Complex Care  Referral from Primary Care Provider:  No  Suggested Interventions and Community Resources  Disease Specific Clinic:  Completed (Comment: Cardiology - [facility] )         Goals Addressed                 This Visit's Progress     COMPLETED: Patient Stated (pt-stated)        Pt will respond pcp for iron infusion through [facility]     Barriers: none  Plan for overcoming my barriers: N/A  Confidence: 10/10  Anticipated Goal Completion Date: 7/10/2020    Pt has received first infusion , 2nd one is for Friday 7/31/2020. Total of 3 infusions are scheduled.         Patient Stated (pt-stated)   On track     Pt will notify pcp of any edema in lower extremities     Barriers: overwhelmed by complexity of regimen  Plan for overcoming my barriers: Work with ACM and PCP for ongoing support   Confidence: 8/10  Anticipated Goal Completion Date: 8/30/2020       Patient Stated (pt-stated)   On track     Pt will notify pcp of any signs or symptoms of bleeding due to eliquis    Barriers: none  Plan for overcoming my barriers: N/A  Confidence: 10/10  Anticipated Goal Completion Date: 8/30/2020       Patient Stated (pt-stated)        Pt will contact [facility] for counseling     Barriers: lack of support  Plan for overcoming my barriers: Work with [facility] for support and encouragement   Confidence: 7/10  Anticipated Goal Completion Date: 8/30/2020            Prior to Admission medications    Medication Sig Start Date End Date Taking? Authorizing Provider   dilTIAZem (CARDIZEM) 60 MG tablet Take 1 tablet by mouth daily as needed (Per pt \"when my heart rate starts to increase\") 6/26/20   STEPHANIE Tomas CNP   torsemide (DEMADEX) 20 MG tablet Take 1 tablet by mouth 2 times daily 6/26/20   STEPHANIE Tomas - CNP   apixaban (ELIQUIS) 5 MG TABS tablet TAKE 1 TABLET BY MOUTH 2 TIMES A DAY 5/27/20   STEPHANIE Ortiz CNP   cyclobenzaprine (FLEXERIL) 5 MG tablet Take 1 tablet by mouth 2 times daily as needed for Muscle spasms 4/16/20   Alden Hernandez,    ipratropium (ATROVENT) 0.03 % nasal spray 2 sprays by Nasal route 3 times daily 2 sprays each side of nose 3/6/20 3/6/21  Ector Mejia MD   Spacer/Aero-Holding Chambers (AEROCHAMBER MV) MISC Use with inhaler as directed 3/6/20   Ector Mejia MD   mometasone-formoterol [facility]) 200-5 MCG/ACT inhaler Inhale 2 puffs into the lungs 2 times daily 3/6/20   Ector Mejia MD   albuterol sulfate  (90 Base) MCG/ACT inhaler INHALE 2 PUFFS BY MOUTH EVERY 4 TO 6 HOURS AS NEEDED 2/25/20   Ector Mejia MD   DULERA 200-5 MCG/ACT inhaler INHALE 1 PUFF INTO THE LUNGS TWICE DAILY 2/25/20   Ector Mejia MD   albuterol sulfate HFA (PROVENTIL HFA) 108 (90 Base) MCG/ACT inhaler Inhale 2 puffs into the lungs every 4 hours as needed for Wheezing or Shortness of Breath (Space out to every 6 hours as symptoms improve) Space out to every 6 hours as symptoms improve.  2/25/20   [facility]) 7.5 MG tablet Take 1 tablet by mouth daily 2/11/20   Aldencass Hernandez DO   EPINEPHrine (AUVI-Q) 0.3 MG/0.3ML SOAJ injection Inject 0.3 mg into the muscle as needed Use as directed for allergic reaction     Historical Provider, MD   magnesium oxide (MAG-OX) 400 MG tablet Take 400 mg by mouth nightly    Historical Provider, MD   Valerian 250 MG CAPS Take by mouth nightly    Historical Provider, MD   docusate sodium (COLACE, DULCOLAX) 100 MG CAPS Take 100 mg by mouth 2 times daily 8/27/19   STEPHANIE Schulte CNP   melatonin 3 MG TABS tablet Take 20 mg by mouth nightly as needed    Historical Provider, MD       Future Appointments   Date Time Provider [facility]   8/21/2020  1:40 PM Khalida Fung DO [facility]   12/28/2020 10:00 AM STEPHANIE Roy CNP [facility]

## 2020-08-21 ENCOUNTER — VIRTUAL VISIT (OUTPATIENT)
Dept: FAMILY MEDICINE CLINIC | Age: 55
End: 2020-08-21
Payer: COMMERCIAL

## 2020-08-21 PROCEDURE — 99214 OFFICE O/P EST MOD 30 MIN: CPT | Performed by: FAMILY MEDICINE

## 2020-08-21 RX ORDER — LORAZEPAM 1 MG/1
1 TABLET ORAL 3 TIMES DAILY PRN
Qty: 90 TABLET | Refills: 2 | Status: SHIPPED | OUTPATIENT
Start: 2020-08-21 | End: 2020-09-20

## 2020-08-21 RX ORDER — MELOXICAM 15 MG/1
15 TABLET ORAL DAILY PRN
Qty: 90 TABLET | Refills: 1 | Status: SHIPPED | OUTPATIENT
Start: 2020-08-21 | End: 2021-02-17

## 2020-08-21 RX ORDER — CYCLOBENZAPRINE HCL 5 MG
5-10 TABLET ORAL 2 TIMES DAILY PRN
Qty: 60 TABLET | Refills: 2 | Status: SHIPPED | OUTPATIENT
Start: 2020-08-21 | End: 2020-10-27 | Stop reason: SDUPTHER

## 2020-08-21 NOTE — PROGRESS NOTES
dilTIAZem (CARDIZEM) 60 MG tablet Take 1 tablet by mouth daily as needed (Per pt \"when my heart rate starts to increase\") Yes STEPHANIE Benson CNP   torsemide (DEMADEX) 20 MG tablet Take 1 tablet by mouth 2 times daily Yes STEPHANIE Benson CNP   apixaban (ELIQUIS) 5 MG TABS tablet TAKE 1 TABLET BY MOUTH 2 TIMES A DAY Yes STEPHANIE Hernandez CNP   Spacer/Aero-Holding Chambers (AEROCHAMBER MV) MISC Use with inhaler as directed Yes Martha Reddy MD   mometasone-formoterol (DULERA) 200-5 MCG/ACT inhaler Inhale 2 puffs into the lungs 2 times daily Yes Martha Reddy MD   DULERA 200-5 MCG/ACT inhaler INHALE 1 PUFF INTO THE LUNGS TWICE DAILY Yes Martha Reddy MD   albuterol sulfate HFA (PROVENTIL HFA) 108 (90 Base) MCG/ACT inhaler Inhale 2 puffs into the lungs every 4 hours as needed for Wheezing or Shortness of Breath (Space out to every 6 hours as symptoms improve) Space out to every 6 hours as symptoms improve.  Yes Jo Ganser, MD   EPINEPHrine (AUVI-Q) 0.3 MG/0.3ML SOAJ injection Inject 0.3 mg into the muscle as needed Use as directed for allergic reaction  Yes Historical Provider, MD   ipratropium (ATROVENT) 0.03 % nasal spray 2 sprays by Nasal route 3 times daily 2 sprays each side of nose  Patient not taking: Reported on 8/21/2020  Martha Reddy MD   albuterol sulfate  (90 Base) MCG/ACT inhaler INHALE 2 PUFFS BY MOUTH EVERY 4 TO 6 HOURS AS NEEDED  Patient not taking: Reported on 8/21/2020  Martha Reddy MD   magnesium oxide (MAG-OX) 400 MG tablet Take 400 mg by mouth nightly  Historical Provider, MD   Valerian 250 MG CAPS Take by mouth nightly  Historical Provider, MD   docusate sodium (COLACE, DULCOLAX) 100 MG CAPS Take 100 mg by mouth 2 times daily  Patient not taking: Reported on 8/21/2020  Lavanda Esters, APRN - CNP   melatonin 3 MG TABS tablet Take 20 mg by mouth nightly as needed  Historical Provider, MD          Patient-Reported Vitals 8/21/2020   Patient-Reported Weight 350   Patient-Reported Height 5'10\"   Patient-Reported Systolic 871   Patient-Reported Diastolic 70   Patient-Reported Temperature 97.6      There were no vitals filed for this visit.    Wt Readings from Last 3 Encounters:   06/26/20 (!) 375 lb 3.2 oz (170.2 kg)   02/25/20 (!) 360 lb (163.3 kg)   02/11/20 (!) 368 lb (166.9 kg)     BP Readings from Last 3 Encounters:   08/07/20 (!) 143/76   06/26/20 130/68   06/09/20 (!) 164/102       Patient Active Problem List   Diagnosis    Asthma    MDD (major depressive disorder), recurrent severe, without psychosis ([facility]    Cluster B personality disorder ([facility]    Colon polyps    Overdose    Restless leg syndrome    Sliding hiatal hernia    HTN (hypertension)    Atrial flutter (HCC)    SVT (supraventricular tachycardia) (HCC)    Paroxysmal atrial fibrillation (HCC)    Sinus bradycardia    Acute lateral meniscus tear of right knee    Acute medial meniscus tear of right knee    Localized edema    Acute pain of right knee    Chronic diastolic congestive heart failure (HCC)    Primary osteoarthritis of right knee    Contusion of multiple sites of right leg    Gastrocnemius strain, left    Ventral incisional hernia    Recurrent incisional hernia    Pain of upper abdomen    Incisional hernia with obstruction, without gangrene    Rhinitis, chronic    Vocal cord dysfunction    Iron deficiency anemia, unspecified       Immunization History   Administered Date(s) Administered    BCG (Juan BCG) 05/28/2013    DTaP 05/28/2013    DTaP (Infanrix) 05/28/2013    Influenza Vaccine, unspecified formulation 10/29/2015, 01/09/2017, 10/23/2017, 11/26/2018    Influenza Virus Vaccine 12/31/2008, 09/22/2009, 01/09/2017, 10/23/2017, 10/23/2017, 11/26/2018    Influenza, Quadv, IM, PF (6 mo and older Fluzone, Flulaval, Fluarix, and 3 yrs and older Afluria) 11/26/2018    Influenza, Quadv, Recombinant, IM PF (Flublok 18 yrs and older) 11/16/2019    PPD Test 06/11/2013, status: Never Smoker    Smokeless tobacco: Never Used   Substance and Sexual Activity    Alcohol use: Yes     Comment: 0-1 drinks per month    Drug use: Yes     Types: Marijuana, Other-see comments     Comment: Medical Marijuana use    Sexual activity: Yes     Partners: Male   Lifestyle    Physical activity     Days per week: Not on file     Minutes per session: Not on file    Stress: Not on file   Relationships    Social connections     Talks on phone: Not on file     Gets together: Not on file     Attends Muslim service: Not on file     Active member of club or organization: Not on file     Attends meetings of clubs or organizations: Not on file     Relationship status: Not on file    Intimate partner violence     Fear of current or ex partner: Not on file     Emotionally abused: Not on file     Physically abused: Not on file     Forced sexual activity: Not on file   Other Topics Concern    Not on file   Social History Narrative    Not on file       O: There were no vitals taken for this visit. Physical Exam  PHYSICAL EXAMINATION:  [ INSTRUCTIONS:  \"[x]\" Indicates a positive item  \"[]\" Indicates a negative item  -- DELETE ALL ITEMS NOT EXAMINED]  Vital Signs: (As obtained by patient/caregiver or practitioner observation)    Constitutional: [x] Appears well-developed and well-nourished [x] No apparent distress      [] Abnormal-   Mental status  [x] Alert and awake  [x] Oriented to person/place/time [x]Able to follow commands      Eyes:  EOM    [x]  Normal  [] Abnormal-  Sclera  [x]  Normal  [] Abnormal -         Discharge [x]  None visible  [] Abnormal -    HENT:   [x] Normocephalic, atraumatic.   [] Abnormal   [] Mouth/Throat: Mucous membranes are moist.     External Ears [x] Normal  [] Abnormal-     Neck: [x] No visualized mass     Pulmonary/Chest: [x] Respiratory effort normal.  [x] No visualized signs of difficulty breathing or respiratory distress        [] Abnormal-      Musculoskeletal:   [] spent 12 minutes counseling patient. PLAN          If applicable, see additional patient information and instructions under \"Patient Instructions. \"    Return in about 3 months (around 2020). Patient Instructions   --Go to [email or web address]. [employer] to obtain the savings card and review how to first take the starting pack. This prescription is at [address] --The other prescriptions are at [place]. --Try the stationary pedeler. TOTAL TIME (in minutes) SPENT ON CONFERENCIN    Please note a portion of this chart was generated using dragon dictation software. Although every effort was made to ensure the accuracy of this automated transcription, some errors in transcription may have occurred. Pursuant to the emergency declaration under the [facility] waiver authority and the [employer] Act, this Virtual  Visit was conducted, with patient's consent, to reduce the patient's risk of exposure to COVID-19 and provide continuity of care for an established patient. Services were provided through a video synchronous discussion virtually to substitute for in-person clinic visit. Patient was instructed that the AVS is available on My Chart or was emailed to the patient if not on My Chart. Lab orders were emailed to patient if they do not use a [facility] lab. Any work notes were sent to patient through My Chart or email.

## 2020-08-24 ENCOUNTER — CARE COORDINATION (OUTPATIENT)
Dept: OTHER | Facility: CLINIC | Age: 55
End: 2020-08-24

## 2020-08-24 ENCOUNTER — TELEPHONE (OUTPATIENT)
Dept: FAMILY MEDICINE CLINIC | Age: 55
End: 2020-08-24

## 2020-08-24 RX ORDER — TRAMADOL HYDROCHLORIDE 50 MG/1
50 TABLET ORAL 2 TIMES DAILY PRN
Qty: 60 TABLET | Refills: 0 | Status: SHIPPED | OUTPATIENT
Start: 2020-08-24 | End: 2020-08-31

## 2020-08-24 RX ORDER — TRIAMTERENE AND HYDROCHLOROTHIAZIDE 75; 50 MG/1; MG/1
1 TABLET ORAL DAILY PRN
Qty: 30 TABLET | Refills: 3 | Status: SHIPPED | OUTPATIENT
Start: 2020-08-24 | End: 2021-03-19 | Stop reason: SDUPTHER

## 2020-08-24 NOTE — TELEPHONE ENCOUNTER
Both rx sent to [facility]. outpatient pharmacy since she works at [facility], [employer]..

## 2020-08-24 NOTE — CARE COORDINATION
Ambulatory Care Coordination Note  8/24/2020  CM Risk Score: 10  Charlson 10 Year Mortality Risk Score: 23%     ACC: Giulia Chaudhry, RN    Summary Note: ACM attempted to reach patient for follow up call regarding care coordination. HIPAA compliant message left requesting a return phone call at patients convenience. Will continue to follow. Care Coordination Interventions    Program Enrollment:  Complex Care  Referral from Primary Care Provider:  No  Suggested Interventions and Community Resources  Disease Specific Clinic:  Completed (Comment: Cardiology - [place] )         Goals Addressed    None         Prior to Admission medications    Medication Sig Start Date End Date Taking? Authorizing Provider   LORazepam (ATIVAN) 1 MG tablet Take 1 tablet by mouth 3 times daily as needed for Anxiety (insomnia) for up to 30 days. 8/21/20 9/20/20  Alden Ruffing Bingcang, DO   meloxicam (MOBIC) 15 MG tablet Take 1 tablet by mouth daily as needed for Pain 8/21/20   Alden Ruffing Bingcang, DO   cyclobenzaprine (FLEXERIL) 5 MG tablet Take 1-2 tablets by mouth 2 times daily as needed for Muscle spasms 8/21/20   Alden Ruffing Bingcang, DO   naltrexone-buPROPion (CONTRAVE) 8-90 MG per extended release tablet Week 1: take one tab po qam,  Week 2:  Take one tab po BID,  Week 3:  Take 2 tab po qam and 1 tab po qhs,  Week 4 onward:   Take 2 tabs po BID 8/21/20   Alexander Reggy Frankel Bingcang, DO   dilTIAZem (CARDIZEM) 60 MG tablet Take 1 tablet by mouth daily as needed (Per pt \"when my heart rate starts to increase\") 6/26/20   STEPHANIE Tomas CNP   torsemide (DEMADEX) 20 MG tablet Take 1 tablet by mouth 2 times daily 6/26/20   STEPHANIE Tomas CNP   apixaban (ELIQUIS) 5 MG TABS tablet TAKE 1 TABLET BY MOUTH 2 TIMES A DAY 5/27/20   STEPHANIE Ortiz CNP   ipratropium (ATROVENT) 0.03 % nasal spray 2 sprays by Nasal route 3 times daily 2 sprays each side of nose  Patient not taking: Reported on 8/21/2020 3/6/20 3/6/21  Gayle Ventura MD [place]   Spacer/Aero-Holding Chambers (AEROCHAMBER MV) [facility] Use with inhaler as directed 3/6/20   Isabel Jay MD   mometasone-formoterol [facility]) 200-5 MCG/ACT inhaler Inhale 2 puffs into the lungs 2 times daily 3/6/20   Isabel Jay MD   albuterol sulfate  (90 Base) MCG/ACT inhaler INHALE 2 PUFFS BY MOUTH EVERY 4 TO 6 HOURS AS NEEDED  Patient not taking: Reported on 8/21/2020 2/25/20   Isabel Jay MD   DULERA 200-5 MCG/ACT inhaler INHALE 1 PUFF INTO THE LUNGS TWICE DAILY 2/25/20   Isabel Jay MD   albuterol sulfate HFA (PROVENTIL HFA) 108 (90 Base) MCG/ACT inhaler Inhale 2 puffs into the lungs every 4 hours as needed for Wheezing or Shortness of Breath (Space out to every 6 hours as symptoms improve) Space out to every 6 hours as symptoms improve.  2/25/20   Cr Quiroz MD   EPINEPHrine (AUVI-Q) 0.3 MG/0.3ML SOAJ injection Inject 0.3 mg into the muscle as needed Use as directed for allergic reaction     Historical Provider, MD   magnesium oxide (MAG-OX) 400 MG tablet Take 400 mg by mouth nightly    Historical Provider, MD   Valerian 250 MG CAPS Take by mouth nightly    Historical Provider, MD   docusate sodium (COLACE, DULCOLAX) 100 MG CAPS Take 100 mg by mouth 2 times daily  Patient not taking: Reported on 8/21/2020 8/27/19   Buzz PhiladelphiaSTEPHANIE gutierrez CNP   melatonin 3 MG TABS tablet Take 20 mg by mouth nightly as needed    Historical Provider, MD       Future Appointments   Date Time Provider Wil Chatterjee   12/28/2020 10:00 AM STEPHANIE Godfrey CNP [facility]

## 2020-08-26 ENCOUNTER — TELEPHONE (OUTPATIENT)
Dept: FAMILY MEDICINE CLINIC | Age: 55
End: 2020-08-26

## 2020-08-27 ENCOUNTER — CARE COORDINATION (OUTPATIENT)
Dept: OTHER | Facility: CLINIC | Age: 55
End: 2020-08-27

## 2020-08-31 ENCOUNTER — TELEPHONE (OUTPATIENT)
Dept: FAMILY MEDICINE CLINIC | Age: 55
End: 2020-08-31

## 2020-09-01 ENCOUNTER — CARE COORDINATION (OUTPATIENT)
Dept: OTHER | Facility: CLINIC | Age: 55
End: 2020-09-01

## 2020-09-01 NOTE — CARE COORDINATION
Ambulatory Care Coordination Note  9/1/2020  CM Risk Score: 10  Charlson 10 Year Mortality Risk Score: 23%     ACC: David Bhat RN    Summary Note: spoke briefly with patient who was working said she is off tomorrow and she will call me tomorrow. Will follow         Care Coordination Interventions    Program Enrollment:  Complex Care  Referral from Primary Care Provider:  No  Suggested Interventions and Community Resources  Disease Specific Clinic:  Completed (Comment: Cardiology - [place] )         Goals Addressed    None         Prior to Admission medications    Medication Sig Start Date End Date Taking? Authorizing Provider   naltrexone-buPROPion (CONTRAVE) 8-90 MG per extended release tablet Week 1: take one tab po qam,  Week 2: Take one tab po BID,  Week 3: Take 2 tab po qam and 1 tab po qhs,  Week 4 onward: Take 2 tabs po BID 8/31/20   Lucas Hernandez, DO   triamterene-hydroCHLOROthiazide (MAXZIDE) 75-50 MG per tablet Take 1 tablet by mouth daily as needed (swelling) 8/24/20   Pavan Mac Mary, DO   LORazepam (ATIVAN) 1 MG tablet Take 1 tablet by mouth 3 times daily as needed for Anxiety (insomnia) for up to 30 days.  8/21/20 9/20/20  Pavan Mac Mary, DO   meloxicam (MOBIC) 15 MG tablet Take 1 tablet by mouth daily as needed for Pain 8/21/20   Pavan Mac Mary, DO   cyclobenzaprine (FLEXERIL) 5 MG tablet Take 1-2 tablets by mouth 2 times daily as needed for Muscle spasms 8/21/20   [facility], DO   dilTIAZem (CARDIZEM) 60 MG tablet Take 1 tablet by mouth daily as needed (Per pt \"when my heart rate starts to increase\") 6/26/20   STEPHANIE Mahajan CNP   torsemide (DEMADEX) 20 MG tablet Take 1 tablet by mouth 2 times daily 6/26/20   STEPHANIE Mahajan CNP   apixaban (ELIQUIS) 5 MG TABS tablet TAKE 1 TABLET BY MOUTH 2 TIMES A DAY 5/27/20   STEPHANIE Gonzalez CNP   ipratropium (ATROVENT) 0.03 % nasal spray 2 sprays by Nasal route 3 times daily 2 sprays each side of nose  Patient not taking: Reported on 8/21/2020 3/6/20 3/6/21  Chapito Lopez MD   Spacer/Aero-Holding Chambers (AEROCHAMBER MV) MISC Use with inhaler as directed 3/6/20   Chapito Lopez MD   mometasone-formoterol [facility]) 200-5 MCG/ACT inhaler Inhale 2 puffs into the lungs 2 times daily 3/6/20   Chapito Lopez MD   albuterol sulfate  (90 Base) MCG/ACT inhaler INHALE 2 PUFFS BY MOUTH EVERY 4 TO 6 HOURS AS NEEDED  Patient not taking: Reported on 8/21/2020 2/25/20   Chapito Lopez MD   DULERA 200-5 MCG/ACT inhaler INHALE 1 PUFF INTO THE LUNGS TWICE DAILY 2/25/20   Chpaito Lopez MD   albuterol sulfate HFA (PROVENTIL HFA) 108 (90 Base) MCG/ACT inhaler Inhale 2 puffs into the lungs every 4 hours as needed for Wheezing or Shortness of Breath (Space out to every 6 hours as symptoms improve) Space out to every 6 hours as symptoms improve.  2/25/20   Andres George MD   EPINEPHrine (AUVI-Q) 0.3 MG/0.3ML SOAJ injection Inject 0.3 mg into the muscle as needed Use as directed for allergic reaction     Historical Provider, MD   magnesium oxide (MAG-OX) 400 MG tablet Take 400 mg by mouth nightly    Historical Provider, MD   Valerian 250 MG CAPS Take by mouth nightly    Historical Provider, MD   docusate sodium (COLACE, DULCOLAX) 100 MG CAPS Take 100 mg by mouth 2 times daily  Patient not taking: Reported on 8/21/2020 8/27/19   STEPHANIE Graf CNP   melatonin 3 MG TABS tablet Take 20 mg by mouth nightly as needed    Historical Provider, MD       Future Appointments   Date Time Provider Wil Chatterjee   12/28/2020 10:00 AM STEPHANIE Hurd CNP [facility]

## 2020-09-09 ENCOUNTER — CARE COORDINATION (OUTPATIENT)
Dept: OTHER | Facility: CLINIC | Age: 55
End: 2020-09-09

## 2020-09-09 NOTE — CARE COORDINATION
BY MOUTH 2 TIMES A DAY 5/27/20   STEPHANIE Leong CNP   ipratropium (ATROVENT) 0.03 % nasal spray 2 sprays by Nasal route 3 times daily 2 sprays each side of nose  Patient not taking: Reported on 8/21/2020 3/6/20 3/6/21  Lianet Saini MD   Spacer/Aero-Holding Chambers (AEROCHAMBER MV) MISC Use with inhaler as directed 3/6/20   Lianet Saini MD   mometasone-formoterol [facility]) 200-5 MCG/ACT inhaler Inhale 2 puffs into the lungs 2 times daily 3/6/20   Lianet Saini MD   albuterol sulfate  (90 Base) MCG/ACT inhaler INHALE 2 PUFFS BY MOUTH EVERY 4 TO 6 HOURS AS NEEDED  Patient not taking: Reported on 8/21/2020 2/25/20   Lianet Saini MD   DULERA 200-5 MCG/ACT inhaler INHALE 1 PUFF INTO THE LUNGS TWICE DAILY 2/25/20   Lianet Saini MD   albuterol sulfate HFA (PROVENTIL HFA) 108 (90 Base) MCG/ACT inhaler Inhale 2 puffs into the lungs every 4 hours as needed for Wheezing or Shortness of Breath (Space out to every 6 hours as symptoms improve) Space out to every 6 hours as symptoms improve.  2/25/20   America Machado MD   EPINEPHrine (AUVI-Q) 0.3 MG/0.3ML SOAJ injection Inject 0.3 mg into the muscle as needed Use as directed for allergic reaction     Historical Provider, MD   magnesium oxide (MAG-OX) 400 MG tablet Take 400 mg by mouth nightly    Historical Provider, MD   Valerian 250 MG CAPS Take by mouth nightly    Historical Provider, MD   docusate sodium (COLACE, DULCOLAX) 100 MG CAPS Take 100 mg by mouth 2 times daily  Patient not taking: Reported on 8/21/2020 8/27/19   STEPHANIE Saunders CNP   melatonin 3 MG TABS tablet Take 20 mg by mouth nightly as needed    Historical Provider, MD       Future Appointments   Date Time Provider Wil Chatterjee   12/28/2020 10:00 AM STEPHANIE Vo CNP [facility]

## 2020-09-16 ENCOUNTER — CARE COORDINATION (OUTPATIENT)
Dept: OTHER | Facility: CLINIC | Age: 55
End: 2020-09-16

## 2020-09-16 NOTE — LETTER
September 16, 2020    [place] [address]      Dear Rolf Daniels:      My name is Tracy Lin , Associate Care Manager for [facility] and I have been trying to reach you. The Associate Care Management (ACM) program is a free-of-charge confidential service provided to our employees and their family members covered by the [address] [facility]. The program will provide an associate and his/her family with the [employer]'s expertise to assist in navigating the health care delivery system, provider services, and their overall care needsso as to assure and improve health care interactions and enhance the quality of life. This program is designed to provide you with the opportunity to have a [facility] partner with you for the following services:     1) when you come home from the hospital or emergency room   2) when help is needed to manage your disease   3) when you need assistance coordinating services or appointments    [address] [place] [address],[address] [facility] is dedicated to empowering the good health of its community and improving the quality of care and care experiences for employees and their families. We are committed to safeguarding patient confidentiality and privacy, assuring that every employee has the respect he or she deserves in managing their health. The information shared with your care manager will not be shared with anyone else aside from those you identify as part of your care team, and will only be used to assist you with any identified care needs. Please contact me if you would like this service provided to you. Sincerely,    Tracy Lin BSN, RN- [facility]   Associate Care Manager  [phone]      If you have any questions or concerns, please don't hesitate to call.

## 2020-09-16 NOTE — CARE COORDINATION
Ambulatory Care Coordination Note  9/16/2020  CM Risk Score: 10  Charlson 10 Year Mortality Risk Score: 23%     ACC: Judi Arriaga RN    Summary Note: Pt has not responded to message sent in My Chart requesting a convenient call time for us. On last call patient voice was abrupt and line was disconnected. Will send UTR letter to patients home address to ensure patient receives the letter. If no response from patient will discharge patient from program        Care Coordination Interventions    Program Enrollment:  Complex Care  Referral from Primary Care Provider:  No  Suggested Interventions and Community Resources  Disease Specific Clinic:  Completed (Comment: Cardiology - Alex )         Goals Addressed    None         Prior to Admission medications    Medication Sig Start Date End Date Taking? Authorizing Provider   naltrexone-buPROPion (CONTRAVE) 8-90 MG per extended release tablet Week 1: take one tab po qam,  Week 2: Take one tab po BID,  Week 3: Take 2 tab po qam and 1 tab po qhs,  Week 4 onward: Take 2 tabs po BID 8/31/20   Lucas Hernandez, DO   triamterene-hydroCHLOROthiazide (MAXZIDE) 75-50 MG per tablet Take 1 tablet by mouth daily as needed (swelling) 8/24/20   Louann Hernandez, DO   LORazepam (ATIVAN) 1 MG tablet Take 1 tablet by mouth 3 times daily as needed for Anxiety (insomnia) for up to 30 days.  8/21/20 9/20/20  Louann Hernandez, DO   meloxicam (MOBIC) 15 MG tablet Take 1 tablet by mouth daily as needed for Pain 8/21/20   Louann Hernandez, DO   cyclobenzaprine (FLEXERIL) 5 MG tablet Take 1-2 tablets by mouth 2 times daily as needed for Muscle spasms 8/21/20   Louann Hernandez, DO   dilTIAZem (CARDIZEM) 60 MG tablet Take 1 tablet by mouth daily as needed (Per pt \"when my heart rate starts to increase\") 6/26/20   STEPHANIE Haley CNP   torsemide (DEMADEX) 20 MG tablet Take 1 tablet by mouth 2 times daily 6/26/20   STEPHANIE Haley CNP   apixaban (ELIQUIS) 5 MG TABS tablet TAKE 1 TABLET BY MOUTH 2 TIMES A DAY 5/27/20   [facility]   ipratropium (ATROVENT) 0.03 % nasal spray 2 sprays by Nasal route 3 times daily 2 sprays each side of nose  Patient not taking: Reported on 8/21/2020 3/6/20 3/6/21  Spencer Bolaños MD   Spacer/Aero-Holding Chambers (AEROCHAMBER MV) MISC Use with inhaler as directed 3/6/20   Spencer Bolaños MD   mometasone-formoterol [facility]) 200-5 MCG/ACT inhaler Inhale 2 puffs into the lungs 2 times daily 3/6/20   Spencer Bolaños MD   albuterol sulfate  (90 Base) MCG/ACT inhaler INHALE 2 PUFFS BY MOUTH EVERY 4 TO 6 HOURS AS NEEDED  Patient not taking: Reported on 8/21/2020 2/25/20   Spencer Bolaños MD   DULERA 200-5 MCG/ACT inhaler INHALE 1 PUFF INTO THE LUNGS TWICE DAILY 2/25/20   Spencer Bolaños MD   albuterol sulfate HFA (PROVENTIL HFA) 108 (90 Base) MCG/ACT inhaler Inhale 2 puffs into the lungs every 4 hours as needed for Wheezing or Shortness of Breath (Space out to every 6 hours as symptoms improve) Space out to every 6 hours as symptoms improve.  2/25/20   Archana Pineda MD   EPINEPHrine (AUVI-Q) 0.3 MG/0.3ML SOAJ injection Inject 0.3 mg into the muscle as needed Use as directed for allergic reaction     Historical Provider, MD   magnesium oxide (MAG-OX) 400 MG tablet Take 400 mg by mouth nightly    Historical Provider, MD   Valerian 250 MG CAPS Take by mouth nightly    Historical Provider, MD   docusate sodium (COLACE, DULCOLAX) 100 MG CAPS Take 100 mg by mouth 2 times daily  Patient not taking: Reported on 8/21/2020 8/27/19   STEPHANIE Orr - CNP   melatonin 3 MG TABS tablet Take 20 mg by mouth nightly as needed    Historical Provider, MD       Future Appointments   Date Time Provider Wil Chatterjee   12/28/2020 10:00 AM Joseph Wallace APRN - CNP [facility] Ozella Lennox BSN, RN- [facility]   Associate Care Manager  [phone]

## 2020-10-28 RX ORDER — CYCLOBENZAPRINE HCL 5 MG
5-10 TABLET ORAL 2 TIMES DAILY PRN
Qty: 60 TABLET | Refills: 0 | Status: SHIPPED | OUTPATIENT
Start: 2020-10-28 | End: 2021-01-15 | Stop reason: ALTCHOICE

## 2020-10-29 PROBLEM — L50.1 IDIOPATHIC URTICARIA: Status: ACTIVE | Noted: 2020-10-29

## 2020-10-29 RX ORDER — DIPHENHYDRAMINE HYDROCHLORIDE 50 MG/ML
50 INJECTION INTRAMUSCULAR; INTRAVENOUS ONCE
Status: CANCELLED | OUTPATIENT
Start: 2020-11-05

## 2020-10-29 RX ORDER — SODIUM CHLORIDE 9 MG/ML
INJECTION, SOLUTION INTRAVENOUS CONTINUOUS
Status: CANCELLED | OUTPATIENT
Start: 2020-11-05

## 2020-10-29 RX ORDER — METHYLPREDNISOLONE SODIUM SUCCINATE 125 MG/2ML
125 INJECTION, POWDER, LYOPHILIZED, FOR SOLUTION INTRAMUSCULAR; INTRAVENOUS ONCE
Status: CANCELLED | OUTPATIENT
Start: 2020-11-05

## 2020-11-06 ENCOUNTER — HOSPITAL ENCOUNTER (OUTPATIENT)
Dept: ONCOLOGY | Age: 55
Setting detail: INFUSION SERIES
Discharge: HOME | End: 2020-11-06
Payer: COMMERCIAL

## 2020-11-06 DIAGNOSIS — J45.40 MODERATE PERSISTENT ASTHMA WITHOUT COMPLICATION: ICD-10-CM

## 2020-11-06 DIAGNOSIS — L50.1 IDIOPATHIC URTICARIA: Primary | ICD-10-CM

## 2020-11-06 RX ORDER — DIPHENHYDRAMINE HYDROCHLORIDE 50 MG/ML
50 INJECTION INTRAMUSCULAR; INTRAVENOUS ONCE
Status: CANCELLED | OUTPATIENT
Start: 2020-12-04

## 2020-11-06 RX ORDER — SODIUM CHLORIDE 9 MG/ML
INJECTION, SOLUTION INTRAVENOUS CONTINUOUS
Status: CANCELLED | OUTPATIENT
Start: 2020-12-04

## 2020-11-06 RX ORDER — METHYLPREDNISOLONE SODIUM SUCCINATE 125 MG/2ML
125 INJECTION, POWDER, LYOPHILIZED, FOR SOLUTION INTRAMUSCULAR; INTRAVENOUS ONCE
Status: CANCELLED | OUTPATIENT
Start: 2020-12-04

## 2020-11-06 NOTE — ONCOLOGY
Pt came to [facility] today to receive Xolair injections Patient forgot to bring her epi pen, pt rescheduled her injection for Monday.

## 2020-11-10 ENCOUNTER — HOSPITAL ENCOUNTER (OUTPATIENT)
Dept: ONCOLOGY | Age: 55
Setting detail: INFUSION SERIES
Discharge: HOME | End: 2020-11-10
Payer: COMMERCIAL

## 2020-11-10 VITALS
HEART RATE: 74 BPM | SYSTOLIC BLOOD PRESSURE: 143 MMHG | TEMPERATURE: 98.2 F | DIASTOLIC BLOOD PRESSURE: 64 MMHG | RESPIRATION RATE: 16 BRPM

## 2020-11-10 DIAGNOSIS — L50.1 IDIOPATHIC URTICARIA: Primary | ICD-10-CM

## 2020-11-10 PROCEDURE — 6360000002 HC RX W HCPCS: Performed by: ALLERGY & IMMUNOLOGY

## 2020-11-10 PROCEDURE — 96372 THER/PROPH/DIAG INJ SC/IM: CPT

## 2020-11-10 RX ORDER — DIPHENHYDRAMINE HYDROCHLORIDE 50 MG/ML
50 INJECTION INTRAMUSCULAR; INTRAVENOUS ONCE
Status: CANCELLED | OUTPATIENT
Start: 2020-12-01

## 2020-11-10 RX ORDER — METHYLPREDNISOLONE SODIUM SUCCINATE 125 MG/2ML
125 INJECTION, POWDER, LYOPHILIZED, FOR SOLUTION INTRAMUSCULAR; INTRAVENOUS ONCE
Status: CANCELLED | OUTPATIENT
Start: 2020-12-01

## 2020-11-10 RX ORDER — SODIUM CHLORIDE 9 MG/ML
INJECTION, SOLUTION INTRAVENOUS CONTINUOUS
Status: CANCELLED | OUTPATIENT
Start: 2020-12-01

## 2020-11-10 RX ADMIN — OMALIZUMAB 300 MG: 150 INJECTION, SOLUTION SUBCUTANEOUS at 14:01

## 2020-11-12 ENCOUNTER — TELEPHONE (OUTPATIENT)
Dept: BARIATRICS/WEIGHT MGMT | Age: 55
End: 2020-11-12

## 2020-12-16 ENCOUNTER — TELEPHONE (OUTPATIENT)
Dept: BARIATRICS/WEIGHT MGMT | Age: 55
End: 2020-12-16

## 2020-12-16 NOTE — TELEPHONE ENCOUNTER
lvm regarding MWM, let her know that she No showed assessment on 12/14/2, also canceled Dr Jami Kohli appt on 12/22/20.  It pt wants to continue, will need to r/s

## 2020-12-18 ENCOUNTER — TELEPHONE (OUTPATIENT)
Dept: FAMILY MEDICINE CLINIC | Age: 55
End: 2020-12-18

## 2020-12-18 ENCOUNTER — TELEMEDICINE (OUTPATIENT)
Dept: FAMILY MEDICINE CLINIC | Age: 55
End: 2020-12-18
Payer: COMMERCIAL

## 2020-12-18 PROCEDURE — 99214 OFFICE O/P EST MOD 30 MIN: CPT | Performed by: FAMILY MEDICINE

## 2020-12-18 RX ORDER — SERTRALINE HYDROCHLORIDE 100 MG/1
100 TABLET, FILM COATED ORAL DAILY
Qty: 30 TABLET | Refills: 3 | Status: SHIPPED | OUTPATIENT
Start: 2020-12-18 | End: 2021-06-16

## 2020-12-18 RX ORDER — HYDROCODONE BITARTRATE AND ACETAMINOPHEN 7.5; 325 MG/1; MG/1
1-2 TABLET ORAL EVERY 8 HOURS PRN
Qty: 60 TABLET | Refills: 0 | Status: SHIPPED | OUTPATIENT
Start: 2020-12-18 | End: 2021-01-07 | Stop reason: SDUPTHER

## 2020-12-18 NOTE — PROGRESS NOTES
[facility]  TELEMEDICINE Progress Note  Minh Rosario, DO      The risks and benefits of converting to a virtual visit were discussed in light of the current infectious disease epidemic. Patient also understood that insurance coverage and co-pays are up to their individual insurance plans. Patient identification was verified at the start of the visit. Rosalia Bautista  1965    12/18/20    Patient location: Home address on file  Provider location: [de-identified] home    Chief Complaint:   Rosalia Bautista is a 54 y.o. patient who is here for chronic pain. HPI:   Has paresthesias and worsening pain after walking. Prefers to go to different pain clinic. CHRONIC PAIN REVIEW & ASSESSMENT:  Due to chronic/continued usage of narcotic pain medications, the following questions/discussion occurred. 1) How are you doing in performing your activities of daily living? Not well. Signiifcant pain after walking shopping. Needs walker day after. 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 2) Any side effects of your medications? no  3) Does your pain medicine prove adequate analgesia/pain control? Yes when taking Norco.   4) Any aberrant behavior to warrant concern? no     Progress with treatment goals:   --opiate medications are part of an overall plan for patient rehabilitation, including ability to perform a home exercise program and perform necessary activities of daily living.   -- Rosalia Bautista continues to experience relief with combination of opiate medications and home exercise program and is able to maintain activities and ability to function. Based on an analysis of risks, benefits, and alternatives, continuation of prescription of opiates appears appropriate for management of her chronic pain. Reason/medically necessity for prescription of more than one controlled substance: Yes.  Discussed that other medications such as benzodiazepines, sedative-hypnotics, or barbiturates may interact with opiates. Potential risks include increased opiate toxicity, increased sleep apnea risk, and increased risk of overdose deaths and other potential adverse effects. The patient should keep all physicians informed of all medications.    ]    ROS negative for headache, vision changes, chest pain, shortness of breath, abdominal pain, urinary sx, bowel changes. Past medical, surgical, and social history reviewed. Medications and allergies reviewed. Allergies   Allergen Reactions    Latex Anaphylaxis and Rash    Aspirin      Swelling in lower legs    Demerol      rash    Meperidine     Nsaids Swelling    Pcn [Penicillins] Hives    Ranitidine Hcl     Sulfa Antibiotics Rash and Hives     Prior to Visit Medications    Medication Sig Taking? Authorizing Provider   Handicap Placard MISC by Does not apply route Dx of lumbar DDD. Effective Dec 18, 2020 until Dec 18, 2021. Yes Merly Hernandez DO   sertraline (ZOLOFT) 100 MG tablet Take 1 tablet by mouth daily Yes Merly Hernandez DO   HYDROcodone-acetaminophen (NORCO) 7.5-325 MG per tablet Take 1-2 tablets by mouth every 8 hours as needed for Pain for up to 10 days. Intended supply: 30 days Yes Merly Hernandez DO   cyclobenzaprine (FLEXERIL) 5 MG tablet Take 1-2 tablets by mouth 2 times daily as needed for Muscle spasms Yes Merly Hernandez DO   naltrexone-buPROPion (CONTRAVE) 8-90 MG per extended release tablet Week 1: take one tab po qam,  Week 2: Take one tab po BID,  Week 3: Take 2 tab po qam and 1 tab po qhs,  Week 4 onward:  Take 2 tabs po BID Yes Merly Hernandez DO   triamterene-hydroCHLOROthiazide (MAXZIDE) 75-50 MG per tablet Take 1 tablet by mouth daily as needed (swelling) Yes Merly Hernandez DO   meloxicam (MOBIC) 15 MG tablet Take 1 tablet by mouth daily as needed for Pain Yes Merly Hernandez DO   dilTIAZem (CARDIZEM) 60 MG tablet Take 1 tablet by mouth daily as needed (Per pt \"when my heart rate starts to increase\") Yes STEPHANIE Bhardwaj CNP   torsemide (DEMADEX) 20 MG tablet Take 1 tablet by mouth 2 times daily Yes STEPHANIE Bhardwaj CNP   apixaban (ELIQUIS) 5 MG TABS tablet TAKE 1 TABLET BY MOUTH 2 TIMES A DAY Yes Rosalyn Dubin, APRN - CNP   ipratropium (ATROVENT) 0.03 % nasal spray 2 sprays by Nasal route 3 times daily 2 sprays each side of nose Yes Efren Oneal MD   Spacer/Aero-Holding Chambers (AEROCHAMBER MV) MISC Use with inhaler as directed Yes Efren Oneal MD   mometasone-formoterol (DULERA) 200-5 MCG/ACT inhaler Inhale 2 puffs into the lungs 2 times daily Yes Efren Oneal MD   albuterol sulfate  (90 Base) MCG/ACT inhaler INHALE 2 PUFFS BY MOUTH EVERY 4 TO 6 HOURS AS NEEDED Yes Efren Oneal MD   DULERA 200-5 MCG/ACT inhaler INHALE 1 PUFF INTO THE LUNGS TWICE DAILY Yes Efren Oneal MD   albuterol sulfate HFA (PROVENTIL HFA) 108 (90 Base) MCG/ACT inhaler Inhale 2 puffs into the lungs every 4 hours as needed for Wheezing or Shortness of Breath (Space out to every 6 hours as symptoms improve) Space out to every 6 hours as symptoms improve.  Yes Rene Stern MD   EPINEPHrine (AUVI-Q) 0.3 MG/0.3ML SOAJ injection Inject 0.3 mg into the muscle as needed Use as directed for allergic reaction  Yes Historical Provider, MD   magnesium oxide (MAG-OX) 400 MG tablet Take 400 mg by mouth nightly Yes Historical Provider, MD   docusate sodium (COLACE, DULCOLAX) 100 MG CAPS Take 100 mg by mouth 2 times daily Yes STEPHANIE Aparicio CNP   melatonin 3 MG TABS tablet Take 20 mg by mouth nightly as needed Yes Historical Provider, MD   omalizumab Jose Machado) 150 MG injection Inject 300 mg into the skin once for 1 dose  Estephania Olvera MD   Valerian 250 MG CAPS Take by mouth nightly  Historical Provider, MD     Periodic Controlled Substance Monitoring: Possible medication side effects, risk of tolerance/dependence & alternative treatments discussed. Sultana Hernandez DO)    Patient-Reported Vitals 12/18/2020   Patient-Reported Weight 360   Patient-Reported Height 5'10\"   Patient-Reported Systolic -   Patient-Reported Diastolic -   Patient-Reported Temperature -      There were no vitals filed for this visit.    Wt Readings from Last 3 Encounters:   06/26/20 (!) 375 lb 3.2 oz (170.2 kg)   02/25/20 (!) 360 lb (163.3 kg)   02/11/20 (!) 368 lb (166.9 kg)     BP Readings from Last 3 Encounters:   11/10/20 (!) 143/64   08/07/20 (!) 143/76   06/26/20 130/68       Patient Active Problem List   Diagnosis    Asthma    MDD (major depressive disorder), recurrent severe, without psychosis [facility]    Cluster B personality disorder ([facility]    Colon polyps    Overdose    Restless leg syndrome    Sliding hiatal hernia    HTN (hypertension)    Atrial flutter (HCC)    SVT (supraventricular tachycardia) (HCC)    Paroxysmal atrial fibrillation (HCC)    Sinus bradycardia    Acute lateral meniscus tear of right knee    Acute medial meniscus tear of right knee    Localized edema    Acute pain of right knee    Chronic diastolic congestive heart failure (HCC)    Primary osteoarthritis of right knee    Contusion of multiple sites of right leg    Gastrocnemius strain, left    Ventral incisional hernia    Recurrent incisional hernia    Pain of upper abdomen    Incisional hernia with obstruction, without gangrene    Rhinitis, chronic    Vocal cord dysfunction    Iron deficiency anemia, unspecified    Idiopathic urticaria       Immunization History   Administered Date(s) Administered    BCG ([place] BCG) 05/28/2013    DTaP 05/28/2013    DTaP (Infanrix) 05/28/2013    Influenza Vaccine, unspecified formulation 10/29/2015, 01/09/2017, 10/23/2017, 11/26/2018    Influenza Virus Vaccine 12/31/2008, 09/22/2009, 01/09/2017, 10/23/2017, 10/23/2017, 11/26/2018    Influenza, Quadv, IM, PF (6 mo and older Fluzone, Flulaval, Fluarix, and 3 yrs and older Afluria) 11/26/2018    Influenza, Azalee Fees, Recombinant, IM PF (Flublok 18 yrs and older) 11/16/2019    PPD Test 06/11/2013, 05/07/2014, 08/13/2019    Pneumococcal Polysaccharide ([id number]) 12/31/2008, 06/17/2019    Tdap (Boostrix, Adacel) 05/28/2013       Past Medical History:   Diagnosis Date    Acute lateral meniscus tear of right knee 02/2019    Acute medial meniscus tear of right knee 02/2019    Anesthesia complication     woke up during one surgery    Anxiety     Arthritis     Asthma     Atrial fibrillation ([facility]     new dx 4 weeks ago, first of  Sept 2017    CHF (congestive heart failure) ([facility]     Edema     Fibromyalgia     GERD (gastroesophageal reflux disease)     reflux    Hiatal hernia     Idiopathic urticaria 10/29/2020     Past Surgical History:   Procedure Laterality Date    ABLATION OF DYSRHYTHMIC FOCUS  04/2019    afib    BLADDER SUSPENSION  2004    HERNIA REPAIR  2001    HYSTERECTOMY  2004    KNEE ARTHROSCOPY Right 7/11/2019    VIDEO ARTHROSCOPY RIGHT KNEE, PARTIAL MEDIAL AND LATERAL MENISCECTOMY,, MEDIAL MICRO FRACTURE CHONDROPLASTY performed by Cain Muñiz MD at [facility] GASTRIC BYPASS  2011    TONSILLECTOMY AND ADENOIDECTOMY      UPPP UVULOPALATOPHARYGOPLASTY      [facility] N/A 8/26/2019    DIAGNOSTIC LAPAROSCOPY, LAPAROSCOPIC LYSIS OF ADHESIONS, LAPAROSCOPIC REDUCTION OF RECURRENT INCISIONAL HERNIA WITH MESH performed by Brigette Epps MD at [address] [place] [address] History   Problem Relation Age of Onset    Cancer Mother         lung    Arthritis Mother     Cirrhosis Father     Asthma Maternal Aunt      Social History     Socioeconomic History    Marital status:      Spouse name: Not on file    Number of children: 3    Years of education: 15.5    Highest education level: Not on file   Occupational History    Not on file   Social Needs    Financial resource strain: Not on file   [facility]/Chest: [x] Respiratory effort normal.  [x] No visualized signs of difficulty breathing or respiratory distress        [] Abnormal-      Musculoskeletal:   [] Normal gait with no signs of ataxia         [x] Normal range of motion of neck        [] Abnormal-       Neurological:        [x] No Facial Asymmetry (Cranial nerve 7 motor function) (limited exam to video visit)          [x] No gaze palsy        [] Abnormal-         Skin:        [x] No significant exanthematous lesions or discoloration noted on facial skin         [] Abnormal-            Psychiatric:       [x] Normal Affect [] No Hallucinations        [] Abnormal-     Other pertinent observable physical exam findings- n/a      Due to this being a TeleHealth encounter, evaluation of the following organ systems is limited: Vitals/Constitutional/EENT/Resp/CV/GI//MS/Neuro/Skin/Heme-Lymph-Imm. ASSESSMENT   Diagnosis Orders   1. MDD (major depressive disorder), recurrent severe, without psychosis ([facility] [address] [facility]  sertraline (ZOLOFT) 100 MG tablet   2. Thoracolumbar back pain  Handicap Placard MISC    HYDROcodone-acetaminophen (NORCO) 7.5-325 MG per tablet    External Referral To Pain Clinic       See plan below. PLAN          If applicable, see additional patient information and instructions under \"Patient Instructions. \"    No follow-ups on file. There are no Patient Instructions on file for this visit. TOTAL TIME (in minutes) SPENT ON CONFERENCIN    Please note a portion of this chart was generated using dragon dictation software. Although every effort was made to ensure the accuracy of this automated transcription, some errors in transcription may have occurred.       Pursuant to the emergency declaration under the [facility] waiver authority and the [employer] Act, this Virtual  Visit was conducted, with patient's consent, to reduce the patient's risk of exposure to COVID-19 and provide continuity of care for an established patient. Services were provided through a video synchronous discussion virtually to substitute for in-person clinic visit. Patient was instructed that the AVS is available on My Chart or was emailed to the patient if not on My Chart. Lab orders were emailed to patient if they do not use a [facility] lab. Any work notes were sent to patient through My Chart or email.

## 2020-12-18 NOTE — TELEPHONE ENCOUNTER
Please  Place placard script for me to sign at my work station; it will eventually be sent to her home address.     Also fax [facility] referral.

## 2020-12-29 ENCOUNTER — HOSPITAL ENCOUNTER (OUTPATIENT)
Dept: ONCOLOGY | Age: 55
Setting detail: INFUSION SERIES
End: 2020-12-29
Payer: COMMERCIAL

## 2020-12-29 ENCOUNTER — TELEPHONE (OUTPATIENT)
Dept: CARDIOLOGY CLINIC | Age: 55
End: 2020-12-29

## 2021-01-07 ENCOUNTER — TELEPHONE (OUTPATIENT)
Dept: FAMILY MEDICINE CLINIC | Age: 56
End: 2021-01-07

## 2021-01-07 DIAGNOSIS — M54.50 THORACOLUMBAR BACK PAIN: ICD-10-CM

## 2021-01-07 DIAGNOSIS — M54.6 THORACOLUMBAR BACK PAIN: ICD-10-CM

## 2021-01-07 RX ORDER — HYDROCODONE BITARTRATE AND ACETAMINOPHEN 7.5; 325 MG/1; MG/1
1-2 TABLET ORAL EVERY 8 HOURS PRN
Qty: 60 TABLET | Refills: 0 | Status: SHIPPED | OUTPATIENT
Start: 2021-01-07 | End: 2021-01-07 | Stop reason: SDUPTHER

## 2021-01-07 RX ORDER — HYDROCODONE BITARTRATE AND ACETAMINOPHEN 7.5; 325 MG/1; MG/1
1-2 TABLET ORAL EVERY 8 HOURS PRN
Qty: 60 TABLET | Refills: 0 | Status: SHIPPED | OUTPATIENT
Start: 2021-01-07 | End: 2021-01-21 | Stop reason: SDUPTHER

## 2021-01-08 ENCOUNTER — HOSPITAL ENCOUNTER (OUTPATIENT)
Dept: ONCOLOGY | Age: 56
Setting detail: INFUSION SERIES
Discharge: HOME | End: 2021-01-08
Payer: COMMERCIAL

## 2021-01-08 VITALS
DIASTOLIC BLOOD PRESSURE: 70 MMHG | SYSTOLIC BLOOD PRESSURE: 128 MMHG | RESPIRATION RATE: 16 BRPM | TEMPERATURE: 98.2 F | HEART RATE: 76 BPM | OXYGEN SATURATION: 95 %

## 2021-01-08 DIAGNOSIS — L50.1 IDIOPATHIC URTICARIA: Primary | ICD-10-CM

## 2021-01-08 PROCEDURE — 6360000002 HC RX W HCPCS: Performed by: ALLERGY & IMMUNOLOGY

## 2021-01-08 PROCEDURE — 2580000003 HC RX 258: Performed by: ALLERGY & IMMUNOLOGY

## 2021-01-08 PROCEDURE — 96372 THER/PROPH/DIAG INJ SC/IM: CPT

## 2021-01-08 RX ORDER — DIPHENHYDRAMINE HYDROCHLORIDE 50 MG/ML
50 INJECTION INTRAMUSCULAR; INTRAVENOUS ONCE
Status: CANCELLED | OUTPATIENT
Start: 2021-01-29 | End: 2021-01-29

## 2021-01-08 RX ORDER — HEPARIN SODIUM (PORCINE) LOCK FLUSH IV SOLN 100 UNIT/ML 100 UNIT/ML
500 SOLUTION INTRAVENOUS PRN
Status: CANCELLED | OUTPATIENT
Start: 2021-01-15

## 2021-01-08 RX ORDER — SODIUM CHLORIDE 9 MG/ML
INJECTION, SOLUTION INTRAVENOUS CONTINUOUS
Status: CANCELLED | OUTPATIENT
Start: 2021-01-15

## 2021-01-08 RX ORDER — SODIUM CHLORIDE 9 MG/ML
INJECTION, SOLUTION INTRAVENOUS CONTINUOUS
Status: CANCELLED | OUTPATIENT
Start: 2021-01-29

## 2021-01-08 RX ORDER — METHYLPREDNISOLONE SODIUM SUCCINATE 125 MG/2ML
125 INJECTION, POWDER, LYOPHILIZED, FOR SOLUTION INTRAMUSCULAR; INTRAVENOUS ONCE
Status: CANCELLED | OUTPATIENT
Start: 2021-01-29 | End: 2021-01-29

## 2021-01-08 RX ORDER — SODIUM CHLORIDE 0.9 % (FLUSH) 0.9 %
10 SYRINGE (ML) INJECTION PRN
Status: CANCELLED | OUTPATIENT
Start: 2021-01-15

## 2021-01-08 RX ORDER — METHYLPREDNISOLONE SODIUM SUCCINATE 125 MG/2ML
125 INJECTION, POWDER, LYOPHILIZED, FOR SOLUTION INTRAMUSCULAR; INTRAVENOUS ONCE
Status: CANCELLED | OUTPATIENT
Start: 2021-01-15 | End: 2021-01-15

## 2021-01-08 RX ORDER — DIPHENHYDRAMINE HYDROCHLORIDE 50 MG/ML
50 INJECTION INTRAMUSCULAR; INTRAVENOUS ONCE
Status: CANCELLED | OUTPATIENT
Start: 2021-01-15 | End: 2021-01-15

## 2021-01-08 RX ORDER — SODIUM CHLORIDE 0.9 % (FLUSH) 0.9 %
5 SYRINGE (ML) INJECTION PRN
Status: CANCELLED | OUTPATIENT
Start: 2021-01-15

## 2021-01-08 RX ADMIN — WATER 300 MG: 1 INJECTION INTRAMUSCULAR; INTRAVENOUS; SUBCUTANEOUS at 13:27

## 2021-01-08 NOTE — PLAN OF CARE
Problem: Falls - Risk of:  Goal: Will remain free from falls  Description: Will remain free from falls  Outcome: Ongoing  Goal: Absence of physical injury  Description: Absence of physical injury  Outcome: Ongoing   Pt is a fall risk. Explained fall risk precautions to pt and rationale behind their use to keep the patient safe. Belongings are in reach. Pt encouraged to notify staff for any and all assistance. Staff present in tx suite throughout entirety of pts treatment to monitor and protect from falls. Assistance provided when ambulating to restroom utilizing Stay With Me. Problem: KNOWLEDGE DEFICIT  Goal: Patient/S.O. demonstrates understanding of disease process, treatment plan, medications, and discharge instructions. Outcome: Ongoing  Pt seen in [address] [place] [address] for Xolair 300 Mg subcutaneous, 2 injections to R arm. Pt carrying Epi pen on person, expiration date of 09/08/2021 noted, LOT [id number]. Pt provided with written pamphlet regarding administration of subcutaneous Xolair injections, pt verbalizes understanding. Pt's baseline vitals stable, vital signs taken 30 minutes post injection, pt tolerated well and without incident. Pt ambulatory with steady gait, discharged to home per self.

## 2021-01-15 ENCOUNTER — TELEMEDICINE (OUTPATIENT)
Dept: FAMILY MEDICINE CLINIC | Age: 56
End: 2021-01-15
Payer: COMMERCIAL

## 2021-01-15 ENCOUNTER — TELEPHONE (OUTPATIENT)
Dept: FAMILY MEDICINE CLINIC | Age: 56
End: 2021-01-15

## 2021-01-15 DIAGNOSIS — M54.6 THORACOLUMBAR BACK PAIN: Primary | ICD-10-CM

## 2021-01-15 DIAGNOSIS — G89.4 CHRONIC PAIN SYNDROME: ICD-10-CM

## 2021-01-15 DIAGNOSIS — F33.2 MDD (MAJOR DEPRESSIVE DISORDER), RECURRENT SEVERE, WITHOUT PSYCHOSIS (HCC): ICD-10-CM

## 2021-01-15 DIAGNOSIS — M47.816 LUMBAR SPONDYLOSIS: ICD-10-CM

## 2021-01-15 DIAGNOSIS — M54.50 THORACOLUMBAR BACK PAIN: Primary | ICD-10-CM

## 2021-01-15 DIAGNOSIS — M79.7 FIBROMYALGIA: ICD-10-CM

## 2021-01-15 PROCEDURE — 99213 OFFICE O/P EST LOW 20 MIN: CPT | Performed by: FAMILY MEDICINE

## 2021-01-15 NOTE — LETTER
[address]  Phone: [phone]  Fax: [phone] [place] [address],         January 15, 2021     Patient: Yue June   YOB: 1965   Date of Visit: 1/15/2021       To Whom It May Concern:    January 15, 2021     Patient: Yue June   YOB: 1965   Date of Visit: 1/15/2021       To Whom It May Concern: It is my medical opinion that Regina Son is experiencing a chronic medical condition which is worsening. She is seeing the specialist for this. She has a follow-up plan. Unfortunately the medical problem requires time off work. Working in her current status makes her less effective and potentially can affect quality of work. Diagnoses include fibromyalgia, major depressive disorder, and arthritis of the knee and spine. At this time I will work with  Ren Tariq and any third party to approve continuous leave for approximately 12 weeks. If you have any questions or concerns, please don't hesitate to call.     Sincerely,        Digna Reasoner, DO             Digna Reasoner, DO

## 2021-01-15 NOTE — PATIENT INSTRUCTIONS
Fax number is [phone] for [facility] paperwork. Your PCP can write for 12 weeks for continuous leave. Your PCP can write Amy Kessler again.

## 2021-01-15 NOTE — LETTER
[address]  Phone: [phone]  Fax: Niall Gomes,         January 15, 2021     Patient: Guanakito Rodriguez   YOB: 1965   Date of Visit: 1/15/2021       To Whom It May Concern: It is my medical opinion that Caterina Wilson is experiencing a chronic medical condition which is worsening. She is seeing the specialist for this. She has a follow-up plan. Unfortunately the medical problem requires time off work. Working in her current status makes her less effective and potentially can affect quality of work. At this time I will work with Mrs. Kyree Vizcaino and any third party to approve continuous leave for approximately 12 weeks. If you have any questions or concerns, please don't hesitate to call.     Sincerely,        Marylin Vogel, DO

## 2021-01-15 NOTE — PROGRESS NOTES
[facility]  TELEMEDICINE Progress Note  Mirian Pappas DO      The risks and benefits of converting to a virtual visit were discussed in light of the current infectious disease epidemic. Patient also understood that insurance coverage and co-pays are up to their individual insurance plans. Patient identification was verified at the start of the visit. Yomi Gomes  1965    01/15/21    Patient location: Home address on file  Provider location: [de-identified] home    Chief Complaint:   Yomi Gomes is a 54 y.o. patient who is here for chronic pain exacerbation and worsening anxiety        HPI:   Needs FMLA short term disability for the chronic pain. Can't take Norco while working as this makes her drowsy and affects her decision-making  Anxiety has worsened while at work    Pain has worsened whole-body blood particularly in the lower extremities and her back. Has a follow-up with a specialist.      ROS negative for headache, vision changes, chest pain, shortness of breath, abdominal pain, urinary sx, bowel changes. Past medical, surgical, and social history reviewed. Medications and allergies reviewed. Allergies   Allergen Reactions    Latex Anaphylaxis and Rash    Aspirin      Swelling in lower legs    Demerol      rash    Meperidine     Nsaids Swelling    Pcn [Penicillins] Hives    Ranitidine Hcl     Sulfa Antibiotics Rash and Hives     Prior to Visit Medications    Medication Sig Taking? Authorizing Provider   HYDROcodone-acetaminophen (NORCO) 7.5-325 MG per tablet Take 1-2 tablets by mouth every 8 hours as needed for Pain for up to 10 days. Intended supply: 30 days Yes Ricky Hernandez DO   Handicap Placard MISC by Does not apply route Dx of lumbar DDD. Effective Dec 18, 2020 until Dec 18, 2021.  Yes Ricky Hernandez DO   sertraline (ZOLOFT) 100 MG tablet Take 1 tablet by mouth daily Yes Ricky Hernandez DO   triamterene-hydroCHLOROthiazide (MAXZIDE) 75-50 MG per tablet Take 1 tablet by mouth daily as needed (swelling) Yes Charlott Bulla Bingcang, DO   meloxicam (MOBIC) 15 MG tablet Take 1 tablet by mouth daily as needed for Pain Yes Charlott Bulla Bingcang, DO   dilTIAZem (CARDIZEM) 60 MG tablet Take 1 tablet by mouth daily as needed (Per pt \"when my heart rate starts to increase\") Yes STEPHANIE Giles CNP   apixaban (ELIQUIS) 5 MG TABS tablet TAKE 1 TABLET BY MOUTH 2 TIMES A DAY Yes STEPHANIE Prajapati CNP   Spacer/Aero-Holding Chambers (AEROCHAMBER MV) MISC Use with inhaler as directed Yes Morris Cruz MD   DULERA 200-5 MCG/ACT inhaler INHALE 1 PUFF INTO THE LUNGS TWICE DAILY Yes Morris Cruz MD   albuterol sulfate HFA (PROVENTIL HFA) 108 (90 Base) MCG/ACT inhaler Inhale 2 puffs into the lungs every 4 hours as needed for Wheezing or Shortness of Breath (Space out to every 6 hours as symptoms improve) Space out to every 6 hours as symptoms improve. Yes Ame Pereira MD   EPINEPHrine (AUVI-Q) 0.3 MG/0.3ML SOAJ injection Inject 0.3 mg into the muscle as needed Use as directed for allergic reaction  Yes Historical Provider, MD   omalizumab Sharl Quails) 150 MG injection Inject 300 mg into the skin once for 1 dose  Viktor Mon MD          Patient-Reported Vitals 1/15/2021   Patient-Reported Weight 350   Patient-Reported Height 5'10\"   Patient-Reported Systolic 822   Patient-Reported Diastolic 70   Patient-Reported Temperature -      There were no vitals filed for this visit.    Wt Readings from Last 3 Encounters:   06/26/20 (!) 375 lb 3.2 oz (170.2 kg)   02/25/20 (!) 360 lb (163.3 kg)   02/11/20 (!) 368 lb (166.9 kg)     BP Readings from Last 3 Encounters:   01/08/21 128/70   11/10/20 (!) 143/64   08/07/20 (!) 143/76       Patient Active Problem List   Diagnosis    Asthma    MDD (major depressive disorder), recurrent severe, without psychosis ([facility]    Cluster B personality disorder ([facility]    Colon polyps    Overdose    Restless leg syndrome    Sliding hiatal hernia    HTN (hypertension)    Atrial flutter (HCC)    SVT (supraventricular tachycardia) (HCC)    Paroxysmal atrial fibrillation (HCC)    Sinus bradycardia    Acute lateral meniscus tear of right knee    Acute medial meniscus tear of right knee    Localized edema    Acute pain of right knee    Chronic diastolic congestive heart failure (HCC)    Primary osteoarthritis of right knee    Contusion of multiple sites of right leg    Gastrocnemius strain, left    Ventral incisional hernia    Recurrent incisional hernia    Pain of upper abdomen    Incisional hernia with obstruction, without gangrene    Rhinitis, chronic    Vocal cord dysfunction    Iron deficiency anemia, unspecified    Idiopathic urticaria       Immunization History   Administered Date(s) Administered    BCG ([place] BCG) 05/28/2013    DTaP 05/28/2013    DTaP (Infanrix) 05/28/2013    Influenza Vaccine, unspecified formulation 10/29/2015, 01/09/2017, 10/23/2017, 11/26/2018    Influenza Virus Vaccine 12/31/2008, 09/22/2009, 01/09/2017, 10/23/2017, 10/23/2017, 11/26/2018    Influenza, Alba Rena, IM, PF (6 mo and older Fluzone, Flulaval, Fluarix, and 3 yrs and older Afluria) 11/26/2018    Influenza, Alba Rena, Recombinant, IM PF (Flublok 18 yrs and older) 11/16/2019    PPD Test 06/11/2013, 05/07/2014, 08/13/2019    Pneumococcal Polysaccharide ([id number]) 12/31/2008, 06/17/2019    Tdap (Boostrix, Adacel) 05/28/2013       Past Medical History:   Diagnosis Date    Acute lateral meniscus tear of right knee 02/2019    Acute medial meniscus tear of right knee 02/2019    Anesthesia complication     woke up during one surgery    Anxiety     Arthritis     Asthma     Atrial fibrillation ([address]     new dx 4 weeks ago, first of  Sept 2017    CHF (congestive heart failure) ([address]     Edema     Fibromyalgia     GERD (gastroesophageal reflux disease)     reflux    Hiatal hernia     Idiopathic urticaria 10/29/2020 Past Surgical History:   Procedure Laterality Date    ABLATION OF DYSRHYTHMIC FOCUS  04/2019    afib    BLADDER SUSPENSION  2004    HERNIA REPAIR  2001    HYSTERECTOMY  2004    KNEE ARTHROSCOPY Right 7/11/2019    VIDEO ARTHROSCOPY RIGHT KNEE, PARTIAL MEDIAL AND LATERAL MENISCECTOMY,, MEDIAL MICRO FRACTURE CHONDROPLASTY performed by Danay Hopson MD at [place] [address] [address] GASTRIC BYPASS  2011    TONSILLECTOMY AND ADENOIDECTOMY      UPPP UVULOPALATOPHARYGOPLASTY      VENTRAL HERNIA REPAIR N/A 8/26/2019    DIAGNOSTIC LAPAROSCOPY, LAPAROSCOPIC LYSIS OF ADHESIONS, LAPAROSCOPIC REDUCTION OF RECURRENT INCISIONAL HERNIA WITH MESH performed by Kamryn Morin MD at [address] [place] [address] History   Problem Relation Age of Onset    Cancer Mother         lung    Arthritis Mother     Cirrhosis Father     Asthma Maternal Aunt      Social History     Socioeconomic History    Marital status:      Spouse name: Not on file    Number of children: 3    Years of education: 15.5    Highest education level: Not on file   Occupational History    Not on file   Social Needs    Financial resource strain: Not on file    Food insecurity     Worry: Not on file     Inability: Not on file   [employer] needs     Medical: Not on file     Non-medical: Not on file   Tobacco Use    Smoking status: Never Smoker    Smokeless tobacco: Never Used   Substance and Sexual Activity    Alcohol use: Yes     Comment: 0-1 drinks per month    Drug use: Yes     Types: Marijuana, Other-see comments     Comment: Medical Marijuana use    Sexual activity: Yes     Partners: Male   Lifestyle    Physical activity     Days per week: Not on file     Minutes per session: Not on file    Stress: Not on file   Relationships    Social connections     Talks on phone: Not on file     Gets together: Not on file     Attends [employer] service: Not on file     Active member of club or organization: Not on file Attends meetings of clubs or organizations: Not on file     Relationship status: Not on file    Intimate partner violence     Fear of current or ex partner: Not on file     Emotionally abused: Not on file     Physically abused: Not on file     Forced sexual activity: Not on file   Other Topics Concern    Not on file   Social History Narrative    Not on file       O: There were no vitals taken for this visit. Physical Exam  PHYSICAL EXAMINATION:  [ INSTRUCTIONS:  \"[x]\" Indicates a positive item  \"[]\" Indicates a negative item  -- DELETE ALL ITEMS NOT EXAMINED]  Vital Signs: (As obtained by patient/caregiver or practitioner observation)    Constitutional: [x] Appears well-developed and well-nourished [x] No apparent distress      [] Abnormal-   Mental status  [x] Alert and awake  [x] Oriented to person/place/time [x]Able to follow commands      Eyes:  EOM    [x]  Normal  [] Abnormal-  Sclera  [x]  Normal  [] Abnormal -         Discharge [x]  None visible  [] Abnormal -    HENT:   [x] Normocephalic, atraumatic.   [] Abnormal   [] Mouth/Throat: Mucous membranes are moist.     External Ears [x] Normal  [] Abnormal-     Neck: [x] No visualized mass     Pulmonary/Chest: [x] Respiratory effort normal.  [x] No visualized signs of difficulty breathing or respiratory distress        [] Abnormal-      Musculoskeletal:   [] Normal gait with no signs of ataxia         [x] Normal range of motion of neck        [] Abnormal-       Neurological:        [x] No Facial Asymmetry (Cranial nerve 7 motor function) (limited exam to video visit)          [x] No gaze palsy        [] Abnormal-         Skin:        [x] No significant exanthematous lesions or discoloration noted on facial skin         [] Abnormal-            Psychiatric:       [] Normal Affect [] No Hallucinations        [x] Abnormal-dysthymic affect and does not endorse SI/HI  Other pertinent observable physical exam findings-     Due to this being a TeleHealth encounter, evaluation of the following organ systems is limited: Vitals/Constitutional/EENT/Resp/CV/GI//MS/Neuro/Skin/Heme-Lymph-Imm. ASSESSMENT   Diagnosis Orders   1. Thoracolumbar back pain     2. MDD (major depressive disorder), recurrent severe, without psychosis ([facility]     3. Lumbar spondylosis     4. Chronic pain syndrome     5. Fibromyalgia         She may call for an additional [place] refill. Will await FMLA paperwork and I am willing to write for proximately 12 weeks of continuous leave. PLAN          If applicable, see additional patient information and instructions under \"Patient Instructions. \"    No follow-ups on file. Patient Instructions   Fax number is [phone] for FMLA paperwork. Your PCP can write for 12 weeks for continuous leave. Your PCP can write [address] [facility] again. Please note a portion of this chart was generated using dragon dictation software. Although every effort was made to ensure the accuracy of this automated transcription, some errors in transcription may have occurred. Pursuant to the emergency declaration under the [facility] waiver authority and the [employer] Act, this Virtual  Visit was conducted, with patient's consent, to reduce the patient's risk of exposure to COVID-19 and provide continuity of care for an established patient. Services were provided through a video synchronous discussion virtually to substitute for in-person clinic visit. Patient was instructed that the AVS is available on My Chart or was emailed to the patient if not on My Chart. Lab orders were emailed to patient if they do not use a [facility] lab. Any work notes were sent to patient through My Chart or email.

## 2021-01-19 ENCOUNTER — TELEPHONE (OUTPATIENT)
Dept: FAMILY MEDICINE CLINIC | Age: 56
End: 2021-01-19

## 2021-01-19 NOTE — TELEPHONE ENCOUNTER
Patient states the letter needs to go with the FMLA forms she dropped off earlier. Will return letter to [facility] to put in folder with FMLA forms. Patient requested a copy of the letter get mailed to her home address. Letter place in out basket at  for  to , meter and mail.

## 2021-01-19 NOTE — TELEPHONE ENCOUNTER
Signed letter received from Dr. Giovani Kimbrough. ALYSON for patient letting her know letter could be picked up in the office or mailed to her home address. Letter is currently in file at  for pick-up.

## 2021-01-21 ENCOUNTER — TELEPHONE (OUTPATIENT)
Dept: FAMILY MEDICINE CLINIC | Age: 56
End: 2021-01-21

## 2021-01-21 DIAGNOSIS — M54.6 THORACOLUMBAR BACK PAIN: ICD-10-CM

## 2021-01-21 DIAGNOSIS — M54.50 THORACOLUMBAR BACK PAIN: ICD-10-CM

## 2021-01-21 RX ORDER — HYDROCODONE BITARTRATE AND ACETAMINOPHEN 7.5; 325 MG/1; MG/1
1-2 TABLET ORAL EVERY 8 HOURS PRN
Qty: 60 TABLET | Refills: 0 | Status: SHIPPED | OUTPATIENT
Start: 2021-01-21 | End: 2021-01-31

## 2021-01-21 NOTE — TELEPHONE ENCOUNTER
Since patient does have scheduled appointment with pain clinic I am willing to send a bridge prescription I sent a prescription of 60 tablets to the pharmacy. You can let her know by phone or my chart.

## 2021-02-03 ENCOUNTER — TELEPHONE (OUTPATIENT)
Dept: FAMILY MEDICINE CLINIC | Age: 56
End: 2021-02-03

## 2021-02-03 DIAGNOSIS — G89.4 CHRONIC PAIN SYNDROME: ICD-10-CM

## 2021-02-03 DIAGNOSIS — M47.816 LUMBAR SPONDYLOSIS: ICD-10-CM

## 2021-02-03 DIAGNOSIS — M54.6 THORACOLUMBAR BACK PAIN: ICD-10-CM

## 2021-02-03 DIAGNOSIS — M54.50 THORACOLUMBAR BACK PAIN: ICD-10-CM

## 2021-02-03 DIAGNOSIS — M17.11 PRIMARY OSTEOARTHRITIS OF RIGHT KNEE: Primary | Chronic | ICD-10-CM

## 2021-02-04 NOTE — TELEPHONE ENCOUNTER
Referral faxed to [facility] pain mgmt  [phone]  Pt notified via vm

## 2021-02-08 ENCOUNTER — NURSE ONLY (OUTPATIENT)
Dept: CARDIOLOGY CLINIC | Age: 56
End: 2021-02-08

## 2021-02-08 ENCOUNTER — OFFICE VISIT (OUTPATIENT)
Dept: CARDIOLOGY CLINIC | Age: 56
End: 2021-02-08
Payer: COMMERCIAL

## 2021-02-08 VITALS
BODY MASS INDEX: 41.95 KG/M2 | SYSTOLIC BLOOD PRESSURE: 134 MMHG | HEART RATE: 109 BPM | HEIGHT: 70 IN | WEIGHT: 293 LBS | TEMPERATURE: 97.1 F | DIASTOLIC BLOOD PRESSURE: 78 MMHG

## 2021-02-08 DIAGNOSIS — R00.2 PALPITATION: Primary | ICD-10-CM

## 2021-02-08 DIAGNOSIS — I48.0 PAF (PAROXYSMAL ATRIAL FIBRILLATION) (HCC): ICD-10-CM

## 2021-02-08 DIAGNOSIS — Z87.19 HISTORY OF GI BLEED: ICD-10-CM

## 2021-02-08 DIAGNOSIS — I10 ESSENTIAL HYPERTENSION: ICD-10-CM

## 2021-02-08 PROCEDURE — 93000 ELECTROCARDIOGRAM COMPLETE: CPT | Performed by: NURSE PRACTITIONER

## 2021-02-08 PROCEDURE — 99214 OFFICE O/P EST MOD 30 MIN: CPT | Performed by: NURSE PRACTITIONER

## 2021-02-08 PROCEDURE — 93246 EXT ECG>7D<15D RECORDING: CPT | Performed by: INTERNAL MEDICINE

## 2021-02-08 RX ORDER — DILTIAZEM HYDROCHLORIDE 120 MG/1
120 CAPSULE, COATED, EXTENDED RELEASE ORAL DAILY
Qty: 90 CAPSULE | Refills: 3 | Status: SHIPPED | OUTPATIENT
Start: 2021-02-08 | End: 2021-03-05 | Stop reason: DRUGHIGH

## 2021-02-08 NOTE — Clinical Note
Aiden Real, can you please call pt and let her know I would also like her to check labs. CBC, CMP and TSH.  Orders are in epic     Thanks, Junior Lagunas

## 2021-02-08 NOTE — PROGRESS NOTES
CC/HPI:    54 y.o. patient of Dr Linda Andres with pAF, hx SVT ablation who has noticed an increased in palpitations, sob and LH/dizziness over the last month. She has chronic pain, depression and anxiety. She denies cp, syncope,  bleeding, n/v/d or fever/chills. Recently tried topamax and since then her symptoms have worsened. She has seen pain management and PCP regarding alternative medicine for pain management. She has occasional bright red stools.      Past Medical History:   Diagnosis Date    Acute lateral meniscus tear of right knee 02/2019    Acute medial meniscus tear of right knee 02/2019    Anesthesia complication     woke up during one surgery    Anxiety     Arthritis     Asthma     Atrial fibrillation ([facility]     new dx 4 weeks ago, first of  Sept 2017    CHF (congestive heart failure) ([facility]     Edema     Fibromyalgia     GERD (gastroesophageal reflux disease)     reflux    Hiatal hernia     Idiopathic urticaria 10/29/2020     Past Surgical History:   Procedure Laterality Date    ABLATION OF DYSRHYTHMIC FOCUS  04/2019    afib    BLADDER SUSPENSION  2004    HERNIA REPAIR  2001    HYSTERECTOMY  2004    KNEE ARTHROSCOPY Right 7/11/2019    VIDEO ARTHROSCOPY RIGHT KNEE, PARTIAL MEDIAL AND LATERAL MENISCECTOMY,, MEDIAL MICRO FRACTURE CHONDROPLASTY performed by Russel Sagastume MD at [facility] GASTRIC BYPASS  2011    TONSILLECTOMY AND ADENOIDECTOMY      UPPP UVULOPALATOPHARYGOPLASTY      [facility] N/A 8/26/2019    DIAGNOSTIC LAPAROSCOPY, LAPAROSCOPIC LYSIS OF ADHESIONS, LAPAROSCOPIC REDUCTION OF RECURRENT INCISIONAL HERNIA WITH MESH performed by North Rdz MD at [address] [place] [address] History   Problem Relation Age of Onset    Cancer Mother         lung    Arthritis Mother     Cirrhosis Father     Asthma Maternal Aunt      Social History     Tobacco Use    Smoking status: Never Smoker    Smokeless tobacco: Never Used   Substance MCV 81.2 2020     2020     BMP:  Lab Results   Component Value Date    CREATININE 0.8 2020    BUN 15 2020     (L) 2020    K 3.9 2020     2020    CO2 20 (L) 2020     Mag:   Lab Results   Component Value Date    MG 1.90 2019     LIVER PROFILE:   Lab Results   Component Value Date    ALT 14 2019    AST 19 2019    ALKPHOS 112 2019    BILITOT 0.5 2019     PT/INR:   Lab Results   Component Value Date    INR 0.91 2020    INR 0.95 2019    INR 1.07 2019    PROTIME 10.6 2020    PROTIME 10.8 2019    PROTIME 12.2 2019     Pro-BNP   Lab Results   Component Value Date    PROBNP 183 2020    PROBNP 209 2020    PROBNP 2,866 2019     LIPIDS:  No components found for: CHLPL  Lab Results   Component Value Date    TRIG 104 2019    TRIG 61 2017     Lab Results   Component Value Date    HDL 87 (H) 2019    HDL 99 (H) 2017     Lab Results   Component Value Date    LDLCALC 80 2019    LDLCALC 71 2017     Lab Results   Component Value Date    LABVLDL 21 2019    LABVLDL 12 2017     TSH:  Lab Results   Component Value Date    TSH 2.41 10/12/2017       IMAGIN/8/2021 ECG: A fib HR 99    2018 Echo:   Left ventricular cavity size is normal. There is mild concentric left   ventricular hypertrophy. Left ventricular function is low normal with ejection fraction estimated at   50%. No diagnostic regional wall motion abnormalities. Diastolic filling parameters suggest grade I diastolic dysfunction. Mild mitral regurgitation is present. Bi-atrial enlargement. Estimated pulmonary artery systolic pressure is at 43 mmHg assuming a right atrial pressure of 3 mmHg. 2017 Nuc stress:     1. Left ventricular ejection fraction of 57 %. 2. SPECT Myocardial Perfusion Imaging results are considered   negative for acute ischemia. Medications:   Current Outpatient Medications   Medication Sig Dispense Refill    Handicap Placard MISC by Does not apply route Dx of lumbar DDD. Effective Dec 18, 2020 until Dec 18, 2021. 1 each 0    sertraline (ZOLOFT) 100 MG tablet Take 1 tablet by mouth daily 30 tablet 3    triamterene-hydroCHLOROthiazide (MAXZIDE) 75-50 MG per tablet Take 1 tablet by mouth daily as needed (swelling) 30 tablet 3    meloxicam (MOBIC) 15 MG tablet Take 1 tablet by mouth daily as needed for Pain 90 tablet 1    dilTIAZem (CARDIZEM) 60 MG tablet Take 1 tablet by mouth daily as needed (Per pt \"when my heart rate starts to increase\") 120 tablet 1    apixaban (ELIQUIS) 5 MG TABS tablet TAKE 1 TABLET BY MOUTH 2 TIMES A DAY 60 tablet 0    Spacer/Aero-Holding Chambers (AEROCHAMBER MV) MISC Use with inhaler as directed 1 each 0    DULERA 200-5 MCG/ACT inhaler INHALE 1 PUFF INTO THE LUNGS TWICE DAILY 13 g 5    albuterol sulfate HFA (PROVENTIL HFA) 108 (90 Base) MCG/ACT inhaler Inhale 2 puffs into the lungs every 4 hours as needed for Wheezing or Shortness of Breath (Space out to every 6 hours as symptoms improve) Space out to every 6 hours as symptoms improve. 1 Inhaler 0    EPINEPHrine (AUVI-Q) 0.3 MG/0.3ML SOAJ injection Inject 0.3 mg into the muscle as needed Use as directed for allergic reaction       omalizumab (XOLAIR) 150 MG injection Inject 300 mg into the skin once for 1 dose 1 vial 3     No current facility-administered medications for this visit. Assessment:  1. Palpitation    2. PAF (paroxysmal atrial fibrillation) ([address]    3. Essential hypertension    4. History of GI bleed          Plan:  Palpitations, afib   Diltiazem 120 mg po daily   Eliquis 5 mg po bid   Echo   2 week monitor   CBC, CMP, TSH  HTN   134/78   Dilitazem 120 mg po daily     Follow up with PCP for blood in stools.      Follow up with Rachelle Pfeiffer in 4-5 weeks

## 2021-02-12 ENCOUNTER — HOSPITAL ENCOUNTER (OUTPATIENT)
Dept: ONCOLOGY | Age: 56
Setting detail: INFUSION SERIES
Discharge: HOME | End: 2021-02-12
Payer: COMMERCIAL

## 2021-02-12 VITALS
SYSTOLIC BLOOD PRESSURE: 146 MMHG | DIASTOLIC BLOOD PRESSURE: 88 MMHG | HEART RATE: 70 BPM | RESPIRATION RATE: 17 BRPM | TEMPERATURE: 98.5 F

## 2021-02-12 DIAGNOSIS — I48.0 PAF (PAROXYSMAL ATRIAL FIBRILLATION) (HCC): ICD-10-CM

## 2021-02-12 DIAGNOSIS — R00.2 PALPITATION: ICD-10-CM

## 2021-02-12 DIAGNOSIS — L50.1 IDIOPATHIC URTICARIA: Primary | ICD-10-CM

## 2021-02-12 LAB
HCT VFR BLD CALC: 42 % (ref 36–48)
HEMOGLOBIN: 13.5 G/DL (ref 12–16)
MCH RBC QN AUTO: 28.8 PG (ref 26–34)
MCHC RBC AUTO-ENTMCNC: 32.1 G/DL (ref 31–36)
MCV RBC AUTO: 89.8 FL (ref 80–100)
PDW BLD-RTO: 14.8 % (ref 12.4–15.4)
PLATELET # BLD: 278 K/UL (ref 135–450)
PMV BLD AUTO: 9.5 FL (ref 5–10.5)
RBC # BLD: 4.68 M/UL (ref 4–5.2)
WBC # BLD: 7.4 K/UL (ref 4–11)

## 2021-02-12 PROCEDURE — 96372 THER/PROPH/DIAG INJ SC/IM: CPT

## 2021-02-12 PROCEDURE — 6360000002 HC RX W HCPCS: Performed by: ALLERGY & IMMUNOLOGY

## 2021-02-12 RX ORDER — METHYLPREDNISOLONE SODIUM SUCCINATE 125 MG/2ML
125 INJECTION, POWDER, LYOPHILIZED, FOR SOLUTION INTRAMUSCULAR; INTRAVENOUS ONCE
Status: CANCELLED | OUTPATIENT
Start: 2021-03-05 | End: 2021-03-05

## 2021-02-12 RX ORDER — DIPHENHYDRAMINE HYDROCHLORIDE 50 MG/ML
50 INJECTION INTRAMUSCULAR; INTRAVENOUS ONCE
Status: CANCELLED | OUTPATIENT
Start: 2021-02-19 | End: 2021-02-19

## 2021-02-12 RX ORDER — SODIUM CHLORIDE 9 MG/ML
INJECTION, SOLUTION INTRAVENOUS CONTINUOUS
Status: CANCELLED | OUTPATIENT
Start: 2021-02-19

## 2021-02-12 RX ORDER — HEPARIN SODIUM (PORCINE) LOCK FLUSH IV SOLN 100 UNIT/ML 100 UNIT/ML
500 SOLUTION INTRAVENOUS PRN
Status: CANCELLED | OUTPATIENT
Start: 2021-02-19

## 2021-02-12 RX ORDER — SODIUM CHLORIDE 9 MG/ML
INJECTION, SOLUTION INTRAVENOUS CONTINUOUS
Status: CANCELLED | OUTPATIENT
Start: 2021-03-05

## 2021-02-12 RX ORDER — SODIUM CHLORIDE 0.9 % (FLUSH) 0.9 %
10 SYRINGE (ML) INJECTION PRN
Status: CANCELLED | OUTPATIENT
Start: 2021-02-19

## 2021-02-12 RX ORDER — DIPHENHYDRAMINE HYDROCHLORIDE 50 MG/ML
50 INJECTION INTRAMUSCULAR; INTRAVENOUS ONCE
Status: CANCELLED | OUTPATIENT
Start: 2021-03-05 | End: 2021-03-05

## 2021-02-12 RX ORDER — METHYLPREDNISOLONE SODIUM SUCCINATE 125 MG/2ML
125 INJECTION, POWDER, LYOPHILIZED, FOR SOLUTION INTRAMUSCULAR; INTRAVENOUS ONCE
Status: CANCELLED | OUTPATIENT
Start: 2021-02-19 | End: 2021-02-19

## 2021-02-12 RX ORDER — SODIUM CHLORIDE 0.9 % (FLUSH) 0.9 %
5 SYRINGE (ML) INJECTION PRN
Status: CANCELLED | OUTPATIENT
Start: 2021-02-19

## 2021-02-12 RX ADMIN — OMALIZUMAB 300 MG: 150 INJECTION, SOLUTION SUBCUTANEOUS at 14:03

## 2021-02-12 NOTE — PLAN OF CARE
Problem: KNOWLEDGE DEFICIT  Goal: Patient/S.O. demonstrates understanding of disease process, treatment plan, medications, and discharge instructions. Outcome: Ongoing  Note: Pt seen in [facility] OPO for Xolair 300 Mg subcutaneous, 2  injections to L arm. Pt carrying Epi pen on person, expiration date of 09/08/2021 noted, LOT [id number]. Pt provided with written pamphlet regarding administration of subcutaneous Xolair injections, pt verbalizes understanding. Pt's baseline vitals stable, vital signs taken 30 minutes post injection, pt tolerated well and without incident. Pt ambulatory with steady gait, discharged to home per self.

## 2021-02-13 LAB
A/G RATIO: 1.5 (ref 1.1–2.2)
ALBUMIN SERPL-MCNC: 4.3 G/DL (ref 3.4–5)
ALP BLD-CCNC: 127 U/L (ref 40–129)
ALT SERPL-CCNC: 13 U/L (ref 10–40)
ANION GAP SERPL CALCULATED.3IONS-SCNC: 10 MMOL/L (ref 3–16)
AST SERPL-CCNC: 16 U/L (ref 15–37)
BILIRUB SERPL-MCNC: 0.4 MG/DL (ref 0–1)
BUN BLDV-MCNC: 16 MG/DL (ref 7–20)
CALCIUM SERPL-MCNC: 9.2 MG/DL (ref 8.3–10.6)
CHLORIDE BLD-SCNC: 107 MMOL/L (ref 99–110)
CO2: 25 MMOL/L (ref 21–32)
CREAT SERPL-MCNC: 1 MG/DL (ref 0.6–1.1)
GFR AFRICAN AMERICAN: >60
GFR NON-AFRICAN AMERICAN: 57
GLOBULIN: 2.8 G/DL
GLUCOSE BLD-MCNC: 106 MG/DL (ref 70–99)
POTASSIUM SERPL-SCNC: 4.8 MMOL/L (ref 3.5–5.1)
SODIUM BLD-SCNC: 142 MMOL/L (ref 136–145)
TOTAL PROTEIN: 7.1 G/DL (ref 6.4–8.2)
TSH REFLEX: 2.46 UIU/ML (ref 0.27–4.2)

## 2021-02-17 ENCOUNTER — OFFICE VISIT (OUTPATIENT)
Dept: FAMILY MEDICINE CLINIC | Age: 56
End: 2021-02-17
Payer: COMMERCIAL

## 2021-02-17 VITALS
RESPIRATION RATE: 16 BRPM | WEIGHT: 293 LBS | SYSTOLIC BLOOD PRESSURE: 131 MMHG | OXYGEN SATURATION: 96 % | DIASTOLIC BLOOD PRESSURE: 83 MMHG | TEMPERATURE: 97.2 F | HEART RATE: 79 BPM | HEIGHT: 70 IN | BODY MASS INDEX: 41.95 KG/M2

## 2021-02-17 DIAGNOSIS — M79.7 FIBROMYALGIA: ICD-10-CM

## 2021-02-17 DIAGNOSIS — Z12.11 COLON CANCER SCREENING: ICD-10-CM

## 2021-02-17 DIAGNOSIS — Z00.00 ROUTINE GENERAL MEDICAL EXAMINATION AT A HEALTH CARE FACILITY: Primary | ICD-10-CM

## 2021-02-17 DIAGNOSIS — Z12.31 VISIT FOR SCREENING MAMMOGRAM: ICD-10-CM

## 2021-02-17 DIAGNOSIS — M17.11 PRIMARY OSTEOARTHRITIS OF RIGHT KNEE: Chronic | ICD-10-CM

## 2021-02-17 PROCEDURE — 99396 PREV VISIT EST AGE 40-64: CPT | Performed by: FAMILY MEDICINE

## 2021-02-17 RX ORDER — HYDROCODONE BITARTRATE AND ACETAMINOPHEN 5; 325 MG/1; MG/1
1 TABLET ORAL EVERY 6 HOURS PRN
Qty: 60 TABLET | Refills: 0 | Status: SHIPPED | OUTPATIENT
Start: 2021-02-17 | End: 2021-03-03

## 2021-02-17 NOTE — PATIENT INSTRUCTIONS
Try the recommendations from the second pain clinic doctor. Your PCP is supportive of you seeing if you would qualify for permanent disability. [facility] and [address] [place] [address] provide the Spinal Cord stimulator. PCP can prescribe the flexeril 5 mg.

## 2021-02-17 NOTE — PROGRESS NOTES
[facility]  Progress Note  Sherren LawnDO Keya  1965    02/18/21    Chief Complaint:   Keya Meyers is a 54 y.o. female who is here for chronic pain and yearly checkup        HPI:   Saw  Pain clinic. Medical marijuana helps. When she is not working she finds that the combination of CBD with THC is most helpful. Had cheset pain while on Topamax and no improvement on chronic pain and has been on Afib. Short term disbility to run out soon. I am familiar with my patient having tried different prescription medications including NSAIDs opioids, nonopioid medications such as muscle relaxers and he is illustrated how much the chronic pain is impacted her daily life and prevented her from doing her job effectively. Her job during the pandemic allows her to work from home but it is difficult to be fixed seated or standing positions for long periods of time. We went over health maintenance today. ROS negative for headache, visionchanges, chest pain, shortness of breath, abdominal pain, urinary sx, bowel changes. Past medical, surgical, and social history reviewed. and allergies reviewed. Allergies   Allergen Reactions    Latex Anaphylaxis and Rash    Aspirin      Swelling in lower legs    Demerol      rash    Meperidine     Nsaids Swelling    Pcn [Penicillins] Hives    Ranitidine Hcl     Sulfa Antibiotics Rash and Hives     Prior to Visit Medications    Medication Sig Taking? Authorizing Provider   HYDROcodone-acetaminophen (NORCO) 5-325 MG per tablet Take 1 tablet by mouth every 6 hours as needed for Pain for up to 14 days. Intended supply: 3 days.  Take lowest dose possible to manage pain Yes Levon Hernandez DO   dilTIAZem (CARDIZEM CD) 120 MG extended release capsule Take 1 capsule by mouth daily Yes STEPHANIE Jack - CNP   apixaban (ELIQUIS) 5 MG TABS tablet Take 1 tablet by mouth 2 times daily TAKE 1 TABLET BY MOUTH 2 TIMES A DAY Yes Diagnosis    Asthma    MDD (major depressive disorder), recurrent severe, without psychosis [facility] [address]    Cluster B personality disorder ([facility] [address]    Colon polyps    Overdose    Restless leg syndrome    Sliding hiatal hernia    HTN (hypertension)    Atrial flutter (HCC)    SVT (supraventricular tachycardia) (HCC)    Paroxysmal atrial fibrillation (HCC)    Sinus bradycardia    Acute lateral meniscus tear of right knee    Acute medial meniscus tear of right knee    Localized edema    Acute pain of right knee    Chronic diastolic congestive heart failure (HCC)    Primary osteoarthritis of right knee    Contusion of multiple sites of right leg    Gastrocnemius strain, left    Ventral incisional hernia    Recurrent incisional hernia    Pain of upper abdomen    Incisional hernia with obstruction, without gangrene    Rhinitis, chronic    Vocal cord dysfunction    Iron deficiency anemia, unspecified    Idiopathic urticaria       Immunization History   Administered Date(s) Administered    BCG (Juan BCG) 05/28/2013    DTaP 05/28/2013    DTaP (Infanrix) 05/28/2013    Influenza Vaccine, unspecified formulation 10/29/2015, 01/09/2017, 10/23/2017, 11/26/2018    Influenza Virus Vaccine 12/31/2008, 09/22/2009, 01/09/2017, 10/23/2017, 10/23/2017, 11/26/2018    Influenza, Quadv, IM, PF (6 mo and older Fluzone, Flulaval, Fluarix, and 3 yrs and older Afluria) 11/26/2018, 12/08/2020    Influenza, Juarez Fleeting, Recombinant, IM PF (Flublok 18 yrs and older) 11/16/2019    PPD Test 06/11/2013, 05/07/2014, 08/13/2019    Pneumococcal Polysaccharide ([id number]) 12/31/2008, 06/17/2019    Tdap (Boostrix, Adacel) 05/28/2013       Past Medical History:   Diagnosis Date    Acute lateral meniscus tear of right knee 02/2019    Acute medial meniscus tear of right knee 02/2019    Anesthesia complication     woke up during one surgery    Anxiety     Arthritis     Asthma     Atrial fibrillation (HCC)     new dx 4 weeks Minutes per session: Not on file    Stress: Not on file   Relationships    Social connections     Talks on phone: Not on file     Gets together: Not on file     Attends Episcopalian service: Not on file     Active member of club or organization: Not on file     Attends meetings of clubs or organizations: Not on file     Relationship status: Not on file    Intimate partner violence     Fear of current or ex partner: Not on file     Emotionally abused: Not on file     Physically abused: Not on file     Forced sexual activity: Not on file   Other Topics Concern    Not on file   Social History Narrative    Not on file       O: /83   Pulse 79   Temp 97.2 °F (36.2 °C) (Tympanic)   Resp 16   Ht 5' 10\" (1.778 m)   Wt (!) 364 lb (165.1 kg)   SpO2 96%   Breastfeeding No   BMI 52.23 kg/m²   Physical Exam  GEN: No acute distress,cooperative, well nourished, alert. HEENT: PEERLA, EOMI , normocephalic/atraumatic, external nose appears normal.  External ear is normal.    Neck: soft, supple, no appreciable thyromegaly,mass  CV: No upper extremity edema. Resp:  Breathing comfortably. Psych:normal affect. Neuro: AOx3  Other Pertinent Physical Exam findings: Noncontributory. ASSESSMENT   Diagnosis Orders   1. Routine general medical examination at a health care facility     2. Visit for screening mammogram  IRAIS DIGITAL SCREENING AUGMENTED BILATERAL   3. Colon cancer screening  VIOLETA - Jany Maldonado MD, Gastroenterology, [facility]   4. Primary osteoarthritis of right knee  HYDROcodone-acetaminophen (NORCO) 5-325 MG per tablet   5. Fibromyalgia       In the forefront of the patient's mind is the significant chronic pain. She has tried many prescription medications. She has been trying to balance her work with the possible side effects of medication such as muscle relaxers and opioids.   She has been on short-term disability for the chronic pain and is contemplating filing for permanent

## 2021-02-22 ENCOUNTER — HOSPITAL ENCOUNTER (OUTPATIENT)
Dept: WOMENS IMAGING | Age: 56
Discharge: HOME | End: 2021-02-22
Payer: COMMERCIAL

## 2021-02-22 DIAGNOSIS — Z12.31 VISIT FOR SCREENING MAMMOGRAM: ICD-10-CM

## 2021-02-22 PROCEDURE — 77067 SCR MAMMO BI INCL CAD: CPT

## 2021-03-02 ENCOUNTER — TELEPHONE (OUTPATIENT)
Dept: FAMILY MEDICINE CLINIC | Age: 56
End: 2021-03-02

## 2021-03-02 ENCOUNTER — TELEPHONE (OUTPATIENT)
Dept: CARDIOLOGY CLINIC | Age: 56
End: 2021-03-02

## 2021-03-02 DIAGNOSIS — N30.90 CYSTITIS: Primary | ICD-10-CM

## 2021-03-02 RX ORDER — NITROFURANTOIN 25; 75 MG/1; MG/1
100 CAPSULE ORAL 2 TIMES DAILY
Qty: 14 CAPSULE | Refills: 0 | Status: SHIPPED | OUTPATIENT
Start: 2021-03-02 | End: 2021-03-09

## 2021-03-02 NOTE — TELEPHONE ENCOUNTER
[facility] requesting medication instructions for a colonoscopy on 6/11/21. I left a message for [facility] the MA for the doc doing the procedureletting her know the date of procedure is still too far out to give instructions. The paperwork in my folder.

## 2021-03-05 ENCOUNTER — TELEPHONE (OUTPATIENT)
Dept: CARDIOLOGY CLINIC | Age: 56
End: 2021-03-05

## 2021-03-05 RX ORDER — DILTIAZEM HYDROCHLORIDE 240 MG/1
240 CAPSULE, COATED, EXTENDED RELEASE ORAL DAILY
Qty: 60 CAPSULE | Refills: 3 | Status: SHIPPED | OUTPATIENT
Start: 2021-03-05 | End: 2022-04-01 | Stop reason: ALTCHOICE

## 2021-03-05 NOTE — TELEPHONE ENCOUNTER
Monitor printed and scanned in Media. Pt on AC and has a similar history. Any new orders? Wolf Smith

## 2021-03-05 NOTE — TELEPHONE ENCOUNTER
Reviewed monitor  Has a fib with RVR at times    Increase diltiazem to 240 mg po daily (she can take two 120 mg tablets until her current script is complete)  Follow up with Deangelo Ocampo as scheduled.

## 2021-03-11 PROCEDURE — 93248 EXT ECG>7D<15D REV&INTERPJ: CPT | Performed by: INTERNAL MEDICINE

## 2021-03-12 ENCOUNTER — VIRTUAL VISIT (OUTPATIENT)
Dept: FAMILY MEDICINE CLINIC | Age: 56
End: 2021-03-12
Payer: COMMERCIAL

## 2021-03-12 DIAGNOSIS — M54.2 CERVICALGIA: Primary | ICD-10-CM

## 2021-03-12 DIAGNOSIS — M54.12 CERVICAL RADICULOPATHY: ICD-10-CM

## 2021-03-12 PROCEDURE — 99213 OFFICE O/P EST LOW 20 MIN: CPT | Performed by: FAMILY MEDICINE

## 2021-03-12 NOTE — PROGRESS NOTES
[facility]  TELEMEDICINE Progress Note  Alley Lopez DO      The risks and benefits of converting to a virtual visit were discussed in light of the current infectious disease epidemic. Patient also understood that insurance coverage and co-pays are up to their individual insurance plans. Patient identification was verified at the start of the visit. Jean-Pierre Polanco  1965    03/14/21    Patient location: Home address on file  Provider location: Physician's home    Chief Complaint:   Jean-Pierre Polanco is a 54 y.o. patient who is here for cervicalgia        HPI:   Pain radiates both sides in to hands. Has been having progressively worsening neck pain. Taking her dose. Daytime and bedtime. The opioid use only. Pain is worse moving the neck. She is known to have lumbar disc disease. ROS negative for headache, vision changes, chest pain, shortness of breath, abdominal pain, urinary sx, bowel changes. Past medical, surgical, and social history reviewed. Medications and allergies reviewed. Allergies   Allergen Reactions    Latex Anaphylaxis and Rash    Aspirin      Swelling in lower legs    Demerol      rash    Meperidine     Nsaids Swelling    Pcn [Penicillins] Hives    Ranitidine Hcl     Sulfa Antibiotics Rash and Hives     Prior to Visit Medications    Medication Sig Taking? Authorizing Provider   dilTIAZem (CARDIZEM CD) 240 MG extended release capsule Take 1 capsule by mouth daily Yes STEPHANIE Lui CNP   apixaban (ELIQUIS) 5 MG TABS tablet Take 1 tablet by mouth 2 times daily TAKE 1 TABLET BY MOUTH 2 TIMES A DAY Yes STEPHANIE Lui CNP   Handicap Placard MISC by Does not apply route Dx of lumbar DDD. Effective Dec 18, 2020 until Dec 18, 2021.  Yes Jewell Hernanedz, DO   sertraline (ZOLOFT) 100 MG tablet Take 1 tablet by mouth daily Yes Jewell Hernandez, DO   triamterene-hydroCHLOROthiazide (MAXZIDE) 75-50 MG per tablet Take 1 tablet by mouth daily as needed (swelling) Yes Art Hernandez, DO   Spacer/Aero-Holding Chambers (AEROCHAMBER MV) MISC Use with inhaler as directed Yes Morris Cruz MD   DULERA 200-5 MCG/ACT inhaler INHALE 1 PUFF INTO THE LUNGS TWICE DAILY Yes Morris Cruz MD   albuterol sulfate HFA (PROVENTIL HFA) 108 (90 Base) MCG/ACT inhaler Inhale 2 puffs into the lungs every 4 hours as needed for Wheezing or Shortness of Breath (Space out to every 6 hours as symptoms improve) Space out to every 6 hours as symptoms improve. Yes Ame Pereira MD   EPINEPHrine (AUVI-Q) 0.3 MG/0.3ML SOAJ injection Inject 0.3 mg into the muscle as needed Use as directed for allergic reaction  Yes Historical Provider, MD   omalizumab Sharl Quails) 150 MG injection Inject 300 mg into the skin once for 1 dose  Viktor Mon MD   dilTIAZem (CARDIZEM) 60 MG tablet Take 1 tablet by mouth daily as needed (Per pt \"when my heart rate starts to increase\")  Patient not taking: Reported on 3/12/2021  STEPHANIE Giles - CNP          Patient-Reported Vitals 1/15/2021   Patient-Reported Weight 350   Patient-Reported Height 5'10\"   Patient-Reported Systolic 888   Patient-Reported Diastolic 70   Patient-Reported Temperature -      There were no vitals filed for this visit.    Wt Readings from Last 3 Encounters:   02/17/21 (!) 364 lb (165.1 kg)   02/08/21 (!) 368 lb 3.2 oz (167 kg)   06/26/20 (!) 375 lb 3.2 oz (170.2 kg)     BP Readings from Last 3 Encounters:   02/17/21 131/83   02/12/21 (!) 146/88   02/08/21 134/78       Patient Active Problem List   Diagnosis    Asthma    MDD (major depressive disorder), recurrent severe, without psychosis ([address]    Cluster B personality disorder ([facility] [address]    Colon polyps    Overdose    Restless leg syndrome    Sliding hiatal hernia    HTN (hypertension)    Atrial flutter (HCC)    SVT (supraventricular tachycardia) (HCC)    Paroxysmal atrial fibrillation (HCC)    Sinus bradycardia    Acute lateral meniscus tear 2001    HYSTERECTOMY  2004    KNEE ARTHROSCOPY Right 7/11/2019    VIDEO ARTHROSCOPY RIGHT KNEE, PARTIAL MEDIAL AND LATERAL MENISCECTOMY,, MEDIAL MICRO FRACTURE CHONDROPLASTY performed by Jeff Flores MD at [place] [address] [address] [facility] [address]  2011    TONSILLECTOMY AND ADENOIDECTOMY      UPPP UVULOPALATOPHARYGOPLASTY      VENTRAL HERNIA REPAIR N/A 8/26/2019    DIAGNOSTIC LAPAROSCOPY, LAPAROSCOPIC LYSIS OF ADHESIONS, LAPAROSCOPIC REDUCTION OF RECURRENT INCISIONAL HERNIA WITH MESH performed by Federico Cardoza MD at [address] [place] [address] History   Problem Relation Age of Onset    Cancer Mother         lung    Arthritis Mother     Cirrhosis Father     Asthma Maternal Aunt      Social History     Socioeconomic History    Marital status:      Spouse name: Not on file    Number of children: 3    Years of education: 15.5    Highest education level: Not on file   Occupational History    Not on file   Social Needs    Financial resource strain: Not on file    Food insecurity     Worry: Not on file     Inability: Not on file   [employer] needs     Medical: Not on file     Non-medical: Not on file   Tobacco Use    Smoking status: Never Smoker    Smokeless tobacco: Never Used   Substance and Sexual Activity    Alcohol use: Yes     Comment: 0-1 drinks per month    Drug use: Yes     Types: Marijuana, Other-see comments     Comment: Medical Marijuana use    Sexual activity: Yes     Partners: Male   Lifestyle    Physical activity     Days per week: Not on file     Minutes per session: Not on file    Stress: Not on file   Relationships    Social connections     Talks on phone: Not on file     Gets together: Not on file     Attends Jewish service: Not on file     Active member of club or organization: Not on file     Attends meetings of clubs or organizations: Not on file     Relationship status: Not on file    Intimate partner violence     Fear of current or ex partner: Not on file     Emotionally abused: Not on file     Physically abused: Not on file     Forced sexual activity: Not on file   Other Topics Concern    Not on file   Social History Narrative    Not on file       O: There were no vitals taken for this visit. Physical Exam  PHYSICAL EXAMINATION:  [ INSTRUCTIONS:  \"[x]\" Indicates a positive item  \"[]\" Indicates a negative item  -- DELETE ALL ITEMS NOT EXAMINED]  Vital Signs: (As obtained by patient/caregiver or practitioner observation)    Constitutional: [x] Appears well-developed and well-nourished [x] No apparent distress      [] Abnormal-   Mental status  [x] Alert and awake  [x] Oriented to person/place/time [x]Able to follow commands      Eyes:  EOM    [x]  Normal  [] Abnormal-  Sclera  [x]  Normal  [] Abnormal -         Discharge [x]  None visible  [] Abnormal -    HENT:   [x] Normocephalic, atraumatic. [] Abnormal   [] Mouth/Throat: Mucous membranes are moist.     External Ears [x] Normal  [] Abnormal-     Neck: [x] No visualized mass     Pulmonary/Chest: [x] Respiratory effort normal.  [x] No visualized signs of difficulty breathing or respiratory distress        [] Abnormal-      Musculoskeletal:   [] Normal gait with no signs of ataxia         [x] Normal range of motion of neck        [] Abnormal-       Neurological:        [x] No Facial Asymmetry (Cranial nerve 7 motor function) (limited exam to video visit)          [x] No gaze palsy        [] Abnormal-         Skin:        [x] No significant exanthematous lesions or discoloration noted on facial skin         [] Abnormal-            Psychiatric:       [x] Normal Affect [] No Hallucinations        [] Abnormal-     Other pertinent observable physical exam findings- n/a    Due to this being a TeleHealth encounter, evaluation of the following organ systems is limited: Vitals/Constitutional/EENT/Resp/CV/GI//MS/Neuro/Skin/Heme-Lymph-Imm. ASSESSMENT   Diagnosis Orders   1.  Cervicalgia  MRI CERVICAL SPINE WO CONTRAST   2. Cervical radiculopathy  MRI CERVICAL SPINE WO CONTRAST       The risks, benefits, potential side effects and barriers to medication use were addressed today. Understanding was acknowledged. Patient asked to follow-up if condition(s) do not improve as anticipated. PLAN          Time spent on encounter (to include any same day medical record review): 20 minutes  Established E/M: 10-19 (00254), 20-29 (43202), 30-39 (55569), 40-54 (51834)   New E/M: 15-29 (71005), 30-44 (22319), 45-59 (32823), 60-74 (64488)  Telephone E/M: 5-10 (72996), 11-20 (87202), 21-30 (47493)    If applicable, see additional patient information and instructions under \"Patient Instructions. \"    Return if symptoms worsen or fail to improve. There are no Patient Instructions on file for this visit. Please note a portion of this chart was generated using dragon dictation software. Although every effort was made to ensure the accuracy of this automated transcription, some errors in transcription may have occurred. Pursuant to the emergency declaration under the [facility] waiver authority and the [employer] Act, this Virtual  Visit was conducted, with patient's consent, to reduce the patient's risk of exposure to COVID-19 and provide continuity of care for an established patient. Services were provided through a video synchronous discussion virtually to substitute for in-person clinic visit. Patient was instructed that the AVS is available on My Chart or was emailed to the patient if not on My Chart. Lab orders were emailed to patient if they do not use a [id number] [facility] lab. Any work notes were sent to patient through My Chart or email.

## 2021-03-16 NOTE — PROGRESS NOTES
[address]   Electrophysiology  Date: 3/23/2021    No chief complaint on file. Cardiac HX: Yue June is a 54 y.o. woman with a h/o anxiety, GERD, fibromyalgia, morbid obesity, ASTRID (tx'd with surgery), asthma,  and paroxysmal AF/AFL, had been on Rythmol and apixaban, admitted 9/7/18 with c/o palpitations, CP and SOB, noted to be in AF/AFL with a spontaneous conversion to NSR, s/p RFA for AFL on 5/7/19 (Dr. Michaeleen Goltz), now on Eliquis and dilt prn. Today: Patient is here for follow-up for paroxysmal atrial fibrillation and AFL. She presents in AF with a controlled heart rate. Patient had seen Juli Medina CNP in February with complaints of shortness of breath, palpitations and lightheadedness. She did wear a 2-week Zio patch (2/4/2021-2/22/2021) that showed an average HR 72 () with a 30% AF burden with the longest episode lasting 4 days in length, 7 SVT episodes with the longest lasting 14 beats in length. She has been on Eliquis and has not missed any doses. She also remains on diltiazem. She states that her diltiazem was increased to 240 after Sara had reviewed the Zio patch. She has been on Rythmol to 25 mg every 8 hours in the past and states that she is not aware if it was helpful or not as she was in and out of A. fib and a flutter at that time. She did not like taking the medication 3 times a day as she would miss the third dose. She has felt lightheaded and near syncopal.  States that she did not have the symptoms when she wore the monitor. During these episodes she states that her heart would race and then would drop down and she would nearly pass out. She continues with no energy, shortness of breath and fatigue. She occasionally has lower extremity edema. She denies PND orthopnea and states she does not have sleep apnea.     Home medications:   Current Outpatient Medications on File Prior to Visit   Medication Sig Dispense Refill    Cholecalciferol 50 MCG (2000 UT) CAPS       cyanocobalamin 1000 MCG tablet Take 1,000 mcg by mouth daily      cyclobenzaprine (FLEXERIL) 5 MG tablet Take 5 mg by mouth 3 times daily as needed      HYDROcodone-acetaminophen (NORCO) 7.5-325 MG per tablet Take 1 tablet by mouth every 8 hours as needed.  ketoconazole (NIZORAL) 2 % shampoo       albuterol sulfate HFA (PROVENTIL HFA) 108 (90 Base) MCG/ACT inhaler Inhale 2 puffs into the lungs every 4 hours as needed for Wheezing or Shortness of Breath (Space out to every 6 hours as symptoms improve) Space out to every 6 hours as symptoms improve. 1 Inhaler 0    triamterene-hydroCHLOROthiazide (MAXZIDE) 75-50 MG per tablet Take 1 tablet by mouth daily as needed (swelling) 90 tablet 1    dilTIAZem (CARDIZEM CD) 240 MG extended release capsule Take 1 capsule by mouth daily 60 capsule 3    apixaban (ELIQUIS) 5 MG TABS tablet Take 1 tablet by mouth 2 times daily TAKE 1 TABLET BY MOUTH 2 TIMES A  tablet 3    Handicap Placard MISC by Does not apply route Dx of lumbar DDD. Effective Dec 18, 2020 until Dec 18, 2021. 1 each 0    sertraline (ZOLOFT) 100 MG tablet Take 1 tablet by mouth daily 30 tablet 3    omalizumab (XOLAIR) 150 MG injection Inject 300 mg into the skin once for 1 dose 1 vial 3    Spacer/Aero-Holding Chambers (AEROCHAMBER MV) MISC Use with inhaler as directed 1 each 0    DULERA 200-5 MCG/ACT inhaler INHALE 1 PUFF INTO THE LUNGS TWICE DAILY 13 g 5    EPINEPHrine (AUVI-Q) 0.3 MG/0.3ML SOAJ injection Inject 0.3 mg into the muscle as needed Use as directed for allergic reaction        No current facility-administered medications on file prior to visit.         Past Medical History:   Diagnosis Date    Acute lateral meniscus tear of right knee 02/2019    Acute medial meniscus tear of right knee 02/2019    Anesthesia complication     woke up during one surgery    Anxiety     Arthritis     Asthma     Atrial fibrillation ([address]     new dx 4 weeks ago, first of  Sept 2017    CHF (congestive heart failure) (HCC)     Edema     Fibromyalgia     GERD (gastroesophageal reflux disease)     reflux    Hiatal hernia     Idiopathic urticaria 10/29/2020    SVT [facility]supra[facility]ricular tachycardia) [facility])     hx svt        Past Surgical History:   Procedure Laterality Date    ABLATION OF DYSRHYTHMIC FOCUS  04/2019    afib    BLADDER SUSPENSION  2004    HERNIA REPAIR  2001    HYSTERECTOMY  2004    KNEE ARTHROSCOPY Right 7/11/2019    VIDEO ARTHROSCOPY RIGHT KNEE, PARTIAL MEDIAL AND LATERAL MENISCECTOMY,, MEDIAL MICRO FRACTURE CHONDROPLASTY performed by Good Salmeron MD at [facility] GASTRIC BYPASS  2011    TONSILLECTOMY AND ADENOIDECTOMY      UPPP UVULOPALATOPHARYGOPLASTY      VENTRAL HERNIA REPAIR N/A 8/26/2019    DIAGNOSTIC LAPAROSCOPY, LAPAROSCOPIC LYSIS OF ADHESIONS, LAPAROSCOPIC REDUCTION OF RECURRENT INCISIONAL HERNIA WITH MESH performed by Mariely Joy MD at [address] [place] [address]   Allergen Reactions    Latex Anaphylaxis and Rash    Aspirin      Swelling in lower legs    Demerol      rash    Meperidine     Nsaids Swelling    Pcn [Penicillins] Hives    Ranitidine Hcl     Sulfa Antibiotics Rash and Hives       Social History:  Reviewed. reports that she has never smoked. She has never used smokeless tobacco. She reports current alcohol use. She reports current drug use. Drugs: Marijuana and Other-see comments. Family History:  Reviewed. family history includes Arthritis in her mother; Asthma in her maternal aunt; Cancer in her mother; Cirrhosis in her father. Review of System:    · Constitutional: No fevers, chills. · Eyes: No visual changes or diplopia. No scleral icterus. · ENT: No Headaches. No mouth sores or sore throat. · Cardiovascular: No for chest pain, Yes for dyspnea on exertion, Yes for palpitations or No for loss of consciousness.  No cough, hemoptysis, No for pleuritic pain, or phlebitis. · Respiratory: No for cough or wheezing. No hematemesis. · Gastrointestinal: No abdominal pain, blood in stools. · Genitourinary: No dysuria, or hematuria. · Musculoskeletal: No gait disturbance,    · Integumentary: No rash or pruritis. · Neurological: No headache, change in muscle strength, numbness or tingling. · Psychiatric: No anxiety, or depression. · Endocrine: No temperature intolerance. No excessive thirst, fluid intake, or urination. · Hem/Lymph: No abnormal bruising or bleeding, blood clots or swollen lymph nodes. · Allergic/Immunologic: No nasal congestion or hives. Physical Examination:  Vitals:    03/23/21 1530   BP: 114/70   Pulse: 78   Temp: 97.7 °F (36.5 °C)         Wt Readings from Last 3 Encounters:   03/23/21 (!) 363 lb (164.7 kg)   02/17/21 (!) 364 lb (165.1 kg)   02/08/21 (!) 368 lb 3.2 oz (167 kg)       · Constitutional: Oriented. No distress. · Head: Normocephalic and atraumatic. · Mouth/Throat: Oropharynx is clear and moist.   · Eyes: Conjunctivae clear without jaunduice. PERRL. · Neck: Neck supple. No rigidity. No JVD present. · Cardiovascular: Normal rate, regular rhythm, S1&S2. · Pulmonary/Chest: Bilateral respiratory sounds. No wheezes, No rhonchi. · Abdominal: Soft. Bowel sounds present. No distension, No tenderness. · Musculoskeletal: No tenderness. No edema    · Lymphadenopathy: Has no cervical adenopathy. · Neurological: Alert and oriented. Cranial nerve appears intact, No Gross deficit   · Skin: Skin is warm and dry. No rash noted. · Psychiatric: Has a normal mood, affect and behavior     Labs:  Reviewed. No results for input(s): NA, K, CL, CO2, PHOS, BUN, CREATININE in the last 72 hours. Invalid input(s): CA,  TSH  No results for input(s): WBC, HGB, HCT, MCV, PLT in the last 72 hours.   Lab Results   Component Value Date    CKTOTAL 80 01/12/2014    TROPONINI <0.01 08/06/2020     Lab Results   Component Value Date    .0 08/07/2018    .0 10/08/2017     Lab Results   Component Value Date    PROTIME 10.6 02/25/2020    PROTIME 10.8 08/25/2019    PROTIME 12.2 05/07/2019    INR 0.91 02/25/2020    INR 0.95 08/25/2019    INR 1.07 05/07/2019     Lab Results   Component Value Date    CHOL 188 06/28/2019    HDL 87 06/28/2019    TRIG 104 06/28/2019       ECG: Personally reviewed: AF, HR 78, QRS 92, QTc 403    ECHO:  5/9/18   Summary   Left ventricular cavity size is normal. There is mild concentric left   ventricular hypertrophy.   Left ventricular function is low normal with ejection fraction estimated at   50%.   No diagnostic regional wall motion abnormalities.   Diastolic filling parameters suggest grade I diastolic dysfunction.   Mild mitral regurgitation is present.   Bi-atrial enlargement.   Estimated pulmonary artery systolic pressure is at 43 mmHg assuming a right   atrial pressure of 3 mmHg.     Stress Test: 6/2017  1. Left ventricular ejection fraction of 57 %. 2. SPECT Myocardial Perfusion Imaging results are considered   negative for acute ischemia.        Problem List:   Patient Active Problem List    Diagnosis Date Noted    Cluster B personality disorder ([facility] 04/18/2018     Priority: Low    MDD (major depressive disorder), recurrent severe, without psychosis ([facility] 03/12/2018     Priority: Low    Asthma 07/17/2013     Priority: Low    Idiopathic urticaria 10/29/2020    Iron deficiency anemia, unspecified 07/16/2020    Rhinitis, chronic 03/06/2020    Vocal cord dysfunction 03/06/2020    Incisional hernia with obstruction, without gangrene 08/25/2019    Ventral incisional hernia 08/24/2019    Recurrent incisional hernia     Pain of upper abdomen     Primary osteoarthritis of right knee 07/02/2019    Contusion of multiple sites of right leg 07/02/2019    Gastrocnemius strain, left 07/02/2019    Chronic diastolic congestive heart failure ([facility] 05/16/2019    Acute pain of right knee     Localized edema 04/26/2019    Acute lateral meniscus tear of right knee 02/01/2019    Acute medial meniscus tear of right knee 02/01/2019    Sinus bradycardia     Paroxysmal atrial fibrillation (HCC)     SVT ([facility]raventricular tachycardia) ([facility] 09/07/2018    Atrial flutter ([address] 06/22/2018    HTN (hypertension) 05/30/2018    Colon polyps 04/28/2015    Sliding hiatal hernia 04/28/2015    Restless leg syndrome 04/04/2013    Overdose 04/03/2013        Assessment:   1. PAF (paroxysmal atrial fibrillation) ([facility]    2. Typical atrial flutter (HCC)    3. Palpitations    4. Shortness of breath    5. Chronic diastolic heart failure ([facility]         Cardiac HX: Bonnie Khan is a 48 y.o. woman with a h/o anxiety, GERD, fibromyalgia, morbid obesity, ASTRID improved with tonsillectomy, asthma, and pAF/AFL, had been on Rythmol previously, now on apixaban, s/p DCCV to NSR 8/2018, recurrent AF/AFL (9/7/18) with a spontaneous conversion to NSR, s/p RFA for AFL on 5/7/19 (Dr. Leonardo Rosenberg), now on Eliquis and diltiazem 240 mg QD. [id number] 1. TSH 2.46 (2/12/2021). 30-day monitor worn 9/17/2018-10/17/2018 showed a min HR of 49, average 68, max 118, NSR, PACs, PVCs. No AF.    AF/AFL  - In AF - HR 78  - S/p RFA for typical AFL on 5/7/19  - On cardizem 240 mg QD  - On Eliquis - no s/s bleeding - continue  - Sleep study - patient not interested  - Will place patient on flecainide 50 mg twice a day -reviewed with Dr. Johnny Meza.   - EKG in 1 week  - Will have patient see Dr. Leslie Jimenez to discuss rhythm management for atrial fibrillation  - MPI (2017) showed no reversible ischemia, echo in March 2021 showed a normal EF  - ECG ordered and results personally reviewed      Chronic diastolic HF  - Echo - EF 39-28%, no diastolic dysfunction, LA 4.7, vol 44.5  - Lifestyle modification - weight loss, exercise  - On torsemide 20 mg BID      EF of 55-60%  No known HF  No known CAD  Anticoagulation for AF   No Tobacco use.      All questions and concerns were addressed to the patient/family. Alternatives to my treatment were discussed. The note was completed using EMR. Every effort was made to ensure accuracy; however, inadvertent computerized transcription errors may be present. Patient received education regarding their diagnosis, treatment and medications while in the office today.       Darlyn Sullivan [address] [facility]

## 2021-03-17 ENCOUNTER — HOSPITAL ENCOUNTER (OUTPATIENT)
Dept: ONCOLOGY | Age: 56
Setting detail: INFUSION SERIES
Discharge: HOME | End: 2021-03-17
Payer: COMMERCIAL

## 2021-03-17 ENCOUNTER — HOSPITAL ENCOUNTER (OUTPATIENT)
Dept: NON INVASIVE DIAGNOSTICS | Age: 56
Discharge: HOME | End: 2021-03-17
Payer: COMMERCIAL

## 2021-03-17 VITALS
SYSTOLIC BLOOD PRESSURE: 140 MMHG | TEMPERATURE: 98.2 F | RESPIRATION RATE: 18 BRPM | HEART RATE: 73 BPM | OXYGEN SATURATION: 98 % | DIASTOLIC BLOOD PRESSURE: 90 MMHG

## 2021-03-17 DIAGNOSIS — I48.0 PAF (PAROXYSMAL ATRIAL FIBRILLATION) (HCC): ICD-10-CM

## 2021-03-17 DIAGNOSIS — L50.1 IDIOPATHIC URTICARIA: Primary | ICD-10-CM

## 2021-03-17 DIAGNOSIS — R00.2 PALPITATION: ICD-10-CM

## 2021-03-17 LAB
LV EF: 58 %
LVEF MODALITY: NORMAL

## 2021-03-17 PROCEDURE — 6360000002 HC RX W HCPCS: Performed by: ALLERGY & IMMUNOLOGY

## 2021-03-17 PROCEDURE — 2580000003 HC RX 258: Performed by: ALLERGY & IMMUNOLOGY

## 2021-03-17 PROCEDURE — 93306 TTE W/DOPPLER COMPLETE: CPT

## 2021-03-17 PROCEDURE — 96372 THER/PROPH/DIAG INJ SC/IM: CPT

## 2021-03-17 RX ORDER — METHYLPREDNISOLONE SODIUM SUCCINATE 125 MG/2ML
125 INJECTION, POWDER, LYOPHILIZED, FOR SOLUTION INTRAMUSCULAR; INTRAVENOUS ONCE
Status: CANCELLED | OUTPATIENT
Start: 2021-04-07 | End: 2021-04-07

## 2021-03-17 RX ORDER — SODIUM CHLORIDE 9 MG/ML
INJECTION, SOLUTION INTRAVENOUS CONTINUOUS
Status: CANCELLED | OUTPATIENT
Start: 2021-04-07

## 2021-03-17 RX ORDER — DIPHENHYDRAMINE HYDROCHLORIDE 50 MG/ML
50 INJECTION INTRAMUSCULAR; INTRAVENOUS ONCE
Status: CANCELLED | OUTPATIENT
Start: 2021-04-07 | End: 2021-04-07

## 2021-03-17 RX ADMIN — WATER 300 MG: 1 INJECTION INTRAMUSCULAR; INTRAVENOUS; SUBCUTANEOUS at 13:32

## 2021-03-17 NOTE — PLAN OF CARE
Problem: KNOWLEDGE DEFICIT  Goal: Patient/S.O. demonstrates understanding of disease process, treatment plan, medications, and discharge instructions. Outcome: Met This Shift      Pt seen in [facility]PO for Xolair 300 Mg subcutaneous, 2  injections to L arm. Pt carrying Epi pen on person, expiration date of 09/08/2021 noted, LOT [id number]. Pt provided with written pamphlet regarding administration of subcutaneous Xolair injections, pt verbalizes understanding. Pt's baseline vitals stable, vital signs taken 30 minutes post injection, pt tolerated well and without incident. Pt ambulatory with steady gait, discharged to home per self.

## 2021-03-18 ENCOUNTER — TELEPHONE (OUTPATIENT)
Dept: FAMILY MEDICINE CLINIC | Age: 56
End: 2021-03-18

## 2021-03-19 DIAGNOSIS — M54.50 THORACOLUMBAR BACK PAIN: ICD-10-CM

## 2021-03-19 DIAGNOSIS — M54.6 THORACOLUMBAR BACK PAIN: ICD-10-CM

## 2021-03-19 DIAGNOSIS — R60.0 LEG EDEMA: ICD-10-CM

## 2021-03-19 RX ORDER — MELOXICAM 15 MG/1
15 TABLET ORAL DAILY PRN
Qty: 90 TABLET | Refills: 1 | Status: SHIPPED | OUTPATIENT
Start: 2021-03-19 | End: 2021-03-23

## 2021-03-19 RX ORDER — TRIAMTERENE AND HYDROCHLOROTHIAZIDE 75; 50 MG/1; MG/1
1 TABLET ORAL DAILY PRN
Qty: 90 TABLET | Refills: 1 | Status: SHIPPED | OUTPATIENT
Start: 2021-03-19 | End: 2021-11-15 | Stop reason: ALTCHOICE

## 2021-03-19 NOTE — TELEPHONE ENCOUNTER
Approved. Let patient or pharmacy team know. Diagnosis Orders   1. Leg edema  triamterene-hydroCHLOROthiazide (MAXZIDE) 75-50 MG per tablet   2.  Thoracolumbar back pain  meloxicam (MOBIC) 15 MG tablet

## 2021-03-19 NOTE — TELEPHONE ENCOUNTER
Pharmacy tech called and states pt is running out of her medication and in need of a refill.     triamterene-hydroCHLOROthiazide (MAXZIDE) 75-50 MG per tablet [[id number]]     Order Details  Dose: 1 tablet Route: Oral Frequency: DAILY PRN for swelling   Dispense Quantity: 30 tablet Refills: 3          Sig: Take 1 tablet by mouth daily as needed (swelling)         Start Date: 08/24/20 End Date: --   Written Date: 08/24/20 Expiration Date: 08/24/21     meloxicam (MOBIC) 15 MG tablet [[id number]]  DISCONTINUED    Order Details  Dose: 15 mg Route: Oral Frequency: DAILY PRN for Pain   Dispense Quantity: 90 tablet Refills: 1          Sig: Take 1 tablet by mouth daily as needed for Pain   Patient not taking: Reported on 2/17/2021         Start Date: 08/21/20 End Date: 02/17/21   Discontinued by: Reese Campos DO on 2/17/2021 11:00         Written Date: 08/21/20 Expiration Date: 08/21/21     Pharmacy    [id number] [place] [address] [facility] [phone] - F [phone]      Last VV: 3/12/2021  Last Labs: 2/12/2021    Please advise, Thank you.     (Written BN)

## 2021-03-22 ENCOUNTER — TELEPHONE (OUTPATIENT)
Dept: FAMILY MEDICINE CLINIC | Age: 56
End: 2021-03-22

## 2021-03-22 DIAGNOSIS — R06.02 SOB (SHORTNESS OF BREATH): Primary | ICD-10-CM

## 2021-03-22 RX ORDER — ALBUTEROL SULFATE 90 UG/1
2 AEROSOL, METERED RESPIRATORY (INHALATION) EVERY 4 HOURS PRN
Qty: 1 INHALER | Refills: 0 | Status: SHIPPED | OUTPATIENT
Start: 2021-03-22 | End: 2021-05-03

## 2021-03-23 ENCOUNTER — TELEPHONE (OUTPATIENT)
Dept: FAMILY MEDICINE CLINIC | Age: 56
End: 2021-03-23

## 2021-03-23 ENCOUNTER — HOSPITAL ENCOUNTER (OUTPATIENT)
Dept: MRI IMAGING | Age: 56
Discharge: HOME | End: 2021-03-23
Payer: COMMERCIAL

## 2021-03-23 ENCOUNTER — OFFICE VISIT (OUTPATIENT)
Dept: CARDIOLOGY CLINIC | Age: 56
End: 2021-03-23
Payer: COMMERCIAL

## 2021-03-23 VITALS
TEMPERATURE: 97.7 F | SYSTOLIC BLOOD PRESSURE: 114 MMHG | DIASTOLIC BLOOD PRESSURE: 70 MMHG | HEART RATE: 78 BPM | BODY MASS INDEX: 41.95 KG/M2 | WEIGHT: 293 LBS | HEIGHT: 70 IN

## 2021-03-23 DIAGNOSIS — R06.02 SHORTNESS OF BREATH: ICD-10-CM

## 2021-03-23 DIAGNOSIS — I48.0 PAF (PAROXYSMAL ATRIAL FIBRILLATION) (HCC): Primary | ICD-10-CM

## 2021-03-23 DIAGNOSIS — R00.2 PALPITATIONS: ICD-10-CM

## 2021-03-23 DIAGNOSIS — I48.3 TYPICAL ATRIAL FLUTTER (HCC): ICD-10-CM

## 2021-03-23 DIAGNOSIS — M54.2 CERVICALGIA: ICD-10-CM

## 2021-03-23 DIAGNOSIS — M54.12 CERVICAL RADICULOPATHY: ICD-10-CM

## 2021-03-23 DIAGNOSIS — I50.32 CHRONIC DIASTOLIC HEART FAILURE (HCC): ICD-10-CM

## 2021-03-23 PROCEDURE — 93000 ELECTROCARDIOGRAM COMPLETE: CPT | Performed by: NURSE PRACTITIONER

## 2021-03-23 PROCEDURE — 99214 OFFICE O/P EST MOD 30 MIN: CPT | Performed by: NURSE PRACTITIONER

## 2021-03-23 RX ORDER — FLECAINIDE ACETATE 50 MG/1
50 TABLET ORAL 2 TIMES DAILY
Qty: 60 TABLET | Refills: 5 | Status: SHIPPED | OUTPATIENT
Start: 2021-03-23 | End: 2022-08-25 | Stop reason: SDUPTHER

## 2021-03-23 RX ORDER — CYCLOBENZAPRINE HCL 5 MG
5 TABLET ORAL 3 TIMES DAILY PRN
COMMUNITY
Start: 2021-01-28 | End: 2021-11-15 | Stop reason: ALTCHOICE

## 2021-03-23 RX ORDER — HYDROCODONE BITARTRATE AND ACETAMINOPHEN 7.5; 325 MG/1; MG/1
1 TABLET ORAL EVERY 8 HOURS PRN
COMMUNITY
End: 2021-03-25 | Stop reason: SDUPTHER

## 2021-03-23 RX ORDER — KETOCONAZOLE 20 MG/ML
SHAMPOO TOPICAL
COMMUNITY
Start: 2021-02-19 | End: 2021-11-15 | Stop reason: ALTCHOICE

## 2021-03-23 NOTE — TELEPHONE ENCOUNTER
APPROVED MRI Cervical Spine wo Contrast 3/22/21 to 9/18/21 by [employer].    Please inform pt of this approval.    Document attached.

## 2021-03-24 DIAGNOSIS — I48.0 PAROXYSMAL ATRIAL FIBRILLATION (HCC): ICD-10-CM

## 2021-03-25 ENCOUNTER — OFFICE VISIT (OUTPATIENT)
Dept: FAMILY MEDICINE CLINIC | Age: 56
End: 2021-03-25
Payer: COMMERCIAL

## 2021-03-25 VITALS
HEART RATE: 86 BPM | WEIGHT: 293 LBS | TEMPERATURE: 97.6 F | OXYGEN SATURATION: 95 % | SYSTOLIC BLOOD PRESSURE: 132 MMHG | BODY MASS INDEX: 52.23 KG/M2 | DIASTOLIC BLOOD PRESSURE: 84 MMHG | RESPIRATION RATE: 16 BRPM

## 2021-03-25 DIAGNOSIS — M54.6 THORACOLUMBAR BACK PAIN: Primary | ICD-10-CM

## 2021-03-25 DIAGNOSIS — F40.240 CLAUSTROPHOBIA: ICD-10-CM

## 2021-03-25 DIAGNOSIS — M54.12 CERVICAL RADICULOPATHY: ICD-10-CM

## 2021-03-25 DIAGNOSIS — M54.50 THORACOLUMBAR BACK PAIN: Primary | ICD-10-CM

## 2021-03-25 PROCEDURE — 99214 OFFICE O/P EST MOD 30 MIN: CPT | Performed by: FAMILY MEDICINE

## 2021-03-25 RX ORDER — DIAZEPAM 5 MG/1
TABLET ORAL
Qty: 4 TABLET | Refills: 0 | Status: SHIPPED | OUTPATIENT
Start: 2021-03-25 | End: 2021-04-08 | Stop reason: SDUPTHER

## 2021-03-25 RX ORDER — HYDROCODONE BITARTRATE AND ACETAMINOPHEN 7.5; 325 MG/1; MG/1
1 TABLET ORAL EVERY 8 HOURS PRN
Qty: 30 TABLET | Refills: 0 | Status: SHIPPED | OUTPATIENT
Start: 2021-03-25 | End: 2021-05-06 | Stop reason: SDUPTHER

## 2021-03-25 NOTE — PATIENT INSTRUCTIONS
Your primary care physician is willing support with any documentation as you go through the disability process. Continue partnership with her cardiology team and to meet with the electrophysiologist in April. You have the Valium to take before entering the MRI machine.

## 2021-03-25 NOTE — PROGRESS NOTES
HYDROcodone-acetaminophen (NORCO) 7.5-325 MG per tablet Take 1 tablet by mouth every 8 hours as needed for Pain for up to 30 doses. Yes Juli Hernandez DO   Cholecalciferol 50 MCG (2000 UT) CAPS  Yes Historical Provider, MD   cyanocobalamin 1000 MCG tablet Take 1,000 mcg by mouth daily Yes Historical Provider, MD   cyclobenzaprine (FLEXERIL) 5 MG tablet Take 5 mg by mouth 3 times daily as needed Yes Historical Provider, MD   ketoconazole (NIZORAL) 2 % shampoo  Yes Historical Provider, MD   flecainide (TAMBOCOR) 50 MG tablet Take 1 tablet by mouth 2 times daily Yes STEPHANIE Eng Aas, CNP   albuterol sulfate HFA (PROVENTIL HFA) 108 (90 Base) MCG/ACT inhaler Inhale 2 puffs into the lungs every 4 hours as needed for Wheezing or Shortness of Breath (Space out to every 6 hours as symptoms improve) Space out to every 6 hours as symptoms improve. Yes Eber Giordano MD   triamterene-hydroCHLOROthiazide (MAXZIDE) 75-50 MG per tablet Take 1 tablet by mouth daily as needed (swelling) Yes Juli Hernandez DO   dilTIAZem (CARDIZEM CD) 240 MG extended release capsule Take 1 capsule by mouth daily Yes STEPHANIE Duran CNP   apixaban (ELIQUIS) 5 MG TABS tablet Take 1 tablet by mouth 2 times daily TAKE 1 TABLET BY MOUTH 2 TIMES A DAY Yes STEPHANIE Duran CNP   Handicap Placard MISC by Does not apply route Dx of lumbar DDD. Effective Dec 18, 2020 until Dec 18, 2021.  Yes Juli Hernandez DO   sertraline (ZOLOFT) 100 MG tablet Take 1 tablet by mouth daily Yes Juli Hernandez DO   Spacer/Aero-Holding Chambers (AEROCHAMBER MV) MISC Use with inhaler as directed Yes Eber Giordano MD   DULERA 200-5 MCG/ACT inhaler INHALE 1 PUFF INTO THE LUNGS TWICE DAILY Yes Eber Giordano MD   EPINEPHrine (AUVI-Q) 0.3 MG/0.3ML SOAJ injection Inject 0.3 mg into the muscle as needed Use as directed for allergic reaction  Yes Historical Provider, MD   omalizumab Amaya Corey) 150 MG injection Inject 300 mg 10/23/2017, 11/26/2018    Influenza, Radha Shaw, IM, PF (6 mo and older Fluzone, Flulaval, Fluarix, and 3 yrs and older Afluria) 11/26/2018, 12/08/2020    Influenza, Radha Shaw, Recombinant, IM PF (Flublok 18 yrs and older) 11/16/2019    PPD Test 06/11/2013, 05/07/2014, 08/13/2019    Pneumococcal Polysaccharide ([id number]) 12/31/2008, 06/17/2019    Tdap (Boostrix, Adacel) 05/28/2013       Past Medical History:   Diagnosis Date    Acute lateral meniscus tear of right knee 02/2019    Acute medial meniscus tear of right knee 02/2019    Anesthesia complication     woke up during one surgery    Anxiety     Arthritis     Asthma     Atrial fibrillation ([address]     new dx 4 weeks ago, first of  Sept 2017    CH[facility]est[facility]) [facility])     Edema     Fibromyalgia     GERD (gastroesophageal reflux disease)     reflux    Hiatal hernia     Idiopathic urticaria 10/29/2020    SVT (supraventricular tachycardia) ([facility])     hx svt     Past Surgical History:   Procedure Laterality Date    ABLATION OF DYSRHYTHMIC FOCUS  04/2019    afib    BLADDER SUSPENSION  2004    HERNIA REPAIR  2001    HYSTERECTOMY  2004    KNEE ARTHROSCOPY Right 7/11/2019    VIDEO ARTHROSCOPY RIGHT KNEE, PARTIAL MEDIAL AND LATERAL MENISCECTOMY,, MEDIAL MICRO FRACTURE CHONDROPLASTY performed by Erika Moreno MD at [facility] GASTRIC BYPASS  2011    TONSILLECTOMY AND ADENOIDECTOMY      UPPP UVULOPALATOPHARYGOPLASTY      [facility] N/A 8/26/2019    DIAGNOSTIC LAPAROSCOPY, LAPAROSCOPIC LYSIS OF ADHESIONS, LAPAROSCOPIC REDUCTION OF RECURRENT INCISIONAL HERNIA WITH MESH performed by Parviz Mackey MD at [address] [place] [address] History   Problem Relation Age of Onset    Cancer Mother         lung    Arthritis Mother     Cirrhosis Father     Asthma Maternal Aunt      Social History     Socioeconomic History    Marital status:      Spouse name: Not on file    Number of children: 3    Years of education: 15.5    Highest education level: Not on file   Occupational History    Not on file   Social Needs    Financial resource strain: Not on file    Food insecurity     Worry: Not on file     Inability: Not on file    Transportation needs     Medical: Not on file     Non-medical: Not on file   Tobacco Use    Smoking status: Never Smoker    Smokeless tobacco: Never Used   Substance and Sexual Activity    Alcohol use: Yes     Comment: 0-1 drinks per month    Drug use: Yes     Types: Marijuana, Other-see comments     Comment: Medical Marijuana use    Sexual activity: Yes     Partners: Male   Lifestyle    Physical activity     Days per week: Not on file     Minutes per session: Not on file    Stress: Not on file   Relationships    Social connections     Talks on phone: Not on file     Gets together: Not on file     Attends [employer] service: Not on file     Active member of club or organization: Not on file     Attends meetings of clubs or organizations: Not on file     Relationship status: Not on file    Intimate partner violence     Fear of current or ex partner: Not on file     Emotionally abused: Not on file     Physically abused: Not on file     Forced sexual activity: Not on file   Other Topics Concern    Not on file   Social History Narrative    Not on file       O: /84   Pulse 86   Temp 97.6 °F (36.4 °C) (Tympanic)   Resp 16   Wt (!) 364 lb (165.1 kg)   SpO2 95%   Breastfeeding No   BMI 52.23 kg/m²   Physical Exam  GEN: No acute distress,cooperative, well nourished, alert. HEENT: PEERLA, EOMI , normocephalic/atraumatic, external nose appears normal.  External ear is normal.    Neck: soft, supple, no appreciable thyromegaly,mass  CV: No upper extremity edema. Resp:  Breathing comfortably. Psych: Anxious and dysthymic affect. Denies any SI/HI  Neuro: AOx3  Other Pertinent Physical Exam findings: Painful to get up from sitting to standing.     ASSESSMENT   Diagnosis Orders   1. Thoracolumbar back pain  [facility]    PT aquatic therapy    HYDROcodone-acetaminophen (NORCO) 7.5-325 MG per tablet   2. Claustrophobia  diazePAM (VALIUM) 5 MG tablet   3. Cervical radiculopathy  [facility]    PT aquatic therapy    HYDROcodone-acetaminophen (NORCO) 7.5-325 MG per tablet     Takes the Norco sparingly. Benzodiazepine prescribed for attempt to get back in the MRI machine. Patient states she has had benefit with massage therapy and aquatic therapy. As a [employer] will see if she can get discounted treatments at one of the [facility]'s. Letter written to extend short-term disability with a return to work date July 2        PLAN          Time spent on encounter (to include any same day medical record review): 30 minutes  Established E/M: 10-19 (19469), 20-29 (64458), 30-39 (61136), 40-54 (24210)   New E/M: 15-29 (69953), 30-44 (53854), 45-59 (81094), 60-74 (10966)  Telephone E/M: 5-10 (87501), 11-20 (63999), 21-30 (46124)    If applicable, see additional patient information and instructions under \"Patient Instructions. \"    Return if symptoms worsen or fail to improve. Patient Instructions   Your primary care physician is willing support with any documentation as you go through the disability process. Continue partnership with her cardiology team and to meet with the electrophysiologist in April. You have the Valium to take before entering the MRI machine. Please note a portion of this chart was generated using dragon dictation software. Although every effort was made to ensure the accuracy of this automated transcription,some errors in transcription may have occurred.

## 2021-03-25 NOTE — LETTER
[address]  Phone: [phone]  Fax: Niall Gomes,         March 25, 2021     Patient: Bernarda Laws   YOB: 1965   Date of Visit: 3/25/2021       To Whom It May Concern: It is my medical opinion that Neelam Landon should continue to remain off work due to ongoing medical problems and necessary testing. [employer] [facility] paperwork has Ms. Shannon Koehler returning to work April 11. As her primary care physician I advise that the short term disability be extended to July 2, 2021. If you have any questions or concerns, please don't hesitate to call.     Sincerely,        Isra Hendrickson, DO

## 2021-03-30 ENCOUNTER — HOSPITAL ENCOUNTER (OUTPATIENT)
Dept: MRI IMAGING | Age: 56
Discharge: HOME | End: 2021-03-30
Payer: COMMERCIAL

## 2021-03-30 DIAGNOSIS — M54.2 CERVICALGIA: ICD-10-CM

## 2021-03-30 PROCEDURE — 72141 MRI NECK SPINE W/O DYE: CPT

## 2021-04-01 ENCOUNTER — TELEPHONE (OUTPATIENT)
Dept: FAMILY MEDICINE CLINIC | Age: 56
End: 2021-04-01

## 2021-04-01 DIAGNOSIS — M54.2 CERVICALGIA: ICD-10-CM

## 2021-04-01 DIAGNOSIS — G89.4 CHRONIC PAIN SYNDROME: ICD-10-CM

## 2021-04-01 DIAGNOSIS — M54.12 CERVICAL RADICULOPATHY: Primary | ICD-10-CM

## 2021-04-01 NOTE — TELEPHONE ENCOUNTER
Referral and placed for patient to call to schedule appointment to meet with [facility] spine physician Dr. Milad Castellano. Please give her phone number to make appointment. [address], [place] Geovanna Diallo [address]  Tel: [phone]

## 2021-04-08 ENCOUNTER — OFFICE VISIT (OUTPATIENT)
Dept: FAMILY MEDICINE CLINIC | Age: 56
End: 2021-04-08
Payer: COMMERCIAL

## 2021-04-08 VITALS
RESPIRATION RATE: 16 BRPM | BODY MASS INDEX: 53.12 KG/M2 | WEIGHT: 293 LBS | DIASTOLIC BLOOD PRESSURE: 88 MMHG | TEMPERATURE: 97.1 F | HEART RATE: 66 BPM | OXYGEN SATURATION: 98 % | SYSTOLIC BLOOD PRESSURE: 128 MMHG

## 2021-04-08 DIAGNOSIS — G89.4 CHRONIC PAIN SYNDROME: ICD-10-CM

## 2021-04-08 DIAGNOSIS — F33.2 MDD (MAJOR DEPRESSIVE DISORDER), RECURRENT SEVERE, WITHOUT PSYCHOSIS (HCC): ICD-10-CM

## 2021-04-08 DIAGNOSIS — I48.0 PAROXYSMAL ATRIAL FIBRILLATION (HCC): ICD-10-CM

## 2021-04-08 DIAGNOSIS — G47.01 INSOMNIA DUE TO MEDICAL CONDITION: Primary | ICD-10-CM

## 2021-04-08 DIAGNOSIS — M54.12 CERVICAL RADICULOPATHY: ICD-10-CM

## 2021-04-08 DIAGNOSIS — M79.7 FIBROMYALGIA: ICD-10-CM

## 2021-04-08 PROCEDURE — 99215 OFFICE O/P EST HI 40 MIN: CPT | Performed by: FAMILY MEDICINE

## 2021-04-08 RX ORDER — DIAZEPAM 5 MG/1
5 TABLET ORAL NIGHTLY PRN
Qty: 30 TABLET | Refills: 2 | Status: SHIPPED | OUTPATIENT
Start: 2021-04-08 | End: 2021-11-15 | Stop reason: SDUPTHER

## 2021-04-08 NOTE — PROGRESS NOTES
[facility]  Progress Note  Flower Haddad DO          Winnie Calvillo  1965    04/08/21    Chief Complaint:   Winnie Calvillo is a 54 y.o. female who is here for disability paperwork for chronic physical health and mental health conditions      HPI:     Ms. Umer Matamoros has disability forms for me to review and complete today. I have known her since she establish in this clinic February 2017. 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 She was working at the main location in [employer] [place] [employer]. During the Covid pandemic she has worked from home. I am well aware of her physical chronic conditions to include degenerative disc disease bilateral knee arthritis and fibromyalgia. I am also aware of her chronic atrial fibrillation which she is on antiarrhythmic flecainide with a consultation with electrophysiologist coming up. Mentally she experiences general anxiety disorder and major depressive disorder exacerbated in the last 3 months or more from the ongoing chronic pain. She found the Valium helpful to her when she went into the MRI machine she found that her anxiety was improved. She is not on a hypnotic but requests prescription to help with the chronic insomnia due to pain anxiety. Finds on days where the pain gets worse she needs to take a Norco.  Unfortunately she experiences drowsiness affecting her work productivity. She does use also medical marijuana which also helps with the chronic pain and does not seem to lead to drowsiness as much as the Norco.  She does use a cane from time to time. 50 minutes spent today on the disability form and we reviewed an 8-hour workday and for 2021 she is found it difficult to be able to work full-time shifts due to the chronic pain. At this time she is on continuous leave.     ROS negative for headache, visionchanges, chest pain, shortness of breath, abdominal pain, urinary sx, bowel 05/28/2013    DTaP 05/28/2013    DTaP (Infanrix) 05/28/2013    Influenza Vaccine, unspecified formulation 10/29/2015, 01/09/2017, 10/23/2017, 11/26/2018    Influenza Virus Vaccine 12/31/2008, 09/22/2009, 01/09/2017, 10/23/2017, 10/23/2017, 11/26/2018    Influenza, Gerhardt Chess, IM, PF (6 mo and older Fluzone, Flulaval, Fluarix, and 3 yrs and older Afluria) 11/26/2018, 12/08/2020    Influenza, Gerhardt Chess, Recombinant, IM PF (Flublok 18 yrs and older) 11/16/2019    PPD Test 06/11/2013, 05/07/2014, 08/13/2019    Pneumococcal Polysaccharide ([id number]) 12/31/2008, 06/17/2019    Tdap (Boostrix, Adacel) 05/28/2013       Past Medical History:   Diagnosis Date    Acute lateral meniscus tear of right knee 02/2019    Acute medial meniscus tear of right knee 02/2019    Anesthesia complication     woke up during one surgery    Anxiety     Arthritis     Asthma     Atrial fibrillation ([address]     new dx 4 weeks ago, first of  Sept 2017    CHF (congestive heart failure) ([address]     Edema     Fibromyalgia     GERD (gastroesophageal reflux disease)     reflux    Hiatal hernia     Idiopathic urticaria 10/29/2020    SVT (supraventricular tachycardia) (HCC)     hx svt     Past Surgical History:   Procedure Laterality Date    ABLATION OF DYSRHYTHMIC FOCUS  04/2019    afib    BLADDER SUSPENSION  2004    HERNIA REPAIR  2001    HYSTERECTOMY  2004    KNEE ARTHROSCOPY Right 7/11/2019    VIDEO ARTHROSCOPY RIGHT KNEE, PARTIAL MEDIAL AND LATERAL MENISCECTOMY,, MEDIAL MICRO FRACTURE CHONDROPLASTY performed by Narciso Rosales MD at [facility] GASTRIC BYPASS  2011    TONSILLECTOMY AND ADENOIDECTOMY      UP UVULOPALATOPHARYGOPLASTY      [facility] N/A 8/26/2019    DIAGNOSTIC LAPAROSCOPY, LAPAROSCOPIC LYSIS OF ADHESIONS, LAPAROSCOPIC REDUCTION OF RECURRENT INCISIONAL HERNIA WITH MESH performed by Martina Daly MD at [facility] OR     Family History   Problem Relation Age of Onset  Cancer Mother         lung    Arthritis Mother     Cirrhosis Father     Asthma Maternal Aunt      Social History     Socioeconomic History    Marital status:      Spouse name: Not on file    Number of children: 3    Years of education: 15.5    Highest education level: Not on file   Occupational History    Not on file   Social Needs    Financial resource strain: Not on file    Food insecurity     Worry: Not on file     Inability: Not on file   [employer] needs     Medical: Not on file     Non-medical: Not on file   Tobacco Use    Smoking status: Never Smoker    Smokeless tobacco: Never Used   Substance and Sexual Activity    Alcohol use: Yes     Comment: 0-1 drinks per month    Drug use: Yes     Types: Marijuana, Other-see comments     Comment: Medical Marijuana use    Sexual activity: Yes     Partners: Male   Lifestyle    Physical activity     Days per week: Not on file     Minutes per session: Not on file    Stress: Not on file   Relationships    Social connections     Talks on phone: Not on file     Gets together: Not on file     Attends [employer] service: Not on file     Active member of club or organization: Not on file     Attends meetings of clubs or organizations: Not on file     Relationship status: Not on file    Intimate partner violence     Fear of current or ex partner: Not on file     Emotionally abused: Not on file     Physically abused: Not on file     Forced sexual activity: Not on file   Other Topics Concern    Not on file   Social History Narrative    Not on file       O: /88   Pulse 66   Temp 97.1 °F (36.2 °C) (Tympanic)   Resp 16   Wt (!) 370 lb 3.2 oz (167.9 kg)   SpO2 98%   Breastfeeding No   BMI 53.12 kg/m²   Physical Exam  GEN: No acute distress,cooperative, well nourished, alert.    HEENT: PEERLA, EOMI , normocephalic/atraumatic, external nose appears normal.  External ear is normal.    Neck: soft, supple, no appreciable thyromegaly,mass  CV: No upper extremity edema. Resp:  Breathing comfortably. Psych: Dysthymic affect. Does not endorse SI/HI  Neuro: AOx3  Other Pertinent Physical Exam findings: Pain and getting up from sitting to standing. ASSESSMENT   Diagnosis Orders   1. Insomnia due to medical condition  diazePAM (VALIUM) 5 MG tablet   2. Paroxysmal atrial fibrillation (HCC)     3. MDD (major depressive disorder), recurrent severe, without psychosis ([facility]     4. Fibromyalgia     5. Cervical radiculopathy     6. Chronic pain syndrome       #1: The risks, benefits, potential side effects and barriers to medication use were addressed today. Understanding was acknowledged. Patient asked to follow-up if condition(s) do not improve as anticipated. Reported 50 minutes spent on the disability form with patient present in the room. We were able to interact and complete the forms today. She has legitimate physical symptoms include arthritis chronic pain and chronic atrial fibrillation along with anxiety and depression which I think make it difficult for her to get through a standard day. Copy of form completed. Original given to patient and she will hand this to her legal representation. She understands she can work with her  to request the medical records      PLAN          Time spent on encounter (to include any same day medical record review): 52 minutes  Established E/M: 10-19 (57146), 20-29 (35201), 30-39 (65691), 40-54 (53044)   New E/M: 15-29 (63537), 30-44 (72796), 45-59 (66459), 60-74 (41775)  Telephone E/M: 5-10 (32315), 11-20 (56202), 21-30 (54990)    If applicable, see additional patient information and instructions under \"Patient Instructions. \"    Return in about 1 month (around 5/8/2021). There are no Patient Instructions on file for this visit. Please note a portion of this chart was generated using dragon dictation software.  Although every effort was made to ensure the accuracy of this automated transcription,some errors in transcription may have occurred.

## 2021-04-13 NOTE — PROGRESS NOTES
[facility]   Cardiac Electrophysiology Consultation   Date: 4/20/2021  Reason for Consultation:  AF  Consult Requesting Physician: No att. providers found     Chief Complaint:   Chief Complaint   Patient presents with    Atrial Fibrillation      HPI: Yani Joseph is a 54 y.o. female with PMH significant for anxiety, GERD, fibromyalgia, morbid obesity s/p gastric bypass, ASTRID (tx'd with surgery), asthma, HFpEF, PAF/AFL, s/p RFA for typical AFL (5/7/19, Dr. Leah Zhou). She had been on Rythmol since 10/2017, but stopped on her own (5/2019) as she felt it was not helpful and frequently forgetting the third dose. Following c/o of increase in palpitations and wore a 14 day Zio (2/2021) that showed 30% AF with longest episode lasting 4 days and max rate of 219 bpm.  Symptoms correlating with AF. Diltiazem was then increased and flecainide started. Four of her siblings have AF.     Interval History: Today, she is here for management of AF, presenting in SR with stable QRS. Since starting the flecainide, her episodes of AF have decreased in frequency and duration. The episodes now occur once every few weeks and last 30 min at the longest.  When in AF, she becomes very SOB and feels palpitations. Atrial Fibrillation:    BMI    :   Body mass index is 53.55 kg/m².     Duration   :   10/2017    Symptoms   :   Shortness of breath, palpitations, easy fatigability, dyspnea on exertion, decline in his functional capacity    Previous DCCV :  none    Previous AAD           :   Propafenone 10/2017-5/2019        Flecainide started 3/23/21    Beta blocker  :   none    ACE / ARB  :   none    OAC   :   Eliquis    LVEF    :   55-60%    Left atrial size :   4.7 cm    ASTRID   :   Yes, surgically treated    Past Medical History:   Diagnosis Date    Acute lateral meniscus tear of right knee 02/2019    Acute medial meniscus tear of right knee 02/2019    Anesthesia complication     woke up during one surgery    Anxiety     Arthritis     Asthma     Atrial fibrillation ([facility] [address]     new dx 4 weeks ago, first of  Sept 2017    CHF (congestive heart failure) (HCC)     Edema     Fibromyalgia     GERD (gastroesophageal reflux disease)     reflux    Hiatal hernia     Idiopathic urticaria 10/29/2020    SVT (supraventricular tachycardia) (HCC)     hx svt        Past Surgical History:   Procedure Laterality Date    ABLATION OF DYSRHYTHMIC FOCUS  04/2019    afib    BLADDER SUSPENSION  2004    HERNIA REPAIR  2001    HYSTERECTOMY  2004    KNEE ARTHROSCOPY Right 7/11/2019    VIDEO ARTHROSCOPY RIGHT KNEE, PARTIAL MEDIAL AND LATERAL MENISCECTOMY,, MEDIAL MICRO FRACTURE CHONDROPLASTY performed by Nguyen Mtz MD [facility] GASTRIC BYPASS  2011    TONSILLECTOMY AND ADENOIDECTOMY      [address] N/A 8/26/2019    DIAGNOSTIC LAPAROSCOPY, LAPAROSCOPIC LYSIS OF ADHESIONS, LAPAROSCOPIC REDUCTION OF RECURRENT INCISIONAL HERNIA WITH MESH performed by Amy Diop MD at [address] [place] [address] Avenue: Allergies   Allergen Reactions    Latex Anaphylaxis and Rash    Aspirin      Swelling in lower legs    Demerol      rash    Meperidine     Nsaids Swelling    Pcn [Penicillins] Hives    Ranitidine Hcl     Sulfa Antibiotics Rash and Hives       Medication:   Prior to Admission medications    Medication Sig Start Date End Date Taking? Authorizing Provider   diazePAM (VALIUM) 5 MG tablet Take 1 tablet by mouth nightly as needed for Anxiety for up to 30 days.  4/8/21 5/8/21 Yes Butch Youngergcang, DO   Cholecalciferol 50 MCG (2000 UT) CAPS    Yes Historical Provider, MD   cyanocobalamin 1000 MCG tablet Take 1,000 mcg by mouth daily   Yes Historical Provider, MD   cyclobenzaprine (FLEXERIL) 5 MG tablet Take 5 mg by mouth 3 times daily as needed 1/28/21  Yes Historical Provider, MD   ketoconazole (NIZORAL) 2 % shampoo  2/19/21  Yes Historical Provider, MD   flecainide (TAMBOCOR) 50 MG tablet Take 1 tablet by mouth 2 times daily 3/23/21  Yes STEPHANIE Pina CNP   albuterol sulfate HFA (PROVENTIL HFA) 108 (90 Base) MCG/ACT inhaler Inhale 2 puffs into the lungs every 4 hours as needed for Wheezing or Shortness of Breath (Space out to every 6 hours as symptoms improve) Space out to every 6 hours as symptoms improve. 3/22/21  Yes Sandra Alvarado MD   triamterene-hydroCHLOROthiazide [facility]) 75-50 MG per tablet Take 1 tablet by mouth daily as needed (swelling) 3/19/21  Yes Luis Hernandez,    dilTIAZem (CARDIZEM CD) 240 MG extended release capsule Take 1 capsule by mouth daily 3/5/21  Yes STEPHANIE Serrano CNP   apixaban (ELIQUIS) 5 MG TABS tablet Take 1 tablet by mouth 2 times daily TAKE 1 TABLET BY MOUTH 2 TIMES A DAY 2/8/21  Yes STEPHANIE Serrano CNP   Handicap Placard MISC by Does not apply route Dx of lumbar DDD. Effective Dec 18, 2020 until Dec 18, 2021. 12/18/20  Yes Luis Hernandez,    sertraline (ZOLOFT) 100 MG tablet Take 1 tablet by mouth daily 12/18/20  Yes Kamini Jason,    Spacer/Aero-Holding Chambers (AEROCHAMBER MV) MISC Use with inhaler as directed 3/6/20  Yes Sandra Alvarado MD   DULERA 200-5 MCG/ACT inhaler INHALE 1 PUFF INTO THE LUNGS TWICE DAILY 2/25/20  Yes Sandra Alvarado MD   EPINEPHrine (AUVI-Q) 0.3 MG/0.3ML SOAJ injection Inject 0.3 mg into the muscle as needed Use as directed for allergic reaction    Yes Historical Provider, MD   omalizumab Zelpha Nigh) 150 MG injection Inject 300 mg into the skin once for 1 dose 11/6/20 3/23/21  Jazlyn Grier MD       Social History:   reports that she has never smoked. She has never used smokeless tobacco. She reports current alcohol use. She reports current drug use. Drugs: Marijuana and Other-see comments. Family History:  family history includes Arthritis in her mother; Asthma in her maternal aunt;  Cancer in her mother; Cirrhosis in her father. Reviewed. Denies family history of sudden cardiac death, arrhythmia, premature CAD    Review of System:    · General ROS: negative for - chills, fever   · Psychological ROS: negative for - anxiety or depression  · Ophthalmic ROS: negative for - eye pain or loss of vision  · ENT ROS: negative for - epistaxis, headaches, nasal discharge, sore throat   · Allergy and Immunology ROS: negative for - hives, nasal congestion   · Hematological and Lymphatic ROS: negative for - bleeding problems, blood clots, bruising or jaundice  · Endocrine ROS: negative for - skin changes, temperature intolerance or unexpected weight changes  · Respiratory ROS: negative for - cough, hemoptysis, pleuritic pain, SOB, sputum changes or wheezing  · Cardiovascular ROS: Per HPI. · Gastrointestinal ROS: negative for - abdominal pain, blood in stools, diarrhea, hematemesis, melena, nausea/vomiting or swallowing difficulty/pain  · Genito-Urinary ROS: negative for - dysuria or incontinence  · Musculoskeletal ROS: negative for - joint swelling or muscle pain  · Neurological ROS: negative for - confusion, dizziness, gait disturbance, headaches, numbness/tingling, seizures, speech problems, tremors, visual changes or weakness  · Dermatological ROS: negative for - rash    Physical Examination:  Vitals:    04/20/21 1014   BP: 128/76   Pulse: 67       · Constitutional: Oriented. No distress. · Head: Normocephalic and atraumatic. · Mouth/Throat: Oropharynx is clear and moist.   · Eyes: Conjunctivae normal. EOM are normal.   · Neck: Normal range of motion. Neck supple. No rigidity. No JVD present. · Cardiovascular: Normal rate, regular rhythm, S1&S2 and intact distal pulses. · Pulmonary/Chest: Bilateral respiratory sounds. No wheezes. No rhonchi. · Abdominal: Soft. Bowel sounds present. No distension, No tenderness. · Musculoskeletal: No tenderness. No edema    · Lymphadenopathy: Has no cervical adenopathy.    · Neurological: Alert and oriented. Cranial nerve appears intact, No Gross deficit   · Skin: Skin is warm and dry. No rash noted. · Psychiatric: Has a normal mood, affect and behavior     Labs:  Reviewed. ECG: reviewed, Sinus  Rhythm, with QRS duration 98 ms. No pathologic Q waves, ventricular pre-excitation, or QT prolongation. Studies:   1. 14 day Zio (2/4-2/22/21):  SR with average HR 72 (), 30% AF burden with the longest episode lasting 4 days and rate up to 219 bpm, 7 runs of SVT with the longest lasting 14 beats and rate up to 187 bpm, symptoms correlating with AF. 30-day monitor (9/17-10/17/18):  SR with average HR 68 (), PACs, PVCs. No AF. 2. Echo 3/17/21:   Summary   Normal left ventricle size and systolic function with an estimated ejection   fraction of 55-60%. Mild concentric left ventricular hypertrophy. No regional wall motion abnormalities are seen. Normal diastolic function. Mild mitral and tricuspid regurgitation. 3. Stress Test 6/3/17:    1. Left ventricular ejection fraction of 57 %. 2. SPECT Myocardial Perfusion Imaging results are considered   negative for acute ischemia. 4. Cath:  na    The MCOT, echocardiogram, stress test, and coronary angiography/PCI were reviewed by myself and used for my plan of care. Procedures:  1.  none    Assessment/Plan:  1. PAF, on Eliquis and flecainide  2. 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 Diastolic CHF  4. HTN, stable on Maxide and diltiazem  5. ASTRID, treated surgically   6. Morbid obesity    Symptomatic AF with SOB and palpitations  Since being on flecainide, her AF episodes have decreased in frequency and duration  In SR today with stable QRS    Distant detail about the various treatment options for atrial fibrillation including antiarrhythmic therapy and atrial fibrillation ablation.   Also discussed about the comorbid conditions which could eventually reduce the success rate of atrial fibrillation ablation in the setting of morbid obesity. As she feels better while being on flecainide, it is reasonable to continue flecainide and work on risk factors. Discussed lifestyle modifications including weight loss. Follow up in 6 months with EP NP    Thank you for allowing me to participate in the care of Mary Newman. All questions and concerns were addressed to the patient/family. Alternatives to my treatment were discussed. Criselda Castillo RN, am scribing for and in the presence of Dr. Pepe Toussaint. 04/20/21 10:30 AM  JARON Campbell MD, personally performed the services prescribed in this documentation as scribed by Ms. Dale Hnason RN in my presence and it is both accurate and complete.        Jamee Curry MD  Cardiac Electrophysiology  [facility]

## 2021-04-15 ENCOUNTER — OFFICE VISIT (OUTPATIENT)
Dept: ORTHOPEDIC SURGERY | Age: 56
End: 2021-04-15
Payer: COMMERCIAL

## 2021-04-15 VITALS — BODY MASS INDEX: 41.95 KG/M2 | HEIGHT: 70 IN | WEIGHT: 293 LBS

## 2021-04-15 DIAGNOSIS — M47.22 CERVICAL SPONDYLOSIS WITH RADICULOPATHY: Primary | ICD-10-CM

## 2021-04-15 PROCEDURE — 99214 OFFICE O/P EST MOD 30 MIN: CPT | Performed by: PHYSICIAN ASSISTANT

## 2021-04-15 NOTE — PROGRESS NOTES
New Patient: CERVICAL SPINE    Referring Provider:  Suhail Pereira    CHIEF COMPLAINT:    Chief Complaint   Patient presents with    Neck Pain     Patient states that she has had neck pain for the last year. Patient complains of neck and bilateral arm pain. She notes weakness in her arms as well. She has not had any treatment. HISTORY OF PRESENT ILLNESS:   Ms. Yasmeen Sheehan is a pleasant 54 y.o. old female here for consultation regarding her neck and bilateral arm pain. She states the pain began insidiously about 1 year ago. Her pain has steadily increased since then. She rates her neck pain 8/10 and shoulder and arm pain 8/10. She describes the pain as aching and burning. Pain is worst with standing and activity, and improved some with sitting or lying down. The arm pain radiates to her left greater than right arm to her hands. She notes numbness and tingling in bilateral arms and hands into all five fingers. She notes weakness of her arms. Patient notes difficulty with fine motor skills. She notes occasional balance disturbance and chronic right knee pain. She notes chronic urinary urgency, otherwise denies saddle numbness or other bowel or bladder dysfunction. The pain occasionally interferes with her sleep.     Current/Past Treatment:   · Physical Therapy: No  · Chiropractic:  No  · Injection:  No   · Medications: Flexeril, Valium , Norco    Past Medical History:   Past Medical History:   Diagnosis Date    Acute lateral meniscus tear of right knee 02/2019    Acute medial meniscus tear of right knee 02/2019    Anesthesia complication     woke up during one surgery    Anxiety     Arthritis     Asthma     Atrial fibrillation ([address]     new dx 4 weeks ago, first of  Sept 2017    CHF (congestive heart failure) ([address]     Edema     Fibromyalgia     GERD (gastroesophageal reflux disease)     reflux    Hiatal hernia     Idiopathic urticaria 10/29/2020    SVT (supraventricular tachycardia) ([address]     hx svt        Past Surgical History:     Past Surgical History:   Procedure Laterality Date    ABLATION OF DYSRHYTHMIC FOCUS  04/2019    afib    BLADDER SUSPENSION  2004    HERNIA REPAIR  2001    HYSTERECTOMY  2004    KNEE ARTHROSCOPY Right 7/11/2019    VIDEO ARTHROSCOPY RIGHT KNEE, PARTIAL MEDIAL AND LATERAL MENISCECTOMY,, MEDIAL MICRO FRACTURE CHONDROPLASTY performed [facility] Judy Kumar [facility] at [facility] GASTRIC BYPASS  2011    TONSILLECTOMY AND ADENOIDECTOMY      UPPP UVULOPALATOPHARYGOPLASTY      VENTRAL HERNIA REPAIR N/A 8/26/2019    DIAGNOSTIC LAPAROSCOPY, LAPAROSCOPIC LYSIS OF ADHESIONS, LAPAROSCOPIC REDUCTION OF RECURRENT INCISIONAL HERNIA WITH MESH performed by Sultana Alegre MD at [facility] OR       Current Medications:     Current Outpatient Medications:     diazePAM (VALIUM) 5 MG tablet, Take 1 tablet by mouth nightly as needed for Anxiety for up to 30 days. , Disp: 30 tablet, Rfl: 2    Cholecalciferol 50 MCG (2000 UT) CAPS, , Disp: , Rfl:     cyanocobalamin 1000 MCG tablet, Take 1,000 mcg by mouth daily, Disp: , Rfl:     cyclobenzaprine (FLEXERIL) 5 MG tablet, Take 5 mg by mouth 3 times daily as needed, Disp: , Rfl:     ketoconazole (NIZORAL) 2 % shampoo, , Disp: , Rfl:     flecainide (TAMBOCOR) 50 MG tablet, Take 1 tablet by mouth 2 times daily, Disp: 60 tablet, Rfl: 5    albuterol sulfate HFA (PROVENTIL HFA) 108 (90 Base) MCG/ACT inhaler, Inhale 2 puffs into the lungs every 4 hours as needed for Wheezing or Shortness of Breath (Space out to every 6 hours as symptoms improve) Space out to every 6 hours as symptoms improve., Disp: 1 Inhaler, Rfl: 0    triamterene-hydroCHLOROthiazide (MAXZIDE) 75-50 MG per tablet, Take 1 tablet by mouth daily as needed (swelling), Disp: 90 tablet, Rfl: 1    dilTIAZem (CARDIZEM CD) 240 MG extended release capsule, Take 1 capsule by mouth daily, Disp: 60 capsule, Rfl: 3    apixaban (ELIQUIS) 5 MG TABS tablet, Take 1 tablet by mouth 2 times daily TAKE 1 TABLET BY MOUTH 2 TIMES A DAY, Disp: 180 tablet, Rfl: 3    Handicap Placard MISC, by Does not apply route Dx of lumbar DDD. Effective Dec 18, 2020 until Dec 18, 2021., Disp: 1 each, Rfl: 0    sertraline (ZOLOFT) 100 MG tablet, Take 1 tablet by mouth daily, Disp: 30 tablet, Rfl: 3    omalizumab (XOLAIR) 150 MG injection, Inject 300 mg into the skin once for 1 dose, Disp: 1 vial, Rfl: 3    Spacer/Aero-Holding Chambers (AEROCHAMBER MV) MISC, Use with inhaler as directed, Disp: 1 each, Rfl: 0    DULERA 200-5 MCG/ACT inhaler, INHALE 1 PUFF INTO THE LUNGS TWICE DAILY, Disp: 13 g, Rfl: 5    EPINEPHrine (AUVI-Q) 0.3 MG/0.3ML SOAJ injection, Inject 0.3 mg into the muscle as needed Use as directed for allergic reaction , Disp: , Rfl:     Allergies:  Latex, Aspirin, Demerol, Meperidine, Nsaids, Pcn [penicillins], Ranitidine hcl, and Sulfa antibiotics    Social History:    reports that she has never smoked. She has never used smokeless tobacco. She reports current alcohol use. She reports current drug use. Drugs: Marijuana and Other-see comments.     Family History:   Family History   Problem Relation Age of Onset   [facility] Cancer Mother         lung    Arthritis Mother     Cirrhosis Father     Asthma Maternal Aunt        REVIEW OF SYSTEMS: Full ROS noted & scanned   CONSTITUTIONAL: Denies unexplained weight loss, fevers, chills or fatigue  NEUROLOGICAL: Denies unsteady gait or progressive weakness  MUSCULOSKELETAL: Denies joint swelling or redness  PSYCHOLOGICAL: Denies anxiety, depression   SKIN: Denies skin changes, delayed healing, rash, itching   HEMATOLOGIC: Denies easy bleeding or bruising  ENDOCRINE: Denies excessive thirst, urination, heat/cold  RESPIRATORY: Denies current dyspnea, cough  GI: Denies nausea, vomiting, diarrhea   : Denies bowel or bladder issues       PHYSICAL EXAM:    Vitals: Height 5' 10\" (1.778 m), weight (!) 370 lb (167.8 kg), not currently breastfeeding. GENERAL EXAM:  · General Apparence: Patient is adequately groomed with no evidence of malnutrition. · Orientation: The patient is oriented to time, place and person. · Mood & Affect:The patient's mood and affect are appropriate   · Vascular: Examination reveals no swelling tenderness in upper or lower extremities. Good capillary refill  · Lymphatic: The lymphatic examination bilaterally reveals all areas to be without enlargement or induration  · Sensation: Sensation is intact without deficit  · Coordination/Balance: Good coordination. Tandem walking normal.     CERVICAL EXAMINATION:  · Inspection: Local inspection shows no step-off or bruising. Cervical alignment is normal.     · Palpation: No evidence of tenderness at the midline, and trapezius. Paraspinal tenderness is present. There is no step-off or paraspinal spasm. · Range of Motion: Cervical flexion, extension, and rotation are mildly reduced with pain. · Strength: 5/5 bilateral upper extremities   · Special Tests:    ·  Spurling's negative & Leblanc's negative bilaterally. ·  Cubital and Carpal tunnel Tinel's negative bilaterally. · Skin:There are no rashes, ulcerations or lesions in right & left upper extremities. · Reflexes: Bilaterally triceps, biceps and brachioradialis are 2+. Clonus absent bilaterally at the feet. · Gait & station: normal, patient ambulates without assistance       · Additional Examinations:       · RIGHT UPPER EXTREMITY:  Inspection/examination of the right upper extremity does not show any tenderness, deformity or injury. Range of motion is unremarkable. There is no gross instability. There are no rashes, ulcerations or lesions. Strength and tone are normal.  · LEFT UPPER EXTREMITY: Inspection/examination of the left upper extremity does not show any tenderness, deformity or injury. Range of motion is unremarkable. There is no gross instability. There are no rashes, ulcerations or lesions. Strength and tone are normal.    Diagnostic Testing:  I reviewed MRI images of her cervical spine from 3/23/21. They show degenerative disc changes with a right disc protrusion and moderate-severe right foraminal narrowing C3-4, and disc bulging and facet arthrosis C5-6 and C6-7 with severe bilateral foraminal narrowing. Impression:   Cervical DDD with radiculopathy    Plan:    We discussed treatment options including observation, physical therapy, epidural injections or ACDF. She wishes to proceed with an epidural injection. She was also provided information for Flako exercises to try at home, as well as a cervical traction collar. Old records were reviewed. Patient examined and note dictated by Natasha Brice PA-C. Patient also seen and examined by Dr. Teri Reyna.

## 2021-04-20 ENCOUNTER — OFFICE VISIT (OUTPATIENT)
Dept: CARDIOLOGY CLINIC | Age: 56
End: 2021-04-20
Payer: COMMERCIAL

## 2021-04-20 VITALS
DIASTOLIC BLOOD PRESSURE: 76 MMHG | WEIGHT: 293 LBS | SYSTOLIC BLOOD PRESSURE: 128 MMHG | HEIGHT: 70 IN | BODY MASS INDEX: 41.95 KG/M2 | HEART RATE: 67 BPM

## 2021-04-20 DIAGNOSIS — I48.3 TYPICAL ATRIAL FLUTTER (HCC): ICD-10-CM

## 2021-04-20 DIAGNOSIS — G47.33 OSA (OBSTRUCTIVE SLEEP APNEA): ICD-10-CM

## 2021-04-20 DIAGNOSIS — I48.0 PAROXYSMAL ATRIAL FIBRILLATION (HCC): Primary | ICD-10-CM

## 2021-04-20 DIAGNOSIS — I50.32 CHRONIC DIASTOLIC HEART FAILURE (HCC): ICD-10-CM

## 2021-04-20 DIAGNOSIS — I10 ESSENTIAL HYPERTENSION: ICD-10-CM

## 2021-04-20 PROCEDURE — 99214 OFFICE O/P EST MOD 30 MIN: CPT | Performed by: INTERNAL MEDICINE

## 2021-04-20 PROCEDURE — 93000 ELECTROCARDIOGRAM COMPLETE: CPT | Performed by: INTERNAL MEDICINE

## 2021-05-02 DIAGNOSIS — R06.02 SOB (SHORTNESS OF BREATH): ICD-10-CM

## 2021-05-03 RX ORDER — ALBUTEROL SULFATE 90 UG/1
AEROSOL, METERED RESPIRATORY (INHALATION)
Qty: 8.5 G | Refills: 2 | Status: SHIPPED | OUTPATIENT
Start: 2021-05-03 | End: 2021-09-29

## 2021-05-06 ENCOUNTER — OFFICE VISIT (OUTPATIENT)
Dept: FAMILY MEDICINE CLINIC | Age: 56
End: 2021-05-06
Payer: COMMERCIAL

## 2021-05-06 VITALS
OXYGEN SATURATION: 95 % | WEIGHT: 293 LBS | TEMPERATURE: 96.9 F | RESPIRATION RATE: 16 BRPM | SYSTOLIC BLOOD PRESSURE: 129 MMHG | HEART RATE: 72 BPM | DIASTOLIC BLOOD PRESSURE: 76 MMHG | BODY MASS INDEX: 54.15 KG/M2

## 2021-05-06 DIAGNOSIS — M54.6 THORACOLUMBAR BACK PAIN: Primary | ICD-10-CM

## 2021-05-06 DIAGNOSIS — R30.0 DYSURIA: ICD-10-CM

## 2021-05-06 DIAGNOSIS — M54.50 THORACOLUMBAR BACK PAIN: Primary | ICD-10-CM

## 2021-05-06 DIAGNOSIS — M54.12 CERVICAL RADICULOPATHY: ICD-10-CM

## 2021-05-06 DIAGNOSIS — M62.838 MUSCLE SPASM: ICD-10-CM

## 2021-05-06 PROCEDURE — 99214 OFFICE O/P EST MOD 30 MIN: CPT | Performed by: FAMILY MEDICINE

## 2021-05-06 RX ORDER — METHOCARBAMOL 750 MG/1
750 TABLET, FILM COATED ORAL 3 TIMES DAILY PRN
Qty: 60 TABLET | Refills: 2 | Status: SHIPPED | OUTPATIENT
Start: 2021-05-06 | End: 2021-05-16

## 2021-05-06 RX ORDER — HYDROCODONE BITARTRATE AND ACETAMINOPHEN 7.5; 325 MG/1; MG/1
1 TABLET ORAL EVERY 8 HOURS PRN
Qty: 30 TABLET | Refills: 0 | Status: SHIPPED | OUTPATIENT
Start: 2021-05-06 | End: 2021-12-14 | Stop reason: SDUPTHER

## 2021-05-06 NOTE — PROGRESS NOTES
[facility] Family Medicine  Progress Note  Ronna Cogan, DO          Umer Campbell  1965    05/06/21    Chief Complaint:   Umer Campbell is a 54 y.o. female who is here for chronic pain. HPI:   Undergoing the disability process. Is awaiting to find out date for the mental evaluation. MJ helping with pain but slows down process (loss of words). Onset the pain is becoming severe she benefits from the [address].  5-3 25 does not seem to be helpful. Denies any opioid-induced constipation. She finds the previous muscle relaxer ineffective. Requests to try Robaxin. Finds it difficult to walk long distances and requests a handicap placard with no expiration. I did caution her to keep the placard in a secure place when parked. ROS negative for headache, visionchanges, chest pain, shortness of breath, abdominal pain, urinary sx, bowel changes. Past medical, surgical, and social history reviewed. and allergies reviewed. Allergies   Allergen Reactions    Latex Anaphylaxis and Rash    Aspirin      Swelling in lower legs    Demerol      rash    Meperidine     Nsaids Swelling    Pcn [Penicillins] Hives    Ranitidine Hcl     Sulfa Antibiotics Rash and Hives     Prior to Visit Medications    Medication Sig Taking? Authorizing Provider   Handicap Placard MISC by Does not apply route Dx lumbar degenerative disc disease. Effective May 6, 2021. PCP requesting placard with no expiration Yes Abisai Levo Bingcang, DO   methocarbamol (ROBAXIN-750) 750 MG tablet Take 1 tablet by mouth 3 times daily as needed (back or muscle spasm) Yes Abisai Youngergcang, DO   HYDROcodone-acetaminophen (NORCO) 7.5-325 MG per tablet Take 1 tablet by mouth every 8 hours as needed for Pain for up to 30 doses.  Yes Abisai Youngergcang, DO   albuterol sulfate  (90 Base) MCG/ACT inhaler INHALE 2 PUFFS INTO THE LUNGS EVERY 4 HOURS AS NEEDED FOR WHEEZING OR SHORTNESS OF BREATH(SPACE OUT TO EVERY 6 HOURS AS Pulse: 72   Resp: 16   Temp: 96.9 °F (36.1 °C)   TempSrc: Tympanic   SpO2: 95%   Weight: (!) 377 lb 6.4 oz (171.2 kg)      Wt Readings from Last 3 Encounters:   05/06/21 (!) 377 lb 6.4 oz (171.2 kg)   04/20/21 (!) 373 lb 3.2 oz (169.3 kg)   04/15/21 (!) 370 lb (167.8 kg)     BP Readings from Last 3 Encounters:   05/06/21 129/76   04/20/21 128/76   04/08/21 128/88       Patient Active Problem List   Diagnosis    Asthma    MDD (major depressive disorder), recurrent severe, without psychosis [facility]    Cluster B personality disorder ([facility]    Colon polyps    Overdose    Restless leg syndrome    Sliding hiatal hernia    HTN (hypertension)    Atrial flutter (HCC)    SVT (supraventricular tachycardia) (HCC)    Paroxysmal atrial fibrillation (HCC)    Sinus bradycardia    Acute lateral meniscus tear of right knee    Acute medial meniscus tear of right knee    Localized edema    Acute pain of right knee    Chronic diastolic congestive heart failure (HCC)    Primary osteoarthritis of right knee    Contusion of multiple sites of right leg    Gastrocnemius strain, left    Ventral incisional hernia    Recurrent incisional hernia    Pain of upper abdomen    Incisional hernia with obstruction, without gangrene    Rhinitis, chronic    Vocal cord dysfunction    Iron deficiency anemia, unspecified    Idiopathic urticaria       Immunization History   Administered Date(s) Administered    BCG (Juan BCG) 05/28/2013    DTaP 05/28/2013    DTaP (Infanrix) 05/28/2013    Influenza Vaccine, unspecified formulation 10/29/2015, 01/09/2017, 10/23/2017, 11/26/2018    Influenza Virus Vaccine 12/31/2008, 09/22/2009, 01/09/2017, 10/23/2017, 10/23/2017, 11/26/2018    Influenza, Quadv, IM, PF (6 mo and older Fluzone, Flulaval, Fluarix, and 3 yrs and older Afluria) 11/26/2018, 12/08/2020    Influenza, Gregorio Majestic, Recombinant, IM PF (Flublok 18 yrs and older) 11/16/2019    PPD Test 06/11/2013, 05/07/2014, 08/13/2019    Pneumococcal Polysaccharide ([id number]) 12/31/2008, 06/17/2019    Tdap (Boostrix, Adacel) 05/28/2013       Past Medical History:   Diagnosis Date    Acute lateral meniscus tear of right knee 02/2019    Acute medial meniscus tear of right knee 02/2019    Anesthesia complication     woke up during one surgery    Anxiety     Arthritis     Asthma     Atrial fibrillation ([address]     new dx 4 weeks ago, first of  Sept 2017    CHF ([facility]estive [facility] failure) [facility]     Edema     Fibromyalgia     GERD (gastroesophageal reflux disease)     reflux    Hiatal hernia     Idiopathic urticaria 10/29/2020    SVT (supraventricular tachycardia) (HCC)     hx svt     Past Surgical History:   Procedure Laterality Date    ABLATION OF DYSRHYTHMIC FOCUS  04/2019    afib    BLADDER SUSPENSION  2004    HERNIA REPAIR  2001    HYSTERECTOMY  2004    KNEE ARTHROSCOPY Right 7/11/2019    VIDEO ARTHROSCOPY RIGHT KNEE, PARTIAL MEDIAL AND LATERAL MENISCECTOMY,, MEDIAL MICRO FRACTURE CHONDROPLASTY performed by Telly Vega MD at [facility] GASTRIC BYPASS  2011    TONSILLECTOMY AND ADENOIDECTOMY      UPPP UVULOPALATOPHARYGOPLASTY      [facility] N/A 8/26/2019    DIAGNOSTIC LAPAROSCOPY, LAPAROSCOPIC LYSIS OF ADHESIONS, LAPAROSCOPIC REDUCTION OF RECURRENT INCISIONAL HERNIA WITH MESH performed by Partha Guerin MD at [address] [place] [address] History   Problem Relation Age of Onset    Cancer Mother         lung    Arthritis Mother     Cirrhosis Father     Asthma Maternal Aunt      Social History     Socioeconomic History    Marital status:      Spouse name: Not on file    Number of children: 3    Years of education: 15.5    Highest education level: Not on file   Occupational History    Not on file   Social Needs    Financial resource strain: Not on file    Food insecurity     Worry: Not on file     Inability: Not on file    Transportation needs     Medical: Not on file     Non-medical: Not on file   Tobacco Use    Smoking status: Never Smoker    Smokeless tobacco: Never Used   Substance and Sexual Activity    Alcohol use: Yes     Comment: 0-1 drinks per month    Drug use: Yes     Types: Marijuana, Other-see comments     Comment: Medical Marijuana use    Sexual activity: Yes     Partners: Male   Lifestyle    Physical activity     Days per week: Not on file     Minutes per session: Not on file    Stress: Not on file   Relationships    Social connections     Talks on phone: Not on file     Gets together: Not on file     Attends [employer] service: Not on file     Active member of club or organization: Not on file     Attends meetings of clubs or organizations: Not on file     Relationship status: Not on file    Intimate partner violence     Fear of current or ex partner: Not on file     Emotionally abused: Not on file     Physically abused: Not on file     Forced sexual activity: Not on file   Other Topics Concern    Not on file   Social History Narrative    Not on file       O: /76   Pulse 72   Temp 96.9 °F (36.1 °C) (Tympanic)   Resp 16   Wt (!) 377 lb 6.4 oz (171.2 kg)   SpO2 95%   Breastfeeding No   BMI 54.15 kg/m²   Physical Exam  GEN: No acute distress,cooperative, well nourished, alert. HEENT: PEERLA, EOMI , normocephalic/atraumatic, external nose appears normal.  External ear is normal.    Neck: soft, supple, no appreciable thyromegaly,mass  CV: No upper extremity edema. Resp:  Breathing comfortably. Psych:normal affect. Neuro: AOx3  Other Pertinent Physical Exam findings: No tenderness to the mid back or paraspinal muscles today of the thoracic region. ASSESSMENT   Diagnosis Orders   1. Thoracolumbar back pain  Handicap Placard MISC    HYDROcodone-acetaminophen (NORCO) 7.5-325 MG per tablet   2. Dysuria  URINALYSIS    Culture, Urine   3. Muscle spasm  Handicap Placard MISC    methocarbamol (ROBAXIN-750) 750 MG tablet   4.  Cervical radiculopathy  Handicap Placard MIS    HYDROcodone-acetaminophen (NORCO) 7.5-325 MG per tablet     #1 and #4:   The current medical regimen is effective;  continue present plan and medications. Will write for small quantities of Norco at a time. Understands the sedative and potentially dangers of taking marijuana with the Norco at the same time. Urged to not take at the same time. #3: The risks, benefits, potential side effects and barriers to medication use were addressed today. Understanding was acknowledged. Patient asked to follow-up if condition(s) do not improve as anticipated. #2:  Not currently symptomatic but is experience occasional recurring dysuria and she would like a standing lab order. PLAN          Time spent on encounter (to include any same day medical record review): 30 minutes  Established E/M: 10-19 (75354), 20-29 (81389), 30-39 (55586), 40-54 (07474)   New E/M: 15-29 (39893), 30-44 (15972), 45-59 (90240), 60-74 (46169)  Telephone E/M: 5-10 (20536), 11-20 (45184), 21-30 (99104)    If applicable, see additional patient information and instructions under \"Patient Instructions. \"    Return if symptoms worsen or fail to improve. Patient Instructions   If need to extend FMLA past July 2, let your PCP know. There is a standing order for urinalysis with culture which can be done at any [facility]. --You can get your lab work or x-ray done at [employer]. LAB: [address]  Lab hours (7AM - 5PM Monday through Friday; 8AM - noon on Saturdays),   Ph# [phone]    X-RAY: [address]  Radiology hours, (7:00AM - 5PM Monday through Friday only)  Ph# [phone] or [facility]  --Call our office in 1 week if you have not heard about the results. Or check [facility] if you have previously enrolled. Please note a portion of this chart was generated using dragon dictation software.  Although every effort was made to ensure the accuracy of this automated transcription,some errors in transcription may have occurred.

## 2021-05-28 DIAGNOSIS — J45.40 MODERATE PERSISTENT ASTHMA WITHOUT COMPLICATION: ICD-10-CM

## 2021-05-29 RX ORDER — MOMETASONE FUROATE AND FORMOTEROL FUMARATE DIHYDRATE 200; 5 UG/1; UG/1
AEROSOL RESPIRATORY (INHALATION)
Qty: 1 INHALER | Refills: 11 | Status: SHIPPED | OUTPATIENT
Start: 2021-05-29

## 2021-06-16 ENCOUNTER — TELEPHONE (OUTPATIENT)
Dept: FAMILY MEDICINE CLINIC | Age: 56
End: 2021-06-16

## 2021-06-16 ENCOUNTER — OFFICE VISIT (OUTPATIENT)
Dept: FAMILY MEDICINE CLINIC | Age: 56
End: 2021-06-16
Payer: COMMERCIAL

## 2021-06-16 VITALS
TEMPERATURE: 96.2 F | SYSTOLIC BLOOD PRESSURE: 149 MMHG | OXYGEN SATURATION: 96 % | DIASTOLIC BLOOD PRESSURE: 85 MMHG | BODY MASS INDEX: 53 KG/M2 | WEIGHT: 293 LBS | RESPIRATION RATE: 16 BRPM | HEART RATE: 82 BPM

## 2021-06-16 DIAGNOSIS — M25.551 RIGHT HIP PAIN: ICD-10-CM

## 2021-06-16 DIAGNOSIS — M25.561 ACUTE PAIN OF RIGHT KNEE: ICD-10-CM

## 2021-06-16 DIAGNOSIS — F33.2 MDD (MAJOR DEPRESSIVE DISORDER), RECURRENT SEVERE, WITHOUT PSYCHOSIS (HCC): Primary | ICD-10-CM

## 2021-06-16 PROCEDURE — 99214 OFFICE O/P EST MOD 30 MIN: CPT | Performed by: FAMILY MEDICINE

## 2021-06-16 RX ORDER — SERTRALINE HYDROCHLORIDE 100 MG/1
TABLET, FILM COATED ORAL
Qty: 30 TABLET | Refills: 5 | Status: SHIPPED | OUTPATIENT
Start: 2021-06-16 | End: 2021-12-14 | Stop reason: SDUPTHER

## 2021-06-16 NOTE — TELEPHONE ENCOUNTER
Eufemia Honeycutt is under significant pain and attempting to lose weight so she could eventually undergo knee replacement surgery. I will send a message to her cardiology team to see if they feel okay with me prescribing certain weight loss medications such as Qsymia or Adipex or if it is too risky for her known A-fib.

## 2021-06-16 NOTE — LETTER
[address]  Phone: [phone]  Fax: [address] [place] [address] [place] [address], DO        June 16, 2021     Patient: Vanna Porras   YOB: 1965   Date of Visit: 6/16/2021       To Whom It May Concern: It is my medical opinion that Derick Mc Continues to experience chronic medical problems fibromyalgia spine disc disease, knee osteoarthritis and chronic heart disease. I have seen my patient in clinic in 2021. With today's clinic visit and the chronicity of the medical problems which continue to worsen, I advocate that Ms. Hung Craig qualify for long-term disability. If you have any questions or concerns, please don't hesitate to call.     Sincerely,        Curry Ormond, DO

## 2021-06-16 NOTE — PROGRESS NOTES
[facility]  Progress Note  Nhan Ramirez DO Yasemen Sheehan  1965    06/17/21    Chief Complaint:   Yasmeen Sheehna is a 54 y.o. female who is here for follow-up on chronic pain and depression        HPI:     Very frustrated that she has difficulty losing weight. Does not qualify for knee replacement as she needs to lose weight to be able to undergo the knee replacement. Interested in weight loss medication. She does have complicating factors of history of A. fib. Followed closely by cardiology clinic. Finds the medical marijuana during the day and is preferring to stay off opioids as they make her drowsy. She is currently on short-term disability and that time window will  soon. She requests a letter for me to move to long-term disability. With her chronic pain she finds it difficult to sit or stand long periods of time. Finds it difficult to even do her work from home. Feels the depression is worse and like to increase from sertraline 100 mg a day to 150 mg. ROS negative for headache, visionchanges, chest pain, shortness of breath, abdominal pain, urinary sx, bowel changes. Past medical, surgical, and social history reviewed. and allergies reviewed. Allergies   Allergen Reactions    Latex Anaphylaxis and Rash    Aspirin      Swelling in lower legs    Demerol      rash    Meperidine     Nsaids Swelling    Pcn [Penicillins] Hives    Ranitidine Hcl     Sulfa Antibiotics Rash and Hives     Prior to Visit Medications    Medication Sig Taking? Authorizing Provider   sertraline (ZOLOFT) 50 MG tablet Take 1 tablet by mouth daily Take 100 mg with 50 mg tab po daily. Yes Jluis Hernandez, DO   sertraline (ZOLOFT) 100 MG tablet Take 100 mg with 50 mg tab po daily.  Yes Romilda Marly Hernandez, DO   DULERA 200-5 MCG/ACT inhaler INHALE TWO PUFFS BY MOUTH TWICE A DAY Yes Joselyn Howell MD   Handicap Placard MISC by Does not apply route Dx lumbar degenerative disc disease. Effective May 6, 2021. PCP requesting placard with no expiration Yes Kourtney Hernandez, DO   albuterol sulfate  (90 Base) MCG/ACT inhaler INHALE 2 PUFFS INTO THE LUNGS EVERY 4 HOURS AS NEEDED FOR WHEEZING OR SHORTNESS OF BREATH(SPACE OUT TO EVERY 6 HOURS AS SYMPTOMS IMPROVE) Yes Shanna Chaney MD   Cholecalciferol 50 MCG (2000 UT) CAPS  Yes Historical Provider, MD   cyanocobalamin 1000 MCG tablet Take 1,000 mcg by mouth daily Yes Historical Provider, MD   cyclobenzaprine (FLEXERIL) 5 MG tablet Take 5 mg by mouth 3 times daily as needed Yes Historical Provider, MD   ketoconazole (NIZORAL) 2 % shampoo  Yes Historical Provider, MD   flecainide (TAMBOCOR) 50 MG tablet Take 1 tablet by mouth 2 times daily Yes STEPHANIE Quezada CNP   triamterene-hydroCHLOROthiazide (MAXZIDE) 75-50 MG per tablet Take 1 tablet by mouth daily as needed (swelling) Yes Kourtney Hernandez, DO   dilTIAZem (CARDIZEM CD) 240 MG extended release capsule Take 1 capsule by mouth daily Yes STEPHANIE Vergara CNP   apixaban (ELIQUIS) 5 MG TABS tablet Take 1 tablet by mouth 2 times daily TAKE 1 TABLET BY MOUTH 2 TIMES A DAY Yes STEPHANIE Vergara CNP   Handicap Placard MISC by Does not apply route Dx of lumbar DDD. Effective Dec 18, 2020 until Dec 18, 2021.  Yes Kourtney Hernandez, DO   Spacer/Aero-Holding Chambers (AEROCHAMBER MV) MISC Use with inhaler as directed Yes Daniel Coffey MD   EPINEPHrine (AUVI-Q) 0.3 MG/0.3ML SOAJ injection Inject 0.3 mg into the muscle as needed Use as directed for allergic reaction  Yes Historical Provider, MD   omalizumab Kiara [place]) 150 MG injection Inject 300 mg into the skin once for 1 dose  Alma Monsalve MD          Vitals:    06/16/21 1444   BP: (!) 149/85   Pulse: 82   Resp: 16   Temp: 96.2 °F (35.7 °C)   TempSrc: Tympanic   SpO2: 96%   Weight: (!) 369 lb 6.4 oz (167.6 kg)      Wt Readings from Last 3 Encounters:   06/16/21 (!) 369 lb 6.4 oz (167.6 kg)   05/06/21 (!) 377 lb 6.4 oz (171.2 kg)   04/20/21 (!) 373 lb 3.2 oz (169.3 kg)     BP Readings from Last 3 Encounters:   06/16/21 (!) 149/85   05/06/21 129/76   04/20/21 128/76       Patient Active Problem List   Diagnosis    Asthma    MDD (major depressive disorder), recurrent severe, without psychosis [facility]    Cluster B personality disorder ([facility]    Colon polyps    Overdose    Restless leg syndrome    Sliding hiatal hernia    HTN (hypertension)    Atrial flutter (HCC)    SVT (supraventricular tachycardia) (HCC)    Paroxysmal atrial fibrillation (HCC)    Sinus bradycardia    Acute lateral meniscus tear of right knee    Acute medial meniscus tear of right knee    Localized edema    Acute pain of right knee    Chronic diastolic congestive heart failure (HCC)    Primary osteoarthritis of right knee    Contusion of multiple sites of right leg    Gastrocnemius strain, left    Ventral incisional hernia    Recurrent incisional hernia    Pain of upper abdomen    Incisional hernia with obstruction, without gangrene    Rhinitis, chronic    Vocal cord dysfunction    Iron deficiency anemia, unspecified    Idiopathic urticaria       Immunization History   Administered Date(s) Administered    BCG (Juan BCG) 05/28/2013    DTaP 05/28/2013    DTaP (Infanrix) 05/28/2013    Influenza Vaccine, unspecified formulation 10/29/2015, 01/09/2017, 10/23/2017, 11/26/2018    Influenza Virus Vaccine 12/31/2008, 09/22/2009, 01/09/2017, 10/23/2017, 10/23/2017, 11/26/2018    Influenza, [place], IM, PF (6 mo and older Fluzone, Flulaval, Fluarix, and 3 yrs and older Afluria) 11/26/2018, 12/08/2020    Influenza, [place], Recombinant, IM PF (Flublok 18 yrs and older) 11/16/2019    PPD Test 06/11/2013, 05/07/2014, 08/13/2019    Pneumococcal Polysaccharide ([id number]) 12/31/2008, 06/17/2019    Tdap (Boostrix, Adacel) 05/28/2013       Past Medical History:   Diagnosis Date    Acute lateral meniscus tear of right knee 02/2019    Acute medial meniscus tear of right knee 02/2019    Anesthesia complication     woke up during one surgery    Anxiety     Arthritis     Asthma     Atrial fibrillation ([address]     new dx 4 weeks ago, first of  Sept 2017    CHF (congestive heart failure) (HCC)     Edema     Fibromyalgia     GERD (gastroesophageal reflux disease)     reflux    Hiatal hernia     Idiopathic urticaria 10/29/2020    SVT (supraventricular tachycardia) (HCC)     hx svt     Past Surgical History:   Procedure Laterality Date    ABLATION OF DYSRHYTHMIC FOCUS  04/2019    afib    BLADDER SUSPENSION  2004    HERNIA REPAIR  2001    HYSTERECTOMY  2004    KNEE ARTHROSCOPY Right 7/11/2019    VIDEO ARTHROSCOPY RIGHT KNEE, PARTIAL MEDIAL AND LATERAL MENISCECTOMY,, MEDIAL MICRO FRACTURE CHONDROPLASTY performed by Cornelia Bautista MD at [facility] GASTRIC BYPASS  2011    TONSILLECTOMY AND ADENOIDECTOMY      UPPP UVULOPALATOPHARYGOPLASTY      [facility] N/A 8/26/2019    DIAGNOSTIC LAPAROSCOPY, LAPAROSCOPIC LYSIS OF ADHESIONS, LAPAROSCOPIC REDUCTION OF RECURRENT INCISIONAL HERNIA WITH MESH performed by Brooklynn Hwang MD at [address] [place] [address] History   Problem Relation Age of Onset    Cancer Mother         lung    Arthritis Mother     Cirrhosis Father     Asthma Maternal Aunt      Social History     Socioeconomic History    Marital status:      Spouse name: Not on file    Number of children: 3    Years of education: 15.5    Highest education level: Not on file   Occupational History    Not on file   Tobacco Use    Smoking status: Never Smoker    Smokeless tobacco: Never Used   Vaping Use    Vaping Use: Never used   Substance and Sexual Activity    Alcohol use: Yes     Comment: 0-1 drinks per month    Drug use: Yes     Types: Marijuana, Other-see comments     Comment: Medical Marijuana use    Sexual activity: Yes     Partners: Male   Other Topics Concern    Not on file   Social History Narrative    Not on file     Social Determinants of Health     Financial Resource Strain:     Difficulty of Paying Living Expenses:    Food Insecurity:     Worried About Running Out of Food in the Last Year:     [address] in the Last Year:    Transportation Needs:     Lack of Transportation (Medical):  Lack of Transportation (Non-Medical):    Physical Activity:     Days of Exercise per Week:     Minutes of Exercise per Session:    Stress:     Feeling of Stress :    Social Connections:     Frequency of Communication with Friends and Family:     Frequency of Social Gatherings with Friends and Family:     Attends Anabaptism Services:     Active Member of Clubs or Organizations:     Attends Club or Organization Meetings:     Marital Status:    Intimate Partner Violence:     Fear of Current or Ex-Partner:     Emotionally Abused:     Physically Abused:     Sexually Abused:        O: BP (!) 149/85   Pulse 82   Temp 96.2 °F (35.7 °C) (Tympanic)   Resp 16   Wt (!) 369 lb 6.4 oz (167.6 kg)   SpO2 96%   Breastfeeding No   BMI 53.00 kg/m²   Physical Exam  GEN: No acute distress,cooperative, well nourished, alert. HEENT: PEERLA, EOMI , normocephalic/atraumatic, external nose appears normal.  External ear is normal.    Neck: soft, supple, no appreciable thyromegaly,mass  CV: No upper extremity edema. Resp:  Breathing comfortably. Psych: Dysthymic affect. Does not endorse SI/HI neuro: AOx3  Other Pertinent Physical Exam findings: Using a cane today. Slow for her to get up from sitting to standing. Her gait is slow due to the chronic bilateral knee pain. ASSESSMENT   Diagnosis Orders   1. MDD (major depressive disorder), recurrent severe, without psychosis (HCC)  sertraline (ZOLOFT) 50 MG tablet    sertraline (ZOLOFT) 100 MG tablet   2.  Right hip pain  XR HIP RIGHT (2-3 VIEWS)   3. Acute pain of right knee  XR KNEE RIGHT (3 VIEWS)     #1: Increased dose of sertraline to 150 mg. Understands if she increases appetite and gains weight we need to return back to the 100 mg dose. #2 and #3: We will obtain views of right lower extremity as she wishes to rule out stress fracture. Remainder of time spent creating and signing letter to advocate for long-term disability for her. PLAN          Time spent on encounter (including any number of the following: review of labs, imaging, provider notes, outside hospital records; performing examination/evaluation; counseling patient and family; ordering medications/tests; placing referrals and communication with referring physicians; coordination of care, and documentation in the EHR): 30 minutes  Established E/M: 10-19 ([id number]), 20-29 ([id number]), 30-39 (62709), 40-54 (33520)   New E/M: 15-29 (88809), 30-44 (62947), 45-59 (05065), 60-74 (86193)  Telephone E/M: 5-10 (Marion), 11-20 (23810), 21-30 (31603)    If applicable, see additional patient information and instructions under \"Patient Instructions. \"    Return in about 4 weeks (around 7/14/2021). There are no Patient Instructions on file for this visit. Please note a portion of this chart was generated using dragon dictation software. Although every effort was made to ensure the accuracy of this automated transcription,some errors in transcription may have occurred.

## 2021-06-17 ENCOUNTER — PATIENT MESSAGE (OUTPATIENT)
Dept: FAMILY MEDICINE CLINIC | Age: 56
End: 2021-06-17

## 2021-06-17 RX ORDER — PHENTERMINE HYDROCHLORIDE 37.5 MG/1
37.5 TABLET ORAL
Qty: 30 TABLET | Refills: 0 | Status: SHIPPED | OUTPATIENT
Start: 2021-06-17 | End: 2021-06-17 | Stop reason: SDUPTHER

## 2021-06-17 RX ORDER — PHENTERMINE HYDROCHLORIDE 37.5 MG/1
37.5 TABLET ORAL
Qty: 30 TABLET | Refills: 0 | Status: SHIPPED | OUTPATIENT
Start: 2021-06-17 | End: 2021-07-17

## 2021-06-17 NOTE — TELEPHONE ENCOUNTER
PT called the pharmacy and would like them to fill the script. Would like a new script sent to the corrected pharm.      phentermine (ADIPEX-P) 37.5 MG tablet [[id number]]     Order Details  Dose: 37.5 mg Route: Oral Frequency: DAILY BEFORE BREAKFAST   Dispense Quantity: 30 tablet Refills: 0    Note to Pharmacy: BMI 50-59.         Sig: Take 1 tablet by mouth every morning (before breakfast) for 30 days.         Start Date: 06/17/21 End Date: 07/17/21 after 30 doses   Written Date: 06/17/21 Expiration Date: 12/14/21     Pharmacy    [facility] #[id number] - [address]", [address] [place] [address] - P [phone] - F [phone]      Please advise, thank you

## 2021-06-17 NOTE — TELEPHONE ENCOUNTER
Please let patient know I did hear from her cardiologist.    It is fine for her to try the Adipex. I sent a prescription to her pharmacy. She will begin the medication.

## 2021-06-17 NOTE — TELEPHONE ENCOUNTER
From: Abrahan Pretsweetie  To: Jay Corrales DO  Sent: 6/17/2021 8:24 AM EDT  Subject: Prescription Question    Hi     Just wondering if you heard from cardiologist about request for adipex?

## 2021-06-23 ENCOUNTER — TELEPHONE (OUTPATIENT)
Dept: FAMILY MEDICINE CLINIC | Age: 56
End: 2021-06-23

## 2021-07-26 ENCOUNTER — VIRTUAL VISIT (OUTPATIENT)
Dept: FAMILY MEDICINE CLINIC | Age: 56
End: 2021-07-26
Payer: COMMERCIAL

## 2021-07-26 DIAGNOSIS — M17.11 PRIMARY OSTEOARTHRITIS OF RIGHT KNEE: Primary | ICD-10-CM

## 2021-07-26 PROCEDURE — 99213 OFFICE O/P EST LOW 20 MIN: CPT | Performed by: FAMILY MEDICINE

## 2021-07-26 RX ORDER — PHENTERMINE HYDROCHLORIDE 37.5 MG/1
37.5 TABLET ORAL
Qty: 30 TABLET | Refills: 0 | Status: SHIPPED | OUTPATIENT
Start: 2021-07-26 | End: 2021-08-25

## 2021-07-26 RX ORDER — PHENTERMINE HYDROCHLORIDE 37.5 MG/1
37.5 TABLET ORAL
Qty: 30 TABLET | Refills: 0 | Status: SHIPPED | OUTPATIENT
Start: 2021-08-25 | End: 2021-09-20 | Stop reason: SDUPTHER

## 2021-07-26 SDOH — ECONOMIC STABILITY: FOOD INSECURITY: WITHIN THE PAST 12 MONTHS, THE FOOD YOU BOUGHT JUST DIDN'T LAST AND YOU DIDN'T HAVE MONEY TO GET MORE.: SOMETIMES TRUE

## 2021-07-26 SDOH — ECONOMIC STABILITY: FOOD INSECURITY: WITHIN THE PAST 12 MONTHS, YOU WORRIED THAT YOUR FOOD WOULD RUN OUT BEFORE YOU GOT MONEY TO BUY MORE.: SOMETIMES TRUE

## 2021-07-26 ASSESSMENT — SOCIAL DETERMINANTS OF HEALTH (SDOH): HOW HARD IS IT FOR YOU TO PAY FOR THE VERY BASICS LIKE FOOD, HOUSING, MEDICAL CARE, AND HEATING?: HARD

## 2021-07-26 NOTE — PROGRESS NOTES
Take 1 tablet by mouth daily Take 100 mg with 50 mg tab po daily. Yes Pablo Hernandez DO   sertraline (ZOLOFT) 100 MG tablet Take 100 mg with 50 mg tab po daily. Yes Pablo Hernandez DO   DULERA 200-5 MCG/ACT inhaler INHALE TWO PUFFS BY MOUTH TWICE A DAY Yes MD Howard Jenkins MISC by Does not apply route Dx lumbar degenerative disc disease. Effective May 6, 2021. PCP requesting placard with no expiration Yes Lucas Finney Mary, DO   albuterol sulfate  (90 Base) MCG/ACT inhaler INHALE 2 PUFFS INTO THE LUNGS EVERY 4 HOURS AS NEEDED FOR WHEEZING OR SHORTNESS OF BREATH(SPACE OUT TO EVERY 6 HOURS AS SYMPTOMS IMPROVE) Yes Shanna Chaney MD   flecainide (TAMBOCOR) 50 MG tablet Take 1 tablet by mouth 2 times daily Yes STEPHANIE Ivan CNP   triamterene-hydroCHLOROthiazide (MAXZIDE) 75-50 MG per tablet Take 1 tablet by mouth daily as needed (swelling) Yes Pablo Hernandez DO   dilTIAZem (CARDIZEM CD) 240 MG extended release capsule Take 1 capsule by mouth daily Yes STEPHANEI De La Fuente CNP   apixaban (ELIQUIS) 5 MG TABS tablet Take 1 tablet by mouth 2 times daily TAKE 1 TABLET BY MOUTH 2 TIMES A DAY Yes STEPHANIE De La Fuente CNP   Handicap Placard MISC by Does not apply route Dx of lumbar DDD. Effective Dec 18, 2020 until Dec 18, 2021.  Yes Pablo Hernandez DO   Spacer/Aero-Holding Chambers (AEROCHAMBER MV) MISC Use with inhaler as directed Yes Margy Flower MD   EPINEPHrine (AUVI-Q) 0.3 MG/0.3ML SOAJ injection Inject 0.3 mg into the muscle as needed Use as directed for allergic reaction  Yes Historical Provider, MD   Cholecalciferol 50 MCG (2000 UT) CAPS   Historical Provider, MD   cyanocobalamin 1000 MCG tablet Take 1,000 mcg by mouth daily  Patient not taking: Reported on 7/26/2021  Historical Provider, MD   cyclobenzaprine (FLEXERIL) 5 MG tablet Take 5 mg by mouth 3 times daily as needed  Patient not taking: Reported on 7/26/2021  Historical Provider, MD   ketoconazole (NIZORAL) 2 % shampoo   Historical Provider, MD   omalizumab Ellyn Rich) 150 MG injection Inject 300 mg into the skin once for 1 dose  Rosemarie Montague MD          Patient-Reported Vitals 1/15/2021   Patient-Reported Weight 350   Patient-Reported Height 5'10\"   Patient-Reported Systolic 755   Patient-Reported Diastolic 70   Patient-Reported Temperature -      There were no vitals filed for this visit.    Wt Readings from Last 3 Encounters:   06/16/21 (!) 369 lb 6.4 oz (167.6 kg)   05/06/21 (!) 377 lb 6.4 oz (171.2 kg)   04/20/21 (!) 373 lb 3.2 oz (169.3 kg)     BP Readings from Last 3 Encounters:   06/16/21 (!) 149/85   05/06/21 129/76   04/20/21 128/76       Patient Active Problem List   Diagnosis    Asthma    MDD (major depressive disorder), recurrent severe, without psychosis ([address]    [facility] B personality disorder [facility]    Colon polyps    Overdose    Restless leg syndrome    Sliding hiatal hernia    HTN (hypertension)    Atrial flutter (HCC)    SVT (supraventricular tachycardia) (HCC)    Paroxysmal atrial fibrillation (HCC)    Sinus bradycardia    Acute lateral meniscus tear of right knee    Acute medial meniscus tear of right knee    Localized edema    Acute pain of right knee    Chronic diastolic congestive heart failure (HCC)    Primary osteoarthritis of right knee    Contusion of multiple sites of right leg    Gastrocnemius strain, left    Ventral incisional hernia    Recurrent incisional hernia    Pain of upper abdomen    Incisional hernia with obstruction, without gangrene    Rhinitis, chronic    Vocal cord dysfunction    Iron deficiency anemia, unspecified    Idiopathic urticaria       Immunization History   Administered Date(s) Administered    BCG (Juan BCG) 05/28/2013    DTaP 05/28/2013    DTaP (Infanrix) 05/28/2013    Influenza Vaccine, unspecified formulation 10/29/2015, 01/09/2017, 10/23/2017, 11/26/2018    Influenza Virus Vaccine 12/31/2008, 09/22/2009, 01/09/2017, 10/23/2017, 10/23/2017, 11/26/2018    Influenza, [place] Age, IM, PF (6 mo and older Fluzone, Flulaval, Fluarix, and 3 yrs and older Afluria) 11/26/2018, 12/08/2020    Influenza, [place] Age, Recombinant, IM PF (Flublok 18 yrs and older) 11/16/2019    PPD Test 06/11/2013, 05/07/2014, 08/13/2019    Pneumococcal Polysaccharide ([id number]) 12/31/2008, 06/17/2019    Tdap (Boostrix, Adacel) 05/28/2013       Past Medical History:   Diagnosis Date    Acute lateral meniscus tear of right knee 02/2019    Acute medial meniscus tear of right knee 02/2019    Anesthesia complication     woke up during one surgery    Anxiety     Arthritis     Asthma     Atrial fibrillation ([facility]     new dx 4 weeks ago, first of  Sept 2017    CH[facility]est[facility] failure) [facility]     Edema     Fibromyalgia     GERD (gastroesophageal reflux disease)     reflux    Hiatal hernia     Idiopathic urticaria 10/29/2020    SVT (supraventricular tachycardia) ([facility])     hx svt     Past Surgical History:   Procedure Laterality Date    ABLATION OF DYSRHYTHMIC FOCUS  04/2019    afib    BLADDER SUSPENSION  2004    HERNIA REPAIR  2001    HYSTERECTOMY  2004    KNEE ARTHROSCOPY Right 7/11/2019    VIDEO ARTHROSCOPY RIGHT KNEE, PARTIAL MEDIAL AND LATERAL MENISCECTOMY,, MEDIAL MICRO FRACTURE CHONDROPLASTY performed by Kenya Carranza MD at [facility] GASTRIC BYPASS  2011    TONSILLECTOMY AND ADENOIDECTOMY      UPPP UVULOPALATOPHARYGOPLASTY      VENTRAL HERNIA REPAIR N/A 8/26/2019    DIAGNOSTIC LAPAROSCOPY, LAPAROSCOPIC LYSIS OF ADHESIONS, LAPAROSCOPIC REDUCTION OF RECURRENT INCISIONAL HERNIA WITH MESH performed by Nicolasa Frazier MD at [address] [place] [address] History   Problem Relation Age of Onset    Cancer Mother         lung    Arthritis Mother     Cirrhosis Father     Asthma Maternal Aunt      Social History     Socioeconomic History    Marital status:      Spouse name: Not on file    Number of children: 3    Years of education: 15.5    Highest education level: Not on file   Occupational History    Not on file   Tobacco Use    Smoking status: Never Smoker    Smokeless tobacco: Never Used   Vaping Use    Vaping Use: Never used   Substance and Sexual Activity    Alcohol use: Yes     Comment: 0-1 drinks per month    Drug use: Yes     Types: Marijuana, Other-see comments     Comment: Medical Marijuana use    Sexual activity: Yes     Partners: Male   Other Topics Concern    Not on file   Social History Narrative    Not on file     Social Determinants of Health     Financial Resource Strain: High Risk    Difficulty of Paying Living Expenses: Hard   Food Insecurity: Food Insecurity Present    Worried About Running Out of Food in the Last Year: Sometimes true    [place] of Food in the Last Year: Sometimes true   Transportation Needs:     Lack of Transportation (Medical):  Lack of Transportation (Non-Medical):    Physical Activity:     Days of Exercise per Week:     Minutes of Exercise per Session:    Stress:     Feeling of Stress :    Social Connections:     Frequency of Communication with Friends and Family:     Frequency of Social Gatherings with Friends and Family:     Attends Pentecostalism Services:     Active Member of Clubs or Organizations:     Attends Club or Organization Meetings:     Marital Status:    Intimate Partner Violence:     Fear of Current or Ex-Partner:     Emotionally Abused:     Physically Abused:     Sexually Abused:        O: There were no vitals taken for this visit.   Physical Exam  PHYSICAL EXAMINATION:  [ INSTRUCTIONS:  \"[x]\" Indicates a positive item  \"[]\" Indicates a negative item  -- DELETE ALL ITEMS NOT EXAMINED]  Vital Signs: (As obtained by patient/caregiver or practitioner observation)    Constitutional: [x] Appears well-developed and well-nourished [x] No apparent distress      [] Abnormal-   Mental status  [x] Alert and awake [x] Oriented to person/place/time [x]Able to follow commands      Eyes:  EOM    [x]  Normal  [] Abnormal-  Sclera  [x]  Normal  [] Abnormal -         Discharge [x]  None visible  [] Abnormal -    HENT:   [x] Normocephalic, atraumatic. [] Abnormal   [] Mouth/Throat: Mucous membranes are moist.     External Ears [x] Normal  [] Abnormal-     Neck: [x] No visualized mass     Pulmonary/Chest: [x] Respiratory effort normal.  [x] No visualized signs of difficulty breathing or respiratory distress        [] Abnormal-      Musculoskeletal:   [] Normal gait with no signs of ataxia         [x] Normal range of motion of neck        [] Abnormal-       Neurological:        [x] No Facial Asymmetry (Cranial nerve 7 motor function) (limited exam to video visit)          [x] No gaze palsy        [] Abnormal-         Skin:        [x] No significant exanthematous lesions or discoloration noted on facial skin         [] Abnormal-            Psychiatric:       [x] Normal Affect [] No Hallucinations        [] Abnormal-     Other pertinent observable physical exam findings- n/a    Due to this being a TeleHealth encounter, evaluation of the following organ systems is limited: Vitals/Constitutional/EENT/Resp/CV/GI//MS/Neuro/Skin/Heme-Lymph-Imm. ASSESSMENT   Diagnosis Orders   1. Primary osteoarthritis of right knee     2. Adult BMI 50.0-59.9 kg/sq m (HCC)  phentermine (ADIPEX-P) 37.5 MG tablet    phentermine (ADIPEX-P) 37.5 MG tablet     #1:  She will see the orthopedic doctor when needed or when ready for knee replacement. #2:  I agree that weight loss is important to set her up for a successful total knee replacement. The risks, benefits, potential side effects and barriers to medication use were addressed today. Understanding was acknowledged. Patient asked to follow-up if condition(s) do not improve as anticipated. The current medical regimen is effective;  continue present plan and medications.       PLAN          Time spent on encounter (including any number of the following: review of labs, imaging, provider notes, outside hospital records; performing examination/evaluation; counseling patient and family; ordering medications/tests; placing referrals and communication with referring physicians; coordination of care, and documentation in the EHR): 20 minutes  Established E/M: 10-19 (16282), 20-29 (76836), 30-39 (92948), 40-54 (41734)   New E/M: 15-29 (11788), 30-44 (23055), 45-59 (72789), 60-74 (02342)  Telephone E/M: 5-10 ([email or web address]), 11-20 (54300), 21-30 (77912)    If applicable, see additional patient information and instructions under \"Patient Instructions. \"    Return in about 3 months (around 10/26/2021). There are no Patient Instructions on file for this visit. Please note a portion of this chart was generated using dragon dictation software. Although every effort was made to ensure the accuracy of this automated transcription, some errors in transcription may have occurred. Pursuant to the emergency declaration under the [facility], [facility]5 waiver authority and the [employer] Act, this Virtual  Visit was conducted, with patient's consent, to reduce the patient's risk of exposure to COVID-19 and provide continuity of care for an established patient. Services were provided through a video synchronous discussion virtually to substitute for in-person clinic visit. Patient was instructed that the AVS is available on My Chart or was emailed to the patient if not on My Chart. Lab orders were emailed to patient if they do not use a [facility] lab. Any work notes were sent to patient through My Chart or email.

## 2021-07-28 NOTE — TELEPHONE ENCOUNTER
Subjective:   Patient doing well. Pain controlled. Tolerating general diet. Passing flatus.       Objective:  Visit Vitals  /80 (BP Location: RUE - Right upper extremity, Patient Position: Supine)   Pulse 72   Temp 99.5 °F (37.5 °C) (Oral)   Resp 16   Ht 5' 5\" (1.651 m)   Wt 82.6 kg   LMP 10/29/2020   SpO2 96%   Breastfeeding Yes   BMI 30.28 kg/m²        General: alert and oriented, no acute distress  CV: regular rate and rhythm  Respiratory: lungs clear, no increased work of breathing  Abdomen: soft, fundus firm, non-tender. Incision clean, dry and intact.  dermabond in place.  Extremities: no calf tenderness or erythema  Skin: warm, pink and dry  Psych:  appropriate mood and affect    WBC (K/mcL)   Date Value   2021 14.9 (H)     RBC (mil/mcL)   Date Value   2021 2.89 (L)     HCT (%)   Date Value   2021 26.7 (L)     HGB (g/dL)   Date Value   2021 8.7 (L)     PLT (K/mcL)   Date Value   2021 168        A/P  29 year old  s/p primary     1. POD# 3  -- GI: tolerating general diet, passing flatus  -- : adequate output, voiding freely  -- Prophylaxis: SCDs, ambulation  -- Pain: well controlled with oral pain medication  -- Feeding: pumping for infant in NICU  -- Infant: at time of rounds this am, he was still in the NICU, on oxygen supplementation.  Doing well per patient.  Plan circ when able       2. Depression: continue sertraline 50 mg    3. MTHFR homozygote:on lovenox     4. Anemia: acute blood loss, hgb 8.7, no symptoms  -- continue iron     Dispo: routine post op care, anticipate discharge to home on post op day#4    Michelle iLve MD       With close of business today I will not be checking my EMR on a regular basis so I will make exception and send prescription for Macrobid to the pharmacy. Sent to the [facility] in [facility]. If no significant improvement in 24 to 48 hours then patient needs appointment either telehealth or in person.

## 2021-08-10 ENCOUNTER — TELEPHONE (OUTPATIENT)
Dept: PULMONOLOGY | Age: 56
End: 2021-08-10

## 2021-08-10 NOTE — TELEPHONE ENCOUNTER
Mo Guzman says that she is between job and does not have insurance right now. She is out of [facility] and cannot afford the $400 for a refill, and so is requesting samples. Unfortunately there are no samples of Dulera in the office. It was suggested that she call pharmacy for assistance in obtaining it with coupon or other discount, and possibly call [employer] for assistance. She will do so.

## 2021-08-12 ENCOUNTER — TELEPHONE (OUTPATIENT)
Dept: FAMILY MEDICINE CLINIC | Age: 56
End: 2021-08-12

## 2021-08-12 NOTE — TELEPHONE ENCOUNTER
Patient requesting samples of Torrie Hussein due to losing her insurance. Patient would like enough to hold her over.  Please advise Thanks

## 2021-08-19 DIAGNOSIS — M17.11 PRIMARY OSTEOARTHRITIS OF RIGHT KNEE: Primary | ICD-10-CM

## 2021-08-19 RX ORDER — HYDROCODONE BITARTRATE AND ACETAMINOPHEN 7.5; 325 MG/1; MG/1
1 TABLET ORAL EVERY 8 HOURS PRN
Qty: 30 TABLET | Refills: 0 | Status: SHIPPED | OUTPATIENT
Start: 2021-08-19 | End: 2021-11-15 | Stop reason: SDUPTHER

## 2021-08-24 ENCOUNTER — APPOINTMENT (OUTPATIENT)
Dept: GENERAL RADIOLOGY | Age: 56
End: 2021-08-24

## 2021-08-24 ENCOUNTER — HOSPITAL ENCOUNTER (EMERGENCY)
Age: 56
Discharge: LEFT BEFORE DISCHARGE | End: 2021-08-24
Attending: STUDENT IN AN ORGANIZED HEALTH CARE EDUCATION/TRAINING PROGRAM

## 2021-08-24 VITALS
TEMPERATURE: 98.3 F | RESPIRATION RATE: 18 BRPM | DIASTOLIC BLOOD PRESSURE: 92 MMHG | HEART RATE: 61 BPM | SYSTOLIC BLOOD PRESSURE: 136 MMHG | OXYGEN SATURATION: 97 %

## 2021-08-24 DIAGNOSIS — R07.9 CHEST PAIN, UNSPECIFIED TYPE: Primary | ICD-10-CM

## 2021-08-24 LAB
A/G RATIO: 1.2 (ref 1.1–2.2)
ALBUMIN SERPL-MCNC: 4 G/DL (ref 3.4–5)
ALP BLD-CCNC: 115 U/L (ref 40–129)
ALT SERPL-CCNC: 9 U/L (ref 10–40)
ANION GAP SERPL CALCULATED.3IONS-SCNC: 14 MMOL/L (ref 3–16)
AST SERPL-CCNC: 13 U/L (ref 15–37)
BASOPHILS ABSOLUTE: 0.1 K/UL (ref 0–0.2)
BASOPHILS RELATIVE PERCENT: 1 %
BILIRUB SERPL-MCNC: 0.6 MG/DL (ref 0–1)
BUN BLDV-MCNC: 15 MG/DL (ref 7–20)
CALCIUM SERPL-MCNC: 8.8 MG/DL (ref 8.3–10.6)
CHLORIDE BLD-SCNC: 104 MMOL/L (ref 99–110)
CO2: 21 MMOL/L (ref 21–32)
CREAT SERPL-MCNC: 0.8 MG/DL (ref 0.6–1.1)
EKG ATRIAL RATE: 72 BPM
EKG DIAGNOSIS: NORMAL
EKG P AXIS: 24 DEGREES
EKG P-R INTERVAL: 220 MS
EKG Q-T INTERVAL: 394 MS
EKG QRS DURATION: 90 MS
EKG QTC CALCULATION (BAZETT): 431 MS
EKG R AXIS: 43 DEGREES
EKG T AXIS: 47 DEGREES
EKG VENTRICULAR RATE: 72 BPM
EOSINOPHILS ABSOLUTE: 0.2 K/UL (ref 0–0.6)
EOSINOPHILS RELATIVE PERCENT: 4 %
GFR AFRICAN AMERICAN: >60
GFR NON-AFRICAN AMERICAN: >60
GLOBULIN: 3.4 G/DL
GLUCOSE BLD-MCNC: 109 MG/DL (ref 70–99)
HCT VFR BLD CALC: 39.8 % (ref 36–48)
HEMOGLOBIN: 13.2 G/DL (ref 12–16)
LYMPHOCYTES ABSOLUTE: 1.8 K/UL (ref 1–5.1)
LYMPHOCYTES RELATIVE PERCENT: 32.1 %
MCH RBC QN AUTO: 29.7 PG (ref 26–34)
MCHC RBC AUTO-ENTMCNC: 33.3 G/DL (ref 31–36)
MCV RBC AUTO: 89.3 FL (ref 80–100)
MONOCYTES ABSOLUTE: 0.5 K/UL (ref 0–1.3)
MONOCYTES RELATIVE PERCENT: 9.8 %
NEUTROPHILS ABSOLUTE: 3 K/UL (ref 1.7–7.7)
NEUTROPHILS RELATIVE PERCENT: 53.1 %
PDW BLD-RTO: 15.5 % (ref 12.4–15.4)
PLATELET # BLD: 295 K/UL (ref 135–450)
PMV BLD AUTO: 8.7 FL (ref 5–10.5)
POTASSIUM REFLEX MAGNESIUM: 4 MMOL/L (ref 3.5–5.1)
RBC # BLD: 4.46 M/UL (ref 4–5.2)
SODIUM BLD-SCNC: 139 MMOL/L (ref 136–145)
TOTAL CK: 58 U/L (ref 26–192)
TOTAL PROTEIN: 7.4 G/DL (ref 6.4–8.2)
TROPONIN: <0.01 NG/ML
TROPONIN: <0.01 NG/ML
WBC # BLD: 5.6 K/UL (ref 4–11)

## 2021-08-24 PROCEDURE — 84484 ASSAY OF TROPONIN QUANT: CPT

## 2021-08-24 PROCEDURE — 36415 COLL VENOUS BLD VENIPUNCTURE: CPT

## 2021-08-24 PROCEDURE — 99284 EMERGENCY DEPT VISIT MOD MDM: CPT

## 2021-08-24 PROCEDURE — 6370000000 HC RX 637 (ALT 250 FOR IP): Performed by: STUDENT IN AN ORGANIZED HEALTH CARE EDUCATION/TRAINING PROGRAM

## 2021-08-24 PROCEDURE — 80053 COMPREHEN METABOLIC PANEL: CPT

## 2021-08-24 PROCEDURE — 85025 COMPLETE CBC W/AUTO DIFF WBC: CPT

## 2021-08-24 PROCEDURE — 71045 X-RAY EXAM CHEST 1 VIEW: CPT

## 2021-08-24 PROCEDURE — 82550 ASSAY OF CK (CPK): CPT

## 2021-08-24 PROCEDURE — 93005 ELECTROCARDIOGRAM TRACING: CPT | Performed by: STUDENT IN AN ORGANIZED HEALTH CARE EDUCATION/TRAINING PROGRAM

## 2021-08-24 RX ORDER — OXYCODONE HYDROCHLORIDE AND ACETAMINOPHEN 5; 325 MG/1; MG/1
1 TABLET ORAL ONCE
Status: COMPLETED | OUTPATIENT
Start: 2021-08-24 | End: 2021-08-24

## 2021-08-24 RX ADMIN — OXYCODONE HYDROCHLORIDE AND ACETAMINOPHEN 1 TABLET: 5; 325 TABLET ORAL at 15:25

## 2021-08-24 ASSESSMENT — PAIN DESCRIPTION - DESCRIPTORS: DESCRIPTORS: ACHING;CONSTANT;DISCOMFORT

## 2021-08-24 ASSESSMENT — PAIN SCALES - GENERAL
PAINLEVEL_OUTOF10: 10
PAINLEVEL_OUTOF10: 10

## 2021-08-24 ASSESSMENT — ENCOUNTER SYMPTOMS
GASTROINTESTINAL NEGATIVE: 1
SHORTNESS OF BREATH: 1
EYES NEGATIVE: 1
ALLERGIC/IMMUNOLOGIC NEGATIVE: 1

## 2021-08-24 ASSESSMENT — HEART SCORE: ECG: 0

## 2021-08-24 ASSESSMENT — PAIN DESCRIPTION - PAIN TYPE: TYPE: CHRONIC PAIN

## 2021-08-24 ASSESSMENT — PAIN DESCRIPTION - LOCATION: LOCATION: GENERALIZED

## 2021-08-24 NOTE — ED NOTES
Prior to receiving discharge paperwork, pt pulled IV out and left department. Pt refused to allow staff to control bleeding prior to leaving unit.        Marina Chaudhary RN  08/24/21 [id number]

## 2021-08-24 NOTE — ED PROVIDER NOTES
ED Attending Attestation Note     Date of evaluation: 8/24/2021    This patient was seen by the resident. I have seen and examined the patient, agree with the workup, evaluation, management and diagnosis. The care plan has been discussed. I have reviewed the ECG and concur with the resident's interpretation. My assessment reveals a woman complaining of chronic body pain in multiple locations for which she has been diagnosed with FM. She states that she had some chest pain very early this AM (which is no longer present). She is breathing easily. On exam, she has good strength distally in all 4 extremities, and her abdomen is benign. Troponins negative x2. CXR reassuring given her known medical history. EKG with 1st degree AV block otherwise unremarkable. Doubt PE given anticoag and vitals. Doubt ACS based on historical factors and HEART score 3. No respiratory symptoms, doubt PNA or COVID. Suitable for outpatient follow-up.        Neal Hernandez MD  08/24/21 1429

## 2021-08-24 NOTE — H&P
1 [facility]  EMERGENCY DEPARTMENT ENCOUNTER          [facility] RESIDENT NOTE       Date of evaluation: 8/24/2021    Chief Complaint     Chest Pain (since 5AM, slight SOB, -fever, hx fibromyalgia - progressively debilitating chronic pain)      History of Present Illness     Ellie Adams is a 54 y.o. female with past medical history of asthma, A. fib who presents with complaint of chest pain. She mentions that her chest pain is started last night around 5 AM.  Describes her pain as substernal, sharp pain, radiating to her back and left side. Movement makes her pain worse. However, nothing makes the pain better. She also endorses nausea for the the past 2 months every time she eats. Therefore, she has decreased oral intake. She denies having weight loss. She also complains of generalized body pain. Mentions that she has been having this pain for several months. Mentions that she saw a rheumatologist who mentioned that she had fibromyalgia. However, she did not follow-up with the rheumatologist.  She did not complain of any fall or trauma. Mentions that she is not able to do her daily activity and her  and sister help her because of her history of osteoarthritis and fibromyalgia. She also mentioned that she has history of asthma and has been using her albuterol inhaler more often. She mentions that because of her pain she had to sit in the sun yesterday which caused redness on her back. Pt denies having LE swelling, cough, fever, numbness, tingling, abdominal pain, constipation, diarrhea, dysuria. Review of Systems     Review of Systems   Constitutional: Positive for appetite change and fatigue. HENT: Negative. Eyes: Negative. Respiratory: Positive for shortness of breath. Cardiovascular: Positive for chest pain. Gastrointestinal: Negative. Endocrine: Negative. Genitourinary: Negative. Musculoskeletal: Positive for arthralgias. Allergic/Immunologic: Negative. Neurological: Negative. Hematological: Negative. Psychiatric/Behavioral: Negative. Past Medical, Surgical, Family, and Social History     She has a past medical history of Acute lateral meniscus tear of right knee, Acute medial meniscus tear of right knee, Anesthesia complication, Anxiety, Arthritis, Asthma, Atrial fibrillation ([address] CHF (congestive heart failure) ([facility] [address] Edema, Fibromyalgia, GERD (gastroesophageal reflux disease), Hiatal hernia, Idiopathic urticaria, and SVT (supraventricular tachycardia) ([facility] [address] She has a past surgical history that includes hernia repair (2001); Hysterectomy (2004); Tonsillectomy and adenoidectomy; Romie-en-Y Gastric Bypass (2011); UPPP; ovarian cyst removal; ablation of dysrhythmic focus (04/2019); Knee arthroscopy (Right, 7/11/2019); bladder suspension (2004); and ventral hernia repair (N/A, 8/26/2019). Her family history includes Arthritis in her mother; Asthma in her maternal aunt; Cancer in her mother; Cirrhosis in her father. She reports that she has never smoked. She has never used smokeless tobacco. She reports current alcohol use. She reports current drug use. Drugs: Marijuana and Other-see comments.     Medications     Previous Medications    ALBUTEROL SULFATE  (90 BASE) MCG/ACT INHALER    INHALE 2 PUFFS INTO THE LUNGS EVERY 4 HOURS AS NEEDED FOR WHEEZING OR SHORTNESS OF BREATH(SPACE OUT TO EVERY 6 HOURS AS SYMPTOMS IMPROVE)    APIXABAN (ELIQUIS) 5 MG TABS TABLET    Take 1 tablet by mouth 2 times daily TAKE 1 TABLET BY MOUTH 2 TIMES A DAY    CHOLECALCIFEROL 50 MCG (2000 UT) CAPS        CYANOCOBALAMIN 1000 MCG TABLET    Take 1,000 mcg by mouth daily    CYCLOBENZAPRINE (FLEXERIL) 5 MG TABLET    Take 5 mg by mouth 3 times daily as needed    DILTIAZEM (CARDIZEM CD) 240 MG EXTENDED RELEASE CAPSULE    Take 1 capsule by mouth daily    DULERA 200-5 MCG/ACT INHALER    INHALE TWO PUFFS BY MOUTH TWICE A DAY    EPINEPHRINE (AUVI-Q) 0.3 MG/0.3ML SOAJ INJECTION Inject 0.3 mg into the muscle as needed Use as directed for allergic reaction     FLECAINIDE (TAMBOCOR) 50 MG TABLET    Take 1 tablet by mouth 2 times daily    HANDICAP PLACARD MISC    by Does not apply route Dx of lumbar DDD. Effective Dec 18, 2020 until Dec 18, 2021. HANDICAP PLACARD MISC    by Does not apply route Dx lumbar degenerative disc disease. Effective May 6, 2021. PCP requesting placard with no expiration    HYDROCODONE-ACETAMINOPHEN (NORCO) 7.5-325 MG PER TABLET    Take 1 tablet by mouth every 8 hours as needed for Pain for up to 10 days. Intended supply: 3 days. Take lowest dose possible to manage pain    KETOCONAZOLE (NIZORAL) 2 % SHAMPOO        OMALIZUMAB (XOLAIR) 150 MG INJECTION    Inject 300 mg into the skin once for 1 dose    PHENTERMINE (ADIPEX-P) 37.5 MG TABLET    Take 1 tablet by mouth every morning (before breakfast) for 30 days. PHENTERMINE (ADIPEX-P) 37.5 MG TABLET    Take 1 tablet by mouth every morning (before breakfast) for 30 days. SERTRALINE (ZOLOFT) 100 MG TABLET    Take 100 mg with 50 mg tab po daily. SERTRALINE (ZOLOFT) 50 MG TABLET    Take 1 tablet by mouth daily Take 100 mg with 50 mg tab po daily. SPACER/AERO-HOLDING CHAMBERS (AEROCHAMBER MV) MISC    Use with inhaler as directed    TRIAMTERENE-HYDROCHLOROTHIAZIDE (MAXZIDE) 75-50 MG PER TABLET    Take 1 tablet by mouth daily as needed (swelling)       Allergies     She is allergic to latex, aspirin, demerol, meperidine, nsaids, pcn [penicillins], ranitidine hcl, and sulfa antibiotics. Physical Exam     INITIAL VITALS: BP: (!) 141/122, Temp: 98.3 °F (36.8 °C), Pulse: 68, Resp: 17, SpO2: 96 %   Physical Exam  Vitals reviewed. Constitutional:       Appearance: She is obese. HENT:      Head: Normocephalic and atraumatic. Nose: Nose normal.      Mouth/Throat:      Mouth: Mucous membranes are moist.   Eyes:      Extraocular Movements: Extraocular movements intact.       Conjunctiva/sclera: Conjunctivae normal.      Pupils: Pupils are equal, round, and reactive to light. Cardiovascular:      Rate and Rhythm: Normal rate and regular rhythm. Pulses: Normal pulses. Heart sounds: Normal heart sounds. Comments: Tenderness present on palpation  Pulmonary:      Effort: Pulmonary effort is normal.      Breath sounds: Normal breath sounds. Abdominal:      Palpations: Abdomen is soft. Musculoskeletal:         General: Normal range of motion. Neurological:      General: No focal deficit present. Mental Status: She is alert and oriented to person, place, and time. Diagnostic Results     EKG   Interpreted inconjunction with emergency department physician Va Toussaint MD  Rhythm: normal sinus   Rate: normal  Axis: normal  Ectopy: none  Conduction: normal  ST Segments: no acute change  T Waves: no acute change  Q Waves: none  Clinical Impression: no acute changes  Comparison:      RADIOLOGY:  No orders to display       LABS:   No results found for this visit on 08/24/21. ED BEDSIDE ULTRASOUND:      RECENT VITALS:  BP: (!) 141/122, Temp: 98.3 °F (36.8 °C),Pulse: 68, Resp: 17, SpO2: 96 %     Procedures         ED Course     Nursing Notes, Past Medical Hx, Past Surgical Hx, Social Hx, Allergies, and FamilyHx were reviewed. The patient was giventhe following medications:  No orders of the defined types were placed in this encounter. CONSULTS:  None    MEDICAL DECISION MAKING / ASSESSMENT / Thejose Rico is a 54 y.o. female with past medical history of asthma, A. fib presented with chief complaint of chest pain. She also complains of generalized body pain that is started a few months ago. Chest x-ray was done and showed cardiac enlargement and mild vascular congestion. Troponin was negative x2. EKG did not show any ST changes or T wave changes. CK was ordered and was within normal limit. Patient does not have any suicidal ideation or homicidal ideation.  Patient is being discharged home in a stable condition and follow-up with primary care physician in a week. This patient was also evaluated by the attending physician. All care plans were discussed and agreed upon. Clinical Impression     No diagnosis found. Disposition     PATIENT REFERRED TO:  No follow-up provider specified.     DISCHARGE MEDICATIONS:  New Prescriptions    No medications on file       DISPOSITION

## 2021-08-24 NOTE — ED NOTES
Bed: B21-21  Expected date:   Expected time:   Means of arrival:   Comments:  Maria Cheney RN  08/24/21 5429

## 2021-08-25 NOTE — ED NOTES
1 [facility]  EMERGENCY DEPARTMENT ENCOUNTER          [facility] RESIDENT NOTE       Date of evaluation: 8/24/2021    Chief Complaint     Chest Pain (since 5AM, slight SOB, -fever, hx fibromyalgia - progressively debilitating chronic pain)      History of Present Illness     Parth Blackwell is a 54 y.o. female with past medical history of asthma, A. fib who presents with complaint of chest pain. She mentions that her chest pain is started last night around 5 AM.  Describes her pain as substernal, sharp pain, radiating to her back and left side. Movement makes her pain worse. However, nothing makes the pain better. She also endorses nausea for the the past 2 months every time she eats. Therefore, she has decreased oral intake. She denies having weight loss. She also complains of generalized body pain. Mentions that she has been having this pain for several months. Mentions that she saw a rheumatologist who mentioned that she had fibromyalgia. However, she did not follow-up with the rheumatologist.  She did not complain of any fall or trauma. Mentions that she is not able to do her daily activity and her  and sister help her because of her history of osteoarthritis and fibromyalgia. She also mentioned that she has history of asthma and has been using her albuterol inhaler more often. She mentions that because of her pain she had to sit in the sun yesterday which caused redness on her back. Pt denies having LE swelling, cough, fever, numbness, tingling, abdominal pain, constipation, diarrhea, dysuria. Review of Systems     Review of Systems   Constitutional: Positive for appetite change and fatigue. HENT: Negative. Eyes: Negative. Respiratory: Positive for shortness of breath. Cardiovascular: Positive for chest pain. Gastrointestinal: Negative. Endocrine: Negative. Genitourinary: Negative. Musculoskeletal: Positive for arthralgias. Allergic/Immunologic: Negative. Neurological: Negative. Hematological: Negative. Psychiatric/Behavioral: Negative. Past Medical, Surgical, Family, and Social History     She has a past medical history of Acute lateral meniscus tear of right knee, Acute medial meniscus tear of right knee, Anesthesia complication, Anxiety, Arthritis, Asthma, Atrial fibrillation ([address] CHF (congestive heart failure) ([address] Edema, Fibromyalgia, GERD (gastroesophageal reflux disease), Hiatal hernia, Idiopathic urticaria, and SVT (supraventricular tachycardia) ([address] She has a past surgical history that includes hernia repair (2001); Hysterectomy (2004); Tonsillectomy and adenoidectomy; Romie-en-Y Gastric Bypass (2011); UPPP; ovarian cyst removal; ablation of dysrhythmic focus (04/2019); Knee arthroscopy (Right, 7/11/2019); bladder suspension (2004); and ventral hernia repair (N/A, 8/26/2019). Her family history includes Arthritis in her mother; Asthma in her maternal aunt; Cancer in her mother; Cirrhosis in her father. She reports that she has never smoked. She has never used smokeless tobacco. She reports current alcohol use. She reports current drug use. Drugs: Marijuana and Other-see comments. Medications     Discharge Medication List as of 8/24/2021  6:15 PM      CONTINUE these medications which have NOT CHANGED    Details   HYDROcodone-acetaminophen (NORCO) 7.5-325 MG per tablet Take 1 tablet by mouth every 8 hours as needed for Pain for up to 10 days. Intended supply: 3 days. Take lowest dose possible to manage pain, Disp-30 tablet, R-0Normal      !! phentermine (ADIPEX-P) 37.5 MG tablet Take 1 tablet by mouth every morning (before breakfast) for 30 days. , Disp-30 tablet, R-0Normal      !! phentermine (ADIPEX-P) 37.5 MG tablet Take 1 tablet by mouth every morning (before breakfast) for 30 days. , Disp-30 tablet, R-0Normal      !! sertraline (ZOLOFT) 50 MG tablet Take 1 tablet by mouth daily Take 100 mg with 50 mg tab po daily. , Disp-30 tablet, R-5Normal      !! sertraline (ZOLOFT) 100 MG tablet Take 100 mg with 50 mg tab po daily. , Disp-30 tablet, R-5Normal      DULERA 200-5 MCG/ACT inhaler INHALE TWO PUFFS BY MOUTH TWICE A DAY, Disp-1 Inhaler, R-11Normal      !! Handicap Placard MISC Starting Thu 5/6/2021, Disp-1 each, R-0, PrintDx lumbar degenerative disc disease. Effective May 6, 2021. PCP requesting placard with no expiration      albuterol sulfate  (90 Base) MCG/ACT inhaler INHALE 2 PUFFS INTO THE LUNGS EVERY 4 HOURS AS NEEDED FOR WHEEZING OR SHORTNESS OF BREATH(SPACE OUT TO EVERY 6 HOURS AS SYMPTOMS IMPROVE), Disp-8.5 g, R-2Normal      Cholecalciferol 50 MCG (2000 UT) CAPS Historical Med      cyanocobalamin 1000 MCG tablet Take 1,000 mcg by mouth dailyHistorical Med      cyclobenzaprine (FLEXERIL) 5 MG tablet Take 5 mg by mouth 3 times daily as neededHistorical Med      ketoconazole (NIZORAL) 2 % shampoo Historical Med      flecainide (TAMBOCOR) 50 MG tablet Take 1 tablet by mouth 2 times daily, Disp-60 tablet, R-5Normal      triamterene-hydroCHLOROthiazide (MAXZIDE) 75-50 MG per tablet Take 1 tablet by mouth daily as needed (swelling), Disp-90 tablet, R-1Normal      dilTIAZem (CARDIZEM CD) 240 MG extended release capsule Take 1 capsule by mouth daily, Disp-60 capsule, R-3Normal      apixaban (ELIQUIS) 5 MG TABS tablet Take 1 tablet by mouth 2 times daily TAKE 1 TABLET BY MOUTH 2 TIMES A DAY, Disp-180 tablet, R-3Normal      !! Handicap Placard MISC Starting Fri 12/18/2020, Disp-1 each, R-0, PrintDx of lumbar DDD. Effective Dec 18, 2020 until Dec 18, 2021.       omalizumab (XOLAIR) 150 MG injection Inject 300 mg into the skin once for 1 dose, Disp-1 vial, R-3Print      Spacer/Aero-Holding Chambers (AEROCHAMBER MV) MISC Disp-1 each, R-0, NormalUse with inhaler as directed      EPINEPHrine (AUVI-Q) 0.3 MG/0.3ML SOAJ injection Inject 0.3 mg into the muscle as needed Use as directed for allergic reaction Historical Med       !! - Potential duplicate medications found. Please discuss with provider. Allergies     She is allergic to latex, aspirin, demerol, meperidine, nsaids, pcn [penicillins], ranitidine hcl, and sulfa antibiotics. Physical Exam     INITIAL VITALS: BP: (!) 141/122, Temp: 98.3 °F (36.8 °C), Pulse: 68, Resp: 17, SpO2: 96 %   Physical Exam   Vitals reviewed. Constitutional:       Appearance: She is obese. HENT:      Head: Normocephalic and atraumatic. Nose: Nose normal.      Mouth/Throat:      Mouth: Mucous membranes are moist.   Eyes:      Extraocular Movements: Extraocular movements intact. Conjunctiva/sclera: Conjunctivae normal.      Pupils: Pupils are equal, round, and reactive to light. Cardiovascular:      Rate and Rhythm: Normal rate and regular rhythm. Pulses: Normal pulses. Heart sounds: Normal heart sounds. Comments: Tenderness present on palpation  Pulmonary:      Effort: Pulmonary effort is normal.      Breath sounds: Normal breath sounds. Abdominal:      Palpations: Abdomen is soft. Musculoskeletal:         General: Normal range of motion. Neurological:      General: No focal deficit present. Mental Status: She is alert and oriented to person, place, and time.      Diagnostic Results     EKG   Interpreted inconjunction with emergency department physician Va Toussaint MD  Rhythm: normal sinus   Rate: normal  Axis: normal  Ectopy: none  Conduction: normal  ST Segments: no acute change  T Waves: no acute change  Q Waves: none  Clinical Impression: no acute changes    RADIOLOGY:  XR CHEST PORTABLE   Final Result      Cardiac enlargement and mild vascular congestion                      LABS:   Results for orders placed or performed during the hospital encounter of 08/24/21   Troponin (lab)   Result Value Ref Range    Troponin <0.01 <0.01 ng/mL   CK (Lab)   Result Value Ref Range    Total CK 58 26 - 192 U/L   CBC auto differential   Result Value Ref Range    WBC 5.6 4.0 - 11.0 K/uL    RBC 4.46 4.00 - 5.20 M/uL    Hemoglobin 13.2 12.0 - 16.0 g/dL    Hematocrit 39.8 36.0 - 48.0 %    MCV 89.3 80.0 - 100.0 fL    MCH 29.7 26.0 - 34.0 pg    MCHC 33.3 31.0 - 36.0 g/dL    RDW 15.5 (H) 12.4 - 15.4 %    Platelets 655 148 - 168 K/uL    MPV 8.7 5.0 - 10.5 fL    Neutrophils % 53.1 %    Lymphocytes % 32.1 %    Monocytes % 9.8 %    Eosinophils % 4.0 %    Basophils % 1.0 %    Neutrophils Absolute 3.0 1.7 - 7.7 K/uL    Lymphocytes Absolute 1.8 1.0 - 5.1 K/uL    Monocytes Absolute 0.5 0.0 - 1.3 K/uL    Eosinophils Absolute 0.2 0.0 - 0.6 K/uL    Basophils Absolute 0.1 0.0 - 0.2 K/uL   Comprehensive Metabolic Panel w/ Reflex to MG   Result Value Ref Range    Sodium 139 136 - 145 mmol/L    Potassium reflex Magnesium 4.0 3.5 - 5.1 mmol/L    Chloride 104 99 - 110 mmol/L    CO2 21 21 - 32 mmol/L    Anion Gap 14 3 - 16    Glucose 109 (H) 70 - 99 mg/dL    BUN 15 7 - 20 mg/dL    CREATININE 0.8 0.6 - 1.1 mg/dL    GFR Non-African American >60 >60    GFR African American >60 >60    Calcium 8.8 8.3 - 10.6 mg/dL    Total Protein 7.4 6.4 - 8.2 g/dL    Albumin 4.0 3.4 - 5.0 g/dL    Albumin/Globulin Ratio 1.2 1.1 - 2.2    Total Bilirubin 0.6 0.0 - 1.0 mg/dL    Alkaline Phosphatase 115 40 - 129 U/L    ALT 9 (L) 10 - 40 U/L    AST 13 (L) 15 - 37 U/L    Globulin 3.4 g/dL   Troponin   Result Value Ref Range    Troponin <0.01 <0.01 ng/mL   EKG 12 Lead   Result Value Ref Range    Ventricular Rate 72 BPM    Atrial Rate 72 BPM    P-R Interval 220 ms    QRS Duration 90 ms    Q-T Interval 394 ms    QTc Calculation (Bazett) 431 ms    P Axis 24 degrees    R Axis 43 degrees    T Axis 47 degrees    Diagnosis       EKG performed in ER and to be interpreted by ER physician. Confirmed by MD, ER ([id number]),  Jed Fisher [phone]) on 8/24/2021 2:31:28 PM       ED BEDSIDE ULTRASOUND:      RECENT VITALS:  BP: (!) 136/92, Temp: 98.3 °F (36.8 °C),Pulse: 61, Resp: 18, SpO2: 97 %     Procedures         ED Course     Nursing Notes, Past Medical Hx, Past Surgical Hx, Social Hx, Allergies, and FamilyHx were reviewed. The patient was giventhe following medications:  Orders Placed This Encounter   Medications    oxyCODONE-acetaminophen (PERCOCET) 5-325 MG per tablet 1 tablet       CONSULTS:  None    [address] / ASSESSMENT / [place] is a 54 y.o. female with past medical history of asthma, A. fib presented with chief complaint of chest pain. She also complains of generalized body pain that is started a few months ago. Chest x-ray was done and showed cardiac enlargement and mild vascular congestion. Troponin was negative x2. EKG did not show any ST changes or T wave changes. CK was ordered and was within normal limit. Patient does not have any suicidal ideation or homicidal ideation. Patient is being discharged home in a stable condition and follow-up with primary care physician in a week.       This patient was also evaluated by the attending physician. All care plans were discussed and agreed upon. Clinical Impression     1.  Chest pain, unspecified type        Disposition     PATIENT REFERRED TO:  Jenae Armando DO  [address]  [phone]    In 1 week        DISCHARGE MEDICATIONS:  Discharge Medication List as of 8/24/2021  6:15 PM          DISPOSITION Eloped - Left Before Treatment Complete 08/24/2021 06:01:56 PM     Анна Cedeno MD  Resident  08/25/21 5566

## 2021-09-10 ENCOUNTER — TELEPHONE (OUTPATIENT)
Dept: FAMILY MEDICINE CLINIC | Age: 56
End: 2021-09-10

## 2021-09-14 ENCOUNTER — TELEPHONE (OUTPATIENT)
Dept: FAMILY MEDICINE CLINIC | Age: 56
End: 2021-09-14

## 2021-09-14 NOTE — TELEPHONE ENCOUNTER
Fax received from third-party son from [employer]. It is in my inbox. Please reviewe options regarding disability paperwork below and ask how she wants to pursue the form completion:      Effective Oct 1, 2020, Dr. Sanju Guillen will begin requiring an appointment for completion of disability forms. The forms are typically completed same day with an appointment. If patient is not willing or unable to schedule an appointment, Dr. Sanju Guillen will be charging $25 for completion of forms. In this case, the turn around time is 1 week.       CPT code 43719 (special reports or forms)

## 2021-09-14 NOTE — TELEPHONE ENCOUNTER
Pt doesn't have insurance anymore, [facility] help her come in and see him. Pt will be calling back with what she is able to come up with.

## 2021-09-20 RX ORDER — PHENTERMINE HYDROCHLORIDE 37.5 MG/1
37.5 TABLET ORAL
Qty: 30 TABLET | Refills: 0 | Status: SHIPPED | OUTPATIENT
Start: 2021-09-20 | End: 2021-10-20

## 2021-09-20 NOTE — TELEPHONE ENCOUNTER
Patient would like to know if you have anymore samples of steroid inhalers. Patient also states that she loss 20 lbs and would like a refill. Please advise. Thanks.       phentermine (ADIPEX-P) 37.5 MG tablet [[id number]]     Order Details  Dose: 37.5 mg Route: Oral Frequency: DAILY BEFORE BREAKFAST  Dispense Quantity: 30 tablet Refills: 0        Sig: Take 1 tablet by mouth every morning (before breakfast) for 30 days.       Start Date: 08/25/21 End Date: 09/24/21 after 30 doses  Written Date: 07/26/21 Expiration Date: 01/22/22     Diagnosis Association: Adult BMI 50.0-59.9 kg/sq m [facility]) (P00.50)  Providers    Authorizing Provider: Cullen Whitaker DO NPI: [id number]  Ordering User:  Cullen Whitaker DO      Pharmacy    [facility] #[id number] [address] -  [phone] - F [phone]   [address] [place] [address], [place] [address]   Phone:  [phone]  Fax:  [phone]

## 2021-09-20 NOTE — TELEPHONE ENCOUNTER
Requested Prescriptions     Pending Prescriptions Disp Refills    phentermine (ADIPEX-P) 37.5 MG tablet 30 tablet 0     Sig: Take 1 tablet by mouth every morning (before breakfast) for 30 days. Last ov 7/26/2021  Last labs 8/24/21    Patient would like to know if you have anymore samples of steroid inhalers. Patient also states that she loss 20 lbs and would like a refill. Please advise. Thanks.

## 2021-09-24 NOTE — TELEPHONE ENCOUNTER
We currently do not have Dulera samples. As of today we have Breo (which is similar to [facility]) for a 1 month supply. Does she want this instead? If so, please save the 2 remaining inalers for her (each inhaler is 1 inhalation q24h for 14 days). If she wants [facility], staff will have to reach [facility] rep. 05-Oct-2021

## 2021-10-26 ENCOUNTER — TELEPHONE (OUTPATIENT)
Dept: ORTHOPEDIC SURGERY | Age: 56
End: 2021-10-26

## 2021-10-27 ENCOUNTER — OFFICE VISIT (OUTPATIENT)
Dept: ORTHOPEDIC SURGERY | Age: 56
End: 2021-10-27
Payer: MEDICAID

## 2021-10-27 VITALS — HEIGHT: 70 IN | BODY MASS INDEX: 41.95 KG/M2 | WEIGHT: 293 LBS

## 2021-10-27 DIAGNOSIS — M47.22 CERVICAL SPONDYLOSIS WITH RADICULOPATHY: Primary | ICD-10-CM

## 2021-10-27 PROCEDURE — G8427 DOCREV CUR MEDS BY ELIG CLIN: HCPCS | Performed by: PHYSICIAN ASSISTANT

## 2021-10-27 PROCEDURE — G8417 CALC BMI ABV UP PARAM F/U: HCPCS | Performed by: PHYSICIAN ASSISTANT

## 2021-10-27 PROCEDURE — 3017F COLORECTAL CA SCREEN DOC REV: CPT | Performed by: PHYSICIAN ASSISTANT

## 2021-10-27 PROCEDURE — 99213 OFFICE O/P EST LOW 20 MIN: CPT | Performed by: PHYSICIAN ASSISTANT

## 2021-10-27 PROCEDURE — G8484 FLU IMMUNIZE NO ADMIN: HCPCS | Performed by: PHYSICIAN ASSISTANT

## 2021-10-27 PROCEDURE — 1036F TOBACCO NON-USER: CPT | Performed by: PHYSICIAN ASSISTANT

## 2021-10-27 NOTE — PROGRESS NOTES
Follow up: CERVICAL SPINE    CHIEF COMPLAINT:    Chief Complaint   Patient presents with    Neck Pain     cervical     HISTORY OF PRESENT ILLNESS:   Ms. Avtar Abarca is a pleasant 64 y.o. old female here for consultation regarding her neck and bilateral arm pain. She states the pain began insidiously about 1.5 years ago. Her pain has steadily increased since then. She notes her pain She rates her neck pain 8/10 and shoulder and arm pain 8/10. She describes the pain as aching and burning. Pain is worst with standing and activity, and improved some with sitting or lying down. The arm pain radiates to her left greater than right arm to her hands. She notes numbness and tingling in bilateral arms and hands in a C6 and C7 distribution. She notes weakness of her arms. Patient notes difficulty with fine motor skills. She notes occasional balance disturbance and chronic right knee pain. She notes chronic urinary urgency, otherwise denies saddle numbness or other bowel or bladder dysfunction. The pain occasionally interferes with her sleep. She has tried home exercises and cervical traction collar with temporary improvement.  She was unable to tolerate Gabapentin and Lyrica in the past.    Current/Past Treatment:   · Physical Therapy: HEP, cervical traction  · Chiropractic:  No  · Injection:  No   · Medications: Flexeril, Valium , Norco    Past Medical History:   Past Medical History:   Diagnosis Date    Acute lateral meniscus tear of right knee 02/2019    Acute medial meniscus tear of right knee 02/2019    Anesthesia complication     woke up during one surgery    Anxiety     Arthritis     Asthma     Atrial fibrillation ([address]     new dx 4 weeks ago, first of  Sept 2017    CHF (congestive heart failure) ([address]     Edema     Fibromyalgia     GERD (gastroesophageal reflux disease)     reflux    Hiatal hernia     Idiopathic urticaria 10/29/2020    SVT (supraventricular tachycardia) (HCC)     hx svt        Past Surgical History:     Past Surgical History:   Procedure Laterality Date    ABLATION OF DYSRHYTHMIC FOCUS  04/2019    afib    BLADDER SUSPENSION  2004    HERNIA REPAIR  2001    HYSTERECTOMY  2004    KNEE ARTHROSCOPY Right 7/11/2019    VIDEO ARTHROSCOPY RIGHT KNEE, PARTIAL MEDIAL AND LATERAL MENISCECTOMY,, MEDIAL MICRO FRACTURE CHONDROPLASTY performed by Magalis Camargo MD at [facility] GASTRIC BYPASS  2011    TONSILLECTOMY AND ADENOIDECTOMY      UPPP UVULOPALATOPHARYGOPLASTY      VENTRAL HERNIA REPAIR N/A 8/26/2019    DIAGNOSTIC LAPAROSCOPY, LAPAROSCOPIC LYSIS OF ADHESIONS, LAPAROSCOPIC REDUCTION OF RECURRENT INCISIONAL HERNIA WITH MESH performed by Zulma Womack MD at [facility] [address]       Current Medications:     Current Outpatient Medications:     albuterol sulfate  (90 Base) MCG/ACT inhaler, INHALE 2 PUFFS INTO LUNGS EVERY 4 HOURS AS NEEDED FOR WHEEZING OR SHORTNESS OF BREATH(SPACE OUT TO EVERY 6 HOURS AS SYMPTOMS IMPROVE), Disp: 8.5 g, Rfl: 11    sertraline (ZOLOFT) 50 MG tablet, Take 1 tablet by mouth daily Take 100 mg with 50 mg tab po daily. , Disp: 30 tablet, Rfl: 5    sertraline (ZOLOFT) 100 MG tablet, Take 100 mg with 50 mg tab po daily. , Disp: 30 tablet, Rfl: 5    DULERA 200-5 MCG/ACT inhaler, INHALE TWO PUFFS BY MOUTH TWICE A DAY, Disp: 1 Inhaler, Rfl: 11    Handicap Placard MISC, by Does not apply route Dx lumbar degenerative disc disease. Effective May 6, 2021.   PCP requesting placard with no expiration, Disp: 1 each, Rfl: 0    Cholecalciferol 50 MCG (2000 UT) CAPS, , Disp: , Rfl:     cyanocobalamin 1000 MCG tablet, Take 1,000 mcg by mouth daily (Patient not taking: Reported on 7/26/2021), Disp: , Rfl:     cyclobenzaprine (FLEXERIL) 5 MG tablet, Take 5 mg by mouth 3 times daily as needed (Patient not taking: Reported on 7/26/2021), Disp: , Rfl:     ketoconazole (NIZORAL) 2 % shampoo, , Disp: , Rfl:     flecainide (TAMBOCOR) 50 MG tablet, Take 1 tablet by mouth 2 times daily, Disp: 60 tablet, Rfl: 5    triamterene-hydroCHLOROthiazide (MAXZIDE) 75-50 MG per tablet, Take 1 tablet by mouth daily as needed (swelling), Disp: 90 tablet, Rfl: 1    dilTIAZem (CARDIZEM CD) 240 MG extended release capsule, Take 1 capsule by mouth daily, Disp: 60 capsule, Rfl: 3    apixaban (ELIQUIS) 5 MG TABS tablet, Take 1 tablet by mouth 2 times daily TAKE 1 TABLET BY MOUTH 2 TIMES A DAY, Disp: 180 tablet, Rfl: 3    Handicap Placard MISC, by Does not apply route Dx of lumbar DDD. Effective Dec 18, 2020 until Dec 18, 2021., Disp: 1 each, Rfl: 0    omalizumab (XOLAIR) 150 MG injection, Inject 300 mg into the skin once for 1 dose, Disp: 1 vial, Rfl: 3    Spacer/Aero-Holding Chambers (AEROCHAMBER MV) MISC, Use with inhaler as directed, Disp: 1 each, Rfl: 0    EPINEPHrine (AUVI-Q) 0.3 MG/0.3ML SOAJ injection, Inject 0.3 mg into the muscle as needed Use as directed for allergic reaction , Disp: , Rfl:     Allergies:  Latex, Aspirin, Demerol, Meperidine, Nsaids, Pcn [penicillins], Ranitidine hcl, and Sulfa antibiotics    Social History:    reports that she has never smoked. She has never used smokeless tobacco. She reports current alcohol use. She reports current drug use. Drugs: Marijuana and Other-see comments.     Family History:   Family History   Problem Relation Age of Onset   Conor Corea Cancer Mother         lung    Arthritis Mother     Cirrhosis Father     Asthma Maternal Aunt        REVIEW OF SYSTEMS: Full ROS noted & scanned   CONSTITUTIONAL: Denies unexplained weight loss, fevers, chills or fatigue  NEUROLOGICAL: Denies unsteady gait or progressive weakness  MUSCULOSKELETAL: Denies joint swelling or redness  PSYCHOLOGICAL: Denies anxiety, depression   SKIN: Denies skin changes, delayed healing, rash, itching   HEMATOLOGIC: Denies easy bleeding or bruising  ENDOCRINE: Denies excessive thirst, urination, heat/cold  RESPIRATORY: Denies current dyspnea, cough  GI: Denies nausea, vomiting, diarrhea   : Denies bowel or bladder issues       PHYSICAL EXAM:    Vitals: Height 5' 10\" (1.778 m), weight (!) 369 lb (167.4 kg), not currently breastfeeding. GENERAL EXAM:  · General Apparence: Patient is adequately groomed with no evidence of malnutrition. · Orientation: The patient is oriented to time, place and person. · Mood & Affect:The patient's mood and affect are appropriate   · Vascular: Examination reveals no swelling tenderness in upper or lower extremities. Good capillary refill  · Lymphatic: The lymphatic examination bilaterally reveals all areas to be without enlargement or induration  · Sensation: Sensation is intact without deficit  · Coordination/Balance: Good coordination. Tandem walking normal.     CERVICAL EXAMINATION:  · Inspection: Local inspection shows no step-off or bruising. Cervical alignment is normal.     · Palpation: No evidence of tenderness at the midline, and trapezius. Paraspinal tenderness is present. There is no step-off or paraspinal spasm. · Range of Motion: Cervical flexion, extension, and rotation are mildly reduced with pain. · Strength: 5/5 bilateral upper extremities   · Special Tests:    ·  Spurling's negative & Leblanc's negative bilaterally. ·  Cubital and Carpal tunnel Tinel's negative bilaterally. · Skin:There are no rashes, ulcerations or lesions in right & left upper extremities. · Reflexes: Bilaterally triceps, biceps and brachioradialis are 2+. Clonus absent bilaterally at the feet. · Gait & station: normal, patient ambulates without assistance       · Additional Examinations:       · RIGHT UPPER EXTREMITY:  Inspection/examination of the right upper extremity does not show any tenderness, deformity or injury. Range of motion is unremarkable. There is no gross instability. There are no rashes, ulcerations or lesions.  Strength and tone are normal.  · LEFT UPPER EXTREMITY: Inspection/examination of the left upper extremity does not show any tenderness, deformity or injury. Range of motion is unremarkable. There is no gross instability. There are no rashes, ulcerations or lesions. Strength and tone are normal.    Diagnostic Testing:  I reviewed MRI images of her cervical spine from 3/23/21. They show degenerative disc changes with a right disc protrusion and moderate-severe right foraminal narrowing C3-4, and disc bulging and facet arthrosis C5-6 and C6-7 with severe bilateral foraminal narrowing. Impression:   Cervical DDD with radiculopathy    Plan:    We discussed treatment options including observation, upper extremity EMG, physical therapy, epidural injections or ACDF. I recommend EMG bilateral upper extremities and CT cervical spine to assess her foraminal stenosis. We will call her with her EMG and CT results. She is reluctant to try epidural injections, because lumbar epidural injections were painful in the past.    Patient examined and note dictated by Claudette Alejandre PA-C. Patient also seen and examined by Dr. Melida Leal.

## 2021-10-28 ENCOUNTER — TELEPHONE (OUTPATIENT)
Dept: ORTHOPEDIC SURGERY | Age: 56
End: 2021-10-28

## 2021-11-05 ENCOUNTER — HOSPITAL ENCOUNTER (OUTPATIENT)
Dept: CT IMAGING | Age: 56
Discharge: HOME | End: 2021-11-05
Payer: MEDICAID

## 2021-11-05 DIAGNOSIS — M47.22 CERVICAL SPONDYLOSIS WITH RADICULOPATHY: ICD-10-CM

## 2021-11-05 PROCEDURE — 72125 CT NECK SPINE W/O DYE: CPT

## 2021-11-09 ENCOUNTER — HOSPITAL ENCOUNTER (OUTPATIENT)
Age: 56
Discharge: HOME | End: 2021-11-09
Payer: MEDICAID

## 2021-11-09 PROCEDURE — 95886 MUSC TEST DONE W/N TEST COMP: CPT | Performed by: PSYCHIATRY & NEUROLOGY

## 2021-11-09 PROCEDURE — 95911 NRV CNDJ TEST 9-10 STUDIES: CPT | Performed by: PSYCHIATRY & NEUROLOGY

## 2021-11-09 NOTE — PROCEDURES
[facility]                     [facility], [address]                             ELECTROMYOGRAM REPORT    PATIENT NAME: Amna Mckeon                   :        1965  MED REC NO:   [id number]                          ROOM:  ACCOUNT NO:   [de-identified]                           ADMIT DATE: 2021  PROVIDER:     Laverta Brittle, MD    DATE OF EM2021    REASON FOR EMG:  Bilateral arm pain, left worse than right, rule out  cervical radiculopathy. SUMMARY:  The left median motor and sensory nerve studies had prolonged  distal latencies. The right median motor nerve study was normal.  The  right median sensory nerve study had a prolonged distal latency. Bilateral ulnar motor and sensory nerve studies were normal.  The left  radial sensory nerve study was normal.  Needle EMG of several muscles in  both upper extremities was normal.    EMG DIAGNOSIS:  This patient has bilateral median nerve lesions at the  wrist (carpal tunnel syndrome). The left side is moderately severe  whereas the right side is mild. There is no electrophysiological  evidence for cervical radiculopathy in this study.         Rno Zimmer MD    D: 2021 13:48:24       T: 2021 13:50:42     JOSE [email or web address]  Job#: [id number]     Doc#: [id number]    CC:

## 2021-11-10 ENCOUNTER — TELEPHONE (OUTPATIENT)
Dept: ORTHOPEDIC SURGERY | Age: 56
End: 2021-11-10

## 2021-11-10 NOTE — TELEPHONE ENCOUNTER
----- Message from Kortney Britt MD sent at 11/8/2021  9:22 AM EST -----  Cervical ct  Bone spurs with nerve compression  Should proceed with emg of arms

## 2021-11-11 ENCOUNTER — TELEPHONE (OUTPATIENT)
Dept: ORTHOPEDIC SURGERY | Age: 56
End: 2021-11-11

## 2021-11-11 DIAGNOSIS — G56.02 CARPAL TUNNEL SYNDROME OF LEFT WRIST: Primary | ICD-10-CM

## 2021-11-15 ENCOUNTER — TELEPHONE (OUTPATIENT)
Dept: FAMILY MEDICINE CLINIC | Age: 56
End: 2021-11-15

## 2021-11-15 ENCOUNTER — OFFICE VISIT (OUTPATIENT)
Dept: FAMILY MEDICINE CLINIC | Age: 56
End: 2021-11-15
Payer: MEDICAID

## 2021-11-15 VITALS
WEIGHT: 293 LBS | RESPIRATION RATE: 16 BRPM | TEMPERATURE: 96.8 F | BODY MASS INDEX: 51.77 KG/M2 | SYSTOLIC BLOOD PRESSURE: 110 MMHG | DIASTOLIC BLOOD PRESSURE: 88 MMHG | OXYGEN SATURATION: 97 % | HEART RATE: 77 BPM

## 2021-11-15 DIAGNOSIS — F43.23 ADJUSTMENT REACTION WITH ANXIETY AND DEPRESSION: Primary | ICD-10-CM

## 2021-11-15 DIAGNOSIS — G47.01 INSOMNIA DUE TO MEDICAL CONDITION: ICD-10-CM

## 2021-11-15 DIAGNOSIS — R93.89 ABNORMAL CHEST X-RAY: ICD-10-CM

## 2021-11-15 DIAGNOSIS — M79.7 FIBROMYALGIA: ICD-10-CM

## 2021-11-15 DIAGNOSIS — M17.11 PRIMARY OSTEOARTHRITIS OF RIGHT KNEE: ICD-10-CM

## 2021-11-15 DIAGNOSIS — M54.12 CERVICAL RADICULOPATHY: ICD-10-CM

## 2021-11-15 PROCEDURE — G8417 CALC BMI ABV UP PARAM F/U: HCPCS | Performed by: FAMILY MEDICINE

## 2021-11-15 PROCEDURE — 99214 OFFICE O/P EST MOD 30 MIN: CPT | Performed by: FAMILY MEDICINE

## 2021-11-15 PROCEDURE — 3017F COLORECTAL CA SCREEN DOC REV: CPT | Performed by: FAMILY MEDICINE

## 2021-11-15 PROCEDURE — G8427 DOCREV CUR MEDS BY ELIG CLIN: HCPCS | Performed by: FAMILY MEDICINE

## 2021-11-15 PROCEDURE — 1036F TOBACCO NON-USER: CPT | Performed by: FAMILY MEDICINE

## 2021-11-15 PROCEDURE — G8484 FLU IMMUNIZE NO ADMIN: HCPCS | Performed by: FAMILY MEDICINE

## 2021-11-15 RX ORDER — DIAZEPAM 5 MG/1
5 TABLET ORAL NIGHTLY PRN
Qty: 30 TABLET | Refills: 2 | Status: SHIPPED | OUTPATIENT
Start: 2021-11-15 | End: 2022-02-14 | Stop reason: SDUPTHER

## 2021-11-15 RX ORDER — HYDROCODONE BITARTRATE AND ACETAMINOPHEN 7.5; 325 MG/1; MG/1
1 TABLET ORAL 2 TIMES DAILY PRN
Qty: 60 TABLET | Refills: 0 | Status: SHIPPED | OUTPATIENT
Start: 2021-11-15 | End: 2021-12-14 | Stop reason: SDUPTHER

## 2021-11-15 NOTE — TELEPHONE ENCOUNTER
Medication Name: hydrocodone-acetaminophen (Norco)    Dose: 7.5-325 mg    Frequency: Take 1 tablet by mouth 2 times daily as needed for Pain for up to 30 days. Intended supply: 3 days. Take lowest dose possible to manage pain    Quanity: 60 tabs    Diagnosis: Primary osteoarthritis of right knee (M17.11); Fibromyalgia (M79.7);  Cervical radiculopathy (M54.12)    Previously tried and failed medications and dates: n/a      Reason for  Insurance denial: Ins will only pay for 7 day supply without PA    Pharmacy Name and Phone Number Requesting PA: Rosezena Prader [address] [place] [address] [address]: [facility]     Member ID# [id number]    Insurance Phone Number: [phone]

## 2021-11-15 NOTE — PATIENT INSTRUCTIONS
ARRANGE APPT TO MEET WITH DR. Tesha AGUILAR. ASK ABOUT CANCELLATION LIST; SHE DOES BOTH TELEHEALTH OR IN-PERSON. IF THERE IS SIGNIFICANT WAIT TIME, ASK WHICH TALK THERAPISTS ARE IN NETWORK.

## 2021-11-15 NOTE — PROGRESS NOTES
[facility]  Progress Note  Jodee VillalobosDO Erwin yates  1965    11/16/21    Chief Complaint:   Erwin Trinidad is a 64 y.o. female who is here for chronic pain, insomnia, follow-up chest x-ray and obesity        HPI:   Tells me that she lost her health insurance once she resigned. Found it too expensive to pay for health insurance extension (COBRA). Qualified for [employer]. Has insurance again. Finds that it is difficult to deal with her chronic pain and her stress. Continues to experience insomnia and requests diazepam renewal for this. Reviewed chest x-ray completed August of this year showing cardiac enlargement and mild vascular congestion. Does not report any worsening edema. Tells me she went to a specialist for the knees and did not have a good patient experience. Was prescribed some Norco for short quantity and she requests extension of the opioid. Is very sleepy on the Percocet. She finds she is able to take the Norco    ROS negative for headache, visionchanges, chest pain, shortness of breath, abdominal pain, urinary sx, bowel changes. Past medical, surgical, and social history reviewed. and allergies reviewed. Allergies   Allergen Reactions    Latex Anaphylaxis and Rash    Aspirin      Swelling in lower legs    Demerol      rash    Meperidine     Nsaids Swelling    Pcn [Penicillins] Hives    Ranitidine Hcl     Sulfa Antibiotics Rash and Hives     Prior to Visit Medications    Medication Sig Taking? Authorizing Provider   HYDROcodone-acetaminophen (NORCO) 7.5-325 MG per tablet Take 1 tablet by mouth 2 times daily as needed for Pain for up to 30 days. Intended supply: 3 days. Take lowest dose possible to manage pain Yes Beck Hernandez, DO   diazePAM (VALIUM) 5 MG tablet Take 1 tablet by mouth nightly as needed for Anxiety for up to 30 days.  Yes Beck Hernandez, DO   Phentermine-Topiramate 15-92 MG CP24 Take 1 tablet by mouth every 24 hours for 30 doses. Yes Jorge Hernandez DO   albuterol sulfate  (90 Base) MCG/ACT inhaler INHALE 2 PUFFS INTO LUNGS EVERY 4 HOURS AS NEEDED FOR WHEEZING OR SHORTNESS OF BREATH(SPACE OUT TO EVERY 6 HOURS AS SYMPTOMS IMPROVE) Yes Shira Medina MD   sertraline (ZOLOFT) 50 MG tablet Take 1 tablet by mouth daily Take 100 mg with 50 mg tab po daily. Yes Jorge Hernandez DO   sertraline (ZOLOFT) 100 MG tablet Take 100 mg with 50 mg tab po daily. Yes Jorge Hernandez DO   DULERA 200-5 MCG/ACT inhaler INHALE TWO PUFFS BY MOUTH TWICE A DAY Yes Shira Medina MD   Handicap Placard MISC by Does not apply route Dx lumbar degenerative disc disease. Effective May 6, 2021. PCP requesting placard with no expiration Yes Jorge Hernandez DO   flecainide (TAMBOCOR) 50 MG tablet Take 1 tablet by mouth 2 times daily Yes STEPHANIE Steel CNP   dilTIAZem (CARDIZEM CD) 240 MG extended release capsule Take 1 capsule by mouth daily Yes STEPHANIE Nguyen CNP   Handicap Placard MISC by Does not apply route Dx of lumbar DDD. Effective Dec 18, 2020 until Dec 18, 2021. Yes Jorge Hernandez DO   Spacer/Aero-Holding Chambers (AEROCHAMBER MV) MISC Use with inhaler as directed Yes Nyla Boudreaux MD   omalizumab Peg Sailors) 150 MG injection Inject 300 mg into the skin once for 1 dose  Jory Loyola MD     Periodic Controlled Substance Monitoring: Possible medication side effects, risk of tolerance/dependence & alternative treatments discussed.  Jorge Hernandez DO)    Vitals:    11/15/21 1339   BP: 110/88   Pulse: 77   Resp: 16   Temp: 96.8 °F (36 °C)   SpO2: 97%   Weight: (!) 360 lb 12.8 oz (163.7 kg)      Wt Readings from Last 3 Encounters:   11/15/21 (!) 360 lb 12.8 oz (163.7 kg)   10/27/21 (!) 369 lb (167.4 kg)   06/16/21 (!) 369 lb 6.4 oz (167.6 kg)     BP Readings from Last 3 Encounters:   11/15/21 110/88   08/24/21 (!) 136/92   06/16/21 (!) 149/85       Patient Active Problem List   Diagnosis    Asthma    MDD (major depressive disorder), recurrent severe, without psychosis ([facility] [facility]    Cluster B personality disorder ([facility]    Colon polyps    Overdose    Restless leg syndrome    Sliding hiatal hernia    HTN (hypertension)    Atrial flutter (HCC)    SVT (supraventricular tachycardia) (HCC)    Paroxysmal atrial fibrillation (HCC)    Sinus bradycardia    Acute lateral meniscus tear of right knee    Acute medial meniscus tear of right knee    Localized edema    Acute pain of right knee    Chronic diastolic congestive heart failure (HCC)    Primary osteoarthritis of right knee    Contusion of multiple sites of right leg    Gastrocnemius strain, left    Ventral incisional hernia    Recurrent incisional hernia    Pain of upper abdomen    Incisional hernia with obstruction, without gangrene    Rhinitis, chronic    Vocal cord dysfunction    Iron deficiency anemia, unspecified    Idiopathic urticaria    Bilateral carpal tunnel syndrome       Immunization History   Administered Date(s) Administered    BCG (Juan BCG) 05/28/2013    DTaP 05/28/2013    DTaP (Infanrix) 05/28/2013    Influenza Vaccine, unspecified formulation 10/29/2015, 01/09/2017, 10/23/2017, 11/26/2018    Influenza Virus Vaccine 12/31/2008, 09/22/2009, 01/09/2017, 10/23/2017, 10/23/2017, 11/26/2018    Influenza, Quadv, IM, PF (6 mo and older Fluzone, Flulaval, Fluarix, and 3 yrs and older Afluria) 11/26/2018, 12/08/2020    Influenza, Lakisha Roughen, Recombinant, IM PF (Flublok 18 yrs and older) 11/16/2019    PPD Test 06/11/2013, 05/07/2014, 08/13/2019    Pneumococcal Polysaccharide (Tpcpbrlem64) 12/31/2008, 06/17/2019    Tdap (Boostrix, Adacel) 05/28/2013       Past Medical History:   Diagnosis Date    Acute lateral meniscus tear of right knee 02/2019    Acute medial meniscus tear of right knee 02/2019    Anesthesia complication     woke up during one surgery    Anxiety  Arthritis     Asthma     Atrial fibrillation ([facility]     new dx 4 weeks ago, first of  Sept 2017    CHF (congestive heart failure) (HCC)     Edema     Fibromyalgia     GERD (gastroesophageal reflux disease)     reflux    Hiatal hernia     Idiopathic urticaria 10/29/2020    SVT (supraventricular tachycardia) (HCC)     hx svt     Past Surgical History:   Procedure Laterality Date    ABLATION OF DYSRHYTHMIC FOCUS  04/2019    afib    BLADDER SUSPENSION  2004    HERNIA REPAIR  2001    HYSTERECTOMY  2004    KNEE ARTHROSCOPY Right 7/11/2019    VIDEO ARTHROSCOPY RIGHT KNEE, PARTIAL MEDIAL AND LATERAL MENISCECTOMY,, MEDIAL MICRO FRACTURE CHONDROPLASTY performed by Manuel Sanders MD at [facility] GASTRIC BYPASS  2011    TONSILLECTOMY AND ADENOIDECTOMY      UPPP UVULOPALATOPHARYGOPLASTY      VENTRAL HERNIA REPAIR N/A 8/26/2019    DIAGNOSTIC LAPAROSCOPY, LAPAROSCOPIC LYSIS OF ADHESIONS, LAPAROSCOPIC REDUCTION OF RECURRENT INCISIONAL HERNIA WITH MESH performed by Jr Sanders MD at [address] [place] [address] History   Problem Relation Age of Onset    Cancer Mother         lung    Arthritis Mother     Cirrhosis Father     Asthma Maternal Aunt      Social History     Socioeconomic History    Marital status:      Spouse name: Not on file    Number of children: 3    Years of education: 15.5    Highest education level: Not on file   Occupational History    Not on file   Tobacco Use    Smoking status: Never Smoker    Smokeless tobacco: Never Used   Vaping Use    Vaping Use: Never used   Substance and Sexual Activity    Alcohol use: Yes     Comment: 0-1 drinks per month    Drug use: Yes     Types: Marijuana Duke Blow), Other-see comments     Comment: Medical Marijuana use    Sexual activity: Yes     Partners: Male   Other Topics Concern    Not on file   Social History Narrative    Not on file     Social Determinants of Health     Financial Resource Strain: right knee  External Referral To Pain Clinic    HYDROcodone-acetaminophen (NORCO) 7.5-325 MG per tablet   3. Fibromyalgia  External Referral To Pain Clinic    HYDROcodone-acetaminophen (NORCO) 7.5-325 MG per tablet   4. Cervical radiculopathy  External Referral To Pain Clinic    HYDROcodone-acetaminophen (NORCO) 7.5-325 MG per tablet   5. Insomnia due to medical condition  diazePAM (VALIUM) 5 MG tablet   6. Abnormal chest x-ray  XR CHEST STANDARD (2 VW)   7. Adult BMI 50.0-59.9 kg/sq m (HCC)  Phentermine-Topiramate 15-92 MG CP24     #1:  At her request she wanted referral to their health consultant. Gave her the name to our [facility] and she will check availability. #2:  At her request I also placed referral to pain clinic in the [facility] area a [address] [place] [address]. To Dr. Nikko Villegas. In the meantime I will write for the Norco which seems to make #2 through 4 tolerable. The risks, benefits, potential side effects and barriers to medication use were addressed today. Understanding was acknowledged. Patient asked to follow-up if condition(s) do not improve as anticipated. #7:  Lastly she does ask for weight loss medication. With limited time she could take the Adipex she request something similar though and I prescribed the Qsymia and asked that she get back to me if the cost is too much. The risks, benefits, potential side effects and barriers to medication use were addressed today. Understanding was acknowledged. Patient asked to follow-up if condition(s) do not improve as anticipated.           PLAN          Time spent on encounter (including any number of the following: review of labs, imaging, provider notes, outside hospital records; performing examination/evaluation; counseling patient and family; ordering medications/tests; placing referrals and communication with referring physicians; coordination of care, and documentation in the EHR): 32 minutes  Established E/M: 10-19 (46057), 20-29 (72927), 30-39 (09007), 40-54 (01467)   New E/M: 15-29 (40399), 30-44 (40365), 45-59 (02700), 60-74 (41292)  Telephone E/M: 5-10 (53667), 11-20 (98958), 21-30 (00781)    If applicable, see additional patient information and instructions under \"Patient Instructions. \"    No follow-ups on file. Patient Instructions   ARRANGE APPT TO MEET WITH DR. Alfonzo AGUILAR. ASK ABOUT CANCELLATION LIST; SHE DOES BOTH TELEHEALTH OR IN-PERSON. IF THERE IS SIGNIFICANT WAIT TIME, ASK WHICH TALK THERAPISTS ARE IN NETWORK. Please note a portion of this chart was generated using dragon dictation software. Although every effort was made to ensure the accuracy of this automated transcription,some errors in transcription may have occurred.

## 2021-11-16 NOTE — TELEPHONE ENCOUNTER
Submitted PA for HYDROcodone-Acetaminophen 7.5-325MG tablets, Key: [id number]. Medication has been APPROVED through 12/16/2021. Please notify patient. Thank you.

## 2021-11-17 ENCOUNTER — TELEPHONE (OUTPATIENT)
Dept: ADMINISTRATIVE | Age: 56
End: 2021-11-17

## 2021-11-17 ENCOUNTER — OFFICE VISIT (OUTPATIENT)
Dept: ORTHOPEDIC SURGERY | Age: 56
End: 2021-11-17
Payer: MEDICAID

## 2021-11-17 VITALS — RESPIRATION RATE: 16 BRPM | BODY MASS INDEX: 41.95 KG/M2 | WEIGHT: 293 LBS | HEIGHT: 70 IN

## 2021-11-17 DIAGNOSIS — G89.29 CHRONIC PAIN OF LEFT WRIST: Primary | ICD-10-CM

## 2021-11-17 DIAGNOSIS — G56.02 CARPAL TUNNEL SYNDROME OF LEFT WRIST: ICD-10-CM

## 2021-11-17 DIAGNOSIS — M25.532 CHRONIC PAIN OF LEFT WRIST: Primary | ICD-10-CM

## 2021-11-17 PROCEDURE — 1036F TOBACCO NON-USER: CPT | Performed by: ORTHOPAEDIC SURGERY

## 2021-11-17 PROCEDURE — G8417 CALC BMI ABV UP PARAM F/U: HCPCS | Performed by: ORTHOPAEDIC SURGERY

## 2021-11-17 PROCEDURE — 99213 OFFICE O/P EST LOW 20 MIN: CPT | Performed by: ORTHOPAEDIC SURGERY

## 2021-11-17 PROCEDURE — 3017F COLORECTAL CA SCREEN DOC REV: CPT | Performed by: ORTHOPAEDIC SURGERY

## 2021-11-17 PROCEDURE — G8427 DOCREV CUR MEDS BY ELIG CLIN: HCPCS | Performed by: ORTHOPAEDIC SURGERY

## 2021-11-17 PROCEDURE — 20526 THER INJECTION CARP TUNNEL: CPT | Performed by: ORTHOPAEDIC SURGERY

## 2021-11-17 PROCEDURE — L3908 WHO COCK-UP NONMOLDE PRE OTS: HCPCS | Performed by: ORTHOPAEDIC SURGERY

## 2021-11-17 PROCEDURE — G8484 FLU IMMUNIZE NO ADMIN: HCPCS | Performed by: ORTHOPAEDIC SURGERY

## 2021-11-17 RX ORDER — BUPIVACAINE HYDROCHLORIDE 5 MG/ML
0.25 INJECTION, SOLUTION PERINEURAL ONCE
Qty: 0.3 ML | Refills: 0 | Status: CANCELLED | OUTPATIENT
Start: 2021-11-17 | End: 2021-11-17

## 2021-11-17 RX ORDER — LIDOCAINE HYDROCHLORIDE 10 MG/ML
0.5 INJECTION, SOLUTION INFILTRATION; PERINEURAL ONCE
Status: COMPLETED | OUTPATIENT
Start: 2021-11-17 | End: 2021-11-17

## 2021-11-17 RX ORDER — BUPIVACAINE HYDROCHLORIDE 5 MG/ML
0.25 INJECTION, SOLUTION PERINEURAL ONCE
Status: COMPLETED | OUTPATIENT
Start: 2021-11-17 | End: 2021-11-17

## 2021-11-17 RX ORDER — TRIAMCINOLONE ACETONIDE 40 MG/ML
40 INJECTION, SUSPENSION INTRA-ARTICULAR; INTRAMUSCULAR ONCE
Status: COMPLETED | OUTPATIENT
Start: 2021-11-17 | End: 2021-11-17

## 2021-11-17 RX ADMIN — BUPIVACAINE HYDROCHLORIDE 1.5 MG: 5 INJECTION, SOLUTION PERINEURAL at 16:45

## 2021-11-17 RX ADMIN — LIDOCAINE HYDROCHLORIDE 0.5 ML: 10 INJECTION, SOLUTION INFILTRATION; PERINEURAL at 16:45

## 2021-11-17 RX ADMIN — TRIAMCINOLONE ACETONIDE 40 MG: 40 INJECTION, SUSPENSION INTRA-ARTICULAR; INTRAMUSCULAR at 16:45

## 2021-11-17 NOTE — PROGRESS NOTES
CHIEF COMPLAINT: Left wrist pain    History:   Ms. Sylvia Rivera 64 y.o. ambidextrous female presents today for the first visit for evaluation of a left wrist pain / injury. The patient was referred by Henrry Steiner MD for Sports Medicine consultation. She did not want to wait for first available appointment with Dr. Giorgio Mark and thus will schedule with me. This is evaluated as a personal injury. The pain began months ago. She rates pain at 7/10. There was not a history of injury. She does have a history of right carpal tunnel release. She complains of pain in her wrist.  She complains of numbness and tingling in her first through fourth fingers. She states this is intermittent. She states it only started a few months ago. She has not tried bracing for the left wrist.  She has not had carpal tunnel injection on the left. She is a retired nurse. Outside reports reviewed: none.     Past Medical History:   Diagnosis Date    Acute lateral meniscus tear of right knee 02/2019    Acute medial meniscus tear of right knee 02/2019    Anesthesia complication     woke up during one surgery    Anxiety     Arthritis     Asthma     Atrial fibrillation ([address]     new dx 4 weeks ago, first of  Sept 2017    CH[facility]) [facility]     Edema     Fibromyalgia     GERD (gastroesophageal reflux disease)     reflux    Hiatal hernia     Idiopathic urticaria 10/29/2020    SVT (supraventricular tachycardia) ([facility])     hx svt       Past Surgical History:   Procedure Laterality Date    ABLATION OF DYSRHYTHMIC FOCUS  04/2019    afib    BLADDER SUSPENSION  2004    HERNIA REPAIR  2001    HYSTERECTOMY  2004    KNEE ARTHROSCOPY Right 7/11/2019    VIDEO ARTHROSCOPY RIGHT KNEE, PARTIAL MEDIAL AND LATERAL MENISCECTOMY,, MEDIAL MICRO FRACTURE CHONDROPLASTY performed by Carrillo Johnson MD at [facility] GASTRIC BYPASS  2011    TONSILLECTOMY AND ADENOIDECTOMY      UP UVULOPALATOPHARYGOPLASTY      VENTRAL HERNIA REPAIR N/A 8/26/2019    DIAGNOSTIC LAPAROSCOPY, LAPAROSCOPIC LYSIS OF ADHESIONS, LAPAROSCOPIC REDUCTION OF RECURRENT INCISIONAL HERNIA WITH MESH performed by Viral Brennan MD at [address]       Current Outpatient Medications on File Prior to Visit   Medication Sig Dispense Refill    HYDROcodone-acetaminophen (NORCO) 7.5-325 MG per tablet Take 1 tablet by mouth 2 times daily as needed for Pain for up to 30 days. Intended supply: 3 days. Take lowest dose possible to manage pain 60 tablet 0    diazePAM (VALIUM) 5 MG tablet Take 1 tablet by mouth nightly as needed for Anxiety for up to 30 days. 30 tablet 2    Phentermine-Topiramate 15-92 MG CP24 Take 1 tablet by mouth every 24 hours for 30 doses. 30 capsule 0    albuterol sulfate  (90 Base) MCG/ACT inhaler INHALE 2 PUFFS INTO LUNGS EVERY 4 HOURS AS NEEDED FOR WHEEZING OR SHORTNESS OF BREATH(SPACE OUT TO EVERY 6 HOURS AS SYMPTOMS IMPROVE) 8.5 g 11    sertraline (ZOLOFT) 50 MG tablet Take 1 tablet by mouth daily Take 100 mg with 50 mg tab po daily. 30 tablet 5    sertraline (ZOLOFT) 100 MG tablet Take 100 mg with 50 mg tab po daily. 30 tablet 5    DULERA 200-5 MCG/ACT inhaler INHALE TWO PUFFS BY MOUTH TWICE A DAY 1 Inhaler 11    Handicap Placard MISC by Does not apply route Dx lumbar degenerative disc disease. Effective May 6, 2021. PCP requesting placard with no expiration 1 each 0    flecainide (TAMBOCOR) 50 MG tablet Take 1 tablet by mouth 2 times daily 60 tablet 5    dilTIAZem (CARDIZEM CD) 240 MG extended release capsule Take 1 capsule by mouth daily 60 capsule 3    Handicap Placard MISC by Does not apply route Dx of lumbar DDD.  Effective Dec 18, 2020 until Dec 18, 2021. 1 each 0    Spacer/Aero-Holding Chambers (AEROCHAMBER MV) MISC Use with inhaler as directed 1 each 0    omalizumab (XOLAIR) 150 MG injection Inject 300 mg into the skin once for 1 dose 1 vial 3     No current facility-administered change. EMG 11/9/21:  bilateral median nerve lesions at the wrist (carpal tunnel syndrome). The left side is moderately severe whereas the right side is mild. There is no electrophysiological  evidence for cervical radiculopathy in this study        Assessment:     Left wrist Carpal tunnel syndrome      Plan:     Natural history and expected course discussed. Questions answered. We discussed nonoperative and operative treatment options for carpal tunnel syndrome. Procedures    Sofia Gonzalez Titan Wrist and Forearm Brace     Patient was prescribed a Sofia Gonzalez Wrist and Forearm Brace. The left hand will require stabilization / immobilization from this semi-rigid / rigid orthosis to improve their function. The orthosis will assist in protecting the affected area, provide functional support and facilitate healing. The patient was educated and fit by a healthcare professional with expert knowledge and specialization in brace application while under the direct supervision of the physician. Verbal and written instructions for the use of and application of this item were provided. They were instructed to contact the office immediately should the brace result in increased pain, decreased sensation, increased swelling or worsening of the condition. The risks and benefits of an injection were discussed with the patient. The patient had full opportunity to ask questions and all were answered. The patient then provided verbal informed consent. The skin was then prepped with betadine solution and alcohol. Under aseptic conditions, the  left carpal tunnel was injected with 1/2cc of 1% xylocaine, 1/2cc of 0.5% marcaine, and 1cc of Kenalog (40mg/ml). There were no immediate complications following the injection. The patient was advised of the possibility of injection site reaction and instructed to apply ice to the area and take NSAIDs if able.      She will follow-up in 6 weeks if symptoms have not improved. Kd Finney. Serg Fajardo MD  Orthopaedic Surgery and Sports Medicine     Disclaimer: This note was generated with use of a verbal recognition program and an attempt was made to check for errors. It is possible that there are still dictated errors within this office note. If so, please bring any significant errors to my attention for an addendum. All efforts were made to ensure that this office note is accurate.

## 2021-11-17 NOTE — TELEPHONE ENCOUNTER
Submitted PA for Qsymia 15-92MG er capsules, Key: [id number], Medication has been DENIED. See attached denial letter. Please notify patient. Thank you.

## 2021-11-18 NOTE — TELEPHONE ENCOUNTER
Please let her know of the insurance denial on the Qsymia. It appears the healthcare community plan is not covering prescription weight loss medication. Unfortunately have limited choices at this time. Besides paying for these medications out of pocket which could be expensive the only other medicines I can think of that can help with weight loss include Metformin and topiramate. How does she feel about a prescription Metformin or topiramate?

## 2021-11-19 ENCOUNTER — PATIENT MESSAGE (OUTPATIENT)
Dept: FAMILY MEDICINE CLINIC | Age: 56
End: 2021-11-19

## 2021-11-19 DIAGNOSIS — E88.81 METABOLIC SYNDROME: Primary | ICD-10-CM

## 2021-11-19 NOTE — TELEPHONE ENCOUNTER
From: CLARK DIAZ  To: Puja aDrden  Sent: 11/19/2021 11:39 AM EST  Subject: reagarding your Stann Hire,    Your insurance denial on the Qsymia. It appears the healthcare community plan is not covering prescription weight loss medication. Unfortunately we have limited choices at this time. Besides paying for these medications out of pocket which could be expensive the only other medicines he can think of that can help with weight loss include Metformin and topiramate. How do you feel about a prescription Metformin or topiramate?     Take care   Nicole Ramos

## 2021-11-22 RX ORDER — METFORMIN HYDROCHLORIDE 500 MG/1
500 TABLET, EXTENDED RELEASE ORAL
Qty: 30 TABLET | Refills: 3 | Status: SHIPPED | OUTPATIENT
Start: 2021-11-22 | End: 2022-03-10 | Stop reason: SDUPTHER

## 2021-11-23 ENCOUNTER — TELEPHONE (OUTPATIENT)
Dept: FAMILY MEDICINE CLINIC | Age: 56
End: 2021-11-23

## 2021-12-10 ENCOUNTER — VIRTUAL VISIT (OUTPATIENT)
Dept: PSYCHOLOGY | Age: 56
End: 2021-12-10
Payer: MEDICAID

## 2021-12-10 DIAGNOSIS — F33.1 MAJOR DEPRESSIVE DISORDER, RECURRENT EPISODE, MODERATE WITH ANXIOUS DISTRESS (HCC): Primary | ICD-10-CM

## 2021-12-10 PROBLEM — F60.89 CLUSTER B PERSONALITY DISORDER (HCC): Status: RESOLVED | Noted: 2018-04-18 | Resolved: 2021-12-10

## 2021-12-10 PROCEDURE — 90791 PSYCH DIAGNOSTIC EVALUATION: CPT | Performed by: PSYCHOLOGIST

## 2021-12-10 NOTE — PATIENT INSTRUCTIONS
its effects. Nicotine:  Avoid Nicotine Before Bedtime  Although some smokers claim that smoking helps them relax, nicotine is a stimulant. The relaxing effects occur with the initial entry of the nicotine, but as the nicotine builds in the system it produces an effect similar to caffeine. Thus, smoking, dipping, or chewing tobacco should be avoided near bedtime and during the night. Dont smoke to get yourself back to sleep. Alcohol:  Avoid Alcohol After Dinner  Alcohol often promotes the onset of sleep, but as alcohol is metabolized sleep becomes disturbed and fragmented. Thus,alcohol is a poor sleep aid and should not be used as such. Limit alcohol use to small quantities (e.g., no more than two drinks). It is best not to drink alcohol after 7PM, as alcohol may not be metabolized before going to sleep and will likely affect sleep. Sleeping Pills:  Sleep Medications are Effective Only Temporarily  Scientists have shown that sleep medications lose their effectiveness in about 2 - 4 weeks when taken regularly. Despite advertisements to the contrary, over-the-counter sleeping aids have little impact on sleep beyond the placebo effect. Over time, sleeping pills actually can make sleep problems worse. When sleeping pills have been used for a long period, withdrawal from the medication can lead to an insomnia rebound. Thus, after long-term use, many individuals incorrectly conclude that they need sleeping pills in order to sleep normally. Keep use of sleep pills infrequent, but dont worry if you need to use one on an occasional basis. Regular Exercise  Get regular exercise, preferably 40 minutes each day of an activity that causes sweating. Exercise in the late afternoon or early evening (i.e., 3-6 hours before bed) seems to aid sleep, although the positive effect often takes several weeks to become noticeable.   Exercising sporadically is not likely to improve sleep, and exercise within 2 hours of bedtime may elevate nervous system activity and interfere with sleep onset. Hot Baths  Spending 20 minutes in a tub of hot water an hour or two prior to bedtime may promote sleep and is strongly recommended. Bedroom Environment: Moderate Temperature, Quiet, and Dark  Extremes of heat or cold can disrupt sleep. A quiet environment is more sleep promoting than a noisy one. Noises can be masked with background white noise (such as the noise of a fan) or with earplugs. Bedrooms may be darkened with black-out shades or sleep masks can be worn. Position clocks out-of-sight since clock-watching can increase worry about the effects of lack of sleep. Be sure your mattress is not too soft or too firm and that your pillow is the right height and firmness. Eating  A light bedtime snack, such a glass of warm milk, cheese, or a bowl of cereal, can promote sleep. You should avoid the following foods at bedtime:  any caffeinated foods (e.g., chocolate), peanuts, beans, most raw fruits and vegetables (since they may cause gas), and high-fat foods such as potato chips or corn chips. Avoid snacks in the middle of the nights since awakening may become associated with hunger. If you have trouble with regurgitation, be especially careful to avoid heavy meals and spices in the evening. Do not go to bed too hungry or too full. It may help to elevate your head with some pillows. Allow Yourself at [facility]- an Hour Before Bedtime to Unwind  Find what works for you to wind down, and perhaps give yourself an hour to do so. Regular Sleep Schedule   Keep a regular time each day, 7 days a week, to get out of bed. Keeping a regular waking time helps set your circadian rhythm so that your body learns to sleep at the desired time.      Set a Reasonable Bedtime and Arising Time and Stick to Them   Set the alarm clock and get out of bed at the same time each morning, weekdays and weekends, regardless of your bedtime or the amount of sleep you obtained on the previous night. This guideline is designed to regulate your internal biological clock and reset your clock. Guidelines for Sleep Stimulus Control    Set a Reasonable Bedtime and Arising Time and Stick to Them  Spending excessive time in bed has two unfortunate consequences: (1) you begin to associate your bedroom with arousal and frustration and (2) your sleep actually becomes more shallow. Restrict your sleep period to the average number of hours you have actually slept per night during the preceding week, plus one additional hour. Set the alarm clock and get out of bed at the same time each morning, weekdays and weekends, regardless of your bedtime or the amount of sleep you obtained on the previous night. You probably will be tempted to stay in bed in the morning if you did not sleep well, but try to maintain your new schedule. This guideline is designed to regulate your internal biological clock and reset your sleep-wake rhythm. Go to Bed Only When You Are Sleepy  There is no reason to go to bed if you are not sleepy. When you go to bed too early, it only gives you more time to become frustrated with your inability to sleep. Individuals often ponder the events of the day, plan the next day's schedule, or worry about their inability to fall asleep. You should therefore delay your bedtime until you are sleepy. This may mean that you go to bed even later than your scheduled bedtime. Remember to stick to your scheduled arising time regardless of the time you go to bed. Get Out of Bed When You Can't Fall Asleep or Go Back to Sleep in About 20-30 Minutes. Return to Bed Only When You Are Sleepy. Repeat This Step as Often as Necessary. Too much time in bed can decrease the sleep quality on subsequent nights and contribute to the maintenance of existing sleep problems. Dont lay in bed for extended times while awake.  Although we don't want you to be a clock-watcher, get out of bed if you don't fall asleep fairly soon (i.e., 20-30 minutes). Use this time to engage in a quiet activity (e.g., reading by a soft light). Return to bed only when you are sleepy (e.g., yawning, head bobbing, eyes closing, concentration decreasing). Dont confuse tiredness with sleepiness; they are different. Tiredness doesnt lead to sleep, only sleepiness does. The object is for you to reconnect your bed with sleeping rather than frustration. It will be demanding to follow this instruction, but many people have found ways to adhere to this guideline. Use the Bed or Bedroom for Sleep and Sex Only. Do NOT Watch TV, Listen to the Radio, Eat, or Read in Your Bedroom. The purpose of this guideline is to associate your bedroom with sleep rather than wakefulness. Just as you may associate the kitchen with hunger, this guideline will help you associate sleep with your bedroom. Follow this rule both during the day and at night. You may have to temporarily move the TV or radio from your bedroom to help you during treatment. If you have difficulty falling asleep without background noise, you can use neutral noise (e.g., white noise machine, fan) to help you fall asleep. If you must use music or the television, recognize that this noise may help you fall asleep but can actually disrupt your sleep later on. Set a timer so the noise will end after 45 minutes. Do Not Nap During the Day. Most sleep experts agree that daytime napping almost always disrupts sleep rhythm, resulting in lighter, more restless sleep, difficulty falling asleep, or early morning awakening. This guideline will help your body acquire a consistent sleep rhythm so that you feel drowsy and ready to sleep at about the same time each night. If you must nap, keep it brief, and take the nap about 8 hours after arising (no later than 4PM). It is best to set an alarm to ensure you dont sleep more than 30 minutes.

## 2021-12-10 NOTE — PROGRESS NOTES
Behavioral Health Consultation  Kwadwo Spivey Psy.D. Psychologist  12/10/2021  11-11:40 AM EST      Time spent with Patient: 40 minutes  This is patient's first [facility] appointment. Reason for Consult: Depression, chronic pain  Referring Provider: DO Kuldip Sanchez [facility]    TELEHEALTH VISIT -- Audio/Visual (During TCKRM-68 public health emergency)    Pt provided informed consent for the behavioral health program. Discussed with patient the model of service, including the limits of confidentiality (e.g., abuse reporting, suicide intervention) and the nature of the [facility] approach (e.g., focused, targeted interventions; open communication with PCP). Pt indicated understanding. Feedback given to PCP. }  Pursuant to the emergency declaration under the [address] [place] [address] waiver authority and the [employer] Act, this Virtual Visit was conducted, with patient's consent, to reduce the patient's risk of exposure to COVID-19 and provide continuity of care for an established patient. Services were provided through a video synchronous discussion virtually to substitute for in-person clinic visit. Pt gave verbal informed consent to participate in telehealth services. Conducted a risk-benefit analysis and determined that the patient's presenting problems are consistent with the use of telepsychology. Determined that the patient has sufficient knowledge and skills in the use of technology enabling them to adequately benefit from telepsychology. It was determined that this patient was able to be properly treated without an in-person session. Patient verified that they were currently located at the address that was provided during registration.     Verified the following information:  Patient's identification: Yes  Patient location: [address] [place] [id number]  Patient's call back number: [phone]  Patient's emergency contact's name and number, as well as permission to contact them if needed:  Extended Emergency Contact Information  Primary Emergency Contact: [facility]  Address: [address]           [place], [address] [facility] [address] Phone: [phone]  Mobile Phone: [phone]  Relation: Domestic Partner  Secondary Emergency Contact: Franksyayo Johnson [facility] [address] Phone: [phone]  Mobile Phone: [phone]  Relation: Child    Provider location: [place], [place]      S:    Love-Work-Play     -Living Situation / [place] / Intimate Relationships - Lives with  Major Revering. Sister comes over and helps her. Pt has 3 adult kids. Close with her daughter. Stressed about 2 sons. One son is in retirement. Other son Kirsten Brandon is 21, has 2 babies and another on the way but has limited resources to support them. Asks pt for a lot of help, doesn't treat her well. Causes tension between pt and her .     -Support System -  [place]. Daughter Chelo. Sister.    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 Had to quit d/t chronic pain, which was very hard for her. Health Behaviors     -Pertinent Medical History - Chronic pain. Fibromyalgia in arms, legs, and back. Also has bilateral carpel tunnel. Dealing with pain for years. Very difficult to manage. Upsetting for her when pain specialists tell her to lose weight, which she has been trying to do for years without success. History of gastric bypass. In a fib.    -Drugs - Pt uses medical marijuana but has developed tolerance. Too expensive. Also takes one Vicodin per day, but she can't continue to receive it bc she's taking marijuana too. -Sleep - Goes to bed around 9:30-10 pm. Unable to fall asleep. Watches TV or reads to try to help her sleep. Wakes up easily, hard to go back to sleep.  Takes Valium to help with sleep, with variable benefit.    -Risk Assessment - History of SI. Attempted suicide about a year ago by overdosing on her medications. Went to the hospital. Julissa Kay lately between having a lot to live for and wanting to not wake up. No recent plan or intent. Would go to the nearest ER for evaluation if she developed a plan. -Mental Health / Trauma History - Long history of depression. History of trauma. Taking Zoloft 150mg. Unable to take some of them because of her gastric bypass and because they can cause weight gain. Social History     Tobacco Use    Smoking status: Never Smoker    Smokeless tobacco: Never Used   Substance Use Topics    Alcohol use: Yes     Comment: 0-1 drinks per month      Illicit drugs:   Social History     Substance and Sexual Activity   Drug Use Yes    Types: Marijuana (Anita Umberto), Other-see comments    Comment: Medical Marijuana use        O:  Interventions:  -Contextual assessment  -Supportive techniques  -Discussed the connection between physical pain and emotional distress. Pt was in agreement.  -Provided psychoeducation re: sleep hygiene and stimulus control. Recommended sleep restriction, as well as The Sleep Solution by Taisha Reid MD.  -Recommended pt meet with Bishop Lezama, psychiatric NP, to discussed psychotropic medication options. She was in agreement. -Reinforced her interest in couple's therapy. Provided resources.  -Conducted risk assessment. See risk assessment below. Discussed potential benefits of seeking a higher level of care (e.g., eval for inpatient or day treatment program), but pt is reticent currently d/t discomfort with how she's been treated by the medical community. She denied recent active SI, plan or intent, and she identified her safety plan independently. Appropriate for outpatient / telehealth care at this time. CHART REVIEW  Per chart review, pt was seen once by Dr. Noble Winston for a psychiatry consult on 4/18/2018. Excerpts from his progress note:    HPI:   María Elena Kerr is a 46 y.o. female with h/o MDD, acute stress reaction who p/t clinic to establish care with this provider. Referred by Deb Vogt DO.      Per chart, pt discharged from [facility] in March and seeing PCP since. Was supposed to do intake for PHP/IOP but was unable 2/2 work, said she would reschedule. Was admitted to [facility] 3/12/18 after OD on Ativan as suicide attempt. Was started on Vistaril, Zoloft, Trazodone. Ambien and Ativan were stopped.      Reports she has a 15 year history of anxiety, and some depression. Traces back with the ups/downs of her son's addiction. Recent stressor with son's criminal problems which were on national news. Now facing 20 years in nursing home. Says she was in [facility] for 3-4 days. Was no longer suicidal when she left. Ran out of Vistaril. Since release from hospital, continues to have anxiety. Keeping busy with work and spending time with her granddaughter. Some depressed mood. Decreased crying spells. Normal energy, motivation. Appetite lower than normal. Denies SI/HI. Denies AVH, paranoia. Sleep 6-7 hours/night. Trouble falling asleep. Denies caffeine. Denies rani.      History obtained from patient and chart (confirmed by patient today).     Past Psychiatric History:               Prior hospitalizations: Several for SI, last in March 2018 OD               Prior diagnoses: MDD, acute stress reaction              Prior medication trials/reactions to meds: Valium, Vistaril, Zoloft, Trazodone,  Ambien. Cymbalta (\"more depressed\"), Lexapro, Paxil, Celexa, Effexor, Abilify, Wellbutrin.  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 Can consider going forward.   -Has had a bypass, possibly affecting medication absorption.   -Emphasized importance of therapy given situational stressors and apparent cluster b traits. -INCREASE Zoloft 100mg qAM  -Continue Vistaril 50mg TID PRN anxiety  -INCREASE Trazodone to 100mg qHS PRN sleep  -Ativan and Ambien stopped while admitted s/p OD. Will not restart.   -Labs: reviewed in Epic, up to date  -Referred to [facility] for therapy. Goes to [employer] weekly re: sonAnuel Mullins    A:  MSE:  Appearance: good hygiene  and appropriate attire  Attitude: cooperative, friendly and mild to moderate distress  Consciousness: alert  Orientation: oriented to person, place, time, general circumstance  Memory: recent and remote memory intact  Attention/Concentration: intact during session  Psychomotor Activity: normal  Eye Contact: normal  Speech: normal rate and volume, well-articulated  Mood: dysphoric, anxious  Affect: congruent  Perception: within normal limits  Thought Content: within normal limits  Thought Process: logical, coherent and goal-directed  Insight: good  Judgment: intact  Ability to understand instructions: Yes  Ability to respond meaningfully: Yes  Morbid Ideation: no   Suicide Assessment: no suicidal ideation, plan, or intent  Homicidal Ideation: no    Safety: No imminent risk of danger to self/others based on the factors considered below. Appropriate for outpatient level of care.  Safety plan includes: unspecified    Idiopathic urticaria    Bilateral carpal tunnel syndrome         Plan:  Pt interventions:  Established rapport, [place]-setting to identify pt's primary goals for [facility] visit / overall health, Supportive techniques, Provided Psychoeducation re: see above, Emphasized self-care as important for managing overall health, Discussed psychotropic medications, Completed risk evaluation, Reviewed sleep hygiene and stimulus control  and Discussed benefits of referral for specialty care for psychiatry. Pt Behavioral Change Plan:  Pt set the following goals:  1. Read The Sleep Solution by Marvin Edmonds MD  2. Schedule an appointment with Terrance Huerta psychiatric NP, to discuss medication options    Pt scheduled a F/U virtual visit.

## 2021-12-10 NOTE — Clinical Note
Shirley Choe,    I referred this patient to you from my other practice. Today was my first visit with her. During this visit we focused on her chronic pain, depression, and difficult family dynamics. After I finished the visit and did more chart review, I realized that she had seen my former colleague Karen Daley for a psychiatry consult in 2018. He diagnosed Cluster B traits. I didn't  on that when I met with her today. I need more time with her to determine whether a personality disorder exists. She has had some difficulty with feeling not heard by past providers so I tried to be extra supportive and attentive to her needs, and she responded well to that. Thanks for your input and help!     Zaina Ortiz

## 2021-12-14 ENCOUNTER — OFFICE VISIT (OUTPATIENT)
Dept: FAMILY MEDICINE CLINIC | Age: 56
End: 2021-12-14
Payer: MEDICAID

## 2021-12-14 VITALS
BODY MASS INDEX: 50.33 KG/M2 | HEART RATE: 73 BPM | TEMPERATURE: 97.1 F | RESPIRATION RATE: 12 BRPM | SYSTOLIC BLOOD PRESSURE: 136 MMHG | OXYGEN SATURATION: 97 % | WEIGHT: 293 LBS | DIASTOLIC BLOOD PRESSURE: 86 MMHG

## 2021-12-14 DIAGNOSIS — F33.2 MDD (MAJOR DEPRESSIVE DISORDER), RECURRENT SEVERE, WITHOUT PSYCHOSIS (HCC): ICD-10-CM

## 2021-12-14 DIAGNOSIS — M54.12 CERVICAL RADICULOPATHY: ICD-10-CM

## 2021-12-14 DIAGNOSIS — M79.7 FIBROMYALGIA: ICD-10-CM

## 2021-12-14 DIAGNOSIS — M54.50 THORACOLUMBAR BACK PAIN: Primary | ICD-10-CM

## 2021-12-14 DIAGNOSIS — M54.6 THORACOLUMBAR BACK PAIN: Primary | ICD-10-CM

## 2021-12-14 DIAGNOSIS — R35.0 FREQUENT URINATION: ICD-10-CM

## 2021-12-14 DIAGNOSIS — M17.11 PRIMARY OSTEOARTHRITIS OF RIGHT KNEE: ICD-10-CM

## 2021-12-14 LAB
BILIRUBIN, POC: NORMAL
BLOOD URINE, POC: NORMAL
CLARITY, POC: NORMAL
COLOR, POC: NORMAL
GLUCOSE URINE, POC: NORMAL
KETONES, POC: NORMAL
LEUKOCYTE EST, POC: NORMAL
NITRITE, POC: POSITIVE
PH, POC: 5
PROTEIN, POC: NORMAL
REASON FOR REJECTION: NORMAL
REJECTED TEST: NORMAL
SPECIFIC GRAVITY, POC: 1.02
UROBILINOGEN, POC: 0.2

## 2021-12-14 PROCEDURE — G8427 DOCREV CUR MEDS BY ELIG CLIN: HCPCS | Performed by: FAMILY MEDICINE

## 2021-12-14 PROCEDURE — 81002 URINALYSIS NONAUTO W/O SCOPE: CPT | Performed by: FAMILY MEDICINE

## 2021-12-14 PROCEDURE — G8417 CALC BMI ABV UP PARAM F/U: HCPCS | Performed by: FAMILY MEDICINE

## 2021-12-14 PROCEDURE — 1036F TOBACCO NON-USER: CPT | Performed by: FAMILY MEDICINE

## 2021-12-14 PROCEDURE — 3017F COLORECTAL CA SCREEN DOC REV: CPT | Performed by: FAMILY MEDICINE

## 2021-12-14 PROCEDURE — 99214 OFFICE O/P EST MOD 30 MIN: CPT | Performed by: FAMILY MEDICINE

## 2021-12-14 PROCEDURE — G8484 FLU IMMUNIZE NO ADMIN: HCPCS | Performed by: FAMILY MEDICINE

## 2021-12-14 RX ORDER — HYDROCODONE BITARTRATE AND ACETAMINOPHEN 7.5; 325 MG/1; MG/1
1 TABLET ORAL 2 TIMES DAILY PRN
Qty: 60 TABLET | Refills: 0 | Status: SHIPPED | OUTPATIENT
Start: 2022-01-13 | End: 2022-02-12

## 2021-12-14 RX ORDER — HYDROCODONE BITARTRATE AND ACETAMINOPHEN 7.5; 325 MG/1; MG/1
1 TABLET ORAL 2 TIMES DAILY PRN
Qty: 60 TABLET | Refills: 0 | Status: SHIPPED | OUTPATIENT
Start: 2021-12-14 | End: 2022-01-13

## 2021-12-14 RX ORDER — HYDROCODONE BITARTRATE AND ACETAMINOPHEN 7.5; 325 MG/1; MG/1
1 TABLET ORAL 2 TIMES DAILY
Qty: 60 TABLET | Refills: 0 | Status: SHIPPED | OUTPATIENT
Start: 2022-02-12 | End: 2022-02-14 | Stop reason: SDUPTHER

## 2021-12-14 RX ORDER — SERTRALINE HYDROCHLORIDE 100 MG/1
TABLET, FILM COATED ORAL
Qty: 30 TABLET | Refills: 5 | Status: SHIPPED | OUTPATIENT
Start: 2021-12-14 | End: 2021-12-16 | Stop reason: DRUGHIGH

## 2021-12-14 NOTE — PATIENT INSTRUCTIONS
[facility]. [phone]. Ask for either Izzy Salmon, or [employer]. These are all board certified orthopedic doctors in upper extremity.

## 2021-12-14 NOTE — PROGRESS NOTES
[facility]  Progress Note  Elpidione Runner, DO [place]  1965    12/14/21    Chief Complaint:   Angel Renae is a 64 y.o. female who is here for fibromyalgia, increased voiding. HPI:   Noted to have 4 mm renal cyst.  Seen on imaging in 2020. Patient is having more frequent voiding. No gross hematuria. Denies any dysuria. Call to make an appointment with pain clinic but unfortunately since she is taking marijuana the clinic she called is not able to see her. She finds that the current regimen of the daily marijuana along with the twice daily Norco 7.5-325 is helpful to keep her chronic pain control. ROS negative for headache, visionchanges, chest pain, shortness of breath, abdominal pain, urinary sx, bowel changes. Past medical, surgical, and social history reviewed. and allergies reviewed. Allergies   Allergen Reactions    Latex Anaphylaxis and Rash    Aspirin      Swelling in lower legs    Demerol      rash    Meperidine     Nsaids Swelling    Pcn [Penicillins] Hives    Ranitidine Hcl     Sulfa Antibiotics Rash and Hives     Prior to Visit Medications    Medication Sig Taking? Authorizing Provider   sertraline (ZOLOFT) 100 MG tablet Take 100 mg with 50 mg tab po daily. Yes [facility] Mary, DO   sertraline (ZOLOFT) 50 MG tablet Take 1 tablet by mouth daily Take 100 mg with 50 mg tab po daily. Yes [facility] Arengcang, DO   HYDROcodone-acetaminophen (NORCO) 7.5-325 MG per tablet Take 1 tablet by mouth 2 times daily for 60 doses. Yes [facility] Bingcang, DO   HYDROcodone-acetaminophen (NORCO) 7.5-325 MG per tablet Take 1 tablet by mouth 2 times daily as needed for Pain for up to 30 days. Yes [facility] Arengcang, DO   HYDROcodone-acetaminophen (NORCO) 7.5-325 MG per tablet Take 1 tablet by mouth 2 times daily as needed for Pain for up to 30 days.  Yes [facility] Mary, DO   metFORMIN (GLUCOPHAGE XR) 500 MG extended release psychosis ([facility]    Overdose    Restless leg syndrome    Sliding hiatal hernia    HTN (hypertension)    Atrial flutter (HCC)    SVT (supraventricular tachycardia) (HCC)    Paroxysmal atrial fibrillation (HCC)    Sinus bradycardia    Acute lateral meniscus tear of right knee    Acute medial meniscus tear of right knee    Localized edema    Acute pain of right knee    Chronic diastolic congestive heart failure (HCC)    Primary osteoarthritis of right knee    Contusion of multiple sites of right leg    Gastrocnemius strain, left    Ventral incisional hernia    Recurrent incisional hernia    Pain of upper abdomen    Incisional hernia with obstruction, without gangrene    Rhinitis, chronic    Vocal cord dysfunction    Iron deficiency anemia, unspecified    Idiopathic urticaria    Bilateral carpal tunnel syndrome       Immunization History   Administered Date(s) Administered    BCG (Landing BCG) 05/28/2013    DTaP 05/28/2013    DTaP (Infanrix) 05/28/2013    Influenza Vaccine, unspecified formulation 10/29/2015, 01/09/2017, 10/23/2017, 11/26/2018    Influenza Virus Vaccine 12/31/2008, 09/22/2009, 01/09/2017, 10/23/2017, 10/23/2017, 11/26/2018    Influenza, Chel Crass, IM, PF (6 mo and older Fluzone, Flulaval, Fluarix, and 3 yrs and older Afluria) 11/26/2018, 12/08/2020    Influenza, Chel Taiss, Recombinant, IM PF (Flublok 18 yrs and older) 11/16/2019    PPD Test 06/11/2013, 05/07/2014, 08/13/2019    Pneumococcal Polysaccharide (Ikyneofvv86) 12/31/2008, 06/17/2019    Tdap (Boostrix, Adacel) 05/28/2013       Past Medical History:   Diagnosis Date    Acute lateral meniscus tear of right knee 02/2019    Acute medial meniscus tear of right knee 02/2019    Anesthesia complication     woke up during one surgery    Anxiety     Arthritis     Asthma     Atrial fibrillation ([address]     new dx 4 weeks ago, first of  Sept 2017    CHF (congestive heart failure) (HCC)     Edema     Fibromyalgia     GERD (gastroesophageal reflux disease)     reflux    Hiatal hernia     Idiopathic urticaria 10/29/2020    SVT (supraventricular tachycardia) (HCC)     hx svt     Past Surgical History:   Procedure Laterality Date    ABLATION OF DYSRHYTHMIC FOCUS  04/2019    afib    BLADDER SUSPENSION  2004    HERNIA REPAIR  2001    HYSTERECTOMY  2004    KNEE ARTHROSCOPY Right 7/11/2019    VIDEO ARTHROSCOPY RIGHT KNEE, PARTIAL MEDIAL AND LATERAL MENISCECTOMY,, MEDIAL MICRO FRACTURE CHONDROPLASTY performed by Manuel Sanders MD at [place] [address] [address]  2011    TONSILLECTOMY AND ADENOIDECTOMY      UPPP UVULOPALATOPHARYGOPLASTY      VENTRAL HERNIA REPAIR N/A 8/26/2019    DIAGNOSTIC LAPAROSCOPY, LAPAROSCOPIC LYSIS OF ADHESIONS, LAPAROSCOPIC REDUCTION OF RECURRENT INCISIONAL HERNIA WITH MESH performed by Jr Sanders MD at [address] [place] [address] History   Problem Relation Age of Onset    Cancer Mother         lung    Arthritis Mother     Cirrhosis Father     Asthma Maternal Aunt      Social History     Socioeconomic History    Marital status:      Spouse name: Not on file    Number of children: 3    Years of education: 15.5    Highest education level: Not on file   Occupational History    Not on file   Tobacco Use    Smoking status: Never Smoker    Smokeless tobacco: Never Used   Vaping Use    Vaping Use: Never used   Substance and Sexual Activity    Alcohol use: Yes     Comment: 0-1 drinks per month    Drug use: Yes     Types: Marijuana Duke Blow), Other-see comments     Comment: Medical Marijuana use    Sexual activity: Yes     Partners: Male   Other Topics Concern    Not on file   Social History Narrative    Not on file     Social Determinants of Health     Financial Resource Strain: High Risk    Difficulty of Paying Living Expenses: Hard   Food Insecurity: Food Insecurity Present    Worried About Running Out of Food in the Last Year: Sometimes true    Ran Out of Food in the Last Year: Sometimes true   Transportation Needs:     Lack of Transportation (Medical): Not on file    Lack of Transportation (Non-Medical): Not on file   Physical Activity:     Days of Exercise per Week: Not on file    Minutes of Exercise per Session: Not on file   Stress:     Feeling of Stress : Not on file   Social Connections:     Frequency of Communication with Friends and Family: Not on file    Frequency of Social Gatherings with Friends and Family: Not on file    Attends Orthodox Services: Not on file    Active Member of [address] [place] [address] [employer] or Organizations: Not on file    Attends Club or Organization Meetings: Not on file    Marital Status: Not on file   Intimate Partner Violence:     Fear of Current or Ex-Partner: Not on file    Emotionally Abused: Not on file    Physically Abused: Not on file    Sexually Abused: Not on file   Housing Stability:     Unable to Pay for Housing in the Last Year: Not on file    Number of [place] in the Last Year: Not on file    Unstable Housing in the Last Year: Not on file       O: /86   Pulse 73   Temp 97.1 °F (36.2 °C) (Temporal)   Resp 12   Wt (!) 350 lb 12.8 oz (159.1 kg)   SpO2 97%   BMI 50.33 kg/m²   Physical Exam  GEN: No acute distress,cooperative, well nourished, alert. HEENT: PEERLA, EOMI , normocephalic/atraumatic, external nose appears normal.  External ear is normal.    Neck: soft, supple, no appreciable thyromegaly,mass  CV: No upper extremity edema. Resp:  Breathing comfortably. Psych:normal affect. Neuro: AOx3  Other Pertinent Physical Exam findings: n/a        ASSESSMENT   Diagnosis Orders   1. Thoracolumbar back pain  HYDROcodone-acetaminophen (NORCO) 7.5-325 MG per tablet   2. Fibromyalgia  HYDROcodone-acetaminophen (NORCO) 7.5-325 MG per tablet   3. Frequent urination  URINE RT REFLEX TO CULTURE   4.  Primary osteoarthritis of right knee  HYDROcodone-acetaminophen (NORCO) 7.5-325 MG per tablet HYDROcodone-acetaminophen (NORCO) 7.5-325 MG per tablet   5. Cervical radiculopathy  HYDROcodone-acetaminophen (NORCO) 7.5-325 MG per tablet    HYDROcodone-acetaminophen (NORCO) 7.5-325 MG per tablet   6. MDD (major depressive disorder), recurrent severe, without psychosis ([facility]  sertraline (ZOLOFT) 100 MG tablet    sertraline (ZOLOFT) 50 MG tablet     #1, #2, #4, and #5:  The current medical regimen is effective;  continue present plan and medications. Pt aware of need for every 3 month medication followup appointments, and that medication refills for benzodiazepines, narcotics and/or stimulants will only be given at appointment. The risks, benefits, potential side effects and barriers to medication use were addressed today. Understanding was acknowledged. Patient asked to follow-up if condition(s) do not improve as anticipated. #3:   If UA is normal, proceed with bloodwork. If bloodwork is normal proceed with kidney imaging (ultrasound) vs CT scan. #6: The current medical regimen is effective;  continue present plan and medications. She wants recommendation for upper extremity doctor for carpal tunnel. PLAN          Time spent on encounter (including any number of the following: review of labs, imaging, provider notes, outside hospital records; performing examination/evaluation; counseling patient and family; ordering medications/tests; placing referrals and communication with referring physicians; coordination of care, and documentation in the EHR): 30 minutes  Established E/M: 10-19 (83093), 20-29 (84593), 30-39 (07652), 40-54 (02816)   New E/M: 15-29 (17246), 30-44 (38128), 45-59 (99541), 60-74 (87919)  Telephone E/M: 5-10 ([email or web address]), 11-20 (52482), 21-30 (43828)    If applicable, see additional patient information and instructions under \"Patient Instructions. \"    No follow-ups on file. Patient Instructions   Contact [facility]. [phone]. Ask for either Izzy Jordan, Casandra Dimas, or [employer]. These are all board certified orthopedic doctors in upper extremity. Please note a portion of this chart was generated using dragon dictation software. Although every effort was made to ensure the accuracy of this automated transcription,some errors in transcription may have occurred.

## 2021-12-15 ENCOUNTER — TELEPHONE (OUTPATIENT)
Dept: FAMILY MEDICINE CLINIC | Age: 56
End: 2021-12-15

## 2021-12-15 DIAGNOSIS — Z13.0 SCREENING, IRON DEFICIENCY ANEMIA: ICD-10-CM

## 2021-12-15 DIAGNOSIS — R30.0 DYSURIA: Primary | ICD-10-CM

## 2021-12-15 DIAGNOSIS — E56.9 VITAMIN DEFICIENCY: ICD-10-CM

## 2021-12-15 NOTE — TELEPHONE ENCOUNTER
Tenna Necessary with [facility] lab called with a rejected Urinalysis reflex specimen was sent in the wrong tube. Please advise.  Thanks

## 2021-12-15 NOTE — TELEPHONE ENCOUNTER
Lab states that they need a new order for the lab and both tubes need to be sent in.      Please advise   Thank you

## 2021-12-15 NOTE — TELEPHONE ENCOUNTER
Pt is advised    She wants to know if you could put in a lab order to check her iron and vitamin b levels.  She feels that her iron level is low     Please advise  Thank you

## 2021-12-15 NOTE — TELEPHONE ENCOUNTER
Lab orders are in. To obtain an accurate iron level, Marzena Rios would need to fast at least 8 hours prior to blood draw. Please let her know.
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
## 2021-12-16 ENCOUNTER — OFFICE VISIT (OUTPATIENT)
Dept: PSYCHIATRY | Age: 56
End: 2021-12-16
Payer: MEDICAID

## 2021-12-16 VITALS
DIASTOLIC BLOOD PRESSURE: 78 MMHG | SYSTOLIC BLOOD PRESSURE: 128 MMHG | BODY MASS INDEX: 41.95 KG/M2 | HEIGHT: 70 IN | WEIGHT: 293 LBS

## 2021-12-16 DIAGNOSIS — F51.01 PRIMARY INSOMNIA: ICD-10-CM

## 2021-12-16 DIAGNOSIS — E56.9 VITAMIN DEFICIENCY: ICD-10-CM

## 2021-12-16 DIAGNOSIS — F41.1 GAD (GENERALIZED ANXIETY DISORDER): Primary | ICD-10-CM

## 2021-12-16 DIAGNOSIS — Z13.0 SCREENING, IRON DEFICIENCY ANEMIA: ICD-10-CM

## 2021-12-16 DIAGNOSIS — F33.1 MAJOR DEPRESSIVE DISORDER, RECURRENT EPISODE, MODERATE (HCC): ICD-10-CM

## 2021-12-16 LAB
BACTERIA: ABNORMAL /HPF
BILIRUBIN URINE: ABNORMAL
BLOOD, URINE: ABNORMAL
CLARITY: ABNORMAL
COLOR: ABNORMAL
EPITHELIAL CELLS, UA: 11 /HPF (ref 0–5)
GLUCOSE URINE: NEGATIVE MG/DL
HYALINE CASTS: 5 /LPF (ref 0–8)
IRON % SATURATION: 20 % (ref 15–50)
IRON: 67 UG/DL (ref 37–145)
KETONES, URINE: ABNORMAL MG/DL
LEUKOCYTE ESTERASE, URINE: ABNORMAL
MICROSCOPIC EXAMINATION: YES
NITRITE, URINE: POSITIVE
PH UA: 5.5 (ref 5–8)
PROTEIN UA: NEGATIVE MG/DL
RBC UA: 1 /HPF (ref 0–4)
SPECIFIC GRAVITY UA: 1.03 (ref 1–1.03)
TOTAL IRON BINDING CAPACITY: 334 UG/DL (ref 260–445)
URINE TYPE: ABNORMAL
UROBILINOGEN, URINE: 0.2 E.U./DL
VITAMIN B-12: 414 PG/ML (ref 211–911)
WBC UA: 30 /HPF (ref 0–5)

## 2021-12-16 PROCEDURE — 3017F COLORECTAL CA SCREEN DOC REV: CPT | Performed by: REGISTERED NURSE

## 2021-12-16 PROCEDURE — 1036F TOBACCO NON-USER: CPT | Performed by: REGISTERED NURSE

## 2021-12-16 PROCEDURE — G8428 CUR MEDS NOT DOCUMENT: HCPCS | Performed by: REGISTERED NURSE

## 2021-12-16 PROCEDURE — G8484 FLU IMMUNIZE NO ADMIN: HCPCS | Performed by: REGISTERED NURSE

## 2021-12-16 PROCEDURE — G8417 CALC BMI ABV UP PARAM F/U: HCPCS | Performed by: REGISTERED NURSE

## 2021-12-16 PROCEDURE — 99205 OFFICE O/P NEW HI 60 MIN: CPT | Performed by: REGISTERED NURSE

## 2021-12-16 RX ORDER — SERTRALINE HYDROCHLORIDE 100 MG/1
200 TABLET, FILM COATED ORAL DAILY
Qty: 60 TABLET | Refills: 0 | Status: SHIPPED | OUTPATIENT
Start: 2021-12-16 | End: 2022-01-18 | Stop reason: SINTOL

## 2021-12-16 ASSESSMENT — ANXIETY QUESTIONNAIRES
2. NOT BEING ABLE TO STOP OR CONTROL WORRYING: 2
5. BEING SO RESTLESS THAT IT IS HARD TO SIT STILL: 2
4. TROUBLE RELAXING: 2
6. BECOMING EASILY ANNOYED OR IRRITABLE: 2
7. FEELING AFRAID AS IF SOMETHING AWFUL MIGHT HAPPEN: 3
1. FEELING NERVOUS, ANXIOUS, OR ON EDGE: 3
GAD7 TOTAL SCORE: 16
3. WORRYING TOO MUCH ABOUT DIFFERENT THINGS: 2

## 2021-12-16 ASSESSMENT — PATIENT HEALTH QUESTIONNAIRE - PHQ9
8. MOVING OR SPEAKING SO SLOWLY THAT OTHER PEOPLE COULD HAVE NOTICED. OR THE OPPOSITE, BEING SO FIGETY OR RESTLESS THAT YOU HAVE BEEN MOVING AROUND A LOT MORE THAN USUAL: 1
SUM OF ALL RESPONSES TO PHQ9 QUESTIONS 1 & 2: 4
5. POOR APPETITE OR OVEREATING: 1
3. TROUBLE FALLING OR STAYING ASLEEP: 3
1. LITTLE INTEREST OR PLEASURE IN DOING THINGS: 2
6. FEELING BAD ABOUT YOURSELF - OR THAT YOU ARE A FAILURE OR HAVE LET YOURSELF OR YOUR FAMILY DOWN: 3
9. THOUGHTS THAT YOU WOULD BE BETTER OFF DEAD, OR OF HURTING YOURSELF: 2
SUM OF ALL RESPONSES TO PHQ QUESTIONS 1-9: 16
7. TROUBLE CONCENTRATING ON THINGS, SUCH AS READING THE NEWSPAPER OR WATCHING TELEVISION: 2
SUM OF ALL RESPONSES TO PHQ QUESTIONS 1-9: 18
4. FEELING TIRED OR HAVING LITTLE ENERGY: 2
SUM OF ALL RESPONSES TO PHQ QUESTIONS 1-9: 18
2. FEELING DOWN, DEPRESSED OR HOPELESS: 2

## 2021-12-16 NOTE — PATIENT INSTRUCTIONS
Here are some of Psychiatric Emergency resources for you:     1. National suicide hotlines: 0-857-169-TALK (0-679.353.5920) and 0-776-EUEFOFX (5-162.993.5602). 2.  Call 911 or go to any nearest emergency room   3. Access the [facility] Emergency Psychiatry Services:     - Go to the [facility] Psychiatric Emergency Services (PES) at [address] [address] [address], [address]   - Call the [facility] at [phone].    - Call the [facility] Mobile Crisis Team at [phone]     Anti-depressant drugs:  This class of drugs can cause sedation, blurred vision, dry-mouth, constipation, postural hypotension, urinary retention, tachycardia, muscle tremors, agitation, headache, skin rash, photo sensitivity, excessive weight gain, glaucoma, heart disease, lowering seizure threshold, increase risk of suicidal thinking or ideations, excessive sweating, sextual dysfunction, insomnia, anxiety, bruxism, GI bleeding, pregnancy complications and birth defects, possible death, etc.   Anti-anxiety drugs: Sedation, morning hangover, ataxia, nausea, respiratory depression, decrease cognitive function, light-headiness, withdrawal effects, anterograde amnesia, possible death, etc.

## 2021-12-16 NOTE — Clinical Note
Nagi Rosenberg-  I saw this patient yesterday. She had tried lots medications in past. We did Motion Engine test-but honestly she is difficult case.   Thanks

## 2021-12-17 ENCOUNTER — TELEPHONE (OUTPATIENT)
Dept: FAMILY MEDICINE CLINIC | Age: 56
End: 2021-12-17

## 2021-12-17 RX ORDER — NITROFURANTOIN 25; 75 MG/1; MG/1
100 CAPSULE ORAL 2 TIMES DAILY
Qty: 14 CAPSULE | Refills: 0 | Status: SHIPPED | OUTPATIENT
Start: 2021-12-17 | End: 2021-12-24

## 2021-12-18 LAB
ORGANISM: ABNORMAL
URINE CULTURE, ROUTINE: ABNORMAL

## 2021-12-20 ENCOUNTER — VIRTUAL VISIT (OUTPATIENT)
Dept: PSYCHOLOGY | Age: 56
End: 2021-12-20
Payer: MEDICAID

## 2021-12-20 DIAGNOSIS — F33.1 MAJOR DEPRESSIVE DISORDER, RECURRENT EPISODE, MODERATE WITH ANXIOUS DISTRESS (HCC): Primary | ICD-10-CM

## 2021-12-20 PROCEDURE — 90832 PSYTX W PT 30 MINUTES: CPT | Performed by: PSYCHOLOGIST

## 2021-12-20 NOTE — PATIENT INSTRUCTIONS
Goals: 1. Read The Sleep Solution by Sharla Casey MD  2. Schedule an appointment with Cayden Argueta, psychiatric NP, to discuss medication options  3. Learn more about self-compassion at www.self-compassion.org. Watch the video on self-compassion vs self-esteem.
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
## 2022-01-18 ENCOUNTER — OFFICE VISIT (OUTPATIENT)
Dept: PSYCHIATRY | Age: 57
End: 2022-01-18
Payer: MEDICAID

## 2022-01-18 VITALS
BODY MASS INDEX: 41.95 KG/M2 | DIASTOLIC BLOOD PRESSURE: 76 MMHG | HEIGHT: 70 IN | SYSTOLIC BLOOD PRESSURE: 124 MMHG | OXYGEN SATURATION: 95 % | HEART RATE: 73 BPM | WEIGHT: 293 LBS

## 2022-01-18 DIAGNOSIS — F41.1 GAD (GENERALIZED ANXIETY DISORDER): Primary | ICD-10-CM

## 2022-01-18 DIAGNOSIS — F51.01 PRIMARY INSOMNIA: ICD-10-CM

## 2022-01-18 DIAGNOSIS — F33.1 MAJOR DEPRESSIVE DISORDER, RECURRENT EPISODE, MODERATE (HCC): ICD-10-CM

## 2022-01-18 PROCEDURE — G8484 FLU IMMUNIZE NO ADMIN: HCPCS | Performed by: REGISTERED NURSE

## 2022-01-18 PROCEDURE — G8417 CALC BMI ABV UP PARAM F/U: HCPCS | Performed by: REGISTERED NURSE

## 2022-01-18 PROCEDURE — 1036F TOBACCO NON-USER: CPT | Performed by: REGISTERED NURSE

## 2022-01-18 PROCEDURE — G8428 CUR MEDS NOT DOCUMENT: HCPCS | Performed by: REGISTERED NURSE

## 2022-01-18 PROCEDURE — 99214 OFFICE O/P EST MOD 30 MIN: CPT | Performed by: REGISTERED NURSE

## 2022-01-18 PROCEDURE — 3017F COLORECTAL CA SCREEN DOC REV: CPT | Performed by: REGISTERED NURSE

## 2022-01-18 RX ORDER — BUSPIRONE HYDROCHLORIDE 10 MG/1
10 TABLET ORAL 3 TIMES DAILY
Qty: 90 TABLET | Refills: 0 | Status: SHIPPED | OUTPATIENT
Start: 2022-01-18 | End: 2022-02-14

## 2022-01-18 RX ORDER — DESVENLAFAXINE 100 MG/1
100 TABLET, EXTENDED RELEASE ORAL DAILY
Qty: 30 TABLET | Refills: 0 | Status: SHIPPED | OUTPATIENT
Start: 2022-01-18 | End: 2022-02-18

## 2022-01-18 NOTE — PATIENT INSTRUCTIONS
Here are some of Psychiatric Emergency resources for you:     1. National suicide hotlines: 8-051-923-TALK (3-815.335.7015) and 4-653-ARELHNP (0-107.361.9116). 2.  Call 911 or go to any nearest emergency room   3. Access the [facility] Emergency Psychiatry Services:     - Go to the [facility] Psychiatric Emergency Services (PES) at [address] [address], [address]   - Call the [facility] at [phone].    - Call the [facility] Mobile Crisis Team at [phone]     Anti-depressant drugs:  This class of drugs can cause sedation, blurred vision, dry-mouth, constipation, postural hypotension, urinary retention, tachycardia, muscle tremors, agitation, headache, skin rash, photo sensitivity, excessive weight gain, glaucoma, heart disease, lowering seizure threshold, increase risk of suicidal thinking or ideations, excessive sweating, sextual dysfunction, insomnia, anxiety, bruxism, GI bleeding, pregnancy complications and birth defects, possible death, etc.

## 2022-01-18 NOTE — PROGRESS NOTES
do feel like my marriage is in trouble. \" She endorse increased depression, hopeless, and anhedonia. + restlessness, excessive anxiety, and unable to fall asleep. She lives her TV on until late to help her go to sleep. She toss and turns a lot. No change in appetite pattern. She states \" I may be eating unhealthy diet. \" She having financial stress as she not working and on permanent disability. She is ambivalent whether to go back to work or not. She chronic health issues which is affecting her well-being. She denies SI/HI/AVH. She denies hallucination or delusions. Context: OV  Location:mind- anxiety, depressed mood, irritable, agitation. Duration: chronic  Severity: mild to moderate  Associated Symptoms: as above  Brief Medical Hx:   Toni Wong has a past medical history of Acute lateral meniscus tear of right knee, Acute medial meniscus tear of right knee, Anesthesia complication, Anxiety, Arthritis, Asthma, Atrial fibrillation ([address] CHF (congestive heart failure) ([address] Edema, Fibromyalgia, GERD (gastroesophageal reflux disease), Hiatal hernia, Idiopathic urticaria, and SVT (supraventricular tachycardia) ([address] ROS: Denies CP, syncope, dizziness. + chronic irregular HR  Past Psych Medications trial: Cymbalta, Seroquel, Requip, Belsomra, Ativan, Ambien, Effexor, Lexapro, Neurontin, Lyrica, paxil, celexa, Abilify, Wellbutrin, Vistrail. She states \" I did not get any relieve from all these medications. \"  Current Medications:  Current Outpatient Medications on File Prior to Visit   Medication Sig Dispense Refill    [START ON 2/12/2022] HYDROcodone-acetaminophen (NORCO) 7.5-325 MG per tablet Take 1 tablet by mouth 2 times daily for 60 doses. 60 tablet 0    HYDROcodone-acetaminophen (NORCO) 7.5-325 MG per tablet Take 1 tablet by mouth 2 times daily as needed for Pain for up to 30 days.  60 tablet 0    metFORMIN (GLUCOPHAGE XR) 500 MG extended release tablet Take 1 tablet by mouth daily (with breakfast) 30 tablet 3    albuterol sulfate  (90 Base) MCG/ACT inhaler INHALE 2 PUFFS INTO LUNGS EVERY 4 HOURS AS NEEDED FOR WHEEZING OR SHORTNESS OF BREATH(SPACE OUT TO EVERY 6 HOURS AS SYMPTOMS IMPROVE) 8.5 g 11    DULERA 200-5 MCG/ACT inhaler INHALE TWO PUFFS BY MOUTH TWICE A DAY 1 Inhaler 11    Handicap Placard MISC by Does not apply route Dx lumbar degenerative disc disease. Effective May 6, 2021. PCP requesting placard with no expiration 1 each 0    flecainide (TAMBOCOR) 50 MG tablet Take 1 tablet by mouth 2 times daily 60 tablet 5    dilTIAZem (CARDIZEM CD) 240 MG extended release capsule Take 1 capsule by mouth daily 60 capsule 3    Handicap Placard MISC by Does not apply route Dx of lumbar DDD. Effective Dec 18, 2020 until Dec 18, 2021. 1 each 0    omalizumab (XOLAIR) 150 MG injection Inject 300 mg into the skin once for 1 dose 1 vial 3    Spacer/Aero-Holding Chambers (AEROCHAMBER MV) MISC Use with inhaler as directed 1 each 0     No current facility-administered medications on file prior to visit. Objective: Wt Readings from Last 3 Encounters:   01/18/22 (!) 357 lb (161.9 kg)   12/16/21 (!) 348 lb (157.9 kg)   12/14/21 (!) 350 lb 12.8 oz (159.1 kg)     Temp Readings from Last 3 Encounters:   12/14/21 97.1 °F (36.2 °C) (Temporal)   11/15/21 96.8 °F (36 °C)   08/24/21 98.3 °F (36.8 °C) (Oral)     BP Readings from Last 3 Encounters:   01/18/22 124/76   12/16/21 128/78   12/14/21 136/86     Pulse Readings from Last 3 Encounters:   01/18/22 73   12/14/21 73   11/15/21 77     AIMS :0  Labs:Reviewed and up to date in [address] Mental Status Exam:   PSYCHIATRIC ASSESSMENT     Mental Status Examination:   Appearance: appropriate attire, alert and oriented x3  Behavior: cooperative.    Eye contact: good  Psycho motor activity: no abnormal movement  Speech: normal tone, volume and rate  Affect: congruent with mood  Mood:  crying pills, emotional, anxious, depressed  Thought process: linear, logical and coherent  Thought content: future oriented, no hallucinations or delusions. Suicidality: none present, denies. Had passive SI at times. No active SI  Homicidally: none present, denies. Insight: fair  Judgment: intact  Cognition:intact  Attention span: fair  Concentration:fair  Abstract thinking: intact  [place] thinking: yes  Memory: Immediate/Recent/Remote: intact   Safety plan:  911, PES, hotlines, and interventions discussed today. Risk factors: depression, Female age >49, previous SA attempts, anxiety, financial and relationship issues. Protective factors: Denies current or recent active suicidal ideation, does not have lethal plan, patient is eros for safety, patient has social or family support, no active psychosis or cognitive dysfunction, physically healthy, compliant with recommended medications, and patient is future-oriented. Total time spent on this encounter: 38 minutes    Thank you for consult. Please do not hesitate to contact provider if there are additional questions regarding patient.     Deepika Peraza DNP, PMHNP-BC, CNP       1/18/2022

## 2022-01-19 ENCOUNTER — TELEPHONE (OUTPATIENT)
Dept: PSYCHIATRY | Age: 57
End: 2022-01-19

## 2022-01-19 NOTE — TELEPHONE ENCOUNTER
Prior Authorization was placed through Cover  Meds for Pristiq 100MG. Approved until 1.18.2023. Approval # [id number]. Patient was notified.

## 2022-01-24 ENCOUNTER — VIRTUAL VISIT (OUTPATIENT)
Dept: PSYCHOLOGY | Age: 57
End: 2022-01-24
Payer: MEDICAID

## 2022-01-24 DIAGNOSIS — F33.1 MAJOR DEPRESSIVE DISORDER, RECURRENT EPISODE, MODERATE WITH ANXIOUS DISTRESS (HCC): Primary | ICD-10-CM

## 2022-01-24 PROCEDURE — 90832 PSYTX W PT 30 MINUTES: CPT | Performed by: PSYCHOLOGIST

## 2022-01-24 NOTE — Clinical Note
Giulia Mcclain, she's very pleased with you and the medications she's taking so far. Bob, she's very sad to see you go and wants to schedule another appointment with you to say goodbye. She is worried about finding another provider who will allow her to continue taking the Vicodin.

## 2022-01-24 NOTE — PROGRESS NOTES
Behavioral Health Consultation  Tiara Costa Psy.D. Psychologist  1/24/2022  3-3:30 PM    Time spent with Patient: 30 minutes  This is patient's third [facility] appointment. Reason for Consult: Depression, chronic pain  Referring Provider: DO Kuldip Segal [address] [address]    TELEHEALTH VISIT -- Audio/Visual (During QJBCI-45 public health emergency)  }  Pursuant to the emergency declaration under the [address] [place] [address] [facility] and the [employer] Act, this Virtual Visit was conducted, with patient's consent, to reduce the patient's risk of exposure to COVID-19 and provide continuity of care for an established patient. Services were provided through a video synchronous discussion virtually to substitute for in-person clinic visit. Pt gave verbal informed consent to participate in telehealth services. Conducted a risk-benefit analysis and determined that the patient's presenting problems are consistent with the use of telepsychology. Determined that the patient has sufficient knowledge and skills in the use of technology enabling them to adequately benefit from telepsychology. It was determined that this patient was able to be properly treated without an in-person session. Patient verified that they were currently located at the address that was provided during registration.     Verified the following information:  Patient's identification: Yes  Patient location: [address] [place] [id number]  Patient's call back number: [phone]  Patient's emergency contact's name and number, as well as permission to contact them if needed:  Extended Emergency Contact Information  Primary Emergency Contact: [facility]  Address: [address] [facility], [address] Phone: [phone]  Mobile Phone: [phone]  Relation: Domestic Partner  Secondary Emergency Contact: Olden Eisenmenger [address] Phone: [phone]  Mobile Phone: [phone]  Relation: Child    Provider location: [facility]      S:  Pt reported that GeneSight results showed very few antidepressants that are appropriate for her, which clarified why she has had so many side effects on past medication trials. Started on Pristiq nearly a week ago; tolerating it well and feeling less tearful. Also taking BuSpar. Feeling calmer and thinking more clearly. Sleeping better. Pain remains high, unchanged. Worried about switching doctors. Wants her pain mgmt regimen to remain the same. Feels supported by her brother who lives w/ pt. Discussed trust-related conflict with her . O:  Interventions:  -Supportive techniques  -Processed recent stressors and experiences  -Engaged in cognitive reappraisal  -Discussed psychotropic medications  -Explored relationship dynamics and communication strategies      A:  MSE:  Appearance: good hygiene  and appropriate attire  Attitude: cooperative, friendly and mild distress  Consciousness: alert  Orientation: oriented to person, place, time, general circumstance  Memory: recent and remote memory intact  Attention/Concentration: intact during session  Psychomotor Activity: normal  Eye Contact: normal  Speech: normal rate and volume, well-articulated  Mood: dysphoric, anxious  Affect: congruent, calmer, brighter  Perception: within normal limits  Thought Content: within normal limits  Thought Process: logical, coherent and goal-directed  Insight: good  Judgment: intact  Ability to understand instructions: Yes  Ability to respond meaningfully: Yes  Morbid Ideation: no   Suicide Assessment: no suicidal ideation, plan, or intent recently. Appropriate for outpatient / telehealth care at this time. Homicidal Ideation: no    Safety: No imminent risk of danger to self/others based on the factors considered below. Appropriate for outpatient level of care. Safety plan includes: 911, PES, hotlines, and interventions discussed today. Risk factors: Depressed mood, past suicidal ideation, prior suicide attempt, chronic pain or medical illness  Protective factors: Age >24 and <55, female gender, denies current suicidal ideation, does not have lethal plan, does not have access to guns or weapons, patient is eros for safety, no family h/o suicide, no substance abuse, patient has social or family support, no active psychosis or cognitive dysfunction, already has outpatient services in place, compliant with recommended medications, and patient is future-oriented. PHQ Scores 12/16/2021 5/10/2017 8/11/2014 7/11/2014 5/7/2014   PHQ2 Score 4 0 0 0 0   PHQ9 Score 18 0 0 0 0     Interpretation of Total Score Depression Severity: 1-4 = Minimal depression, 5-9 = Mild depression, 10-14 = Moderate depression, 15-19 = Moderately severe depression, 20-27 = Severe depression    Diagnosis:    1.  Major depressive disorder, recurrent episode, moderate with anxious distress (HCC)        Patient Active Problem List   Diagnosis    Asthma    MDD (major depressive disorder), recurrent severe, without psychosis ([facility]    Overdose    Restless leg syndrome    Sliding hiatal hernia    HTN (hypertension)    Atrial flutter (HCC)    SVT (supraventricular tachycardia) (HCC)    Paroxysmal atrial fibrillation (HCC)    Sinus bradycardia    Acute lateral meniscus tear of right knee    Acute medial meniscus tear of right knee    Localized edema    Acute pain of right knee    Chronic diastolic congestive heart failure (HCC)    Primary osteoarthritis of right knee    Contusion of multiple sites of right leg    Gastrocnemius strain, left    Ventral incisional hernia    Recurrent incisional hernia    Pain of upper abdomen    Incisional hernia with obstruction, without gangrene    Rhinitis, chronic    Vocal cord dysfunction    Iron deficiency anemia, unspecified    Idiopathic urticaria    Bilateral carpal tunnel syndrome         Plan:  Pt interventions:  Supportive techniques, Emphasized self-care as important for managing overall health, Cognitive strategies to target balanced thinking and Discussed psychotropic medications. Pt Behavioral Change Plan:  Pt set the following goals:  1. Read The Sleep Solution by Reese Cui MD  2. Schedule an appointment with Tuan Gonzales psychiatric NP, to discuss medication options  3. Learn more about self-compassion at www.self-compassion.org. Watch the video on self-compassion vs self-esteem. Pt scheduled a F/U virtual visit.

## 2022-01-24 NOTE — PATIENT INSTRUCTIONS
Goals: 1. Read The Sleep Solution by Victoria Gibson MD  2. Schedule an appointment with Leonela Edmondson, psychiatric NP, to discuss medication options  3. Learn more about self-compassion at www.self-compassion.org. Watch the video on self-compassion vs self-esteem.

## 2022-02-10 ENCOUNTER — VIRTUAL VISIT (OUTPATIENT)
Dept: PSYCHOLOGY | Age: 57
End: 2022-02-10
Payer: MEDICAID

## 2022-02-10 DIAGNOSIS — F33.1 MAJOR DEPRESSIVE DISORDER, RECURRENT EPISODE, MODERATE WITH ANXIOUS DISTRESS (HCC): Primary | ICD-10-CM

## 2022-02-10 PROCEDURE — 90832 PSYTX W PT 30 MINUTES: CPT | Performed by: PSYCHOLOGIST

## 2022-02-10 NOTE — PROGRESS NOTES
Behavioral Health Consultation  Marco Wilson Psy.D. Psychologist  2/10/2022  11:30 AM - 12 PM    Time spent with Patient: 30 minutes  This is patient's fourth [facility] appointment. Reason for Consult: Depression, chronic pain  Referring Provider: DO Kuldip Beyer [address] [address]    TELEHEALTH VISIT -- Audio/Visual (During JJSZP-76 public health emergency)  }  Pursuant to the emergency declaration under the [address] [place] [address] [facility] and the [employer] Act, this Virtual Visit was conducted, with patient's consent, to reduce the patient's risk of exposure to COVID-19 and provide continuity of care for an established patient. Services were provided through a video synchronous discussion virtually to substitute for in-person clinic visit. Pt gave verbal informed consent to participate in telehealth services. Conducted a risk-benefit analysis and determined that the patient's presenting problems are consistent with the use of telepsychology. Determined that the patient has sufficient knowledge and skills in the use of technology enabling them to adequately benefit from telepsychology. It was determined that this patient was able to be properly treated without an in-person session. Patient verified that they were currently located at the address that was provided during registration.     Verified the following information:  Patient's identification: Yes  Patient location: [address] [place] [id number]  Patient's call back number: [phone]  Patient's emergency contact's name and number, as well as permission to contact them if needed:  Extended Emergency Contact Information  Primary Emergency Contact: [facility]  Address: [address] [facility], [facility] [address] Phone: [phone]  Mobile Phone: [phone]  Relation: Domestic Partner  Secondary Emergency Contact: Swapnil De La Rosa [address] Phone: [phone]  Mobile Phone: [phone]  Relation: Child    Provider location: [place], [place]      S:  Pt is struggling emotionally today. Blood sugar has been dropping lately. Feeling shaky all morning, despite trying to increase her blood sugar. Can't have knee surgery until she loses weight, and she's trying to speed up the weight loss process d/t her high level of pain. Feeling supported by her daughter. Only getting out on the weekends with her daughter. Concerned about finances. Reflected on challenging dynamics with her . Discussed ongoing struggles with boundary setting. O:  Interventions:  -Supportive techniques  -Processed recent stressors and experiences  -Engaged in cognitive reappraisal  -Discussed psychotropic medications  -Explored relationship dynamics and communication strategies      A:  MSE:  Appearance: good hygiene  and appropriate attire  Attitude: cooperative, friendly and mild distress  Consciousness: alert  Orientation: oriented to person, place, time, general circumstance  Memory: recent and remote memory intact  Attention/Concentration: intact during session  Psychomotor Activity: normal  Eye Contact: normal  Speech: normal rate and volume, well-articulated  Mood: dysphoric, anxious  Affect: congruent, calmer, brighter  Perception: within normal limits  Thought Content: within normal limits  Thought Process: logical, coherent and goal-directed  Insight: good  Judgment: intact  Ability to understand instructions: Yes  Ability to respond meaningfully: Yes  Morbid Ideation: no   Suicide Assessment: no suicidal ideation, plan, or intent recently. Appropriate for outpatient / telehealth care at this time. Homicidal Ideation: no    Safety: No imminent risk of danger to self/others based on the factors considered below. Appropriate for outpatient level of care.  Safety plan includes: 911, PES, hotlines, and interventions discussed today. Risk factors: Depressed mood, past suicidal ideation, prior suicide attempt, chronic pain or medical illness  Protective factors: Age >24 and <55, female gender, denies current suicidal ideation, does not have lethal plan, does not have access to guns or weapons, patient is eros for safety, no family h/o suicide, no substance abuse, patient has social or family support, no active psychosis or cognitive dysfunction, already has outpatient services in place, compliant with recommended medications, and patient is future-oriented. PHQ Scores 12/16/2021 5/10/2017 8/11/2014 7/11/2014 5/7/2014   PHQ2 Score 4 0 0 0 0   PHQ9 Score 18 0 0 0 0     Interpretation of Total Score Depression Severity: 1-4 = Minimal depression, 5-9 = Mild depression, 10-14 = Moderate depression, 15-19 = Moderately severe depression, 20-27 = Severe depression    Diagnosis:    1.  Major depressive disorder, recurrent episode, moderate with anxious distress (HCC)        Patient Active Problem List   Diagnosis    Asthma    MDD (major depressive disorder), recurrent severe, without psychosis ([facility]    Overdose    Restless leg syndrome    Sliding hiatal hernia    HTN (hypertension)    Atrial flutter (HCC)    SVT (supraventricular tachycardia) (HCC)    Paroxysmal atrial fibrillation (HCC)    Sinus bradycardia    Acute lateral meniscus tear of right knee    Acute medial meniscus tear of right knee    Localized edema    Acute pain of right knee    Chronic diastolic congestive heart failure (HCC)    Primary osteoarthritis of right knee    Contusion of multiple sites of right leg    Gastrocnemius strain, left    Ventral incisional hernia    Recurrent incisional hernia    Pain of upper abdomen    Incisional hernia with obstruction, without gangrene    Rhinitis, chronic    Vocal cord dysfunction    Iron deficiency anemia, unspecified    Idiopathic urticaria  Bilateral carpal tunnel syndrome         Plan:  Pt interventions:  Supportive techniques, Emphasized self-care as important for managing overall health, Cognitive strategies to target balanced thinking and Discussed psychotropic medications. Pt Behavioral Change Plan:  Pt set the following goals:  1. Read The Sleep Solution by Mike Tomlin MD  2. Schedule an appointment with Claude Hobson psychiatric NP, to discuss medication options  3. Learn more about self-compassion at www.self-compassion.org. Watch the video on self-compassion vs self-esteem. Pt scheduled a F/U virtual visit.

## 2022-02-14 ENCOUNTER — OFFICE VISIT (OUTPATIENT)
Dept: FAMILY MEDICINE CLINIC | Age: 57
End: 2022-02-14
Payer: MEDICAID

## 2022-02-14 VITALS
DIASTOLIC BLOOD PRESSURE: 82 MMHG | BODY MASS INDEX: 49.65 KG/M2 | SYSTOLIC BLOOD PRESSURE: 136 MMHG | TEMPERATURE: 96.9 F | WEIGHT: 293 LBS | OXYGEN SATURATION: 96 % | RESPIRATION RATE: 12 BRPM | HEART RATE: 69 BPM

## 2022-02-14 DIAGNOSIS — M54.6 THORACOLUMBAR BACK PAIN: ICD-10-CM

## 2022-02-14 DIAGNOSIS — G47.01 INSOMNIA DUE TO MEDICAL CONDITION: ICD-10-CM

## 2022-02-14 DIAGNOSIS — M54.50 THORACOLUMBAR BACK PAIN: ICD-10-CM

## 2022-02-14 DIAGNOSIS — G25.81 RLS (RESTLESS LEGS SYNDROME): Primary | ICD-10-CM

## 2022-02-14 DIAGNOSIS — M54.12 CERVICAL RADICULOPATHY: ICD-10-CM

## 2022-02-14 PROCEDURE — G8484 FLU IMMUNIZE NO ADMIN: HCPCS | Performed by: FAMILY MEDICINE

## 2022-02-14 PROCEDURE — G8417 CALC BMI ABV UP PARAM F/U: HCPCS | Performed by: FAMILY MEDICINE

## 2022-02-14 PROCEDURE — G8427 DOCREV CUR MEDS BY ELIG CLIN: HCPCS | Performed by: FAMILY MEDICINE

## 2022-02-14 PROCEDURE — 3017F COLORECTAL CA SCREEN DOC REV: CPT | Performed by: FAMILY MEDICINE

## 2022-02-14 PROCEDURE — 1036F TOBACCO NON-USER: CPT | Performed by: FAMILY MEDICINE

## 2022-02-14 PROCEDURE — 99213 OFFICE O/P EST LOW 20 MIN: CPT | Performed by: FAMILY MEDICINE

## 2022-02-14 RX ORDER — ROPINIROLE 0.5 MG/1
1.5 TABLET, FILM COATED ORAL EVERY EVENING
Qty: 90 TABLET | Refills: 5 | Status: SHIPPED | OUTPATIENT
Start: 2022-02-14 | End: 2022-05-06 | Stop reason: SDUPTHER

## 2022-02-14 RX ORDER — DIAZEPAM 5 MG/1
5 TABLET ORAL NIGHTLY PRN
Qty: 30 TABLET | Refills: 2 | Status: SHIPPED | OUTPATIENT
Start: 2022-02-14 | End: 2022-03-16

## 2022-02-14 RX ORDER — HYDROCODONE BITARTRATE AND ACETAMINOPHEN 7.5; 325 MG/1; MG/1
1 TABLET ORAL 2 TIMES DAILY
Qty: 60 TABLET | Refills: 0 | Status: SHIPPED | OUTPATIENT
Start: 2022-02-14 | End: 2022-03-10 | Stop reason: SDUPTHER

## 2022-02-14 NOTE — PROGRESS NOTES
Nsaids Swelling    Pcn [Penicillins] Hives    Ranitidine Hcl     Sulfa Antibiotics Rash and Hives     Prior to Visit Medications    Medication Sig Taking? Authorizing Provider   diazePAM (VALIUM) 5 MG tablet Take 1 tablet by mouth nightly as needed for Anxiety for up to 30 days. Yes Cynthia Hernandez, DO   HYDROcodone-acetaminophen (NORCO) 7.5-325 MG per tablet Take 1 tablet by mouth 2 times daily for 60 doses. Yes Cynthia Hernandez DO   rOPINIRole (REQUIP) 0.5 MG tablet Take 3 tablets by mouth every evening Yes Pamaletrino [place], DO   metFORMIN (GLUCOPHAGE XR) 500 MG extended release tablet Take 1 tablet by mouth daily (with breakfast) Yes Cynthia Hernandez, DO   albuterol sulfate  (90 Base) MCG/ACT inhaler INHALE 2 PUFFS INTO LUNGS EVERY 4 HOURS AS NEEDED FOR WHEEZING OR SHORTNESS OF BREATH(SPACE OUT TO EVERY 6 HOURS AS SYMPTOMS IMPROVE) Yes Giovany Oglesby MD   DULERA 200-5 MCG/ACT inhaler INHALE TWO PUFFS BY MOUTH TWICE A DAY Yes Giovany Oglesby MD   Handicap Placard MISC by Does not apply route Dx lumbar degenerative disc disease. Effective May 6, 2021. PCP requesting placard with no expiration Yes Cynthia Hernandez DO   flecainide (TAMBOCOR) 50 MG tablet Take 1 tablet by mouth 2 times daily Yes STEPHANIE Wu CNP   Handicap Placard MISC by Does not apply route Dx of lumbar DDD. Effective Dec 18, 2020 until Dec 18, 2021.  Yes Cynthia Hernandez DO   Spacer/Aero-Holding Chambers (AEROCHAMBER MV) MISC Use with inhaler as directed Yes Antonio Scott MD   desvenlafaxine succinate (PRISTIQ) 100 MG TB24 extended release tablet Take 1 tablet by mouth daily  STEPHANIE Pond CNP   busPIRone (BUSPAR) 15 MG tablet Take 15 mg by mouth 2 times daily  STEPHANIE Pond CNP   dilTIAZem (CARDIZEM CD) 240 MG extended release capsule Take 1 capsule by mouth daily  Patient not taking: Reported on 2/14/2022  STEPHANIE Patel CNP   omalizumab [place]) 150 MG injection Inject 300 mg into the skin once for 1 dose  Tabitha Small MD          Vitals:    02/14/22 1347   BP: 136/82   Pulse: 69   Resp: 12   Temp: 96.9 °F (36.1 °C)   TempSrc: Temporal   SpO2: 96%   Weight: (!) 346 lb (156.9 kg)      Wt Readings from Last 3 Encounters:   02/15/22 (!) 348 lb (157.9 kg)   02/14/22 (!) 346 lb (156.9 kg)   01/18/22 (!) 357 lb (161.9 kg)     BP Readings from Last 3 Encounters:   02/15/22 138/80   02/14/22 136/82   01/18/22 124/76       Patient Active Problem List   Diagnosis    Asthma    MDD (major depressive disorder), recurrent severe, without psychosis ([facility]    Overdose    Restless leg syndrome    Sliding hiatal hernia    HTN (hypertension)    Atrial flutter (HCC)    SVT (supraventricular tachycardia) (HCC)    Paroxysmal atrial fibrillation (HCC)    Sinus bradycardia    Acute lateral meniscus tear of right knee    Acute medial meniscus tear of right knee    Localized edema    Acute pain of right knee    Chronic diastolic congestive heart failure (HCC)    Primary osteoarthritis of right knee    Contusion of multiple sites of right leg    Gastrocnemius strain, left    Ventral incisional hernia    Recurrent incisional hernia    Pain of upper abdomen    Incisional hernia with obstruction, without gangrene    Rhinitis, chronic    Vocal cord dysfunction    Iron deficiency anemia, unspecified    Idiopathic urticaria    Bilateral carpal tunnel syndrome       Immunization History   Administered Date(s) Administered    BCG (Hanalei BCG) 05/28/2013    DTaP 05/28/2013    DTaP (Infanrix) 05/28/2013    Influenza Vaccine, unspecified formulation 10/29/2015, 01/09/2017, 10/23/2017, 11/26/2018    Influenza Virus Vaccine 12/31/2008, 09/22/2009, 01/09/2017, 10/23/2017, 10/23/2017, 11/26/2018    Influenza, Quadv, IM, PF (6 mo and older Fluzone, Flulaval, Fluarix, and 3 yrs and older Afluria) 11/26/2018, 12/08/2020    Influenza, Quadv, Recombinant, IM PF (Flublok 18 yrs and older) 11/16/2019    PPD Test 06/11/2013, 05/07/2014, 08/13/2019    Pneumococcal Polysaccharide (Yaginystc61) 12/31/2008, 06/17/2019    Tdap (Boostrix, Adacel) 05/28/2013       Past Medical History:   Diagnosis Date    Acute lateral meniscus tear of right knee 02/2019    Acute medial meniscus tear of right knee 02/2019    Anesthesia complication     woke up during one surgery    Anxiety     Arthritis     Asthma     Atrial fibrillation ([address]     new dx 4 weeks ago, first of  Sept 2017    CHF (congestive heart failure) ([address]     Edema     Fibromyalgia     GERD (gastroesophageal reflux disease)     reflux    Hiatal hernia     Idiopathic urticaria 10/29/2020    SVT (supraventricular tachycardia) (HCC)     hx svt     Past Surgical History:   Procedure Laterality Date    ABLATION OF DYSRHYTHMIC FOCUS  04/2019    afib    BLADDER SUSPENSION  2004    HERNIA REPAIR  2001    HYSTERECTOMY  2004    KNEE ARTHROSCOPY Right 7/11/2019    VIDEO ARTHROSCOPY RIGHT KNEE, PARTIAL MEDIAL AND LATERAL MENISCECTOMY,, MEDIAL MICRO FRACTURE CHONDROPLASTY performed by Kayleen Carlson MD at [facility] GA[facility]IC BYPASS  2011    TONSILLECTOMY AND ADENOIDECTOMY      UPPP UVULOPALATOPHARYGOPLASTY      VENTRAL HERNIA REPAIR N/A 8/26/2019    DIAGNOSTIC LAPAROSCOPY, LAPAROSCOPIC LYSIS OF ADHESIONS, LAPAROSCOPIC REDUCTION OF RECURRENT INCISIONAL HERNIA WITH MESH performed by Matt Chavez MD at [address] [place] [address] History   Problem Relation Age of Onset    Cancer Mother         lung    Arthritis Mother     Cirrhosis Father     Asthma Maternal Aunt      Social History     Socioeconomic History    Marital status:      Spouse name: Not on file    Number of children: 3    Years of education: 15.5    Highest education level: Not on file   Occupational History    Not on file   Tobacco Use    Smoking status: Never Smoker    Smokeless tobacco: Never Used   Vaping Use  Vaping Use: Never used   Substance and Sexual Activity    Alcohol use: Yes     Comment: 0-1 drinks per month    Drug use: Yes     Types: Marijuana Lanis Kirsten), Other-see comments     Comment: Medical Marijuana use    Sexual activity: Yes     Partners: Male   Other Topics Concern    Not on file   Social History Narrative    Not on file     Social Determinants of Health     Financial Resource Strain: High Risk    Difficulty of Paying Living Expenses: Hard   Food Insecurity: Food Insecurity Present    Worried About Running Out of Food in the Last Year: Sometimes true    [place] of Food in the Last Year: Sometimes true   Transportation Needs:     Lack of Transportation (Medical): Not on file    Lack of Transportation (Non-Medical): Not on file   Physical Activity:     Days of Exercise per Week: Not on file    Minutes of Exercise per Session: Not on file   Stress:     Feeling of Stress : Not on file   Social Connections:     Frequency of Communication with Friends and Family: Not on file    Frequency of Social Gatherings with Friends and Family: Not on file    Attends Faith Services: Not on file    Active Member of [address] [place] [address] or Organizations: Not on file    Attends Club or Organization Meetings: Not on file    Marital Status: Not on file   Intimate Partner Violence:     Fear of Current or Ex-Partner: Not on file    Emotionally Abused: Not on file    Physically Abused: Not on file    Sexually Abused: Not on file   Housing Stability:     Unable to Pay for Housing in the Last Year: Not on file    Number of [place] in the Last Year: Not on file    Unstable Housing in the Last Year: Not on file       O: /82   Pulse 69   Temp 96.9 °F (36.1 °C) (Temporal)   Resp 12   Wt (!) 346 lb (156.9 kg)   SpO2 96%   BMI 49.65 kg/m²   Physical Exam  GEN: No acute distress,cooperative, well nourished, alert.    HEENT: PEERLA, EOMI , normocephalic/atraumatic, external nose appears normal.  External ear is normal.    Neck: soft, supple, no appreciable thyromegaly,mass  CV: No upper extremity edema. Resp:  Breathing comfortably. Psych:normal affect. Neuro: AOx3  Other Pertinent Physical Exam findings:         ASSESSMENT   Diagnosis Orders   1. RLS (restless legs syndrome)  rOPINIRole (REQUIP) 0.5 MG tablet   2. Insomnia due to medical condition  diazePAM (VALIUM) 5 MG tablet   3. Thoracolumbar back pain  HYDROcodone-acetaminophen (NORCO) 7.5-325 MG per tablet   4. Cervical radiculopathy  HYDROcodone-acetaminophen (NORCO) 7.5-325 MG per tablet     #1: The current medical regimen is effective;  continue present plan and medications. #2- #4:   The current medical regimen is effective;  continue present plan and medications. Right leg restless leg sx. PLAN          Time spent on encounter (including any number of the following: review of labs, imaging, provider notes, outside hospital records; performing examination/evaluation; counseling patient and family; ordering medications/tests; placing referrals and communication with referring physicians; coordination of care, and documentation in the EHR): 28 minutes  Established E/M: 10-19 (10428), 20-29 (32499), 30-39 (99216), 40-54 (02063)   New E/M: 15-29 (00371), 30-44 (64082), 45-59 (43231), 60-74 (00135)  Telephone E/M: 5-10 (Aura.Ao), 11-20 (93650), 21-30 (69253)    If applicable, see additional patient information and instructions under \"Patient Instructions. \"    Return in about 4 weeks (around 3/14/2022). Patient Instructions       Your doctor will be part of [facility] until March 11. You are certainly welcome to continue your healthcare needs with Dr. Kenan Pickard until then. To find a different primary care provider consider calling the \"find a doctor line\" at [phone]    -or-    --go to [facility]. com  --enter your location as your home zip code  --click \"save my location\"  --click \"find a doctor\"  --click \"Primary Care\"  --check out the reviews on the physician, nurse practitioner, or physician assistant that you are interested in.  --make sure that the provider is taking new patients (there should be a message on the right of the screen [facility] Patient availability within. Maryellen Sims \"  --click \"book on-line. \"      --follow the prompts    -or-    Schedule an appointment in order to spend time for Dr. Pako Lerma to assist you in finding a good replacement match  --Using an active Torrential account  --Logging on [facility] [employer], searching \"Urban Remedyng\" and setting up appointment  --or calling the scheduling line at [phone]. If appointment times are not available for your preferred day and time, then call the clinic back at [phone] or send a Torrential message and the clinic team will assist.    Thank you. Dr. Pako Lerma          Please note a portion of this chart was generated using dragon dictation software. Although every effort was made to ensure the accuracy of this automated transcription,some errors in transcription may have occurred.

## 2022-02-14 NOTE — PATIENT INSTRUCTIONS
Your doctor will be part of [facility] until March 11. You are certainly welcome to continue your healthcare needs with Dr. Pamla Opitz until then. To find a different primary care provider consider calling the \"find a doctor line\" at [phone]    -or-    --go to [facility]  --enter your location as your home zip code  --click \"save my location\"  --click \"find a doctor\"  --click \"Primary Care\"  --check out the reviews on the physician, nurse practitioner, or physician assistant that you are interested in.  --make sure that the provider is taking new patients (there should be a message on the right of the screen \"New Patient availability within. Wilbert Skates Wilbert Dona Carcamo \"  --click \"book on-line. \"      --follow the prompts    -or-    Schedule an appointment in order to spend time for Dr. Pamla Opitz to assist you in finding a good replacement match  --Using an active [employer] account  --Logging on [facility] [employer], searching \"[place]\" and setting up appointment  --or calling the scheduling line at [phone]. If appointment times are not available for your preferred day and time, then call the clinic back at [phone] or send a V[employer] message and the clinic team will assist.    Thank you.     Dr. Pamla Opitz

## 2022-02-15 ENCOUNTER — OFFICE VISIT (OUTPATIENT)
Dept: PSYCHIATRY | Age: 57
End: 2022-02-15
Payer: MEDICAID

## 2022-02-15 VITALS
SYSTOLIC BLOOD PRESSURE: 138 MMHG | DIASTOLIC BLOOD PRESSURE: 80 MMHG | HEIGHT: 70 IN | OXYGEN SATURATION: 96 % | HEART RATE: 67 BPM | WEIGHT: 293 LBS | BODY MASS INDEX: 41.95 KG/M2

## 2022-02-15 DIAGNOSIS — F51.01 PRIMARY INSOMNIA: ICD-10-CM

## 2022-02-15 DIAGNOSIS — F33.1 MAJOR DEPRESSIVE DISORDER, RECURRENT EPISODE, MODERATE (HCC): ICD-10-CM

## 2022-02-15 DIAGNOSIS — F41.1 GAD (GENERALIZED ANXIETY DISORDER): Primary | ICD-10-CM

## 2022-02-15 PROCEDURE — G8417 CALC BMI ABV UP PARAM F/U: HCPCS | Performed by: REGISTERED NURSE

## 2022-02-15 PROCEDURE — 3017F COLORECTAL CA SCREEN DOC REV: CPT | Performed by: REGISTERED NURSE

## 2022-02-15 PROCEDURE — G8428 CUR MEDS NOT DOCUMENT: HCPCS | Performed by: REGISTERED NURSE

## 2022-02-15 PROCEDURE — 99214 OFFICE O/P EST MOD 30 MIN: CPT | Performed by: REGISTERED NURSE

## 2022-02-15 PROCEDURE — G8484 FLU IMMUNIZE NO ADMIN: HCPCS | Performed by: REGISTERED NURSE

## 2022-02-15 PROCEDURE — 1036F TOBACCO NON-USER: CPT | Performed by: REGISTERED NURSE

## 2022-02-15 RX ORDER — BUSPIRONE HYDROCHLORIDE 15 MG/1
15 TABLET ORAL 2 TIMES DAILY
Qty: 180 TABLET | Refills: 0 | Status: SHIPPED | OUTPATIENT
Start: 2022-02-15 | End: 2022-04-13 | Stop reason: DRUGHIGH

## 2022-02-15 NOTE — PROGRESS NOTES
PSYCHIATRY Follow up outpatient    Latha Lemon  1965  2/15/2022   PCP: Brijesh Jasso DO  Chief compliant: F/u for depression, anxiety, Insomnia. Diagnosis:  1. JACEK (generalized anxiety disorder)  2. Major depressive disorder, recurrent episode, moderate (HCC)  3. Primary insomnia    Assessment and plan    1. Depression and anxiety  - Continue Pristiq  mg/daily. - change Buspar to 15 mg/twice a day per patient for preference. - continue psychotherapy with Víctor Byrne, [address] complementary health approaches such as: self-management strategies, relaxation techniques, yoga, and physical exercise as tolerated. 2. RTC in 8 weeks or earlier if your symptoms fail to improve or go to nearest ER if having active SI/HI. Evaluated medications and assessed for side effects and effectiveness. Assessed patient's educational needs including reviewing plan of care, medications,diagnosis, treatment options, and prognosis. I reviewed the plan of care with the patient and the patient consented to the treatment interventions. Patient acknowledged, verbalized understanding, and agreed with plan of care. Interval hx:   Ms William Craig states she has been doing \"fine. \"  She thinks Pristiq has helped for depression. She is less depressed now. Anxiety is better now. She noticed had tremor both hand and extremities recently. She went her PCP yesterday and started taking Requip for RLS. At this time, it hard to tell if the Pristiq  mg daily the offending agent to cause EPS. I don't think SNRI like Pristiq will cause EPS as it does not block dopamine in the CNS. She also think her anxiety may have cause the tremor. But the movement on hands are involuntary. The patient and I have decided to keep the dose of Pristiq ER at the current dose to see if the tremors will improve with Requip. She states counseling has helpful. She look after her grandchildren at times.  She was given by her Orthopedic provider to lose some weight. She believes it is achievable goal. She sometimes feels bad about her general health conditions and have engage in negative self talk. Advised patient how to manage anxiety and depression symptoms. She denies SI/HI/AVH. She wants a paper to be completed and sent to her insurance provider. She is ambivalent whether to go back to work or remain on permanent disability. Context: OV  Location:mind- anxiety, depression  Duration: chronic  Severity: moderate  Associated Symptoms: progression of illness, insomnia, stress. Brief Medical Hx:   Carlos Alberto Alcantara has a past medical history of Acute lateral meniscus tear of right knee, Acute medial meniscus tear of right knee, Anesthesia complication, Anxiety, Arthritis, Asthma, Atrial fibrillation ([address] CHF (congestive heart failure) ([address] Edema, Fibromyalgia, GERD (gastroesophageal reflux disease), Hiatal hernia, Idiopathic urticaria, and SVT (supraventricular tachycardia) ([facility] ROS: Denies CP, SOB, dizziness, syncope. Past Psych Medications trial: Cymbalta, Seroquel, Requip, Belsomra, Ativan, Ambien, Effexor, Lexapro, Neurontin, Lyrica, paxil, celexa, Abilify, Wellbutrin, Vistrail. She states \" I did not get any relieve from all these medications. \"  Current Medications:  Current Outpatient Medications on File Prior to Visit   Medication Sig Dispense Refill    diazePAM (VALIUM) 5 MG tablet Take 1 tablet by mouth nightly as needed for Anxiety for up to 30 days. 30 tablet 2    HYDROcodone-acetaminophen (NORCO) 7.5-325 MG per tablet Take 1 tablet by mouth 2 times daily for 60 doses.  60 tablet 0    rOPINIRole (REQUIP) 0.5 MG tablet Take 3 tablets by mouth every evening 90 tablet 5    desvenlafaxine succinate (PRISTIQ) 100 MG TB24 extended release tablet Take 1 tablet by mouth daily 30 tablet 0    metFORMIN (GLUCOPHAGE XR) 500 MG extended release tablet Take 1 tablet by mouth daily (with breakfast) 30 tablet 3    albuterol sulfate  (90 Base) MCG/ACT inhaler INHALE 2 PUFFS INTO LUNGS EVERY 4 HOURS AS NEEDED FOR WHEEZING OR SHORTNESS OF BREATH(SPACE OUT TO EVERY 6 HOURS AS SYMPTOMS IMPROVE) 8.5 g 11    DULERA 200-5 MCG/ACT inhaler INHALE TWO PUFFS BY MOUTH TWICE A DAY 1 Inhaler 11    Handicap Placard MISC by Does not apply route Dx lumbar degenerative disc disease. Effective May 6, 2021. PCP requesting placard with no expiration 1 each 0    flecainide (TAMBOCOR) 50 MG tablet Take 1 tablet by mouth 2 times daily 60 tablet 5    dilTIAZem (CARDIZEM CD) 240 MG extended release capsule Take 1 capsule by mouth daily (Patient not taking: Reported on 2/14/2022) 60 capsule 3    Handicap Placard MISC by Does not apply route Dx of lumbar DDD. Effective Dec 18, 2020 until Dec 18, 2021. 1 each 0    omalizumab (XOLAIR) 150 MG injection Inject 300 mg into the skin once for 1 dose 1 vial 3    Spacer/Aero-Holding Chambers (AEROCHAMBER MV) MISC Use with inhaler as directed 1 each 0     No current facility-administered medications on file prior to visit. Objective: Wt Readings from Last 3 Encounters:   02/15/22 (!) 348 lb (157.9 kg)   02/14/22 (!) 346 lb (156.9 kg)   01/18/22 (!) 357 lb (161.9 kg)     Temp Readings from Last 3 Encounters:   02/14/22 96.9 °F (36.1 °C) (Temporal)   12/14/21 97.1 °F (36.2 °C) (Temporal)   11/15/21 96.8 °F (36 °C)     BP Readings from Last 3 Encounters:   02/15/22 138/80   02/14/22 136/82   01/18/22 124/76     Pulse Readings from Last 3 Encounters:   02/15/22 67   02/14/22 69   01/18/22 73     AIMS: 3  Labs: up to date  Mental Status Exam:   PSYCHIATRIC ASSESSMENT     Mental Status Examination:    Mental Status Examination:    Appearance: appropriate attire. Alert and oriented x4. Behavior: calm and cooperative  Eye contact: good  Psycho motor activity: tremors on hands noted. Patient reported she also on BLE.    Speech: normal  Affect: congruent with mood  Mood: fatigue, anxious , depressed  Thought process: linear, logical and coherent  Thought content: future oriented. No hallucinations or delusions. Suicidality: none present, denies  Homicidally: none present, denies  Insight: good  Judgment: good  Cognition: intact  Attention span: good  Concentration: good  Abstract thinking: good  [place] thinking: yes  Memory: Immediate/Recent/Remote: Intact    Safety plan:  911, PES, hotlines, and interventions discussed today. Risk factors: depression, Female age >49, previous SA attempts, anxiety, financial and relationship issues. Protective factors: Denies current or recent active suicidal ideation, does not have lethal plan, patient is eros for safety, patient has social or family support, no active psychosis or cognitive dysfunction, physically healthy, compliant with recommended medications, and patient is future-oriented. Total time spent on this encounter: 35 minutes    Thank you for consult. Please do not hesitate to contact provider if there are additional questions regarding patient.     Claude Hobson DNP, PMJAYSONP-BC, CNP       2/15/2022

## 2022-02-15 NOTE — PATIENT INSTRUCTIONS
Here are some of Psychiatric Emergency resources for you:     1. National suicide hotlines: 3-707-568-TALK (3-357.117.1732) and 1-701-UQHJGCA (0-227.151.1532). 2.  Call 911 or go to any nearest emergency room   3. Access the [facility] Emergency Psychiatry Services:     - Go to the [facility] Psychiatric Emergency Services (PES) at [address] [address] [address], [address]   - Call the [address] at [phone].    - Call the [facility] Mobile Crisis Team at [phone]     Anti-depressant drugs:  This class of drugs can cause sedation, blurred vision, dry-mouth, constipation, postural hypotension, urinary retention, tachycardia, muscle tremors, agitation, headache, skin rash, photo sensitivity, excessive weight gain, glaucoma, heart disease, lowering seizure threshold, increase risk of suicidal thinking or ideations, excessive sweating, sextual dysfunction, insomnia, anxiety, bruxism, GI bleeding, pregnancy complications and birth defects, possible death, etc.

## 2022-02-18 RX ORDER — DESVENLAFAXINE 100 MG/1
100 TABLET, EXTENDED RELEASE ORAL DAILY
Qty: 90 TABLET | Refills: 0 | Status: SHIPPED | OUTPATIENT
Start: 2022-02-18 | End: 2022-04-14 | Stop reason: DRUGHIGH

## 2022-03-07 ENCOUNTER — TELEMEDICINE (OUTPATIENT)
Dept: PSYCHOLOGY | Age: 57
End: 2022-03-07
Payer: MEDICAID

## 2022-03-07 DIAGNOSIS — F33.1 MAJOR DEPRESSIVE DISORDER, RECURRENT EPISODE, MODERATE WITH ANXIOUS DISTRESS (HCC): Primary | ICD-10-CM

## 2022-03-07 PROCEDURE — 90832 PSYTX W PT 30 MINUTES: CPT | Performed by: PSYCHOLOGIST

## 2022-03-07 NOTE — PATIENT INSTRUCTIONS
Goals: 1. Read The Sleep Solution by Kev Bobby MD  2. Schedule an appointment with Tim Colin, psychiatric NP, to discuss medication options  3. Learn more about self-compassion at www.self-compassion.org. Watch the video on self-compassion vs self-esteem.

## 2022-03-07 NOTE — PROGRESS NOTES
Behavioral Health Consultation  Fara Bryant Psy.D. Psychologist  3/7/2022  12-12:30 PM    Time spent with Patient: 30 minutes  This is patient's fifth [facility] appointment. Reason for Consult: Depression, chronic pain  Referring Provider: DO Ada Ghotra [address] [id number]    TELEHEALTH VISIT -- Audio/Visual (During XLAUP-88 public health emergency)  }  Pursuant to the emergency declaration under the [address] [place] [address], [facility] waiver authority and the [employer] Act, this Virtual Visit was conducted, with patient's consent, to reduce the patient's risk of exposure to COVID-19 and provide continuity of care for an established patient. Services were provided through a video synchronous discussion virtually to substitute for in-person clinic visit. Pt gave verbal informed consent to participate in telehealth services. Conducted a risk-benefit analysis and determined that the patient's presenting problems are consistent with the use of telepsychology. Determined that the patient has sufficient knowledge and skills in the use of technology enabling them to adequately benefit from telepsychology. It was determined that this patient was able to be properly treated without an in-person session. Patient verified that they were currently located at the address that was provided during registration.     Verified the following information:  Patient's identification: Yes  Patient location: [address] [place] [id number]  Patient's call back number: [phone]  Patient's emergency contact's name and number, as well as permission to contact them if needed:  Extended Emergency Contact Information  Primary Emergency Contact: [facility]  Address: [address] [place] [address], [facility] [address] Phone: [phone]  Mobile Phone: [phone]  Relation: Domestic Partner  Secondary Emergency Contact: Griffin Thomas [address] Phone: [phone]  Mobile Phone: [phone]  Relation: Child    Provider location: [facility]      S:  Pt joined the visit from bed, quite tearful, with her daughter next to her for support. Daughter shared that pt was upset bc she was demeaned by her , then learned today about money she was overpaid and now needs to pay back ASAP. Pt's son, his gf, and their 2 kids were evicted, moved into pt's home, which adds to marital stress. Pt has been thinking about getting life insurance and ending her life. She sometimes wants to escape, other times wants to die, but denied plan or intent. She would never do that to her children or grandchildren. She calls her daughter when she is really struggling. Pt has been experiencing shaking in her hands. Wonders if it's related to blood sugar vs medication side effects. Sometimes she doesn't eat much in an effort to lose weight; other times she engages in emotional eating. O:  Interventions:  -Supportive techniques  -Processed recent stressors and experiences  -Engaged in cognitive reappraisal  -Encouraged behavioral activation. Encouraged pt's tentative plan to leave the house with her daughter today to spend time together.   -Conducted risk assessment. Appropriate for outpatient / telehealth care at this time.       A:  MSE:  Appearance: good hygiene  and appropriate attire  Attitude: cooperative, friendly and mild to moderate distress  Consciousness: alert  Orientation: oriented to person, place, time, general circumstance  Memory: recent and remote memory intact  Attention/Concentration: intact during session  Psychomotor Activity: normal  Eye Contact: normal  Speech: normal rate and volume, well-articulated  Mood: dysphoric, anxious  Affect: congruent  Perception: within normal limits  Thought Content: within normal limits  Thought Process: logical, coherent and goal-directed  Insight: good  Judgment: intact  Ability to understand instructions: Yes  Ability to respond meaningfully: Yes  Morbid Ideation: passive thoughts of death  Suicide Assessment: suicidal ideation without plan or intent recently. Appropriate for outpatient / telehealth care at this time. Homicidal Ideation: no    Safety: No imminent risk of danger to self/others based on the factors considered below. Appropriate for outpatient level of care. Safety plan includes: 911, PES, hotlines, and interventions discussed today. Risk factors: Depressed mood, recent suicidal ideation, prior suicide attempt, chronic pain or medical illness  Protective factors: Age >24 and <55, female gender, denies current suicidal ideation, does not have lethal plan, does not have access to guns or weapons, patient is eros for safety, no family h/o suicide, no substance abuse, patient has social or family support, no active psychosis or cognitive dysfunction, already has outpatient services in place, compliant with recommended medications, and patient is future-oriented. PHQ Scores 12/16/2021 5/10/2017 8/11/2014 7/11/2014 5/7/2014   PHQ2 Score 4 0 0 0 0   PHQ9 Score 18 0 0 0 0     Interpretation of Total Score Depression Severity: 1-4 = Minimal depression, 5-9 = Mild depression, 10-14 = Moderate depression, 15-19 = Moderately severe depression, 20-27 = Severe depression    Diagnosis:    1.  Major depressive disorder, recurrent episode, moderate with anxious distress (HCC)        Patient Active Problem List   Diagnosis    Asthma    MDD (major depressive disorder), recurrent severe, without psychosis ([facility] [address]    Overdose    Restless leg syndrome    Sliding hiatal hernia    HTN (hypertension)    Atrial flutter (HCC)    SVT (supraventricular tachycardia) (HCC)    Paroxysmal atrial fibrillation (HCC)    Sinus bradycardia    Acute lateral meniscus tear of right knee    Acute medial meniscus tear of right knee    Localized edema    Acute pain of right knee    Chronic diastolic congestive heart failure (HCC)    Primary osteoarthritis of right knee    Contusion of multiple sites of right leg    Gastrocnemius strain, left    Ventral incisional hernia    Recurrent incisional hernia    Pain of upper abdomen    Incisional hernia with obstruction, without gangrene    Rhinitis, chronic    Vocal cord dysfunction    Iron deficiency anemia, unspecified    Idiopathic urticaria    Bilateral carpal tunnel syndrome         Plan:  Pt interventions:  Supportive techniques, Emphasized self-care as important for managing overall health, Cognitive strategies to target balanced thinking, Discussed and set plan for behavioral activation and Completed risk evaluation. Pt Behavioral Change Plan:  Pt set the following goals:  1. Read The Sleep Solution by Shelli Allen MD  2. Schedule an appointment with Noah Salvador psychiatric NP, to discuss medication options  3. Learn more about self-compassion at www.self-compassion.org. Watch the video on self-compassion vs self-esteem. Pt scheduled a F/U virtual visit.

## 2022-03-09 DIAGNOSIS — E88.81 METABOLIC SYNDROME: ICD-10-CM

## 2022-03-09 DIAGNOSIS — M54.6 THORACOLUMBAR BACK PAIN: ICD-10-CM

## 2022-03-09 DIAGNOSIS — M54.50 THORACOLUMBAR BACK PAIN: ICD-10-CM

## 2022-03-09 DIAGNOSIS — M54.12 CERVICAL RADICULOPATHY: ICD-10-CM

## 2022-03-09 NOTE — TELEPHONE ENCOUNTER
Heidy Oh [phone] (home)    is requesting refill(s) of medication Norco, Metformin  to [facility] pharmacy [address]

## 2022-03-09 NOTE — TELEPHONE ENCOUNTER
Requested Prescriptions     Pending Prescriptions Disp Refills    HYDROcodone-acetaminophen (NORCO) 7.5-325 MG per tablet 60 tablet 0     Sig: Take 1 tablet by mouth 2 times daily for 60 doses.     metFORMIN (GLUCOPHAGE XR) 500 MG extended release tablet 30 tablet 3     Sig: Take 1 tablet by mouth daily (with breakfast)     Last ov 2/14/22  Last lab 8/24/21

## 2022-03-10 RX ORDER — HYDROCODONE BITARTRATE AND ACETAMINOPHEN 7.5; 325 MG/1; MG/1
1 TABLET ORAL 2 TIMES DAILY
Qty: 60 TABLET | Refills: 0 | Status: SHIPPED | OUTPATIENT
Start: 2022-03-10 | End: 2022-04-01 | Stop reason: DRUGHIGH

## 2022-03-10 RX ORDER — METFORMIN HYDROCHLORIDE 500 MG/1
500 TABLET, EXTENDED RELEASE ORAL
Qty: 30 TABLET | Refills: 3 | Status: SHIPPED | OUTPATIENT
Start: 2022-03-10

## 2022-03-10 NOTE — TELEPHONE ENCOUNTER
Seen in the prescribing window. I am able  to approve the medications. Check that she is taking steps to find another PCP. Let her know that the 2 doctors in the [address] [place] [address] are accepting my patients and began seeing the patients earlier this week. .   Diagnosis Orders   1. Thoracolumbar back pain  HYDROcodone-acetaminophen (NORCO) 7.5-325 MG per tablet   2. Cervical radiculopathy  HYDROcodone-acetaminophen (NORCO) 7.5-325 MG per tablet   3.  Metabolic syndrome  metFORMIN (GLUCOPHAGE XR) 500 MG extended release tablet

## 2022-03-28 ENCOUNTER — CLINICAL DOCUMENTATION (OUTPATIENT)
Dept: PSYCHOLOGY | Age: 57
End: 2022-03-28

## 2022-03-30 ENCOUNTER — OFFICE VISIT (OUTPATIENT)
Dept: ORTHOPEDIC SURGERY | Age: 57
End: 2022-03-30
Payer: MEDICAID

## 2022-03-30 VITALS — HEIGHT: 70 IN | WEIGHT: 293 LBS | BODY MASS INDEX: 41.95 KG/M2

## 2022-03-30 DIAGNOSIS — G56.03 CARPAL TUNNEL SYNDROME, BILATERAL: Primary | ICD-10-CM

## 2022-03-30 DIAGNOSIS — M18.12 ARTHRITIS OF CARPOMETACARPAL (CMC) JOINT OF LEFT THUMB: ICD-10-CM

## 2022-03-30 PROCEDURE — G8427 DOCREV CUR MEDS BY ELIG CLIN: HCPCS | Performed by: ORTHOPAEDIC SURGERY

## 2022-03-30 PROCEDURE — 99204 OFFICE O/P NEW MOD 45 MIN: CPT | Performed by: ORTHOPAEDIC SURGERY

## 2022-03-30 PROCEDURE — 1036F TOBACCO NON-USER: CPT | Performed by: ORTHOPAEDIC SURGERY

## 2022-03-30 PROCEDURE — 3017F COLORECTAL CA SCREEN DOC REV: CPT | Performed by: ORTHOPAEDIC SURGERY

## 2022-03-30 PROCEDURE — G8417 CALC BMI ABV UP PARAM F/U: HCPCS | Performed by: ORTHOPAEDIC SURGERY

## 2022-03-30 PROCEDURE — G8484 FLU IMMUNIZE NO ADMIN: HCPCS | Performed by: ORTHOPAEDIC SURGERY

## 2022-03-30 NOTE — PROGRESS NOTES
This 64 y.o., right hand dominant retired woman is seen today with a chief complaint of numbness and tingling of the bilateral hands, L>R and chronic intermittent pain in the base of her left thumb. Symptoms have been present for 6 months. The patient also frequently notices pain in the hand, wrist and arm, as well as weakness of , morning stiffness and swelling as well as difficulty performing functions which require fine touch. Symptoms are sometimes worse at night and can disturb sleep. The problem appears to recently have become worse. She has seen several doctors for this and has had 3 steroid injections in her left carpal tunnel and one in her thumb. There is no history of wrist fracture. There is no history and/or family history of diabetes. There is no history of thyroid disease. There is family history of carpal tunnel syndrome(brother). The pain assessment has been reviewed and is correct as stated. The patient's social history, past medical history, family history, medications, allergies and review of systems, entered 3/30/22, have been reviewed, and dated and are recorded in the chart. On examination the patient is Height: 5' 10\" (177.8 cm) tall and weighs Weight: (!) 348 lb (157.9 kg). Respirations are 18 per minute. The patient is well nourished, is oriented to time and place, demonstrates appropriate mood and affect as well as normal gait and station. Cervical range of motion is normal for the patient's stated age and does not produce pain or paresthesias in either upper extremity. There is no soft tissue swelling involving the volar aspect of the bilateral wrists. There are no significant thumb deformities. There is mild left thenar muscle atrophy. The Tinel sign is positive over the median nerve at the  carpal tunnel on left and negative over the ulnar nerve at the elbow bilaterally. The Phalen test is positive on the left at 0 seconds.   There is mild hypalgesia, with

## 2022-04-01 ENCOUNTER — OFFICE VISIT (OUTPATIENT)
Dept: FAMILY MEDICINE CLINIC | Age: 57
End: 2022-04-01
Payer: MEDICAID

## 2022-04-01 VITALS
OXYGEN SATURATION: 98 % | WEIGHT: 293 LBS | SYSTOLIC BLOOD PRESSURE: 118 MMHG | TEMPERATURE: 96.6 F | HEART RATE: 78 BPM | BODY MASS INDEX: 49.79 KG/M2 | DIASTOLIC BLOOD PRESSURE: 80 MMHG

## 2022-04-01 DIAGNOSIS — G89.29 CHRONIC GENERALIZED PAIN DISORDER: ICD-10-CM

## 2022-04-01 DIAGNOSIS — M54.6 THORACOLUMBAR BACK PAIN: ICD-10-CM

## 2022-04-01 DIAGNOSIS — F33.2 MDD (MAJOR DEPRESSIVE DISORDER), RECURRENT SEVERE, WITHOUT PSYCHOSIS (HCC): ICD-10-CM

## 2022-04-01 DIAGNOSIS — R52 CHRONIC GENERALIZED PAIN DISORDER: ICD-10-CM

## 2022-04-01 DIAGNOSIS — G56.03 BILATERAL CARPAL TUNNEL SYNDROME: ICD-10-CM

## 2022-04-01 DIAGNOSIS — E66.01 MORBID OBESITY (HCC): ICD-10-CM

## 2022-04-01 DIAGNOSIS — M17.0 PRIMARY OSTEOARTHRITIS OF BOTH KNEES: ICD-10-CM

## 2022-04-01 DIAGNOSIS — I10 PRIMARY HYPERTENSION: Primary | ICD-10-CM

## 2022-04-01 DIAGNOSIS — M54.12 CERVICAL RADICULOPATHY: ICD-10-CM

## 2022-04-01 DIAGNOSIS — M54.50 THORACOLUMBAR BACK PAIN: ICD-10-CM

## 2022-04-01 PROBLEM — M54.2 NECK PAIN: Status: ACTIVE | Noted: 2021-01-28

## 2022-04-01 PROCEDURE — 99214 OFFICE O/P EST MOD 30 MIN: CPT | Performed by: FAMILY MEDICINE

## 2022-04-01 RX ORDER — DIAZEPAM 5 MG/1
TABLET ORAL
COMMUNITY
Start: 2022-03-17 | End: 2022-09-01

## 2022-04-01 RX ORDER — HYDROCODONE BITARTRATE AND ACETAMINOPHEN 7.5; 325 MG/1; MG/1
.5-1 TABLET ORAL DAILY PRN
Qty: 30 TABLET | Refills: 0 | Status: SHIPPED
Start: 2022-04-01 | End: 2022-04-12 | Stop reason: SDUPTHER

## 2022-04-01 RX ORDER — CHLORZOXAZONE 500 MG/1
250-500 TABLET ORAL 3 TIMES DAILY PRN
Qty: 60 TABLET | Refills: 1 | Status: SHIPPED | OUTPATIENT
Start: 2022-04-01 | End: 2022-05-19 | Stop reason: SDUPTHER

## 2022-04-01 RX ORDER — EPINEPHRINE 0.3 MG/.3ML
INJECTION SUBCUTANEOUS
COMMUNITY
Start: 2022-03-08

## 2022-04-01 NOTE — LETTER
CONTROLLED SUBSTANCE MEDICATION AGREEMENT     Patient Name: Margarita Nash  Patient YOB: 1965   I understand, that controlled substance medications may be used to help better manage my symptoms and to improve my ability to function at home, work and in social settings. However, I also understand that these medications do have risks, which have been discussed with me, including possible development of physical or psychological dependence. I understand that successful treatment requires mutual trust and honesty between me and my provider. I understand and agree that following this Medication Agreement is necessary in continuing my provider-patient relationship and the success of my treatment plan. Explanation from my Provider: Benefits and Goals of Controlled Substance Medications: There are two potential goals for your treatment: (1) decreased pain and suffering (2) improved daily life functions. There are many possible treatments for your chronic condition(s). Alternatives such as physical therapy, yoga, massage, home daily exercise, meditation, relaxation techniques, injections, chiropractic manipulations, surgery, cognitive therapy, hypnosis and many medications that are not habit-forming may be used. Use of controlled substance medications may be helpful, but they are unlikely to resolve all symptoms or restore all function. Explanation from my Provider: Risks of Controlled Substance Medications:  Opioid pain medications: These medications can lead to problems such as addiction/dependence, sedation, lightheadedness/dizziness, memory issues, falls, constipation, nausea, or vomiting. They may also impair the ability to drive or operate machinery. Additionally, these medications may lower testosterone levels, leading to loss of bone strength, stamina and sex drive.   They may cause problems with breathing, sleep apnea and reduced coughing, which is especially dangerous for patients with lung disease. Overdose or dangerous interactions with alcohol and other medications may occur, leading to death. Hyperalgesia may develop, which means patients receiving opioids for the treatment of pain may become more sensitive to certain painful stimuli, and in some cases, experience pain from ordinarily non-painful stimuli. Women between the ages of 14-53 who could become pregnant should carefully weigh the risks and benefits of opioids with their physicians, as these medications increase the risk of pregnancy complications, including miscarriage,  delivery and stillbirth. It is also possible for babies to be born addicted to opioids. Opioid dependence withdrawal symptoms may include; feelings of uneasiness, increased pain, irritability, belly pain, diarrhea, sweats and goose-flesh. Benzodiazepines and non-benzodiazepine sleep medications: These medications can lead to problems such as addiction/dependence, sedation, fatigue, lightheadedness, dizziness, incoordination, falls, depression, hallucinations, and impaired judgment, memory and concentration. The ability to drive and operate machinery may also be affected. Abnormal sleep-related behaviors have been reported, including sleepwalking, driving, making telephone calls, eating, or having sex while not fully awake. These medications can suppress breathing and worsen sleep apnea, particularly when combined with alcohol or other sedating medications, potentially leading to death. Dependence withdrawal symptoms may include tremors, anxiety, hallucinations and seizures. Stimulants:  Common adverse effects include addiction/dependence, increased blood  pressure and heart rate, decreased appetite, nausea, involuntary weight loss, insomnia,                                                                                                                     Initials:_______   irritability, and headaches.   These risks may increase when these medications are combined with other stimulants, such as caffeine pills or energy drinks, certain weight loss supplements and oral decongestants. Dependence withdrawal symptoms may include depressed mood, loss of interest, suicidal thoughts, anxiety, fatigue, appetite changes and agitation. Testosterone replacement therapy:  Potential side effects include increased risk of stroke and heart attack, blood clots, increased blood pressure, increased cholesterol, enlarged prostate, sleep apnea, irritability/aggression and other mood disorders, and decreased fertility. I agree and understand that I and my prescriber have the following rights and responsibilities regarding my treatment plan:     1. MY RIGHTS:  To be informed of my treatment and medication plan. To be an active participant in my health and wellbeing. 2. MY RESPONSIBILITY AND UNDERSTANDING FOR USE OF MEDICATIONS   I will take medications at the dose and frequency as directed. For my safety, I will not increase or change how I take my medications without the recommendation of my healthcare provider.  I will actively participate in any program recommended by my provider which may improve function, including social, physical, psychological programs.  I will not take my medications with alcohol or other drugs not prescribed to me. I understand that drinking alcohol with my medications increases the chances of side effects, including reduced breathing rate and could lead to personal injury when operating machinery.  I understand that if I have a history of substance use disorders, including alcohol or other illicit drugs, that I may be at increased risk of addiction to my medications.  I agree to notify my provider immediately if I should become pregnant so that my treatment plan can be adjusted.    I agree and understand that I shall only receive controlled substance medications from the prescriber that signed this agreement unless there is written agreement among other prescribers of controlled substances outlining the responsibility of the medications being prescribed.  I understand that the if the controlled medication is not helping to achieve goals, the dosage may be tapered and no longer prescribed. 3. MY RESPONSIBILITY FOR COMMUNICATION / PRESCRIPTION RENEWALS   I agree that all controlled substance medications that I take will be prescribed only by my provider. If another healthcare provider prescribes me medication in an emergency, I will notify my provider within seventy-two (72) hours.  I will arrange for refills at the prescribed interval ONLY during regular office hours. I will not ask for refills earlier than agreed, after-hours, on holidays or weekends. Refills may take up to 72 hours for processing and prescriptions to reach the pharmacy.  I will inform my other health care providers that I am taking these medications and of the existence of this [address]. In the event of an emergency, I will provide the same information to the emergency department prescribers.  I will keep my provider updated on the pharmacy I am using for controlled medication prescription filling. Initials:_______  4. MY RESPONSIBILITY FOR PROTECTING MEDICATIONS   I will protect my prescriptions and medications. I understand that lost or misplaced prescriptions will not be replaced.  I will keep medications only for my own use and will not share them with others. I will keep all medications away from children.  I agree that if my medications are adjusted or discontinued, I will properly dispose of any remaining medications. I understand that I will be required to dispose of any remaining controlled medications as, directed by my prescriber, prior to being provided with any prescriptions for other controlled medications.   Medication drop box locations can be found at: [email or web address]    5. MY RESPONSIBILITY WITH ILLEGAL DRUGS    I will not use illegal or street drugs or another person's prescription medications not prescribed to me.  If there are identified addiction type symptoms, then referral to a program may be provided by my provider and I agree to follow through with this recommendation. 6. MY RESPONSIBILITY FOR COOPERATION WITH INVESTIGATIONS   I understand that my provider will comply with any applicable law and may discuss my use and/or possible misuse/abuse of controlled substances and alcohol, as appropriate, with any health care provider involved in my care, pharmacist, or legal authority.  I authorize my provider and pharmacy to cooperate fully with law enforcement agencies (as permitted by law) in the investigation of any possible misuse, sale, or other diversion of my controlled substances.  I agree to waive any applicable privilege or right of privacy or confidentiality with respect to these authorizations. 7. PROVIDERS RIGHT TO MONITOR FOR SAFETY: PRESCRIPTION MONITORING / DRUG TESTING   I consent to drug/toxicology screening and will submit to a drug screen upon my providers request to assure I am only taking the prescribed drugs for my safety monitoring. I understand that a drug screen is a laboratory test in which a sample of my urine, blood or saliva is checked to see what drugs I have been taking. This may entail an observed urine specimen, which means that a nurse or other health care provider may watch me provide urine, and I will cooperate if I am asked to provide an observed specimen.  I understand that my provider will check a copy of my State Prescription Monitoring Program () Report in order to safely prescribe medications.  Pill Counts: I consent to pill counts when requested.   I may be asked to bring all my prescribed controlled substance medications, in their original bottles, to all of my scheduled appointments. In addition, my provider may ask me to come to the practice at any time for a random pill count. 8. TERMINATION OF THIS AGREEMENT  For my safety, my prescriber has the right to stop prescribing controlled substance medications and may end this agreement. Initials:_______   Conditions that may result in termination of this agreement:  a. I do not show any improvement in pain, or my activity has not improved. b. I develop rapid tolerance or loss of improvement, as described in my treatment plan.  c. I develop significant side effects from the medication. d. My behavior is not consistent with the responsibilities outlined above, thereby causing safety concerns to continue prescribing controlled substance medications. e. I fail to follow the terms of this agreement. f. Other:____________________________       UNDERSTANDING THIS MEDICATION AGREEMENT:    I have read the above and have had all my questions answered. For chronic disease management, I know that my symptoms can be managed with many types of treatments. A chronic medication trial may be part of my treatment, but I must be an active participant in my care. Medication therapy is only one part of my symptom management plan. In some cases, there may be limited scientific evidence to support the chronic use of certain medications to improve symptoms and daily function. Furthermore, in certain circumstances, there may be scientific information that suggests that the use of chronic controlled substances may worsen my symptoms and increase my risk of unintentional death directly related to this medication therapy. I know that if my provider feels my risk from controlled medications is greater than my benefit, I will have my controlled substance medication(s) compassionately lowered or removed altogether.      I further agree to allow this office to contact my HIPAA contact if there are concerns about my safety and use of the controlled medications. I have agreed to use the prescribed controlled substance medications to me as instructed by my provider and as stated in this Medication Agreement. My initial on each page and my signature below shows that I have read each page and I have had the opportunity to ask questions with answers provided by my provider.     Patient Name (Printed): _____________________________________  Patient Signature:  ______________________   Date: _____________    Prescriber Name (Printed): ___________________________________  Prescriber Signature: _____________________  Date: _____________

## 2022-04-01 NOTE — PROGRESS NOTES
Portions of this chart may have been created with voice recognition software. Occasional wrong-word or \"sound-alike\" substitutions may have occurred due to the inherent limitations of voice recognition software. Read the chart carefully and recognize, using context, where these substitutions have occurred        Chief Complaint     Established New Doctor               Wesley Lyons is a 64 y.o. female here for establishing care with me and follow-up of her chronic medical problems. Patient has a history of hypertension, congestive heart failure currently under the care of cardiologist.  History of osteoarthritis of multiple joints including knee and neck. Patient has chronic generalized pain as well. Currently taking hydrocodone 7.5 mg half a tablet to 1 tablet as needed for pain. Patient states that she also takes medical marijuana primarily first for pain control and if needed she will take her hydrocodone half to 1 tablet. She states maximum 30 pills a month. She is also currently under the care of orthopedic surgeon however is waiting to lose weight to get her knee replacement done. She is also under the care of spine surgeon for her neck who wants to get carpal tunnel surgery first before he would operate on her neck. Patient would like to stay for referral to a different hand surgeon at this time. No chest pain palpitation shortness of breath positive for chronic pain in hands neck knees primarily as well as lower back. Currently stable. No worsening symptoms of any numbness tingling sensation any weakness which is worsening. Mood is also currently stable history of depression and anxiety in the past currently stable with BuSpar and desvenlafaxine. Refer patient to aquatic therapy to help her with weight loss as well as pain management at this time as well as start her on muscle relaxants to help pain control additionally.       REVIEW OF SYSTEMS:  Pertinent symptoms noted in HPI      Lab Results   Component Value Date    WBC 5.6 08/24/2021    HGB 13.2 08/24/2021    HCT 39.8 08/24/2021    MCV 89.3 08/24/2021     08/24/2021      Lab Results   Component Value Date    LABA1C 5.4 09/07/2018     Lab Results   Component Value Date    .3 09/07/2018     Lab Results   Component Value Date    TSH 2.41 10/12/2017     Lab Results   Component Value Date    CHOL 188 06/28/2019     Lab Results   Component Value Date    TRIG 104 06/28/2019     Lab Results   Component Value Date    HDL 87 (H) 06/28/2019     Lab Results   Component Value Date    LDLCALC 80 06/28/2019     Lab Results   Component Value Date    LABVLDL 21 06/28/2019     No results found for: [facility]   Lab Results   Component Value Date     08/24/2021    K 4.0 08/24/2021     08/24/2021    CO2 21 08/24/2021    BUN 15 08/24/2021    CREATININE 0.8 08/24/2021    GLUCOSE 109 (H) 08/24/2021    CALCIUM 8.8 08/24/2021    PROT 7.4 08/24/2021    LABALBU 4.0 08/24/2021    BILITOT 0.6 08/24/2021    ALKPHOS 115 08/24/2021    AST 13 (L) 08/24/2021    ALT 9 (L) 08/24/2021    LABGLOM >60 08/24/2021    GFRAA >60 08/24/2021    AGRATIO 1.2 08/24/2021    GLOB 3.4 08/24/2021           Current Outpatient Medications   Medication Sig Dispense Refill    D-Xylitol (XYLAREX PO) Take by mouth      HYDROcodone-acetaminophen (NORCO) 7.5-325 MG per tablet Take 0.5-1 tablets by mouth daily as needed for up to 30 doses.  30 tablet 0    chlorzoxazone (PARAFON FORTE) 500 MG tablet Take 0.5-1 tablets by mouth 3 times daily as needed for Muscle spasms 60 tablet 1    metFORMIN (GLUCOPHAGE XR) 500 MG extended release tablet Take 1 tablet by mouth daily (with breakfast) 30 tablet 3    desvenlafaxine succinate (PRISTIQ) 100 MG TB24 extended release tablet Take 1 tablet by mouth daily 90 tablet 0    busPIRone (BUSPAR) 15 MG tablet Take 15 mg by mouth 2 times daily 180 tablet 0    rOPINIRole (REQUIP) 0.5 MG tablet Take 3 tablets by mouth every evening 90 tablet 5    albuterol sulfate  (90 Base) MCG/ACT inhaler INHALE 2 PUFFS INTO LUNGS EVERY 4 HOURS AS NEEDED FOR WHEEZING OR SHORTNESS OF BREATH(SPACE OUT TO EVERY 6 HOURS AS SYMPTOMS IMPROVE) 8.5 g 11    DULERA 200-5 MCG/ACT inhaler INHALE TWO PUFFS BY MOUTH TWICE A DAY 1 Inhaler 11    Handicap Placard MISC by Does not apply route Dx lumbar degenerative disc disease. Effective May 6, 2021. PCP requesting placard with no expiration 1 each 0    flecainide (TAMBOCOR) 50 MG tablet Take 1 tablet by mouth 2 times daily 60 tablet 5    Handicap Placard MISC by Does not apply route Dx of lumbar DDD. Effective Dec 18, 2020 until Dec 18, 2021. 1 each 0    omalizumab (XOLAIR) 150 MG injection Inject 300 mg into the skin once for 1 dose 1 vial 3    Spacer/Aero-Holding Chambers (AEROCHAMBER MV) MISC Use with inhaler as directed 1 each 0    EPINEPHrine (EPIPEN) 0.3 MG/0.3ML SOAJ injection INJECT INTO UPPER THIGH AS DIRECTED AS NEEDED FOR ANAPHYLAXIS      diazePAM (VALIUM) 5 MG tablet TAKE 1 TABLET BY MOUTH NIGHTLY AS NEEDED FOR ANXIETY       No current facility-administered medications for this visit.        Allergies   Allergen Reactions    Latex Anaphylaxis and Rash    Aspirin      Swelling in lower legs    Demerol      rash    Meperidine     Nsaids Swelling    Pcn [Penicillins] Hives    Ranitidine Hcl     Sulfa Antibiotics Rash and Hives         Past Medical History:   Diagnosis Date    Acute lateral meniscus tear of right knee 02/2019    Acute medial meniscus tear of right knee 02/2019    Anesthesia complication     woke up during one surgery    Anxiety     Arthritis     Asthma     Atrial fibrillation ([address]     new dx 4 weeks ago, first of  Sept 2017    CHF (congestive heart failure) ([address]     Edema     Fibromyalgia     GERD (gastroesophageal reflux disease)     reflux    Hiatal hernia     Idiopathic urticaria 10/29/2020    SVT (supraventricular tachycardia) (HCC)     hx svt Past Surgical History:   Procedure Laterality Date    ABLATION OF DYSRHYTHMIC FOCUS  04/2019    afib    BLADDER SUSPENSION  2004    HERNIA REPAIR  2001    HYSTERECTOMY  2004    KNEE ARTHROSCOPY Right 7/11/2019    VIDEO ARTHROSCOPY RIGHT KNEE, PARTIAL MEDIAL AND LATERAL MENISCECTOMY,, MEDIAL MICRO FRACTURE CHONDROPLASTY performed by Cristal Barrios MD at [place] [address] [facility]  2011    TONSILLECTOMY AND ADENOIDECTOMY      UPPP UVULOPALATOPHARYGOPLASTY      VENTRAL HERNIA REPAIR N/A 8/26/2019    DIAGNOSTIC LAPAROSCOPY, LAPAROSCOPIC LYSIS OF ADHESIONS, LAPAROSCOPIC REDUCTION OF RECURRENT INCISIONAL HERNIA WITH MESH performed by Alanna Hopkins MD at [address] [place] [address] History   Problem Relation Age of Onset    Cancer Mother         lung    Arthritis Mother     Cirrhosis Father     Asthma Maternal Aunt         Social History     Socioeconomic History    Marital status:      Spouse name: Not on file    Number of children: 3    Years of education: 15.5    Highest education level: Not on file   Occupational History    Not on file   Tobacco Use    Smoking status: Never Smoker    Smokeless tobacco: Never Used   Vaping Use    Vaping Use: Never used   Substance and Sexual Activity    Alcohol use: Yes     Comment: 0-1 drinks per month    Drug use: Yes     Types: Marijuana Curlie Opal), Other-see comments     Comment: Medical Marijuana use    Sexual activity: Yes     Partners: Male   Other Topics Concern    Not on file   Social History Narrative    Not on file     Social Determinants of Health     Financial Resource Strain: High Risk    Difficulty of Paying Living Expenses: Hard   Food Insecurity: Food Insecurity Present    Worried About Running Out of Food in the Last Year: Sometimes true    [place] of Food in the Last Year: Sometimes true   Transportation Needs:     Lack of Transportation (Medical):  Not on file    Lack of Transportation (Non-Medical): Not on file   Physical Activity:     Days of Exercise per Week: Not on file    Minutes of Exercise per Session: Not on file   Stress:     Feeling of Stress : Not on file   Social Connections:     Frequency of Communication with Friends and Family: Not on file    Frequency of Social Gatherings with Friends and Family: Not on file    Attends [employer] Services: Not on file    Active Member of [address] [place] [address] or Organizations: Not on file    Attends Club or Organization Meetings: Not on file    Marital Status: Not on file   Intimate Partner Violence:     Fear of Current or Ex-Partner: Not on file    Emotionally Abused: Not on file    Physically Abused: Not on file    Sexually Abused: Not on file   Housing Stability:     Unable to Pay for Housing in the Last Year: Not on file    Number of [place] in the Last Year: Not on file    Unstable Housing in the Last Year: Not on file          OBJECTIVE:      Vitals:    04/01/22 1058   BP: 118/80   Site: Left Upper Arm   Position: Sitting   Cuff Size: Large Adult   Pulse: 78   Temp: 96.6 °F (35.9 °C)   SpO2: 98%   Weight: (!) 347 lb (157.4 kg)         Constitutional: Patient appears well-nourished, not in any distress. HENT:  Head: Normocephalic and atraumatic. Eyes: Conjunctivae and EOM are normal  Right Ear: External ear normal.  Left Ear: External ear normal.   Nose: Nose normal.  Mouth/Throat: Oropharynx is clear and moist.   Neck: Normal range of motion. Neck supple. Cardiovascular: Normal rate, regular rhythm, normal heart sounds and intact distal pulses. Pulmonary/Chest: Effort normal and breath sounds normal, no wheezes or rhonchi. Neurological:Patient is alert, oriented to person, place, and time.   No obvious focal neurological deficits  Skin: Skin is warm and moist.  Psychiatric:Patient has a normal mood and affect, behavior is normal. Judgment and thought content normal.    ASSESSMENT AND PLAN    Jose Castellano was seen today for established new doctor. Diagnoses and all orders for this visit:    Primary hypertension    MDD (major depressive disorder), recurrent severe, without psychosis ([facility]    Primary osteoarthritis of both knees  -     External Referral To Physicial Medicine and Rehab    Chronic generalized pain disorder  -     External Referral To Physicial Medicine and Rehab    Thoracolumbar back pain  -     HYDROcodone-acetaminophen (NORCO) 7.5-325 MG per tablet; Take 0.5-1 tablets by mouth daily as needed for up to 30 doses. Cervical radiculopathy  -     HYDROcodone-acetaminophen (NORCO) 7.5-325 MG per tablet; Take 0.5-1 tablets by mouth daily as needed for up to 30 doses. Morbid obesity ([facility]  -     External Referral To Physicial Medicine and Rehab    Bilateral carpal tunnel syndrome    Other orders  -     chlorzoxazone (PARAFON FORTE) 500 MG tablet; Take 0.5-1 tablets by mouth 3 times daily as needed for Muscle spasms           DISCHARGE MEDICATION LIST        Medication List          Accurate as of April 1, 2022 12:03 PM. If you have any questions, ask your nurse or doctor. START taking these medications    chlorzoxazone 500 MG tablet  Commonly known as: PARAFON FORTE  Take 0.5-1 tablets by mouth 3 times daily as needed for Muscle spasms  Started by: Jus Lozoya MD        CHANGE how you take these medications    HYDROcodone-acetaminophen 7.5-325 MG per tablet  Commonly known as: [address]  Take 0.5-1 tablets by mouth daily as needed for up to 30 doses.   What changed:   · how much to take  · when to take this  · reasons to take this  Changed by: Jus Lozoya MD        CONTINUE taking these medications    AeroChamber MV Misc  Use with inhaler as directed     albuterol sulfate  (90 Base) MCG/ACT inhaler  INHALE 2 PUFFS INTO LUNGS EVERY 4 HOURS AS NEEDED FOR WHEEZING OR SHORTNESS OF BREATH(SPACE OUT TO EVERY 6 HOURS AS SYMPTOMS IMPROVE)     busPIRone 15 MG tablet  Commonly known as: BUSPAR  Take 15 mg by mouth 2 times daily     desvenlafaxine succinate 100 MG Tb24 extended release tablet  Commonly known as: PRISTIQ  Take 1 tablet by mouth daily     diazePAM 5 MG tablet  Commonly known as: VALIUM     Dulera 200-5 MCG/ACT inhaler  Generic drug: mometasone-formoterol  INHALE TWO PUFFS BY MOUTH TWICE A DAY     EPINEPHrine 0.3 MG/0.3ML Soaj injection  Commonly known as: EPIPEN     flecainide 50 MG tablet  Commonly known as: TAMBOCOR  Take 1 tablet by mouth 2 times daily     Handicap Placard Misc  by Does not apply route Dx of lumbar DDD. Effective Dec 18, 2020 until Dec 18, 2021. Handicap Placard Misc  by Does not apply route Dx lumbar degenerative disc disease. Effective May 6, 2021. PCP requesting placard with no expiration     metFORMIN 500 MG extended release tablet  Commonly known as: Glucophage XR  Take 1 tablet by mouth daily (with breakfast)     omalizumab 150 MG injection  Commonly known as: XOLAIR  Inject 300 mg into the skin once for 1 dose     rOPINIRole 0.5 MG tablet  Commonly known as: Requip  Take 3 tablets by mouth every evening     XYLAREX PO        STOP taking these medications    dilTIAZem 240 MG extended release capsule  Commonly known as: CARDIZEM CD  Stopped by: Judith Farmer MD           Where to Get Your Medications      These medications were sent to [place] [address] #[id number] [address]    Phone: [phone]   · chlorzoxazone 500 MG tablet     Information about where to get these medications is not yet available    Ask your nurse or doctor about these medications  · HYDROcodone-acetaminophen 7.5-325 MG per tablet          Return in about 4 weeks (around 4/29/2022), or if symptoms worsen or fail to improve. Please refer to diagnosis, orders and patient instructions for further recommendations given. Body mass index is 49.79 kg/m². . Goals of Healthy standard of living discussed. Emphasis given on appropriate diet and routine regular physical activity with cardio and strength training as much as tolerated advised. All patient's questions and concerns were addressed appropriately. All orders, handouts were reviewed in detail with the patient and patient voiced understanding verbally.

## 2022-04-04 ENCOUNTER — TELEPHONE (OUTPATIENT)
Dept: ORTHOPEDIC SURGERY | Age: 57
End: 2022-04-04

## 2022-04-04 NOTE — TELEPHONE ENCOUNTER
Per Shyann Gutierrez, called patient. At her last visit, she was to call when she was ready to discuss CTR. Shyann Gutierrez wanted to verify if this is the reason for her appointment, so that he can discuss with her over the phone and save her the trip into the office. Asked that patient call back to verify the reason for her upcoming appointment. If it is to discuss surgery, appointment for 4/6 can be canceled and Shyann Gutierrez will call her.

## 2022-04-05 ENCOUNTER — TELEPHONE (OUTPATIENT)
Dept: FAMILY MEDICINE CLINIC | Age: 57
End: 2022-04-05

## 2022-04-05 DIAGNOSIS — I10 PRIMARY HYPERTENSION: ICD-10-CM

## 2022-04-05 DIAGNOSIS — G56.03 BILATERAL CARPAL TUNNEL SYNDROME: Primary | ICD-10-CM

## 2022-04-05 NOTE — TELEPHONE ENCOUNTER
Pt called to request a referral for a new hand surgeon. Pt also needs orders sent for lab work. Please call pt when orders are in place.     LOV 04/01/2022

## 2022-04-08 NOTE — TELEPHONE ENCOUNTER
Had blood work and placed hand surgery referral.  Please make an appointment for this patient with the preferred physician.

## 2022-04-12 DIAGNOSIS — M54.50 THORACOLUMBAR BACK PAIN: ICD-10-CM

## 2022-04-12 DIAGNOSIS — M54.6 THORACOLUMBAR BACK PAIN: ICD-10-CM

## 2022-04-12 DIAGNOSIS — M54.12 CERVICAL RADICULOPATHY: ICD-10-CM

## 2022-04-12 RX ORDER — HYDROCODONE BITARTRATE AND ACETAMINOPHEN 7.5; 325 MG/1; MG/1
.5-1 TABLET ORAL DAILY PRN
Qty: 30 TABLET | Refills: 0 | Status: SHIPPED | OUTPATIENT
Start: 2022-04-13 | End: 2022-05-13

## 2022-04-13 RX ORDER — BUSPIRONE HYDROCHLORIDE 15 MG/1
15 TABLET ORAL 2 TIMES DAILY
Qty: 180 TABLET | Refills: 1 | Status: SHIPPED | OUTPATIENT
Start: 2022-04-13 | End: 2022-07-12

## 2022-04-13 RX ORDER — BUSPIRONE HYDROCHLORIDE 15 MG/1
15 TABLET ORAL 2 TIMES DAILY
Qty: 180 TABLET | Refills: 0 | Status: CANCELLED | OUTPATIENT
Start: 2022-04-13 | End: 2022-07-12

## 2022-04-13 NOTE — TELEPHONE ENCOUNTER
Called patient and left message for patient that antibiotics were sent to the pharmacy.
Pt states she was seen at the Ed for UTI back in the beginning of May and she was given Cipro. Pt states she feels the sx's haven't really subsided. She states she is having R. Flank pain and it is ongoing. No other sx's. She states she would like another week worth of Cipro if she can. Pt has an ov on 6/14/19 for a physical. Pharmacy and medication are listed.  Please review
Other

## 2022-04-14 ENCOUNTER — TELEMEDICINE (OUTPATIENT)
Dept: PSYCHIATRY | Age: 57
End: 2022-04-14
Payer: MEDICAID

## 2022-04-14 ENCOUNTER — TELEPHONE (OUTPATIENT)
Dept: PRIMARY CARE CLINIC | Age: 57
End: 2022-04-14

## 2022-04-14 DIAGNOSIS — F41.1 GAD (GENERALIZED ANXIETY DISORDER): Primary | ICD-10-CM

## 2022-04-14 DIAGNOSIS — F33.1 MAJOR DEPRESSIVE DISORDER, RECURRENT EPISODE, MODERATE (HCC): ICD-10-CM

## 2022-04-14 DIAGNOSIS — F51.01 PRIMARY INSOMNIA: ICD-10-CM

## 2022-04-14 PROCEDURE — 1036F TOBACCO NON-USER: CPT | Performed by: REGISTERED NURSE

## 2022-04-14 PROCEDURE — G8417 CALC BMI ABV UP PARAM F/U: HCPCS | Performed by: REGISTERED NURSE

## 2022-04-14 PROCEDURE — G8427 DOCREV CUR MEDS BY ELIG CLIN: HCPCS | Performed by: REGISTERED NURSE

## 2022-04-14 PROCEDURE — 99213 OFFICE O/P EST LOW 20 MIN: CPT | Performed by: REGISTERED NURSE

## 2022-04-14 PROCEDURE — 3017F COLORECTAL CA SCREEN DOC REV: CPT | Performed by: REGISTERED NURSE

## 2022-04-14 RX ORDER — DESVENLAFAXINE 100 MG/1
200 TABLET, EXTENDED RELEASE ORAL DAILY
Qty: 180 TABLET | Refills: 0 | Status: SHIPPED | OUTPATIENT
Start: 2022-04-14 | End: 2022-04-14 | Stop reason: SDUPTHER

## 2022-04-14 RX ORDER — DESVENLAFAXINE 100 MG/1
200 TABLET, EXTENDED RELEASE ORAL DAILY
Qty: 180 TABLET | Refills: 0 | Status: SHIPPED | OUTPATIENT
Start: 2022-04-14 | End: 2022-07-12

## 2022-04-14 NOTE — PROGRESS NOTES
Leonard Mahoney (:  1965) is a 64 y.o. female,Established patient, here for evaluation of the following chief complaint(s): Anxiety, Depression, Insomnia, and Follow-up    Diagnosis:  1. JACEK (generalized anxiety disorder)  2. Major depressive disorder, recurrent episode, moderate (HCC)  3. Primary insomnia    ASSESSMENT/PLAN:    1. Depression, Anxiety and Insomnia  - ^ Pristiq ER from 100 mg/daily > 200 mg/daily. - continue Buspar 15 mg twice a day.   -continue Requip 1.5 mg/ nightly. ( Rx by previous PCP)- patient states it helps to sleep.   - medication R/B/SE discussed and patient consents for treatment. 2.Multiple chronic medical issues  - follow PCP  3. RTC in 3 months or earlier if your symptoms fail to improve or go to nearest ER if having active SI/HI. Evaluated medications and assessed for side effects and effectiveness. Assessed patient's educational needs including reviewing plan of care, medications, and diagnosis. I reviewed the plan of care with the patient and the patient consented to the treatment interventions. Patient acknowledged, verbalized understanding, and agreed with plan of care     Interval Hx:  Ms Rosy Veronica is seen via Video for anxiety, depression, and Insomnia. She is less anxious now. Buspar has been helping her. Shaking stopped now. She is also on Requip which she endorses help her sleep. Frustrated about hand issue in which she was referred to a specialist and told \" Carpal tunnel does not kill any one. \" She is upset with the lack or compassion form medical providers. In the past she was also told they can't help until she works on her weight issue. She has a plan to go back to her PCP to get a new referral for hand specialist. She has more health issues that she has to deal with and needs some interventions. Depressed mood and irritable at times due to health conditions. 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 Discussed mediation options to target more the depressive symptoms. I will increase the dose of Pristiq today. Patient has been taking mediations as prescribed. Denies SI/HI/AVH. No hallucinations or delusions. No safety concern reported during the virtual visit today. Home Medications:  Prior to Admission medications    Medication Sig Start Date End Date Taking? Authorizing Provider   desvenlafaxine succinate (PRISTIQ) 100 MG TB24 extended release tablet Take 2 tablets by mouth daily 4/14/22 7/13/22 Yes STEPHANIE Velazquez CNP   busPIRone (BUSPAR) 15 MG tablet Take 15 mg by mouth 2 times daily 4/13/22 7/12/22 Yes STEPHANIE Velazquez CNP   HYDROcodone-acetaminophen (NORCO) 7.5-325 MG per tablet Take 0.5-1 tablets by mouth daily as needed for Pain for up to 30 days.  4/13/22 5/13/22 Yes Alec Rodriguez MD   D-Xylitol (XYLAREX PO) Take by mouth   Yes Historical Provider, MD   chlorzoxazone (PARAFON FORTE) 500 MG tablet Take 0.5-1 tablets by mouth 3 times daily as needed for Muscle spasms 4/1/22  Yes Alec Rodriguez MD   EPINEPHrine (EPIPEN) 0.3 MG/0.3ML SOAJ injection INJECT INTO UPPER THIGH AS DIRECTED AS NEEDED FOR ANAPHYLAXIS 3/8/22  Yes Historical Provider, MD   diazePAM (VALIUM) 5 MG tablet TAKE 1 TABLET BY MOUTH NIGHTLY AS NEEDED FOR ANXIETY 3/17/22  Yes Historical Provider, MD   metFORMIN (GLUCOPHAGE XR) 500 MG extended release tablet Take 1 tablet by mouth daily (with breakfast) 3/10/22  Yes Jyoti Hernandez, DO   rOPINIRole (REQUIP) 0.5 MG tablet Take 3 tablets by mouth every evening 2/14/22  Yes Jyoti Hernandez, DO   albuterol sulfate  (90 Base) MCG/ACT inhaler INHALE 2 PUFFS INTO LUNGS EVERY 4 HOURS AS NEEDED FOR WHEEZING OR SHORTNESS OF BREATH(SPACE OUT TO EVERY 6 HOURS AS SYMPTOMS IMPROVE) 9/29/21  Yes Everardo Moser MD   DULERA 200-5 MCG/ACT inhaler INHALE TWO PUFFS BY MOUTH TWICE A DAY 5/29/21  Yes Everardo GlendaleMD Howard yates MISC by Does not apply route Dx lumbar degenerative disc disease. Effective May 6, 2021. PCP requesting placard with no expiration 5/6/21  Yes Susan Hernandez,    flecainide (TAMBOCOR) 50 MG tablet Take 1 tablet by mouth 2 times daily 3/23/21  Yes STEPHANIE Browning - CNP   Handicap Placard MISC by Does not apply route Dx of lumbar DDD. Effective Dec 18, 2020 until Dec 18, 2021. 12/18/20  Yes Cullen Whitaker DO   Spacer/Aero-Holding Chambers (AEROCHAMBER MV) MISC Use with inhaler as directed 3/6/20  Yes Kritsi Hardy MD   omalizumab Donata Hacker) 150 MG injection Inject 300 mg into the skin once for 1 dose 11/6/20 4/1/22  Jimena Quintana MD        Past psych Medication Trials:   Psychiatric Exam         Attention and Perception: attentive        Mood and Affect: calm         Speech: normal       Behavior: calm and cooperative        Cognition and Memory: normal     Judgment: intact    Safety plan  Discussed and informed patient to call 911 or go to nearest emergency room if patient experiences SI/HI immediately. In addition, Patient educated to use the National Suicide Hotlines: 9-614-053-TALK (8-841.637.4446) and 4-976-GPKCRHN (2-477.297.4374) and Local psychiatric Emergency Services given to patient during the virtual visit. Patient location during VV: Home- on porch  Provider location during VV: [address] [place] [address]  Mode of Virtual visit used: Video  [place], was evaluated through a synchronous (real-time) audio-video encounter. The patient (or guardian if applicable) is aware that this is a billable service, which includes applicable co-pays. This Virtual Visit was conducted with patient's (and/or legal guardian's) consent. The visit was conducted pursuant to the emergency declaration under the [address] [place] [address], [address] [place] [address] authority and the [employer] and [employer] Act. Patient identification was verified, and a caregiver was present when appropriate. The patient was located at home in a state where the provider was licensed to provide care. Total time spent for this encounter: 25 minutes    --STEPHANIE Edouard CNP on 4/14/2022 at 7:28 PM    An electronic signature was used to authenticate this note.

## 2022-04-15 ENCOUNTER — TELEPHONE (OUTPATIENT)
Dept: ORTHOPEDIC SURGERY | Age: 57
End: 2022-04-15

## 2022-04-15 NOTE — TELEPHONE ENCOUNTER
Received a PA request for Desvenlafaxine Succinate ER 100MG er tablets. This drug was prescribed by a psychiatry nurse practitioner that the Medication Pre-Auth Dept doesn't do PAs for.

## 2022-04-22 ENCOUNTER — TELEMEDICINE (OUTPATIENT)
Dept: PSYCHOLOGY | Age: 57
End: 2022-04-22
Payer: MEDICAID

## 2022-04-22 DIAGNOSIS — F33.1 MAJOR DEPRESSIVE DISORDER, RECURRENT EPISODE, MODERATE WITH ANXIOUS DISTRESS (HCC): Primary | ICD-10-CM

## 2022-04-22 DIAGNOSIS — G89.4 CHRONIC PAIN SYNDROME: ICD-10-CM

## 2022-04-22 PROCEDURE — 90832 PSYTX W PT 30 MINUTES: CPT | Performed by: PSYCHOLOGIST

## 2022-04-22 NOTE — PROGRESS NOTES
Emergency Contact: Barrientos Vandana [address] Phone: [phone]  Mobile Phone: [phone]  Relation: Child    Provider location: [place], [place]      S:  Pt is not feeling well - bleeding rectally, SOB, dizzy. Had multiple polyps at last colonoscopy; needs to get another one. Has to wait a few weeks to see GI doctor, which concerns her. Hand issues have worsened. Described a frustrating experience with the hand doctor. Reflected on other physical issues and her concerns about the medical care she receives. Pt was very pleased with her first visit with Dr. Jillian Crane. Discussed desire to have knee surgery so she can get moving. Reflected on the full-body rash she started experiencing a few years ago. Diagnosed with mast cell activation syndrome. Zolair shot helped but her [employer] won't cover it so she's very itchy currently. Pristiq dose was increased about a week ago. Less tearful. She has fleeting moments of SI but no plan or intent. Feels agitated and overwhelmed. Lashes out at others. O:  Interventions:  -Supportive techniques  -Processed recent stressors and experiences  -Engaged in cognitive reappraisal  -Discussed health concerns and pt's experience of the care she receives  -Conducted risk assessment. Appropriate for outpatient / telehealth care at this time.       A:  MSE:  Appearance: good hygiene  and appropriate attire  Attitude: cooperative, friendly and mild distress  Consciousness: alert  Orientation: oriented to person, place, time, general circumstance  Memory: recent and remote memory intact  Attention/Concentration: intact during session  Psychomotor Activity: normal  Eye Contact: normal  Speech: normal rate and volume, well-articulated  Mood: dysphoric, anxious  Affect: congruent  Perception: within normal limits  Thought Content: within normal limits  Thought Process: logical, coherent and goal-directed  Insight: good  Judgment: intact  Ability to understand instructions: Yes  Ability to respond meaningfully: Yes  Morbid Ideation: passive thoughts of death  Suicide Assessment: \"fleeting\" suicidal ideation without plan or intent recently. Appropriate for outpatient / telehealth care at this time. Homicidal Ideation: no    Safety: No imminent risk of danger to self/others based on the factors considered below. Appropriate for outpatient level of care. Safety plan includes: 911, PES, hotlines, and interventions discussed today. Risk factors: Depressed mood, recent suicidal ideation, prior suicide attempt, chronic pain or medical illness  Protective factors: Age >24 and <55, female gender, denies current suicidal ideation, does not have lethal plan, does not have access to guns or weapons, patient is eros for safety, no family h/o suicide, no substance abuse, patient has social or family support, no active psychosis or cognitive dysfunction, already has outpatient services in place, compliant with recommended medications, and patient is future-oriented. PHQ Scores 12/16/2021 5/10/2017 8/11/2014 7/11/2014 5/7/2014   PHQ2 Score 4 0 0 0 0   PHQ9 Score 18 0 0 0 0     Interpretation of Total Score Depression Severity: 1-4 = Minimal depression, 5-9 = Mild depression, 10-14 = Moderate depression, 15-19 = Moderately severe depression, 20-27 = Severe depression    Diagnosis:    1.  Major depressive disorder, recurrent episode, moderate with anxious distress (HCC)        Patient Active Problem List   Diagnosis    Asthma    MDD (major depressive disorder), recurrent severe, without psychosis ([facility]    Overdose    Restless leg syndrome    Sliding hiatal hernia    HTN (hypertension)    Atrial flutter (HCC)    SVT (supraventricular tachycardia) (HCC)    Paroxysmal atrial fibrillation (HCC)    Sinus bradycardia    Acute lateral meniscus tear of right knee    Acute medial meniscus tear of right knee    Localized edema    Acute pain of right knee    Chronic diastolic congestive heart failure (HCC)    Primary osteoarthritis of right knee    Contusion of multiple sites of right leg    Gastrocnemius strain, left    Ventral incisional hernia    Recurrent incisional hernia    Pain of upper abdomen    Incisional hernia with obstruction, without gangrene    Rhinitis, chronic    Vocal cord dysfunction    Iron deficiency anemia, unspecified    Idiopathic urticaria    Carpal tunnel syndrome, bilateral    Arthritis of carpometacarpal (CMC) joint of left thumb    Neck pain         Plan:  Pt interventions:  Supportive techniques, Emphasized self-care as important for managing overall health, Cognitive strategies to target balanced thinking and Completed risk evaluation. Pt Behavioral Change Plan:  Pt set the following goals:  1. Read The Sleep Solution by Cristofer Smith MD  2. Schedule an appointment with Florencia Frey, psychiatric NP, to discuss medication options  3. Learn more about self-compassion at [email or web address]-compassion.org. Watch the video on self-compassion vs self-esteem. Pt scheduled a F/U virtual visit.

## 2022-04-28 ENCOUNTER — OFFICE VISIT (OUTPATIENT)
Dept: FAMILY MEDICINE CLINIC | Age: 57
End: 2022-04-28
Payer: MEDICAID

## 2022-04-28 VITALS
TEMPERATURE: 97.1 F | SYSTOLIC BLOOD PRESSURE: 130 MMHG | OXYGEN SATURATION: 96 % | HEIGHT: 70 IN | BODY MASS INDEX: 41.95 KG/M2 | DIASTOLIC BLOOD PRESSURE: 68 MMHG | WEIGHT: 293 LBS | HEART RATE: 74 BPM

## 2022-04-28 DIAGNOSIS — F33.2 MDD (MAJOR DEPRESSIVE DISORDER), RECURRENT SEVERE, WITHOUT PSYCHOSIS (HCC): ICD-10-CM

## 2022-04-28 DIAGNOSIS — G56.03 BILATERAL CARPAL TUNNEL SYNDROME: Primary | ICD-10-CM

## 2022-04-28 DIAGNOSIS — M17.0 PRIMARY OSTEOARTHRITIS OF BOTH KNEES: ICD-10-CM

## 2022-04-28 DIAGNOSIS — E66.01 MORBID OBESITY (HCC): ICD-10-CM

## 2022-04-28 PROCEDURE — 99213 OFFICE O/P EST LOW 20 MIN: CPT | Performed by: FAMILY MEDICINE

## 2022-04-28 ASSESSMENT — PATIENT HEALTH QUESTIONNAIRE - PHQ9
4. FEELING TIRED OR HAVING LITTLE ENERGY: 1
9. THOUGHTS THAT YOU WOULD BE BETTER OFF DEAD, OR OF HURTING YOURSELF: 0
SUM OF ALL RESPONSES TO PHQ QUESTIONS 1-9: 6
5. POOR APPETITE OR OVEREATING: 0
3. TROUBLE FALLING OR STAYING ASLEEP: 3
7. TROUBLE CONCENTRATING ON THINGS, SUCH AS READING THE NEWSPAPER OR WATCHING TELEVISION: 0
1. LITTLE INTEREST OR PLEASURE IN DOING THINGS: 1
6. FEELING BAD ABOUT YOURSELF - OR THAT YOU ARE A FAILURE OR HAVE LET YOURSELF OR YOUR FAMILY DOWN: 0
SUM OF ALL RESPONSES TO PHQ QUESTIONS 1-9: 6
10. IF YOU CHECKED OFF ANY PROBLEMS, HOW DIFFICULT HAVE THESE PROBLEMS MADE IT FOR YOU TO DO YOUR WORK, TAKE CARE OF THINGS AT HOME, OR GET ALONG WITH OTHER PEOPLE: 0
2. FEELING DOWN, DEPRESSED OR HOPELESS: 1
SUM OF ALL RESPONSES TO PHQ9 QUESTIONS 1 & 2: 2
8. MOVING OR SPEAKING SO SLOWLY THAT OTHER PEOPLE COULD HAVE NOTICED. OR THE OPPOSITE, BEING SO FIGETY OR RESTLESS THAT YOU HAVE BEEN MOVING AROUND A LOT MORE THAN USUAL: 0

## 2022-04-28 NOTE — PROGRESS NOTES
Portions of this chart may have been created with voice recognition software. Occasional wrong-word or \"sound-alike\" substitutions may have occurred due to the inherent limitations of voice recognition software. Read the chart carefully and recognize, using context, where these substitutions have occurred        Chief Complaint     Follow-up               SUBJECTIVE    Katerin Ellis is a 64 y.o. female here for follow-up from her previous office visit. Patient has still had not gotten with the surgeon. Patient states that the 1 that we referred her to is not accepting her insurance. Patient was advised to call her insurance to find in network providers for her and that she will call back. Her depression is currently well controlled she is to continue to see psychiatrist for this. No worsening mood changes noted. Arthritis is also getting a little bit better but still suffers from chronic knee pain and continue to take the medication as prescribed. She is here with following up for her weight she is lost 7 pounds since the last office visit. With the summer coming patient states that she will be more active in her pool and doing water exercises as well to help her with weight loss as well as her knee pain. Recommended continuing the current management      REVIEW OF SYSTEMS:  CONSTITUTIONAL: No weight loss, fever, weakness or fatigue. HEENT:No sore throat, runny nose, earache. No vision or hearing disturbances   CARDIOVASCULAR: No chest pain,No palpitations or edema. RESPIRATORY : No shortness of breath, cough. GASTROINTESTINAL: No nausea, vomiting, diarrhea or constipation. No abdominal pain. GENITOURINARY:No dysuria, urgency, frequency, hematuria. NEUROLOGICAL: No headache, dizziness or syncope. PSYCHIATRIC : No mood changes  SKIN: No rash or itching.       Lab Results   Component Value Date    WBC 5.6 08/24/2021    HGB 13.2 08/24/2021    HCT 39.8 08/24/2021    MCV 89.3 08/24/2021     08/24/2021      Lab Results   Component Value Date    LABA1C 5.4 09/07/2018     Lab Results   Component Value Date    .3 09/07/2018     Lab Results   Component Value Date    TSH 2.41 10/12/2017     Lab Results   Component Value Date    CHOL 188 06/28/2019     Lab Results   Component Value Date    TRIG 104 06/28/2019     Lab Results   Component Value Date    HDL 87 (H) 06/28/2019     Lab Results   Component Value Date    LDLCALC 80 06/28/2019     Lab Results   Component Value Date    LABVLDL 21 06/28/2019     No results found for: [facility]   Lab Results   Component Value Date     08/24/2021    K 4.0 08/24/2021     08/24/2021    CO2 21 08/24/2021    BUN 15 08/24/2021    CREATININE 0.8 08/24/2021    GLUCOSE 109 (H) 08/24/2021    CALCIUM 8.8 08/24/2021    PROT 7.4 08/24/2021    LABALBU 4.0 08/24/2021    BILITOT 0.6 08/24/2021    ALKPHOS 115 08/24/2021    AST 13 (L) 08/24/2021    ALT 9 (L) 08/24/2021    LABGLOM >60 08/24/2021    GFRAA >60 08/24/2021    AGRATIO 1.2 08/24/2021    GLOB 3.4 08/24/2021           Current Outpatient Medications   Medication Sig Dispense Refill    desvenlafaxine succinate (PRISTIQ) 100 MG TB24 extended release tablet Take 2 tablets by mouth daily 180 tablet 0    busPIRone (BUSPAR) 15 MG tablet Take 15 mg by mouth 2 times daily 180 tablet 1    HYDROcodone-acetaminophen (NORCO) 7.5-325 MG per tablet Take 0.5-1 tablets by mouth daily as needed for Pain for up to 30 days.  30 tablet 0    D-Xylitol (XYLAREX PO) Take by mouth      chlorzoxazone (PARAFON FORTE) 500 MG tablet Take 0.5-1 tablets by mouth 3 times daily as needed for Muscle spasms 60 tablet 1    EPINEPHrine (EPIPEN) 0.3 MG/0.3ML SOAJ injection INJECT INTO UPPER THIGH AS DIRECTED AS NEEDED FOR ANAPHYLAXIS      diazePAM (VALIUM) 5 MG tablet TAKE 1 TABLET BY MOUTH NIGHTLY AS NEEDED FOR ANXIETY      metFORMIN (GLUCOPHAGE XR) 500 MG extended release tablet Take 1 tablet by mouth daily (with breakfast) 30 tablet 3  rOPINIRole (REQUIP) 0.5 MG tablet Take 3 tablets by mouth every evening 90 tablet 5    albuterol sulfate  (90 Base) MCG/ACT inhaler INHALE 2 PUFFS INTO LUNGS EVERY 4 HOURS AS NEEDED FOR WHEEZING OR SHORTNESS OF BREATH(SPACE OUT TO EVERY 6 HOURS AS SYMPTOMS IMPROVE) 8.5 g 11    DULERA 200-5 MCG/ACT inhaler INHALE TWO PUFFS BY MOUTH TWICE A DAY 1 Inhaler 11    Handicap Placard MISC by Does not apply route Dx lumbar degenerative disc disease. Effective May 6, 2021. PCP requesting placard with no expiration 1 each 0    flecainide (TAMBOCOR) 50 MG tablet Take 1 tablet by mouth 2 times daily 60 tablet 5    Handicap Placard MISC by Does not apply route Dx of lumbar DDD. Effective Dec 18, 2020 until Dec 18, 2021. 1 each 0    Spacer/Aero-Holding Chambers (AEROCHAMBER MV) MISC Use with inhaler as directed 1 each 0    omalizumab (XOLAIR) 150 MG injection Inject 300 mg into the skin once for 1 dose 1 vial 3     No current facility-administered medications for this visit.        Allergies   Allergen Reactions    Latex Anaphylaxis and Rash    Aspirin      Swelling in lower legs    Demerol      rash    Meperidine     Nsaids Swelling    Pcn [Penicillins] Hives    Ranitidine Hcl     Sulfa Antibiotics Rash and Hives         Past Medical History:   Diagnosis Date    Acute lateral meniscus tear of right knee 02/2019    Acute medial meniscus tear of right knee 02/2019    Anesthesia complication     woke up during one surgery    Anxiety     Arthritis     Asthma     Atrial fibrillation ([address]     new dx 4 weeks ago, first of  Sept 2017    CHF (congestive heart failure) ([address]     Edema     Fibromyalgia     GERD (gastroesophageal reflux disease)     reflux    Hiatal hernia     Idiopathic urticaria 10/29/2020    SVT (supraventricular tachycardia) (HCC)     hx svt         Past Surgical History:   Procedure Laterality Date    ABLATION OF DYSRHYTHMIC FOCUS  04/2019    afib    BLADDER SUSPENSION  2004    HERNIA REPAIR  2001    HYSTERECTOMY  2004    KNEE ARTHROSCOPY Right 7/11/2019    VIDEO ARTHROSCOPY RIGHT KNEE, PARTIAL MEDIAL AND LATERAL MENISCECTOMY,, MEDIAL MICRO FRACTURE CHONDROPLASTY performed by Uzma Sanchez MD at [place] [address] [facility]  2011    TONSILLECTOMY AND ADENOIDECTOMY      UPPP UVULOPALATOPHARYGOPLASTY      VENTRAL HERNIA REPAIR N/A 8/26/2019    DIAGNOSTIC LAPAROSCOPY, LAPAROSCOPIC LYSIS OF ADHESIONS, LAPAROSCOPIC REDUCTION OF RECURRENT INCISIONAL HERNIA WITH MESH performed by Ana Flores MD at [address] [place] [address] History   Problem Relation Age of Onset    Cancer Mother         lung    Arthritis Mother     Cirrhosis Father     Asthma Maternal Aunt         Social History     Socioeconomic History    Marital status:      Spouse name: None    Number of children: 3    Years of education: 15.5    Highest education level: None   Occupational History    None   Tobacco Use    Smoking status: Never Smoker    Smokeless tobacco: Never Used   Vaping Use    Vaping Use: Never used   Substance and Sexual Activity    Alcohol use: Yes     Comment: 0-1 drinks per month    Drug use: Yes     Types: Marijuana Renetta Stuart), Other-see comments     Comment: Medical Marijuana use    Sexual activity: Yes     Partners: Male   Other Topics Concern    None   Social History Narrative    None     Social Determinants of Health     Financial Resource Strain: High Risk    Difficulty of Paying Living Expenses: Hard   Food Insecurity: Food Insecurity Present    Worried About Running Out of Food in the Last Year: Sometimes true    [place] of Food in the Last Year: Sometimes true   Transportation Needs:     Lack of Transportation (Medical): Not on file    Lack of Transportation (Non-Medical):  Not on file   Physical Activity:     Days of Exercise per Week: Not on file    Minutes of Exercise per Session: Not on file   Stress:     Feeling of Stress : Not on file   Social Connections:     Frequency of Communication with Friends and Family: Not on file    Frequency of Social Gatherings with Friends and Family: Not on file    Attends Confucianism Services: Not on file    Active Member of Clubs or Organizations: Not on file    Attends Club or Organization Meetings: Not on file    Marital Status: Not on file   Intimate Partner Violence:     Fear of Current or Ex-Partner: Not on file    Emotionally Abused: Not on file    Physically Abused: Not on file    Sexually Abused: Not on file   Housing Stability:     Unable to Pay for Housing in the Last Year: Not on file    Number of [place] in the Last Year: Not on file    Unstable Housing in the Last Year: Not on file          OBJECTIVE:      Vitals:    04/28/22 1107   BP: 130/68   Pulse: 74   Temp: 97.1 °F (36.2 °C)   TempSrc: Temporal   SpO2: 96%   Weight: (!) 340 lb 9.6 oz (154.5 kg)   Height: 5' 10\" (1.778 m)         Constitutional: Patient appears well-nourished, not in any distress. HENT:  Head: Normocephalic and atraumatic. Eyes: Conjunctivae and EOM are normal  Right Ear: External ear normal.  Left Ear: External ear normal.   Nose: Nose normal.  Neurological:Patient is alert, oriented to person, place, and time. No obvious focal neurological deficits  Skin: Skin is warm and moist.  Psychiatric:Patient has a normal mood and affect, behavior is normal. Judgment and thought content normal.    ASSESSMENT AND PLAN    Rossana Traore was seen today for follow-up. Diagnoses and all orders for this visit:    Bilateral carpal tunnel syndrome    MDD (major depressive disorder), recurrent severe, without psychosis ([address]    Primary osteoarthritis of both knees    Morbid obesity ([address]           DISCHARGE MEDICATION LIST        Medication List          Accurate as of April 28, 2022  3:09 PM. If you have any questions, ask your nurse or doctor.             CONTINUE taking these medications    AeroChamber MV Misc  Use with inhaler as directed     albuterol sulfate  (90 Base) MCG/ACT inhaler  INHALE 2 PUFFS INTO LUNGS EVERY 4 HOURS AS NEEDED FOR WHEEZING OR SHORTNESS OF BREATH(SPACE OUT TO EVERY 6 HOURS AS SYMPTOMS IMPROVE)     busPIRone 15 MG tablet  Commonly known as: BUSPAR  Take 15 mg by mouth 2 times daily     chlorzoxazone 500 MG tablet  Commonly known as: PARAFON FORTE  Take 0.5-1 tablets by mouth 3 times daily as needed for Muscle spasms     desvenlafaxine succinate 100 MG Tb24 extended release tablet  Commonly known as: PRISTIQ  Take 2 tablets by mouth daily     diazePAM 5 MG tablet  Commonly known as: VALIUM     Dulera 200-5 MCG/ACT inhaler  Generic drug: mometasone-formoterol  INHALE TWO PUFFS BY MOUTH TWICE A DAY     EPINEPHrine 0.3 MG/0.3ML Soaj injection  Commonly known as: EPIPEN     flecainide 50 MG tablet  Commonly known as: TAMBOCOR  Take 1 tablet by mouth 2 times daily     Handicap Placard Misc  by Does not apply route Dx of lumbar DDD. Effective Dec 18, 2020 until Dec 18, 2021. Handicap Placard Misc  by Does not apply route Dx lumbar degenerative disc disease. Effective May 6, 2021. PCP requesting placard with no expiration     HYDROcodone-acetaminophen 7.5-325 MG per tablet  Commonly known as: NORCO  Take 0.5-1 tablets by mouth daily as needed for Pain for up to 30 days. metFORMIN 500 MG extended release tablet  Commonly known as: Glucophage XR  Take 1 tablet by mouth daily (with breakfast)     omalizumab 150 MG injection  Commonly known as: XOLAIR  Inject 300 mg into the skin once for 1 dose     rOPINIRole 0.5 MG tablet  Commonly known as: Requip  Take 3 tablets by mouth every evening     XYLAREX PO             Return in about 2 months (around 6/28/2022), or if symptoms worsen or fail to improve. Please refer to diagnosis, orders and patient instructions for further recommendations given. Body mass index is 48.87 kg/m². . Goals of Healthy standard of living discussed.  Emphasis given on appropriate diet and routine regular physical activity with cardio and strength training as much as tolerated advised. All patient's questions and concerns were addressed appropriately. All orders, handouts were reviewed in detail with the patient and patient voiced understanding verbally.

## 2022-05-06 ENCOUNTER — TELEPHONE (OUTPATIENT)
Dept: FAMILY MEDICINE CLINIC | Age: 57
End: 2022-05-06

## 2022-05-06 DIAGNOSIS — G25.81 RLS (RESTLESS LEGS SYNDROME): ICD-10-CM

## 2022-05-06 RX ORDER — ROPINIROLE 0.5 MG/1
1.5 TABLET, FILM COATED ORAL EVERY EVENING
Qty: 90 TABLET | Refills: 5 | Status: SHIPPED | OUTPATIENT
Start: 2022-05-06

## 2022-05-06 RX ORDER — ROPINIROLE 1 MG/1
1.5 TABLET, FILM COATED ORAL 2 TIMES DAILY
Qty: 90 TABLET | Refills: 5 | Status: SHIPPED | OUTPATIENT
Start: 2022-05-06 | End: 2022-08-04

## 2022-05-06 NOTE — TELEPHONE ENCOUNTER
Pt called back and stated that the rx was not updated per conversation at last appt. Pt needs to take the rx below twice a day. Please call the pharmacy and have this corrected. Please call pt when new order is placed.     LOV:4/28/22

## 2022-05-17 DIAGNOSIS — M54.12 CERVICAL RADICULOPATHY: ICD-10-CM

## 2022-05-17 DIAGNOSIS — R52 CHRONIC GENERALIZED PAIN DISORDER: Primary | ICD-10-CM

## 2022-05-17 DIAGNOSIS — G89.29 CHRONIC GENERALIZED PAIN DISORDER: Primary | ICD-10-CM

## 2022-05-17 DIAGNOSIS — R06.02 SOB (SHORTNESS OF BREATH): ICD-10-CM

## 2022-05-17 NOTE — TELEPHONE ENCOUNTER
HYDROcodone-acetaminophen (Kaci Gear) 7.5-325 MG per tablet [[id number]]     [place] [address] [id number] Monique Dumont, [address] [place] [address] [address] [facility] [phone]   [address] [facility] [address]   Phone:  [phone]  Fax:  [phone]    LOV 04/28/2022

## 2022-05-18 ENCOUNTER — TELEMEDICINE (OUTPATIENT)
Dept: PSYCHOLOGY | Age: 57
End: 2022-05-18
Payer: MEDICAID

## 2022-05-18 DIAGNOSIS — F33.1 MAJOR DEPRESSIVE DISORDER, RECURRENT EPISODE, MODERATE WITH ANXIOUS DISTRESS (HCC): Primary | ICD-10-CM

## 2022-05-18 DIAGNOSIS — G89.4 CHRONIC PAIN SYNDROME: ICD-10-CM

## 2022-05-18 PROCEDURE — 90832 PSYTX W PT 30 MINUTES: CPT | Performed by: PSYCHOLOGIST

## 2022-05-18 NOTE — PROGRESS NOTES
Behavioral Health Consultation  Francisco J Pang Psy.D. Psychologist  5/18/2022  9-9:30 AM    Time spent with Patient: 30 minutes  This is patient's seventh [facility] appointment. Reason for Consult: Depression, chronic pain  Referring Provider: Hai Meyers MD  [address] [facility] [address] [facility],First Floor [address]    TELEHEALTH VISIT -- Audio Only (During [id number] public health emergency)  }  Pt provided informed consent for the behavioral health program and participation in telehealth services. Discussed with patient the model of service, including the limits of confidentiality (e.g., abuse reporting, suicide intervention) and the nature of the [facility] approach (e.g., focused, targeted interventions; open communication with PCP). Pt indicated understanding. Feedback given to PCP. Suzy Kumar was evaluated through a synchronous (real-time) audio encounter using HIPAA-compliant technology. The patient (or guardian, if applicable) is aware that this is a billable service, which includes applicable co-pays. This Virtual Visit was conducted with patient's (and/or legal guardian's) consent. The visit was conducted pursuant to the emergency declaration under the [facility], [address] [place] [address] wa[facility] State [facility] authority and the [employer] General Act. Patient identification was verified, and a caregiver was present when appropriate. The patient was located in a state where the provider was licensed to provide care. Consent:  She and/or health care decision maker is aware that that she may receive a bill for this telephone service, depending on her insurance coverage, and has provided verbal consent to proceed: Yes    Conducted a risk-benefit analysis and determined that the patient's presenting problems are consistent with the use of telepsychology.  Determined that the patient has sufficient knowledge and skills in the use of technology enabling them to adequately benefit from telepsychology. It was determined that this patient was able to be properly treated without an in-person session. Patient verified current location at the beginning of the visit. Verified the following information:  Patient's identification: Yes  Patient location: [place] Esteban  Patient's call back number: [phone]  Patient's emergency contact's name and number, as well as permission to contact them if needed:   Extended Emergency Contact Information  Primary Emergency Contact: [facility]  Address: [address]           [place], [address] [facility] [address] [facility] Phone: [phone]  Mobile Phone: [phone]  Relation: Domestic Partner  Secondary Emergency Contact: Meghana Landin [facility] [address] Phone: [phone]  Mobile Phone: [phone]  Relation: Child    [facility] affirm this is an episode with an Established Patient who has not had a related appointment within my department in the past 7 days or scheduled within the next 24 hours. S:  Pt's daughter had premie twins, and pt is currently caring for her other granddaughter as well. Discussed frustration with some family members. Chronic pain remains high. Has passive thoughts of death but denied SI, plan or intent. Grandkids are strong protective factor. O:  Interventions:  -Supportive techniques  -Processed recent stressors and experiences  -Engaged in cognitive reappraisal  -Discussed family dynamics  -Recommended 2-minute daily gratitude practice to improve mood and reduce chronic pain. Pt was in agreement to try this until our next visit. Current QoL is 1 out of 10.        A:  MSE:  Appearance: Not assessed  Attitude: cooperative, friendly and mild distress  Consciousness: alert  Orientation: oriented to person, place, time, general circumstance  Memory: recent and remote memory intact  Attention/Concentration: intact during session  Psychomotor Activity: Not assessed  Eye Contact: Not assessed  Speech: normal rate and volume, well-articulated  Mood: dysphoric, anxious  Affect: congruent  Perception: within normal limits  Thought Content: within normal limits  Thought Process: logical, coherent and goal-directed  Insight: good  Judgment: intact  Ability to understand instructions: Yes  Ability to respond meaningfully: Yes  Morbid Ideation: passive thoughts of death  Suicide Assessment: \"fleeting\" suicidal ideation without plan or intent recently. Appropriate for outpatient / telehealth care at this time. Homicidal Ideation: no    Safety: No imminent risk of danger to self/others based on the factors considered below. Appropriate for outpatient level of care. Safety plan includes: 911, PES, hotlines, and interventions discussed today. Risk factors: Depressed mood, history of suicidal ideation, prior suicide attempt, chronic pain or medical illness  Protective factors: Age >24 and <55, female gender, denies current suicidal ideation, does not have lethal plan, does not have access to guns or weapons, patient is eros for safety, no family h/o suicide, no substance abuse, patient has social or family support, no active psychosis or cognitive dysfunction, already has outpatient services in place, compliant with recommended medications, and patient is future-oriented. PHQ Scores 4/28/2022 12/16/2021 5/10/2017 8/11/2014 7/11/2014 5/7/2014   PHQ2 Score 2 4 0 0 0 0   PHQ9 Score 6 18 0 0 0 0     Interpretation of Total Score Depression Severity: 1-4 = Minimal depression, 5-9 = Mild depression, 10-14 = Moderate depression, 15-19 = Moderately severe depression, 20-27 = Severe depression    Diagnosis:    1. Major depressive disorder, recurrent episode, moderate with anxious distress ([address]    2.  Chronic pain syndrome        Patient Active Problem List   Diagnosis    Asthma    MDD (major depressive disorder), recurrent severe, without psychosis ([address]    Overdose    Restless leg syndrome    Sliding hiatal hernia    HTN (hypertension)    Atrial flutter (HCC)    SVT (supraventricular tachycardia) (HCC)    Paroxysmal atrial fibrillation (HCC)    Sinus bradycardia    Acute lateral meniscus tear of right knee    Acute medial meniscus tear of right knee    Localized edema    Acute pain of right knee    Chronic diastolic congestive heart failure (HCC)    Primary osteoarthritis of right knee    Contusion of multiple sites of right leg    Gastrocnemius strain, left    Ventral incisional hernia    Recurrent incisional hernia    Pain of upper abdomen    Incisional hernia with obstruction, without gangrene    Rhinitis, chronic    Vocal cord dysfunction    Iron deficiency anemia, unspecified    Idiopathic urticaria    Carpal tunnel syndrome, bilateral    Arthritis of carpometacarpal (CMC) joint of left thumb    Neck pain         Plan:  Pt interventions:  Supportive techniques, Provided Psychoeducation re: see above, Emphasized self-care as important for managing overall health, Cognitive strategies to target balanced thinking and Completed risk evaluation. Pt Behavioral Change Plan:  Pt set the following goals:  1. Read The Sleep Solution by Ruth Valle MD  2. Schedule an appointment with Carlie Boxer, psychiatric NP, to discuss medication options  3. Learn more about self-compassion at www.self-compassion.org. Watch the video on self-compassion vs self-esteem. Pt scheduled a F/U virtual visit.

## 2022-05-18 NOTE — TELEPHONE ENCOUNTER
Pt called again wanting to know if her rxs have been sent to get filled. She states that she is in a lot of pain.

## 2022-05-18 NOTE — PATIENT INSTRUCTIONS
Goals: 1. Read The Sleep Solution by Tho Soto MD  2. Schedule an appointment with Leonor Rubinstein, psychiatric NP, to discuss medication options  3. Learn more about self-compassion at www.self-compassion.org. Watch the video on self-compassion vs self-esteem.

## 2022-05-19 RX ORDER — ALBUTEROL SULFATE 90 UG/1
2 AEROSOL, METERED RESPIRATORY (INHALATION) EVERY 4 HOURS PRN
Qty: 8.5 G | Refills: 11 | Status: SHIPPED | OUTPATIENT
Start: 2022-05-19 | End: 2022-08-17

## 2022-05-19 RX ORDER — HYDROCODONE BITARTRATE AND ACETAMINOPHEN 7.5; 325 MG/1; MG/1
1 TABLET ORAL DAILY PRN
Qty: 30 TABLET | Refills: 0 | Status: SHIPPED | OUTPATIENT
Start: 2022-05-19 | End: 2022-06-24 | Stop reason: SDUPTHER

## 2022-05-19 RX ORDER — CHLORZOXAZONE 500 MG/1
250-500 TABLET ORAL 3 TIMES DAILY PRN
Qty: 60 TABLET | Refills: 1 | Status: SHIPPED | OUTPATIENT
Start: 2022-05-19 | End: 2022-06-24 | Stop reason: SDUPTHER

## 2022-05-20 RX ORDER — ALBUTEROL SULFATE 90 UG/1
AEROSOL, METERED RESPIRATORY (INHALATION)
Qty: 8.5 G | Refills: 11 | OUTPATIENT
Start: 2022-05-20

## 2022-05-25 DIAGNOSIS — I10 PRIMARY HYPERTENSION: ICD-10-CM

## 2022-05-25 LAB
A/G RATIO: 1.8 (ref 1.1–2.2)
ALBUMIN SERPL-MCNC: 4.7 G/DL (ref 3.4–5)
ALP BLD-CCNC: 121 U/L (ref 40–129)
ALT SERPL-CCNC: 12 U/L (ref 10–40)
ANION GAP SERPL CALCULATED.3IONS-SCNC: 13 MMOL/L (ref 3–16)
AST SERPL-CCNC: 20 U/L (ref 15–37)
BILIRUB SERPL-MCNC: 0.4 MG/DL (ref 0–1)
BUN BLDV-MCNC: 13 MG/DL (ref 7–20)
CALCIUM SERPL-MCNC: 9.3 MG/DL (ref 8.3–10.6)
CHLORIDE BLD-SCNC: 99 MMOL/L (ref 99–110)
CHOLESTEROL, TOTAL: 194 MG/DL (ref 0–199)
CO2: 25 MMOL/L (ref 21–32)
CREAT SERPL-MCNC: 0.7 MG/DL (ref 0.6–1.1)
FERRITIN: 28 NG/ML (ref 15–150)
GFR AFRICAN AMERICAN: >60
GFR NON-AFRICAN AMERICAN: >60
GLUCOSE BLD-MCNC: 93 MG/DL (ref 70–99)
HDLC SERPL-MCNC: 82 MG/DL (ref 40–60)
IRON SATURATION: 11 % (ref 15–50)
IRON: 43 UG/DL (ref 37–145)
LDL CHOLESTEROL CALCULATED: 96 MG/DL
POTASSIUM SERPL-SCNC: 5.9 MMOL/L (ref 3.5–5.1)
SODIUM BLD-SCNC: 137 MMOL/L (ref 136–145)
TOTAL IRON BINDING CAPACITY: 375 UG/DL (ref 260–445)
TOTAL PROTEIN: 7.3 G/DL (ref 6.4–8.2)
TRIGL SERPL-MCNC: 81 MG/DL (ref 0–150)
TSH SERPL DL<=0.05 MIU/L-ACNC: 2.97 UIU/ML (ref 0.27–4.2)
VLDLC SERPL CALC-MCNC: 16 MG/DL

## 2022-05-26 ENCOUNTER — TELEPHONE (OUTPATIENT)
Dept: CARDIOLOGY CLINIC | Age: 57
End: 2022-05-26

## 2022-05-26 ENCOUNTER — APPOINTMENT (OUTPATIENT)
Dept: GENERAL RADIOLOGY | Age: 57
End: 2022-05-26
Payer: MEDICAID

## 2022-05-26 ENCOUNTER — HOSPITAL ENCOUNTER (EMERGENCY)
Age: 57
Discharge: HOME | End: 2022-05-26
Attending: EMERGENCY MEDICINE
Payer: MEDICAID

## 2022-05-26 VITALS
OXYGEN SATURATION: 94 % | DIASTOLIC BLOOD PRESSURE: 83 MMHG | BODY MASS INDEX: 41.95 KG/M2 | HEIGHT: 70 IN | TEMPERATURE: 98.7 F | HEART RATE: 75 BPM | WEIGHT: 293 LBS | SYSTOLIC BLOOD PRESSURE: 152 MMHG | RESPIRATION RATE: 16 BRPM

## 2022-05-26 DIAGNOSIS — Z01.89 ENCOUNTER FOR LABORATORY TEST: ICD-10-CM

## 2022-05-26 DIAGNOSIS — R53.83 OTHER FATIGUE: Primary | ICD-10-CM

## 2022-05-26 LAB
BASOPHILS ABSOLUTE: 0.1 K/UL (ref 0–0.2)
BASOPHILS RELATIVE PERCENT: 1.8 %
EKG ATRIAL RATE: 70 BPM
EKG DIAGNOSIS: NORMAL
EKG P AXIS: 19 DEGREES
EKG P-R INTERVAL: 202 MS
EKG Q-T INTERVAL: 392 MS
EKG QRS DURATION: 84 MS
EKG QTC CALCULATION (BAZETT): 423 MS
EKG R AXIS: 25 DEGREES
EKG T AXIS: 62 DEGREES
EKG VENTRICULAR RATE: 70 BPM
EOSINOPHILS ABSOLUTE: 0.2 K/UL (ref 0–0.6)
EOSINOPHILS RELATIVE PERCENT: 3.7 %
HCT VFR BLD CALC: 43.3 % (ref 36–48)
HEMOGLOBIN: 13.9 G/DL (ref 12–16)
LYMPHOCYTES ABSOLUTE: 1.6 K/UL (ref 1–5.1)
LYMPHOCYTES RELATIVE PERCENT: 26.1 %
MCH RBC QN AUTO: 29.5 PG (ref 26–34)
MCHC RBC AUTO-ENTMCNC: 32.1 G/DL (ref 31–36)
MCV RBC AUTO: 91.7 FL (ref 80–100)
MONOCYTES ABSOLUTE: 0.5 K/UL (ref 0–1.3)
MONOCYTES RELATIVE PERCENT: 7.7 %
NEUTROPHILS ABSOLUTE: 3.8 K/UL (ref 1.7–7.7)
NEUTROPHILS RELATIVE PERCENT: 60.7 %
PDW BLD-RTO: 14.6 % (ref 12.4–15.4)
PLATELET # BLD: 278 K/UL (ref 135–450)
PMV BLD AUTO: 10 FL (ref 5–10.5)
POTASSIUM SERPL-SCNC: 4.6 MMOL/L (ref 3.5–5.1)
RBC # BLD: 4.72 M/UL (ref 4–5.2)
WBC # BLD: 6.2 K/UL (ref 4–11)

## 2022-05-26 PROCEDURE — 71046 X-RAY EXAM CHEST 2 VIEWS: CPT

## 2022-05-26 PROCEDURE — 99285 EMERGENCY DEPT VISIT HI MDM: CPT

## 2022-05-26 PROCEDURE — 85025 COMPLETE CBC W/AUTO DIFF WBC: CPT

## 2022-05-26 PROCEDURE — 36415 COLL VENOUS BLD VENIPUNCTURE: CPT

## 2022-05-26 PROCEDURE — 84132 ASSAY OF SERUM POTASSIUM: CPT

## 2022-05-26 PROCEDURE — 93005 ELECTROCARDIOGRAM TRACING: CPT | Performed by: EMERGENCY MEDICINE

## 2022-05-26 RX ORDER — PANTOPRAZOLE SODIUM 40 MG/1
TABLET, DELAYED RELEASE ORAL
COMMUNITY
Start: 2022-04-29

## 2022-05-26 RX ORDER — FAMOTIDINE 40 MG/1
TABLET, FILM COATED ORAL
COMMUNITY
Start: 2022-04-29

## 2022-05-26 ASSESSMENT — PAIN DESCRIPTION - DESCRIPTORS: DESCRIPTORS: ACHING

## 2022-05-26 ASSESSMENT — ENCOUNTER SYMPTOMS
NAUSEA: 0
ABDOMINAL PAIN: 0
COUGH: 0
DIARRHEA: 0
SHORTNESS OF BREATH: 1
VOMITING: 0

## 2022-05-26 ASSESSMENT — PAIN DESCRIPTION - LOCATION: LOCATION: GENERALIZED

## 2022-05-26 ASSESSMENT — LIFESTYLE VARIABLES: HOW OFTEN DO YOU HAVE A DRINK CONTAINING ALCOHOL: NEVER

## 2022-05-26 ASSESSMENT — PAIN DESCRIPTION - PAIN TYPE: TYPE: CHRONIC PAIN

## 2022-05-26 ASSESSMENT — PAIN SCALES - GENERAL: PAINLEVEL_OUTOF10: 4

## 2022-05-26 ASSESSMENT — PAIN - FUNCTIONAL ASSESSMENT: PAIN_FUNCTIONAL_ASSESSMENT: 0-10

## 2022-05-26 ASSESSMENT — PAIN DESCRIPTION - FREQUENCY: FREQUENCY: CONTINUOUS

## 2022-05-26 NOTE — ED TRIAGE NOTES
Pt has been feeling weak, fatigued and SOB for a couple weeks. Pt had blood work done by PCP and had a potassium of 5.9. Physician told her to come to ER for evaluation. Pt states this is how she usually feels when her \"iron and hemoglobin are off\".

## 2022-05-26 NOTE — ED PROVIDER NOTES
[address]          ATTENDING PHYSICIAN NOTE       Date of evaluation: 5/26/2022    Chief Complaint     Abnormal Lab (Pt has been feeling weak, fatigued and SOB for a couple weeks. Pt had blood work done by PCP and had a potassium of 5.9. Physician told her to come to ER for evaluation. Pt states this is how she usually feels when her \"iron and hemoglobin are off\". ) and Fatigue      History of Present Illness     Mike Hanna is a 64 y.o. female who presents for recheck of a potassium level that came back as 5.9 on outpatient lab testing done yesterday. It was recommended that she come to the emergency department to get this officially checked. The specimen was hemolyzed. The patient does complain of some fatigue that has been going on for quite some time and she in the past has been anemic and required iron for similar symptoms. She feels a little bit short of breath as well. The symptoms are not really new. She is not reporting any chest pain or abdominal pain and has not had any abnormal bleeding. Review of Systems     Review of Systems   Constitutional: Positive for fatigue. Negative for chills and fever. Respiratory: Positive for shortness of breath. Negative for cough. Cardiovascular: Negative for chest pain. Gastrointestinal: Negative for abdominal pain, diarrhea, nausea and vomiting. Neurological: Positive for weakness and light-headedness. Negative for syncope. All other systems reviewed and are negative.       Past Medical, Surgical, Family, and Social History     She has a past medical history of Acute lateral meniscus tear of right knee, Acute medial meniscus tear of right knee, Anesthesia complication, Anxiety, Arthritis, Asthma, Atrial fibrillation ([address] CHF (congestive heart failure) ([address] Edema, Fibromyalgia, GERD (gastroesophageal reflux disease), Hiatal hernia, Idiopathic urticaria, and SVT (supraventricular tachycardia) [facility]).  She has a past surgical history that includes hernia repair (2001); Hysterectomy (2004); Tonsillectomy and adenoidectomy; Romie-en-Y Gastric Bypass (2011); UPPP; ovarian cyst removal; ablation of dysrhythmic focus (04/2019); Knee arthroscopy (Right, 7/11/2019); bladder suspension (2004); and ventral hernia repair (N/A, 8/26/2019). Her family history includes Arthritis in her mother; Asthma in her maternal aunt; Cancer in her mother; Cirrhosis in her father. She reports that she has never smoked. She has never used smokeless tobacco. She reports current alcohol use. She reports current drug use. Drugs: Marijuana (Weed) and [employer] comments. Medications     Discharge Medication List as of 5/26/2022  5:14 PM      CONTINUE these medications which have NOT CHANGED    Details   famotidine (PEPCID) 40 MG tablet TAKE 1 TABLET BY MOUTH AT BEDTIMEHistorical Med      pantoprazole (PROTONIX) 40 MG tablet TAKE 1 TABLET BY MOUTH DAILYHistorical Med      albuterol sulfate  (90 Base) MCG/ACT inhaler Inhale 2 puffs into the lungs every 4 hours as needed for Wheezing or Shortness of Breath, Disp-8.5 g, R-11Normal      chlorzoxazone (PARAFON FORTE) 500 MG tablet Take 0.5-1 tablets by mouth 3 times daily as needed for Muscle spasms, Disp-60 tablet, R-1Normal      HYDROcodone-acetaminophen (NORCO) 7.5-325 MG per tablet Take 1 tablet by mouth daily as needed for Pain for up to 30 days.  Intended supply: 30 days, Disp-30 tablet, R-0Normal      !! rOPINIRole (REQUIP) 0.5 MG tablet Take 3 tablets by mouth every evening, Disp-90 tablet, R-5Normal      !! rOPINIRole (REQUIP) 1 MG tablet Take 1.5 tablets by mouth in the morning and at bedtime, Disp-90 tablet, R-5Normal      desvenlafaxine succinate (PRISTIQ) 100 MG TB24 extended release tablet Take 2 tablets by mouth daily, Disp-180 tablet, R-0Normal      busPIRone (BUSPAR) 15 MG tablet Take 15 mg by mouth 2 times daily, Disp-180 tablet, R-1Normal      D-Xylitol Richi Pleasant PO) Take by mouthHistorical Med      EPINEPHrine (EPIPEN) 0.3 MG/0.3ML SOAJ injection INJECT INTO UPPER THIGH AS DIRECTED AS NEEDED FOR ANAPHYLAXISHistorical Med      diazePAM (VALIUM) 5 MG tablet TAKE 1 TABLET BY MOUTH NIGHTLY AS NEEDED FOR ANXIETYHistorical Med      metFORMIN (GLUCOPHAGE XR) 500 MG extended release tablet Take 1 tablet by mouth daily (with breakfast), Disp-30 tablet, R-3Normal      DULERA 200-5 MCG/ACT inhaler INHALE TWO PUFFS BY MOUTH TWICE A DAY, Disp-1 Inhaler, R-11Normal      !! Handicap Placard MISC Starting Thu 5/6/2021, Disp-1 each, R-0, PrintDx lumbar degenerative disc disease. Effective May 6, 2021. PCP requesting placard with no expiration      flecainide (TAMBOCOR) 50 MG tablet Take 1 tablet by mouth 2 times daily, Disp-60 tablet, R-5Normal      !! Handicap Placard MISC Starting Fri 12/18/2020, Disp-1 each, R-0, PrintDx of lumbar DDD. Effective Dec 18, 2020 until Dec 18, 2021. omalizumab (XOLAIR) 150 MG injection Inject 300 mg into the skin once for 1 dose, Disp-1 vial, R-3Print      Spacer/Aero-Holding Chambers (AEROCHAMBER MV) MISC Disp-1 each, R-0, NormalUse with inhaler as directed       !! - Potential duplicate medications found. Please discuss with provider. Allergies     She is allergic to latex, aspirin, demerol, meperidine, nsaids, pcn [penicillins], ranitidine hcl, and sulfa antibiotics. Physical Exam     INITIAL VITALS: BP: (!) 152/83, Temp: 98.7 °F (37.1 °C), Heart Rate: 75, Resp: 16, SpO2: 94 %   Physical Exam  Vitals and nursing note reviewed. Constitutional:       General: She is not in acute distress. Appearance: She is well-developed. She is not diaphoretic. Cardiovascular:      Rate and Rhythm: Normal rate and regular rhythm. Pulmonary:      Effort: Pulmonary effort is normal.      Breath sounds: Normal breath sounds. Abdominal:      General: Bowel sounds are normal. There is no distension. Palpations: Abdomen is soft. Tenderness: There is no abdominal tenderness. Musculoskeletal:      Cervical back: Normal range of motion. Skin:     General: Skin is warm and dry. Neurological:      Mental Status: She is alert and oriented to person, place, and time. Psychiatric:         Behavior: Behavior normal.         Diagnostic Results     EKG   Interpreted by No att. providers found     Rhythm: normal sinus   Rate: normal  Axis: normal  Ectopy: none  Conduction: normal  ST Segments: no acute change  T Waves: no acute change  Q Waves: none    Clinical Impression: normal sinus rhythm with no acute changes/normal EKG      RADIOLOGY:  XR CHEST (2 VW)   Final Result    No acute cardiopulmonary abnormality          LABS:   Results for orders placed or performed during the hospital encounter of 05/26/22   CBC with Auto Differential   Result Value Ref Range    WBC 6.2 4.0 - 11.0 K/uL    RBC 4.72 4.00 - 5.20 M/uL    Hemoglobin 13.9 12.0 - 16.0 g/dL    Hematocrit 43.3 36.0 - 48.0 %    MCV 91.7 80.0 - 100.0 fL    MCH 29.5 26.0 - 34.0 pg    MCHC 32.1 31.0 - 36.0 g/dL    RDW 14.6 12.4 - 15.4 %    Platelets 297 325 - 068 K/uL    MPV 10.0 5.0 - 10.5 fL    Neutrophils % 60.7 %    Lymphocytes % 26.1 %    Monocytes % 7.7 %    Eosinophils % 3.7 %    Basophils % 1.8 %    Neutrophils Absolute 3.8 1.7 - 7.7 K/uL    Lymphocytes Absolute 1.6 1.0 - 5.1 K/uL    Monocytes Absolute 0.5 0.0 - 1.3 K/uL    Eosinophils Absolute 0.2 0.0 - 0.6 K/uL    Basophils Absolute 0.1 0.0 - 0.2 K/uL   Potassium   Result Value Ref Range    Potassium 4.6 3.5 - 5.1 mmol/L   EKG 12 Lead   Result Value Ref Range    Ventricular Rate 70 BPM    Atrial Rate 70 BPM    P-R Interval 202 ms    QRS Duration 84 ms    Q-T Interval 392 ms    QTc Calculation (Bazett) 423 ms    P Axis 19 degrees    R Axis 25 degrees    T Axis 62 degrees    Diagnosis       EKG performed in ER and to be interpreted by ER physician. Confirmed by MD, ER ([id number]),  Alfa amarissameer, [address] [place] [address] ([id number]) on 5/26/2022 2:36:04 PM RECENT VITALS:  BP: (!) 152/83,Temp: 98.7 °F (37.1 °C), Heart Rate: 75, Resp: 16, SpO2: 94 %       ED Course     Nursing Notes, Past Medical Hx, Past Surgical Hx, Social Hx,Allergies, and Family Hx were reviewed. patient was given the following medications:  No orders of the defined types were placed in this encounter. CONSULTS:  None    MEDICAL DECISIONMAKING / ASSESSMENT / Enma Perez is a 64 y.o. female presenting for recheck of abnormal labs. Her repeat potassium here was normal and she is not anemic. Her fatigue and other symptoms may be more chronic and it was not felt that there was any further work-up necessary for these here. X-ray was clear as well and EKG was unremarkable. Patient will be discharged and asked to follow-up with her primary care physician as needed. Clinical Impression     1. Other fatigue    2.  Encounter for laboratory test        Disposition     PATIENT REFERRED TO:  Michael Dewitt MD  [address] [facility],[address] [place] [address]  [phone]      if not improving      DISCHARGE MEDICATIONS:  Discharge Medication List as of 5/26/2022  5:14 PM          DISPOSITION Decision To Discharge 05/26/2022 05:09:28 PM       Dennie Fowler, MD  05/26/22 [id number]

## 2022-05-26 NOTE — TELEPHONE ENCOUNTER
Patients potassium is high her pcp wants her to go to the er. Patient wants to know what the heart doctor wants her to do. Patient is feeling dizzy and sob. Pt # [id number]   I suggested she go to the er.

## 2022-05-26 NOTE — TELEPHONE ENCOUNTER
Spoke w/pt, states she feels weak, and SOB 'all the time'. States GI doctor told her 'the potassium level could be a false positive'. Last OV w/EP ~ 3/2021. Advised pt to follow PCP advice and go to nearest ER for eval. Verb understanding.

## 2022-06-24 DIAGNOSIS — M54.12 CERVICAL RADICULOPATHY: ICD-10-CM

## 2022-06-24 DIAGNOSIS — R52 CHRONIC GENERALIZED PAIN DISORDER: ICD-10-CM

## 2022-06-24 DIAGNOSIS — G89.29 CHRONIC GENERALIZED PAIN DISORDER: ICD-10-CM

## 2022-06-24 RX ORDER — HYDROCODONE BITARTRATE AND ACETAMINOPHEN 7.5; 325 MG/1; MG/1
1 TABLET ORAL DAILY PRN
Qty: 30 TABLET | Refills: 0 | Status: SHIPPED | OUTPATIENT
Start: 2022-06-24 | End: 2022-07-24

## 2022-06-24 RX ORDER — CHLORZOXAZONE 500 MG/1
250-500 TABLET ORAL 3 TIMES DAILY PRN
Qty: 60 TABLET | Refills: 1 | Status: SHIPPED | OUTPATIENT
Start: 2022-06-24 | End: 2022-08-26 | Stop reason: ALTCHOICE

## 2022-06-24 NOTE — TELEPHONE ENCOUNTER
Pt states she takes 2 hydrocodones a day due to pain. Pt wants to know can she get her quantity changed.       LOV 4/28/22    FOV 7/6/22

## 2022-06-27 NOTE — TELEPHONE ENCOUNTER
PT called in stating she was on 'some sort of pain medication possibly Vicodin\" - went off it because she got a medical marijuana card and that was working wonders. States she no longer has insurance to cover it and it is \"god awful expensive and does not have that sort of money right now. \"    Would like someone to \"call in that one pain medication\" she was on. I tried to confirm if it was norco and pt states she \"is unsure just that she knows it is a pain medication and thinks Vicodin sounds the most familiar. \"    Please advise, thank you! Trauma/ Subdural/ Intubated

## 2022-07-12 ENCOUNTER — TELEMEDICINE (OUTPATIENT)
Dept: PSYCHIATRY | Age: 57
End: 2022-07-12
Payer: MEDICAID

## 2022-07-12 DIAGNOSIS — F33.2 MDD (MAJOR DEPRESSIVE DISORDER), RECURRENT SEVERE, WITHOUT PSYCHOSIS (HCC): ICD-10-CM

## 2022-07-12 DIAGNOSIS — F41.1 GAD (GENERALIZED ANXIETY DISORDER): Primary | ICD-10-CM

## 2022-07-12 DIAGNOSIS — F51.01 PRIMARY INSOMNIA: ICD-10-CM

## 2022-07-12 PROCEDURE — 1036F TOBACCO NON-USER: CPT | Performed by: REGISTERED NURSE

## 2022-07-12 PROCEDURE — 3017F COLORECTAL CA SCREEN DOC REV: CPT | Performed by: REGISTERED NURSE

## 2022-07-12 PROCEDURE — G8427 DOCREV CUR MEDS BY ELIG CLIN: HCPCS | Performed by: REGISTERED NURSE

## 2022-07-12 PROCEDURE — G8417 CALC BMI ABV UP PARAM F/U: HCPCS | Performed by: REGISTERED NURSE

## 2022-07-12 PROCEDURE — 99214 OFFICE O/P EST MOD 30 MIN: CPT | Performed by: REGISTERED NURSE

## 2022-07-12 RX ORDER — DESVENLAFAXINE 100 MG/1
300 TABLET, EXTENDED RELEASE ORAL DAILY
Qty: 270 TABLET | Refills: 0 | Status: SHIPPED | OUTPATIENT
Start: 2022-07-12 | End: 2022-08-26

## 2022-07-12 RX ORDER — BUSPIRONE HYDROCHLORIDE 15 MG/1
15 TABLET ORAL 3 TIMES DAILY
Qty: 270 TABLET | Refills: 0 | Status: SHIPPED | OUTPATIENT
Start: 2022-07-12 | End: 2022-08-26

## 2022-07-12 NOTE — PROGRESS NOTES
Jami Gutiererz (:  1965) is a 64 y.o. female,Established patient, here for evaluation of the following chief complaint(s): Anxiety and Depression    Diagnosis:  1. JACEK (generalized anxiety disorder)  2. MDD (major depressive disorder), recurrent severe, without psychosis ([facility]  3. Primary insomnia    ASSESSMENT/PLAN:    1. Depression, Anxiety and Insomnia  - ^ Pristiq  mg/daily > 300 mg/daily due to worsening depression.   - ^  Buspar 15 mg twice a day> three times a day.   -continue Requip 1.5 mg/ nightly. ( Rx by previous PCP)- patient states it helps to sleep.   - medication R/B/SE discussed and patient consents for treatment. 2. Psychotherapy  - Follow with Dr Jolie Stokes PsyD  3. Medical   - follow PCP  4. RTC in 3 months or earlier if your symptoms fail to improve or go to nearest ER if having active SI/HI. Evaluated medications and assessed for side effects and effectiveness. Assessed patient's educational needs including reviewing plan of care, medications, and diagnosis. I reviewed the plan of care with the patient and the patient consented to the treatment interventions. Patient acknowledged, verbalized understanding, and agreed with plan of care     Interval Hx:  Ms Tracy Barragan is seen via Video for anxiety, depression, and Insomnia. Her depression and anxiety has been worsened. Per care every note \" admitted after attempted suicide overdose following ingestion of Requip and chlorzoxazone tablets. She was lethargic on arrival to the emergency room. Poison control was notified and she was admitted under suicide precautions-24-hour monitoring postingestion of tablets was recommended. She did not develop any dyskinesia or akathisia or ventricular arrhythmias. She was monitored on the floor, has been refusing all medical care, but has been clinically stable, and is on a 72-hour hold per psychiatry recommendations, and will be transferred to [facility] psychiatry unit.  \" Per [facility] 0.3 MG/0.3ML SOAJ injection INJECT INTO UPPER THIGH AS DIRECTED AS NEEDED FOR ANAPHYLAXIS 3/8/22  Yes Historical Provider, MD   diazePAM (VALIUM) 5 MG tablet TAKE 1 TABLET BY MOUTH NIGHTLY AS NEEDED FOR ANXIETY 3/17/22  Yes Historical Provider, MD   metFORMIN (GLUCOPHAGE XR) 500 MG extended release tablet Take 1 tablet by mouth daily (with breakfast) 3/10/22  Yes Xiomara Beam Washingtonng, DO   DULERA 200-5 MCG/ACT inhaler INHALE TWO PUFFS BY MOUTH TWICE A DAY 5/29/21  Yes Cyrus Whitt MD   Handicap Placard MISC by Does not apply route Dx lumbar degenerative disc disease. Effective May 6, 2021. PCP requesting placard with no expiration 5/6/21  Yes Xiomara Beam Mary, DO   flecainide (TAMBOCOR) 50 MG tablet Take 1 tablet by mouth 2 times daily 3/23/21  Yes Tim Shaikh APRN - CNP   Handicap Placard MISC by Does not apply route Dx of lumbar DDD. Effective Dec 18, 2020 until Dec 18, 2021. 12/18/20  Yes Vibha Forte DO   Spacer/Aero-Holding Chambers (AEROCHAMBER MV) MISC Use with inhaler as directed 3/6/20  Yes Chapincito Tillman MD   omalizumab Eppie Orts) 150 MG injection Inject 300 mg into the skin once for 1 dose 11/6/20 4/1/22  Mali Darling MD        Past psych Medication Trials:   Psychiatric Exam         Attention and Perception: attentive        Mood and Affect: calm         Speech: normal       Behavior: calm and cooperative        Cognition and Memory: normal     Judgment: intact    Safety plan  Discussed and informed patient to call 911 or go to nearest emergency room if patient experiences SI/HI immediately. In addition, Patient educated to use the National Suicide Hotlines: 8-340-937-TALK (6-615.773.5451) and 3-535-GHDPMER (3-894.109.1700) and Local psychiatric Emergency Services given to patient during the virtual visit. Lu Saenz, was evaluated through a synchronous (real-time) audio-video encounter.  The patient (or guardian if applicable) is aware that this is a billable service, which includes applicable co-pays. This Virtual Visit was conducted with patient's (and/or legal guardian's) consent. The visit was conducted pursuant to the emergency declaration under the [address] [place] [address] authority and the [employer]ere and [employer] General Act. Patient identification was verified, and a caregiver was present when appropriate. The patient was located at Home: [address] [place] [phone]. Provider was located at [facility] ([address] [place] [address]): [id number] [place] [address]. [address] [place] [address],  [facility]. Total time spent for this encounter: Not billed by time    --STEPHANIE Hernandez CNP on 7/12/2022 at 4:20 PM    An electronic signature was used to authenticate this note.

## 2022-07-12 NOTE — PATIENT INSTRUCTIONS
Anti-depressant drugs:  This class of drugs can cause sedation, blurred vision, dry-mouth, constipation, postural hypotension, urinary retention, tachycardia, muscle tremors, agitation, headache, skin rash, photo sensitivity, excessive weight gain, glaucoma, heart disease, lowering seizure threshold, increase risk of suicidal thinking or ideations, excessive sweating, sextual dysfunction, insomnia, anxiety, bruxism, GI bleeding, pregnancy complications and birth defects, possible death, etc.

## 2022-07-14 ENCOUNTER — TELEMEDICINE (OUTPATIENT)
Dept: PSYCHOLOGY | Age: 57
End: 2022-07-14
Payer: MEDICAID

## 2022-07-14 DIAGNOSIS — F33.1 MAJOR DEPRESSIVE DISORDER, RECURRENT EPISODE, MODERATE WITH ANXIOUS DISTRESS (HCC): Primary | ICD-10-CM

## 2022-07-14 DIAGNOSIS — G89.4 CHRONIC PAIN SYNDROME: ICD-10-CM

## 2022-07-14 PROCEDURE — 90832 PSYTX W PT 30 MINUTES: CPT | Performed by: PSYCHOLOGIST

## 2022-07-14 NOTE — PATIENT INSTRUCTIONS
Goals: 1. Read The Sleep Solution by Bravo Ruth MD  2. Schedule an appointment with Ying Morales, psychiatric NP, to discuss medication options  3. Learn more about self-compassion at www.self-compassion.org. Watch the video on self-compassion vs self-esteem.

## 2022-07-14 NOTE — PROGRESS NOTES
Behavioral Health Consultation  Collin Andrews Psy.D. Psychologist  7/14/2022  10:30-11 AM    Time spent with Patient: 30 minutes  This is [place] [facility] [place]. Reason for Consult: Depression, chronic pain  Referring Provider: Erendira Sam MD  [address] [facility] [address] [facility],First Floor [address]    TELEHEALTH VISIT -- Audio/Visual (During ESI-95 public health emergency)  }  Indy White was evaluated through a synchronous (real-time) audio-video encounter using HIPAA-compliant technology. The patient (or guardian, if applicable) is aware that this is a billable service, which includes applicable co-pays. This Virtual Visit was conducted with patient's (and/or legal guardian's) consent. The visit was conducted pursuant to the emergency declaration under the [address] [place] [address], [address] [place] [address] authority and the [employer] General Act. Patient identification was verified, and a caregiver was present when appropriate. The patient was located in a state where the provider was licensed to provide care. Conducted a risk-benefit analysis and determined that the patient's presenting problems are consistent with the use of telepsychology. Determined that the patient has sufficient knowledge and skills in the use of technology enabling them to adequately benefit from telepsychology. It was determined that this patient was able to be properly treated without an in-person session. Patient verified current location at the beginning of the visit.     Verified the following information:  Patient's identification: Yes  Patient location: [address] [place] [id number]  Patient's call back number: [phone]  Patient's emergency contact's name and number, as well as permission to contact them if needed:  Extended Emergency Contact Information  Primary Emergency Contact: [facility]  Address: [address] [place] [address] [address] [place] [address] Street of [address] [place] [address] Phone: [phone]  Mobile Phone: [phone]  Relation: Domestic Partner  Secondary Emergency Contact: [place] of [address] [place] [address] Phone: [phone]  Mobile Phone: [phone]  Relation: Child    Provider location: [facility]:  Pt shared about the events that led to her recent suicide attempt. Described conflict with her , which was contributing to pt feeling more anxious and revved up. Pt took her muscle relaxants in an effort to end her life. Pt was hospitalized on a psychiatric hold. She had a horrible experience, did not find it therapeutic at all. Pt's pain remains high. She is not feeling well. Can't get proper sleep d/t pain. Feeling stuck with her medical care. Worried about her brother, who was stressed living in the household so he is now living in his car.     O:  Interventions:  -Supportive techniques  -Processed recent stressors and experiences  -Engaged in parts work with her part that holds physical pain  -Conducted risk assessment. SI continues to arise but she denied plan or intent. Appropriate for outpatient / telehealth care at this time.  Discussed safety plan, including reaching out for additional support (e.g., national hotline, this writer and Rod Ward, DNP, PMHNP-BC, CNP)      A:  MSE:  Appearance: good hygiene  and appropriate attire  Attitude: cooperative, friendly and mild distress  Consciousness: alert  Orientation: oriented to person, place, time, general circumstance  Memory: recent and remote memory intact  Attention/Concentration: intact during session  Psychomotor Activity: normal  Eye Contact: normal  Speech: normal rate and volume, well-articulated  Mood: dysphoric, anxious  Affect: congruent  Perception: within normal limits  Thought Content: within normal limits  Thought Process: logical, coherent and goal-directed  Insight: good  Judgment: intact  Ability to understand instructions: pain of right knee    Chronic diastolic congestive heart failure (HCC)    Primary osteoarthritis of right knee    Contusion of multiple sites of right leg    Gastrocnemius strain, left    Ventral incisional hernia    Recurrent incisional hernia    Pain of upper abdomen    Incisional hernia with obstruction, without gangrene    Rhinitis, chronic    Vocal cord dysfunction    Iron deficiency anemia, unspecified    Idiopathic urticaria    Carpal tunnel syndrome, bilateral    Arthritis of carpometacarpal (CMC) joint of left thumb    Neck pain         Plan:  Pt interventions:  Supportive techniques, Emphasized self-care as important for managing overall health, Cognitive strategies to target balanced thinking and Completed risk evaluation, Parts work. Pt Behavioral Change Plan:  Pt set the following goals:  1. Read The Sleep Solution by Ori Concepcion MD  2. Schedule an appointment with Makr Clancy, psychiatric NP, to discuss medication options  3. Learn more about self-compassion at www.self-compassion.org. Watch the video on self-compassion vs self-esteem. Pt scheduled a F/U virtual visit.

## 2022-07-22 ENCOUNTER — TELEMEDICINE (OUTPATIENT)
Dept: FAMILY MEDICINE CLINIC | Age: 57
End: 2022-07-22
Payer: MEDICAID

## 2022-07-22 ENCOUNTER — TELEPHONE (OUTPATIENT)
Dept: FAMILY MEDICINE CLINIC | Age: 57
End: 2022-07-22

## 2022-07-22 DIAGNOSIS — M54.12 CERVICAL RADICULOPATHY: ICD-10-CM

## 2022-07-22 DIAGNOSIS — G89.29 CHRONIC GENERALIZED PAIN DISORDER: Primary | ICD-10-CM

## 2022-07-22 DIAGNOSIS — F33.2 MDD (MAJOR DEPRESSIVE DISORDER), RECURRENT SEVERE, WITHOUT PSYCHOSIS (HCC): ICD-10-CM

## 2022-07-22 DIAGNOSIS — F51.04 PSYCHOPHYSIOLOGICAL INSOMNIA: ICD-10-CM

## 2022-07-22 DIAGNOSIS — R52 CHRONIC GENERALIZED PAIN DISORDER: Primary | ICD-10-CM

## 2022-07-22 DIAGNOSIS — T14.91XA SUICIDE ATTEMPT (HCC): ICD-10-CM

## 2022-07-22 PROCEDURE — 99213 OFFICE O/P EST LOW 20 MIN: CPT | Performed by: FAMILY MEDICINE

## 2022-07-22 RX ORDER — DOXEPIN HYDROCHLORIDE 25 MG/1
25 CAPSULE ORAL NIGHTLY
Qty: 15 CAPSULE | Refills: 0 | Status: SHIPPED | OUTPATIENT
Start: 2022-07-22 | End: 2022-08-26 | Stop reason: SINTOL

## 2022-07-22 RX ORDER — APIXABAN 5 MG/1
TABLET, FILM COATED ORAL
Status: ON HOLD | COMMUNITY
Start: 2022-07-05 | End: 2022-08-04

## 2022-07-22 NOTE — TELEPHONE ENCOUNTER
Pt had scheduled VV with PCP 7/22. After a few minutes waiting for pt after scheduled time, went ahead and gave pt call to verify she was joining her appt. Call was sent to VM after a few rings-- unable to LVM due to VM not being set up. Will await possible call from pt or assume she NS appt.

## 2022-07-22 NOTE — PROGRESS NOTES
2022    TELEHEALTH EVALUATION -- Audio/Visual     HPI:    Lu Saenz (:  1965) has requested an audio/video evaluation for the following concern(s):    Patient was admitted to hospital as inpatient for attempting to suicide with overdose of chlorzoxazone and Requip. patient was discharged home to follow-up with a psychiatrist.  Patient did follow-up with our nurse practitioner psychiatrist in the office. She still has trouble with insomnia and her major depression. Her symptoms are likely contributed because of worsening pain levels. Patient has been having worsening neck pain radiculopathy, back pain and generalized pain secondary to fibromyalgia. Patient states that she still continues to take her hydrocodone as recommended however is not getting any pain relief. She was advised to consider pain management however she did not want to at the time but now she is states that if she sleeps and if she rests well she thinks that she would be in a better mood and that her pain levels would get better. She did discussed that her psychiatrist offered her doxepin however because of her history of overdose on medication he deferred giving the medication to her as the risks of overdose on this medication or high. Patient is stating that she is not considering to do that other time and is willing to and hoping to try that medication to help her sleep as well as with her pain levels. We would not dispense more than 15pills at the time and see if patient gets any relief from these medications. Patient was advised to follow-up with psychiatrist patient states that she has no problem with the nurse practitioner and she would follow-up with him for further evaluation and management as well. She will call us back in 1 week for follow-up on all of these. Otherwise no other significant questions or concerns other than these.     Review of Systems  Pertinent symptoms noted in HPI  Prior to Visit MCG/ACT inhaler INHALE TWO PUFFS BY MOUTH TWICE A DAY  Monie Tatum MD   Handicap Placard MISC by Does not apply route Dx lumbar degenerative disc disease. Effective May 6, 2021. PCP requesting placard with no expiration  Cortney Hernandez DO   flecainide (TAMBOCOR) 50 MG tablet Take 1 tablet by mouth 2 times daily  Katie Benson, APRN - CNP   Handicap Placard MISC by Does not apply route Dx of lumbar DDD. Effective Dec 18, 2020 until Dec 18, 2021.   Cortney Hernandez DO   omalizumab Noemi Shock) 150 MG injection Inject 300 mg into the skin once for 1 dose  Jackie Steele MD   Spacer/Aero-Holding Chambers (AEROCHAMBER MV) MISC Use with inhaler as directed  Christian Padilla MD       Social History     Tobacco Use    Smoking status: Never    Smokeless tobacco: Never   Vaping Use    Vaping Use: Never used   Substance Use Topics    Alcohol use: Yes     Comment: 0-1 drinks per month    Drug use: Yes     Types: Marijuana Wesleytemi Ramirez), Other-see comments     Comment: Medical Marijuana use        Allergies   Allergen Reactions    Latex Anaphylaxis and Rash    Aspirin      Swelling in lower legs    Demerol      rash    Meperidine     Nsaids Swelling    Pcn [Penicillins] Hives    Ranitidine Hcl     Sulfa Antibiotics Rash and Hives   ,   Past Medical History:   Diagnosis Date    Acute lateral meniscus tear of right knee 02/2019    Acute medial meniscus tear of right knee 02/2019    Anesthesia complication     woke up during one surgery    Anxiety     Arthritis     Asthma     Atrial fibrillation ([address]     new dx 4 weeks ago, first of  Sept 2017    CHF (congestive heart failure) (HCC)     Edema     Fibromyalgia     GERD (gastroesophageal reflux disease)     reflux    Hiatal hernia     Idiopathic urticaria 10/29/2020    SVT (supraventricular tachycardia) (HCC)     hx svt   ,   Past Surgical History:   Procedure Laterality Date    ABLATION OF DYSRHYTHMIC FOCUS  04/2019    afib    BLADDER SUSPENSION  2004    HERNIA REPAIR 2001    HYSTERECTOMY (CERVIX STATUS UNKNOWN)  2004    KNEE ARTHROSCOPY Right 7/11/2019    VIDEO ARTHROSCOPY RIGHT KNEE, PARTIAL MEDIAL AND LATERAL MENISCECTOMY,, MEDIAL MICRO FRACTURE CHONDROPLASTY performed by Ximena Hodgson., MD at [facility] GASTRIC BYPASS  2011    TONSILLECTOMY AND ADENOIDECTOMY      UPPP UVULOPALATOPHARYGOPLASTY      VENTRAL HERNIA REPAIR N/A 8/26/2019    DIAGNOSTIC LAPAROSCOPY, LAPAROSCOPIC LYSIS OF ADHESIONS, LAPAROSCOPIC REDUCTION OF RECURRENT INCISIONAL HERNIA WITH MESH performed by Anil Grier MD at [address] [place] [address] [address]   ,   Social History     Tobacco Use    Smoking status: Never    Smokeless tobacco: Never   Vaping Use    Vaping Use: Never used   Substance Use Topics    Alcohol use: Yes     Comment: 0-1 drinks per month    Drug use: Yes     Types: Marijuana Darryle Pimple), Other-see comments     Comment: Medical Marijuana use   ,   Family History   Problem Relation Age of Onset    Cancer Mother         lung    Arthritis Mother     Cirrhosis Father     Asthma Maternal Aunt        PHYSICAL EXAMINATION:  [ INSTRUCTIONS:  \"[x]\" Indicates a positive item  \"[]\" Indicates a negative item  -- DELETE ALL ITEMS NOT EXAMINED]  Vital Signs: (As obtained by patient/caregiver or practitioner observation)    Blood pressure-  Heart rate-    Respiratory rate-    Temperature-  Pulse oximetry-     Constitutional: [x] Appears well-developed and well-nourished [x] No apparent distress      [] Abnormal-   Mental status  [x] Alert and awake  [] Oriented to person/place/time []Able to follow commands      Eyes:  EOM    [x]  Normal  [] Abnormal-  Sclera  [x]  Normal  [] Abnormal -         Discharge []  None visible  [] Abnormal -    HENT:   [x] Normocephalic, atraumatic.   [] Abnormal   [] Mouth/Throat: Mucous membranes are moist.     External Ears [x] Normal  [] Abnormal-     Neck: [x] No visualized mass     Pulmonary/Chest: [x] Respiratory effort normal.  [] No visualized signs of difficulty breathing or respiratory distress        [] Abnormal-      Musculoskeletal:    Normal gait with no signs of ataxia         [] Normal range of motion of neck        [] Abnormal-   []    Neurological:        [x] No Facial Asymmetry (Cranial nerve 7 motor function) (limited exam to video visit)          [x] No gaze palsy        [] Abnormal-         Skin:        [x] No significant exanthematous lesions or discoloration noted on facial skin         [] Abnormal-            Psychiatric:       [x] Normal Affect [] No Hallucinations        [] Abnormal-     Other pertinent observable physical exam findings-     ASSESSMENT/PLAN:  1. Chronic generalized pain disorder      2. Cervical radiculopathy      3. MDD (major depressive disorder), recurrent severe, without psychosis ([facility]      4. [facility]    5. Psychophysiological insomnia         Medication List            Accurate as of July 22, 2022 12:16 PM. If you have any questions, ask your nurse or doctor.                 START taking these medications      doxepin 25 MG capsule  Commonly known as: SINEQUAN  Take 1 capsule by mouth nightly for 15 days  Started by: Audrey Cedillo MD            CONTINUE taking these medications      AeroChamber MV Misc  Use with inhaler as directed     albuterol sulfate  (90 Base) MCG/ACT inhaler  Commonly known as: PROVENTIL;VENTOLIN;PROAIR  Inhale 2 puffs into the lungs every 4 hours as needed for Wheezing or Shortness of Breath     busPIRone 15 MG tablet  Commonly known as: BUSPAR  Take 15 mg by mouth 3 times daily     chlorzoxazone 500 MG tablet  Commonly known as: PARAFON FORTE  Take 0.5-1 tablets by mouth 3 times daily as needed for Muscle spasms     desvenlafaxine succinate 100 MG Tb24 extended release tablet  Commonly known as: PRISTIQ  Take 3 tablets by mouth daily     diazePAM 5 MG tablet  Commonly known as: VALIUM     Dulera 200-5 MCG/ACT inhaler  Generic drug: mometasone-formoterol  INHALE TWO PUFFS BY MOUTH TWICE A DAY     Eliquis 5 MG Tabs tablet  Generic drug: apixaban     EPINEPHrine 0.3 MG/0.3ML Soaj injection  Commonly known as: EPIPEN     famotidine 40 MG tablet  Commonly known as: PEPCID     flecainide 50 MG tablet  Commonly known as: TAMBOCOR  Take 1 tablet by mouth 2 times daily     Handicap Placard Misc  by Does not apply route Dx of lumbar DDD. Effective Dec 18, 2020 until Dec 18, 2021. Handicap Placard Misc  by Does not apply route Dx lumbar degenerative disc disease. Effective May 6, 2021. PCP requesting placard with no expiration     HYDROcodone-acetaminophen 7.5-325 MG per tablet  Commonly known as: Norco  Take 1 tablet by mouth daily as needed for Pain for up to 30 days. Intended supply: 30 days     metFORMIN 500 MG extended release tablet  Commonly known as: Glucophage XR  Take 1 tablet by mouth daily (with breakfast)     metoprolol tartrate 25 MG tablet  Commonly known as: LOPRESSOR     omalizumab 150 MG injection  Commonly known as: XOLAIR  Inject 300 mg into the skin once for 1 dose     pantoprazole 40 MG tablet  Commonly known as: PROTONIX     * rOPINIRole 0.5 MG tablet  Commonly known as: Requip  Take 3 tablets by mouth every evening     * rOPINIRole 1 MG tablet  Commonly known as: Requip  Take 1.5 tablets by mouth in the morning and at bedtime     XYLAREX PO           * This list has 2 medication(s) that are the same as other medications prescribed for you. Read the directions carefully, and ask your doctor or other care provider to review them with you. Where to Get Your Medications        These medications were sent to [id number]  [address] [place] [address] Yudith Kiara [address]      Phone: [phone]   doxepin 25 MG capsule      meds      Return if symptoms worsen or fail to improve. Raven Matos was evaluated through a synchronous (real-time) audio-video encounter. The patient (or guardian if applicable) is aware that this is a billable service, which includes applicable co-pays. This Virtual Visit was conducted with patient's (and/or legal guardian's) consent. The visit was conducted pursuant to the emergency declaration under the [address] [facility], [address] [place] [address] authority and the [employer] General Act. Patient identification was verified, and a caregiver was present when appropriate. The patient was located at Home: [address] [place] [id number]. Provider was [facility] [phone] [address]. Total time spent on this encounter:  21    --Delma Nolan MD on 7/22/2022 at 12:13 PM    An electronic signature was used to authenticate this note.

## 2022-08-03 ENCOUNTER — ANESTHESIA EVENT (OUTPATIENT)
Dept: ENDOSCOPY | Age: 57
End: 2022-08-03
Payer: MEDICAID

## 2022-08-04 ENCOUNTER — ANESTHESIA (OUTPATIENT)
Dept: ENDOSCOPY | Age: 57
End: 2022-08-04
Payer: MEDICAID

## 2022-08-04 ENCOUNTER — HOSPITAL ENCOUNTER (OUTPATIENT)
Age: 57
Setting detail: OUTPATIENT SURGERY
Discharge: HOME | End: 2022-08-04
Attending: INTERNAL MEDICINE | Admitting: INTERNAL MEDICINE
Payer: MEDICAID

## 2022-08-04 VITALS
DIASTOLIC BLOOD PRESSURE: 67 MMHG | OXYGEN SATURATION: 95 % | SYSTOLIC BLOOD PRESSURE: 122 MMHG | TEMPERATURE: 97.3 F | RESPIRATION RATE: 16 BRPM | HEART RATE: 65 BPM

## 2022-08-04 DIAGNOSIS — D50.9 IRON DEFICIENCY ANEMIA, UNSPECIFIED IRON DEFICIENCY ANEMIA TYPE: ICD-10-CM

## 2022-08-04 DIAGNOSIS — Z86.010 HISTORY OF COLON POLYPS: ICD-10-CM

## 2022-08-04 PROCEDURE — 6360000002 HC RX W HCPCS: Performed by: NURSE ANESTHETIST, CERTIFIED REGISTERED

## 2022-08-04 PROCEDURE — 2709999900 HC NON-CHARGEABLE SUPPLY: Performed by: INTERNAL MEDICINE

## 2022-08-04 PROCEDURE — 3609027000 HC COLONOSCOPY: Performed by: INTERNAL MEDICINE

## 2022-08-04 PROCEDURE — 3700000001 HC ADD 15 MINUTES (ANESTHESIA): Performed by: INTERNAL MEDICINE

## 2022-08-04 PROCEDURE — 7100000011 HC PHASE II RECOVERY - ADDTL 15 MIN: Performed by: INTERNAL MEDICINE

## 2022-08-04 PROCEDURE — 3700000000 HC ANESTHESIA ATTENDED CARE: Performed by: INTERNAL MEDICINE

## 2022-08-04 PROCEDURE — 3609012400 HC EGD TRANSORAL BIOPSY SINGLE/MULTIPLE: Performed by: INTERNAL MEDICINE

## 2022-08-04 PROCEDURE — 2580000003 HC RX 258: Performed by: ANESTHESIOLOGY

## 2022-08-04 PROCEDURE — 88305 TISSUE EXAM BY PATHOLOGIST: CPT

## 2022-08-04 PROCEDURE — 7100000010 HC PHASE II RECOVERY - FIRST 15 MIN: Performed by: INTERNAL MEDICINE

## 2022-08-04 RX ORDER — PROPOFOL 10 MG/ML
INJECTION, EMULSION INTRAVENOUS PRN
Status: DISCONTINUED | OUTPATIENT
Start: 2022-08-04 | End: 2022-08-04 | Stop reason: SDUPTHER

## 2022-08-04 RX ORDER — SODIUM CHLORIDE, SODIUM LACTATE, POTASSIUM CHLORIDE, CALCIUM CHLORIDE 600; 310; 30; 20 MG/100ML; MG/100ML; MG/100ML; MG/100ML
INJECTION, SOLUTION INTRAVENOUS CONTINUOUS
Status: DISCONTINUED | OUTPATIENT
Start: 2022-08-04 | End: 2022-08-04 | Stop reason: HOSPADM

## 2022-08-04 RX ORDER — SODIUM CHLORIDE 9 MG/ML
INJECTION, SOLUTION INTRAVENOUS PRN
Status: DISCONTINUED | OUTPATIENT
Start: 2022-08-04 | End: 2022-08-04 | Stop reason: HOSPADM

## 2022-08-04 RX ORDER — LIDOCAINE HYDROCHLORIDE 20 MG/ML
INJECTION, SOLUTION INTRAVENOUS PRN
Status: DISCONTINUED | OUTPATIENT
Start: 2022-08-04 | End: 2022-08-04 | Stop reason: SDUPTHER

## 2022-08-04 RX ORDER — SODIUM CHLORIDE 0.9 % (FLUSH) 0.9 %
5-40 SYRINGE (ML) INJECTION EVERY 12 HOURS SCHEDULED
Status: DISCONTINUED | OUTPATIENT
Start: 2022-08-04 | End: 2022-08-04 | Stop reason: HOSPADM

## 2022-08-04 RX ORDER — SODIUM CHLORIDE 0.9 % (FLUSH) 0.9 %
5-40 SYRINGE (ML) INJECTION PRN
Status: DISCONTINUED | OUTPATIENT
Start: 2022-08-04 | End: 2022-08-04 | Stop reason: HOSPADM

## 2022-08-04 RX ADMIN — PROPOFOL 30 MG: 10 INJECTION, EMULSION INTRAVENOUS at 07:52

## 2022-08-04 RX ADMIN — PROPOFOL 30 MG: 10 INJECTION, EMULSION INTRAVENOUS at 08:04

## 2022-08-04 RX ADMIN — PROPOFOL 30 MG: 10 INJECTION, EMULSION INTRAVENOUS at 08:08

## 2022-08-04 RX ADMIN — PROPOFOL 30 MG: 10 INJECTION, EMULSION INTRAVENOUS at 07:53

## 2022-08-04 RX ADMIN — PROPOFOL 30 MG: 10 INJECTION, EMULSION INTRAVENOUS at 07:58

## 2022-08-04 RX ADMIN — PROPOFOL 30 MG: 10 INJECTION, EMULSION INTRAVENOUS at 08:01

## 2022-08-04 RX ADMIN — PROPOFOL 50 MG: 10 INJECTION, EMULSION INTRAVENOUS at 07:48

## 2022-08-04 RX ADMIN — PROPOFOL 30 MG: 10 INJECTION, EMULSION INTRAVENOUS at 07:50

## 2022-08-04 RX ADMIN — PROPOFOL 30 MG: 10 INJECTION, EMULSION INTRAVENOUS at 08:02

## 2022-08-04 RX ADMIN — PROPOFOL 30 MG: 10 INJECTION, EMULSION INTRAVENOUS at 08:03

## 2022-08-04 RX ADMIN — PROPOFOL 30 MG: 10 INJECTION, EMULSION INTRAVENOUS at 07:56

## 2022-08-04 RX ADMIN — PROPOFOL 30 MG: 10 INJECTION, EMULSION INTRAVENOUS at 08:00

## 2022-08-04 RX ADMIN — PROPOFOL 30 MG: 10 INJECTION, EMULSION INTRAVENOUS at 07:54

## 2022-08-04 RX ADMIN — PROPOFOL 30 MG: 10 INJECTION, EMULSION INTRAVENOUS at 08:06

## 2022-08-04 RX ADMIN — SODIUM CHLORIDE, POTASSIUM CHLORIDE, SODIUM LACTATE AND CALCIUM CHLORIDE: 600; 310; 30; 20 INJECTION, SOLUTION INTRAVENOUS at 07:16

## 2022-08-04 RX ADMIN — PROPOFOL 20 MG: 10 INJECTION, EMULSION INTRAVENOUS at 07:49

## 2022-08-04 RX ADMIN — LIDOCAINE HYDROCHLORIDE 100 MG: 20 INJECTION, SOLUTION INTRAVENOUS at 07:48

## 2022-08-04 RX ADMIN — PROPOFOL 30 MG: 10 INJECTION, EMULSION INTRAVENOUS at 08:10

## 2022-08-04 RX ADMIN — PROPOFOL 30 MG: 10 INJECTION, EMULSION INTRAVENOUS at 08:09

## 2022-08-04 RX ADMIN — PROPOFOL 30 MG: 10 INJECTION, EMULSION INTRAVENOUS at 08:07

## 2022-08-04 RX ADMIN — PROPOFOL 30 MG: 10 INJECTION, EMULSION INTRAVENOUS at 07:55

## 2022-08-04 RX ADMIN — PROPOFOL 30 MG: 10 INJECTION, EMULSION INTRAVENOUS at 07:57

## 2022-08-04 RX ADMIN — PROPOFOL 30 MG: 10 INJECTION, EMULSION INTRAVENOUS at 07:51

## 2022-08-04 RX ADMIN — PROPOFOL 30 MG: 10 INJECTION, EMULSION INTRAVENOUS at 07:59

## 2022-08-04 ASSESSMENT — PAIN SCALES - GENERAL: PAINLEVEL_OUTOF10: 0

## 2022-08-04 NOTE — DISCHARGE INSTRUCTIONS
ENDOSCOPY DISCHARGE INSTRUCTIONS:    Call the physician that did your procedure for any questions or concern:    [facility]: [phone]   [address] [facility] [place]A VILLALOBOS        ACTIVITY:    There are potential side effects to the medications used for sedation and anesthesia during your procedure. These include:  Dizziness or light-headedness, confusion or memory loss, delayed reaction times, loss of coordination, nausea and vomiting. Because of your increased risk for injury, we ask that you observe the following precautions: For the next 24 hours,  DO NOT operate an automobile, bicycle, motorcycle, , power tools or large equipment of any kind. Do not drink alcohol, sign any legal documents or make any legal decisions for 24 hours. Do not bend your head over lower than your heart. DO sit on the side of bed/couch awhile before getting up. Plan on bedrest or quiet relaxation today. You may resume normal activities in 24 hours. DIET:    Your first meal today should be light, avoiding spicy and fatty foods. If you tolerate this first meal, then you may advance to your regular diet unless otherwise advised by your physician. NORMAL SYMPTOMS:  -Mild sore throat if youve had an EGD   -Gaseous discomfort    NOTIFY YOUR PHYSICIAN IF THESE SYMPTOMS OCCUR:  1. Fever (greater than 100)  5. Increased abdominal bloating  2. Severe pain    6. Excessive bleeding  3. Nausea and vomiting  7. Chest pain                                                                    4. Chills    8. Shortness of breath    ADDITIONAL INSTRUCTIONS:    Biopsy results: Call [address],[facility] for biopsy results in 1 week    Educational Information:         Celiac Disease: Care Instructions  Your Care Instructions  Celiac disease (or celiac sprue) is a problem with digesting gluten. Gluten is a type of protein found in wheat, rye, and other grains.  This problem starts when the body's immune system attacks the small intestine when gluten is eaten. The immune system is supposed to fight off viruses and other invaders, but sometimes it turns on the person's own body. (This is called an autoimmunedisease.) Celiac disease seems to run in families. Celiac disease causes damage to the small intestine. This makes it hard for the body to absorb vitamins and other nutrients. You cannot prevent celiac disease. But you can stop and reverse the damage to the small intestine by eating astrict gluten-free diet. Follow-up care is a key part of your treatment and safety. Be sure to make and go to all appointments, and call your doctor if you are having problems. It's also a good idea to know your test results and keep alist of the medicines you take. How can you care for yourself at home? Eat a gluten-free diet to prevent symptoms and damage to the small intestine. Even a small amount of gluten may cause damage. Avoid all foods that contain wheat, rye, and barley. Foods that are often made with these grains include bread, bagels, pasta, pizza, malted breakfast cereals, and crackers. Avoid oats, at least at first. Oats may cause symptoms in some people. The oats may be contaminated with wheat, barley, or rye from processing. But many people who have celiac disease can eat moderate amounts of oats without having symptoms. Health professionals vary in their long-term recommendations regarding eating foods with oats. But most agree it is safe to eat oats labeled as gluten-free. You may need to avoid milk and milk products for a while. Once you stop eating any gluten, the intestine will begin to heal. Then it should be okay to drink milk and eat milk products. Read food labels carefully and look for hidden gluten, such as gluten in medicine and some food additives. If a label says \"modified food starch,\" the product may contain gluten. Plan your diet around:  Eggs. Dairy products, if you can eat them.  Cheese, yogurt, and other dairy products can be an important part of the diet. Flours and foods made with amaranth, arrowroot, beans, buckwheat, corn, cornmeal, flax, millet, potatoes, gluten-free oat bran, quinoa, rice, sorghum, soybeans, tapioca, or teff. Fresh, frozen, and canned meats, fruits, and vegetables. Watch for added gluten. Talk to your doctor or contact your local hospital or dietitian for information about support groups in your area. You may find a support group helpful for discovering ways to help you deal with celiac disease. Celiac disease support groups often share recipes and good food sources. Look for gluten-free foods. Many food stores, especially health food stores, offer specially marked gluten-free food. When should you call for help? Watch closely for changes in your health, and be sure to contact your doctor if:    Your bloating, gas, and diarrhea get worse. You have bloating, gas, and diarrhea after not having them for a while. Where can you learn more? Go to https://[employer]. org and sign in to your [employer] account. Enter [phone] in the [facility] box to learn more about \"Celiac Disease: Care Instructions. \"     If you do not have an account, please click on the \"Sign Up Now\" link. Current as of: September 8, 2021               Content Version: 13.3  © 2900-5323 Healthwise, Incorporated. Care instructions adapted under license by [facility] ([facility]). If you have questions about a medical condition or this instruction, always ask your healthcare professional. [address] any warranty or liability for your use of this information. Please review these discharge instructions this evening or tomorrow for  information you may have forgotten. We want to thank you for choosing the [facility] as your health care provider. We always strive to provide you with excellent care while you are here. You may receive a survey in the mail regarding your care.  We would

## 2022-08-04 NOTE — ANESTHESIA PRE PROCEDURE
Department of Anesthesiology  Preprocedure Note       Name:  Aster Moon   Age:  64 y.o.  :  1965                                          MRN:  [id number]         Date:  2022      Surgeon: Helen Simeon):  Jaye Cuello    Procedure: Procedure(s):  ESOPHAGOGASTRODUODENOSCOPY  COLONOSCOPY    Medications prior to admission:   Prior to Admission medications    Medication Sig Start Date End Date Taking? Authorizing Provider   doxepin (SINEQUAN) 25 MG capsule Take 1 capsule by mouth nightly for 15 days 22  Getachew Keenan MD   ELIQUIS 5 MG TABS tablet TAKE 1 TABLET BY MOUTH TWICE DAILY FOR ATRIAL FIBRILLATION 22   Historical Provider, MD   metoprolol tartrate (LOPRESSOR) 25 MG tablet Take 12.5 mg by mouth in the morning and 12.5 mg before bedtime.  22  Historical Provider, MD   desvenlafaxine succinate (PRISTIQ) 100 MG TB24 extended release tablet Take 3 tablets by mouth daily 7/12/22 10/10/22  STEPHANIE Francois CNP   busPIRone (BUSPAR) 15 MG tablet Take 15 mg by mouth 3 times daily 7/12/22 10/10/22  STEPHANIE Francois CNP   chlorzoxazone (PARAFON FORTE) 500 MG tablet Take 0.5-1 tablets by mouth 3 times daily as needed for Muscle spasms 22   Getachew Keenan MD   famotidine (PEPCID) 40 MG tablet TAKE 1 TABLET BY MOUTH AT BEDTIME 22   Historical Provider, MD   pantoprazole (PROTONIX) 40 MG tablet TAKE 1 TABLET BY MOUTH DAILY 22   Historical Provider, MD   albuterol sulfate  (90 Base) MCG/ACT inhaler Inhale 2 puffs into the lungs every 4 hours as needed for Wheezing or Shortness of Breath 22   Getachew Keenan MD   rOPINIRole (REQUIP) 0.5 MG tablet Take 3 tablets by mouth every evening 22   Getachew Kenean MD   rOPINIRole (REQUIP) 1 MG tablet Take 1.5 tablets by mouth in the morning and at bedtime 22  Getachew Keenan MD   D-Xylitol (XYLAREX PO) Take by mouth    Historical Provider, MD   EPINEPHrine (EPIPEN) 0.3 MG/0.3ML SOAJ injection INJECT INTO UPPER THIGH AS DIRECTED AS NEEDED FOR ANAPHYLAXIS 3/8/22   Historical Provider, MD   diazePAM (VALIUM) 5 MG tablet TAKE 1 TABLET BY MOUTH NIGHTLY AS NEEDED FOR ANXIETY 3/17/22   Historical Provider, MD   metFORMIN (GLUCOPHAGE XR) 500 MG extended release tablet Take 1 tablet by mouth daily (with breakfast) 3/10/22   Sharon Hernandez DO   DULERA 200-5 MCG/ACT inhaler INHALE TWO PUFFS BY MOUTH TWICE A DAY 5/29/21   Liat Sweet MD   Handicap Placard MISC by Does not apply route Dx lumbar degenerative disc disease. Effective May 6, 2021. PCP requesting placard with no expiration 5/6/21   Sharon Hernandez DO   flecainide (TAMBOCOR) 50 MG tablet Take 1 tablet by mouth 2 times daily 3/23/21   Chasity Lands, APRN - CNP   Handicap Placard MISC by Does not apply route Dx of lumbar DDD. Effective Dec 18, 2020 until Dec 18, 2021. 12/18/20   Sharon Hernandez DO   omalizumab Deljohnson Norris) 150 MG injection Inject 300 mg into the skin once for 1 dose 11/6/20 4/1/22  Usman Rangel MD   Spacer/Aero-Holding Chambers (AEROCHAMBER MV) MISC Use with inhaler as directed 3/6/20   April Rucker MD       Current medications:    No current facility-administered medications for this encounter. Allergies:     Allergies   Allergen Reactions    Latex Anaphylaxis and Rash    Aspirin      Swelling in lower legs    Demerol      rash    Meperidine     Nsaids Swelling    Pcn [Penicillins] Hives    Ranitidine Hcl     Sulfa Antibiotics Rash and Hives       Problem List:    Patient Active Problem List   Diagnosis Code    Asthma J45.909    MDD (major depressive disorder), recurrent severe, without psychosis ([facility] [address] F33.2    Overdose T50.901A    Restless leg syndrome G25.81    Sliding hiatal hernia K44.9    HTN (hypertension) I10    Atrial flutter (HCC) I48.92    SVT (supraventricular tachycardia) (HCC) I47.1    Paroxysmal atrial fibrillation (HCC) I48.0    Sinus bradycardia R00.1    Acute lateral meniscus tear of right knee S83.281A    Acute medial meniscus tear of right knee S83.241A    Localized edema R60.0    Acute pain of right knee M25.561    Chronic diastolic congestive heart failure (HCC) I50.32    Primary osteoarthritis of right knee M17.11    Contusion of multiple sites of right leg S80.11XA    Gastrocnemius strain, left S86.112A    Ventral incisional hernia K43.2    Recurrent incisional hernia K43.2    Pain of upper abdomen R10.10    Incisional hernia with obstruction, without gangrene K43.0    Rhinitis, chronic J31.0    Vocal cord dysfunction J38.3    Iron deficiency anemia, unspecified D50.9    Idiopathic urticaria L50.1    Carpal tunnel syndrome, bilateral G56.03    Arthritis of carpometacarpal (CMC) joint of left thumb M18.12    Neck pain M54.2       Past Medical History:        Diagnosis Date    Acute lateral meniscus tear of right knee 02/2019    Acute medial meniscus tear of right knee 02/2019    Anesthesia complication     woke up during one surgery    Anxiety     Arthritis     Asthma     Atrial fibrillation ([address]     new dx 4 weeks ago, first of  Sept 2017    [facility]est[facility] failure[facility])     Edema     Fibromyalgia     GERD (gastroesophageal reflux disease)     reflux    Hiatal hernia     Idiopathic urticaria 10/29/2020    SVT (supraventricular tachycardia) ([facility])     hx svt       Past Surgical History:        Procedure Laterality Date    ABLATION OF DYSRHYTHMIC FOCUS  04/2019    afib    BLADDER SUSPENSION  2004    HERNIA REPAIR  2001    HYSTERECTOMY (CERVIX STATUS UNKNOWN)  2004    KNEE ARTHROSCOPY Right 7/11/2019    VIDEO ARTHROSCOPY RIGHT KNEE, PARTIAL MEDIAL AND LATERAL MENISCECTOMY,, MEDIAL MICRO FRACTURE CHONDROPLASTY performed by Shelle Soulier., MD at [facility] GASTRIC BYPASS  2011    TONSILLECTOMY AND ADENOIDECTOMY      UPPP UVULOPALATOPHARYGOPLASTY      [facility] N/A 8/26/2019 DIAGNOSTIC LAPAROSCOPY, LAPAROSCOPIC LYSIS OF ADHESIONS, LAPAROSCOPIC REDUCTION OF RECURRENT INCISIONAL HERNIA WITH MESH performed by Carolyn Suárez MD at [address]  History:    Social History     Tobacco Use    Smoking status: Never    Smokeless tobacco: Never   Substance Use Topics    Alcohol use: Yes     Comment: 0-1 drinks per month                                Counseling given: Not Answered      Vital Signs (Current): There were no vitals filed for this visit.                                            BP Readings from Last 3 Encounters:   05/26/22 (!) 152/83   04/28/22 130/68   04/01/22 118/80       NPO Status:                                                                                 BMI:   Wt Readings from Last 3 Encounters:   05/26/22 (!) 345 lb (156.5 kg)   04/28/22 (!) 340 lb 9.6 oz (154.5 kg)   04/01/22 (!) 347 lb (157.4 kg)     There is no height or weight on file to calculate BMI.    CBC:   Lab Results   Component Value Date/Time    WBC 6.2 05/26/2022 02:25 PM    RBC 4.72 05/26/2022 02:25 PM    HGB 13.9 05/26/2022 02:25 PM    HCT 43.3 05/26/2022 02:25 PM    MCV 91.7 05/26/2022 02:25 PM    RDW 14.6 05/26/2022 02:25 PM     05/26/2022 02:25 PM       CMP:   Lab Results   Component Value Date/Time     05/25/2022 08:01 AM    K 4.6 05/26/2022 03:58 PM    K 4.0 08/24/2021 02:09 PM    CL 99 05/25/2022 08:01 AM    CO2 25 05/25/2022 08:01 AM    BUN 13 05/25/2022 08:01 AM    CREATININE 0.7 05/25/2022 08:01 AM    GFRAA >60 05/25/2022 08:01 AM    GFRAA 87 12/21/2011 05:25 AM    AGRATIO 1.8 05/25/2022 08:01 AM    LABGLOM >60 05/25/2022 08:01 AM    GLUCOSE 93 05/25/2022 08:01 AM    PROT 7.3 05/25/2022 08:01 AM    CALCIUM 9.3 05/25/2022 08:01 AM    BILITOT 0.4 05/25/2022 08:01 AM    ALKPHOS 121 05/25/2022 08:01 AM    AST 20 05/25/2022 08:01 AM    ALT 12 05/25/2022 08:01 AM       POC Tests: No results for input(s): POCGLU, POCNA, POCK, POCCL, POCBUN, POCHEMO, POCHCT in the last 72 hours.    Coags:   Lab Results   Component Value Date/Time    PROTIME 10.6 02/25/2020 07:23 AM    INR 0.91 02/25/2020 07:23 AM    APTT 30.6 02/25/2020 07:23 AM       HCG (If Applicable): No results found for: PREGTESTUR, PREGSERUM, HCG, HCGQUANT     ABGs:   Lab Results   Component Value Date/Time    PHART 7.39 12/17/2011 03:00 AM    PO2ART 83 12/17/2011 03:00 AM    NSN6ZPC 38 12/17/2011 03:00 AM    PBF5AUC 22 12/17/2011 03:00 AM    BEART -2.0 12/17/2011 03:00 AM        Type & Screen (If Applicable):  No results found for: LABABO, LABRH    Drug/Infectious Status (If Applicable):  No results found for: HIV, HEPCAB    COVID-19 Screening (If Applicable): No results found for: COVID19        Anesthesia Evaluation    Airway: Mallampati: II  TM distance: >3 FB   Neck ROM: full  Mouth opening: > = 3 FB   Dental:          Pulmonary:   (+) asthma:                            Cardiovascular:    (+) hypertension:, dysrhythmias:, CHF:,         Rhythm: regular  Rate: normal                    Neuro/Psych:   (+) neuromuscular disease:, psychiatric history:            GI/Hepatic/Renal:   (+) hiatal hernia, GERD:,           Endo/Other:                     Abdominal:             Vascular: Other Findings:           Anesthesia Plan      MAC     ASA 2       Induction: intravenous. Anesthetic plan and risks discussed with patient. Plan discussed with CRNA.                     Rosamaria Jules MD   8/4/2022

## 2022-08-04 NOTE — H&P
Gastroenterology Note             Pre-operative History and Physical    Patient: Arben Klein  : 1965  CSN: [id number]    History Obtained From:  patient and/or guardian. HISTORY OF PRESENT ILLNESS:    The patient is a 64 y.o. female  here for GERD, IRON DEFICIENCY ANEMIA, HX OF POLYPS, RECTAL BLEEDING. Past Medical History:    Past Medical History:   Diagnosis Date    Acute lateral meniscus tear of right knee 2019    Acute medial meniscus tear of right knee 2019    Anesthesia complication     woke up during one surgery    Anxiety     Arthritis     Asthma     Atrial fibrillation ([address]     new dx 4 weeks ago, first of  2017    CHF (congestive heart failure) ([facility])     Edema     Fibromyalgia     GERD (gastroesophageal reflux disease)     reflux    Hiatal hernia     Idiopathic urticaria 10/29/2020    SVT (supraventricular tachycardia) ([facility])     hx svt     Past Surgical History:    Past Surgical History:   Procedure Laterality Date    ABLATION OF DYSRHYTHMIC FOCUS  2019    afib    BLADDER SUSPENSION      HERNIA REPAIR      HYSTERECTOMY (CERVIX STATUS UNKNOWN)      KNEE ARTHROSCOPY Right 2019    VIDEO ARTHROSCOPY RIGHT KNEE, PARTIAL MEDIAL AND LATERAL MENISCECTOMY,, MEDIAL MICRO FRACTURE CHONDROPLASTY performed by Sukhjinder Jimenez MD at [facility] GASTRIC BYPASS      TONSILLECTOMY AND ADENOIDECTOMY      [address]PPP [address] [facility] N/A 2019    DIAGNOSTIC LAPAROSCOPY, LAPAROSCOPIC LYSIS OF ADHESIONS, LAPAROSCOPIC REDUCTION OF RECURRENT INCISIONAL HERNIA WITH MESH performed by Tong Rasmussen MD at [address] [place] [address] [address]     Medications Prior to Admission:   No current facility-administered medications on file prior to encounter.      Current Outpatient Medications on File Prior to Encounter   Medication Sig Dispense Refill    albuterol sulfate  (90 Base) MCG/ACT inhaler Inhale 2 puffs into the Resp 20   SpO2 96%  I        Heart:   within normal limits    Lungs:  CTA bilat anteriorly,  Normal effort    Abdomen:   soft, NT, ND      ASA Grade:  ASA 2 - Patient with mild systemic disease with no functional limitations    Mallampati Class: 2      ASSESSMENT AND PLAN:    1. Patient is a 64 y.o. female here for EGD/Colonoscopy. 2.  Procedure options, risks and benefits reviewed with patient. Patient expresses understanding and wishes to proceed. Isis Schwarz, ,   [facility]  8/4/2022    Please note that some or all of this record was generated using voice recognition software. If there are any questions about the content of this document, please contact the author as some errors in translation may have occurred.

## 2022-08-04 NOTE — ANESTHESIA POSTPROCEDURE EVALUATION
Department of Anesthesiology  Postprocedure Note    Patient: Charmayne Sack  MRN: [id number]  YOB: 1965  Date of evaluation: 8/4/2022      Procedure Summary     Date: 08/04/22 Room / Location: [facility]    Anesthesia Start: [address] Anesthesia Stop: 0816    Procedures:       EGD BIOPSY      COLONOSCOPY Diagnosis:       Iron deficiency anemia, unspecified iron deficiency anemia type      History of colon polyps      (Iron deficiency anemia, unspecified iron deficiency anemia type [D50.9])      (History of colon polyps [Z86.010])    Surgeons: Teodoro Garcia Responsible Provider: Odessa Sandhu MD    Anesthesia Type: MAC ASA Status: 2          Anesthesia Type: No value filed.     Savita Phase I: Savita Score: 10    Savita Phase II:        Anesthesia Post Evaluation    Patient location during evaluation: PACU  Level of consciousness: awake  Complications: no  Multimodal analgesia pain management approach

## 2022-08-04 NOTE — PROCEDURES
EGD and Colonoscopy Procedure  Note            Patient: Jami Gutierrez  : 1965  CSN: [id number]    Procedure: Esophagogastroduodenoscopy with biopsy and Colonoscopy     Date:  2022     Surgeon:  Bret Hampton,      Referring Physician:  Lino Dumas MD    Preoperative Diagnosis:  Iron deficiency anemia, unspecified iron deficiency anemia type [D50.9]  History of colon polyps [Z86.010]  Rectal bleeding    Postoperative Diagnosis: The GE junction was located at 32 cm, no erosive esophagitis or Rizo's esophagus. Extremely small gastric remnant approximately 2 to 3 cm in size, normal-appearing Romie-en-Y anastomosis with small bowel. Normal-appearing small bowel, random biopsies obtained from the duodenum to rule out celiac disease or sprue. Several left colon diverticula, significant amount of liquid stool noted in the left colon, prep was poor despite significant time spent lavaging, cecum was reached with moderate difficulty due to redundant colon, cecal prep was very poor, polyps in this area could be missed. Small polyps and flat lesions could also be missed in the left colon. Medium inflamed hemorrhoids noted on retroflexion in the rectum. Anesthesia:  MAC    EBL: minimal to none    Indications: This is a 64y.o. year old female who presents today with intermittent rectal bleeding, history of polyps, iron deficiency anemia, GERD symptoms. .      Procedure Details: The Olympus video endoscope was passed through the hypopharynx into the esophagus. The scope was advanced all the way to the second portion of the duodenum. The GE junction was at approximately 32 cms. The scope was slowly withdrawn and mucosa was carefully evaluated including a retroflex view of the proximal stomach. Findings and interventions are described below. The patient was decompressed and the scope was then withdrawn.     Gastric or Duodenal ulcer present: No    Procedure Details:    With the patient in left lateral decubitus position a digital rectal examination was performed. The Olympus video colonoscope was inserted through the anorectal area into the rectum. The scope was then advanced through the length of the colon to the cecum, which was identified by the ileocecal valve and appendiceal orifice. The quality of preparation was inadequate. The scope was then slowly withdrawn with careful inspection of the mucosa including retroflexion in the rectum. Findings and interventions are described below. The patient was decompressed. The scope was then withdrawn. The patient was taken to recovery in good condition. There were no immediate complications. Impression:   -  Upper Endoscopy-   -The GE junction was located at 32 cm, no erosive esophagitis or Rizo's esophagus.    - Extremely small gastric remnant approximately 2 to 3 cm in size, normal-appearing Romie-en-Y anastomosis with small bowel.    -Normal-appearing small bowel, random biopsies obtained from the duodenum to rule out celiac disease or sprue. Colonoscopy-  -Several left colon diverticula, significant amount of liquid stool noted in the left colon, prep was poor despite significant time spent lavaging, cecum was reached with moderate difficulty due to redundant colon, cecal prep was very poor, polyps in this area could be missed. Small polyps and flat lesions could also be missed in the left colon. -Medium inflamed hemorrhoids noted on retroflexion in the rectum. Recommendations:   -Continue acid suppression. , -Await pathology. -Repeat colonoscopy with 2 day prep in 1 year  - Oral iron supplementation- anemia likely related to poor oral iron absorption given extremely small gastric remnant.   - follow up with me in office    [place] Joanne  8/4/2022    Please note that some or all of this record was generated using voice recognition software.  If there are any questions about the content of this document, please contact the author as some errors in translation may have occurred.

## 2022-08-16 DIAGNOSIS — R06.02 SOB (SHORTNESS OF BREATH): ICD-10-CM

## 2022-08-17 ENCOUNTER — TELEPHONE (OUTPATIENT)
Dept: FAMILY MEDICINE CLINIC | Age: 57
End: 2022-08-17

## 2022-08-17 ENCOUNTER — OFFICE VISIT (OUTPATIENT)
Dept: ORTHOPEDIC SURGERY | Age: 57
End: 2022-08-17
Payer: MEDICAID

## 2022-08-17 VITALS — WEIGHT: 293 LBS | BODY MASS INDEX: 41.95 KG/M2 | HEIGHT: 70 IN

## 2022-08-17 DIAGNOSIS — M47.22 CERVICAL SPONDYLOSIS WITH RADICULOPATHY: Primary | ICD-10-CM

## 2022-08-17 PROCEDURE — 1036F TOBACCO NON-USER: CPT | Performed by: ORTHOPAEDIC SURGERY

## 2022-08-17 PROCEDURE — 99214 OFFICE O/P EST MOD 30 MIN: CPT | Performed by: ORTHOPAEDIC SURGERY

## 2022-08-17 PROCEDURE — G8427 DOCREV CUR MEDS BY ELIG CLIN: HCPCS | Performed by: ORTHOPAEDIC SURGERY

## 2022-08-17 PROCEDURE — G8417 CALC BMI ABV UP PARAM F/U: HCPCS | Performed by: ORTHOPAEDIC SURGERY

## 2022-08-17 PROCEDURE — 3017F COLORECTAL CA SCREEN DOC REV: CPT | Performed by: ORTHOPAEDIC SURGERY

## 2022-08-17 RX ORDER — ALBUTEROL SULFATE 90 UG/1
AEROSOL, METERED RESPIRATORY (INHALATION)
Qty: 8.5 G | Refills: 11 | Status: SHIPPED | OUTPATIENT
Start: 2022-08-17

## 2022-08-17 NOTE — PROGRESS NOTES
Follow up: CERVICAL SPINE    CHIEF COMPLAINT:    Chief Complaint   Patient presents with    Neck Pain     Bilateral arm pain, right had worse. Numbness in hands, shoulder pain, upper back     HISTORY OF PRESENT ILLNESS:   Ms. Angel Smith is a pleasant 64 y.o. old female here for consultation regarding her neck and bilateral arm pain. She states the pain began insidiously about 2 years ago. Her pain has steadily increased since then. She notes her pain She rates her neck pain 8/10 and shoulder and arm pain 8/10. She describes the pain as aching and burning. Pain is worst with standing and activity, and improved some with sitting or lying down. The arm pain radiates to her left greater than right arm to her hands. She notes numbness and tingling in bilateral arms and hands in a C6 and C7 distribution. She notes weakness of her arms. Patient notes difficulty with fine motor skills. She notes occasional balance disturbance and chronic right knee pain. She notes chronic urinary urgency, otherwise denies saddle numbness or other bowel or bladder dysfunction. The pain occasionally interferes with her sleep. She has tried home exercises and cervical traction collar with temporary improvement.  She was unable to tolerate Gabapentin and Lyrica in the past.    Current/Past Treatment:   Physical Therapy: HEP, cervical traction  Chiropractic:  No  Injection:  No   Medications: Flexeril, Valium , Norco    Past Medical History:   Past Medical History:   Diagnosis Date    Acute lateral meniscus tear of right knee 02/2019    Acute medial meniscus tear of right knee 02/2019    Anesthesia complication     woke up during one surgery    Anxiety     Arthritis     Asthma     Atrial fibrillation ([address]     new dx 4 weeks ago, first of  Sept 2017    CHF (congestive heart failure) (HCC)     Edema     Fibromyalgia     GERD (gastroesophageal reflux disease)     reflux    Hiatal hernia     Idiopathic urticaria 10/29/2020    SVT (supraventricular tachycardia) ([facility]     hx svt        Past Surgical History:     Past Surgical History:   Procedure Laterality Date    ABLATION OF DYSRHYTHMIC FOCUS  04/2019    afib    BLADDER SUSPENSION  2004    COLONOSCOPY N/A 8/4/2022    COLONOSCOPY performed by Gilma Chavez at [facility] (CERVIX STATUS UNKNOWN)  2004    KNEE ARTHROSCOPY Right 7/11/2019    VIDEO ARTHROSCOPY RIGHT KNEE, PARTIAL MEDIAL AND LATERAL MENISCECTOMY,, MEDIAL MICRO FRACTURE CHONDROPLASTY performed by Juan Franz MD at [facility] GASTRIC BYPASS  2011    TONSILLECTOMY AND ADENOIDECTOMY      UPPER GASTROINTESTINAL ENDOSCOPY N/A 8/4/2022    EGD BIOPSY performed by Gilma Chavez at [address] N/A 8/26/2019    DIAGNOSTIC LAPAROSCOPY, LAPAROSCOPIC LYSIS OF ADHESIONS, LAPAROSCOPIC REDUCTION OF RECURRENT INCISIONAL HERNIA WITH MESH performed by Madina Ramon MD at [facility] OR       Current Medications:     Current Outpatient Medications:     doxepin (SINEQUAN) 25 MG capsule, Take 1 capsule by mouth nightly for 15 days (Patient not taking: Reported on 8/4/2022), Disp: 15 capsule, Rfl: 0    desvenlafaxine succinate (PRISTIQ) 100 MG TB24 extended release tablet, Take 3 tablets by mouth daily (Patient not taking: Reported on 8/4/2022), Disp: 270 tablet, Rfl: 0    busPIRone (BUSPAR) 15 MG tablet, Take 15 mg by mouth 3 times daily (Patient not taking: Reported on 8/4/2022), Disp: 270 tablet, Rfl: 0    chlorzoxazone (PARAFON FORTE) 500 MG tablet, Take 0.5-1 tablets by mouth 3 times daily as needed for Muscle spasms, Disp: 60 tablet, Rfl: 1    famotidine (PEPCID) 40 MG tablet, TAKE 1 TABLET BY MOUTH AT BEDTIME, Disp: , Rfl:     pantoprazole (PROTONIX) 40 MG tablet, TAKE 1 TABLET BY MOUTH DAILY, Disp: , Rfl:     albuterol sulfate  (90 Base) MCG/ACT inhaler, Inhale 2 puffs into the lungs every 4 hours as needed for Wheezing or Shortness of Breath, Disp: 8.5 g, Rfl: 11    rOPINIRole (REQUIP) 0.5 MG tablet, Take 3 tablets by mouth every evening, Disp: 90 tablet, Rfl: 5    rOPINIRole (REQUIP) 1 MG tablet, Take 1.5 tablets by mouth in the morning and at bedtime, Disp: 90 tablet, Rfl: 5    D-Xylitol (XYLAREX PO), Take by mouth (Patient not taking: Reported on 8/4/2022), Disp: , Rfl:     EPINEPHrine (EPIPEN) 0.3 MG/0.3ML SOAJ injection, INJECT INTO UPPER THIGH AS DIRECTED AS NEEDED FOR ANAPHYLAXIS, Disp: , Rfl:     diazePAM (VALIUM) 5 MG tablet, TAKE 1 TABLET BY MOUTH NIGHTLY AS NEEDED FOR ANXIETY (Patient not taking: Reported on 8/4/2022), Disp: , Rfl:     metFORMIN (GLUCOPHAGE XR) 500 MG extended release tablet, Take 1 tablet by mouth daily (with breakfast) (Patient not taking: Reported on 8/4/2022), Disp: 30 tablet, Rfl: 3    DULERA 200-5 MCG/ACT inhaler, INHALE TWO PUFFS BY MOUTH TWICE A DAY, Disp: 1 Inhaler, Rfl: 11    Handicap Placard MISC, by Does not apply route Dx lumbar degenerative disc disease. Effective May 6, 2021. PCP requesting placard with no expiration, Disp: 1 each, Rfl: 0    flecainide (TAMBOCOR) 50 MG tablet, Take 1 tablet by mouth 2 times daily, Disp: 60 tablet, Rfl: 5    Handicap Placard MISC, by Does not apply route Dx of lumbar DDD. Effective Dec 18, 2020 until Dec 18, 2021., Disp: 1 each, Rfl: 0    omalizumab (XOLAIR) 150 MG injection, Inject 300 mg into the skin once for 1 dose, Disp: 1 vial, Rfl: 3    Spacer/Aero-Holding Chambers (AEROCHAMBER MV) MISC, Use with inhaler as directed, Disp: 1 each, Rfl: 0    Allergies:  Latex, Aspirin, Demerol, Meperidine, Nsaids, Pcn [penicillins], Ranitidine hcl, and Sulfa antibiotics    Social History:    reports that she has never smoked. She has never used smokeless tobacco. She reports current alcohol use. She reports current drug use. Drugs: Marijuana (Weed) and Xradia comments.     Family History:   Family History   Problem Relation Age of Onset    Cancer Mother         lung    Arthritis Mother     Cirrhosis Father     Asthma Maternal Aunt        REVIEW OF SYSTEMS: Full ROS noted & scanned   CONSTITUTIONAL: Denies unexplained weight loss, fevers, chills or fatigue  NEUROLOGICAL: Denies unsteady gait or progressive weakness  MUSCULOSKELETAL: Denies joint swelling or redness  PSYCHOLOGICAL: Denies anxiety, depression   SKIN: Denies skin changes, delayed healing, rash, itching   HEMATOLOGIC: Denies easy bleeding or bruising  ENDOCRINE: Denies excessive thirst, urination, heat/cold  RESPIRATORY: Denies current dyspnea, cough  GI: Denies nausea, vomiting, diarrhea   : Denies bowel or bladder issues       PHYSICAL EXAM:    Vitals: Height 5' 10\" (1.778 m), weight (!) 345 lb (156.5 kg), not currently breastfeeding. GENERAL EXAM:  General Apparence: Patient is adequately groomed with no evidence of malnutrition. Orientation: The patient is oriented to time, place and person. Mood & Affect:The patient's mood and affect are appropriate   Vascular: Examination reveals no swelling tenderness in upper or lower extremities. Good capillary refill  Lymphatic: The lymphatic examination bilaterally reveals all areas to be without enlargement or induration  Sensation: Sensation is intact without deficit  Coordination/Balance: Good coordination. Tandem walking normal.     CERVICAL EXAMINATION:  Inspection: Local inspection shows no step-off or bruising. Cervical alignment is normal.     Palpation: No evidence of tenderness at the midline, and trapezius. Paraspinal tenderness is present. There is no step-off or paraspinal spasm. Range of Motion: Cervical flexion, extension, and rotation are mildly reduced with pain. Strength: 5/5 bilateral upper extremities   Special Tests:     Spurling's negative & Leblanc's negative bilaterally. Cubital and Carpal tunnel Tinel's negative bilaterally.       Skin:There are no rashes, ulcerations or lesions in right & left upper extremities. Reflexes: Bilaterally triceps, biceps and brachioradialis are 2+. Clonus absent bilaterally at the feet. Gait & station: normal, patient ambulates without assistance       Additional Examinations:       RIGHT UPPER EXTREMITY:  Inspection/examination of the right upper extremity does not show any tenderness, deformity or injury. Range of motion is unremarkable. There is no gross instability. There are no rashes, ulcerations or lesions. Strength and tone are normal.  LEFT UPPER EXTREMITY: Inspection/examination of the left upper extremity does not show any tenderness, deformity or injury. Range of motion is unremarkable. There is no gross instability. There are no rashes, ulcerations or lesions. Strength and tone are normal.    Diagnostic Testing:  I reviewed MRI images of her cervical spine from 3/23/21. They show degenerative disc changes with a right disc protrusion and moderate-severe right foraminal narrowing C3-4, and disc bulging and facet arthrosis C5-6 and C6-7 with severe bilateral foraminal narrowing. I reviewed CT images of her cervical spine from 11/5/2021 the office today. Those show spondylosis with moderate to severe bilateral foraminal stenosis C5-C6 and C6-C7    I reviewed an EMG of her upper extremities from 11/9/2021. This showed bilateral median nerve lesions at the wrist and no evidence of radiculopathy. Impression:   Cervical DDD with radiculopathy    Plan:    We discussed treatment options including observation, upper extremity EMG, physical therapy, epidural injections or ACDF.  We discussed the risks, benefits, and alternatives to surgery including the risks of nerve, spinal cord, esphogus or vessel injury, paralysis, spinal blood clot, spinal fluid leak, death, infection, need for additional surgery to remove or reposition plates, rods, screws, or graft, adjacent level arthritis failure of surgery to alleviate her symptoms and worse symptoms following surgery, difficulty swallowing and hoarseness. She understands these risks and wishes to proceed with surgery or epidural injection. Her questions were answered.   We will repeat her cervical MRI to be certain she is still a candidate for surgery

## 2022-08-17 NOTE — TELEPHONE ENCOUNTER
Per pharmacy it is a refill too soon until 8/31/22  Pt was very rude to the staff. They were able to get it filled as Ventolin [facility].  Pt has her medication

## 2022-08-17 NOTE — TELEPHONE ENCOUNTER
Belgica Maldonado from [facility] stating albuterol sulfate  (90 Base) MCG/ACT inhaler needs a PA  Asking for this being marked as urgent because the pt is having difficulties breathing as is saying she may need to go to the ED

## 2022-08-19 ENCOUNTER — TELEPHONE (OUTPATIENT)
Dept: ORTHOPEDIC SURGERY | Age: 57
End: 2022-08-19

## 2022-08-19 NOTE — PROGRESS NOTES
Sedonia Pronto    Age 64 y.o.    female    1965    MRN [id number]    9/7/2022  Arrival Time_____________  OR Time____________205 Min     Procedure(s):  ANTERIOR CERVICAL DISCECTOMY AND FUSION CERVICAL 5 - CERVICAL 6, CERVICAL 6 - CERVICAL 7 WITH DEPUY ALLOGRAFT, PLATE AND SCREWS AND EVOKES               **LATEX SENSITIVE**                      General   Surgeon(s):  Parviz Parham, MD      DAY ADMIT ___  SDS/OP ___  OUTPT IN BED ___        Phone [phone] (home)     PCP _____________________ Phone_________________ Epic ( ) Epic CE ( ) Appt ________    ADDITIONAL INFO __________________________________ Cardio/Consult _____________    NOTES _____________________________________________________________________    ____________________________________________________________________________    PAT APPT DATE:________ TIME: ________  FAXED QAD: _______  (__) H&P w/ Hospitalist  __________________________________________________________________________  Preop Nurse phone screen complete: _____________  (__) CBC     (__) W/ DIFF ___________     (__) Hgb A1C    ___________  (__) CHEST X RAY   __________  (__) LIPID PROFILE  ___________  (__) EKG   __________  (__) PT/PTT   ___________  (__) PFT's   __________  (__) BMP   ___________  (__) CAROTIDS  __________  (__) CMP   ___________  (__) VEIN MAPPING  __________  (__) U/A   ___________  (__) HISTORY & PHYSICAL __________  (__) URINE C & S  ___________  (__) CARDIAC CLEARANCE __________  (__) U/A W/ FLEX  ___________  (__) PULM.  CLEARANCE __________  (__) SERUM PREGNANCY ___________  (__) Check Epic DOS orders __________  (__) TYPE & SCREEN __________repeat ( ) (__)  __________________ __________  (__) ALBUMIN Francesca Nicky ___________  (__)  __________________ __________  (__) TRANSFERRIN  ___________  (__)  __________________ __________  (__) LIVER PROFILE  ___________  (__)  __________________ __________  (__) MRSA NASAL SWAB ___________  (__) URINE PREG DOS __________  (__) SED RATE  ___________  (__) BLOOD SUGAR DOS __________  (__) C-REACTIVE PROTEIN ___________    (__) VITAMIN D HYDROXY ___________  (__) BLOOD THINNERS __________    (__) ACE/ ARBS: _____________________     (__) BETABLOCKERS __________________

## 2022-08-22 ENCOUNTER — TELEPHONE (OUTPATIENT)
Dept: CARDIOLOGY CLINIC | Age: 57
End: 2022-08-22

## 2022-08-22 ENCOUNTER — TELEPHONE (OUTPATIENT)
Dept: ORTHOPEDIC SURGERY | Age: 57
End: 2022-08-22

## 2022-08-22 NOTE — TELEPHONE ENCOUNTER
Pt states she is having surgery 9.7.22 and need a cardiac clearance next appt was 9.6.22 pt would like a states she need a soon appt please call [phone]

## 2022-08-22 NOTE — TELEPHONE ENCOUNTER
CPT: [id number] [address] [address]  AUTH: [id number]  DATE RANGE: 9/7/22 TO 12/6/22  INSURANCE: [facility]  LOCATION AND  NOTE: DOC SCANNED

## 2022-08-22 NOTE — TELEPHONE ENCOUNTER
APPROVED  CPT: [id number]: [id number]  DATE RANGE: 9/7/22 TO 12/6/22  INSURANCE: [facility]  LOCATION AND  NOTE: [address] SCANNED

## 2022-08-24 ENCOUNTER — TELEPHONE (OUTPATIENT)
Dept: ORTHOPEDIC SURGERY | Age: 57
End: 2022-08-24

## 2022-08-24 NOTE — PROGRESS NOTES
[address]   Cardiac Electrophysiology Consultation   Date: 8/25/2022  Reason for Consultation:  AF  Consult Requesting Physician: No att. providers found     Chief Complaint:   No chief complaint on file. HPI: María Elena Kerr is a 64 y.o. female with PMH significant for anxiety, GERD, fibromyalgia, morbid obesity s/p gastric bypass, ASTRID (tx'd with surgery), asthma, HFpEF, PAF/AFL, s/p RFA for typical AFL (5/7/19, Dr. Aaliyah Frey), on [address] [place] [address] since 10/2017, but stopped on her own (5/2019) as she felt it was not helpful and frequently forgetting the third dose, recurrent AF with monitor (2/2021) showing 30% symptomatic AF, started on flecainide. Four of her siblings have AF. Interval History: Today, she is here for CV risk assessment for cervical surgery on 9/7/22. She reports having intermittent episodes of AF, occurring about once a month. She feels that stress brings on the episodes. She takes flecainide as needed and has not been taking Eliquis or metoprolol. Denies complaints of dizziness, CP, SOB, orthopnea, presyncope, or syncope. Assessment:  1. PAF, on flecainide as pill in pocket  2. Typical AFL, s/p ablation   3. Diastolic CHF  4. HTN  5.  ASTRID, treated surgically   6. Morbid obesity    WOH8AM1-CTHa score 1 - for gender. No history of hypertension. She was on metformin for weight loss. Hence, she is not on anticoagulation. Plan:  She is at low cardiovascular risk for noncardiac surgery  Continue flecainide as needed for AF episodes  Follow up with EP NP in 6 months      Atrial Fibrillation:    BMI    :   Body mass index is 50.08 kg/m².     Duration   :   10/2017    Symptoms   :   Shortness of breath, palpitations, easy fatigability, dyspnea on exertion, decline in his functional capacity    Previous DCCV :  none    Previous AAD           :   Propafenone 10/2017-5/2019        Flecainide started 3/23/21    Beta blocker  :   none    ACE / ARB  :   none    OAC   : Eliquis    LVEF    :   55-60%    Left atrial size :   4.7 cm    ASTRID   :   Yes, surgically treated    Past Medical History:   Diagnosis Date    Acute lateral meniscus tear of right knee 02/2019    Acute medial meniscus tear of right knee 02/2019    Anesthesia complication     woke up during one surgery    Anxiety     Arthritis     Asthma     Atrial fibrillation ([facility]     new dx 4 weeks ago, first of  Sept 2017    CHF (congestive heart failure) ([address]     Edema     Fibromyalgia     GERD (gastroesophageal reflux disease)     reflux    Hiatal hernia     Idiopathic urticaria 10/29/2020    SVT (supraventricular tachycardia) (HCC)     hx svt        Past Surgical History:   Procedure Laterality Date    ABLATION OF DYSRHYTHMIC FOCUS  04/2019    afib    BLADDER SUSPENSION  2004    COLONOSCOPY N/A 8/4/2022    COLONOSCOPY performed by Jessica Collier at [address] ([address])  2004    KNEE ARTHROSCOPY Right 7/11/2019    VIDEO ARTHROSCOPY RIGHT KNEE, PARTIAL MEDIAL AND LATERAL MENISCECTOMY,, MEDIAL MICRO FRACTURE CHONDROPLASTY performed by Kunal Mclaughlin MD at [facility] GASTRIC BYPASS  2011    TONSILLECTOMY AND ADENOIDECTOMY      UPPER GASTROINTESTINAL ENDOSCOPY N/A 8/4/2022    EGD BIOPSY performed by Jessica Collier at [address] N/A 8/26/2019    DIAGNOSTIC LAPAROSCOPY, LAPAROSCOPIC LYSIS OF ADHESIONS, LAPAROSCOPIC REDUCTION OF RECURRENT INCISIONAL HERNIA WITH MESH performed by Nacho Mcgrath MD at [address]: Allergies   Allergen Reactions    Latex Anaphylaxis and Rash    Aspirin      Swelling in lower legs    Demerol      rash    Meperidine     Nsaids Swelling    Pcn [Penicillins] Hives    Ranitidine Hcl     Sulfa Antibiotics Rash and Hives       Medication:   Prior to Admission medications    Medication Sig Start Date End Date Taking?  Authorizing Provider   albuterol sulfate HFA (PROVENTIL;VENTOLIN;PROAIR) 108 (90 Base) MCG/ACT inhaler INHALE 2 PUFFS BY MOUTH EVERY 4 HOURS AS NEEDED FOR WHEEZING OR SHORTNESS OF BREATH(SPACE OUT TO EVERY 6 HOURS AS SYMPTOMS IMPROVE) 8/17/22  Yes Cleopatra Sellers MD   chlorzoxazone (PARAFON FORTE) 500 MG tablet Take 0.5-1 tablets by mouth 3 times daily as needed for Muscle spasms 6/24/22  Yes Martin Park MD   famotidine (PEPCID) 40 MG tablet TAKE 1 TABLET BY MOUTH AT BEDTIME 4/29/22  Yes Historical Provider, MD   pantoprazole (PROTONIX) 40 MG tablet TAKE 1 TABLET BY MOUTH DAILY 4/29/22  Yes Historical Provider, MD   rOPINIRole (REQUIP) 0.5 MG tablet Take 3 tablets by mouth every evening 5/6/22  Yes Martin Park MD   EPINEPHrine (EPIPEN) 0.3 MG/0.3ML SOAJ injection INJECT INTO UPPER THIGH AS DIRECTED AS NEEDED FOR ANAPHYLAXIS 3/8/22  Yes Historical Provider, MD   metFORMIN (GLUCOPHAGE XR) 500 MG extended release tablet Take 1 tablet by mouth daily (with breakfast) 3/10/22  Yes Emilie Hernandez DO   DULERA 200-5 MCG/ACT inhaler INHALE TWO PUFFS BY MOUTH TWICE A DAY 5/29/21  Yes Cleopatra Sellers MD   Handicap Placard MISC by Does not apply route Dx lumbar degenerative disc disease. Effective May 6, 2021.   PCP requesting placard with no expiration 5/6/21  Yes Emilie Hernandez DO   flecainide (TAMBOCOR) 50 MG tablet Take 1 tablet by mouth 2 times daily 3/23/21  Yes STEPHANIE Silvestre - OCTAVIO   omalizumab [place]) 150 MG injection Inject 300 mg into the skin once for 1 dose 11/6/20 8/25/22 Yes Anant Epps MD   Spacer/Aero-Holding Chambers (AEROCHAMBER MV) MISC Use with inhaler as directed 3/6/20  Yes Jose Armando Feng MD   doxepin (SINEQUAN) 25 MG capsule Take 1 capsule by mouth nightly for 15 days  Patient not taking: No sig reported 7/22/22 8/6/22  Martin Park MD   desvenlafaxine succinate (PRISTIQ) 100 MG TB24 extended release tablet Take 3 tablets by mouth daily  Patient not taking: No sig reported 7/12/22 10/10/22  Clarise Leyden, APRN - CNP   busPIRone (BUSPAR) 15 MG tablet Take 15 mg by mouth 3 times daily  Patient not taking: No sig reported 7/12/22 10/10/22  Clarise Leyden, APRN - CNP   rOPINIRole (REQUIP) 1 MG tablet Take 1.5 tablets by mouth in the morning and at bedtime 5/6/22 8/4/22  Jessi Griffith MD   D-Xylitol (XYLAREX PO) Take by mouth  Patient not taking: No sig reported    Historical Provider, MD   diazePAM (VALIUM) 5 MG tablet TAKE 1 TABLET BY MOUTH NIGHTLY AS NEEDED FOR ANXIETY  Patient not taking: No sig reported 3/17/22   Historical Provider, MD       Social History:   reports that she has never smoked. She has never used smokeless tobacco. She reports current alcohol use. She reports current drug use. Drugs: Marijuana (Weed) and Quipper comments. Family History:  family history includes Arthritis in her mother; Asthma in her maternal aunt; Cancer in her mother; Cirrhosis in her father. Reviewed. Denies family history of sudden cardiac death, arrhythmia, premature CAD    Review of System:    General ROS: negative for - chills, fever   Psychological ROS: negative for - anxiety or depression  Ophthalmic ROS: negative for - eye pain or loss of vision  ENT ROS: negative for - epistaxis, headaches, nasal discharge, sore throat   Allergy and Immunology ROS: negative for - hives, nasal congestion   Hematological and Lymphatic ROS: negative for - bleeding problems, blood clots, bruising or jaundice  Endocrine ROS: negative for - skin changes, temperature intolerance or unexpected weight changes  Respiratory ROS: negative for - cough, hemoptysis, pleuritic pain, SOB, sputum changes or wheezing  Cardiovascular ROS: Per HPI.    Gastrointestinal ROS: negative for - abdominal pain, blood in stools, diarrhea, hematemesis, melena, nausea/vomiting or swallowing difficulty/pain  Genito-Urinary ROS: negative for - dysuria or incontinence  Musculoskeletal ROS: negative for - joint swelling or muscle pain  Neurological ROS: negative for - confusion, dizziness, gait disturbance, headaches, numbness/tingling, seizures, speech problems, tremors, visual changes or weakness  Dermatological ROS: negative for - rash    Physical Examination:  Vitals:    08/25/22 0853   BP: 116/76   Pulse: 79         Constitutional: Oriented. No distress. Head: Normocephalic and atraumatic. Mouth/Throat: Oropharynx is clear and moist.   Eyes: Conjunctivae normal. EOM are normal.   Neck: Normal range of motion. Neck supple. No rigidity. No JVD present. Cardiovascular: Normal rate, regular rhythm, S1&S2 and intact distal pulses. Pulmonary/Chest: Bilateral respiratory sounds. No wheezes. No rhonchi. Abdominal: Soft. Bowel sounds present. No distension, No tenderness. Musculoskeletal: No tenderness. No edema    Lymphadenopathy: Has no cervical adenopathy. Neurological: Alert and oriented. Cranial nerve appears intact, No Gross deficit   Skin: Skin is warm and dry. No rash noted. Psychiatric: Has a normal mood, affect and behavior     Labs:  Reviewed. ECG: reviewed, Sinus  Rhythm, with QRS duration 98 ms. No pathologic Q waves, ventricular pre-excitation, or QT prolongation. Studies:   1. 14 day Zio (2/4-2/22/21):  SR with average HR 72 (), 30% AF burden with the longest episode lasting 4 days and rate up to 219 bpm, 7 runs of SVT with the longest lasting 14 beats and rate up to 187 bpm, symptoms correlating with AF. 30-day monitor (9/17-10/17/18):  SR with average HR 68 (), PACs, PVCs. No AF. 2. Echo 3/17/21:   Summary   Normal left ventricle size and systolic function with an estimated ejection   fraction of 55-60%. Mild concentric left ventricular hypertrophy. No regional wall motion abnormalities are seen. Normal diastolic function. Mild mitral and tricuspid regurgitation. 3. Stress Test 6/3/17:    1. Left ventricular ejection fraction of 57 %.    2. SPECT Myocardial Perfusion Imaging results are considered   negative for acute ischemia. 4. Cath:  na    The MCOT, echocardiogram, stress test, and coronary angiography/PCI were reviewed by myself and used for my plan of care. - The patient is counseled to follow a low salt diet to assure blood pressure remains controlled for cardiovascular risk factor modification.   - The patient is counseled to avoid excess caffeine, and energy drinks as this may exacerbated ectopy and arrhythmia. - The patient is counseled to get regular exercise 3-5 times per week to control cardiovascular risk factors. - The patient is counseled to lose weigt to control cardiovascular risk factors. -The patient is counseled about the health hazards of smoking including cardiovascular side effects and its impact on morbidity and mortality. Thank you for allowing me to participate in the care of Kingsley Prakash. All questions and concerns were addressed to the patient/family. Alternatives to my treatment were discussed.      Javier Cole MD  Cardiac Electrophysiology  [facility]

## 2022-08-24 NOTE — TELEPHONE ENCOUNTER
General Question     Subject: MRI TEST  Patient and /or Facility Request: Kandy Guido  Contact Number: [phone]    [facility] PLAN INSURANCE CALLED IN STATING THAT PATIENT NEEDS SOME MEDICATION FOR HER MRI TEST COMING UP. Viraj Brenner PATIENT NEEDS TO BE IN AN OPEN/ CLOSED MRI TEST. [facility] PROVIDER SERVICES OFFICE NEEDS A CPT CODE FOR THE MRI TO BE APPROVE. [facility]     [address] AT [phone]

## 2022-08-25 ENCOUNTER — OFFICE VISIT (OUTPATIENT)
Dept: CARDIOLOGY CLINIC | Age: 57
End: 2022-08-25
Payer: MEDICAID

## 2022-08-25 VITALS
WEIGHT: 293 LBS | HEART RATE: 79 BPM | DIASTOLIC BLOOD PRESSURE: 76 MMHG | BODY MASS INDEX: 50.08 KG/M2 | SYSTOLIC BLOOD PRESSURE: 116 MMHG

## 2022-08-25 DIAGNOSIS — I10 ESSENTIAL HYPERTENSION: ICD-10-CM

## 2022-08-25 DIAGNOSIS — I50.32 CHRONIC DIASTOLIC HEART FAILURE (HCC): ICD-10-CM

## 2022-08-25 DIAGNOSIS — G47.33 OSA (OBSTRUCTIVE SLEEP APNEA): ICD-10-CM

## 2022-08-25 DIAGNOSIS — I48.3 TYPICAL ATRIAL FLUTTER (HCC): ICD-10-CM

## 2022-08-25 DIAGNOSIS — I48.0 PAROXYSMAL ATRIAL FIBRILLATION (HCC): Primary | ICD-10-CM

## 2022-08-25 PROCEDURE — G8427 DOCREV CUR MEDS BY ELIG CLIN: HCPCS | Performed by: INTERNAL MEDICINE

## 2022-08-25 PROCEDURE — 3017F COLORECTAL CA SCREEN DOC REV: CPT | Performed by: INTERNAL MEDICINE

## 2022-08-25 PROCEDURE — 99214 OFFICE O/P EST MOD 30 MIN: CPT | Performed by: INTERNAL MEDICINE

## 2022-08-25 PROCEDURE — G8417 CALC BMI ABV UP PARAM F/U: HCPCS | Performed by: INTERNAL MEDICINE

## 2022-08-25 PROCEDURE — 93000 ELECTROCARDIOGRAM COMPLETE: CPT | Performed by: INTERNAL MEDICINE

## 2022-08-25 PROCEDURE — 1036F TOBACCO NON-USER: CPT | Performed by: INTERNAL MEDICINE

## 2022-08-25 RX ORDER — FLECAINIDE ACETATE 50 MG/1
50 TABLET ORAL 2 TIMES DAILY PRN
Qty: 30 TABLET | Refills: 5 | Status: SHIPPED | OUTPATIENT
Start: 2022-08-25

## 2022-08-26 ENCOUNTER — OFFICE VISIT (OUTPATIENT)
Dept: FAMILY MEDICINE CLINIC | Age: 57
End: 2022-08-26
Payer: MEDICAID

## 2022-08-26 VITALS
WEIGHT: 293 LBS | SYSTOLIC BLOOD PRESSURE: 118 MMHG | TEMPERATURE: 97.4 F | BODY MASS INDEX: 41.95 KG/M2 | DIASTOLIC BLOOD PRESSURE: 74 MMHG | OXYGEN SATURATION: 96 % | HEIGHT: 70 IN | HEART RATE: 84 BPM

## 2022-08-26 DIAGNOSIS — I10 PRIMARY HYPERTENSION: ICD-10-CM

## 2022-08-26 DIAGNOSIS — Z01.818 PRE-OP EVALUATION: ICD-10-CM

## 2022-08-26 DIAGNOSIS — G89.29 CHRONIC NECK PAIN: Primary | ICD-10-CM

## 2022-08-26 DIAGNOSIS — M54.2 CHRONIC NECK PAIN: Primary | ICD-10-CM

## 2022-08-26 LAB
A/G RATIO: 1.7 (ref 1.1–2.2)
ALBUMIN SERPL-MCNC: 4.1 G/DL (ref 3.4–5)
ALP BLD-CCNC: 119 U/L (ref 40–129)
ALT SERPL-CCNC: 11 U/L (ref 10–40)
ANION GAP SERPL CALCULATED.3IONS-SCNC: 11 MMOL/L (ref 3–16)
AST SERPL-CCNC: 14 U/L (ref 15–37)
BASOPHILS ABSOLUTE: 0 K/UL (ref 0–0.2)
BASOPHILS RELATIVE PERCENT: 0.4 %
BILIRUB SERPL-MCNC: 0.5 MG/DL (ref 0–1)
BUN BLDV-MCNC: 12 MG/DL (ref 7–20)
CALCIUM SERPL-MCNC: 8.6 MG/DL (ref 8.3–10.6)
CHLORIDE BLD-SCNC: 106 MMOL/L (ref 99–110)
CO2: 25 MMOL/L (ref 21–32)
CREAT SERPL-MCNC: 1.1 MG/DL (ref 0.6–1.1)
EOSINOPHILS ABSOLUTE: 0.2 K/UL (ref 0–0.6)
EOSINOPHILS RELATIVE PERCENT: 2.8 %
GFR AFRICAN AMERICAN: >60
GFR NON-AFRICAN AMERICAN: 51
GLUCOSE BLD-MCNC: 87 MG/DL (ref 70–99)
HCT VFR BLD CALC: 36.3 % (ref 36–48)
HEMOGLOBIN: 11.3 G/DL (ref 12–16)
LYMPHOCYTES ABSOLUTE: 1.6 K/UL (ref 1–5.1)
LYMPHOCYTES RELATIVE PERCENT: 20.2 %
MCH RBC QN AUTO: 26 PG (ref 26–34)
MCHC RBC AUTO-ENTMCNC: 31.2 G/DL (ref 31–36)
MCV RBC AUTO: 83.2 FL (ref 80–100)
MONOCYTES ABSOLUTE: 0.7 K/UL (ref 0–1.3)
MONOCYTES RELATIVE PERCENT: 8.4 %
NEUTROPHILS ABSOLUTE: 5.3 K/UL (ref 1.7–7.7)
NEUTROPHILS RELATIVE PERCENT: 68.2 %
PDW BLD-RTO: 16.3 % (ref 12.4–15.4)
PLATELET # BLD: 336 K/UL (ref 135–450)
PMV BLD AUTO: 8.9 FL (ref 5–10.5)
POTASSIUM SERPL-SCNC: 4.7 MMOL/L (ref 3.5–5.1)
RBC # BLD: 4.37 M/UL (ref 4–5.2)
SODIUM BLD-SCNC: 142 MMOL/L (ref 136–145)
TOTAL PROTEIN: 6.5 G/DL (ref 6.4–8.2)
WBC # BLD: 7.8 K/UL (ref 4–11)

## 2022-08-26 PROCEDURE — 99214 OFFICE O/P EST MOD 30 MIN: CPT | Performed by: FAMILY MEDICINE

## 2022-08-26 RX ORDER — METHOCARBAMOL 750 MG/1
750 TABLET, FILM COATED ORAL 3 TIMES DAILY
Qty: 90 TABLET | Refills: 0 | Status: SHIPPED | OUTPATIENT
Start: 2022-08-26

## 2022-08-26 SDOH — ECONOMIC STABILITY: FOOD INSECURITY: WITHIN THE PAST 12 MONTHS, THE FOOD YOU BOUGHT JUST DIDN'T LAST AND YOU DIDN'T HAVE MONEY TO GET MORE.: NEVER TRUE

## 2022-08-26 SDOH — ECONOMIC STABILITY: FOOD INSECURITY: WITHIN THE PAST 12 MONTHS, YOU WORRIED THAT YOUR FOOD WOULD RUN OUT BEFORE YOU GOT MONEY TO BUY MORE.: NEVER TRUE

## 2022-08-26 ASSESSMENT — PATIENT HEALTH QUESTIONNAIRE - PHQ9
7. TROUBLE CONCENTRATING ON THINGS, SUCH AS READING THE NEWSPAPER OR WATCHING TELEVISION: 0
5. POOR APPETITE OR OVEREATING: 0
SUM OF ALL RESPONSES TO PHQ QUESTIONS 1-9: 6
3. TROUBLE FALLING OR STAYING ASLEEP: 3
SUM OF ALL RESPONSES TO PHQ QUESTIONS 1-9: 6
8. MOVING OR SPEAKING SO SLOWLY THAT OTHER PEOPLE COULD HAVE NOTICED. OR THE OPPOSITE, BEING SO FIGETY OR RESTLESS THAT YOU HAVE BEEN MOVING AROUND A LOT MORE THAN USUAL: 0
SUM OF ALL RESPONSES TO PHQ QUESTIONS 1-9: 6
1. LITTLE INTEREST OR PLEASURE IN DOING THINGS: 0
SUM OF ALL RESPONSES TO PHQ9 QUESTIONS 1 & 2: 0
2. FEELING DOWN, DEPRESSED OR HOPELESS: 0
6. FEELING BAD ABOUT YOURSELF - OR THAT YOU ARE A FAILURE OR HAVE LET YOURSELF OR YOUR FAMILY DOWN: 0
4. FEELING TIRED OR HAVING LITTLE ENERGY: 3
10. IF YOU CHECKED OFF ANY PROBLEMS, HOW DIFFICULT HAVE THESE PROBLEMS MADE IT FOR YOU TO DO YOUR WORK, TAKE CARE OF THINGS AT HOME, OR GET ALONG WITH OTHER PEOPLE: 0
9. THOUGHTS THAT YOU WOULD BE BETTER OFF DEAD, OR OF HURTING YOURSELF: 0
SUM OF ALL RESPONSES TO PHQ QUESTIONS 1-9: 6

## 2022-08-26 ASSESSMENT — SOCIAL DETERMINANTS OF HEALTH (SDOH): HOW HARD IS IT FOR YOU TO PAY FOR THE VERY BASICS LIKE FOOD, HOUSING, MEDICAL CARE, AND HEATING?: NOT HARD AT ALL

## 2022-08-26 NOTE — PROGRESS NOTES
Portions of this chart may have been created with voice recognition software. Occasional wrong-word or \"sound-alike\" substitutions may have occurred due to the inherent limitations of voice recognition software. Read the chart carefully and recognize, using context, where these substitutions have occurred    Chief Complaint    Pre-operative evaluation    SUBJECTIVE:    Chaparro Boo is a 64 y.o. female who is here for pre operative evaluation. Chaparro Boo is undergoing following surgery    Chaparro Boo   MRN: [id number]     General Information    Date: 9/7/2022 Time: 1000 Status: Scheduled   Location: [facility] OR Room: OR  Service: Neurosurgery   Patient class: Outpatient Surgery Case classification: Elective      Pre-op Diagnosis         Panel Information    Panel 1    Surgeon Role   George Garcia MD Primary    Procedure Laterality Anesthesia   ANTERIOR CERVICAL DISCECTOMY AND FUSION CERVICAL 5 - CERVICAL 6, CERVICAL 6 - CERVICAL 7 WITH DEPUY ALLOGRAFT, PLATE AND SCREWS AND EVOKES               **LATEX SENSITIVE** N/A General            PERIOPERATIVE RISKS:    Perioperative cardiovascular risk; current active hypertension, AF, chronic diastolic heart failure all of which are currently stable controlled, under the care of cardiologist, cleared by cardiologist to be low preoperative cardiovascular risk. .    Perioperative respiratory risk: Patient has been assessed by Brigette Nurse respiratory failure index and has less than 10 risk factors; hence patient has a 0.5% perioperative risk of respiratory failure. Perioperative liver and renal risks: These have been assessed with laboratory investigations. However, the patient is at average risk for hepatic and renal complications perioperatively. Perioperative adrenal risk: Patient has not been on any oral corticosteroids for the past six months; hence is at a low risk for adrenal failure perioperatively.     Comorbid conditions: Patient was advised to refrain from any aspirin, any NSAIDS' and other medications as directed by surgeon prior to procedure. Perioperative intubation risk; The patient does not have any history of rheumatoid arthritis: hence, she is at a low risk for any cervical subluxation spinal injuries during intubation. Patient has a airway Mallampati classification of class II    GI prophylaxis will be left to the discretion of the surgeon, anesthesiologist, or physician taking care of the patient in the hospital.    Functional Capacity: Overall assessment of patient's functional capacity meets< 4 METS. Patient is morbidly obese,  patient falls under ASA Class III classification    Hematological: No history of bleeding disorder, previous thromboembolic episodes. VTE prophylaxis will be left to the discretion of the operating physician. REVIEW OF SYSTEMS:    CONSTITUTIONAL: No weight loss, fever, weakness positive for chronic fatigue  HEENT:No sore throat, runny nose, earache. No vision or hearing disturbances   CARDIOVASCULAR: No chest pain,No palpitations or edema. RESPIRATORY : No shortness of breath, cough. GASTROINTESTINAL: No nausea, vomiting, diarrhea or constipation. No abdominal pain. GENITOURINARY:No dysuria, urgency, frequency, hematuria. NEUROLOGICAL: No headache, dizziness or syncope. MUSCULOSKELETAL: Positive for chronic neck pain, arm pain, hand pain, numbness tingling sensation of both upper extremities, chronic knee pain. PSYCHIATRIC : Deferred depressed mood currently stable, insomnia currently not under control. SKIN: No rash or itching.         Lab Results   Component Value Date    WBC 6.2 05/26/2022    HGB 13.9 05/26/2022    HCT 43.3 05/26/2022    MCV 91.7 05/26/2022     05/26/2022      Lab Results   Component Value Date    LABA1C 5.4 09/07/2018     Lab Results   Component Value Date    .3 09/07/2018     Lab Results   Component Value Date    TSH 2.97 05/25/2022     Lab Results   Component Value Date CHOL 194 05/25/2022     Lab Results   Component Value Date    TRIG 81 05/25/2022     Lab Results   Component Value Date    HDL 82 (H) 05/25/2022     Lab Results   Component Value Date    LDLCALC 96 05/25/2022     Lab Results   Component Value Date    LABVLDL 16 05/25/2022     No results found for: [facility]   Lab Results   Component Value Date     05/25/2022    K 4.6 05/26/2022    CL 99 05/25/2022    CO2 25 05/25/2022    BUN 13 05/25/2022    CREATININE 0.7 05/25/2022    GLUCOSE 93 05/25/2022    CALCIUM 9.3 05/25/2022    PROT 7.3 05/25/2022    LABALBU 4.7 05/25/2022    BILITOT 0.4 05/25/2022    ALKPHOS 121 05/25/2022    AST 20 05/25/2022    ALT 12 05/25/2022    LABGLOM >60 05/25/2022    GFRAA >60 05/25/2022    AGRATIO 1.8 05/25/2022    GLOB 3.4 08/24/2021           Current Outpatient Medications   Medication Sig Dispense Refill    methocarbamol (ROBAXIN) 750 MG tablet Take 1 tablet by mouth 3 times daily 90 tablet 0    flecainide (TAMBOCOR) 50 MG tablet Take 1 tablet by mouth 2 times daily as needed (episdoes of atrial fibrillation) 30 tablet 5    albuterol sulfate HFA (PROVENTIL;VENTOLIN;PROAIR) 108 (90 Base) MCG/ACT inhaler INHALE 2 PUFFS BY MOUTH EVERY 4 HOURS AS NEEDED FOR WHEEZING OR SHORTNESS OF BREATH(SPACE OUT TO EVERY 6 HOURS AS SYMPTOMS IMPROVE) 8.5 g 11    famotidine (PEPCID) 40 MG tablet TAKE 1 TABLET BY MOUTH AT BEDTIME      pantoprazole (PROTONIX) 40 MG tablet TAKE 1 TABLET BY MOUTH DAILY      rOPINIRole (REQUIP) 0.5 MG tablet Take 3 tablets by mouth every evening 90 tablet 5    EPINEPHrine (EPIPEN) 0.3 MG/0.3ML SOAJ injection INJECT INTO UPPER THIGH AS DIRECTED AS NEEDED FOR ANAPHYLAXIS      metFORMIN (GLUCOPHAGE XR) 500 MG extended release tablet Take 1 tablet by mouth daily (with breakfast) 30 tablet 3    DULERA 200-5 MCG/ACT inhaler INHALE TWO PUFFS BY MOUTH TWICE A DAY 1 Inhaler 11    Handicap Placard MISC by Does not apply route Dx lumbar degenerative disc disease.  Effective May 6, 2021.  PCP requesting placard with no expiration 1 each 0    Spacer/Aero-Holding Chambers (AEROCHAMBER MV) MISC Use with inhaler as directed 1 each 0    rOPINIRole (REQUIP) 1 MG tablet Take 1.5 tablets by mouth in the morning and at bedtime 90 tablet 5    D-Xylitol (XYLAREX PO) Take by mouth (Patient not taking: No sig reported)      diazePAM (VALIUM) 5 MG tablet TAKE 1 TABLET BY MOUTH NIGHTLY AS NEEDED FOR ANXIETY (Patient not taking: No sig reported)      omalizumab (XOLAIR) 150 MG injection Inject 300 mg into the skin once for 1 dose 1 vial 3     No current facility-administered medications for this visit.        Allergies   Allergen Reactions    Latex Anaphylaxis and Rash    Aspirin      Swelling in lower legs    Demerol      rash    Meperidine     Nsaids Swelling    Pcn [Penicillins] Hives    Ranitidine Hcl     Sulfa Antibiotics Rash and Hives         Past Medical History:   Diagnosis Date    Acute lateral meniscus tear of right knee 02/2019    Acute medial meniscus tear of right knee 02/2019    Anesthesia complication     woke up during one surgery    Anxiety     Arthritis     Asthma     Atrial fibrillation ([address]     new dx 4 weeks ago, first of  Sept 2017    CHF (congestive heart failure) ([address]     Edema     Fibromyalgia     GERD (gastroesophageal reflux disease)     reflux    Hiatal hernia     Idiopathic urticaria 10/29/2020    SVT (supraventricular tachycardia) ([address]     hx svt         Past Surgical History:   Procedure Laterality Date    ABLATION OF DYSRHYTHMIC FOCUS  04/2019    afib    BLADDER SUSPENSION  2004    COLONOSCOPY N/A 8/4/2022    COLONOSCOPY performed by Merly Masters at [address] [facility] ([address] [facility])  2004    KNEE ARTHROSCOPY Right 7/11/2019    VIDEO ARTHROSCOPY RIGHT KNEE, PARTIAL MEDIAL AND LATERAL MENISCECTOMY,, MEDIAL MICRO FRACTURE CHONDROPLASTY performed by Susan Chaney MD at [facility] BYPASS  2011    TONSILLECTOMY AND ADENOIDECTOMY      UPPER GASTROINTESTINAL ENDOSCOPY N/A 8/4/2022    EGD BIOPSY performed by Merly Masters at [address] N/A 8/26/2019    DIAGNOSTIC LAPAROSCOPY, LAPAROSCOPIC LYSIS OF ADHESIONS, LAPAROSCOPIC REDUCTION OF RECURRENT INCISIONAL HERNIA WITH MESH performed by Michelle Bhatia MD at [address] [facility] History   Problem Relation Age of Onset    Cancer Mother         lung    Arthritis Mother     Cirrhosis Father     Asthma Maternal Aunt         Social History     Socioeconomic History    Marital status:      Spouse name: None    Number of children: 3    Years of education: 15.5    Highest education level: None   Tobacco Use    Smoking status: Never    Smokeless tobacco: Never   Vaping Use    Vaping Use: Never used   Substance and Sexual Activity    Alcohol use: Yes     Comment: 0-1 drinks per month    Drug use: Yes     Types: Marijuana ([employer] Coins), Other-see comments     Comment: Medical Marijuana use    Sexual activity: Yes     Partners: Male     Social Determinants of Health     Financial Resource Strain: Low Risk     Difficulty of Paying Living Expenses: Not hard at all   Food Insecurity: No Food Insecurity    Worried About [address] [employer] in the Last Year: Never true    [facility] in the Last Year: Never true            OBJECTIVE      Vitals:    08/26/22 1036   BP: 118/74   Pulse: 84   Temp: 97.4 °F (36.3 °C)   SpO2: 96%   Weight: (!) 344 lb 12.8 oz (156.4 kg)   Height: 5' 10\" (1.778 m)     General appearance: alert, no distress, cooperative, appears stated age   Head: Normocephalic, without obvious abnormality, atraumatic  Eyes: conjunctivae/corneas clear. Pupils equal, round, reactive to light. Extraocular Movements intact. Ears: normal Tympanic Membranes and external ear canals bilaterally  Nose: Nares normal. Septum midline. Mucosa normal. No drainage or sinus tenderness.    Throat: Lips, mucosa, and tongue normal.  Neck: supple, symmetrical, trachea midline, no adenopathy, symmetric, no tenderness/mass/nodules  Back: inspection of back is normal, no tenderness noted   Lungs: no acute distress, clear to auscultation bilaterally  Heart: regular rate and rhythm, S1, S2 normal, no murmur, click, rub or gallop   Abdomen: soft, non-tender. Bowel sounds normal. No masses, no organomegaly   Extremities: extremities normal, atraumatic, no cyanosis or edema  Pulses: pedal pulses intact  Skin: Skin color, texture, turgor normal. No rashes or lesions  Lymph nodes: Cervical nodes normal., Supraclavicular nodes normal.   Neurologic: Alert and oriented X 3. No focal neurological deficits. Psychiatric: Patient has a normal mood and affect, behavior is normal. Judgment and thought content normal.              ASSESSMENT AND PLAN      Juliann Loera was seen today for pre-op exam.    Diagnoses and all orders for this visit:    Chronic neck pain    Pre-op evaluation    Primary hypertension  -     CBC with Auto Differential; Future  -     Comprehensive Metabolic Panel; Future    Other orders  -     methocarbamol (ROBAXIN) 750 MG tablet; Take 1 tablet by mouth 3 times daily           Return if symptoms worsen or fail to improve. All patient's questions and concerns were addressed appropriately. Please refer to diagnosis, orders and patient instructions for further recommendations given. All patient's questions and concerns were addressed appropriately. All orders, handouts were reviewed in detail with the patient and patient voiced understanding verbally.

## 2022-08-29 DIAGNOSIS — R52 CHRONIC GENERALIZED PAIN DISORDER: Primary | ICD-10-CM

## 2022-08-29 DIAGNOSIS — G89.29 CHRONIC GENERALIZED PAIN DISORDER: Primary | ICD-10-CM

## 2022-08-29 DIAGNOSIS — M17.11 PRIMARY OSTEOARTHRITIS OF RIGHT KNEE: ICD-10-CM

## 2022-08-29 RX ORDER — HYDROCODONE BITARTRATE AND ACETAMINOPHEN 7.5; 325 MG/1; MG/1
1 TABLET ORAL 2 TIMES DAILY PRN
Qty: 60 TABLET | Refills: 0 | Status: CANCELLED | OUTPATIENT
Start: 2022-08-29 | End: 2022-09-28

## 2022-08-30 RX ORDER — HYDROCODONE BITARTRATE AND ACETAMINOPHEN 7.5; 325 MG/1; MG/1
1 TABLET ORAL DAILY
Qty: 30 TABLET | Refills: 0 | Status: SHIPPED | OUTPATIENT
Start: 2022-08-30 | End: 2022-10-11 | Stop reason: SDUPTHER

## 2022-08-31 ENCOUNTER — TELEPHONE (OUTPATIENT)
Dept: ORTHOPEDIC SURGERY | Age: 57
End: 2022-08-31

## 2022-09-01 NOTE — PROGRESS NOTES
1. Do not eat or drink anything after 12 midnight prior to surgery. This includes no water, chewing gum mints, or ice chips. You may brush your teeth and gargle the day of surgery but DO NOT SWALLOW THE WATER. 2. Please see your family doctor/pediatrician for a history and physical and/or concerning medications. Bring any test results/reports from your physician's office. If you are under the care of a heart doctor or specialist please be aware that you may be asked to see him or her for clearance. 3. You may be asked to stop blood thinners such as Coumadin, Plavix, Fragmin, and Lovenox or Anti-inflammatories such as Aspirin, Ibuprofen, Advil, and Naproxen prior to your surgery. Please check with your doctor before stopping these or any other medications. 4. Do not smoke, and do not drink any alcoholic beverages 24 hours prior to surgery. 5. You MUST make arrangements for a responsible adult to take you home after your surgery. For your safety, you will not be allowed to leave alone or drive yourself home. Your surgery will be cancelled if you do not have a ride home. Also for your safety, it is strongly suggested someone stay with you the first 24 hrs after your surgery. 6. A parent/legal guardian must accompany a child scheduled for surgery and plan to stay at the hospital until the child is discharged. Please do not bring other children with you. 7. For your comfort,please wear simple, loose fitting clothing to the hospital.  Please do not bring valuables (money, credit cards, checkbooks, etc.) Do not wear any makeup (including no eye makeup) or nail polish on your fingers or toes. 8. For your safety, please DO NOT wear any jewelry or piercings on day of surgery. All body piercing jewelry must be removed. 9. If you have dentures, they will be removed before going to the OR; for your convenience we will provide you with a container.   If you wear contact lenses or glasses, they will be removed, they will be removed, please bring a case for them. 10. If appicable,Please see your family doctor/pediatrician for a history & physical and/or concerning medications. Bring any test results/reports from your physician's office. 11. Remember to bring Blood Bank bracelet to the hospital on the day of surgery. 12. If you have a Living Will and Durable Power of  for Healthcare, please bring in a copy. 15. Notify your Surgeon if you develop any illness between now and surgery  time, cough, cold, fever, sore throat, nausea, vomiting, etc.  Please notify your surgeon if you experience dizziness, shortness of breath or blurred vision between now & the time of your surgery   14. DO NOT shave your operative site 96 hours prior to surgery. For face & neck surgery, men may use an electric razor 48 hours prior to surgery. 15. Shower the night before surgery with ___Antibacterial soap ___Hibiclens   16. To provide excellent care visitors will be limited to one in the room at any given time. 17.  Please bring picture ID and insurance card. 18.  Visit our web site for additional information:  [email or web address]/surgery.

## 2022-09-01 NOTE — PROGRESS NOTES
Preoperative Screening for Elective Surgery/Invasive Procedures While COVID-19 present in the community    Have you had any of the following symptoms? Fever, chills  Cough  Shortness of breath  Muscle aches/pain  Diarrhea  Abdominal pain, nausea, vomiting  Loss or decrease in taste and / or smell  Risk of Exposure  Have you recently been hospitalized for COVID-19 or flu-like illness, if so when? Recently diagnosed with COVID-19, if so when? Recently tested for COVID-19, if so when? Have you been in close contact with a person or family member who currently has or recently had COVID-23? If yes, when and in what context? Do you live with anybody who in the last 14 days has had fever, chills, shortness of breath, muscle aches, flu-like illness? Do you have any close contacts or family members who are currently in the hospital for COVID-19 or flu-like illness? If yes, assess recent close contact with this person. Indicate if the patient has a positive screen by answering yes to one or more of the above questions. Patients who test positive or screen positive prior to surgery or on the day of surgery should be evaluated in conjunction with the surgeon/proceduralist/anesthesiologist to determine the urgency of the procedure.     Pt states is symptom free

## 2022-09-02 ENCOUNTER — TELEPHONE (OUTPATIENT)
Dept: ORTHOPEDIC SURGERY | Age: 57
End: 2022-09-02

## 2022-09-02 NOTE — TELEPHONE ENCOUNTER
----- Message from Estefanía Angel MD sent at 8/31/2022 11:38 AM EDT -----  Probably okay to have surgery. However safer to do PT and get new MRI.    ----- Message -----  From: Pierre Polk MA  Sent: 8/31/2022  10:32 AM EDT  To: Estefanía Angel MD    Spoke to her about her pain, she is having pain and numbness on both sides now which started a few months ago. it is progressively worse since March (last MRI) she was left arm and now right arm. Pain in fingertips also, all her symptoms are worse over time. She was the lady who MRI was denied and they wanted her to do 6 wks of therapy.

## 2022-09-08 ENCOUNTER — TELEPHONE (OUTPATIENT)
Dept: ORTHOPEDIC SURGERY | Age: 57
End: 2022-09-08

## 2022-09-08 DIAGNOSIS — M47.22 CERVICAL SPONDYLOSIS WITH RADICULOPATHY: Primary | ICD-10-CM

## 2022-09-09 NOTE — TELEPHONE ENCOUNTER
Will call patient and talk to her about this. She has to do therapy to get her mri  which Dr. Mervin Suarez wants her to have before her surgery.

## 2022-09-12 ENCOUNTER — TELEPHONE (OUTPATIENT)
Dept: ORTHOPEDIC SURGERY | Age: 57
End: 2022-09-12

## 2022-09-12 NOTE — TELEPHONE ENCOUNTER
General Question     Subject: MRI APPROVED  Patient and /or Facility Request: Ashley Travis  Contact Number: [phone]    PT WANTED TO LET SX  KNOW MRI HAS BEEN APPROVED AND SHE IS GETTING IT DONE AS SOON AS POSSIBLE. CONTACT PT AT NUMBER LISTED IF NEED TO ADVISE.

## 2022-09-19 ENCOUNTER — HOSPITAL ENCOUNTER (OUTPATIENT)
Dept: MRI IMAGING | Age: 57
Discharge: HOME | End: 2022-09-19
Payer: MEDICAID

## 2022-09-19 DIAGNOSIS — M47.22 CERVICAL SPONDYLOSIS WITH RADICULOPATHY: ICD-10-CM

## 2022-09-19 PROCEDURE — 72141 MRI NECK SPINE W/O DYE: CPT

## 2022-09-27 ENCOUNTER — TELEPHONE (OUTPATIENT)
Dept: ORTHOPEDIC SURGERY | Age: 57
End: 2022-09-27

## 2022-09-27 NOTE — TELEPHONE ENCOUNTER
----- Message from Franck Quiroz MD sent at 9/27/2022  9:17 AM EDT -----  Amy Ivory to proceed with surgery    ----- Message -----  From: Jenna Patricia MA  Sent: 9/22/2022  12:43 PM EDT  To: Franck Quiroz MD    Review her new mri , her surgery is Friday 9-30-22

## 2022-09-28 ENCOUNTER — OFFICE VISIT (OUTPATIENT)
Dept: FAMILY MEDICINE CLINIC | Age: 57
End: 2022-09-28
Payer: MEDICAID

## 2022-09-28 VITALS
WEIGHT: 293 LBS | SYSTOLIC BLOOD PRESSURE: 130 MMHG | TEMPERATURE: 97.2 F | HEIGHT: 70 IN | HEART RATE: 70 BPM | BODY MASS INDEX: 41.95 KG/M2 | DIASTOLIC BLOOD PRESSURE: 86 MMHG | OXYGEN SATURATION: 97 %

## 2022-09-28 DIAGNOSIS — J30.9 CHRONIC ALLERGIC RHINITIS: ICD-10-CM

## 2022-09-28 DIAGNOSIS — Z01.818 PRE-OP EVALUATION: ICD-10-CM

## 2022-09-28 DIAGNOSIS — G89.29 CHRONIC NECK PAIN: Primary | ICD-10-CM

## 2022-09-28 DIAGNOSIS — M54.2 CHRONIC NECK PAIN: Primary | ICD-10-CM

## 2022-09-28 LAB
A/G RATIO: 1.4 (ref 1.1–2.2)
ALBUMIN SERPL-MCNC: 3.8 G/DL (ref 3.4–5)
ALP BLD-CCNC: 109 U/L (ref 40–129)
ALT SERPL-CCNC: 14 U/L (ref 10–40)
ANION GAP SERPL CALCULATED.3IONS-SCNC: 8 MMOL/L (ref 3–16)
AST SERPL-CCNC: 16 U/L (ref 15–37)
BASOPHILS ABSOLUTE: 0 K/UL (ref 0–0.2)
BASOPHILS RELATIVE PERCENT: 0.8 %
BILIRUB SERPL-MCNC: 0.5 MG/DL (ref 0–1)
BUN BLDV-MCNC: 12 MG/DL (ref 7–20)
CALCIUM SERPL-MCNC: 9 MG/DL (ref 8.3–10.6)
CHLORIDE BLD-SCNC: 104 MMOL/L (ref 99–110)
CO2: 27 MMOL/L (ref 21–32)
CREAT SERPL-MCNC: 0.9 MG/DL (ref 0.6–1.1)
EOSINOPHILS ABSOLUTE: 0.2 K/UL (ref 0–0.6)
EOSINOPHILS RELATIVE PERCENT: 2.8 %
GFR AFRICAN AMERICAN: >60
GFR NON-AFRICAN AMERICAN: >60
GLUCOSE BLD-MCNC: 97 MG/DL (ref 70–99)
HCT VFR BLD CALC: 36.4 % (ref 36–48)
HEMOGLOBIN: 11.5 G/DL (ref 12–16)
LYMPHOCYTES ABSOLUTE: 2.2 K/UL (ref 1–5.1)
LYMPHOCYTES RELATIVE PERCENT: 36.3 %
MCH RBC QN AUTO: 25 PG (ref 26–34)
MCHC RBC AUTO-ENTMCNC: 31.6 G/DL (ref 31–36)
MCV RBC AUTO: 79.2 FL (ref 80–100)
MONOCYTES ABSOLUTE: 0.7 K/UL (ref 0–1.3)
MONOCYTES RELATIVE PERCENT: 11 %
NEUTROPHILS ABSOLUTE: 2.9 K/UL (ref 1.7–7.7)
NEUTROPHILS RELATIVE PERCENT: 49.1 %
PDW BLD-RTO: 17 % (ref 12.4–15.4)
PLATELET # BLD: 295 K/UL (ref 135–450)
PMV BLD AUTO: 9.1 FL (ref 5–10.5)
POTASSIUM SERPL-SCNC: 4.8 MMOL/L (ref 3.5–5.1)
RBC # BLD: 4.6 M/UL (ref 4–5.2)
SODIUM BLD-SCNC: 139 MMOL/L (ref 136–145)
TOTAL PROTEIN: 6.6 G/DL (ref 6.4–8.2)
WBC # BLD: 5.9 K/UL (ref 4–11)

## 2022-09-28 PROCEDURE — 99214 OFFICE O/P EST MOD 30 MIN: CPT | Performed by: FAMILY MEDICINE

## 2022-09-28 RX ORDER — AZELASTINE 1 MG/ML
2 SPRAY, METERED NASAL 2 TIMES DAILY
Qty: 120 ML | Refills: 1 | Status: SHIPPED | OUTPATIENT
Start: 2022-09-28

## 2022-09-28 ASSESSMENT — PATIENT HEALTH QUESTIONNAIRE - PHQ9
SUM OF ALL RESPONSES TO PHQ9 QUESTIONS 1 & 2: 0
SUM OF ALL RESPONSES TO PHQ QUESTIONS 1-9: 0
2. FEELING DOWN, DEPRESSED OR HOPELESS: 0
1. LITTLE INTEREST OR PLEASURE IN DOING THINGS: 0
SUM OF ALL RESPONSES TO PHQ QUESTIONS 1-9: 0

## 2022-09-28 NOTE — PROGRESS NOTES
Portions of this chart may have been created with voice recognition software. Occasional wrong-word or \"sound-alike\" substitutions may have occurred due to the inherent limitations of voice recognition software. Read the chart carefully and recognize, using context, where these substitutions have occurred    Chief Complaint    Pre-operative evaluation    SUBJECTIVE:    Clemente Mccarthy is a 64 y.o. female who is here for pre operative evaluation. Clemente Mccarthy is undergoing following surgery      General Information    Date: 9/30/2022 Time: 0730 Status: Scheduled   Location: [address] [place] [address] OR Room: [facility] Service: Neurosurgery   Patient class: Outpatient Surgery Case classification: Elective      Pre-op Diagnosis         Panel Information    Panel 1    Surgeon Role   Isidro Hernandez MD Primary    Procedure Laterality Anesthesia   ANTERIOR CERVICAL DISCECTOMY AND FUSION CERVICAL 5 - CERVICAL 6, CERVICAL 6 - CERVICAL 7 WITH DEPUY ALLOGRAFT, PLATE AND SCREWS AND EVOKES               **LATEX SENSITIVE** N/A General                  PERIOPERATIVE RISKS:    Perioperative cardiovascular risk; current active hypertension, AF, chronic diastolic heart failure all of which are currently stable controlled, under the care of cardiologist, cleared by cardiologist to be low preoperative cardiovascular risk. .    Perioperative respiratory risk: Patient has been assessed by Lencho Cea respiratory failure index and has less than 10 risk factors; hence patient has a 0.5% perioperative risk of respiratory failure. Perioperative liver and renal risks: These have been assessed with laboratory investigations. However, the patient is at average risk for hepatic and renal complications perioperatively. Perioperative adrenal risk: Patient has not been on any oral corticosteroids for the past six months; hence is at a low risk for adrenal failure perioperatively.     Comorbid conditions: Patient was advised to refrain from any aspirin, any NSAIDS' and other medications as directed by surgeon prior to procedure. Perioperative intubation risk; The patient does not have any history of rheumatoid arthritis: hence, she is at a low risk for any cervical subluxation spinal injuries during intubation. Patient has a airway Mallampati classification of class II    GI prophylaxis will be left to the discretion of the surgeon, anesthesiologist, or physician taking care of the patient in the hospital.    Functional Capacity: Overall assessment of patient's functional capacity meets< 4 METS. Patient is morbidly obese,  patient falls under ASA Class III classification    Hematological: No history of bleeding disorder, previous thromboembolic episodes. VTE prophylaxis will be left to the discretion of the operating physician. REVIEW OF SYSTEMS:    CONSTITUTIONAL: No weight loss, fever, weakness,  positive for chronic fatigue  HEENT: Worsening chronic allergic rhinitis symptoms including runny nose itchy nose itchy throat, ears. No fever no chills no sore throat no sinus pressure pain symptoms. Requesting nasal antihistamines as oral antihistamines are not proving to be effective at all. CARDIOVASCULAR: No chest pain,No palpitations or edema. RESPIRATORY : No shortness of breath, cough. GASTROINTESTINAL: No nausea, vomiting, diarrhea or constipation. No abdominal pain. GENITOURINARY:No dysuria, urgency, frequency, hematuria. NEUROLOGICAL: No headache, dizziness or syncope. MUSCULOSKELETAL: Positive for chronic neck pain, arm pain, hand pain, numbness tingling sensation of both upper extremities, chronic knee pain. PSYCHIATRIC : depressed mood currently stable  SKIN: No rash or itching.         Lab Results   Component Value Date    WBC 7.8 08/26/2022    HGB 11.3 (L) 08/26/2022    HCT 36.3 08/26/2022    MCV 83.2 08/26/2022     08/26/2022      Lab Results   Component Value Date    LABA1C 5.4 09/07/2018     Lab Results   Component Value Date    EAG 108.3 09/07/2018     Lab Results   Component Value Date    TSH 2.97 05/25/2022     Lab Results   Component Value Date    CHOL 194 05/25/2022     Lab Results   Component Value Date    TRIG 81 05/25/2022     Lab Results   Component Value Date    HDL 82 (H) 05/25/2022     Lab Results   Component Value Date    LDLCALC 96 05/25/2022     Lab Results   Component Value Date    LABVLDL 16 05/25/2022     No results found for: [facility]   Lab Results   Component Value Date     08/26/2022    K 4.7 08/26/2022     08/26/2022    CO2 25 08/26/2022    BUN 12 08/26/2022    CREATININE 1.1 08/26/2022    GLUCOSE 87 08/26/2022    CALCIUM 8.6 08/26/2022    PROT 6.5 08/26/2022    LABALBU 4.1 08/26/2022    BILITOT 0.5 08/26/2022    ALKPHOS 119 08/26/2022    AST 14 (L) 08/26/2022    ALT 11 08/26/2022    LABGLOM 51 (A) 08/26/2022    GFRAA >60 08/26/2022    AGRATIO 1.7 08/26/2022    GLOB 3.4 08/24/2021           Current Outpatient Medications   Medication Sig Dispense Refill    mupirocin (BACTROBAN) 2 % ointment Apply 2cm to each nostril BID for 5 days prior to surgery 22 g 0    HYDROcodone-acetaminophen (NORCO) 7.5-325 MG per tablet Take 1 tablet by mouth daily for 30 days. Intended supply: 3 days.  Take lowest dose possible to manage pain 30 tablet 0    methocarbamol (ROBAXIN) 750 MG tablet Take 1 tablet by mouth 3 times daily 90 tablet 0    flecainide (TAMBOCOR) 50 MG tablet Take 1 tablet by mouth 2 times daily as needed (episdoes of atrial fibrillation) 30 tablet 5    albuterol sulfate HFA (PROVENTIL;VENTOLIN;PROAIR) 108 (90 Base) MCG/ACT inhaler INHALE 2 PUFFS BY MOUTH EVERY 4 HOURS AS NEEDED FOR WHEEZING OR SHORTNESS OF BREATH(SPACE OUT TO EVERY 6 HOURS AS SYMPTOMS IMPROVE) 8.5 g 11    famotidine (PEPCID) 40 MG tablet TAKE 1 TABLET BY MOUTH AT BEDTIME      pantoprazole (PROTONIX) 40 MG tablet TAKE 1 TABLET BY MOUTH DAILY      rOPINIRole (REQUIP) 0.5 MG tablet Take 3 tablets by mouth every evening 90 tablet 5    EPINEPHrine afib    BLADDER SUSPENSION  2004    COLONOSCOPY N/A 8/4/2022    COLONOSCOPY performed by Mike Monsivais at [facility] (CERVIX STATUS UNKNOWN)  2004    KNEE ARTHROSCOPY Right 7/11/2019    VIDEO ARTHROSCOPY RIGHT KNEE, PARTIAL MEDIAL AND LATERAL MENISCECTOMY,, MEDIAL MICRO FRACTURE CHONDROPLASTY performed by Bob Montez MD at [facility] GASTRIC BYPASS  2011    TONSILLECTOMY AND ADENOIDECTOMY      UPPER GASTROINTESTINAL ENDOSCOPY N/A 8/4/2022    EGD BIOPSY performed by Mike Monsivais at [address] N/A 8/26/2019    DIAGNOSTIC LAPAROSCOPY, LAPAROSCOPIC LYSIS OF ADHESIONS, LAPAROSCOPIC REDUCTION OF RECURRENT INCISIONAL HERNIA WITH MESH performed by Cyndi Strauss MD at [facility] History   Problem Relation Age of Onset    Cancer Mother         lung    Arthritis Mother     Cirrhosis Father     Asthma Maternal Aunt         Social History     Socioeconomic History    Marital status:      Spouse name: None    Number of children: 3    Years of education: 15.5    Highest education level: None   Tobacco Use    Smoking status: Never    Smokeless tobacco: Never   Vaping Use    Vaping Use: Never used   Substance and Sexual Activity    Alcohol use: Yes     Comment: 0-1 drinks per month    Drug use: Yes     Types: Marijuana Praneeth Kasey), Other-see comments     Comment: Medical Marijuana use    Sexual activity: Yes     Partners: Male     Social Determinants of Health     Financial Resource Strain: Low Risk     Difficulty of Paying Living Expenses: Not hard at all   Food Insecurity: No Food Insecurity    Worried About [facility] in the Last Year: Never true    [facility] [address] in the Last Year: Never true            OBJECTIVE      Vitals:    09/28/22 0841   BP: 130/86   Pulse: 70   Temp: 97.2 °F (36.2 °C)   SpO2: 97%   Weight: (!) 346 lb 3.2 oz (157 kg) Height: 5' 10\" (1.778 m)     General appearance: alert, no distress, cooperative, appears stated age   Head: Normocephalic, without obvious abnormality, atraumatic  Eyes: conjunctivae/corneas clear. Pupils equal, round, reactive to light. Extraocular Movements intact. Ears: normal Tympanic Membranes and external ear canals bilaterally  Nose: Nares normal. Septum midline. Mucosa normal. No drainage or sinus tenderness. Throat: Lips, mucosa, and tongue normal.  Neck: supple, symmetrical, trachea midline, no adenopathy, symmetric, no tenderness/mass/nodules  Back: inspection of back is normal, no tenderness noted   Lungs: no acute distress, clear to auscultation bilaterally  Heart: regular rate and rhythm, S1, S2 normal, no murmur, click, rub or gallop   Abdomen: soft, non-tender. Bowel sounds normal. No masses, no organomegaly   Extremities: extremities normal, atraumatic, no cyanosis or edema  Pulses: pedal pulses intact  Skin: Skin color, texture, turgor normal. No rashes or lesions  Lymph nodes: Cervical nodes normal., Supraclavicular nodes normal.   Neurologic: Alert and oriented X 3. No focal neurological deficits. Psychiatric: Patient has a normal mood and affect, behavior is normal. Judgment and thought content normal.              ASSESSMENT AND PLAN      Cholo Orellana was seen today for pre-op exam.    Diagnoses and all orders for this visit:    Chronic neck pain    Pre-op evaluation  -     CBC with Auto Differential; Future  -     Comprehensive Metabolic Panel; Future         Return if symptoms worsen or fail to improve. All patient's questions and concerns were addressed appropriately. Please refer to diagnosis, orders and patient instructions for further recommendations given. All patient's questions and concerns were addressed appropriately. All orders, handouts were reviewed in detail with the patient and patient voiced understanding verbally.

## 2022-09-29 ENCOUNTER — ANESTHESIA EVENT (OUTPATIENT)
Dept: OPERATING ROOM | Age: 57
End: 2022-09-29
Payer: MEDICAID

## 2022-09-30 ENCOUNTER — APPOINTMENT (OUTPATIENT)
Dept: GENERAL RADIOLOGY | Age: 57
End: 2022-09-30
Attending: ORTHOPAEDIC SURGERY
Payer: MEDICAID

## 2022-09-30 ENCOUNTER — HOSPITAL ENCOUNTER (OUTPATIENT)
Age: 57
Setting detail: OUTPATIENT SURGERY
Discharge: HOME | End: 2022-09-30
Attending: ORTHOPAEDIC SURGERY | Admitting: ORTHOPAEDIC SURGERY
Payer: MEDICAID

## 2022-09-30 ENCOUNTER — ANESTHESIA (OUTPATIENT)
Dept: OPERATING ROOM | Age: 57
End: 2022-09-30
Payer: MEDICAID

## 2022-09-30 VITALS
DIASTOLIC BLOOD PRESSURE: 91 MMHG | HEART RATE: 71 BPM | SYSTOLIC BLOOD PRESSURE: 150 MMHG | TEMPERATURE: 97 F | BODY MASS INDEX: 41.95 KG/M2 | WEIGHT: 293 LBS | OXYGEN SATURATION: 93 % | RESPIRATION RATE: 16 BRPM | HEIGHT: 70 IN

## 2022-09-30 DIAGNOSIS — M47.22 CERVICAL SPONDYLOSIS WITH RADICULOPATHY: Primary | ICD-10-CM

## 2022-09-30 PROCEDURE — 6360000002 HC RX W HCPCS

## 2022-09-30 PROCEDURE — 6360000002 HC RX W HCPCS: Performed by: ANESTHESIOLOGY

## 2022-09-30 PROCEDURE — 3600000015 HC SURGERY LEVEL 5 ADDTL 15MIN: Performed by: ORTHOPAEDIC SURGERY

## 2022-09-30 PROCEDURE — A4217 STERILE WATER/SALINE, 500 ML: HCPCS | Performed by: ORTHOPAEDIC SURGERY

## 2022-09-30 PROCEDURE — C1713 ANCHOR/SCREW BN/BN,TIS/BN: HCPCS | Performed by: ORTHOPAEDIC SURGERY

## 2022-09-30 PROCEDURE — 6360000002 HC RX W HCPCS: Performed by: ORTHOPAEDIC SURGERY

## 2022-09-30 PROCEDURE — 7100000011 HC PHASE II RECOVERY - ADDTL 15 MIN: Performed by: ORTHOPAEDIC SURGERY

## 2022-09-30 PROCEDURE — 2580000003 HC RX 258: Performed by: ORTHOPAEDIC SURGERY

## 2022-09-30 PROCEDURE — 2580000003 HC RX 258

## 2022-09-30 PROCEDURE — 3600000005 HC SURGERY LEVEL 5 BASE: Performed by: ORTHOPAEDIC SURGERY

## 2022-09-30 PROCEDURE — 6370000000 HC RX 637 (ALT 250 FOR IP): Performed by: ANESTHESIOLOGY

## 2022-09-30 PROCEDURE — 2720000010 HC SURG SUPPLY STERILE: Performed by: ORTHOPAEDIC SURGERY

## 2022-09-30 PROCEDURE — 2580000003 HC RX 258: Performed by: ANESTHESIOLOGY

## 2022-09-30 PROCEDURE — 2709999900 HC NON-CHARGEABLE SUPPLY: Performed by: ORTHOPAEDIC SURGERY

## 2022-09-30 PROCEDURE — 3209999900 FLUORO FOR SURGICAL PROCEDURES

## 2022-09-30 PROCEDURE — 7100000001 HC PACU RECOVERY - ADDTL 15 MIN: Performed by: ORTHOPAEDIC SURGERY

## 2022-09-30 PROCEDURE — 7100000010 HC PHASE II RECOVERY - FIRST 15 MIN: Performed by: ORTHOPAEDIC SURGERY

## 2022-09-30 PROCEDURE — 72040 X-RAY EXAM NECK SPINE 2-3 VW: CPT

## 2022-09-30 PROCEDURE — 2500000003 HC RX 250 WO HCPCS

## 2022-09-30 PROCEDURE — 3700000000 HC ANESTHESIA ATTENDED CARE: Performed by: ORTHOPAEDIC SURGERY

## 2022-09-30 PROCEDURE — 2500000003 HC RX 250 WO HCPCS: Performed by: ORTHOPAEDIC SURGERY

## 2022-09-30 PROCEDURE — 2780000010 HC IMPLANT OTHER: Performed by: ORTHOPAEDIC SURGERY

## 2022-09-30 PROCEDURE — 7100000000 HC PACU RECOVERY - FIRST 15 MIN: Performed by: ORTHOPAEDIC SURGERY

## 2022-09-30 PROCEDURE — 3700000001 HC ADD 15 MINUTES (ANESTHESIA): Performed by: ORTHOPAEDIC SURGERY

## 2022-09-30 DEVICE — IMPLANTABLE DEVICE: Type: IMPLANTABLE DEVICE | Site: SPINE CERVICAL | Status: FUNCTIONAL

## 2022-09-30 DEVICE — SCREW SPNL L14MM DIA4MM ANT CERV TI SELF DRL VAR ANG FULL: Type: IMPLANTABLE DEVICE | Site: SPINE CERVICAL | Status: FUNCTIONAL

## 2022-09-30 RX ORDER — MIDAZOLAM HYDROCHLORIDE 1 MG/ML
INJECTION INTRAMUSCULAR; INTRAVENOUS
Status: COMPLETED
Start: 2022-09-30 | End: 2022-09-30

## 2022-09-30 RX ORDER — PROPOFOL 10 MG/ML
INJECTION, EMULSION INTRAVENOUS CONTINUOUS PRN
Status: DISCONTINUED | OUTPATIENT
Start: 2022-09-30 | End: 2022-09-30 | Stop reason: SDUPTHER

## 2022-09-30 RX ORDER — REMIFENTANIL HYDROCHLORIDE 1 MG/ML
INJECTION, POWDER, LYOPHILIZED, FOR SOLUTION INTRAVENOUS CONTINUOUS PRN
Status: DISCONTINUED | OUTPATIENT
Start: 2022-09-30 | End: 2022-09-30 | Stop reason: SDUPTHER

## 2022-09-30 RX ORDER — PROPOFOL 10 MG/ML
INJECTION, EMULSION INTRAVENOUS PRN
Status: DISCONTINUED | OUTPATIENT
Start: 2022-09-30 | End: 2022-09-30 | Stop reason: SDUPTHER

## 2022-09-30 RX ORDER — KETAMINE HYDROCHLORIDE 100 MG/ML
INJECTION, SOLUTION INTRAMUSCULAR; INTRAVENOUS PRN
Status: DISCONTINUED | OUTPATIENT
Start: 2022-09-30 | End: 2022-09-30 | Stop reason: SDUPTHER

## 2022-09-30 RX ORDER — OXYCODONE HYDROCHLORIDE 5 MG/1
10 TABLET ORAL PRN
Status: COMPLETED | OUTPATIENT
Start: 2022-09-30 | End: 2022-09-30

## 2022-09-30 RX ORDER — GLYCOPYRROLATE 0.2 MG/ML
INJECTION INTRAMUSCULAR; INTRAVENOUS PRN
Status: DISCONTINUED | OUTPATIENT
Start: 2022-09-30 | End: 2022-09-30 | Stop reason: SDUPTHER

## 2022-09-30 RX ORDER — EPHEDRINE SULFATE 50 MG/ML
INJECTION INTRAVENOUS PRN
Status: DISCONTINUED | OUTPATIENT
Start: 2022-09-30 | End: 2022-09-30 | Stop reason: SDUPTHER

## 2022-09-30 RX ORDER — LIDOCAINE HYDROCHLORIDE 10 MG/ML
INJECTION, SOLUTION EPIDURAL; INFILTRATION; INTRACAUDAL; PERINEURAL PRN
Status: DISCONTINUED | OUTPATIENT
Start: 2022-09-30 | End: 2022-09-30

## 2022-09-30 RX ORDER — REMIFENTANIL HYDROCHLORIDE 1 MG/ML
INJECTION, POWDER, LYOPHILIZED, FOR SOLUTION INTRAVENOUS PRN
Status: DISCONTINUED | OUTPATIENT
Start: 2022-09-30 | End: 2022-09-30

## 2022-09-30 RX ORDER — ONDANSETRON 2 MG/ML
INJECTION INTRAMUSCULAR; INTRAVENOUS PRN
Status: DISCONTINUED | OUTPATIENT
Start: 2022-09-30 | End: 2022-09-30 | Stop reason: SDUPTHER

## 2022-09-30 RX ORDER — PROPOFOL 10 MG/ML
INJECTION, EMULSION INTRAVENOUS PRN
Status: DISCONTINUED | OUTPATIENT
Start: 2022-09-30 | End: 2022-09-30

## 2022-09-30 RX ORDER — LIDOCAINE HYDROCHLORIDE 10 MG/ML
INJECTION, SOLUTION EPIDURAL; INFILTRATION; INTRACAUDAL; PERINEURAL PRN
Status: DISCONTINUED | OUTPATIENT
Start: 2022-09-30 | End: 2022-09-30 | Stop reason: SDUPTHER

## 2022-09-30 RX ORDER — NALOXONE HYDROCHLORIDE 4 MG/.1ML
1 SPRAY NASAL PRN
Qty: 1 EACH | Refills: 5 | Status: SHIPPED | OUTPATIENT
Start: 2022-09-30

## 2022-09-30 RX ORDER — DEXAMETHASONE SODIUM PHOSPHATE 4 MG/ML
INJECTION, SOLUTION INTRA-ARTICULAR; INTRALESIONAL; INTRAMUSCULAR; INTRAVENOUS; SOFT TISSUE PRN
Status: DISCONTINUED | OUTPATIENT
Start: 2022-09-30 | End: 2022-09-30 | Stop reason: SDUPTHER

## 2022-09-30 RX ORDER — SODIUM CHLORIDE, SODIUM LACTATE, POTASSIUM CHLORIDE, CALCIUM CHLORIDE 600; 310; 30; 20 MG/100ML; MG/100ML; MG/100ML; MG/100ML
INJECTION, SOLUTION INTRAVENOUS CONTINUOUS
Status: DISCONTINUED | OUTPATIENT
Start: 2022-09-30 | End: 2022-09-30 | Stop reason: HOSPADM

## 2022-09-30 RX ORDER — DIPHENHYDRAMINE HYDROCHLORIDE 50 MG/ML
12.5 INJECTION INTRAMUSCULAR; INTRAVENOUS
Status: DISCONTINUED | OUTPATIENT
Start: 2022-09-30 | End: 2022-09-30 | Stop reason: HOSPADM

## 2022-09-30 RX ORDER — SODIUM CHLORIDE 9 MG/ML
INJECTION, SOLUTION INTRAVENOUS PRN
Status: DISCONTINUED | OUTPATIENT
Start: 2022-09-30 | End: 2022-09-30 | Stop reason: HOSPADM

## 2022-09-30 RX ORDER — SODIUM CHLORIDE 0.9 % (FLUSH) 0.9 %
5-40 SYRINGE (ML) INJECTION PRN
Status: DISCONTINUED | OUTPATIENT
Start: 2022-09-30 | End: 2022-09-30 | Stop reason: HOSPADM

## 2022-09-30 RX ORDER — SODIUM CHLORIDE 9 MG/ML
INJECTION, SOLUTION INTRAVENOUS CONTINUOUS PRN
Status: DISCONTINUED | OUTPATIENT
Start: 2022-09-30 | End: 2022-09-30 | Stop reason: SDUPTHER

## 2022-09-30 RX ORDER — SODIUM CHLORIDE 0.9 % (FLUSH) 0.9 %
5-40 SYRINGE (ML) INJECTION EVERY 12 HOURS SCHEDULED
Status: DISCONTINUED | OUTPATIENT
Start: 2022-09-30 | End: 2022-09-30 | Stop reason: HOSPADM

## 2022-09-30 RX ORDER — FENTANYL CITRATE 50 UG/ML
INJECTION, SOLUTION INTRAMUSCULAR; INTRAVENOUS PRN
Status: DISCONTINUED | OUTPATIENT
Start: 2022-09-30 | End: 2022-09-30 | Stop reason: SDUPTHER

## 2022-09-30 RX ORDER — CLINDAMYCIN HYDROCHLORIDE 300 MG/1
300 CAPSULE ORAL 2 TIMES DAILY
Qty: 2 CAPSULE | Refills: 0 | Status: SHIPPED | OUTPATIENT
Start: 2022-09-30 | End: 2022-10-01

## 2022-09-30 RX ORDER — BUPIVACAINE HYDROCHLORIDE AND EPINEPHRINE 2.5; 5 MG/ML; UG/ML
INJECTION, SOLUTION EPIDURAL; INFILTRATION; INTRACAUDAL; PERINEURAL PRN
Status: DISCONTINUED | OUTPATIENT
Start: 2022-09-30 | End: 2022-09-30 | Stop reason: HOSPADM

## 2022-09-30 RX ORDER — DOCUSATE SODIUM 100 MG/1
100 CAPSULE, LIQUID FILLED ORAL 2 TIMES DAILY PRN
Qty: 30 CAPSULE | Refills: 1 | Status: SHIPPED | OUTPATIENT
Start: 2022-09-30

## 2022-09-30 RX ORDER — LABETALOL HYDROCHLORIDE 5 MG/ML
5 INJECTION, SOLUTION INTRAVENOUS EVERY 10 MIN PRN
Status: DISCONTINUED | OUTPATIENT
Start: 2022-09-30 | End: 2022-09-30 | Stop reason: HOSPADM

## 2022-09-30 RX ORDER — REMIFENTANIL HYDROCHLORIDE 1 MG/ML
INJECTION, POWDER, LYOPHILIZED, FOR SOLUTION INTRAVENOUS CONTINUOUS PRN
Status: DISCONTINUED | OUTPATIENT
Start: 2022-09-30 | End: 2022-09-30

## 2022-09-30 RX ORDER — ONDANSETRON 2 MG/ML
4 INJECTION INTRAMUSCULAR; INTRAVENOUS
Status: DISCONTINUED | OUTPATIENT
Start: 2022-09-30 | End: 2022-09-30 | Stop reason: HOSPADM

## 2022-09-30 RX ORDER — OXYCODONE HYDROCHLORIDE AND ACETAMINOPHEN 5; 325 MG/1; MG/1
2 TABLET ORAL EVERY 4 HOURS PRN
Qty: 40 TABLET | Refills: 0 | Status: SHIPPED | OUTPATIENT
Start: 2022-09-30 | End: 2022-11-11

## 2022-09-30 RX ORDER — MIDAZOLAM HYDROCHLORIDE 1 MG/ML
1 INJECTION INTRAMUSCULAR; INTRAVENOUS EVERY 10 MIN PRN
Status: DISCONTINUED | OUTPATIENT
Start: 2022-09-30 | End: 2022-09-30 | Stop reason: HOSPADM

## 2022-09-30 RX ORDER — OXYCODONE HYDROCHLORIDE 5 MG/1
5 TABLET ORAL PRN
Status: COMPLETED | OUTPATIENT
Start: 2022-09-30 | End: 2022-09-30

## 2022-09-30 RX ORDER — SUCCINYLCHOLINE CHLORIDE 20 MG/ML
INJECTION INTRAMUSCULAR; INTRAVENOUS PRN
Status: DISCONTINUED | OUTPATIENT
Start: 2022-09-30 | End: 2022-09-30 | Stop reason: SDUPTHER

## 2022-09-30 RX ADMIN — HYDROMORPHONE HYDROCHLORIDE 0.5 MG: 1 INJECTION, SOLUTION INTRAMUSCULAR; INTRAVENOUS; SUBCUTANEOUS at 11:01

## 2022-09-30 RX ADMIN — METHOCARBAMOL 200 MG: 100 INJECTION INTRAMUSCULAR; INTRAVENOUS at 09:16

## 2022-09-30 RX ADMIN — HYDROMORPHONE HYDROCHLORIDE 0.5 MG: 1 INJECTION, SOLUTION INTRAMUSCULAR; INTRAVENOUS; SUBCUTANEOUS at 10:42

## 2022-09-30 RX ADMIN — REMIFENTANIL HYDROCHLORIDE 0.12 MCG/KG/MIN: 1 INJECTION, POWDER, LYOPHILIZED, FOR SOLUTION INTRAVENOUS at 07:48

## 2022-09-30 RX ADMIN — SODIUM CHLORIDE: 9 INJECTION, SOLUTION INTRAVENOUS at 08:15

## 2022-09-30 RX ADMIN — METHOCARBAMOL 100 MG: 100 INJECTION INTRAMUSCULAR; INTRAVENOUS at 09:45

## 2022-09-30 RX ADMIN — KETAMINE HYDROCHLORIDE 20 MG: 100 INJECTION INTRAMUSCULAR; INTRAVENOUS at 08:00

## 2022-09-30 RX ADMIN — GLYCOPYRROLATE 0.2 MG: 0.2 INJECTION, SOLUTION INTRAMUSCULAR; INTRAVENOUS at 08:20

## 2022-09-30 RX ADMIN — KETAMINE HYDROCHLORIDE 10 MG: 100 INJECTION INTRAMUSCULAR; INTRAVENOUS at 09:31

## 2022-09-30 RX ADMIN — HYDROMORPHONE HYDROCHLORIDE 0.5 MG: 1 INJECTION, SOLUTION INTRAMUSCULAR; INTRAVENOUS; SUBCUTANEOUS at 11:11

## 2022-09-30 RX ADMIN — ONDANSETRON 4 MG: 2 INJECTION INTRAMUSCULAR; INTRAVENOUS at 07:44

## 2022-09-30 RX ADMIN — SUCCINYLCHOLINE CHLORIDE 180 MG: 20 INJECTION, SOLUTION INTRAMUSCULAR; INTRAVENOUS at 07:49

## 2022-09-30 RX ADMIN — LIDOCAINE HYDROCHLORIDE 60 MG: 10 INJECTION, SOLUTION EPIDURAL; INFILTRATION; INTRACAUDAL; PERINEURAL at 07:48

## 2022-09-30 RX ADMIN — MIDAZOLAM HYDROCHLORIDE 1 MG: 1 INJECTION, SOLUTION INTRAMUSCULAR; INTRAVENOUS at 07:17

## 2022-09-30 RX ADMIN — FENTANYL CITRATE 50 MCG: 50 INJECTION INTRAMUSCULAR; INTRAVENOUS at 07:45

## 2022-09-30 RX ADMIN — METHOCARBAMOL 200 MG: 100 INJECTION INTRAMUSCULAR; INTRAVENOUS at 09:20

## 2022-09-30 RX ADMIN — EPHEDRINE SULFATE 25 MG: 50 INJECTION INTRAVENOUS at 08:28

## 2022-09-30 RX ADMIN — Medication 3000 MG: at 08:00

## 2022-09-30 RX ADMIN — METHOCARBAMOL 100 MG: 100 INJECTION INTRAMUSCULAR; INTRAVENOUS at 09:07

## 2022-09-30 RX ADMIN — DEXAMETHASONE SODIUM PHOSPHATE 12 MG: 4 INJECTION, SOLUTION INTRAMUSCULAR; INTRAVENOUS at 07:44

## 2022-09-30 RX ADMIN — OXYCODONE 10 MG: 5 TABLET ORAL at 11:36

## 2022-09-30 RX ADMIN — PHENYLEPHRINE HYDROCHLORIDE 30 MCG/MIN: 10 INJECTION INTRAVENOUS at 08:00

## 2022-09-30 RX ADMIN — PROPOFOL 40 MG: 10 INJECTION, EMULSION INTRAVENOUS at 07:49

## 2022-09-30 RX ADMIN — METHOCARBAMOL 200 MG: 100 INJECTION INTRAMUSCULAR; INTRAVENOUS at 09:50

## 2022-09-30 RX ADMIN — PROPOFOL 150 MCG/KG/MIN: 10 INJECTION, EMULSION INTRAVENOUS at 07:48

## 2022-09-30 RX ADMIN — PROPOFOL 200 MG: 10 INJECTION, EMULSION INTRAVENOUS at 07:48

## 2022-09-30 RX ADMIN — METHOCARBAMOL 200 MG: 100 INJECTION INTRAMUSCULAR; INTRAVENOUS at 09:28

## 2022-09-30 RX ADMIN — MIDAZOLAM HYDROCHLORIDE 1 MG: 1 INJECTION INTRAMUSCULAR; INTRAVENOUS at 07:17

## 2022-09-30 RX ADMIN — KETAMINE HYDROCHLORIDE 10 MG: 100 INJECTION INTRAMUSCULAR; INTRAVENOUS at 08:30

## 2022-09-30 RX ADMIN — SODIUM CHLORIDE, POTASSIUM CHLORIDE, SODIUM LACTATE AND CALCIUM CHLORIDE: 600; 310; 30; 20 INJECTION, SOLUTION INTRAVENOUS at 06:45

## 2022-09-30 ASSESSMENT — PAIN DESCRIPTION - LOCATION
LOCATION: ARM;BACK
LOCATION: ARM;BACK

## 2022-09-30 ASSESSMENT — PAIN DESCRIPTION - ORIENTATION
ORIENTATION: RIGHT;LEFT
ORIENTATION: RIGHT;LEFT;MID

## 2022-09-30 ASSESSMENT — PAIN SCALES - GENERAL
PAINLEVEL_OUTOF10: 0
PAINLEVEL_OUTOF10: 7
PAINLEVEL_OUTOF10: 7

## 2022-09-30 ASSESSMENT — PAIN DESCRIPTION - DESCRIPTORS
DESCRIPTORS: ACHING;DISCOMFORT
DESCRIPTORS: ACHING;DISCOMFORT

## 2022-09-30 NOTE — PROGRESS NOTES
Pt to [facility] for Phase II care. Tolerating PO. Dressings CDI. Denies needs at this time.  to bedside upon returning to facility. Call light in easy reach, bedside table in easy reach, and bed in lowest position.   Precilla Postin, RN

## 2022-09-30 NOTE — OP NOTE
Operative Note      Patient: Adama Dupont  YOB: 1965  MRN: [id number]    Date of Procedure: 9/30/2022    Pre-Op Diagnosis: CERVICAL SPONDYLOSIS WITH RADICULOPATHY, morbid obesity BMI 49.67    Post-Op Diagnosis: Same       Procedure(s):  ANTERIOR CERVICAL DISCECTOMY AND FUSION CERVICAL 5 - CERVICAL 6, CERVICAL 6 - CERVICAL 7 WITH DEPUY ALLOGRAFT, PLATE AND SCREWS AND EVOKES               **LATEX SENSITIVE**    Surgeon(s):  Katia Valladares MD    Assistant:   Surgical Assistant: Chris Hankins    Anesthesia: General    Estimated Blood Loss (mL): less than 924     Complications: None    Specimens:   * No specimens in log *    Implants:  Implant Name Type Inv. Item Serial No.  Lot No. LRB No. Used Action   [id number] - [id number]   [id number]   N/A 1 Implanted   [id number] - [id number]   [id number]   N/A 1 Implanted   PLATE SPNL [id number] ANT BILAT CERV TI TWO LEV LUDA HYBRID - [id number]  PLATE SPNL [id number] ANT BILAT CERV TI TWO LEV LUDA HYBRID  [employer] SPINE-WD  N/A 1 Implanted   SCREW SPNL L14MM DIA4MM ANT CERV TI SELF DRL FARIBA ANG FULL - [id number]  SCREW SPNL L14MM DIA4MM ANT CERV TI SELF DRL FARIBA ANG FULL  [employer] DEPU[employer] SPINE-WD  N/A 6 Implanted         Drains: * No LDAs found *    Findings: stenosis    Detailed Description of Procedure:     INDICATIONS FOR SURGERY:   Adama Dupont is a 64 y.o. female with neck pain, left greater than right arm pain and morbid obesity BMI 49.67. The symptoms failed to respond to conservative intervention. An MRI scan was performed and this showed evidence of severe bilateral foraminal stenosis at the C5/C6 and C6-7 levels. Having failed conservative management and experiencing persistent symptoms, the patient elected to proceed ahead with the surgical option ACDF at C5/C6 and C6-7. DETAILS OF PROCEDURE:  The patient was brought to the operating room, placed supine on the vero table and placed under general anesthesia. Evokes stimulating and monitoring needles were placed. A gel roll was placed under the scapula, being careful to avoid cervical extension. A donut pad was placed under the head. Wrist restraints were loosely applied to the wrists and the arms were padded and tucked at the sides. All bony prominences were inspected and padded prior to sterile draping. Positioning and padding were difficult and time consuming given her morbid obesity BMI 49.67. I injected the skin with 0.5% Marcaine with epi. Using a #10 blade knife, the skin was incised in an existing transverse crease on the right side of the neck. A plane was developed between the skin and platysma muscle with scissors. The platysma muscle was grasped with forceps and split longitudinally. The external and anterior jugular veins were identified and protected. A plane was developed deep to the platysma. The anterior border of the sternocleidomastoid was identified and blunt dissection was performed just medial to the sternocleidomastoid muscle and the carotid sheath and just lateral to the strap muscles, trachea, esophagus and thyroid. Vessels and nerves coursing transversely across that interval were identified and protected. The end of an angled retractor was placed deep to the esophagus and the structures medial to the carotid sheath were gently retracted from the midline. I palpated the spine and identified the longus coli muscles. A pin was placed near the superior endplate of what I guessed was C6 and fluoroscopy was used to confirm the level. I marked the proper disc by making a horizontal cut in the disc with a bovie. I removed the pin and used bipolar electrocautery along the medial portions of the longus coli muscles just rostral and caudal to the disc. The longus coli muscles were carefully elevated with straight and angled curettes and the blades of a blackbelt retractor were placed deep the longus coli muscles.  Those blades were attached to the retractor. Exposure was difficult and time consuming given her morbid obesity BMI 49.67. We had to use the longest instruments in the hospital.  Using the operating microscope and a 15 blade knife I incised the edges of the C6-7 annulus from the endplates. I used a straight curette to free the disc from the endplates and the disc was grasped and removed with a pituitary. Maxine distraction pins were then placed in the vertebrae directly rostral and caudal to the disc and the retractor was placed. I removed the anterior inferior lip of the rostral vertebra with a #3 kerrison. I removed disc material back to the PLL with straight and angled curettes. I identified a rent in the PLL. I carefully enlarged that rent with an angled 6-0 curette. I used a #1 then a #2 kerrison to remove the PLL. The dura was identified and protected. I removed a portion of the posterior uncus on each side and performed foraminotomies with a 6-0 curette and a #2 kerrison. A microdissector could easily be passed into the foramen and I could identify exiting nerves. Having completed the spinal cord and nerve decompression, I used the midas to bur the vertebral endplates. This contoured the endplates and exposed bleeding bone. I then used the sizers to determine the best size allograft to insert and to check the fit. The appropriate allograft was impacted into the disc space and the maxine pins were removed. I then performed an anterior discectomy, foraminotomies and interbody fusion at C5-6 using the previously described technique. Bone was was packed into the pin sites. I then selected the shortest plate that spanned the disc space and onto the adjacent portions of the C5, C6, and C7 vertebrae. The plate was positioned midline. I used a guide and an awl to create starter holes. 14 mm screws were then advanced through the holes in the plate and into the  holes.  The screws were tightened and lateral fluoroscopy confirmed good position of the bone graft, plate, and screws. Evokes identified no abnormal changes in monitoring. The Blackbelt retractor blades were removed and I examined all the structures in the wound for injury. Hemostasis was confirmed. I placed the tip of a drain over the spine and brought the second end of the drain out through a second stab wound. I closed the platysma and subcutaneous tissues with interrupted 3-0 Vicryl sutures. I then closed the skin with a  a 4-0 Vicryl subcuticular suture. Sterile dressing were then applied. The patient was extubated in the operating room and transferred to the recovery room in stable condition. There were no complications. Chente Freeman M.D.        Electronically signed by Alba Ronquillo MD on 9/30/2022 at 10:04 AM

## 2022-09-30 NOTE — LETTER
Shawanda DUMAS  : 1965  Diagnosis: Cervical spondylosis with radiculopathy    Surgeon: Haris Harris    DOS: 22    Procedure:  1. 26067 Anterior discectomy, decompression of spinal cord,  interbody fusion C5-6  2. 04824 Anterior discectomy, decompression of spinal cord,  interbody fusion C6-7  3. 06321 Anterior cervical plate C5 to C7  4. 67992 Structural allograft X2

## 2022-09-30 NOTE — DISCHARGE INSTRUCTIONS
[facility]    Dr. Dong Esters Operative Cervical Spine Surgery Instructions    Patients with arm pain before surgery generally note marked improvement in their arm symptoms soon after surgery. However it may take weeks for complete relief of your arm pain. Many patients note an increase in their arm symptoms 2 to 4 days after surgery. Such pain is usually related to inflammation and improves with time. Difficulty swallowing, pain around your shoulders, shoulder blades and the base of your skull are common following surgery. Those symptoms improve as you heal following surgery. Your arm numbness may be a little worse following surgery and should improve with time. Please call our office even at night if you began to experience pain, numbness or weakness in your previously asymptomatic arm or legs, numbness in your groin or saddle area, difficulty controlling your bladder or bowels, or severe pain, difficulty breathing or difficulty walking. Incision/Showering:  Keep your incision clean and dry. You may shower with the dressing that was applied at the hospital. Please avoid bathing in a tub until discussed with the doctor. You may remove the dressing and leave it off on the 4th day after surgery. Look at your wound daily. Call the office if you notice a foul odor or drainage. Call for fevers over 100.5 degrees or go to the emergency room for evaluation. While it is rare, you may be developing an infection. Activity/Driving:  Walking is encouraged as a daily routine. Walk as far as you like. This builds and maintains muscles. Avoid twisting, bending and most importantly, lifting over 5 pounds. Steps are permissible but should be limited to a few times a day only in the first weeks after your surgery. Please refrain from driving until your first visit with the doctor 2 weeks after surgery.  Remember that while you are taking narcotic medications you cannot react or move as quickly and it may be unsafe to drive. You will also need to use your rear view mirrors for improved visibility. Diet/Bowel routine:  Eat three healthy meals a day to assist your body to heal well. Avoid eating hard bits of food like nuts and popcorn that may stick in your throat because of swelling related to your neck surgery. Call the office if you are having difficulty with swallowing. Constipation is common after surgery and especially while on pain medications. Apple or prune juice may help with this problem. You may also try any over the counter laxative you feel may be helpful. Drinking more water is another way to keep your bowels moving. Braces: You may have been given a neck collar brace at the hospital.  If you have received one,  then you should be wearing it at all times when in an upright position. It can be removed for bathing or sleep. Medications: You can resume your usual home medicines after you are discharged from the hospital.  Additionally, pain medications or anti-inflammatory medications may have been  prescribed. These medicines have been given to you to assist in relief of pain related to your surgery. It will be helpful to you to keep your pain under control in order to assist you in completing the recommended daily walks. Returning to work:  Some patients can return to a sedentary job in 2-3 weeks. Heavy  lifting jobs require more time off of work. Please discuss this with the doctor on your first postoperative office visit. Healing may take up to 3 or more  months after a major spine surgery. Diabetics:  Sometimes blood sugars are elevated after a surgery and may take up to a week to return to your usual levels. As well, if you have been prescribed a Medrol Steroid Pack, your sugars are likely to become somewhat elevated for a short period of time.   Please monitor your blood sugars daily and call your primary doctor if the levels become higher than 200.    Follow-up Appointment:  You should contact the office for an appointment with the doctor or his assistant 2-3 weeks after surgery. Dr. Hamilton Chan      [phone]     ANESTHESIA DISCHARGE INSTRUCTIONS    You are under the influence of drugs- do not drink alcohol, drive a car, operate machinery(such as power tools, kitchen appliances, etc), sign legal documents, or make any important decisions for 24 hours (or while on pain medications). Children should not ride bikes or Bent or play on gym sets  for 24 hours after surgery. A responsible adult should be with you for 24 hours. Rest at home today- increase activity as tolerated. Progress slowly to a regular diet unless your physician has instructed you otherwise. Drink plenty of water. CALL YOUR DOCTOR IF YOU:  Have moderate to severe nausea or vomiting AND are unable to hold down fluids or prescribed medications. Have bright red bloody drainage from your dressing that won't stop oozing. Do not get relief with your pain medication    NORMAL (POSSIBLE) SIDE EFFECTS FROM ANESTHESIA:     Confusion, temporary memory loss, delayed reaction times in the first 24 hours  Lightheadedness, dizziness, difficulty focusing, blurred vision  Nausea/vomiting can happen  Shivering, feeling cold, sore throat, cough and muscle aches should stop within 24-48 hours  Trouble urinating - call your surgeon if it has been more than 8 hrs  Bruising or soreness at the IV site - call if it remains red, firm or there is drainage             FEMALES OF CHILDBEARING AGE WHO ARE TAKING BIRTH CONTROL PILLS:  You may have received a medication during your procedure that interferes with the   actions of birth control pills (Bridion or Emend). Use some other kind of birth control in addition to your pills, like a condom, for 1 month after your procedure to prevent unwanted pregnancy.     The following instructions are to be followed if you have a known history or diagnosis of sleep apnea: For all sleep apnea patients:  ? Sleep on your side or sitting up in a chair whenever possible, especially the first 24 hours after surgery. ? Use only medicines prescribed by your doctor. ? Do not drink alcohol. ? If you have a dental device to assist you while at rest, use it at all times for the first 24 hours. For patients using CPAP machines:  ? Use your CPAP machine during all periods of sleep as usual.  ? Use your CPAP machine during all periods of daytime rest while on pain medicines. ** Follow up with your primary care doctor for continued care. IF YOU DO NOT TAKE ALL OF YOUR NARCOTIC PAIN MEDICATION, please dispose of them responsibly. There are drop off boxes in the Emergency Departments 24/7 at both [facility] and [facility]. If these locations are not convenient, other options for discarding them can be found at:  [email or web address]    Hospital or office staff may NOT accept any medications to drop off in the cabinet for you.

## 2022-09-30 NOTE — H&P
I have reviewed the history and physical and examined the patient and find no relevant changes. I have reviewed with the patient and/or family the risks, benefits, and alternatives to the procedure. The patient was counseled at length about the risks of eros Covid-19 during their perioperative period and any recovery window from their procedure. The patient was made aware that eros Covid-19  may worsen their prognosis for recovering from their procedure  and lend to a higher morbidity and/or mortality risk. All material risks, benefits, and reasonable alternatives including postponing the procedure were discussed. The patient does wish to proceed with the procedure at this time. Hellen Miller MD  9/30/2022 No intake/output data recorded.

## 2022-09-30 NOTE — PROGRESS NOTES
D: AVS/discharge instructions, prescriptions and follow up care reviewed with patient and . Patient and  aware that paper prescriptions can be filled at the desired pharmacy. All questions answered; patient and ride verbalized understanding and deny further needs. PIV removed without complications and dressing in place. Patient wheeled down to ride's personal vehicle without issue.

## 2022-09-30 NOTE — DISCHARGE INSTR - DIET
Good nutrition is important when healing from an illness, injury, or surgery. Follow any nutrition recommendations given to you during your hospital stay. If you were given an oral nutrition supplement while in the hospital, continue to take this supplement at home. You can take it with meals, in-between meals, and/or before bedtime. These supplements can be purchased at most local grocery stores, pharmacies, and chain [employer]-stores. If you have any questions about your diet or nutrition, call the hospital and ask for the dietitian.   Advance to your regular diet as tolerated

## 2022-09-30 NOTE — ANESTHESIA POSTPROCEDURE EVALUATION
Department of Anesthesiology  Postprocedure Note    Patient: Angel Renae  MRN: [id number]  YOB: 1965  Date of evaluation: 9/30/2022      Procedure Summary     Date: 09/30/22 Room / Location: [facility]    Anesthesia Start: 6247 Anesthesia Stop: 1725    Procedure: ANTERIOR CERVICAL DISCECTOMY AND FUSION CERVICAL 5 - CERVICAL 6, CERVICAL 6 - CERVICAL 7 WITH DEPUY ALLOGRAFT, PLATE AND SCREWS AND EVOKES               **LATEX SENSITIVE** (Neck) Diagnosis:       Cervical spondylosis with radiculopathy      (CERVICAL SPONDYLOSIS WITH RADICULOPATHY)    Surgeons: Adilia Penaloza MD Responsible Provider: Good Rapp MD    Anesthesia Type: general ASA Status: 4          Anesthesia Type: No value filed.     Savita Phase I: Savita Score: 8    Savita Phase II: Savita Score: 10      Anesthesia Post Evaluation    Comments: Postoperative Anesthesia Note    Name:    Angel Renae  MRN:      [id number]    Patient Vitals in the past 12 hrs:  09/30/22 1221, BP:(!) 150/91, Pulse:71, SpO2:93 %  09/30/22 1205, BP:(!) 160/101, Pulse:76, SpO2:92 %  09/30/22 1200, BP:(!) 150/89, Pulse:74, SpO2:95 %  09/30/22 1155, BP:(!) 170/96, Pulse:62, SpO2:92 %  09/30/22 1150, BP:(!) 155/98, Pulse:73, SpO2:94 %  09/30/22 1145, BP:(!) 122/106, Pulse:66, SpO2:92 %  09/30/22 1140, BP:(!) 165/119, Pulse:66, SpO2:93 %  09/30/22 1135, BP:(!) 165/96, Pulse:68, SpO2:92 %  09/30/22 1130, BP:(!) 144/124, Pulse:68, SpO2:93 %  09/30/22 1125, BP:(!) 172/99, Pulse:66, SpO2:93 %  09/30/22 1120, BP:(!) 170/96, Pulse:73, SpO2:95 %  09/30/22 1115, Pulse:71, SpO2:94 %  09/30/22 1114, BP:(!) 175/103, Pulse:70, SpO2:98 %  09/30/22 1113, Pulse:70, SpO2:98 %  09/30/22 1112, Pulse:68, SpO2:100 %  09/30/22 1111, Pulse:73, SpO2:100 %  09/30/22 1110, Pulse:67, SpO2:98 %  09/30/22 1109, BP:(!) 168/102, Pulse:68, SpO2:100 %  09/30/22 1108, Pulse:65, SpO2:99 %  09/30/22 1107, Pulse:67, SpO2:96 %  09/30/22 1106, Pulse:69, SpO2:95 %  09/30/22 1105, Pulse:64, SpO2:97 %  09/30/22 1104, BP:(!) 174/106, Pulse:65, SpO2:98 %  09/30/22 1103, Pulse:66, SpO2:96 %  09/30/22 1102, Pulse:65, SpO2:94 %  09/30/22 1101, Pulse:76, SpO2:95 %  09/30/22 1100, Pulse:66, SpO2:97 %  09/30/22 1059, BP:(!) 169/98, Pulse:69, SpO2:96 %  09/30/22 1058, Pulse:64, SpO2:97 %  09/30/22 1057, Pulse:71, SpO2:100 %  09/30/22 1056, Pulse:74, SpO2:94 %  09/30/22 1055, Pulse:73, SpO2:99 %  09/30/22 1054, Pulse:68, SpO2:97 %  09/30/22 1053, Pulse:70, SpO2:99 %  09/30/22 1052, Pulse:66, SpO2:97 %  09/30/22 1051, Pulse:77, SpO2:94 %  09/30/22 1050, Pulse:72, SpO2:95 %  09/30/22 1049, BP:(!) 170/97, Pulse:68, SpO2:97 %  09/30/22 1048, Pulse:74, SpO2:98 %  09/30/22 1047, Pulse:68, SpO2:97 %  09/30/22 1046, Pulse:74, SpO2:96 %  09/30/22 1045, Pulse:74, SpO2:98 %  09/30/22 1044, Pulse:72, SpO2:96 %  09/30/22 1043, Pulse:70, SpO2:97 %  09/30/22 1042, Pulse:73, SpO2:96 %  09/30/22 1041, Pulse:73, SpO2:95 %  09/30/22 1040, Pulse:77, SpO2:97 %  09/30/22 1039, BP:(!) 120/107, Pulse:74, SpO2:96 %  09/30/22 1038, Pulse:74, SpO2:95 %  09/30/22 1037, Pulse:76, SpO2:94 %  09/30/22 1036, Pulse:77, SpO2:94 %  09/30/22 1035, Pulse:76, SpO2:93 %  09/30/22 1034, BP:(!) 158/113, Temp:97 °F (36.1 °C), Temp src:Temporal, Pulse:76, Resp:16, SpO2:92 %  09/30/22 0630, BP:(!) 148/81, Temp:97.6 °F (36.4 °C), Temp src:Temporal, Pulse:69, Resp:18, SpO2:94 %     LABS:    CBC  Lab Results       Component                Value               Date/Time                  WBC                      5.9                 09/28/2022 09:39 AM        HGB                      11.5 (L)            09/28/2022 09:39 AM        HCT                      36.4                09/28/2022 09:39 AM        PLT                      295                 09/28/2022 09:39 AM   RENAL  Lab Results       Component                Value               Date/Time                  NA                       139                 09/28/2022 09:39 AM        K 4.8                 09/28/2022 09:39 AM        K                        4.0                 08/24/2021 02:09 PM        CL                       104                 09/28/2022 09:39 AM        CO2                      27                  09/28/2022 09:39 AM        BUN                      12                  09/28/2022 09:39 AM        CREATININE               0.9                 09/28/2022 09:39 AM        GLUCOSE                  97                  09/28/2022 09:39 AM   COAGS  Lab Results       Component                Value               Date/Time                  PROTIME                  10.6                02/25/2020 07:23 AM        INR                      0.91                02/25/2020 07:23 AM        APTT                     30.6                02/25/2020 07:23 AM     Intake & Output:  @[id number]@    Nausea & Vomiting:  No    Level of Consciousness:  Awake    Pain Assessment:  Adequate analgesia    Anesthesia Complications:  No apparent anesthetic complications    SUMMARY      Vital signs stable  OK to discharge from Stage I post anesthesia care.   Care transferred from Anesthesiology department on discharge from perioperative area

## 2022-09-30 NOTE — ANESTHESIA PRE PROCEDURE
Department of Anesthesiology  Preprocedure Note       Name:  Kingsley Prakash   Age:  64 y.o.  :  1965                                          MRN:  [id number]         Date:  2022      Surgeon: Edson Palafox):  Charles Olguin MD    Procedure: Procedure(s):  ANTERIOR CERVICAL DISCECTOMY AND FUSION CERVICAL 5 - CERVICAL 6, CERVICAL 6 - CERVICAL 7 WITH DEPUY ALLOGRAFT, PLATE AND SCREWS AND EVOKES               **LATEX SENSITIVE**    Medications prior to admission:   Prior to Admission medications    Medication Sig Start Date End Date Taking? Authorizing Provider   azelastine (ASTELIN) 0.1 % nasal spray 2 sprays by Nasal route 2 times daily Use in each nostril as directed 22   Sheryle Crane, MD   mupirocin (BACTROBAN) 2 % ointment Apply 2cm to each nostril BID for 5 days prior to surgery 22   Arleth Ewing PA-C   HYDROcodone-acetaminophen (NORCO) 7.5-325 MG per tablet Take 1 tablet by mouth daily for 30 days. Intended supply: 3 days.  Take lowest dose possible to manage pain 22  Sheryle Crane, MD   methocarbamol (ROBAXIN) 750 MG tablet Take 1 tablet by mouth 3 times daily 22   Sheryle Crane, MD   flecainide (TAMBOCOR) 50 MG tablet Take 1 tablet by mouth 2 times daily as needed (episdoes of atrial fibrillation) 22   Kimberly Jones MD   albuterol sulfate HFA (PROVENTIL;VENTOLIN;PROAIR) 108 (90 Base) MCG/ACT inhaler INHALE 2 PUFFS BY MOUTH EVERY 4 HOURS AS NEEDED FOR WHEEZING OR SHORTNESS OF BREATH(SPACE OUT TO EVERY 6 HOURS AS SYMPTOMS IMPROVE) 22   Nancy Newell MD   famotidine (PEPCID) 40 MG tablet TAKE 1 TABLET BY MOUTH AT BEDTIME 22   Historical Provider, MD   pantoprazole (PROTONIX) 40 MG tablet TAKE 1 TABLET BY MOUTH DAILY 22   Historical Provider, MD   rOPINIRole (REQUIP) 0.5 MG tablet Take 3 tablets by mouth every evening 22   Sheryle Crane, MD   rOPINIRole (REQUIP) 1 MG tablet Take 1.5 tablets by mouth in the morning and at bedtime 5/6/22 8/4/22  Estrellita Enriquez MD   EPINEPHrine (EPIPEN) 0.3 MG/0.3ML SOAJ injection INJECT INTO UPPER THIGH AS DIRECTED AS NEEDED FOR ANAPHYLAXIS 3/8/22   Historical Provider, MD   metFORMIN (GLUCOPHAGE XR) 500 MG extended release tablet Take 1 tablet by mouth daily (with breakfast)  Patient taking differently: Take 500 mg by mouth daily (with breakfast) Indications: Weight Loss 3/10/22   Ramírez Hernandez,    DULERA 200-5 MCG/ACT inhaler INHALE TWO PUFFS BY MOUTH TWICE A DAY 5/29/21   Ramon Mancilla MD   Handicap Placard MISC by Does not apply route Dx lumbar degenerative disc disease. Effective May 6, 2021. PCP requesting placard with no expiration 5/6/21   Lucas Hernandez, DO   omalizumab Tatianajesus Villagomeza) 150 MG injection Inject 300 mg into the skin once for 1 dose  Patient taking differently: Inject 300 mg into the skin every 28 days 11/6/20 8/25/22  Kit Tang MD   Spacer/Aero-Holding Chambers (AEROCHAMBER MV) MISC Use with inhaler as directed 3/6/20   Samantha Hussein MD       Current medications:    No current facility-administered medications for this encounter. Current Outpatient Medications   Medication Sig Dispense Refill    azelastine (ASTELIN) 0.1 % nasal spray 2 sprays by Nasal route 2 times daily Use in each nostril as directed 120 mL 1    mupirocin (BACTROBAN) 2 % ointment Apply 2cm to each nostril BID for 5 days prior to surgery 22 g 0    HYDROcodone-acetaminophen (NORCO) 7.5-325 MG per tablet Take 1 tablet by mouth daily for 30 days. Intended supply: 3 days.  Take lowest dose possible to manage pain 30 tablet 0    methocarbamol (ROBAXIN) 750 MG tablet Take 1 tablet by mouth 3 times daily 90 tablet 0    flecainide (TAMBOCOR) 50 MG tablet Take 1 tablet by mouth 2 times daily as needed (episdoes of atrial fibrillation) 30 tablet 5    albuterol sulfate HFA (PROVENTIL;VENTOLIN;PROAIR) 108 (90 Base) MCG/ACT inhaler INHALE 2 PUFFS BY MOUTH EVERY 4 HOURS AS NEEDED FOR WHEEZING OR SHORTNESS OF BREATH(SPACE OUT TO EVERY 6 HOURS AS SYMPTOMS IMPROVE) 8.5 g 11    famotidine (PEPCID) 40 MG tablet TAKE 1 TABLET BY MOUTH AT BEDTIME      pantoprazole (PROTONIX) 40 MG tablet TAKE 1 TABLET BY MOUTH DAILY      rOPINIRole (REQUIP) 0.5 MG tablet Take 3 tablets by mouth every evening 90 tablet 5    rOPINIRole (REQUIP) 1 MG tablet Take 1.5 tablets by mouth in the morning and at bedtime 90 tablet 5    EPINEPHrine (EPIPEN) 0.3 MG/0.3ML SOAJ injection INJECT INTO UPPER THIGH AS DIRECTED AS NEEDED FOR ANAPHYLAXIS      metFORMIN (GLUCOPHAGE XR) 500 MG extended release tablet Take 1 tablet by mouth daily (with breakfast) (Patient taking differently: Take 500 mg by mouth daily (with breakfast) Indications: Weight Loss) 30 tablet 3    DULERA 200-5 MCG/ACT inhaler INHALE TWO PUFFS BY MOUTH TWICE A DAY 1 Inhaler 11    Handicap Placard MISC by Does not apply route Dx lumbar degenerative disc disease. Effective May 6, 2021. PCP requesting placard with no expiration 1 each 0    omalizumab (XOLAIR) 150 MG injection Inject 300 mg into the skin once for 1 dose (Patient taking differently: Inject 300 mg into the skin every 28 days) 1 vial 3    Spacer/Aero-Holding Chambers (AEROCHAMBER MV) MISC Use with inhaler as directed 1 each 0       Allergies:     Allergies   Allergen Reactions    Latex Anaphylaxis and Rash    Aspirin      Swelling in lower legs    Demerol      rash    Meperidine     Nsaids Swelling    Pcn [Penicillins] Hives    Ranitidine Hcl Rash    Sulfa Antibiotics Rash and Hives       Problem List:    Patient Active Problem List   Diagnosis Code    Asthma J45.909    MDD (major depressive disorder), recurrent severe, without psychosis ([facility] [address] F33.2    Overdose T50.901A    Restless leg syndrome G25.81    Sliding hiatal hernia K44.9    HTN (hypertension) I10    Atrial flutter (HCC) I48.92    SVT (supraventricular tachycardia) (HCC) I47.1    Paroxysmal atrial fibrillation (HCC) I48.0    Sinus bradycardia R00.1    Acute lateral meniscus tear of right knee S83.281A    Acute medial meniscus tear of right knee S83.241A    Localized edema R60.0    Acute pain of right knee M25.561    Chronic diastolic congestive heart failure (HCC) I50.32    Primary osteoarthritis of right knee M17.11    Contusion of multiple sites of right leg S80.11XA    Gastrocnemius strain, left S86.112A    Ventral incisional hernia K43.2    Recurrent incisional hernia K43.2    Pain of upper abdomen R10.10    Incisional hernia with obstruction, without gangrene K43.0    Rhinitis, chronic J31.0    Vocal cord dysfunction J38.3    Iron deficiency anemia, unspecified D50.9    Idiopathic urticaria L50.1    Carpal tunnel syndrome, bilateral G56.03    Arthritis of carpometacarpal (CMC) joint of left thumb M18.12    Neck pain M54.2       Past Medical History:        Diagnosis Date    Acute lateral meniscus tear of right knee 02/2019    Acute medial meniscus tear of right knee 02/2019    Anesthesia complication     woke up during one surgery    Anxiety     Arthritis     Asthma     Atrial fibrillation ([facility] [address]     new dx 4 weeks ago, first of  Sept 2017    CHF (congestive heart failure) ([facility])     Edema     Fibromyalgia     GERD (gastroesophageal reflux disease)     reflux    Hiatal hernia     History of blood transfusion     Hx of major depression     Idiopathic urticaria 10/29/2020    SVT (supraventricular tachycardia) ([facility])     hx svt       Past Surgical History:        Procedure Laterality Date    ABLATION OF DYSRHYTHMIC FOCUS  04/2019    afib    BLADDER SUSPENSION  2004    COLONOSCOPY N/A 8/4/2022    COLONOSCOPY performed by Lissett Rivera at [address] (CERVIX STATUS UNKNOWN)  2004    KNEE ARTHROSCOPY Right 7/11/2019    VIDEO ARTHROSCOPY RIGHT KNEE, PARTIAL MEDIAL AND LATERAL MENISCECTOMY,, MEDIAL MICRO FRACTURE CHONDROPLASTY performed by Michelle Ferreira MD at [facility] OR    OVARIAN CYST REMOVAL      ARIAS-EN-Y GASTRIC BYPASS  2011    TONSILLECTOMY AND ADENOIDECTOMY      UPPER GASTROINTESTINAL ENDOSCOPY N/A 8/4/2022    EGD BIOPSY performed by Li Yang at [facility] N/A 8/26/2019    DIAGNOSTIC LAPAROSCOPY, LAPAROSCOPIC LYSIS OF ADHESIONS, LAPAROSCOPIC REDUCTION OF RECURRENT INCISIONAL HERNIA WITH MESH performed by Tanesha Penny MD at [address]  History:    Social History     Tobacco Use    Smoking status: Never    Smokeless tobacco: Never   Substance Use Topics    Alcohol use: Yes     Comment: 0-1 drinks per month                                Counseling given: Not Answered      Vital Signs (Current):   Vitals:    08/31/22 1244 09/01/22 1050   Weight: (!) 344 lb (156 kg) (!) 344 lb (156 kg)   Height: 5' 10\" (1.778 m) 5' 10\" (1.778 m)                                              BP Readings from Last 3 Encounters:   09/28/22 130/86   08/26/22 118/74   08/25/22 116/76       NPO Status:                                                                                 BMI:   Wt Readings from Last 3 Encounters:   09/28/22 (!) 346 lb 3.2 oz (157 kg)   08/26/22 (!) 344 lb 12.8 oz (156.4 kg)   08/25/22 (!) 349 lb (158.3 kg)     Body mass index is 49.36 kg/m².     CBC:   Lab Results   Component Value Date/Time    WBC 5.9 09/28/2022 09:39 AM    RBC 4.60 09/28/2022 09:39 AM    HGB 11.5 09/28/2022 09:39 AM    HCT 36.4 09/28/2022 09:39 AM    MCV 79.2 09/28/2022 09:39 AM    RDW 17.0 09/28/2022 09:39 AM     09/28/2022 09:39 AM       CMP:   Lab Results   Component Value Date/Time     09/28/2022 09:39 AM    K 4.8 09/28/2022 09:39 AM    K 4.0 08/24/2021 02:09 PM     09/28/2022 09:39 AM    CO2 27 09/28/2022 09:39 AM    BUN 12 09/28/2022 09:39 AM    CREATININE 0.9 09/28/2022 09:39 AM    GFRAA >60 09/28/2022 09:39 AM    GFRAA 87 12/21/2011 05:25 AM    AGRATIO 1.4 09/28/2022 09:39 AM LABGLOM >60 09/28/2022 09:39 AM    GLUCOSE 97 09/28/2022 09:39 AM    PROT 6.6 09/28/2022 09:39 AM    CALCIUM 9.0 09/28/2022 09:39 AM    BILITOT 0.5 09/28/2022 09:39 AM    ALKPHOS 109 09/28/2022 09:39 AM    AST 16 09/28/2022 09:39 AM    ALT 14 09/28/2022 09:39 AM       POC Tests: No results for input(s): POCGLU, POCNA, POCK, POCCL, POCBUN, POCHEMO, POCHCT in the last 72 hours. Coags:   Lab Results   Component Value Date/Time    PROTIME 10.6 02/25/2020 07:23 AM    INR 0.91 02/25/2020 07:23 AM    APTT 30.6 02/25/2020 07:23 AM       HCG (If Applicable): No results found for: PREGTESTUR, PREGSERUM, HCG, HCGQUANT     ABGs:   Lab Results   Component Value Date/Time    PHART 7.39 12/17/2011 03:00 AM    PO2ART 83 12/17/2011 03:00 AM    LZL4MMW 38 12/17/2011 03:00 AM    XJL9KZB 22 12/17/2011 03:00 AM    BEART -2.0 12/17/2011 03:00 AM        Type & Screen (If Applicable):  No results found for: LABABO, LABRH    Drug/Infectious Status (If Applicable):  No results found for: HIV, HEPCAB    COVID-19 Screening (If Applicable): No results found for: COVID19        Anesthesia Evaluation  Patient summary reviewed and Nursing notes reviewed no history of anesthetic complications:   Airway: Mallampati: II     Neck ROM: full     Dental:          Pulmonary:Negative Pulmonary ROS and normal exam    (+) asthma:                            Cardiovascular:Negative CV ROS    (+) hypertension:, dysrhythmias (No issues recently Patrick Melia): atrial flutter and SVT, CHF:,                   Neuro/Psych:   Negative Neuro/Psych ROS  (+) neuromuscular disease (FM CTS):, psychiatric history:depression/anxiety             GI/Hepatic/Renal: Neg GI/Hepatic/Renal ROS  (+) hiatal hernia, GERD: well controlled,           Endo/Other: Negative Endo/Other ROS   (+) : arthritis:., .                 Abdominal:             Vascular:           Other Findings:           Anesthesia Plan      general     ASA 4     (I discussed with the patient the risks and benefits of PIV, general anesthesia, IV Narcotics, PACU. All questions were answered the patient agrees with the plan and wishes to proceed.  )  Induction: intravenous. Pre-Operative Diagnosis: Cervical spondylosis with radiculopathy [M47.22]    64 y.o.   BMI:  Body mass index is 49.36 kg/m².      Vitals:    08/31/22 1244 09/01/22 1050 09/30/22 0630   BP:   (!) 148/81   Pulse:   69   Resp:   18   Temp:   97.6 °F (36.4 °C)   TempSrc:   Temporal   SpO2:   94%   Weight: (!) 344 lb (156 kg) (!) 344 lb (156 kg)    Height: 5' 10\" (1.778 m) 5' 10\" (1.778 m)        Allergies   Allergen Reactions    Latex Anaphylaxis and Rash    Aspirin      Swelling in lower legs    Demerol      rash    Meperidine     Nsaids Swelling    Pcn [Penicillins] Hives    Ranitidine Hcl Rash    Sulfa Antibiotics Rash and Hives       Social History     Tobacco Use    Smoking status: Never    Smokeless tobacco: Never   Substance Use Topics    Alcohol use: Yes     Comment: 0-1 drinks per month       LABS:    CBC  Lab Results   Component Value Date/Time    WBC 5.9 09/28/2022 09:39 AM    HGB 11.5 (L) 09/28/2022 09:39 AM    HCT 36.4 09/28/2022 09:39 AM     09/28/2022 09:39 AM     RENAL  Lab Results   Component Value Date/Time     09/28/2022 09:39 AM    K 4.8 09/28/2022 09:39 AM    K 4.0 08/24/2021 02:09 PM     09/28/2022 09:39 AM    CO2 27 09/28/2022 09:39 AM    BUN 12 09/28/2022 09:39 AM    CREATININE 0.9 09/28/2022 09:39 AM    GLUCOSE 97 09/28/2022 09:39 AM     COAGS  Lab Results   Component Value Date/Time    PROTIME 10.6 02/25/2020 07:23 AM    INR 0.91 02/25/2020 07:23 AM    APTT 30.6 02/25/2020 07:23 AM     Doyle Howard MD   9/29/2022

## 2022-09-30 NOTE — LETTER
Use this one    Name:CRISTOFER DUMAS  : 1965  Diagnosis: Cervical spondylosis with radiculopathy, morbid obesity BMI 49.67    Surgeon: Harini Harris    DOS: 22    Procedure:  1. 00523-15 Anterior discectomy, decompression of spinal cord,  interbody fusion C5-6  2. 28825 Anterior discectomy, decompression of spinal cord,  interbody fusion C6-7  3. 66519 Anterior cervical plate C5 to C7  4. 70733 Structural allograft X2

## 2022-10-06 ENCOUNTER — TELEPHONE (OUTPATIENT)
Dept: ORTHOPEDIC SURGERY | Age: 57
End: 2022-10-06

## 2022-10-06 ENCOUNTER — TELEPHONE (OUTPATIENT)
Dept: FAMILY MEDICINE CLINIC | Age: 57
End: 2022-10-06

## 2022-10-06 ENCOUNTER — TELEMEDICINE (OUTPATIENT)
Dept: FAMILY MEDICINE CLINIC | Age: 57
End: 2022-10-06
Payer: MEDICAID

## 2022-10-06 DIAGNOSIS — J01.90 ACUTE SINUSITIS, RECURRENCE NOT SPECIFIED, UNSPECIFIED LOCATION: ICD-10-CM

## 2022-10-06 DIAGNOSIS — J45.41 MODERATE PERSISTENT ASTHMA WITH (ACUTE) EXACERBATION: Primary | ICD-10-CM

## 2022-10-06 PROCEDURE — 99422 OL DIG E/M SVC 11-20 MIN: CPT | Performed by: FAMILY MEDICINE

## 2022-10-06 RX ORDER — PREDNISONE 20 MG/1
20 TABLET ORAL DAILY
Qty: 5 TABLET | Refills: 0 | Status: SHIPPED | OUTPATIENT
Start: 2022-10-06 | End: 2022-10-11 | Stop reason: ALTCHOICE

## 2022-10-06 RX ORDER — IPRATROPIUM BROMIDE AND ALBUTEROL SULFATE 2.5; .5 MG/3ML; MG/3ML
1 SOLUTION RESPIRATORY (INHALATION) EVERY 4 HOURS
Qty: 360 ML | Refills: 0 | Status: SHIPPED | OUTPATIENT
Start: 2022-10-06

## 2022-10-06 RX ORDER — NEBULIZER ACCESSORIES
KIT MISCELLANEOUS
Qty: 1 KIT | Refills: 0 | Status: SHIPPED | OUTPATIENT
Start: 2022-10-06 | End: 2022-10-10 | Stop reason: SDUPTHER

## 2022-10-06 RX ORDER — AZITHROMYCIN 250 MG/1
250 TABLET, FILM COATED ORAL SEE ADMIN INSTRUCTIONS
Qty: 6 TABLET | Refills: 0 | Status: SHIPPED | OUTPATIENT
Start: 2022-10-06 | End: 2022-10-11

## 2022-10-06 NOTE — TELEPHONE ENCOUNTER
----- Message from Ana Rosa Duran sent at 10/6/2022  2:06 PM EDT -----  Subject: Message to Provider    QUESTIONS  Information for Provider? Medical Service is needing the order faxed over   the ppt nebulizer please fax it to [phone] if the office needs to be   contacted [phone] please follow up   ---------------------------------------------------------------------------  --------------  [address]  [phone]; OK to leave message on voicemail  ---------------------------------------------------------------------------  --------------  SCRIPT ANSWERS  Relationship to Patient? Third Party  Third Party Type? Insurance? Representative Name?  Jake Gamez

## 2022-10-06 NOTE — TELEPHONE ENCOUNTER
Prescription Refill     Medication Name:  ANTIBIOTICS  Pharmacy: [address] [place] [address]  Patient Contact Number: [phone]

## 2022-10-06 NOTE — PROGRESS NOTES
10/6/2022    TELEHEALTH EVALUATION -- Audio/Visual (During W-09 public health emergency)    HPI:    Arnulfo De La O (:  1965) has requested an audio/video evaluation for the following concern(s):    Calling with complaints of runny nose. Nasal discharge, postnasal drainage, congestion symptoms, low-grade fever, persistent cough with greenish-yellow sputum production, wheezing and shortness of breath. She has a history of moderate persistent asthma as well. There is to use her Dulera on a regular basis has been using her albuterol inhaler more than normal.  We will start her on nebulizers as well as prednisone at this time as well as start on an antibiotic to help her with the symptoms. To call back if symptoms do not improve or worsen. Recommended COVID-19 testing as well. Review of Systems  Pertinent symptoms noted in HPI  Prior to Visit Medications    Medication Sig Taking? Authorizing Provider   Respiratory Therapy Supplies (NEBULIZER/TUBING/MOUTHPIECE) KIT Continuous Yes Carlos Mares MD   oxyCODONE-acetaminophen (PERCOCET) 5-325 MG per tablet Take 2 tablets by mouth every 4 hours as needed for Pain for up to 40 doses.  May take 1 tab every 4 hours prn moderate pain Yes Lana Davison MD   docusate sodium (COLACE) 100 MG capsule Take 1 capsule by mouth 2 times daily as needed for Constipation Yes Lana Davison MD   naloxone 4 MG/0.1ML LIQD nasal spray 1 spray by Nasal route as needed for Opioid Reversal Yes Lana Davison MD   azelastine (ASTELIN) 0.1 % nasal spray 2 sprays by Nasal route 2 times daily Use in each nostril as directed Yes Carlos Mares MD   methocarbamol (ROBAXIN) 750 MG tablet Take 1 tablet by mouth 3 times daily Yes Carlos Mares MD   flecainide (TAMBOCOR) 50 MG tablet Take 1 tablet by mouth 2 times daily as needed (episdoes of atrial fibrillation) Yes Prudence MD Lance   albuterol sulfate HFA (PROVENTIL;VENTOLIN;PROAIR) 108 (90 Base) MCG/ACT inhaler INHALE 2 PUFFS BY MOUTH EVERY 4 HOURS AS NEEDED FOR WHEEZING OR SHORTNESS OF BREATH(SPACE OUT TO EVERY 6 HOURS AS SYMPTOMS IMPROVE) Yes Christian James MD   famotidine (PEPCID) 40 MG tablet TAKE 1 TABLET BY MOUTH AT BEDTIME Yes Historical Provider, MD   pantoprazole (PROTONIX) 40 MG tablet TAKE 1 TABLET BY MOUTH DAILY Yes Historical Provider, MD   rOPINIRole (REQUIP) 0.5 MG tablet Take 3 tablets by mouth every evening Yes Hernesto Peter MD   EPINEPHrine (EPIPEN) 0.3 MG/0.3ML SOAJ injection INJECT INTO UPPER THIGH AS DIRECTED AS NEEDED FOR ANAPHYLAXIS Yes Historical Provider, MD Zuleima Rios 200-5 MCG/ACT inhaler INHALE TWO PUFFS BY MOUTH TWICE A DAY Yes Christian James MD   Handicap Placard MISC by Does not apply route Dx lumbar degenerative disc disease. Effective May 6, 2021.   PCP requesting placard with no expiration Yes Rene Hernandez, DO   Spacer/Aero-Holding Chambers (AEROCHAMBER MV) MISC Use with inhaler as directed Yes Nette Feng MD   azithromycin (ZITHROMAX) 250 MG tablet Take 1 tablet by mouth See Admin Instructions for 5 days 500mg on day 1 followed by 250mg on days 2 - 5 Yes Hernesto Peter MD   ipratropium-albuterol (DUONEB) 0.5-2.5 (3) MG/3ML SOLN nebulizer solution Inhale 3 mLs into the lungs every 4 hours Yes Hernesto Peter MD   predniSONE (DELTASONE) 20 MG tablet Take 1 tablet by mouth daily for 5 days Yes Hernesto Peter MD   rOPINIRole (REQUIP) 1 MG tablet Take 1.5 tablets by mouth in the morning and at bedtime  Hernesto Peter MD   metFORMIN (GLUCOPHAGE XR) 500 MG extended release tablet Take 1 tablet by mouth daily (with breakfast)  Patient not taking: Reported on 10/6/2022  Rene Hernandez, DO   omalizumab Haider Robert) 150 MG injection Inject 300 mg into the skin once for 1 dose  Patient taking differently: Inject 300 mg into the skin every 28 days  Ana Paula MD       Social History     Tobacco Use    Smoking status: Never    Smokeless tobacco: Never   Vaping Use    Vaping Use: Never used Substance Use Topics    Alcohol use: Yes     Comment: 0-1 drinks per month    Drug use: Yes     Types: Marijuana [place]), Other-see comments     Comment: Medical Marijuana use        Allergies   Allergen Reactions    Latex Anaphylaxis and Rash    Aspirin      Swelling in lower legs    Demerol      rash    Meperidine     Nsaids Swelling    Pcn [Penicillins] Hives    Ranitidine Hcl Rash    Sulfa Antibiotics Rash and Hives   ,   Past Medical History:   Diagnosis Date    Acute lateral meniscus tear of right knee 02/2019    Acute medial meniscus tear of right knee 02/2019    Anesthesia complication     woke up during one surgery    Anxiety     Arthritis     Asthma     Atrial fibrillation ([address]     new dx 4 weeks ago, first of  Sept 2017    CHF (congestive heart failure) ([facility])     Edema     Fibromyalgia     GERD (gastroesophageal reflux disease)     reflux    Hiatal hernia     History of blood transfusion     Hx of major depression     Idiopathic urticaria 10/29/2020    SVT (supraventricular tachycardia) ([facility])     hx svt   ,   Past Surgical History:   Procedure Laterality Date    ABLATION OF DYSRHYTHMIC FOCUS  04/2019    afib    BLADDER SUSPENSION  2004    CERVICAL FUSION N/A 9/30/2022    ANTERIOR CERVICAL DISCECTOMY AND FUSION CERVICAL 5 - CERVICAL 6, CERVICAL 6 - CERVICAL 7 WITH DEPUY ALLOGRAFT, PLATE AND SCREWS AND EVOKES               **LATEX SENSITIVE** performed by Cale Grant MD at [address] N/A 8/4/2022    COLONOSCOPY performed by Michoacano Lyon at [address] [facility] ([address] [place] [address])  2004    KNEE ARTHROSCOPY Right 7/11/2019    VIDEO ARTHROSCOPY RIGHT KNEE, PARTIAL MEDIAL AND LATERAL MENISCECTOMY,, MEDIAL MICRO FRACTURE CHONDROPLASTY performed by Adali Head MD at [facility] GASTRIC BYPASS  2011    TONSILLECTOMY AND ADENOIDECTOMY      UPPER GASTROINTESTINAL ENDOSCOPY N/A 8/4/2022    EGD BIOPSY performed by Author Lucrecia at [address] N/A 8/26/2019    DIAGNOSTIC LAPAROSCOPY, LAPAROSCOPIC LYSIS OF ADHESIONS, LAPAROSCOPIC REDUCTION OF RECURRENT INCISIONAL HERNIA WITH MESH performed by Jhonny Ventura MD at [address]   ,   Social History     Tobacco Use    Smoking status: Never    Smokeless tobacco: Never   Vaping Use    Vaping Use: Never used   Substance Use Topics    Alcohol use: Yes     Comment: 0-1 drinks per month    Drug use: Yes     Types: Marijuana Charlene Hikes), Other-see comments     Comment: Medical Marijuana use   ,   Family History   Problem Relation Age of Onset    Cancer Mother         lung    Arthritis Mother     Cirrhosis Father     Asthma Maternal Aunt    ,   Immunization History   Administered Date(s) Administered    BCG (Juan BCG) 05/28/2013    DTaP 05/28/2013    DTaP (Infanrix) 05/28/2013    Influenza Vaccine, unspecified formulation 10/29/2015, 01/09/2017, 10/23/2017, 11/26/2018    Influenza Virus Vaccine 12/31/2008, 09/22/2009, 01/09/2017, 10/23/2017, 10/23/2017, 11/26/2018    Influenza, FLUARIX, FLULAVAL, FLUZONE (age 10 mo+) AND AFLURIA, (age 1 y+), PF, 0.5mL 11/26/2018, 12/08/2020    Influenza, FLUBLOK, (age 25 y+), PF, 0.5mL 11/16/2019    PPD Test 06/11/2013, 05/07/2014, 08/13/2019    Pneumococcal Polysaccharide ([id number]) 12/31/2008, 06/17/2019    Tdap (Boostrix, Adacel) 05/28/2013   ,   Health Maintenance   Topic Date Due    COVID-19 Vaccine (1) Never done    Cervical cancer screen  Never done    Shingles vaccine (1 of 2) Never done    Pneumococcal 0-64 years Vaccine (2 - PCV) 06/17/2020    Flu vaccine (1) 08/01/2022    Breast cancer screen  02/22/2023    DTaP/Tdap/Td vaccine (2 - Td or Tdap) 05/28/2023    Depression Monitoring  09/28/2023    Lipids  07/06/2027    Colorectal Cancer Screen  08/04/2032    Hepatitis C screen  Completed    HIV screen  Completed    Hepatitis A vaccine  Aged Out    Hepatitis B vaccine  Aged Out    Hib vaccine  Aged Out    Meningococcal (ACWY) vaccine  Aged Out       PHYSICAL EXAMINATION: Audio call only as patient's video was not working. [ INSTRUCTIONS:  \"[x]\" Indicates a positive item  \"[]\" Indicates a negative item  -- DELETE ALL ITEMS NOT EXAMINED]  Vital Signs: (As obtained by patient/caregiver or practitioner observation)    Blood pressure-  Heart rate-    Respiratory rate-    Temperature-  Pulse oximetry-     Constitutional: [] Appears well-developed and well-nourished [] No apparent distress      [] Abnormal-   Mental status  [] Alert and awake  [] Oriented to person/place/time []Able to follow commands      Eyes:  EOM    []  Normal  [] Abnormal-  Sclera  []  Normal  [] Abnormal -         Discharge []  None visible  [] Abnormal -    HENT:   [] Normocephalic, atraumatic. [] Abnormal   [] Mouth/Throat: Mucous membranes are moist.     External Ears [] Normal  [] Abnormal-     Neck: [] No visualized mass     Pulmonary/Chest: [] Respiratory effort normal.  [] No visualized signs of difficulty breathing or respiratory distress        [] Abnormal-      Musculoskeletal:   [] Normal gait with no signs of ataxia         [] Normal range of motion of neck        [] Abnormal-       Neurological:        [] No Facial Asymmetry (Cranial nerve 7 motor function) (limited exam to video visit)          [] No gaze palsy        [] Abnormal-         Skin:        [] No significant exanthematous lesions or discoloration noted on facial skin         [] Abnormal-            Psychiatric:       [] Normal Affect [] No Hallucinations        [] Abnormal-     Other pertinent observable physical exam findings-     ASSESSMENT/PLAN:    Saleme Anne was seen today for cough. Diagnoses and all orders for this visit:    Moderate persistent asthma with (acute) exacerbation  -     azithromycin (ZITHROMAX) 250 MG tablet;  Take 1 tablet by mouth See Admin Instructions for 5 days 500mg on day 1 followed by 250mg on days 2 - 5    Acute sinusitis, recurrence not specified, unspecified location    Other orders  -     Respiratory Therapy Supplies (NEBULIZER/TUBING/MOUTHPIECE) KIT; Continuous  -     ipratropium-albuterol (DUONEB) 0.5-2.5 (3) MG/3ML SOLN nebulizer solution; Inhale 3 mLs into the lungs every 4 hours  -     predniSONE (DELTASONE) 20 MG tablet; Take 1 tablet by mouth daily for 5 days          Return if symptoms worsen or fail to improve. Maria M Burciaga, was evaluated through a synchronous (real-time) audio-video encounter. The patient (or guardian if applicable) is aware that this is a billable service, which includes applicable co-pays. This Virtual Visit was conducted with patient's (and/or legal guardian's) consent. The visit was conducted pursuant to the emergency declaration under the [address] [place] [address], [address] [place] [address] authority and the [employer] General Act. Patient identification was verified, and a caregiver was present when appropriate. The patient was located at Home: [address] [place] [id number]. Provider was located at [facility] ([facility] Dept): [phone] [place] [address]. Total time spent on this encounter:  12 minutes    --Rikki Natarajan MD on 10/6/2022 at 1:17 PM    An electronic signature was used to authenticate this note.

## 2022-10-07 ENCOUNTER — TELEPHONE (OUTPATIENT)
Dept: FAMILY MEDICINE CLINIC | Age: 57
End: 2022-10-07

## 2022-10-07 DIAGNOSIS — J45.41 MODERATE PERSISTENT ASTHMA WITH ACUTE EXACERBATION: Primary | ICD-10-CM

## 2022-10-07 NOTE — TELEPHONE ENCOUNTER
\"We received NEBULIZER RX for patient, Alma Plascencia [[id number]], we will need an updated RX that states Nebulizer w/ supplies, qualifying diagnosis, sign & dated by physician w/ npi #. The RX we received is missing the qualifying diagnosis  on the prescription based  on medicaid guidelines. Please fax back to [phone]. Thank you\"     Unfortunately, I cannot order these supplies. Please place order. Thanks!

## 2022-10-08 RX ORDER — BENZONATATE 200 MG/1
200 CAPSULE ORAL 3 TIMES DAILY PRN
Qty: 30 CAPSULE | Refills: 0 | Status: SHIPPED | OUTPATIENT
Start: 2022-10-08 | End: 2022-10-15

## 2022-10-10 ENCOUNTER — TELEPHONE (OUTPATIENT)
Dept: FAMILY MEDICINE CLINIC | Age: 57
End: 2022-10-10

## 2022-10-10 RX ORDER — NEBULIZER ACCESSORIES
KIT MISCELLANEOUS
Qty: 1 KIT | Refills: 0 | Status: SHIPPED | OUTPATIENT
Start: 2022-10-10

## 2022-10-10 NOTE — TELEPHONE ENCOUNTER
Prednisone, tessalon pearls, nebulizer treatments, and antiobiotics haven't helped  Her neck is in pain from coughing so much  She is asking for something to suppress the cough

## 2022-10-11 ENCOUNTER — HOSPITAL ENCOUNTER (OUTPATIENT)
Dept: GENERAL RADIOLOGY | Age: 57
Discharge: HOME | End: 2022-10-11
Payer: MEDICAID

## 2022-10-11 ENCOUNTER — OFFICE VISIT (OUTPATIENT)
Dept: FAMILY MEDICINE CLINIC | Age: 57
End: 2022-10-11
Payer: MEDICAID

## 2022-10-11 VITALS
HEART RATE: 92 BPM | DIASTOLIC BLOOD PRESSURE: 90 MMHG | WEIGHT: 293 LBS | SYSTOLIC BLOOD PRESSURE: 130 MMHG | HEIGHT: 70 IN | BODY MASS INDEX: 41.95 KG/M2 | OXYGEN SATURATION: 96 % | TEMPERATURE: 97 F

## 2022-10-11 DIAGNOSIS — G89.29 CHRONIC GENERALIZED PAIN DISORDER: ICD-10-CM

## 2022-10-11 DIAGNOSIS — J45.41 MODERATE PERSISTENT ASTHMA WITH ACUTE EXACERBATION: Primary | ICD-10-CM

## 2022-10-11 DIAGNOSIS — R52 CHRONIC GENERALIZED PAIN DISORDER: ICD-10-CM

## 2022-10-11 DIAGNOSIS — J45.41 MODERATE PERSISTENT ASTHMA WITH ACUTE EXACERBATION: ICD-10-CM

## 2022-10-11 PROCEDURE — G8417 CALC BMI ABV UP PARAM F/U: HCPCS | Performed by: FAMILY MEDICINE

## 2022-10-11 PROCEDURE — 1036F TOBACCO NON-USER: CPT | Performed by: FAMILY MEDICINE

## 2022-10-11 PROCEDURE — 99213 OFFICE O/P EST LOW 20 MIN: CPT | Performed by: FAMILY MEDICINE

## 2022-10-11 PROCEDURE — 3017F COLORECTAL CA SCREEN DOC REV: CPT | Performed by: FAMILY MEDICINE

## 2022-10-11 PROCEDURE — G8484 FLU IMMUNIZE NO ADMIN: HCPCS | Performed by: FAMILY MEDICINE

## 2022-10-11 PROCEDURE — G8427 DOCREV CUR MEDS BY ELIG CLIN: HCPCS | Performed by: FAMILY MEDICINE

## 2022-10-11 PROCEDURE — 96372 THER/PROPH/DIAG INJ SC/IM: CPT | Performed by: FAMILY MEDICINE

## 2022-10-11 PROCEDURE — 71046 X-RAY EXAM CHEST 2 VIEWS: CPT

## 2022-10-11 PROCEDURE — 94640 AIRWAY INHALATION TREATMENT: CPT | Performed by: FAMILY MEDICINE

## 2022-10-11 RX ORDER — METHYLPREDNISOLONE SODIUM SUCCINATE 125 MG/2ML
125 INJECTION, POWDER, LYOPHILIZED, FOR SOLUTION INTRAMUSCULAR; INTRAVENOUS ONCE
Status: COMPLETED | OUTPATIENT
Start: 2022-10-11 | End: 2022-10-11

## 2022-10-11 RX ORDER — HYDROCODONE BITARTRATE AND ACETAMINOPHEN 7.5; 325 MG/1; MG/1
1 TABLET ORAL DAILY
Qty: 30 TABLET | Refills: 0 | Status: SHIPPED | OUTPATIENT
Start: 2022-10-11 | End: 2022-11-10

## 2022-10-11 RX ORDER — PREDNISONE 20 MG/1
20 TABLET ORAL 2 TIMES DAILY
Qty: 10 TABLET | Refills: 0 | Status: SHIPPED | OUTPATIENT
Start: 2022-10-11 | End: 2022-10-16

## 2022-10-11 RX ORDER — IPRATROPIUM BROMIDE AND ALBUTEROL SULFATE 2.5; .5 MG/3ML; MG/3ML
1 SOLUTION RESPIRATORY (INHALATION) ONCE
Status: COMPLETED | OUTPATIENT
Start: 2022-10-11 | End: 2022-10-11

## 2022-10-11 RX ADMIN — METHYLPREDNISOLONE SODIUM SUCCINATE 125 MG: 125 INJECTION, POWDER, LYOPHILIZED, FOR SOLUTION INTRAMUSCULAR; INTRAVENOUS at 15:33

## 2022-10-11 RX ADMIN — IPRATROPIUM BROMIDE AND ALBUTEROL SULFATE 1 AMPULE: 2.5; .5 SOLUTION RESPIRATORY (INHALATION) at 15:33

## 2022-10-11 ASSESSMENT — PATIENT HEALTH QUESTIONNAIRE - PHQ9
8. MOVING OR SPEAKING SO SLOWLY THAT OTHER PEOPLE COULD HAVE NOTICED. OR THE OPPOSITE, BEING SO FIGETY OR RESTLESS THAT YOU HAVE BEEN MOVING AROUND A LOT MORE THAN USUAL: 0
10. IF YOU CHECKED OFF ANY PROBLEMS, HOW DIFFICULT HAVE THESE PROBLEMS MADE IT FOR YOU TO DO YOUR WORK, TAKE CARE OF THINGS AT HOME, OR GET ALONG WITH OTHER PEOPLE: 0
SUM OF ALL RESPONSES TO PHQ QUESTIONS 1-9: 0
SUM OF ALL RESPONSES TO PHQ9 QUESTIONS 1 & 2: 0
6. FEELING BAD ABOUT YOURSELF - OR THAT YOU ARE A FAILURE OR HAVE LET YOURSELF OR YOUR FAMILY DOWN: 0
SUM OF ALL RESPONSES TO PHQ QUESTIONS 1-9: 0
1. LITTLE INTEREST OR PLEASURE IN DOING THINGS: 0
9. THOUGHTS THAT YOU WOULD BE BETTER OFF DEAD, OR OF HURTING YOURSELF: 0
3. TROUBLE FALLING OR STAYING ASLEEP: 0
SUM OF ALL RESPONSES TO PHQ QUESTIONS 1-9: 0
4. FEELING TIRED OR HAVING LITTLE ENERGY: 0
5. POOR APPETITE OR OVEREATING: 0
7. TROUBLE CONCENTRATING ON THINGS, SUCH AS READING THE NEWSPAPER OR WATCHING TELEVISION: 0
SUM OF ALL RESPONSES TO PHQ QUESTIONS 1-9: 0
2. FEELING DOWN, DEPRESSED OR HOPELESS: 0

## 2022-10-12 ENCOUNTER — TELEPHONE (OUTPATIENT)
Dept: FAMILY MEDICINE CLINIC | Age: 57
End: 2022-10-12

## 2022-10-12 DIAGNOSIS — J45.41 MODERATE PERSISTENT ASTHMA WITH ACUTE EXACERBATION: Primary | ICD-10-CM

## 2022-10-12 DIAGNOSIS — J45.41 MODERATE PERSISTENT ASTHMA WITH ACUTE EXACERBATION: ICD-10-CM

## 2022-10-12 RX ORDER — GUAIFENESIN AND CODEINE PHOSPHATE 100; 10 MG/5ML; MG/5ML
5 SOLUTION ORAL 2 TIMES DAILY PRN
Qty: 50 ML | Refills: 0 | Status: SHIPPED | OUTPATIENT
Start: 2022-10-12 | End: 2022-10-17

## 2022-10-12 RX ORDER — CYCLOBENZAPRINE HCL 10 MG
10 TABLET ORAL 3 TIMES DAILY PRN
Qty: 60 TABLET | Refills: 1 | Status: SHIPPED | OUTPATIENT
Start: 2022-10-12

## 2022-10-12 NOTE — PROGRESS NOTES
Portions of this chart may have been created with voice recognition software. Occasional wrong-word or \"sound-alike\" substitutions may have occurred due to the inherent limitations of voice recognition software. Read the chart carefully and recognize, using context, where these substitutions have occurred        Chief Complaint     Cough (Has had a cough since surgery, coughing frequently enough it is making her dizzy and hurting area she had surgery. Has tried OTC medication which has not helped. Surgeon ofc recommended seeing PCP for cough)       ASSESSMENT AND PLAN      1. Moderate persistent asthma with acute exacerbation    - XR CHEST STANDARD (2 VW); Future  - methylPREDNISolone sodium (SOLU-MEDROL) injection 125 mg  - ipratropium-albuterol (DUONEB) nebulizer solution 1 ampule    2. Chronic generalized pain disorder    - HYDROcodone-acetaminophen (NORCO) 7.5-325 MG per tablet; Take 1 tablet by mouth daily for 30 days. Intended supply: 3 days. Take lowest dose possible to manage pain  Dispense: 30 tablet; Refill: 0         Please refer to diagnosis, orders and patient instructions for further recommendations given. Symptomatic therapy suggested: rest, increase fluids, gargle prn for sore throat, apply heat to sinuses prn, use mist of vaporizer prn, OTC acetaminophen, oral decongestants/mucolytics/cough suppressant as needed, Nsaids as needed, gradual reintroduction of usual activities. Call or return to clinic prn if these symptoms worsen or fail to improve as anticipated. Joy Gonzales is a 64 y.o. female here for ration of asthma symptoms. Patient states that none of the over-the-counter cough medications or the Tessalon Perles has helped with the cough. She continues to have wheezing and chest tightness. Julio Simpson had neck surgery and is afraid that she is going to dislodge or have complications with surgery because of constant coughing.   She is also in a lot of pain because of the coughing and unable to sleep at night. She requests refill of her pain medications at this time. So far we have already treated her with antibiotics as well as prednisone 1 course. We will also do an another course of prednisone at this time. She has been using her nebulizer also every 4-6 hours as needed. . The problem has been gradually worsening. The problem occurs constantly. The cough is productive of purulent sputum. Associated symptoms include sore throat, plugged ear sensation, myalgias, rhinorrhea and wheezing. Review of Systems  Constitutional: Positive for fatigue. Negative for fever. HEENT: Positive for rhinorrhea, sore throat. Respiratory: Positive for cough, chest tightness and wheezing. Cardiovascular: Negative. Gastrointestinal: Negative. Endocrine: Negative. Genitourinary: Negative. Musculoskeletal:Positive for myalgias  Neurological: Negative. Current Outpatient Medications   Medication Sig Dispense Refill    predniSONE (DELTASONE) 20 MG tablet Take 1 tablet by mouth 2 times daily for 5 days 10 tablet 0    HYDROcodone-acetaminophen (NORCO) 7.5-325 MG per tablet Take 1 tablet by mouth daily for 30 days. Intended supply: 3 days. Take lowest dose possible to manage pain 30 tablet 0    Respiratory Therapy Supplies (NEBULIZER/TUBING/MOUTHPIECE) KIT Continuous 1 kit 0    benzonatate (TESSALON) 200 MG capsule Take 1 capsule by mouth 3 times daily as needed for Cough 30 capsule 0    ipratropium-albuterol (DUONEB) 0.5-2.5 (3) MG/3ML SOLN nebulizer solution Inhale 3 mLs into the lungs every 4 hours 360 mL 0    oxyCODONE-acetaminophen (PERCOCET) 5-325 MG per tablet Take 2 tablets by mouth every 4 hours as needed for Pain for up to 40 doses.  May take 1 tab every 4 hours prn moderate pain 40 tablet 0    docusate sodium (COLACE) 100 MG capsule Take 1 capsule by mouth 2 times daily as needed for Constipation 30 capsule 1    naloxone 4 MG/0.1ML LIQD nasal spray 1 spray by Nasal route as needed for Opioid Reversal 1 each 5    azelastine (ASTELIN) 0.1 % nasal spray 2 sprays by Nasal route 2 times daily Use in each nostril as directed 120 mL 1    methocarbamol (ROBAXIN) 750 MG tablet Take 1 tablet by mouth 3 times daily 90 tablet 0    flecainide (TAMBOCOR) 50 MG tablet Take 1 tablet by mouth 2 times daily as needed (episdoes of atrial fibrillation) 30 tablet 5    albuterol sulfate HFA (PROVENTIL;VENTOLIN;PROAIR) 108 (90 Base) MCG/ACT inhaler INHALE 2 PUFFS BY MOUTH EVERY 4 HOURS AS NEEDED FOR WHEEZING OR SHORTNESS OF BREATH(SPACE OUT TO EVERY 6 HOURS AS SYMPTOMS IMPROVE) 8.5 g 11    famotidine (PEPCID) 40 MG tablet TAKE 1 TABLET BY MOUTH AT BEDTIME      pantoprazole (PROTONIX) 40 MG tablet TAKE 1 TABLET BY MOUTH DAILY      rOPINIRole (REQUIP) 0.5 MG tablet Take 3 tablets by mouth every evening 90 tablet 5    EPINEPHrine (EPIPEN) 0.3 MG/0.3ML SOAJ injection INJECT INTO UPPER THIGH AS DIRECTED AS NEEDED FOR ANAPHYLAXIS      metFORMIN (GLUCOPHAGE XR) 500 MG extended release tablet Take 1 tablet by mouth daily (with breakfast) 30 tablet 3    DULERA 200-5 MCG/ACT inhaler INHALE TWO PUFFS BY MOUTH TWICE A DAY 1 Inhaler 11    Handicap Placard MISC by Does not apply route Dx lumbar degenerative disc disease. Effective May 6, 2021. PCP requesting placard with no expiration 1 each 0    Spacer/Aero-Holding Chambers (AEROCHAMBER MV) MISC Use with inhaler as directed 1 each 0    guaiFENesin-codeine (TUSSI-ORGANIDIN NR) 100-10 MG/5ML syrup Take 5 mLs by mouth 2 times daily as needed for Cough for up to 5 days.  50 mL 0    cyclobenzaprine (FLEXERIL) 10 MG tablet Take 1 tablet by mouth 3 times daily as needed for Muscle spasms 60 tablet 1    rOPINIRole (REQUIP) 1 MG tablet Take 1.5 tablets by mouth in the morning and at bedtime 90 tablet 5    omalizumab (XOLAIR) 150 MG injection Inject 300 mg into the skin once for 1 dose (Patient taking differently: Inject 300 mg into the skin every 28 days) 1 vial 3     No current facility-administered medications for this visit.        Allergies   Allergen Reactions    Latex Anaphylaxis and Rash    Aspirin      Swelling in lower legs    Demerol      rash    Meperidine     Nsaids Swelling    Pcn [Penicillins] Hives    Ranitidine Hcl Rash    Sulfa Antibiotics Rash and Hives         Past Medical History:   Diagnosis Date    Acute lateral meniscus tear of right knee 02/2019    Acute medial meniscus tear of right knee 02/2019    Anesthesia complication     woke up during one surgery    Anxiety     Arthritis     Asthma     Atrial fibrillation ([address]     new dx 4 weeks ago, first of  Sept 2017    CHF (congestive heart failure) ([facility])     Edema     Fibromyalgia     GERD (gastroesophageal reflux disease)     reflux    Hiatal hernia     History of blood transfusion     Hx of major depression     Idiopathic urticaria 10/29/2020    SVT (supraventricular tachycardia) ([facility])     hx svt         Past Surgical History:   Procedure Laterality Date    ABLATION OF DYSRHYTHMIC FOCUS  04/2019    afib    BLADDER SUSPENSION  2004    CERVICAL FUSION N/A 9/30/2022    ANTERIOR CERVICAL DISCECTOMY AND FUSION CERVICAL 5 - CERVICAL 6, CERVICAL 6 - CERVICAL 7 WITH DEPUY ALLOGRAFT, PLATE AND SCREWS AND EVOKES               **LATEX SENSITIVE** performed by Pat Wynne MD at [address] N/A 8/4/2022    COLONOSCOPY performed by Daniella Goldberg at [address] ([address])  2004    KNEE ARTHROSCOPY Right 7/11/2019    VIDEO ARTHROSCOPY RIGHT KNEE, PARTIAL MEDIAL AND LATERAL MENISCECTOMY,, MEDIAL MICRO FRACTURE CHONDROPLASTY performed by Ruby Block MD at [facility] 2  2011    TONSILLECTOMY AND ADENOIDECTOMY      UPPER GASTROINTESTINAL ENDOSCOPY N/A 8/4/2022    EGD BIOPSY performed by Daniella Goldberg at [address] [facility] [address] N/A 8/26/2019 DIAGNOSTIC LAPAROSCOPY, LAPAROSCOPIC LYSIS OF ADHESIONS, LAPAROSCOPIC REDUCTION OF RECURRENT INCISIONAL HERNIA WITH MESH performed by Aruna Taylor MD at [address] [facility] [address] History   Problem Relation Age of Onset    Cancer Mother         lung    Arthritis Mother     Cirrhosis Father     Asthma Maternal Aunt         Social History     Socioeconomic History    Marital status:      Spouse name: None    Number of children: 3    Years of education: 15.5    Highest education level: None   Tobacco Use    Smoking status: Never    Smokeless tobacco: Never   Vaping Use    Vaping Use: Never used   Substance and Sexual Activity    Alcohol use: Yes     Comment: 0-1 drinks per month    Drug use: Yes     Types: Marijuana (Ledon Devlin), Other-see comments     Comment: Medical Marijuana use    Sexual activity: Yes     Partners: Male     Social Determinants of Health     Financial Resource Strain: Low Risk     Difficulty of Paying Living Expenses: Not hard at all   Food Insecurity: No Food Insecurity    Worried About [employer] in the Last Year: Never true    Ran Out of Food in the Last Year: Never true          OBJECTIVE:      Vitals:    10/11/22 1149   BP: (!) 130/90   Pulse: 92   Temp: 97 °F (36.1 °C)   SpO2: 96%   Weight: (!) 366 lb 3.2 oz (166.1 kg)   Height: 5' 10\" (1.778 m)         Constitutional: Patient appears well-nourished, not in any distress. HEENT:  Head: Normocephalic and atraumatic. Eyes: Conjunctivae and EOM are normal  Right Ear: External ear normal.  Left Ear: External ear normal.   Nose: Nose normal.  Mouth/Throat: Oropharynx is clear and moist.   Neck: Normal range of motion. Neck supple. Lymphatic: No cervical lymphadenopathy  Cardiovascular: Normal rate, regular rhythm, normal heart sounds and intact distal pulses. Pulmonary/Chest: No respiratory distress. Patient has wheezes in both lung fields. Chest tenderness present.   Neurological:Patient is alert, oriented to person, place, and time, Skin: Skin is warm and moist.  Psychiatric: . Patient has a normal mood and affect, behavior is normal. Judgment and thought content normal.          All patient's questions and concerns were addressed appropriately. All orders, handouts were reviewed in detail with the patient and patient voiced understanding verbally.

## 2022-10-12 NOTE — TELEPHONE ENCOUNTER
Pt states teslon pearls are not helping with her cough and she would like some robitussin called in. As well that she had discussed getting a muscle relaxer with  and was wondering if that could be called in also.

## 2022-10-12 NOTE — TELEPHONE ENCOUNTER
Called and spoke to pt and relayed message that the requested medications have been ordered at her pharmacy on file. No additional questions or concerns at this time.

## 2022-10-13 RX ORDER — DESVENLAFAXINE 100 MG/1
TABLET, EXTENDED RELEASE ORAL
Qty: 270 TABLET | Refills: 0 | OUTPATIENT
Start: 2022-10-13

## 2022-10-19 ENCOUNTER — OFFICE VISIT (OUTPATIENT)
Dept: ORTHOPEDIC SURGERY | Age: 57
End: 2022-10-19

## 2022-10-19 VITALS — BODY MASS INDEX: 41.95 KG/M2 | HEIGHT: 70 IN | WEIGHT: 293 LBS

## 2022-10-19 DIAGNOSIS — Z98.1 STATUS POST CERVICAL SPINAL FUSION: Primary | ICD-10-CM

## 2022-10-19 PROCEDURE — 99024 POSTOP FOLLOW-UP VISIT: CPT | Performed by: ORTHOPAEDIC SURGERY

## 2022-10-19 NOTE — PROGRESS NOTES
Ms. Maria M Burciaga returns today 2 weeks s/p ACDF C5-C6 and C6-C7 for spondylosis with foraminal stenosis and left greater than right arm pain. She reports marked improvement of her arm symptoms. She has mild interscapular pain and swallowing discomfort. Her incisions show no signs of infection. She has a normal gait and 5/5 strength of her wrist DFs/VFs and biceps/triceps bilaterally. Her sensation is intact from C5 to C8 bilaterally. I reviewed AP and LAT x-rays of her cervical spine that were obtained in the office today. They show good position of her plate, screws and bone graft. She will return in 8 weeks for repeat x-rays.

## 2022-11-07 ENCOUNTER — OFFICE VISIT (OUTPATIENT)
Dept: CARDIOLOGY CLINIC | Age: 57
End: 2022-11-07
Payer: MEDICAID

## 2022-11-07 ENCOUNTER — TELEPHONE (OUTPATIENT)
Dept: CARDIOLOGY CLINIC | Age: 57
End: 2022-11-07

## 2022-11-07 VITALS
WEIGHT: 293 LBS | BODY MASS INDEX: 51.25 KG/M2 | DIASTOLIC BLOOD PRESSURE: 70 MMHG | HEART RATE: 72 BPM | SYSTOLIC BLOOD PRESSURE: 140 MMHG

## 2022-11-07 DIAGNOSIS — R60.1 GENERALIZED EDEMA: Primary | ICD-10-CM

## 2022-11-07 DIAGNOSIS — Z86.79 HISTORY OF ATRIAL FIBRILLATION: ICD-10-CM

## 2022-11-07 DIAGNOSIS — I10 PRIMARY HYPERTENSION: ICD-10-CM

## 2022-11-07 DIAGNOSIS — R06.02 SOB (SHORTNESS OF BREATH): ICD-10-CM

## 2022-11-07 DIAGNOSIS — R06.01 ORTHOPNEA: ICD-10-CM

## 2022-11-07 DIAGNOSIS — I50.31 ACUTE HEART FAILURE WITH PRESERVED EJECTION FRACTION (HFPEF) (HCC): ICD-10-CM

## 2022-11-07 PROCEDURE — 3017F COLORECTAL CA SCREEN DOC REV: CPT | Performed by: NURSE PRACTITIONER

## 2022-11-07 PROCEDURE — 99214 OFFICE O/P EST MOD 30 MIN: CPT | Performed by: NURSE PRACTITIONER

## 2022-11-07 PROCEDURE — G8484 FLU IMMUNIZE NO ADMIN: HCPCS | Performed by: NURSE PRACTITIONER

## 2022-11-07 PROCEDURE — 3078F DIAST BP <80 MM HG: CPT | Performed by: NURSE PRACTITIONER

## 2022-11-07 PROCEDURE — G8427 DOCREV CUR MEDS BY ELIG CLIN: HCPCS | Performed by: NURSE PRACTITIONER

## 2022-11-07 PROCEDURE — 1036F TOBACCO NON-USER: CPT | Performed by: NURSE PRACTITIONER

## 2022-11-07 PROCEDURE — G8417 CALC BMI ABV UP PARAM F/U: HCPCS | Performed by: NURSE PRACTITIONER

## 2022-11-07 PROCEDURE — 3074F SYST BP LT 130 MM HG: CPT | Performed by: NURSE PRACTITIONER

## 2022-11-07 RX ORDER — FUROSEMIDE 20 MG/1
20 TABLET ORAL DAILY
Qty: 60 TABLET | Refills: 3 | Status: SHIPPED | OUTPATIENT
Start: 2022-11-07

## 2022-11-07 RX ORDER — TRIAMTERENE AND HYDROCHLOROTHIAZIDE 37.5; 25 MG/1; MG/1
1 TABLET ORAL DAILY
COMMUNITY

## 2022-11-07 NOTE — TELEPHONE ENCOUNTER
Spoke with pt. Since her neck surgery on 9/30, she has been struggling with bilateral ankle edema. She's been elevating her legs as much as possible, but just comes right back. Also, confirms MENDEZ and orthopnea. Her weights have been going up and down, so hard to tell a trend. She reports that she's taking HCTZ, but it is not on her list (nor listed in the med history). Scheduled OV with CG this afternoon. Pt appreciative.

## 2022-11-07 NOTE — PROGRESS NOTES
lasCC/HPI:    62 y.o. patient of Dr Nevin Matta with pAF, hx SVT ablation who presented with increased LE edema since surgery 1 month ago. She now has developed SOB, orthopnea and edema. She denies warmth, redness or pain. Neg. Arcos's sign. Both legs developed edema right has slightly improved leg remains more swollen than the right. She denies cp, LH/dizziness, palpitations, syncope or falls. No n/v/d, fever or GI/ bleeding.      Past Medical History:   Diagnosis Date    Acute lateral meniscus tear of right knee 02/2019    Acute medial meniscus tear of right knee 02/2019    Anesthesia complication     woke up during one surgery    Anxiety     Arthritis     Asthma     Atrial fibrillation ([address]     new dx 4 weeks ago, first of  Sept 2017    CHF (congestive heart failure) (HCC)     Edema     Fibromyalgia     GERD (gastroesophageal reflux disease)     reflux    Hiatal hernia     History of blood transfusion     Hx of major depression     Idiopathic urticaria 10/29/2020    SVT (supraventricular tachycardia) (HCC)     hx svt     Past Surgical History:   Procedure Laterality Date    ABLATION OF DYSRHYTHMIC FOCUS  04/2019    afib    BLADDER SUSPENSION  2004    CERVICAL FUSION N/A 9/30/2022    ANTERIOR CERVICAL DISCECTOMY AND FUSION CERVICAL 5 - CERVICAL 6, CERVICAL 6 - CERVICAL 7 WITH DEPUY ALLOGRAFT, PLATE AND SCREWS AND EVOKES               **LATEX SENSITIVE** performed by Liv Fields MD at [facility] N/A 8/4/2022    COLONOSCOPY performed by Maxi Seen at [facility] ([facility])  2004    KNEE ARTHROSCOPY Right 7/11/2019    VIDEO ARTHROSCOPY RIGHT KNEE, PARTIAL MEDIAL AND LATERAL MENISCECTOMY,, MEDIAL MICRO FRACTURE CHONDROPLASTY performed by Katerine Slaughter MD at [address] [place] [address] ENDOSCOPY N/A 8/4/2022    EGD BIOPSY performed by Latrice Forrest at [address] N/A 8/26/2019    DIAGNOSTIC LAPAROSCOPY, LAPAROSCOPIC LYSIS OF ADHESIONS, LAPAROSCOPIC REDUCTION OF RECURRENT INCISIONAL HERNIA WITH MESH performed by Alexis Dutton MD at [facility] History   Problem Relation Age of Onset    Cancer Mother         lung    Arthritis Mother     Cirrhosis Father     Asthma Maternal Aunt      Social History     Tobacco Use    Smoking status: Never    Smokeless tobacco: Never   Vaping Use    Vaping Use: Never used   Substance Use Topics    Alcohol use: Yes     Comment: 0-1 drinks per month    Drug use: Yes     Types: Marijuana [place]), Other-see comments     Comment: Medical Marijuana use     Allergies:Latex, Aspirin, Demerol, Meperidine, Nsaids, Pcn [penicillins], Ranitidine hcl, and Sulfa antibiotics    Review of Systems  General: No changes in weight, fatigue, or night sweats. HEENT: No blurry or decreased vision. No changes in hearing, nasal discharge or sore throat. Cardiovascular:  See HPI. Respiratory: No cough, hemoptysis, or wheezing. + orthopnea   Gastrointestinal:  No abdominal pain, melana, constipation, diarrhea, or history of GI ulcers. + Bloating   Genito-Urinary: No dysuria or hematuria. No urgency or polyuria. Musculoskeletal:  + LE edema   Neurological:  No dizziness, headaches, numbness/tingling, speech problems or weakness. Psychological:  No anxiety or depression. Hematological and Lymphatic: No abnormal bleeding or bruising, blood clots, jaundice or swollen lymph nodes. Endocrine:   No malaise/lethargy, palpitations, polydipsia/polyuria, temperature intolerance or unexpected weight changes  Skin:  No rashes or non-healing ulcers.     Physical Exam:  BP (!) 140/70   Pulse 72   Wt (!) 357 lb 3.2 oz (162 kg)   BMI 51.25 kg/m²    General (appearance):  No acute distress  Eyes: anicteric   Neck: soft, No JVD  Ears/Nose/Mouth/Thorat: No cyanosis  CV: RRR  Respiratory:  Clear, normal effort  GI: soft, non-tender, non-distended  Skin: Warm, dry. No rashes  Neuro/Psych: Alert and oriented x 3.  Appropriate behavior  Ext:  No c/c. ++ BLE L>R  Pulses:  2+ radial     Weight  Wt Readings from Last 3 Encounters:   11/07/22 (!) 357 lb 3.2 oz (162 kg)   10/19/22 (!) 366 lb (166 kg)   10/11/22 (!) 366 lb 3.2 oz (166.1 kg)          CBC:   Lab Results   Component Value Date    WBC 5.9 09/28/2022    HGB 11.5 (L) 09/28/2022    HCT 36.4 09/28/2022    MCV 79.2 (L) 09/28/2022     09/28/2022     BMP:  Lab Results   Component Value Date    CREATININE 0.9 09/28/2022    BUN 12 09/28/2022     09/28/2022    K 4.8 09/28/2022     09/28/2022    CO2 27 09/28/2022     Mag:   Lab Results   Component Value Date/Time    MG 1.90 08/27/2019 04:48 AM     LIVER PROFILE:   Lab Results   Component Value Date    ALT 14 09/28/2022    AST 16 09/28/2022    ALKPHOS 109 09/28/2022    BILITOT 0.5 09/28/2022     PT/INR:   Lab Results   Component Value Date    INR 0.91 02/25/2020    INR 0.95 08/25/2019    INR 1.07 05/07/2019    PROTIME 10.6 02/25/2020    PROTIME 10.8 08/25/2019    PROTIME 12.2 05/07/2019     Pro-BNP   Lab Results   Component Value Date/Time    PROBNP 183 06/09/2020 04:07 PM    PROBNP 209 02/25/2020 07:23 AM    PROBNP 2,866 04/29/2019 09:59 PM     LIPIDS:  No components found for: CHLPL  Lab Results   Component Value Date    TRIG 81 05/25/2022    TRIG 104 06/28/2019    TRIG 61 03/24/2017     Lab Results   Component Value Date    HDL 82 (H) 05/25/2022    HDL 87 (H) 06/28/2019    HDL 99 (H) 03/24/2017     Lab Results   Component Value Date    LDLCALC 96 05/25/2022    LDLCALC 80 06/28/2019    LDLCALC 71 03/24/2017     Lab Results   Component Value Date    LABVLDL 16 05/25/2022    LABVLDL 21 06/28/2019    LABVLDL 12 03/24/2017     TSH:  Lab Results   Component Value Date    TSH 2.97 05/25/2022       IMAGING:     3/2021 Echo   Normal left ventricle size and systolic function with an estimated ejection   fraction of 55-60%. Mild concentric left ventricular hypertrophy. No regional wall motion abnormalities are seen. Normal diastolic function. Mild mitral and tricuspid regurgitation. 2/8/2021 ECG: A fib HR 99    2018 Echo:   Left ventricular cavity size is normal. There is mild concentric left   ventricular hypertrophy. Left ventricular function is low normal with ejection fraction estimated at 50%. No diagnostic regional wall motion abnormalities. Diastolic filling parameters suggest grade I diastolic dysfunction. Mild mitral regurgitation is present. Bi-atrial enlargement. Estimated pulmonary artery systolic pressure is at 43 mmHg assuming a right atrial pressure of 3 mmHg. 2017 Nuc stress:     1. Left ventricular ejection fraction of 57 %. 2. SPECT Myocardial Perfusion Imaging results are considered   negative for acute ischemia. Medications:   Current Outpatient Medications   Medication Sig Dispense Refill    triamterene-hydroCHLOROthiazide (MAXZIDE-25) 37.5-25 MG per tablet Take 1 tablet by mouth daily      cyclobenzaprine (FLEXERIL) 10 MG tablet Take 1 tablet by mouth 3 times daily as needed for Muscle spasms 60 tablet 1    HYDROcodone-acetaminophen (NORCO) 7.5-325 MG per tablet Take 1 tablet by mouth daily for 30 days. Intended supply: 3 days. Take lowest dose possible to manage pain 30 tablet 0    Respiratory Therapy Supplies (NEBULIZER/TUBING/MOUTHPIECE) KIT Continuous 1 kit 0    ipratropium-albuterol (DUONEB) 0.5-2.5 (3) MG/3ML SOLN nebulizer solution Inhale 3 mLs into the lungs every 4 hours 360 mL 0    oxyCODONE-acetaminophen (PERCOCET) 5-325 MG per tablet Take 2 tablets by mouth every 4 hours as needed for Pain for up to 40 doses.  May take 1 tab every 4 hours prn moderate pain 40 tablet 0    docusate sodium (COLACE) 100 MG capsule Take 1 capsule by mouth 2 times daily as needed for Constipation 30 capsule 1    naloxone 4 MG/0.1ML LIQD nasal spray 1 spray by Nasal route as needed for Opioid Reversal 1 each 5    azelastine (ASTELIN) 0.1 % nasal spray 2 sprays by Nasal route 2 times daily Use in each nostril as directed 120 mL 1    methocarbamol (ROBAXIN) 750 MG tablet Take 1 tablet by mouth 3 times daily 90 tablet 0    flecainide (TAMBOCOR) 50 MG tablet Take 1 tablet by mouth 2 times daily as needed (episdoes of atrial fibrillation) 30 tablet 5    albuterol sulfate HFA (PROVENTIL;VENTOLIN;PROAIR) 108 (90 Base) MCG/ACT inhaler INHALE 2 PUFFS BY MOUTH EVERY 4 HOURS AS NEEDED FOR WHEEZING OR SHORTNESS OF BREATH(SPACE OUT TO EVERY 6 HOURS AS SYMPTOMS IMPROVE) 8.5 g 11    famotidine (PEPCID) 40 MG tablet TAKE 1 TABLET BY MOUTH AT BEDTIME      pantoprazole (PROTONIX) 40 MG tablet TAKE 1 TABLET BY MOUTH DAILY      rOPINIRole (REQUIP) 0.5 MG tablet Take 3 tablets by mouth every evening 90 tablet 5    EPINEPHrine (EPIPEN) 0.3 MG/0.3ML SOAJ injection INJECT INTO UPPER THIGH AS DIRECTED AS NEEDED FOR ANAPHYLAXIS      DULERA 200-5 MCG/ACT inhaler INHALE TWO PUFFS BY MOUTH TWICE A DAY 1 Inhaler 11    Handicap Placard MISC by Does not apply route Dx lumbar degenerative disc disease. Effective May 6, 2021. PCP requesting placard with no expiration 1 each 0    Spacer/Aero-Holding Chambers (AEROCHAMBER MV) MISC Use with inhaler as directed 1 each 0    rOPINIRole (REQUIP) 1 MG tablet Take 1.5 tablets by mouth in the morning and at bedtime 90 tablet 5    metFORMIN (GLUCOPHAGE XR) 500 MG extended release tablet Take 1 tablet by mouth daily (with breakfast) (Patient not taking: Reported on 11/7/2022) 30 tablet 3    omalizumab (XOLAIR) 150 MG injection Inject 300 mg into the skin once for 1 dose (Patient taking differently: Inject 300 mg into the skin every 28 days) 1 vial 3     No current facility-administered medications for this visit. Assessment:  1. Generalized edema    2. SOB (shortness of breath)    3. Orthopnea    4.  Acute heart failure with preserved ejection fraction (HFpEF) ([facility]    5. Primary hypertension    6.  History of atrial fibrillation            Plan:  LE edema, sob, orthopnea and bloating   Likely acute HFpEF   EF 55% in 2021   Start lasix 20 mg po daily    Daily weights, low salt diet, compression stocking and elevation   Discussed s/s decompensated HF   BMP in 1 week     LE edema: acute   Concern for possible DVT   LE venous duplex    Hx AF   Flecainide   No c/o palpitations    HTN: stable    /70   Triamterene/HCTZ    Follow up in 2 weeks

## 2022-11-07 NOTE — TELEPHONE ENCOUNTER
The patient called stating after her neck surgery on    09/30/22, she's had consistent swelling. The patient also states her weight goes up and down. The patient also complains of SOB and its keeping her up at night. The patient did not record her vitals. The patient would like to know if she should adjust her   Hydrochlorothiazide medication. Please call the patient at [phone] to advise.

## 2022-11-10 DIAGNOSIS — G89.29 CHRONIC GENERALIZED PAIN DISORDER: ICD-10-CM

## 2022-11-10 DIAGNOSIS — R52 CHRONIC GENERALIZED PAIN DISORDER: ICD-10-CM

## 2022-11-10 RX ORDER — CYCLOBENZAPRINE HCL 10 MG
10 TABLET ORAL 3 TIMES DAILY PRN
Qty: 60 TABLET | Refills: 1 | Status: SHIPPED | OUTPATIENT
Start: 2022-11-10

## 2022-11-10 RX ORDER — HYDROCODONE BITARTRATE AND ACETAMINOPHEN 7.5; 325 MG/1; MG/1
1 TABLET ORAL DAILY
Qty: 30 TABLET | Refills: 0 | Status: SHIPPED | OUTPATIENT
Start: 2022-11-10 | End: 2022-12-10

## 2022-11-11 ENCOUNTER — TELEPHONE (OUTPATIENT)
Dept: CARDIOLOGY CLINIC | Age: 57
End: 2022-11-11

## 2022-11-11 NOTE — TELEPHONE ENCOUNTER
Pt states that she wants Guevara Bravo to know that her leg swelling has went down a lot and she does not think she has a blood clot and she is still going to get her lab done

## 2022-11-11 NOTE — TELEPHONE ENCOUNTER
[facility] called to inform us that pt declined scheduling VL dup lower exremity because she does not believe that she has any blood clots.  Just christine

## 2022-11-22 ENCOUNTER — OFFICE VISIT (OUTPATIENT)
Dept: FAMILY MEDICINE CLINIC | Age: 57
End: 2022-11-22
Payer: MEDICAID

## 2022-11-22 VITALS
TEMPERATURE: 97.5 F | HEART RATE: 80 BPM | SYSTOLIC BLOOD PRESSURE: 120 MMHG | HEIGHT: 70 IN | DIASTOLIC BLOOD PRESSURE: 82 MMHG | BODY MASS INDEX: 41.95 KG/M2 | OXYGEN SATURATION: 93 % | WEIGHT: 293 LBS

## 2022-11-22 DIAGNOSIS — M54.2 CHRONIC NECK PAIN: ICD-10-CM

## 2022-11-22 DIAGNOSIS — G89.29 CHRONIC GENERALIZED PAIN DISORDER: Primary | ICD-10-CM

## 2022-11-22 DIAGNOSIS — G89.29 CHRONIC NECK PAIN: ICD-10-CM

## 2022-11-22 DIAGNOSIS — M79.7 FIBROMYALGIA: ICD-10-CM

## 2022-11-22 DIAGNOSIS — R52 CHRONIC GENERALIZED PAIN DISORDER: Primary | ICD-10-CM

## 2022-11-22 DIAGNOSIS — F43.23 ADJUSTMENT REACTION WITH ANXIETY AND DEPRESSION: ICD-10-CM

## 2022-11-22 PROBLEM — F39 MOOD DISORDER (HCC): Status: ACTIVE | Noted: 2022-07-06

## 2022-11-22 PROBLEM — F12.90 MARIJUANA USER: Status: ACTIVE | Noted: 2022-07-06

## 2022-11-22 PROBLEM — F10.929 ACUTE ALCOHOLIC INTOXICATION (HCC): Status: ACTIVE | Noted: 2022-07-06

## 2022-11-22 LAB
ANION GAP SERPL CALCULATED.3IONS-SCNC: 15 MMOL/L (ref 3–16)
BUN BLDV-MCNC: 17 MG/DL (ref 7–20)
CALCIUM SERPL-MCNC: 9.2 MG/DL (ref 8.3–10.6)
CHLORIDE BLD-SCNC: 105 MMOL/L (ref 99–110)
CO2: 23 MMOL/L (ref 21–32)
CREAT SERPL-MCNC: 0.8 MG/DL (ref 0.6–1.1)
GFR SERPL CREATININE-BSD FRML MDRD: >60 ML/MIN/{1.73_M2}
GLUCOSE BLD-MCNC: 84 MG/DL (ref 70–99)
POTASSIUM SERPL-SCNC: 4.9 MMOL/L (ref 3.5–5.1)
SODIUM BLD-SCNC: 143 MMOL/L (ref 136–145)

## 2022-11-22 PROCEDURE — 3078F DIAST BP <80 MM HG: CPT | Performed by: FAMILY MEDICINE

## 2022-11-22 PROCEDURE — 99213 OFFICE O/P EST LOW 20 MIN: CPT | Performed by: FAMILY MEDICINE

## 2022-11-22 PROCEDURE — 3017F COLORECTAL CA SCREEN DOC REV: CPT | Performed by: FAMILY MEDICINE

## 2022-11-22 PROCEDURE — G8427 DOCREV CUR MEDS BY ELIG CLIN: HCPCS | Performed by: FAMILY MEDICINE

## 2022-11-22 PROCEDURE — G8484 FLU IMMUNIZE NO ADMIN: HCPCS | Performed by: FAMILY MEDICINE

## 2022-11-22 PROCEDURE — G8417 CALC BMI ABV UP PARAM F/U: HCPCS | Performed by: FAMILY MEDICINE

## 2022-11-22 PROCEDURE — 1036F TOBACCO NON-USER: CPT | Performed by: FAMILY MEDICINE

## 2022-11-22 PROCEDURE — 3074F SYST BP LT 130 MM HG: CPT | Performed by: FAMILY MEDICINE

## 2022-11-22 RX ORDER — ACETAMINOPHEN AND CODEINE PHOSPHATE 300; 30 MG/1; MG/1
1 TABLET ORAL 2 TIMES DAILY
Qty: 60 TABLET | Refills: 0 | Status: SHIPPED | OUTPATIENT
Start: 2022-11-22 | End: 2022-12-22

## 2022-11-22 ASSESSMENT — PATIENT HEALTH QUESTIONNAIRE - PHQ9
9. THOUGHTS THAT YOU WOULD BE BETTER OFF DEAD, OR OF HURTING YOURSELF: 0
8. MOVING OR SPEAKING SO SLOWLY THAT OTHER PEOPLE COULD HAVE NOTICED. OR THE OPPOSITE, BEING SO FIGETY OR RESTLESS THAT YOU HAVE BEEN MOVING AROUND A LOT MORE THAN USUAL: 0
SUM OF ALL RESPONSES TO PHQ9 QUESTIONS 1 & 2: 2
7. TROUBLE CONCENTRATING ON THINGS, SUCH AS READING THE NEWSPAPER OR WATCHING TELEVISION: 3
2. FEELING DOWN, DEPRESSED OR HOPELESS: 1
SUM OF ALL RESPONSES TO PHQ QUESTIONS 1-9: 12
3. TROUBLE FALLING OR STAYING ASLEEP: 1
SUM OF ALL RESPONSES TO PHQ QUESTIONS 1-9: 12
10. IF YOU CHECKED OFF ANY PROBLEMS, HOW DIFFICULT HAVE THESE PROBLEMS MADE IT FOR YOU TO DO YOUR WORK, TAKE CARE OF THINGS AT HOME, OR GET ALONG WITH OTHER PEOPLE: 2
4. FEELING TIRED OR HAVING LITTLE ENERGY: 3
5. POOR APPETITE OR OVEREATING: 0
SUM OF ALL RESPONSES TO PHQ QUESTIONS 1-9: 12
6. FEELING BAD ABOUT YOURSELF - OR THAT YOU ARE A FAILURE OR HAVE LET YOURSELF OR YOUR FAMILY DOWN: 3
SUM OF ALL RESPONSES TO PHQ QUESTIONS 1-9: 12
1. LITTLE INTEREST OR PLEASURE IN DOING THINGS: 1

## 2022-11-23 NOTE — PROGRESS NOTES
Portions of this chart may have been created with voice recognition software. Occasional wrong-word or \"sound-alike\" substitutions may have occurred due to the inherent limitations of voice recognition software. Read the chart carefully and recognize, using context, where these substitutions have occurred        Chief Complaint     Other (Disability forms, discuss health concerns in regards to right knee, losing weight)               Gabby Reyes is a 62 y.o. female here in continuation of long-term disability through her employment which was initially as a short-term disability. Patient had long-term disability because of her chronic pain secondary to her fibromyalgia and her chronic neck pain as well as a due to her psychiatric problems including anxiety and depression. Patient is undergoing treatment right now with the neurosurgeon for s/p neck surgery as well. She is also following up with orthopedic surgeon to possibly get knee replacement as well as with the.  Extension to help her lose weight to get ready for the knee surgery. This time patient is unable to get back to work full-time because of these chronic conditions. We will fill appropriate paperwork. Patient's pain levels are still not under control with the current medications. Patient is not tolerant to many neuropathic pain medications and as well as anti-inflammatory medications. We will make an exception and started on Tylenol with codeine to be taken as needed for pain symptoms and the hydrocodone to be reserved only for severe pain. We will also refer her to external pain management for further evaluation and management.         REVIEW OF SYSTEMS:  Pertinent positive and negative symptoms noted in HPI        Lab Results   Component Value Date    WBC 5.9 09/28/2022    HGB 11.5 (L) 09/28/2022    HCT 36.4 09/28/2022    MCV 79.2 (L) 09/28/2022     09/28/2022      Lab Results   Component Value Date    LABA1C 5.4 09/07/2018     Lab Results   Component Value Date    .3 09/07/2018     Lab Results   Component Value Date    TSH 2.97 05/25/2022     Lab Results   Component Value Date    CHOL 194 05/25/2022     Lab Results   Component Value Date    TRIG 81 05/25/2022     Lab Results   Component Value Date    HDL 82 (H) 05/25/2022     Lab Results   Component Value Date    LDLCALC 96 05/25/2022     Lab Results   Component Value Date    LABVLDL 16 05/25/2022     No results found for: [facility]   Lab Results   Component Value Date     11/22/2022    K 4.9 11/22/2022     11/22/2022    CO2 23 11/22/2022    BUN 17 11/22/2022    CREATININE 0.8 11/22/2022    GLUCOSE 84 11/22/2022    CALCIUM 9.2 11/22/2022    PROT 6.6 09/28/2022    LABALBU 3.8 09/28/2022    BILITOT 0.5 09/28/2022    ALKPHOS 109 09/28/2022    AST 16 09/28/2022    ALT 14 09/28/2022    LABGLOM >60 11/22/2022    GFRAA >60 09/28/2022    AGRATIO 1.4 09/28/2022    GLOB 3.4 08/24/2021           Current Outpatient Medications   Medication Sig Dispense Refill    acetaminophen-codeine (TYLENOL/CODEINE #3) 300-30 MG per tablet Take 1 tablet by mouth in the morning and at bedtime for 30 days. Take lowest dose possible to manage pain 60 tablet 0    HYDROcodone-acetaminophen (NORCO) 7.5-325 MG per tablet Take 1 tablet by mouth daily for 30 days. Intended supply: 3 days.  Take lowest dose possible to manage pain 30 tablet 0    cyclobenzaprine (FLEXERIL) 10 MG tablet Take 1 tablet by mouth 3 times daily as needed for Muscle spasms 60 tablet 1    triamterene-hydroCHLOROthiazide (MAXZIDE-25) 37.5-25 MG per tablet Take 1 tablet by mouth daily      furosemide (LASIX) 20 MG tablet Take 1 tablet by mouth daily 60 tablet 3    Respiratory Therapy Supplies (NEBULIZER/TUBING/MOUTHPIECE) KIT Continuous 1 kit 0    ipratropium-albuterol (DUONEB) 0.5-2.5 (3) MG/3ML SOLN nebulizer solution Inhale 3 mLs into the lungs every 4 hours 360 mL 0    docusate sodium (COLACE) 100 MG capsule Take 1 capsule by mouth 2 times daily as needed for Constipation 30 capsule 1    naloxone 4 MG/0.1ML LIQD nasal spray 1 spray by Nasal route as needed for Opioid Reversal 1 each 5    azelastine (ASTELIN) 0.1 % nasal spray 2 sprays by Nasal route 2 times daily Use in each nostril as directed 120 mL 1    methocarbamol (ROBAXIN) 750 MG tablet Take 1 tablet by mouth 3 times daily 90 tablet 0    flecainide (TAMBOCOR) 50 MG tablet Take 1 tablet by mouth 2 times daily as needed (episdoes of atrial fibrillation) 30 tablet 5    albuterol sulfate HFA (PROVENTIL;VENTOLIN;PROAIR) 108 (90 Base) MCG/ACT inhaler INHALE 2 PUFFS BY MOUTH EVERY 4 HOURS AS NEEDED FOR WHEEZING OR SHORTNESS OF BREATH(SPACE OUT TO EVERY 6 HOURS AS SYMPTOMS IMPROVE) 8.5 g 11    famotidine (PEPCID) 40 MG tablet TAKE 1 TABLET BY MOUTH AT BEDTIME      pantoprazole (PROTONIX) 40 MG tablet TAKE 1 TABLET BY MOUTH DAILY      rOPINIRole (REQUIP) 0.5 MG tablet Take 3 tablets by mouth every evening 90 tablet 5    EPINEPHrine (EPIPEN) 0.3 MG/0.3ML SOAJ injection INJECT INTO UPPER THIGH AS DIRECTED AS NEEDED FOR ANAPHYLAXIS      metFORMIN (GLUCOPHAGE XR) 500 MG extended release tablet Take 1 tablet by mouth daily (with breakfast) 30 tablet 3    DULERA 200-5 MCG/ACT inhaler INHALE TWO PUFFS BY MOUTH TWICE A DAY 1 Inhaler 11    Handicap Placard MISC by Does not apply route Dx lumbar degenerative disc disease. Effective May 6, 2021. PCP requesting placard with no expiration 1 each 0    Spacer/Aero-Holding Chambers (AEROCHAMBER MV) MISC Use with inhaler as directed 1 each 0    rOPINIRole (REQUIP) 1 MG tablet Take 1.5 tablets by mouth in the morning and at bedtime 90 tablet 5    omalizumab (XOLAIR) 150 MG injection Inject 300 mg into the skin once for 1 dose (Patient taking differently: Inject 300 mg into the skin every 28 days) 1 vial 3     No current facility-administered medications for this visit.        Allergies   Allergen Reactions    Latex Anaphylaxis and Rash Aspirin      Swelling in lower legs    Demerol      rash    Meperidine     Nsaids Swelling    Pcn [Penicillins] Hives    Ranitidine Hcl Rash    Sulfa Antibiotics Rash and Hives         Past medical, Surgical,Family, Social History Reviewed and Updated in chart today. OBJECTIVE:      Vitals:    11/22/22 1120   BP: 120/82   Pulse: 80   Temp: 97.5 °F (36.4 °C)   SpO2: 93%   Weight: (!) 346 lb 12.8 oz (157.3 kg)   Height: 5' 10\" (1.778 m)         Constitutional: Patient appears well-nourished, not in any distress. HENT:WNL  Neurological:Patient is alert, oriented to person, place, and time. No obvious focal neurological deficits  Skin: Skin is warm and moist.  Psychiatric:Patient has a normal mood and affect, behavior is normal. Judgment and thought content normal.    ASSESSMENT AND PLAN    Myriam Martínez was seen today for other. Diagnoses and all orders for this visit:    Chronic generalized pain disorder  -     acetaminophen-codeine (TYLENOL/CODEINE #3) 300-30 MG per tablet; Take 1 tablet by mouth in the morning and at bedtime for 30 days. Take lowest dose possible to manage pain  -     Amb External Referral To Pain Medicine    Chronic neck pain  -     Amb External Referral To Pain Medicine    Adjustment reaction with anxiety and depression    Fibromyalgia  -     Amb External Referral To Pain Medicine           DISCHARGE MEDICATION LIST        Medication List            Accurate as of November 22, 2022 11:59 PM. If you have any questions, ask your nurse or doctor. START taking these medications      acetaminophen-codeine 300-30 MG per tablet  Commonly known as: TYLENOL/CODEINE #3  Take 1 tablet by mouth in the morning and at bedtime for 30 days.  Take lowest dose possible to manage pain  Started by: Endy Lin MD            CHANGE how you take these medications      omalizumab 150 MG injection  Commonly known as: XOLAIR  Inject 300 mg into the skin once for 1 dose  What changed: when to take this            CONTINUE taking these medications      AeroChamber MV Misc  Use with inhaler as directed     albuterol sulfate  (90 Base) MCG/ACT inhaler  Commonly known as: PROVENTIL;VENTOLIN;PROAIR  INHALE 2 PUFFS BY MOUTH EVERY 4 HOURS AS NEEDED FOR WHEEZING OR SHORTNESS OF BREATH(SPACE OUT TO EVERY 6 HOURS AS SYMPTOMS IMPROVE)     azelastine 0.1 % nasal spray  Commonly known as: ASTELIN  2 sprays by Nasal route 2 times daily Use in each nostril as directed     cyclobenzaprine 10 MG tablet  Commonly known as: FLEXERIL  Take 1 tablet by mouth 3 times daily as needed for Muscle spasms     docusate sodium 100 MG capsule  Commonly known as: Colace  Take 1 capsule by mouth 2 times daily as needed for Constipation     Dulera 200-5 MCG/ACT inhaler  Generic drug: mometasone-formoterol  INHALE TWO PUFFS BY MOUTH TWICE A DAY     EPINEPHrine 0.3 MG/0.3ML Soaj injection  Commonly known as: EPIPEN     famotidine 40 MG tablet  Commonly known as: PEPCID     flecainide 50 MG tablet  Commonly known as: TAMBOCOR  Take 1 tablet by mouth 2 times daily as needed (episdoes of atrial fibrillation)     furosemide 20 MG tablet  Commonly known as: Lasix  Take 1 tablet by mouth daily     Handicap Placard Misc  by Does not apply route Dx lumbar degenerative disc disease. Effective May 6, 2021. PCP requesting placard with no expiration     HYDROcodone-acetaminophen 7.5-325 MG per tablet  Commonly known as: Norco  Take 1 tablet by mouth daily for 30 days. Intended supply: 3 days.  Take lowest dose possible to manage pain     ipratropium-albuterol 0.5-2.5 (3) MG/3ML Soln nebulizer solution  Commonly known as: DUONEB  Inhale 3 mLs into the lungs every 4 hours     metFORMIN 500 MG extended release tablet  Commonly known as: Glucophage XR  Take 1 tablet by mouth daily (with breakfast)     methocarbamol 750 MG tablet  Commonly known as: ROBAXIN  Take 1 tablet by mouth 3 times daily     naloxone 4 MG/0.1ML Liqd nasal spray  1 spray by Nasal route as needed for Opioid Reversal     Nebulizer/Tubing/Mouthpiece Kit  Continuous     pantoprazole 40 MG tablet  Commonly known as: PROTONIX     * rOPINIRole 0.5 MG tablet  Commonly known as: Requip  Take 3 tablets by mouth every evening     * rOPINIRole 1 MG tablet  Commonly known as: Requip  Take 1.5 tablets by mouth in the morning and at bedtime     triamterene-hydroCHLOROthiazide 37.5-25 MG per tablet  Commonly known as: MAXZIDE-25           * This list has 2 medication(s) that are the same as other medications prescribed for you. Read the directions carefully, and ask your doctor or other care provider to review them with you. Where to Get Your Medications        These medications were sent to [place] [id number] #[id number] [facility], [address] [place] [address],[address] [phone] - F [phone]  [address] [place] [address] Reba Booth [address]      Phone: [phone]   acetaminophen-codeine 300-30 MG per tablet         Please refer to diagnosis, orders and patient instructions for further recommendations given. All patient's questions and concerns were addressed appropriately. All orders, handouts were reviewed in detail with the patient and patient voiced understanding verbally.

## 2022-12-02 ENCOUNTER — OFFICE VISIT (OUTPATIENT)
Dept: CARDIOLOGY CLINIC | Age: 57
End: 2022-12-02
Payer: MEDICAID

## 2022-12-02 VITALS
BODY MASS INDEX: 49.65 KG/M2 | WEIGHT: 293 LBS | SYSTOLIC BLOOD PRESSURE: 110 MMHG | HEART RATE: 108 BPM | DIASTOLIC BLOOD PRESSURE: 68 MMHG | OXYGEN SATURATION: 96 %

## 2022-12-02 DIAGNOSIS — R60.1 GENERALIZED EDEMA: Primary | ICD-10-CM

## 2022-12-02 DIAGNOSIS — I10 ESSENTIAL HYPERTENSION: ICD-10-CM

## 2022-12-02 DIAGNOSIS — R06.02 SOB (SHORTNESS OF BREATH): ICD-10-CM

## 2022-12-02 DIAGNOSIS — Z86.79 HISTORY OF ATRIAL FIBRILLATION: ICD-10-CM

## 2022-12-02 PROCEDURE — 3017F COLORECTAL CA SCREEN DOC REV: CPT | Performed by: NURSE PRACTITIONER

## 2022-12-02 PROCEDURE — 3074F SYST BP LT 130 MM HG: CPT | Performed by: NURSE PRACTITIONER

## 2022-12-02 PROCEDURE — 3078F DIAST BP <80 MM HG: CPT | Performed by: NURSE PRACTITIONER

## 2022-12-02 PROCEDURE — G8484 FLU IMMUNIZE NO ADMIN: HCPCS | Performed by: NURSE PRACTITIONER

## 2022-12-02 PROCEDURE — 99214 OFFICE O/P EST MOD 30 MIN: CPT | Performed by: NURSE PRACTITIONER

## 2022-12-02 PROCEDURE — 1036F TOBACCO NON-USER: CPT | Performed by: NURSE PRACTITIONER

## 2022-12-02 PROCEDURE — G8417 CALC BMI ABV UP PARAM F/U: HCPCS | Performed by: NURSE PRACTITIONER

## 2022-12-02 PROCEDURE — G8427 DOCREV CUR MEDS BY ELIG CLIN: HCPCS | Performed by: NURSE PRACTITIONER

## 2022-12-02 RX ORDER — FLECAINIDE ACETATE 50 MG/1
50 TABLET ORAL 2 TIMES DAILY PRN
Qty: 30 TABLET | Refills: 5 | Status: SHIPPED | OUTPATIENT
Start: 2022-12-02

## 2022-12-02 RX ORDER — FUROSEMIDE 20 MG/1
20 TABLET ORAL 2 TIMES DAILY
COMMUNITY

## 2022-12-02 NOTE — PROGRESS NOTES
lasCC/HPI:    62 y.o. patient of Dr Pop Roman with LE edema,  pAF, hx SVT ablation who presented for follow up after starting lasix for LE edema. Edema is much better. Weight is down. She denies cp, sob, syncope or falls. She has occasional orthostatic dizziness and fatigue. She has occasional palpitations and takes flecainide PRN. Denies orthopnea, PND, abdominal bloating or early satiety. Struggles with chromic pain.      Past Medical History:   Diagnosis Date    Acute lateral meniscus tear of right knee 02/2019    Acute medial meniscus tear of right knee 02/2019    Anesthesia complication     woke up during one surgery    Anxiety     Arthritis     Asthma     Atrial fibrillation ([address]     new dx 4 weeks ago, first of  Sept 2017    CHF (congestive heart failure) (HCC)     Edema     Fibromyalgia     GERD (gastroesophageal reflux disease)     reflux    Hiatal hernia     History of blood transfusion     Hx of major depression     Idiopathic urticaria 10/29/2020    SVT (supraventricular tachycardia) ([facility])     hx svt     Past Surgical History:   Procedure Laterality Date    ABLATION OF DYSRHYTHMIC FOCUS  04/2019    afib    BLADDER SUSPENSION  2004    CERVICAL FUSION N/A 9/30/2022    ANTERIOR CERVICAL DISCECTOMY AND FUSION CERVICAL 5 - CERVICAL 6, CERVICAL 6 - CERVICAL 7 WITH DEPUY ALLOGRAFT, PLATE AND SCREWS AND EVOKES               **LATEX SENSITIVE** performed by Tariq Dang MD at [address] [facility] N/A 8/4/2022    COLONOSCOPY performed by Princess Powell at [address] ([facility])  2004    KNEE ARTHROSCOPY Right 7/11/2019    VIDEO ARTHROSCOPY RIGHT KNEE, PARTIAL MEDIAL AND LATERAL MENISCECTOMY,, MEDIAL MICRO FRACTURE CHONDROPLASTY performed by Lakisha Nagy MD at [address] [address] ENDOSCOPY N/A 8/4/2022    EGD BIOPSY performed by Raina Rosales at [address] N/A 8/26/2019    DIAGNOSTIC LAPAROSCOPY, LAPAROSCOPIC LYSIS OF ADHESIONS, LAPAROSCOPIC REDUCTION OF RECURRENT INCISIONAL HERNIA WITH MESH performed by Aruna Taylor MD at [address] [facility] History   Problem Relation Age of Onset    Cancer Mother         lung    Arthritis Mother     Cirrhosis Father     Asthma Maternal Aunt      Social History     Tobacco Use    Smoking status: Never    Smokeless tobacco: Never   Vaping Use    Vaping Use: Never used   Substance Use Topics    Alcohol use: Yes     Comment: 0-1 drinks per month    Drug use: Yes     Types: Marijuana [place] [place]), Other-see comments     Comment: Medical Marijuana use     Allergies:Latex, Aspirin, Demerol, Meperidine, Nsaids, Pcn [penicillins], Ranitidine hcl, and Sulfa antibiotics    Review of Systems  General: + Fatigue and chronic pain   HEENT: No blurry or decreased vision. No changes in hearing, nasal discharge or sore throat. Cardiovascular:  See HPI. Respiratory: No cough, hemoptysis, or wheezing. + orthopnea   Gastrointestinal:  No abdominal pain, melana, constipation, diarrhea, or history of GI ulcers. +  Genito-Urinary: No dysuria or hematuria. No urgency or polyuria. Musculoskeletal:  + LE edema   Neurological:  No dizziness, headaches, numbness/tingling, speech problems or weakness. Psychological:  No anxiety or depression. Hematological and Lymphatic: No abnormal bleeding or bruising, blood clots, jaundice or swollen lymph nodes. Endocrine:   No malaise/lethargy, palpitations, polydipsia/polyuria, temperature intolerance or unexpected weight changes  Skin:  No rashes or non-healing ulcers.     Physical Exam:  Pulse (!) 108   Wt (!) 346 lb (156.9 kg)   SpO2 96%   BMI 49.65 kg/m²    General (appearance):  No acute distress  Eyes: anicteric   Neck: soft, No JVD  Ears/Nose/Mouth/Thorat: No cyanosis  CV: Reg tach  Respiratory:  Clear, normal effort  GI: soft, non-tender, non-distended  Skin: Warm, dry. No rashes  Neuro/Psych: Alert and oriented x 3.  Appropriate behavior  Ext:  No c/c. mild BLE L>R  Pulses:  2+ radial     Weight  Wt Readings from Last 3 Encounters:   12/02/22 (!) 346 lb (156.9 kg)   11/22/22 (!) 346 lb 12.8 oz (157.3 kg)   11/07/22 (!) 357 lb 3.2 oz (162 kg)          CBC:   Lab Results   Component Value Date    WBC 5.9 09/28/2022    HGB 11.5 (L) 09/28/2022    HCT 36.4 09/28/2022    MCV 79.2 (L) 09/28/2022     09/28/2022     BMP:  Lab Results   Component Value Date    CREATININE 0.8 11/22/2022    BUN 17 11/22/2022     11/22/2022    K 4.9 11/22/2022     11/22/2022    CO2 23 11/22/2022     Mag:   Lab Results   Component Value Date/Time    MG 1.90 08/27/2019 04:48 AM     LIVER PROFILE:   Lab Results   Component Value Date    ALT 14 09/28/2022    AST 16 09/28/2022    ALKPHOS 109 09/28/2022    BILITOT 0.5 09/28/2022     PT/INR:   Lab Results   Component Value Date    INR 0.91 02/25/2020    INR 0.95 08/25/2019    INR 1.07 05/07/2019    PROTIME 10.6 02/25/2020    PROTIME 10.8 08/25/2019    PROTIME 12.2 05/07/2019     Pro-BNP   Lab Results   Component Value Date/Time    PROBNP 183 06/09/2020 04:07 PM    PROBNP 209 02/25/2020 07:23 AM    PROBNP 2,866 04/29/2019 09:59 PM     LIPIDS:  No components found for: CHLPL  Lab Results   Component Value Date    TRIG 81 05/25/2022    TRIG 104 06/28/2019    TRIG 61 03/24/2017     Lab Results   Component Value Date    HDL 82 (H) 05/25/2022    HDL 87 (H) 06/28/2019    HDL 99 (H) 03/24/2017     Lab Results   Component Value Date    LDLCALC 96 05/25/2022    LDLCALC 80 06/28/2019    LDLCALC 71 03/24/2017     Lab Results   Component Value Date    LABVLDL 16 05/25/2022    LABVLDL 21 06/28/2019    LABVLDL 12 03/24/2017     TSH:  Lab Results   Component Value Date    TSH 2.97 05/25/2022       IMAGING:     3/2021 Echo   Normal left ventricle size and systolic function with an estimated ejection fraction of 55-60%. Mild concentric left ventricular hypertrophy. No regional wall motion abnormalities are seen. Normal diastolic function. Mild mitral and tricuspid regurgitation. 2/8/2021 ECG: A fib HR 99    2018 Echo:   Left ventricular cavity size is normal. There is mild concentric left   ventricular hypertrophy. Left ventricular function is low normal with ejection fraction estimated at 50%. No diagnostic regional wall motion abnormalities. Diastolic filling parameters suggest grade I diastolic dysfunction. Mild mitral regurgitation is present. Bi-atrial enlargement. Estimated pulmonary artery systolic pressure is at 43 mmHg assuming a right atrial pressure of 3 mmHg. 2017 Nuc stress:     1. Left ventricular ejection fraction of 57 %. 2. SPECT Myocardial Perfusion Imaging results are considered   negative for acute ischemia. Medications:   Current Outpatient Medications   Medication Sig Dispense Refill    acetaminophen-codeine (TYLENOL/CODEINE #3) 300-30 MG per tablet Take 1 tablet by mouth in the morning and at bedtime for 30 days. Take lowest dose possible to manage pain 60 tablet 0    HYDROcodone-acetaminophen (NORCO) 7.5-325 MG per tablet Take 1 tablet by mouth daily for 30 days. Intended supply: 3 days.  Take lowest dose possible to manage pain 30 tablet 0    cyclobenzaprine (FLEXERIL) 10 MG tablet Take 1 tablet by mouth 3 times daily as needed for Muscle spasms 60 tablet 1    triamterene-hydroCHLOROthiazide (MAXZIDE-25) 37.5-25 MG per tablet Take 1 tablet by mouth daily      furosemide (LASIX) 20 MG tablet Take 1 tablet by mouth daily 60 tablet 3    Respiratory Therapy Supplies (NEBULIZER/TUBING/MOUTHPIECE) KIT Continuous 1 kit 0    ipratropium-albuterol (DUONEB) 0.5-2.5 (3) MG/3ML SOLN nebulizer solution Inhale 3 mLs into the lungs every 4 hours 360 mL 0    docusate sodium (COLACE) 100 MG capsule Take 1 capsule by mouth 2 times daily as needed for Constipation 30 capsule 1    naloxone 4 MG/0.1ML LIQD nasal spray 1 spray by Nasal route as needed for Opioid Reversal 1 each 5    azelastine (ASTELIN) 0.1 % nasal spray 2 sprays by Nasal route 2 times daily Use in each nostril as directed 120 mL 1    methocarbamol (ROBAXIN) 750 MG tablet Take 1 tablet by mouth 3 times daily 90 tablet 0    flecainide (TAMBOCOR) 50 MG tablet Take 1 tablet by mouth 2 times daily as needed (episdoes of atrial fibrillation) 30 tablet 5    albuterol sulfate HFA (PROVENTIL;VENTOLIN;PROAIR) 108 (90 Base) MCG/ACT inhaler INHALE 2 PUFFS BY MOUTH EVERY 4 HOURS AS NEEDED FOR WHEEZING OR SHORTNESS OF BREATH(SPACE OUT TO EVERY 6 HOURS AS SYMPTOMS IMPROVE) 8.5 g 11    famotidine (PEPCID) 40 MG tablet TAKE 1 TABLET BY MOUTH AT BEDTIME      pantoprazole (PROTONIX) 40 MG tablet TAKE 1 TABLET BY MOUTH DAILY      rOPINIRole (REQUIP) 0.5 MG tablet Take 3 tablets by mouth every evening 90 tablet 5    rOPINIRole (REQUIP) 1 MG tablet Take 1.5 tablets by mouth in the morning and at bedtime 90 tablet 5    EPINEPHrine (EPIPEN) 0.3 MG/0.3ML SOAJ injection INJECT INTO UPPER THIGH AS DIRECTED AS NEEDED FOR ANAPHYLAXIS      metFORMIN (GLUCOPHAGE XR) 500 MG extended release tablet Take 1 tablet by mouth daily (with breakfast) 30 tablet 3    DULERA 200-5 MCG/ACT inhaler INHALE TWO PUFFS BY MOUTH TWICE A DAY 1 Inhaler 11    Handicap Placard MISC by Does not apply route Dx lumbar degenerative disc disease. Effective May 6, 2021. PCP requesting placard with no expiration 1 each 0    omalizumab (XOLAIR) 150 MG injection Inject 300 mg into the skin once for 1 dose (Patient taking differently: Inject 300 mg into the skin every 28 days) 1 vial 3    Spacer/Aero-Holding Chambers (AEROCHAMBER MV) MISC Use with inhaler as directed 1 each 0     No current facility-administered medications for this visit. Assessment:  1. Generalized edema    2. SOB (shortness of breath)    3. History of atrial fibrillation    4.  Essential hypertension Plan:  LE edema: stable    Edema improved. No SOB   Cont lasix 20 mg po daily.  Takes additional 20 mg PRN for weight gain, edema and sob     Hx AF: stable    Flecainide   No c/o palpitations    HTN: stable    /68    Follow up in 2 months

## 2022-12-08 DIAGNOSIS — R52 CHRONIC GENERALIZED PAIN DISORDER: ICD-10-CM

## 2022-12-08 DIAGNOSIS — G89.29 CHRONIC GENERALIZED PAIN DISORDER: ICD-10-CM

## 2022-12-08 RX ORDER — HYDROCODONE BITARTRATE AND ACETAMINOPHEN 7.5; 325 MG/1; MG/1
1 TABLET ORAL DAILY
Qty: 30 TABLET | Refills: 0 | Status: SHIPPED | OUTPATIENT
Start: 2022-12-10 | End: 2023-01-09

## 2022-12-14 ENCOUNTER — OFFICE VISIT (OUTPATIENT)
Dept: ORTHOPEDIC SURGERY | Age: 57
End: 2022-12-14

## 2022-12-14 VITALS — HEIGHT: 70 IN | WEIGHT: 293 LBS | BODY MASS INDEX: 41.95 KG/M2

## 2022-12-14 DIAGNOSIS — Z98.1 STATUS POST CERVICAL SPINAL FUSION: Primary | ICD-10-CM

## 2022-12-14 NOTE — PROGRESS NOTES
Ms. Santosh Rahman returns today 2.5 months s/p ACDF C5-C6 and C6-C7 for spondylosis with foraminal stenosis and left greater than right arm pain. She reports no improvement of her arm symptoms. She has mild interscapular pain and swallowing discomfort. Her incisions show no signs of infection. She has a normal gait and 5/5 strength of her wrist DFs/VFs and biceps/triceps bilaterally. Her sensation is intact from C5 to C8 bilaterally. I reviewed AP and LAT x-rays of her cervical spine that were obtained in the office today. They show good position of her plate, screws and bone graft. Unable to take gabapentin or Lyrica. We will repeat her cervical MRI.

## 2022-12-16 DIAGNOSIS — G25.81 RLS (RESTLESS LEGS SYNDROME): ICD-10-CM

## 2022-12-16 RX ORDER — ROPINIROLE 0.5 MG/1
1.5 TABLET, FILM COATED ORAL EVERY EVENING
Qty: 90 TABLET | Refills: 5 | Status: SHIPPED | OUTPATIENT
Start: 2022-12-16 | End: 2022-12-16 | Stop reason: SDUPTHER

## 2022-12-16 RX ORDER — ROPINIROLE 1 MG/1
1 TABLET, FILM COATED ORAL 2 TIMES DAILY
Qty: 180 TABLET | Refills: 1 | Status: SHIPPED | OUTPATIENT
Start: 2022-12-16

## 2022-12-21 ENCOUNTER — TELEMEDICINE (OUTPATIENT)
Dept: PSYCHOLOGY | Age: 57
End: 2022-12-21
Payer: MEDICAID

## 2022-12-21 DIAGNOSIS — F33.1 MAJOR DEPRESSIVE DISORDER, RECURRENT EPISODE, MODERATE WITH ANXIOUS DISTRESS (HCC): Primary | ICD-10-CM

## 2022-12-21 DIAGNOSIS — G89.4 CHRONIC PAIN SYNDROME: ICD-10-CM

## 2022-12-21 PROCEDURE — 90832 PSYTX W PT 30 MINUTES: CPT | Performed by: PSYCHOLOGIST

## 2022-12-21 NOTE — LETTER
[facility] PSYCHOLOGY  [address]  Phone: [phone]    [place] Jesus        December 21, 2022     Patient: Jose Guadalupe Sparks   YOB: 1965   Date of Visit: 12/21/2022       To Whom It May Concern:    Please allow this letter to serve as documentation that I am providing behavioral health services to Mrs. Oscar Montalvo. We have met regularly in the past, and we resumed our treatment today. Mrs. Oscar Montalvo expressed her intention to meet with me on a monthly basis moving forward. Sincerely,        Tracie Galvan. Jake Hernandez Psy.D.   Licensed Clinical Psychologist  [facility] [id number] [facility]
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
## 2022-12-21 NOTE — PATIENT INSTRUCTIONS
Goals: 1. Read The Sleep Solution by Lucinda Persaud MD  2. Schedule an appointment with Autry Sicard, psychiatric NP, to discuss medication options  3. Learn more about self-compassion at www.self-compassion.org. Watch the video on self-compassion vs self-esteem.

## 2022-12-22 DIAGNOSIS — R52 CHRONIC GENERALIZED PAIN DISORDER: ICD-10-CM

## 2022-12-22 DIAGNOSIS — G89.29 CHRONIC GENERALIZED PAIN DISORDER: ICD-10-CM

## 2022-12-27 RX ORDER — PYRAZINAMIDE 500 MG/1
1 TABLET ORAL 2 TIMES DAILY
Qty: 60 TABLET | Refills: 0 | OUTPATIENT
Start: 2022-12-27 | End: 2023-01-26

## 2022-12-27 NOTE — TELEPHONE ENCOUNTER
Phones were down [place].  Sent patient my chart message per [facility]

## 2022-12-29 ENCOUNTER — TELEPHONE (OUTPATIENT)
Dept: FAMILY MEDICINE CLINIC | Age: 57
End: 2022-12-29

## 2022-12-29 NOTE — TELEPHONE ENCOUNTER
Patient will need to wait for Dr. Hank Guidry to return the office to place this referral.  She does not want NP care therefore I will not provide any further orders for her.

## 2022-12-30 DIAGNOSIS — Z98.1 STATUS POST CERVICAL SPINAL FUSION: ICD-10-CM

## 2022-12-30 DIAGNOSIS — M47.22 CERVICAL SPONDYLOSIS WITH RADICULOPATHY: Primary | ICD-10-CM

## 2023-01-03 ENCOUNTER — HOSPITAL ENCOUNTER (OUTPATIENT)
Dept: MRI IMAGING | Age: 58
Discharge: HOME | End: 2023-01-03
Payer: MEDICAID

## 2023-01-03 DIAGNOSIS — Z98.1 STATUS POST CERVICAL SPINAL FUSION: ICD-10-CM

## 2023-01-03 DIAGNOSIS — M47.22 CERVICAL SPONDYLOSIS WITH RADICULOPATHY: Primary | ICD-10-CM

## 2023-01-03 DIAGNOSIS — M47.22 CERVICAL SPONDYLOSIS WITH RADICULOPATHY: ICD-10-CM

## 2023-01-03 PROCEDURE — 72141 MRI NECK SPINE W/O DYE: CPT

## 2023-01-05 ENCOUNTER — TELEPHONE (OUTPATIENT)
Dept: FAMILY MEDICINE CLINIC | Age: 58
End: 2023-01-05

## 2023-01-05 DIAGNOSIS — G89.29 CHRONIC GENERALIZED PAIN DISORDER: Primary | ICD-10-CM

## 2023-01-05 DIAGNOSIS — M79.7 FIBROMYALGIA: ICD-10-CM

## 2023-01-05 DIAGNOSIS — R52 CHRONIC GENERALIZED PAIN DISORDER: Primary | ICD-10-CM

## 2023-01-05 DIAGNOSIS — M54.12 CERVICAL RADICULOPATHY: ICD-10-CM

## 2023-01-05 RX ORDER — PYRAZINAMIDE 500 MG/1
1 TABLET ORAL 2 TIMES DAILY
Qty: 60 TABLET | Refills: 0 | Status: SHIPPED | OUTPATIENT
Start: 2023-01-05 | End: 2023-02-04

## 2023-01-05 NOTE — TELEPHONE ENCOUNTER
Discussed w/ patient. Referral [facility]ed [facility] to [facility] as requested per pt.  Advised pt to call ofc if she has any questions or concerns at any time

## 2023-01-06 ENCOUNTER — TELEPHONE (OUTPATIENT)
Dept: ORTHOPEDIC SURGERY | Age: 58
End: 2023-01-06

## 2023-01-06 DIAGNOSIS — Z98.1 STATUS POST CERVICAL SPINAL FUSION: ICD-10-CM

## 2023-01-06 DIAGNOSIS — M47.22 CERVICAL SPONDYLOSIS WITH RADICULOPATHY: Primary | ICD-10-CM

## 2023-01-09 ENCOUNTER — HOSPITAL ENCOUNTER (INPATIENT)
Age: 58
LOS: 3 days | Discharge: HOME | DRG: 194 | End: 2023-01-12
Attending: STUDENT IN AN ORGANIZED HEALTH CARE EDUCATION/TRAINING PROGRAM | Admitting: INTERNAL MEDICINE
Payer: MEDICAID

## 2023-01-09 ENCOUNTER — APPOINTMENT (OUTPATIENT)
Dept: GENERAL RADIOLOGY | Age: 58
DRG: 194 | End: 2023-01-09
Payer: MEDICAID

## 2023-01-09 DIAGNOSIS — J45.41 MODERATE PERSISTENT ASTHMA WITH EXACERBATION: Primary | ICD-10-CM

## 2023-01-09 DIAGNOSIS — E87.70 HYPERVOLEMIA, UNSPECIFIED HYPERVOLEMIA TYPE: ICD-10-CM

## 2023-01-09 DIAGNOSIS — R06.02 SHORTNESS OF BREATH: ICD-10-CM

## 2023-01-09 DIAGNOSIS — I48.0 PAROXYSMAL ATRIAL FIBRILLATION WITH RAPID VENTRICULAR RESPONSE (HCC): ICD-10-CM

## 2023-01-09 DIAGNOSIS — I50.9 OTHER CONGESTIVE HEART FAILURE (HCC): ICD-10-CM

## 2023-01-09 PROBLEM — I50.21 ACUTE SYSTOLIC (CONGESTIVE) HEART FAILURE (HCC): Status: ACTIVE | Noted: 2023-01-09

## 2023-01-09 LAB
ANION GAP SERPL CALCULATED.3IONS-SCNC: 11 MMOL/L (ref 3–16)
ANION GAP SERPL CALCULATED.3IONS-SCNC: 11 MMOL/L (ref 3–16)
ANION GAP SERPL CALCULATED.3IONS-SCNC: 16 MMOL/L (ref 3–16)
ANTI-XA UNFRAC HEPARIN: <0.1 IU/ML (ref 0.3–0.7)
APTT: 29.9 SEC (ref 23–34.3)
BACTERIA: ABNORMAL /HPF
BASE EXCESS VENOUS: 2 MMOL/L (ref -2–3)
BASOPHILS ABSOLUTE: 0.1 K/UL (ref 0–0.2)
BASOPHILS RELATIVE PERCENT: 1.2 %
BILIRUBIN URINE: NEGATIVE
BLOOD, URINE: NEGATIVE
BUN BLDV-MCNC: 16 MG/DL (ref 7–20)
BUN BLDV-MCNC: 16 MG/DL (ref 7–20)
BUN BLDV-MCNC: 18 MG/DL (ref 7–20)
CALCIUM SERPL-MCNC: 8.4 MG/DL (ref 8.3–10.6)
CALCIUM SERPL-MCNC: 8.8 MG/DL (ref 8.3–10.6)
CALCIUM SERPL-MCNC: 9 MG/DL (ref 8.3–10.6)
CARBOXYHEMOGLOBIN: 0.8 % (ref 0–1.5)
CHLORIDE BLD-SCNC: 100 MMOL/L (ref 99–110)
CHLORIDE BLD-SCNC: 102 MMOL/L (ref 99–110)
CHLORIDE BLD-SCNC: 104 MMOL/L (ref 99–110)
CLARITY: CLEAR
CO2: 22 MMOL/L (ref 21–32)
CO2: 23 MMOL/L (ref 21–32)
CO2: 24 MMOL/L (ref 21–32)
COLOR: YELLOW
CREAT SERPL-MCNC: 0.8 MG/DL (ref 0.6–1.1)
CREAT SERPL-MCNC: 0.9 MG/DL (ref 0.6–1.1)
CREAT SERPL-MCNC: 0.9 MG/DL (ref 0.6–1.1)
EKG ATRIAL RATE: 138 BPM
EKG DIAGNOSIS: NORMAL
EKG Q-T INTERVAL: 280 MS
EKG QRS DURATION: 80 MS
EKG QTC CALCULATION (BAZETT): 416 MS
EKG R AXIS: 27 DEGREES
EKG T AXIS: 33 DEGREES
EKG VENTRICULAR RATE: 133 BPM
EOSINOPHILS ABSOLUTE: 0.2 K/UL (ref 0–0.6)
EOSINOPHILS RELATIVE PERCENT: 1.5 %
EPITHELIAL CELLS, UA: ABNORMAL /HPF (ref 0–5)
FERRITIN: 13.8 NG/ML (ref 15–150)
GFR SERPL CREATININE-BSD FRML MDRD: >60 ML/MIN/{1.73_M2}
GLUCOSE BLD-MCNC: 104 MG/DL (ref 70–99)
GLUCOSE BLD-MCNC: 107 MG/DL (ref 70–99)
GLUCOSE BLD-MCNC: 108 MG/DL (ref 70–99)
GLUCOSE URINE: NEGATIVE MG/DL
HCO3 VENOUS: 26.5 MMOL/L (ref 24–28)
HCT VFR BLD CALC: 35.3 % (ref 36–48)
HCT VFR BLD CALC: 35.3 % (ref 36–48)
HEMOGLOBIN, VEN, REDUCED: 47.7 %
HEMOGLOBIN: 11.1 G/DL (ref 12–16)
HEMOGLOBIN: 11.4 G/DL (ref 12–16)
KETONES, URINE: NEGATIVE MG/DL
LEUKOCYTE ESTERASE, URINE: ABNORMAL
LYMPHOCYTES ABSOLUTE: 2 K/UL (ref 1–5.1)
LYMPHOCYTES RELATIVE PERCENT: 18.9 %
MAGNESIUM: 1.9 MG/DL (ref 1.8–2.4)
MCH RBC QN AUTO: 24.2 PG (ref 26–34)
MCH RBC QN AUTO: 24.6 PG (ref 26–34)
MCHC RBC AUTO-ENTMCNC: 31.6 G/DL (ref 31–36)
MCHC RBC AUTO-ENTMCNC: 32.2 G/DL (ref 31–36)
MCV RBC AUTO: 76.2 FL (ref 80–100)
MCV RBC AUTO: 76.6 FL (ref 80–100)
METHEMOGLOBIN VENOUS: 0.9 % (ref 0–1.5)
MICROSCOPIC EXAMINATION: YES
MONOCYTES ABSOLUTE: 0.8 K/UL (ref 0–1.3)
MONOCYTES RELATIVE PERCENT: 7.5 %
NEUTROPHILS ABSOLUTE: 7.6 K/UL (ref 1.7–7.7)
NEUTROPHILS RELATIVE PERCENT: 70.9 %
NITRITE, URINE: POSITIVE
O2 SAT, VEN: 52 %
PCO2, VEN: 39.9 MMHG (ref 41–51)
PDW BLD-RTO: 18.2 % (ref 12.4–15.4)
PDW BLD-RTO: 18.5 % (ref 12.4–15.4)
PH UA: 6.5 (ref 5–8)
PH VENOUS: 7.43 (ref 7.35–7.45)
PLATELET # BLD: 299 K/UL (ref 135–450)
PLATELET # BLD: 324 K/UL (ref 135–450)
PMV BLD AUTO: 8.2 FL (ref 5–10.5)
PMV BLD AUTO: 8.5 FL (ref 5–10.5)
PO2, VEN: <30 MMHG (ref 25–40)
POTASSIUM REFLEX MAGNESIUM: 4.1 MMOL/L (ref 3.5–5.1)
POTASSIUM SERPL-SCNC: 4.2 MMOL/L (ref 3.5–5.1)
POTASSIUM SERPL-SCNC: 4.2 MMOL/L (ref 3.5–5.1)
PRO-BNP: 1507 PG/ML (ref 0–124)
PROCALCITONIN: 0.09 NG/ML (ref 0–0.15)
PROTEIN UA: NEGATIVE MG/DL
RAPID INFLUENZA  B AGN: NEGATIVE
RAPID INFLUENZA A AGN: NEGATIVE
RBC # BLD: 4.61 M/UL (ref 4–5.2)
RBC # BLD: 4.64 M/UL (ref 4–5.2)
RBC UA: ABNORMAL /HPF (ref 0–4)
SARS-COV-2, NAAT: NOT DETECTED
SODIUM BLD-SCNC: 137 MMOL/L (ref 136–145)
SODIUM BLD-SCNC: 137 MMOL/L (ref 136–145)
SODIUM BLD-SCNC: 139 MMOL/L (ref 136–145)
SPECIFIC GRAVITY UA: 1.02 (ref 1–1.03)
TCO2 CALC VENOUS: 28 MMOL/L
TROPONIN: <0.01 NG/ML
URINE REFLEX TO CULTURE: YES
URINE TYPE: ABNORMAL
UROBILINOGEN, URINE: 0.2 E.U./DL
WBC # BLD: 10 K/UL (ref 4–11)
WBC # BLD: 10.7 K/UL (ref 4–11)
WBC UA: ABNORMAL /HPF (ref 0–5)

## 2023-01-09 PROCEDURE — 6360000002 HC RX W HCPCS: Performed by: STUDENT IN AN ORGANIZED HEALTH CARE EDUCATION/TRAINING PROGRAM

## 2023-01-09 PROCEDURE — 36415 COLL VENOUS BLD VENIPUNCTURE: CPT

## 2023-01-09 PROCEDURE — 6370000000 HC RX 637 (ALT 250 FOR IP): Performed by: STUDENT IN AN ORGANIZED HEALTH CARE EDUCATION/TRAINING PROGRAM

## 2023-01-09 PROCEDURE — 1200000000 HC SEMI PRIVATE

## 2023-01-09 PROCEDURE — 2580000003 HC RX 258: Performed by: STUDENT IN AN ORGANIZED HEALTH CARE EDUCATION/TRAINING PROGRAM

## 2023-01-09 PROCEDURE — 2500000003 HC RX 250 WO HCPCS: Performed by: STUDENT IN AN ORGANIZED HEALTH CARE EDUCATION/TRAINING PROGRAM

## 2023-01-09 PROCEDURE — 99285 EMERGENCY DEPT VISIT HI MDM: CPT

## 2023-01-09 PROCEDURE — 83735 ASSAY OF MAGNESIUM: CPT

## 2023-01-09 PROCEDURE — 84145 PROCALCITONIN (PCT): CPT

## 2023-01-09 PROCEDURE — 82803 BLOOD GASES ANY COMBINATION: CPT

## 2023-01-09 PROCEDURE — 6370000000 HC RX 637 (ALT 250 FOR IP)

## 2023-01-09 PROCEDURE — 71045 X-RAY EXAM CHEST 1 VIEW: CPT

## 2023-01-09 PROCEDURE — 87088 URINE BACTERIA CULTURE: CPT

## 2023-01-09 PROCEDURE — 99223 1ST HOSP IP/OBS HIGH 75: CPT | Performed by: INTERNAL MEDICINE

## 2023-01-09 PROCEDURE — 81001 URINALYSIS AUTO W/SCOPE: CPT

## 2023-01-09 PROCEDURE — 87186 SC STD MICRODIL/AGAR DIL: CPT

## 2023-01-09 PROCEDURE — 85025 COMPLETE CBC W/AUTO DIFF WBC: CPT

## 2023-01-09 PROCEDURE — 87086 URINE CULTURE/COLONY COUNT: CPT

## 2023-01-09 PROCEDURE — 84484 ASSAY OF TROPONIN QUANT: CPT

## 2023-01-09 PROCEDURE — 85730 THROMBOPLASTIN TIME PARTIAL: CPT

## 2023-01-09 PROCEDURE — 82728 ASSAY OF FERRITIN: CPT

## 2023-01-09 PROCEDURE — 6360000002 HC RX W HCPCS

## 2023-01-09 PROCEDURE — 0202U NFCT DS 22 TRGT SARS-COV-2: CPT

## 2023-01-09 PROCEDURE — 80048 BASIC METABOLIC PNL TOTAL CA: CPT

## 2023-01-09 PROCEDURE — 85520 HEPARIN ASSAY: CPT

## 2023-01-09 PROCEDURE — 94664 DEMO&/EVAL PT USE INHALER: CPT

## 2023-01-09 PROCEDURE — 94640 AIRWAY INHALATION TREATMENT: CPT

## 2023-01-09 PROCEDURE — 85027 COMPLETE CBC AUTOMATED: CPT

## 2023-01-09 PROCEDURE — 87635 SARS-COV-2 COVID-19 AMP PRB: CPT

## 2023-01-09 PROCEDURE — 87804 INFLUENZA ASSAY W/OPTIC: CPT

## 2023-01-09 PROCEDURE — 94761 N-INVAS EAR/PLS OXIMETRY MLT: CPT

## 2023-01-09 PROCEDURE — 93005 ELECTROCARDIOGRAM TRACING: CPT | Performed by: STUDENT IN AN ORGANIZED HEALTH CARE EDUCATION/TRAINING PROGRAM

## 2023-01-09 PROCEDURE — 83880 ASSAY OF NATRIURETIC PEPTIDE: CPT

## 2023-01-09 PROCEDURE — 96374 THER/PROPH/DIAG INJ IV PUSH: CPT

## 2023-01-09 RX ORDER — SODIUM CHLORIDE 0.9 % (FLUSH) 0.9 %
5-40 SYRINGE (ML) INJECTION EVERY 12 HOURS SCHEDULED
Status: DISCONTINUED | OUTPATIENT
Start: 2023-01-09 | End: 2023-01-12 | Stop reason: HOSPADM

## 2023-01-09 RX ORDER — LEVALBUTEROL INHALATION SOLUTION 0.63 MG/3ML
0.63 SOLUTION RESPIRATORY (INHALATION) EVERY 6 HOURS PRN
Status: DISCONTINUED | OUTPATIENT
Start: 2023-01-09 | End: 2023-01-12 | Stop reason: HOSPADM

## 2023-01-09 RX ORDER — HEPARIN SODIUM 10000 [USP'U]/100ML
5-30 INJECTION, SOLUTION INTRAVENOUS CONTINUOUS
Status: DISCONTINUED | OUTPATIENT
Start: 2023-01-09 | End: 2023-01-09

## 2023-01-09 RX ORDER — CIPROFLOXACIN 2 MG/ML
400 INJECTION, SOLUTION INTRAVENOUS EVERY 12 HOURS
Status: DISCONTINUED | OUTPATIENT
Start: 2023-01-09 | End: 2023-01-10

## 2023-01-09 RX ORDER — IPRATROPIUM BROMIDE AND ALBUTEROL SULFATE 2.5; .5 MG/3ML; MG/3ML
1 SOLUTION RESPIRATORY (INHALATION)
Status: DISCONTINUED | OUTPATIENT
Start: 2023-01-09 | End: 2023-01-09

## 2023-01-09 RX ORDER — FUROSEMIDE 10 MG/ML
20 INJECTION INTRAMUSCULAR; INTRAVENOUS ONCE
Status: COMPLETED | OUTPATIENT
Start: 2023-01-09 | End: 2023-01-09

## 2023-01-09 RX ORDER — IPRATROPIUM BROMIDE AND ALBUTEROL SULFATE 2.5; .5 MG/3ML; MG/3ML
1 SOLUTION RESPIRATORY (INHALATION) ONCE
Status: COMPLETED | OUTPATIENT
Start: 2023-01-09 | End: 2023-01-09

## 2023-01-09 RX ORDER — ONDANSETRON 2 MG/ML
4 INJECTION INTRAMUSCULAR; INTRAVENOUS EVERY 6 HOURS PRN
Status: DISCONTINUED | OUTPATIENT
Start: 2023-01-09 | End: 2023-01-12 | Stop reason: HOSPADM

## 2023-01-09 RX ORDER — ROPINIROLE 1 MG/1
1.5 TABLET, FILM COATED ORAL 2 TIMES DAILY
COMMUNITY

## 2023-01-09 RX ORDER — BENZONATATE 100 MG/1
100 CAPSULE ORAL ONCE
Status: COMPLETED | OUTPATIENT
Start: 2023-01-09 | End: 2023-01-09

## 2023-01-09 RX ORDER — ONDANSETRON 4 MG/1
4 TABLET, ORALLY DISINTEGRATING ORAL EVERY 8 HOURS PRN
Status: DISCONTINUED | OUTPATIENT
Start: 2023-01-09 | End: 2023-01-12 | Stop reason: HOSPADM

## 2023-01-09 RX ORDER — DILTIAZEM HYDROCHLORIDE 5 MG/ML
5 INJECTION INTRAVENOUS ONCE
Status: COMPLETED | OUTPATIENT
Start: 2023-01-09 | End: 2023-01-09

## 2023-01-09 RX ORDER — BUDESONIDE 0.25 MG/2ML
0.25 INHALANT ORAL 2 TIMES DAILY
Status: DISCONTINUED | OUTPATIENT
Start: 2023-01-09 | End: 2023-01-12 | Stop reason: HOSPADM

## 2023-01-09 RX ORDER — HEPARIN SODIUM 1000 [USP'U]/ML
10000 INJECTION, SOLUTION INTRAVENOUS; SUBCUTANEOUS PRN
Status: DISCONTINUED | OUTPATIENT
Start: 2023-01-09 | End: 2023-01-09 | Stop reason: SDUPTHER

## 2023-01-09 RX ORDER — METOPROLOL TARTRATE 5 MG/5ML
5 INJECTION INTRAVENOUS EVERY 5 MIN PRN
Status: CANCELLED | OUTPATIENT
Start: 2023-01-09

## 2023-01-09 RX ORDER — FUROSEMIDE 10 MG/ML
40 INJECTION INTRAMUSCULAR; INTRAVENOUS 2 TIMES DAILY
Status: DISCONTINUED | OUTPATIENT
Start: 2023-01-09 | End: 2023-01-12

## 2023-01-09 RX ORDER — SODIUM CHLORIDE 0.9 % (FLUSH) 0.9 %
5-40 SYRINGE (ML) INJECTION PRN
Status: DISCONTINUED | OUTPATIENT
Start: 2023-01-09 | End: 2023-01-12 | Stop reason: HOSPADM

## 2023-01-09 RX ORDER — POLYETHYLENE GLYCOL 3350 17 G/17G
17 POWDER, FOR SOLUTION ORAL DAILY PRN
Status: DISCONTINUED | OUTPATIENT
Start: 2023-01-09 | End: 2023-01-12 | Stop reason: HOSPADM

## 2023-01-09 RX ORDER — SODIUM CHLORIDE 9 MG/ML
INJECTION, SOLUTION INTRAVENOUS PRN
Status: DISCONTINUED | OUTPATIENT
Start: 2023-01-09 | End: 2023-01-12 | Stop reason: HOSPADM

## 2023-01-09 RX ORDER — LEVALBUTEROL 1.25 MG/.5ML
SOLUTION, CONCENTRATE RESPIRATORY (INHALATION)
Status: DISPENSED
Start: 2023-01-09 | End: 2023-01-10

## 2023-01-09 RX ORDER — ACETAMINOPHEN 325 MG/1
650 TABLET ORAL EVERY 6 HOURS PRN
Status: DISCONTINUED | OUTPATIENT
Start: 2023-01-09 | End: 2023-01-12 | Stop reason: HOSPADM

## 2023-01-09 RX ORDER — HEPARIN SODIUM 1000 [USP'U]/ML
5000 INJECTION, SOLUTION INTRAVENOUS; SUBCUTANEOUS PRN
Status: DISCONTINUED | OUTPATIENT
Start: 2023-01-09 | End: 2023-01-09 | Stop reason: SDUPTHER

## 2023-01-09 RX ORDER — IPRATROPIUM BROMIDE AND ALBUTEROL SULFATE 2.5; .5 MG/3ML; MG/3ML
1 SOLUTION RESPIRATORY (INHALATION) EVERY 4 HOURS PRN
COMMUNITY

## 2023-01-09 RX ORDER — ACETAMINOPHEN 650 MG/1
650 SUPPOSITORY RECTAL EVERY 6 HOURS PRN
Status: DISCONTINUED | OUTPATIENT
Start: 2023-01-09 | End: 2023-01-12 | Stop reason: HOSPADM

## 2023-01-09 RX ADMIN — SODIUM CHLORIDE, PRESERVATIVE FREE 10 ML: 5 INJECTION INTRAVENOUS at 20:26

## 2023-01-09 RX ADMIN — FUROSEMIDE 40 MG: 10 INJECTION, SOLUTION INTRAMUSCULAR; INTRAVENOUS at 09:10

## 2023-01-09 RX ADMIN — DILTIAZEM HYDROCHLORIDE 15 MG/HR: 5 INJECTION INTRAVENOUS at 17:58

## 2023-01-09 RX ADMIN — FUROSEMIDE 20 MG: 10 INJECTION, SOLUTION INTRAMUSCULAR; INTRAVENOUS at 06:11

## 2023-01-09 RX ADMIN — APIXABAN 5 MG: 5 TABLET, FILM COATED ORAL at 20:25

## 2023-01-09 RX ADMIN — FUROSEMIDE 20 MG: 10 INJECTION, SOLUTION INTRAMUSCULAR; INTRAVENOUS at 03:17

## 2023-01-09 RX ADMIN — DILTIAZEM HYDROCHLORIDE 5 MG: 5 INJECTION, SOLUTION INTRAVENOUS at 05:17

## 2023-01-09 RX ADMIN — ACETAMINOPHEN 650 MG: 325 TABLET ORAL at 12:51

## 2023-01-09 RX ADMIN — HEPARIN SODIUM 10000 UNITS: 1000 INJECTION INTRAVENOUS; SUBCUTANEOUS at 12:47

## 2023-01-09 RX ADMIN — CIPROFLOXACIN 400 MG: 2 INJECTION, SOLUTION INTRAVENOUS at 12:38

## 2023-01-09 RX ADMIN — SODIUM CHLORIDE, PRESERVATIVE FREE 10 ML: 5 INJECTION INTRAVENOUS at 09:10

## 2023-01-09 RX ADMIN — DILTIAZEM HYDROCHLORIDE 5 MG/HR: 5 INJECTION INTRAVENOUS at 05:28

## 2023-01-09 RX ADMIN — IPRATROPIUM BROMIDE AND ALBUTEROL SULFATE 1 AMPULE: 2.5; .5 SOLUTION RESPIRATORY (INHALATION) at 01:47

## 2023-01-09 RX ADMIN — BENZONATATE 100 MG: 100 CAPSULE ORAL at 03:17

## 2023-01-09 RX ADMIN — FUROSEMIDE 40 MG: 10 INJECTION, SOLUTION INTRAMUSCULAR; INTRAVENOUS at 18:22

## 2023-01-09 RX ADMIN — APIXABAN 5 MG: 5 TABLET, FILM COATED ORAL at 14:38

## 2023-01-09 RX ADMIN — BUDESONIDE 250 MCG: 0.25 SUSPENSION RESPIRATORY (INHALATION) at 08:27

## 2023-01-09 RX ADMIN — HEPARIN SODIUM 13 UNITS/KG/HR: 5000 INJECTION, SOLUTION INTRAVENOUS; SUBCUTANEOUS at 05:11

## 2023-01-09 RX ADMIN — BUDESONIDE 250 MCG: 0.25 SUSPENSION RESPIRATORY (INHALATION) at 21:31

## 2023-01-09 ASSESSMENT — PAIN DESCRIPTION - LOCATION: LOCATION: HEAD

## 2023-01-09 ASSESSMENT — ENCOUNTER SYMPTOMS
ABDOMINAL PAIN: 0
BACK PAIN: 0
COUGH: 1
ABDOMINAL DISTENTION: 1
WHEEZING: 1
DIARRHEA: 0
NAUSEA: 0
EYE DISCHARGE: 0
BACK PAIN: 1
VOMITING: 0
SHORTNESS OF BREATH: 1

## 2023-01-09 ASSESSMENT — PAIN SCALES - GENERAL
PAINLEVEL_OUTOF10: 0
PAINLEVEL_OUTOF10: 6

## 2023-01-09 ASSESSMENT — PAIN DESCRIPTION - DESCRIPTORS: DESCRIPTORS: ACHING

## 2023-01-09 ASSESSMENT — PAIN DESCRIPTION - ORIENTATION: ORIENTATION: RIGHT;LEFT;ANTERIOR

## 2023-01-09 NOTE — PROGRESS NOTES
This RN attempted twice to insert new PIV for pt but unable to. Amira Castaneda will attempt when able.

## 2023-01-09 NOTE — CONSULTS
[address]   Electrophysiology Consultation     Date: 1/9/2023  Reason for Consultation: atrial fibrillation   Consult Requesting Physician: Sawyer Lopez MD     CC: short of breath, cough     HPI: Jeanette Anderson is a 62 y.o. female history of paroxysmal atrial flutter, status post ablation in 2018, paroxysmal atrial fibrillation on \"pill in pocket\" flecainide, presents to the emergency department with worsening shortness of breath, redness, in addition to orthopnea as well as nonproductive cough over the past 3 to 4 days. She denies fevers or chills. Denies chest pain. She was unaware of her elevated heart rates on admission. EKG demonstrating atrial fibrillation with RVR, telemetry reviewed with heart rates up to the 150s. She has had lower extremity swelling and dyspnea on exertion in the past, placed on diuretic with improvement in swelling. She denies swelling of her lower extremities or abdomen leading up to this admission. Regards to her diagnosis of atrial fibrillation, she occasionally experiences palpitations and short runs fast heart rates are to take as needed flecainide times each month. She was not aware that her heart was racing during this admission. Hypertensive on admission. Started on diltiazem drip    Review of System:  Complete 10 point ROS performed and negative unless noted in above HPI or below  No nausea, vomiting, diarrhea  Denies dizziness, syncope    Prior to Admission medications    Medication Sig Start Date End Date Taking? Authorizing Provider   acetaminophen-codeine (TYLENOL/CODEINE #3) 300-30 MG per tablet Take 1 tablet by mouth in the morning and at bedtime for 30 days. Intended supply: 3 days.  Take lowest dose possible to manage pain Max Daily Amount: 2 tablets 1/5/23 2/4/23  Hernesto Peter MD   rOPINIRole (REQUIP) 1 MG tablet Take 1 tablet by mouth in the morning and at bedtime 12/16/22   Hernesto Peter MD   HYDROcodone-acetaminophen ([address]) 7.5-325 MG per tablet Take 1 tablet by mouth daily for 30 days. Intended supply: 3 days.  Take lowest dose possible to manage pain 12/10/22 1/9/23  Paco Haynes MD   furosemide (LASIX) 20 MG tablet Take 20 mg by mouth 2 times daily    Historical Provider, MD   flecainide (TAMBOCOR) 50 MG tablet Take 1 tablet by mouth 2 times daily as needed (episdoes of atrial fibrillation) 12/2/22   STEPHANIE Saucedo CNP   cyclobenzaprine (FLEXERIL) 10 MG tablet Take 1 tablet by mouth 3 times daily as needed for Muscle spasms 11/10/22   Paco Haynes MD   furosemide (LASIX) 20 MG tablet Take 1 tablet by mouth daily 11/7/22   STEPHANIE Saucedo CNP   Respiratory Therapy Supplies (NEBULIZER/TUBING/MOUTHPIECE) KIT Continuous 10/10/22   Paco Haynes MD   ipratropium-albuterol (DUONEB) 0.5-2.5 (3) MG/3ML SOLN nebulizer solution Inhale 3 mLs into the lungs every 4 hours 10/6/22   Paco Haynes MD   docusate sodium (COLACE) 100 MG capsule Take 1 capsule by mouth 2 times daily as needed for Constipation 9/30/22   Kamran Jones MD   azelastine (ASTELIN) 0.1 % nasal spray 2 sprays by Nasal route 2 times daily Use in each nostril as directed 9/28/22   Paco Haynes MD   albuterol sulfate HFA (PROVENTIL;VENTOLIN;PROAIR) 108 (90 Base) MCG/ACT inhaler INHALE 2 PUFFS BY MOUTH EVERY 4 HOURS AS NEEDED FOR WHEEZING OR SHORTNESS OF BREATH(SPACE OUT TO EVERY 6 HOURS AS SYMPTOMS IMPROVE) 8/17/22   Angel Ortiz MD   famotidine (PEPCID) 40 MG tablet TAKE 1 TABLET BY MOUTH AT BEDTIME 4/29/22   Historical Provider, MD   pantoprazole (PROTONIX) 40 MG tablet TAKE 1 TABLET BY MOUTH DAILY 4/29/22   Historical Provider, MD   EPINEPHrine (EPIPEN) 0.3 MG/0.3ML SOAJ injection INJECT INTO UPPER THIGH AS DIRECTED AS NEEDED FOR ANAPHYLAXIS 3/8/22   Historical Provider, MD   metFORMIN (GLUCOPHAGE XR) 500 MG extended release tablet Take 1 tablet by mouth daily (with breakfast) 3/10/22   DO RUIZ ZengERA 200-5 MCG/ACT inhaler INHALE TWO PUFFS BY MOUTH TWICE A DAY 5/29/21   Rehan Davenport MD   Handicap Placard MISC by Does not apply route Dx lumbar degenerative disc disease. Effective May 6, 2021.   PCP requesting placard with no expiration 5/6/21   Lucas Hernandez, DO   omalizumab Gabo Huston) 150 MG injection Inject 300 mg into the skin once for 1 dose  Patient taking differently: Inject 300 mg into the skin every 28 days 11/6/20 12/2/22  Sherry Collins MD   Spacer/Aero-Holding Chambers (AEROCHAMBER MV) MISC Use with inhaler as directed 3/6/20   Angelito Colin MD       Past Medical History:   Diagnosis Date    Acute lateral meniscus tear of right knee 02/2019    Acute medial meniscus tear of right knee 02/2019    Anesthesia complication     woke up during one surgery    Anxiety     Arthritis     Asthma     Atrial fibrillation ([address]     new dx 4 weeks ago, first of  Sept 2017    CHF (congestive heart failure) ([facility])     Edema     Fibromyalgia     GERD (gastroesophageal reflux disease)     reflux    Hiatal hernia     History of blood transfusion     Hx of major depression     Idiopathic urticaria 10/29/2020    SVT (supraventricular tachycardia) ([facility])     hx svt        Past Surgical History:   Procedure Laterality Date    ABLATION OF DYSRHYTHMIC FOCUS  04/2019    afib    BLADDER SUSPENSION  2004    CERVICAL FUSION N/A 9/30/2022    ANTERIOR CERVICAL DISCECTOMY AND FUSION CERVICAL 5 - CERVICAL 6, CERVICAL 6 - CERVICAL 7 WITH DEPUY ALLOGRAFT, PLATE AND SCREWS AND EVOKES               **LATEX SENSITIVE** performed by Neri Perez MD at [address] N/A 8/4/2022    COLONOSCOPY performed by Jeovanny Robb at [address] [facility] ([address])  2004    KNEE ARTHROSCOPY Right 7/11/2019    VIDEO ARTHROSCOPY RIGHT KNEE, PARTIAL MEDIAL AND LATERAL MENISCECTOMY,, MEDIAL MICRO FRACTURE CHONDROPLASTY performed by Gilda Mast MD at [address] [address] AND ADENOIDECTOMY      UPPER GASTROINTESTINAL ENDOSCOPY N/A 2022    EGD BIOPSY performed by [place] at [address] N/A 2019    DIAGNOSTIC LAPAROSCOPY, LAPAROSCOPIC LYSIS OF ADHESIONS, LAPAROSCOPIC REDUCTION OF RECURRENT INCISIONAL HERNIA WITH MESH performed by Valentina Pastrana MD at [address]   Allergen Reactions    Latex Anaphylaxis and Rash    Penicillins Hives and Rash    Sulfa Antibiotics Rash, Hives and Swelling    Aspirin      Swelling in lower legs    Demerol      rash    Nsaids Swelling    Meperidine Nausea And Vomiting and Other (See Comments)     Demerol,   \"deathly ill\"  rash    Ranitidine Hcl Rash       Social History:  Reviewed. reports that she has never smoked. She has never used smokeless tobacco. She reports current alcohol use. She reports current drug use. Drugs: Marijuana (Weed) and [employer] comments. Family History:  Reviewed. Reviewed. No family history of SCD.       Physical Examination:  /78   Pulse (!) 116   Temp 99 °F (37.2 °C) (Oral)   Resp 18   Wt (!) 350 lb 15.6 oz (159.2 kg)   SpO2 93%   BMI 50.36 kg/m²   Temp  Av.5 °F (36.9 °C)  Min: 97.9 °F (36.6 °C)  Max: 99 °F (37.2 °C)  Pulse  Av.5  Min: 112  Max: 143  BP  Min: 120/80  Max: 181/130  SpO2  Av.3 %  Min: 93 %  Max: 98 %    Intake/Output Summary (Last 24 hours) at 2023 1153  Last data filed at 2023 1035  Gross per 24 hour   Intake --   Output 1600 ml   Net -1600 ml       Motion mucosa, nonicteric conjunctiva  Nonlabored, expiratory wheeze bilaterally  Is a regular rhythm, tachycardic, no murmurs, normal S1 and S2, no murmurs, trace nonpitting bilateral lower extremity swelling, jugular venous distention to mid neck at 45 degrees, warm and well-perfused  Abdomen is rounded, soft, nontender  Strength is grossly preserved, normal tone, moves all extremities  No focal neurologic deficits  Appropriate mood and affect  Rashes or ecchymosis    Labs:    K 4.2  Cr 0.9  BNP 1500  Trop negative  Hgb 11.1  A1C 5.4    Flu negative/COVID negative      EKG  Atrial fibrillation with RVR    Stress    Cath    ECHO 3/17/2021   Normal left ventricle size and systolic function with an estimated ejection   fraction of 55-60%. Mild concentric left ventricular hypertrophy. No regional wall motion abnormalities are seen. Normal diastolic function. Mild mitral and tricuspid regurgitation. Assessment/Plan:   Patient is a 60-year-old female with a history of ASTRID, HTN, paroxysmal atrial flutter status post CTI ablation, paroxysmal atrial fibrillation with \"pill in pocket\" flecainide that she uses on a monthly basis for symptoms of palpitations/fluttering. Presents with progressive dyspnea and cough with atrial fibrillation and RVR. Paroxsymal AF RVR   - in setting of dyspnea, volume overload, URI?  - normal LA on ECHO, LVEF preserved  - [id number] 3 (previously diagnosed with HTN, female, HF), recommend chronic oral anticoagulation, Eliquis 5mg BID  - continue diltiazem drip  - if rates remain elevated we will consider SHEY/CV  - discussed ablation with patient given recurrence of atrial fibrillation and monthly use of flecainide, patient would like to proceed, will schedule as OP  - discussed weight loss for reducing AF burden  - hx of ASTRID, not using CPAP, refer to sleep   - treat elevated BP        NOTE: This report was transcribed using voice recognition software. Every effort was made to ensure accuracy, however, inadvertent computerized transcription errors may be present.      Sigrid Borden MD  [address]   Office: [phone]  Fax: [phone]

## 2023-01-09 NOTE — ED PROVIDER NOTES
[facility] RESIDENT [facility]       Date of evaluation: 1/9/2023    Chief Complaint     Shortness of Breath      of Present Illness     Maria M Burciaga is a 62 y.o. female  w/ a history of chronic pain from arthritis/fibromyalgia, paroxysmal atrial fibrillation (on flecanide prn), obesity and asthma presenting to the emergency department with shortness of breath. Pt states she has been feeling ill for the past 3-4 days. However tonight, she developed SOB exacerbated by lying flat. Pt used her home inhaler without relief. She endorses chills and maybe a fever before bed, but took tylenol prior to going to sleep. She notes her grandchild has a URI. She has associated myalgias and cough. Denies any CP, abdominal pain. Has chronic leg swelling, but states it is improved today. Review of Systems     Review of Systems   Constitutional:  Positive for fever. Negative for appetite change. HENT:  Negative for congestion. Eyes:  Negative for discharge. Respiratory:  Positive for cough, shortness of breath and wheezing. Cardiovascular:  Negative for chest pain. Gastrointestinal:  Negative for abdominal pain, diarrhea, nausea and vomiting. Genitourinary:  Negative for dysuria. Musculoskeletal:  Positive for back pain. Skin:  Negative for rash. Neurological:  Negative for dizziness, weakness and headaches. Psychiatric/Behavioral:  The patient is not nervous/anxious.       Past Medical, Surgical, Family, and Social History     She has a past medical history of Acute lateral meniscus tear of right knee, Acute medial meniscus tear of right knee, Anesthesia complication, Anxiety, Arthritis, Asthma, Atrial fibrillation ([address] CHF (congestive heart failure) ([address] Edema, Fibromyalgia, GERD (gastroesophageal reflux disease), Hiatal hernia, History of blood transfusion, Hx of major depression, Idiopathic urticaria, and SVT (supraventricular tachycardia) [facility]).  She has a past surgical history that includes hernia repair (2001); Hysterectomy (2004); Tonsillectomy and adenoidectomy; Romie-en-Y Gastric Bypass (2011); UPPP; ovarian cyst removal; ablation of dysrhythmic focus (04/2019); Knee arthroscopy (Right, 7/11/2019); bladder suspension (2004); ventral hernia repair (N/A, 8/26/2019); Upper gastrointestinal endoscopy (N/A, 8/4/2022); Colonoscopy (N/A, 8/4/2022); and cervical fusion (N/A, 9/30/2022). Her family history includes Arthritis in her mother; Asthma in her maternal aunt; Cancer in her mother; Cirrhosis in her father. She reports that she has never smoked. She has never used smokeless tobacco. She reports current alcohol use. She reports current drug use. Drugs: Marijuana ([employer]) and [employer] comments. Medications     Previous Medications    ACETAMINOPHEN-CODEINE (TYLENOL/CODEINE #3) 300-30 MG PER TABLET    Take 1 tablet by mouth in the morning and at bedtime for 30 days. Intended supply: 3 days.  Take lowest dose possible to manage pain Max Daily Amount: 2 tablets    ALBUTEROL SULFATE HFA (PROVENTIL;VENTOLIN;PROAIR) 108 (90 BASE) MCG/ACT INHALER    INHALE 2 PUFFS BY MOUTH EVERY 4 HOURS AS NEEDED FOR WHEEZING OR SHORTNESS OF BREATH(SPACE OUT TO EVERY 6 HOURS AS SYMPTOMS IMPROVE)    AZELASTINE (ASTELIN) 0.1 % NASAL SPRAY    2 sprays by Nasal route 2 times daily Use in each nostril as directed    CYCLOBENZAPRINE (FLEXERIL) 10 MG TABLET    Take 1 tablet by mouth 3 times daily as needed for Muscle spasms    DOCUSATE SODIUM (COLACE) 100 MG CAPSULE    Take 1 capsule by mouth 2 times daily as needed for Constipation    DULERA 200-5 MCG/ACT INHALER    INHALE TWO PUFFS BY MOUTH TWICE A DAY    EPINEPHRINE (EPIPEN) 0.3 MG/0.3ML SOAJ INJECTION    INJECT INTO UPPER THIGH AS DIRECTED AS NEEDED FOR ANAPHYLAXIS    FAMOTIDINE (PEPCID) 40 MG TABLET    TAKE 1 TABLET BY MOUTH AT BEDTIME    FLECAINIDE (TAMBOCOR) 50 MG TABLET    Take 1 tablet by mouth 2 times daily as needed (episdoes of atrial fibrillation)    FUROSEMIDE (LASIX) 20 MG TABLET    Take 1 tablet by mouth daily    FUROSEMIDE (LASIX) 20 MG TABLET    Take 20 mg by mouth 2 times daily    HANDICAP PLACARD MISC    by Does not apply route Dx lumbar degenerative disc disease. Effective May 6, 2021. PCP requesting placard with no expiration    HYDROCODONE-ACETAMINOPHEN (NORCO) 7.5-325 MG PER TABLET    Take 1 tablet by mouth daily for 30 days. Intended supply: 3 days. Take lowest dose possible to manage pain    IPRATROPIUM-ALBUTEROL (DUONEB) 0.5-2.5 (3) MG/3ML SOLN NEBULIZER SOLUTION    Inhale 3 mLs into the lungs every 4 hours    METFORMIN (GLUCOPHAGE XR) 500 MG EXTENDED RELEASE TABLET    Take 1 tablet by mouth daily (with breakfast)    OMALIZUMAB (XOLAIR) 150 MG INJECTION    Inject 300 mg into the skin once for 1 dose    PANTOPRAZOLE (PROTONIX) 40 MG TABLET    TAKE 1 TABLET BY MOUTH DAILY    RESPIRATORY THERAPY SUPPLIES (NEBULIZER/TUBING/MOUTHPIECE) KIT    Continuous    ROPINIROLE (REQUIP) 1 MG TABLET    Take 1 tablet by mouth in the morning and at bedtime    SPACER/AERO-HOLDING CHAMBERS (AEROCHAMBER MV) MISC    Use with inhaler as directed       Allergies     She is allergic to latex, penicillins, sulfa antibiotics, aspirin, demerol, nsaids, meperidine, and ranitidine hcl. Physical Exam     INITIAL VITALS: BP: (!) 164/119, Temp: 97.9 °F (36.6 °C), Heart Rate: (!) 125, Resp: 26, SpO2: 96 %   Physical Exam  Constitutional:       General: She is in acute distress. Appearance: Normal appearance. HENT:      Right Ear: Tympanic membrane normal.      Left Ear: Tympanic membrane normal.      Nose: No rhinorrhea. Mouth/Throat:      Mouth: Mucous membranes are moist.   Eyes:      Extraocular Movements: Extraocular movements intact. Pupils: Pupils are equal, round, and reactive to light. Cardiovascular:      Rate and Rhythm: Normal rate and regular rhythm. Pulses: Normal pulses.       Heart sounds: No murmur heard.  Pulmonary:      Breath sounds: Wheezing present. Comments: BL inspiratory and expiratory wheezing  Abdominal:      General: Abdomen is flat. There is no distension. Palpations: Abdomen is soft. Tenderness: There is no abdominal tenderness. Musculoskeletal:      Cervical back: Normal range of motion. No tenderness. Right lower leg: No edema. Left lower leg: No edema. Skin:     General: Skin is warm. Capillary Refill: Capillary refill takes less than 2 seconds. Neurological:      General: No focal deficit present. Mental Status: She is alert and oriented to person, place, and time.        DiagnosticResults     EKG   Interpreted in conjunction with emergencydepartment physician No att. providers found  Rhythm: atrial fibrillation - rapid  Rate: 130-140  Axis: normal  Ectopy: none  Conduction: normal  ST Segments: no acute change  T Waves:no acute change  Q Waves: none  Clinical Impression: afib w/ RVR  Comparison:  hx of afib     RADIOLOGY:  XR CHEST PORTABLE    (Results Pending)       LABS:   Results for orders placed or performed during the hospital encounter of 01/09/23   COVID-19, Rapid    Specimen: Nasopharyngeal Swab   Result Value Ref Range    SARS-CoV-2, NAAT Not Detected Not Detected   Rapid Flu Swab    Specimen: Nasopharyngeal   Result Value Ref Range    Rapid Influenza A Ag Negative Negative    Rapid Influenza B Ag Negative Negative   CBC with Auto Differential   Result Value Ref Range    WBC 10.7 4.0 - 11.0 K/uL    RBC 4.64 4.00 - 5.20 M/uL    Hemoglobin 11.4 (L) 12.0 - 16.0 g/dL    Hematocrit 35.3 (L) 36.0 - 48.0 %    MCV 76.2 (L) 80.0 - 100.0 fL    MCH 24.6 (L) 26.0 - 34.0 pg    MCHC 32.2 31.0 - 36.0 g/dL    RDW 18.5 (H) 12.4 - 15.4 %    Platelets 071 805 - 677 K/uL    MPV 8.2 5.0 - 10.5 fL    Neutrophils % 70.9 %    Lymphocytes % 18.9 %    Monocytes % 7.5 %    Eosinophils % 1.5 %    Basophils % 1.2 %    Neutrophils Absolute 7.6 1.7 - 7.7 K/uL    Lymphocytes Absolute 2.0 1.0 - 5.1 K/uL    Monocytes Absolute 0.8 0.0 - 1.3 K/uL    Eosinophils Absolute 0.2 0.0 - 0.6 K/uL    Basophils Absolute 0.1 0.0 - 0.2 K/uL   Basic Metabolic Panel   Result Value Ref Range    Sodium 137 136 - 145 mmol/L    Potassium 4.2 3.5 - 5.1 mmol/L    Chloride 102 99 - 110 mmol/L    CO2 24 21 - 32 mmol/L    Anion Gap 11 3 - 16    Glucose 108 (H) 70 - 99 mg/dL    BUN 18 7 - 20 mg/dL    Creatinine 0.9 0.6 - 1.1 mg/dL    Est, Glom Filt Rate >60 >60    Calcium 8.8 8.3 - 10.6 mg/dL   Brain Natriuretic Peptide   Result Value Ref Range    Pro-BNP 1,507 (H) 0 - 124 pg/mL   Troponin   Result Value Ref Range    Troponin <0.01 <0.01 ng/mL   Blood gas, venous (Lab)   Result Value Ref Range    pH, Karl 7.430 7.350 - 7.450    pCO2, Karl 39.9 (L) 41.0 - 51.0 mmHg    pO2, Karl <30.0 25.0 - 40.0 mmHg    HCO3, Venous 26.5 24.0 - 28.0 mmol/L    Base Excess, Karl 2.0 -2.0 - 3.0 mmol/L    O2 Sat, Karl 52 Not established %    Carboxyhemoglobin 0.8 0.0 - 1.5 %    MetHgb, Karl 0.9 0.0 - 1.5 %    TC02 (Calc), Karl 28 mmol/L    Hemoglobin, Karl, Reduced 47.70 %       ED BEDSIDE ULTRASOUND:  No results found. RECENT VITALS:  BP: (!) 138/92, Temp: 97.9 °F (36.6 °C), Heart Rate: (!) 132,Resp: 16, SpO2: 98 %     Procedures         ED Course     Nursing Notes, Past Medical Hx, Past Surgical Hx, Social Hx, Allergies, and Family Hx were reviewed. ED Course as of 01/09/23 0257 Mon Jan 09, 2023   0204 Vbg w/ mild hypocarbia likely 2/2 to tachypnea [DR]   0205 CBC w/o leukocytosis. Hbg of 11.4, baseline anemia [DR]   0223 Pro-BNP(!): 1,507  Concern for HF exacerbation.  Although pt does not appear fluid overloaded on exam. No pitting edema, no crackles on lung exam [DR]   0224 Patient received DuoNeb, continues to be wheezing on exam. [DR]      ED Course User Index  [DR] Torsten Phelps MD       The patient was given the followingmedications:  Orders Placed This Encounter   Medications    DISCONTD: ipratropium-albuterol (Merle Meier) nebulizer solution 1 ampule     Order Specific Question:   Initiate RT Bronchodilator Protocol     Answer:   Yes - ED Protocol    ipratropium-albuterol (DUONEB) nebulizer solution 1 ampule     Order Specific Question:   Initiate RT Bronchodilator Protocol     Answer:   Yes - ED Protocol    benzonatate (TESSALON) capsule 100 mg    furosemide (LASIX) injection 20 mg       CONSULTS:  None    MEDICAL DECISION MAKING / ASSESSMENT / Mikael Ortega is a 62 y.o. female w/ a history of chronic pain from arthritis/fibromyalgia, atrial fibrillation, obesity presenting to the emergency department with shortness of breath. On presentation, patient presents with shortness of breath and wheezing on exam, consistent with asthma exacerbation. Chest x-ray showed no evidence of pneumonia, pneumothorax, or pleural effusion to precipitate these symptoms. No clinical evidence on physical exam for congestive heart failure exacerbation or fluid overload. However a BNP was notably elevated, concerning for HF exacerbation. No chest pain to suggest ACS equivalent. No marked tachypnea, tachycardia, pleuritic chest pain, or dyspnea only with exertion to suggest pulmonary embolism. As their pulmonary exam is consistent with asthma exacerbation, I treated them with DuoNeb x1. Pt had minimal improvement. Amount and/or Complexity of Data Reviewed  External Data Reviewed: radiology. Details: 3/2021 Echo  Normal left ventricle size and systolic function with an estimated ejection  fraction of 55-60%. Mild concentric left ventricular hypertrophy. No regional wall motion abnormalities are seen. Normal diastolic function. Mild mitral and tricuspid regurgitation. Labs: ordered. Decision-making details documented in ED Course. Radiology: ordered and independent interpretation performed. Decision-making details documented in ED Course.   ECG/medicine tests: ordered and independent interpretation performed. Risk  Prescription drug management. Decision regarding hospitalization. This patient was also evaluated by the attending physician. All care plans werediscussed and agreed upon. At this time, I will be going off service and care will be assumed by Dr. Marily Matos. Pts pending:  - CXR   - admission  - reassessment after lasix 20     Clinical Impression     1. Shortness of breath        Disposition     PATIENT REFERRED TO:  No follow-up provider specified.     DISCHARGE MEDICATIONS:  New Prescriptions    No medications on file       DISPOSITION       Anthony Begum MD  Resident  01/09/23 [id number]

## 2023-01-09 NOTE — ED TRIAGE NOTES
Pt presents to the ED for SOB. Pt reports it started approx 3-4days ago and has progressively worsened. Pt states she feels as though when she lays down she cannot breath. Hx of asthma.

## 2023-01-09 NOTE — H&P
Internal Medicine  PGY -II  History & Physical      CC: Shortness of breath    History Obtained From:  patient, at the bedside daughter    HISTORY OF PRESENT ILLNESS:  26-year-old female with a past medical history of asthma, A. fib, heart failure with preserved ejection fraction presented to the emergency department due to shortness of breath. In the emergency department labs revealed a BNP of 1057, normal troponin's  Chest x-ray showing mild patchy opacities in left lung base as per the read. Was given a dose of IV Lasix 20 mg and, and DuoNeb and happyview. Evaluation patient told me that she has been having shortness of breath for the past 3 days associated with production of phlegm. As of yesterday she has not been able to breath even at rest which prompted her to come to the emergency department. Although she feels that she has been having shortness of breath since September ever since she had a neck surgery but currently she has been using her rescue inhaler albuterol 5 times a day,     she has been using 3 pillows for years but has not been able to lie flat and feels that she feels better when she is up in the bed. Patient knowledges that her sister is chronically ill use as she might have an infection but did not does not endorse any other sick contacts. She used to work as a nurse at the transfer center.     Past Medical History:        Diagnosis Date    Acute lateral meniscus tear of right knee 02/2019    Acute medial meniscus tear of right knee 02/2019    Anesthesia complication     woke up during one surgery    Anxiety     Arthritis     Asthma     Atrial fibrillation ([address]     new dx 4 weeks ago, first of  Sept 2017    CHF (congestive heart failure) (HCC)     Edema     Fibromyalgia     GERD (gastroesophageal reflux disease)     reflux    Hiatal hernia     History of blood transfusion     Hx of major depression     Idiopathic urticaria 10/29/2020    SVT (supraventricular tachycardia) ([facility]     hx svt       Past Surgical History:        Procedure Laterality Date    ABLATION OF DYSRHYTHMIC FOCUS  04/2019    afib    BLADDER SUSPENSION  2004    CERVICAL FUSION N/A 9/30/2022    ANTERIOR CERVICAL DISCECTOMY AND FUSION CERVICAL 5 - CERVICAL 6, CERVICAL 6 - CERVICAL 7 WITH DEPUY ALLOGRAFT, PLATE AND SCREWS AND EVOKES               **LATEX SENSITIVE** performed by Parviz Parham MD at [facility] N/A 8/4/2022    COLONOSCOPY performed by Latrice Forrest at [address] [facility] ([address])  2004    KNEE ARTHROSCOPY Right 7/11/2019    VIDEO ARTHROSCOPY RIGHT KNEE, PARTIAL MEDIAL AND LATERAL MENISCECTOMY,, MEDIAL MICRO FRACTURE CHONDROPLASTY performed by Jonathan Dodge MD at [facility] GASTRIC BYPASS  2011    TONSILLECTOMY AND ADENOIDECTOMY      UPPER GASTROINTESTINAL ENDOSCOPY N/A 8/4/2022    EGD BIOPSY performed by Latrice Forrest at [address] N/A 8/26/2019    DIAGNOSTIC LAPAROSCOPY, LAPAROSCOPIC LYSIS OF ADHESIONS, LAPAROSCOPIC REDUCTION OF RECURRENT INCISIONAL HERNIA WITH MESH performed by Alexis Dutton MD at [address] [place] [address] Admission:    Not in a hospital admission. Allergies:  Latex, Penicillins, Sulfa antibiotics, Aspirin, Demerol, Nsaids, Meperidine, and Ranitidine hcl    Social History:   TOBACCO:   reports that she has never smoked. She has never used smokeless tobacco.  ETOH:   reports current alcohol use. DRUGS :  Patient currently lives with family     Family History:       Problem Relation Age of Onset    Cancer Mother         lung    Arthritis Mother     Cirrhosis Father     Asthma Maternal Aunt        Review of Systems  A 10 point review of systems was conducted, significant findings as noted in HPI. Physical Exam  Vitals reviewed.    HENT:      Nose: Nose normal.   Eyes:      Extraocular Movements: Extraocular movements intact. Pupils: Pupils are equal, round, and reactive to light. Cardiovascular:      Rate and Rhythm: Tachycardia present. Rhythm irregular. Heart sounds: S1 normal and S2 normal. Heart sounds not distant. No murmur heard. No systolic murmur is present. No diastolic murmur is present. No friction rub. No gallop. No S3 or S4 sounds. Abdominal:      General: Bowel sounds are normal. There is no distension. Palpations: There is no mass. Tenderness: There is no abdominal tenderness. There is no right CVA tenderness, left CVA tenderness, guarding or rebound. Hernia: No hernia is present. Musculoskeletal:         General: Normal range of motion. Right lower leg: No edema. Left lower leg: No edema. Skin:     General: Skin is warm. Neurological:      Mental Status: She is oriented to person, place, and time. Psychiatric:         Mood and Affect: Mood normal.         Behavior: Behavior normal.          Vitals:    01/09/23 0317   BP: (!) 150/74   Pulse:    Resp:    Temp:    SpO2:        DATA:    Labs:  CBC:   Recent Labs     01/09/23  0156   WBC 10.7   HGB 11.4*   HCT 35.3*          BMP:   Recent Labs     01/09/23  0156      K 4.2      CO2 24   BUN 18   CREATININE 0.9   GLUCOSE 108*     LFT's: No results for input(s): AST, ALT, ALB, BILITOT, ALKPHOS in the last 72 hours. Troponin:   Recent Labs     01/09/23  0156   TROPONINI <0.01     BNP:No results for input(s): BNP in the last 72 hours. ABGs: No results for input(s): PHART, HDO0PCN, PO2ART in the last 72 hours. INR: No results for input(s): INR in the last 72 hours.     U/A:  Recent Labs     01/09/23  0259   COLORU Yellow   PHUR 6.5   WBCUA 10-20*   RBCUA see below*   BACTERIA 4+*   CLARITYU Clear   SPECGRAV 1.020   LEUKOCYTESUR SMALL*   UROBILINOGEN 0.2   BILIRUBINUR Negative   BLOODU Negative   GLUCOSEU Negative       XR CHEST PORTABLE   Final Result   Mild patchy opacity in the left lung base. May represent    pneumonia. Consider follow-up to ensure resolution. ASSESSMENT AND PLAN:    #Dyspnea secondary to CHF exacerbation possible asthma exacerbation  -Shortness of breath worse on lying flat now on rest, BNP  was elevated at 1507  -He was given a IV 20 mg Lasix emergency department  -Chest x-ray revealing mild pulm patchy opacity in the left lung base concerning for pneumonia, will check procalcitonin no need for IV or p.o. antibiotics right now  -We will schedule IV 40 mg Lasix twice daily  -Repeat 2D echo in the morning  -Daily bed weights, BNP  -Consult cardiology for further recommendations    #A. fib with RVR  -History of paroxysmal atrial fibrillation with as needed flecainide 50 mg twice daily  -Not on anticoagulation due to allergic reaction?  -We will start heparin drip  -IV diltiazem bolus 5 mg given with diltiazem drip  -She follows with Dr. Jovany Mcelroy as an outpatient we will consult electrophysiology for further recommendations, patient is on flecainide as needed at home    #UTI  -UA consistent with leukocyte esterase positive and nitrates positive with WBCs 10-20 and 4+ bacteria  - Patient endorses urgency but no burning tell me she has a prolapsed bladder  - We will start ciprofloxacin, as chart review reveals she has an allergic reaction listed as hives with Ceftriaxone with Hx of UTI secondary to E.  Coli     #History of asthma now in exacerbation  -On as needed albuterol, Dulera twice daily  -Also Dr. Wilmar Carrillo as an outpatient  -Start Xopenex as needed, Pulmicort twice daily    #Heart failure preserved ejection fraction  -Echocardiogram done in 2021 revealed EF of 50 to 55%  -proBNP elevated at 1500  - We will repeat 2D echo  -Consult cardiology with electrophysiology due to A. fib with RVR     # Morbid obesity  -BMI 49.50, she has a history of bariatric surgery as per chart review      Will discuss with attending physician   Dr. Ana Villavicencio Status: Full code  FEN: Regular carb controlled diet with low salt with restriction  PPX: Protonix, heparin drip  DISPO: Ti Jimenez MD  Internal Medicine, PGY-II  1/9/2023,  4:40 AM

## 2023-01-09 NOTE — PROGRESS NOTES
Dr. Alex made aware that pt's HR has been 80's-90's. Cardizem gtt still infusing at 15mg/hr.     Per Dr. Alex, will wean down to 10 mg/hr as long as pt tolerates.

## 2023-01-09 NOTE — PROGRESS NOTES
Progress Note    Admit Date: 1/9/2023  Day: 1  Diet: ADULT DIET; Regular; 3 carb choices (45 gm/meal); Low Sodium (2 gm)    CC: SOB    Interval history:   - Patient arrived in morning; down in ED when I saw her. Medications:     Scheduled Meds:   furosemide  40 mg IntraVENous BID    sodium chloride flush  5-40 mL IntraVENous 2 times per day    ciprofloxacin  400 mg IntraVENous Q12H    budesonide  0.25 mg Nebulization BID     Continuous Infusions:   heparin (PORCINE) Infusion 13 Units/kg/hr (01/09/23 0511)    dilTIAZem 5 mg/hr (01/09/23 0528)    sodium chloride       PRN Meds:heparin (porcine), heparin (porcine), levalbuterol, sodium chloride flush, sodium chloride, ondansetron **OR** ondansetron, polyethylene glycol, acetaminophen **OR** acetaminophen, perflutren lipid microspheres    Objective:   Vitals:   T-max:  Patient Vitals for the past 8 hrs:   BP Temp Temp src Pulse Resp SpO2 Weight   01/09/23 0900 120/81 -- -- (!) 118 -- -- --   01/09/23 0828 -- -- -- (!) 123 18 94 % --   01/09/23 0827 (!) 140/84 99 °F (37.2 °C) Oral (!) 112 19 95 % --   01/09/23 0812 -- -- -- -- -- -- (!) 350 lb 15.6 oz (159.2 kg)   01/09/23 0500 (!) 138/97 98.6 °F (37 °C) Oral (!) 143 18 93 % --   01/09/23 0317 (!) 150/74 -- -- -- -- -- --   01/09/23 0240 (!) 138/92 -- -- (!) 132 16 98 % --   01/09/23 0149 -- -- -- (!) 133 18 96 % --   01/09/23 0130 (!) 181/130 -- -- -- -- 97 % --       Intake/Output Summary (Last 24 hours) at 1/9/2023 0923  Last data filed at 1/9/2023 8279  Gross per 24 hour   Intake --   Output 400 ml   Net -400 ml       Review of Systems   Constitutional:  Positive for fatigue. Negative for chills and fever. Respiratory:  Positive for cough, shortness of breath and wheezing. Cardiovascular:  Negative for chest pain, palpitations and leg swelling. Gastrointestinal:  Positive for abdominal distention. Negative for abdominal pain, nausea and vomiting. Musculoskeletal:  Negative for back pain.    Neurological: Negative for headaches. Physical Exam  Cardiovascular:      Rate and Rhythm: Tachycardia present. Rhythm irregular. Pulses: Normal pulses. Heart sounds: Normal heart sounds. Pulmonary:      Breath sounds: Wheezing, rhonchi and rales (bibasilar rales) present. Abdominal:      General: There is distension. Palpations: There is fluid wave. Neurological:      General: No focal deficit present. Mental Status: She is alert and oriented to person, place, and time. LABS:    CBC:   Recent Labs     01/09/23 0156 01/09/23  0526   WBC 10.7 10.0   HGB 11.4* 11.1*   HCT 35.3* 35.3*    299   MCV 76.2* 76.6*     Renal:    Recent Labs     01/09/23 0156 01/09/23  0525    137   K 4.2 4.1    104   CO2 24 22   BUN 18 16   CREATININE 0.9 0.8   GLUCOSE 108* 107*   CALCIUM 8.8 8.4   ANIONGAP 11 11     Hepatic: No results for input(s): AST, ALT, BILITOT, BILIDIR, PROT, LABALBU, ALKPHOS in the last 72 hours. Troponin:   Recent Labs     01/09/23 0156   TROPONINI <0.01     BNP: No results for input(s): BNP in the last 72 hours. Lipids: No results for input(s): CHOL, HDL in the last 72 hours. Invalid input(s): LDLCALCU, TRIGLYCERIDE  ABGs:  No results for input(s): PHART, PYF3XCA, PO2ART, FEK6CIH, BEART, THGBART, K0KYGZLU, AFO7FYF in the last 72 hours. INR: No results for input(s): INR in the last 72 hours. Lactate: No results for input(s): LACTATE in the last 72 hours. Cultures:  -----------------------------------------------------------------  RAD:   XR CHEST PORTABLE   Final Result   Mild patchy opacity in the left lung base. May represent    pneumonia. Consider follow-up to ensure resolution. Assessment/Plan:   MARIANA is a 62year old female with a PMHx of Afib with RVR, HFpEF and Asthma who presents to [facility] complaining of progressive shortness of breath. Dyspnea 2/2 CHF exacerbation vs Asthma - Progressively worsening SOB that started a few days ago.  Given 20 mg IV Lasix in ED and got breathing treatment with some improvement. -  Duonebs PRN  -  Monitor vitals and give O2 when necessary  -  Treat CHF and Asthma per below     HFpEF - Last Echo was in 3/17/2021 showed normal diastolic dysfunction with EF of 55-60%. Echo on 05/09/18 showed grade 1 diastolic dysfunction. Patient reports drinking more fluids since onset of SOB, worsening her symptoms. Also CXR concerning for Pulmonary Edema  -  Lasix 40 mg IV BID  -  Repeat Echo  -  Strict Is and Os  -  Daily Weights      Asthma Exacerbation - Patient is wheezy on examination and has PMHx of asthma. Could be in exacerbation.   -  Budesonide inhaler  -  Levalbuterol PRN      Atrial Fibrillation with RVR - Hx of paroxysmal atrial fib with as needed flecainide 50 mg twice daily. Not currently on A/C. Has a chadsvasc score of 2-3, so likely needs anticoagulation.   - Heparin drip  - Dilt gtt -> convert to PO when patient converts  - Cardiology consult  - continuous tele      Acute Cystitis - U/A consistent with UTI (nitrite positive/ small leukocyte esterase/ 10-20 WBC and bacteria 4+)  - Ciprofloxacin   - Urine Culture        Code Status: Full Code  FEN: Regular carb controlled diet with low salt  PPX: Protonix;  Heparin gtt  DISPO: Demetrice Carrillo MD, PGY-1  01/09/23  9:23 AM    This patient has been staffed and discussed with Domingo Domínguez MD.

## 2023-01-09 NOTE — DISCHARGE INSTRUCTIONS
Extra Heart Failure sites:   https://[employer]/ --- this is American Heart Association interactive Healthier Living with Heart Failure guidebook. Please copy and paste link into search bar. Use your mouse to scroll through the pages. Lots and lots of info / tips    HF [place] jasmin  --- free smart phone jasmin available for [employer] and [employer]. Use your phone to track sodium / fluid intake,  symptoms, weight, etc.    [employer] - website-- [employer] is a dialysis company. All dialysis patients follow a renal diet which IS low sodium!! This website offers free seasonal cookbooks. Each quarter, they will release 25-30 new recipes with a breakdown of calories, sodium, glucose, etc    www.eatingwell.com/recipes -- more free recipes          Cardioversion  Patient Discharge [address] [facility]  Electrophysiology  MD Jackelin Rodrigues MD, MD, CNP, CNP, RN      No driving for 24 hours. We strongly recommend that a responsible adult stay with you for the next 24 hours. Continue anticoagulation as directed by physician. Do not stop. Hydrocortisone 1% cream to reddened areas as needed. FOLLOW-UP APPOINTMENT    Follow-up with the Electrophysiology Clinic  In 1 month  [facility], [address] Phone: [phone]. If you are unable to make this appointment, please call to reschedule.

## 2023-01-10 LAB
ANION GAP SERPL CALCULATED.3IONS-SCNC: 15 MMOL/L (ref 3–16)
ANISOCYTOSIS: ABNORMAL
BANDED NEUTROPHILS RELATIVE PERCENT: 1 % (ref 0–7)
BASOPHILS ABSOLUTE: 0 K/UL (ref 0–0.2)
BASOPHILS RELATIVE PERCENT: 0 %
BUN BLDV-MCNC: 17 MG/DL (ref 7–20)
CALCIUM SERPL-MCNC: 9.1 MG/DL (ref 8.3–10.6)
CHLORIDE BLD-SCNC: 99 MMOL/L (ref 99–110)
CO2: 24 MMOL/L (ref 21–32)
CREAT SERPL-MCNC: 1 MG/DL (ref 0.6–1.1)
EOSINOPHILS ABSOLUTE: 0.1 K/UL (ref 0–0.6)
EOSINOPHILS RELATIVE PERCENT: 2 %
GFR SERPL CREATININE-BSD FRML MDRD: >60 ML/MIN/{1.73_M2}
GLUCOSE BLD-MCNC: 105 MG/DL (ref 70–99)
HCT VFR BLD CALC: 35.4 % (ref 36–48)
HEMOGLOBIN: 11.1 G/DL (ref 12–16)
HYPOCHROMIA: ABNORMAL
LV EF: 53 %
LVEF MODALITY: NORMAL
LYMPHOCYTES ABSOLUTE: 1.9 K/UL (ref 1–5.1)
LYMPHOCYTES RELATIVE PERCENT: 28 %
MAGNESIUM: 2 MG/DL (ref 1.8–2.4)
MCH RBC QN AUTO: 24.2 PG (ref 26–34)
MCHC RBC AUTO-ENTMCNC: 31.2 G/DL (ref 31–36)
MCV RBC AUTO: 77.5 FL (ref 80–100)
MONOCYTES ABSOLUTE: 1 K/UL (ref 0–1.3)
MONOCYTES RELATIVE PERCENT: 15 %
NEUTROPHILS ABSOLUTE: 3.7 K/UL (ref 1.7–7.7)
NEUTROPHILS RELATIVE PERCENT: 54 %
PDW BLD-RTO: 18.5 % (ref 12.4–15.4)
PLATELET # BLD: 299 K/UL (ref 135–450)
PMV BLD AUTO: 9.2 FL (ref 5–10.5)
POTASSIUM REFLEX MAGNESIUM: 3.9 MMOL/L (ref 3.5–5.1)
PRO-BNP: 671 PG/ML (ref 0–124)
RBC # BLD: 4.57 M/UL (ref 4–5.2)
REPORT: NORMAL
RESPIRATORY PANEL PCR: NORMAL
SODIUM BLD-SCNC: 138 MMOL/L (ref 136–145)
WBC # BLD: 6.8 K/UL (ref 4–11)

## 2023-01-10 PROCEDURE — 85025 COMPLETE CBC W/AUTO DIFF WBC: CPT

## 2023-01-10 PROCEDURE — 36415 COLL VENOUS BLD VENIPUNCTURE: CPT

## 2023-01-10 PROCEDURE — 2580000003 HC RX 258: Performed by: STUDENT IN AN ORGANIZED HEALTH CARE EDUCATION/TRAINING PROGRAM

## 2023-01-10 PROCEDURE — C8923 2D TTE W OR W/O FOL W/CON,CO: HCPCS

## 2023-01-10 PROCEDURE — 94640 AIRWAY INHALATION TREATMENT: CPT

## 2023-01-10 PROCEDURE — 2500000003 HC RX 250 WO HCPCS: Performed by: STUDENT IN AN ORGANIZED HEALTH CARE EDUCATION/TRAINING PROGRAM

## 2023-01-10 PROCEDURE — 6360000002 HC RX W HCPCS: Performed by: STUDENT IN AN ORGANIZED HEALTH CARE EDUCATION/TRAINING PROGRAM

## 2023-01-10 PROCEDURE — 2060000000 HC ICU INTERMEDIATE R&B

## 2023-01-10 PROCEDURE — 6370000000 HC RX 637 (ALT 250 FOR IP)

## 2023-01-10 PROCEDURE — 6360000004 HC RX CONTRAST MEDICATION: Performed by: STUDENT IN AN ORGANIZED HEALTH CARE EDUCATION/TRAINING PROGRAM

## 2023-01-10 PROCEDURE — 6370000000 HC RX 637 (ALT 250 FOR IP): Performed by: STUDENT IN AN ORGANIZED HEALTH CARE EDUCATION/TRAINING PROGRAM

## 2023-01-10 PROCEDURE — 80048 BASIC METABOLIC PNL TOTAL CA: CPT

## 2023-01-10 PROCEDURE — 83880 ASSAY OF NATRIURETIC PEPTIDE: CPT

## 2023-01-10 PROCEDURE — 83735 ASSAY OF MAGNESIUM: CPT

## 2023-01-10 PROCEDURE — 6370000000 HC RX 637 (ALT 250 FOR IP): Performed by: INTERNAL MEDICINE

## 2023-01-10 RX ORDER — BENZONATATE 100 MG/1
100 CAPSULE ORAL 3 TIMES DAILY PRN
Status: DISCONTINUED | OUTPATIENT
Start: 2023-01-10 | End: 2023-01-12 | Stop reason: HOSPADM

## 2023-01-10 RX ORDER — CIPROFLOXACIN 500 MG/1
500 TABLET, FILM COATED ORAL EVERY 12 HOURS SCHEDULED
Status: DISCONTINUED | OUTPATIENT
Start: 2023-01-10 | End: 2023-01-12 | Stop reason: HOSPADM

## 2023-01-10 RX ADMIN — SODIUM CHLORIDE, PRESERVATIVE FREE 10 ML: 5 INJECTION INTRAVENOUS at 20:42

## 2023-01-10 RX ADMIN — BUDESONIDE 250 MCG: 0.25 SUSPENSION RESPIRATORY (INHALATION) at 21:53

## 2023-01-10 RX ADMIN — CIPROFLOXACIN 400 MG: 2 INJECTION, SOLUTION INTRAVENOUS at 11:49

## 2023-01-10 RX ADMIN — BUDESONIDE 250 MCG: 0.25 SUSPENSION RESPIRATORY (INHALATION) at 09:45

## 2023-01-10 RX ADMIN — FUROSEMIDE 40 MG: 10 INJECTION, SOLUTION INTRAMUSCULAR; INTRAVENOUS at 18:26

## 2023-01-10 RX ADMIN — CIPROFLOXACIN 400 MG: 2 INJECTION, SOLUTION INTRAVENOUS at 00:58

## 2023-01-10 RX ADMIN — APIXABAN 5 MG: 5 TABLET, FILM COATED ORAL at 20:42

## 2023-01-10 RX ADMIN — PERFLUTREN 1.5 ML: 6.52 INJECTION, SUSPENSION INTRAVENOUS at 16:15

## 2023-01-10 RX ADMIN — DILTIAZEM HYDROCHLORIDE 10 MG/HR: 5 INJECTION INTRAVENOUS at 17:07

## 2023-01-10 RX ADMIN — APIXABAN 5 MG: 5 TABLET, FILM COATED ORAL at 09:01

## 2023-01-10 RX ADMIN — CIPROFLOXACIN 500 MG: 500 TABLET, FILM COATED ORAL at 20:42

## 2023-01-10 RX ADMIN — BENZONATATE 100 MG: 100 CAPSULE ORAL at 20:42

## 2023-01-10 RX ADMIN — SODIUM CHLORIDE, PRESERVATIVE FREE 10 ML: 5 INJECTION INTRAVENOUS at 09:02

## 2023-01-10 RX ADMIN — LEVALBUTEROL HYDROCHLORIDE 0.63 MG: 0.63 SOLUTION RESPIRATORY (INHALATION) at 21:54

## 2023-01-10 RX ADMIN — FUROSEMIDE 40 MG: 10 INJECTION, SOLUTION INTRAMUSCULAR; INTRAVENOUS at 09:01

## 2023-01-10 ASSESSMENT — ENCOUNTER SYMPTOMS
NAUSEA: 0
BACK PAIN: 0
COUGH: 1
SHORTNESS OF BREATH: 0
CONSTIPATION: 0
DIARRHEA: 0
WHEEZING: 0
ABDOMINAL PAIN: 0
VOMITING: 0

## 2023-01-10 ASSESSMENT — PAIN SCALES - GENERAL
PAINLEVEL_OUTOF10: 2
PAINLEVEL_OUTOF10: 0
PAINLEVEL_OUTOF10: 0

## 2023-01-10 ASSESSMENT — PAIN DESCRIPTION - ORIENTATION: ORIENTATION: LEFT

## 2023-01-10 ASSESSMENT — PAIN DESCRIPTION - LOCATION: LOCATION: ARM;NECK;SHOULDER

## 2023-01-10 ASSESSMENT — PAIN DESCRIPTION - FREQUENCY: FREQUENCY: INTERMITTENT

## 2023-01-10 ASSESSMENT — PAIN DESCRIPTION - ONSET: ONSET: ON-GOING

## 2023-01-10 ASSESSMENT — PAIN DESCRIPTION - DESCRIPTORS: DESCRIPTORS: ACHING

## 2023-01-10 ASSESSMENT — PAIN DESCRIPTION - PAIN TYPE: TYPE: ACUTE PAIN

## 2023-01-10 NOTE — PROGRESS NOTES
4 Eyes Admission Assessment     I agree as the admission nurse that 2 RN's have performed a thorough Head to Toe Skin Assessment on the patient. ALL assessment sites listed below have been assessed on admission. Areas assessed by both nurses: ***  [x]   Head, Face, and Ears   [x]   Shoulders, Back, and Chest  [x]   Arms, Elbows, and Hands   [x]   Coccyx, Sacrum, and Ischium  [x]   Legs, Feet, and Heels        Does the Patient have Skin Breakdown?   No         Teodoro Prevention initiated:  Yes   Wound Care Orders initiated:  No      [facility] nurse consulted for Pressure Injury (Stage 3,4, Unstageable, DTI, NWPT, and Complex wounds) or Teodoro score 18 or lower:  NA      Nurse 1 eSignature: Electronically signed by Jeb Rodriguez RN on 1/9/23 at 11:18 PM EST    **SHARE this note so that the co-signing nurse is able to place an eSignature**    Nurse 2 eSignature: {Esignature:[id number]}

## 2023-01-10 NOTE — PROGRESS NOTES
[facility]   Electrophysiology Consultation     Date: 1/10/2023  Reason for Consultation: atrial fibrillation   Consult Requesting Physician: Myriam Sanders MD     CC: short of breath, cough     S:  Denies palpitations, racing heart  Telemetry reviewed remains in atrial fibrillation, rates better controlled still with some RVR when up to the bathroom (rates 120-130)   Dilt @ 7.5  Persistent cough    Review of System:  Complete 10 point ROS performed and negative unless noted in above HPI or below  No nausea, vomiting, diarrhea  Denies dizziness, syncope    Prior to Admission medications    Medication Sig Start Date End Date Taking? Authorizing Provider   rOPINIRole (REQUIP) 1 MG tablet Take 1.5 mg by mouth in the morning and at bedtime   Yes Historical Provider, MD   ipratropium-albuterol (DUONEB) 0.5-2.5 (3) MG/3ML SOLN nebulizer solution Inhale 1 vial into the lungs every 4 hours as needed for Shortness of Breath   Yes Historical Provider, MD   omalizumab Judyth Lipps) 150 MG/ML SOSY injection Inject 300 mg into the skin every 28 days   Yes Historical Provider, MD   acetaminophen-codeine (TYLENOL/CODEINE #3) 300-30 MG per tablet Take 1 tablet by mouth in the morning and at bedtime for 30 days. Intended supply: 3 days.  Take lowest dose possible to manage pain Max Daily Amount: 2 tablets 1/5/23 2/4/23  Juani Marroquin MD   flecainide (TAMBOCOR) 50 MG tablet Take 1 tablet by mouth 2 times daily as needed (episdoes of atrial fibrillation) 12/2/22   STEPHANIE Kaye CNP   cyclobenzaprine (FLEXERIL) 10 MG tablet Take 1 tablet by mouth 3 times daily as needed for Muscle spasms 11/10/22   Juani Marroquin MD   furosemide (LASIX) 20 MG tablet Take 1 tablet by mouth daily 11/7/22   STEPHANIE Kaye CNP   Respiratory Therapy Supplies (NEBULIZER/TUBING/MOUTHPIECE) KIT Continuous 10/10/22   Juani Marroquin MD   albuterol sulfate HFA (PROVENTIL;VENTOLIN;PROAIR) 108 (90 Base) MCG/ACT inhaler INHALE 2 PUFFS BY MOUTH EVERY 4 HOURS AS NEEDED FOR WHEEZING OR SHORTNESS OF BREATH(SPACE OUT TO EVERY 6 HOURS AS SYMPTOMS IMPROVE) 8/17/22   Frida Figueroa MD   EPINEPHrine (EPIPEN) 0.3 MG/0.3ML SOAJ injection INJECT INTO UPPER THIGH AS DIRECTED AS NEEDED FOR ANAPHYLAXIS 3/8/22   Historical Provider, MD Lindy Patrick 200-5 MCG/ACT inhaler INHALE TWO PUFFS BY MOUTH TWICE A DAY 5/29/21   Frida Figueroa MD       Past Medical History:   Diagnosis Date    Acute lateral meniscus tear of right knee 02/2019    Acute medial meniscus tear of right knee 02/2019    Anesthesia complication     woke up during one surgery    Anxiety     Arthritis     Asthma     Atrial fibrillation ([address]     new dx 4 weeks ago, first of  Sept 2017    CHF (congestive heart failure) (HCC)     Edema     Fibromyalgia     GERD (gastroesophageal reflux disease)     reflux    Hiatal hernia     History of blood transfusion     Hx of major depression     Idiopathic urticaria 10/29/2020    SVT (supraventricular tachycardia) (HCC)     hx svt        Past Surgical History:   Procedure Laterality Date    ABLATION OF DYSRHYTHMIC FOCUS  04/2019    afib    BLADDER SUSPENSION  2004    CERVICAL FUSION N/A 9/30/2022    ANTERIOR CERVICAL DISCECTOMY AND FUSION CERVICAL 5 - CERVICAL 6, CERVICAL 6 - CERVICAL 7 WITH DEPUY ALLOGRAFT, PLATE AND SCREWS AND EVOKES               **LATEX SENSITIVE** performed by Emre Addison MD at [address] N/A 8/4/2022    COLONOSCOPY performed by Miguelangel Mitchell at [address] ([facility])  2004    KNEE ARTHROSCOPY Right 7/11/2019    VIDEO ARTHROSCOPY RIGHT KNEE, PARTIAL MEDIAL AND LATERAL MENISCECTOMY,, MEDIAL MICRO FRACTURE CHONDROPLASTY performed by Moose Higginbotham MD at [facility] [place] [address] ENDOSCOPY N/A 8/4/2022    EGD BIOPSY performed by Miguelangel Mitchell at [place] [address] UPPP UVULOPALATOPHARYGOPLASTY      VENTRAL HERNIA REPAIR N/A 2019    DIAGNOSTIC LAPAROSCOPY, LAPAROSCOPIC LYSIS OF ADHESIONS, LAPAROSCOPIC REDUCTION OF RECURRENT INCISIONAL HERNIA WITH MESH performed by Valentina Pastrana MD at [address]   Allergen Reactions    Latex Anaphylaxis and Rash    Aspirin Swelling and Other (See Comments)     Swelling in lower legs    Nsaids Swelling    Penicillins Hives and Rash    Sulfa Antibiotics Hives, Swelling and Rash    Meperidine Nausea And Vomiting, Rash and Other (See Comments)    Ranitidine Hcl Rash       Social History:  Reviewed. reports that she has never smoked. She has never used smokeless tobacco. She reports current alcohol use. She reports current drug use. Drugs: Marijuana (Weed) and [employer] comments. Family History:  Reviewed. Reviewed. No family history of SCD.       Physical Examination:  /65   Pulse 72   Temp 98.1 °F (36.7 °C) (Oral)   Resp 18   Ht 5' 10\" (1.778 m)   Wt (!) 351 lb 3.1 oz (159.3 kg)   SpO2 95%   BMI 50.39 kg/m²   Temp  Av.3 °F (36.8 °C)  Min: 98 °F (36.7 °C)  Max: 98.8 °F (37.1 °C)  Pulse  Av.4  Min: 72  Max: 136  BP  Min: 106/65  Max: 161/123  SpO2  Av.6 %  Min: 91 %  Max: 95 %    Intake/Output Summary (Last 24 hours) at 1/10/2023 0909  Last data filed at 1/10/2023 0447  Gross per 24 hour   Intake 2104.91 ml   Output 1900 ml   Net 204.91 ml       NAD  Nonlabored, expiratory wheeze bilaterally  Irregular rhythm, regular rate, no murmurs, normal S1 and S2, no murmurs, trace nonpitting bilateral lower extremity swelling, jugular venous distention to mid neck at 45 degrees, warm and well-perfused  Abdomen is rounded, soft, nontender  Strength is grossly preserved, normal tone, moves all extremities  No focal neurologic deficits  Appropriate mood and affect  Rashes or ecchymosis    Labs:    K 4.2  Cr 0.9  BNP 1500  Trop negative  Hgb 11.1  A1C 5.4    Flu negative/COVID negative    EKG  Atrial fibrillation with RVR    Stress    Cath    ECHO 3/17/2021   Normal left ventricle size and systolic function with an estimated ejection   fraction of 55-60%. Mild concentric left ventricular hypertrophy. No regional wall motion abnormalities are seen. Normal diastolic function. Mild mitral and tricuspid regurgitation. Assessment/Plan:   Patient is a 24-year-old female with a history of ASTRID, HTN, paroxysmal atrial flutter status post CTI ablation, paroxysmal atrial fibrillation with \"pill in pocket\" flecainide that she uses on a monthly basis for symptoms of palpitations/fluttering. Presents with progressive dyspnea and cough with atrial fibrillation and RVR. Paroxsymal AF RVR   - in setting of dyspnea, volume overload, URI?  - normal LA on ECHO, LVEF preserved  - [id number] 3 (previously diagnosed with HTN, female, HF), continue Eliquis 5mg BID  - continue diltiazem drip, start PO diltiazem   - still elevated rates with ambulation, discussed SHEY/ cardioversion tomorrow  - discussed ablation with patient given recurrence of atrial fibrillation and monthly use of flecainide, patient would like to proceed, will schedule as OP  - discussed weight loss for reducing AF burden  - hx of ASTRID, not using CPAP, refer to sleep   - treat elevated BP    Keep NPO at midnight, plan for SHEY cardioversion tomorrow, will schedule flecainide at that time, ablation as outpatient        NOTE: This report was transcribed using voice recognition software. Every effort was made to ensure accuracy, however, inadvertent computerized transcription errors may be present.      Wiley Holly MD  [address]   Office: [phone]  Fax: [phone]

## 2023-01-10 NOTE — PROGRESS NOTES
Progress Note    Admit Date: 1/9/2023  Day: 2  Diet: ADULT DIET; Regular; 3 carb choices (45 gm/meal); Low Sodium (2 gm)    CC: SOB    Interval history:   - Patient reports improved respiratory symptoms; satting fine on room air. Other VSS  - Some brief episodes of A fib overnight but for most part is NSR controlled on Diltiazem  - Lost her voice last nigh while coughing  - Produced 2.3 L of urine (has negative 195 mL)  - Respiratory panel negative    Medications:     Scheduled Meds:   furosemide  40 mg IntraVENous BID    sodium chloride flush  5-40 mL IntraVENous 2 times per day    ciprofloxacin  400 mg IntraVENous Q12H    budesonide  0.25 mg Nebulization BID    apixaban  5 mg Oral BID    levalbuterol         Continuous Infusions:   dilTIAZem 7.5 mg/hr (01/10/23 0823)    sodium chloride       PRN Meds:levalbuterol, sodium chloride flush, sodium chloride, ondansetron **OR** ondansetron, polyethylene glycol, acetaminophen **OR** acetaminophen, perflutren lipid microspheres    Objective:   Vitals:   T-max:  Patient Vitals for the past 8 hrs:   BP Temp Temp src Pulse Resp SpO2   01/10/23 0817 106/65 98.1 °F (36.7 °C) Oral 72 18 95 %   01/10/23 0447 120/87 98 °F (36.7 °C) Oral 87 18 94 %   01/10/23 0219 110/75 -- -- 99 -- --       Intake/Output Summary (Last 24 hours) at 1/10/2023 0917  Last data filed at 1/10/2023 0447  Gross per 24 hour   Intake 2104.91 ml   Output 1900 ml   Net 204.91 ml       Review of Systems   Constitutional:  Positive for fatigue. Negative for chills and fever. Respiratory:  Positive for cough. Negative for shortness of breath and wheezing. Cardiovascular:  Negative for chest pain. Gastrointestinal:  Negative for abdominal pain, constipation, diarrhea, nausea and vomiting. Musculoskeletal:  Negative for back pain. Neurological:  Negative for headaches. Physical Exam  Constitutional:       Appearance: She is obese.    Cardiovascular:      Rate and Rhythm: Normal rate and regular rhythm. Pulses: Normal pulses. Heart sounds: Normal heart sounds. Pulmonary:      Effort: Pulmonary effort is normal.      Breath sounds: Wheezing and rhonchi present. Abdominal:      General: Abdomen is flat. Palpations: Abdomen is soft. Musculoskeletal:         General: Swelling present. Right lower leg: No edema. Left lower leg: No edema. Neurological:      Mental Status: She is alert and oriented to person, place, and time. LABS:    CBC:   Recent Labs     01/09/23 0156 01/09/23  0526 01/10/23  0257   WBC 10.7 10.0 6.8   HGB 11.4* 11.1* 11.1*   HCT 35.3* 35.3* 35.4*    299 299   MCV 76.2* 76.6* 77.5*     Renal:    Recent Labs     01/09/23 0156 01/09/23  0525 01/09/23  0930    137 139   K 4.2 4.1 4.2    104 100   CO2 24 22 23   BUN 18 16 16   CREATININE 0.9 0.8 0.9   GLUCOSE 108* 107* 104*   CALCIUM 8.8 8.4 9.0   MG  --   --  1.90   ANIONGAP 11 11 16     Hepatic: No results for input(s): AST, ALT, BILITOT, BILIDIR, PROT, LABALBU, ALKPHOS in the last 72 hours. Troponin:   Recent Labs     01/09/23 0156   TROPONINI <0.01     BNP: No results for input(s): BNP in the last 72 hours. Lipids: No results for input(s): CHOL, HDL in the last 72 hours. Invalid input(s): LDLCALCU, TRIGLYCERIDE  ABGs:  No results for input(s): PHART, VCI8QUH, PO2ART, FGU2MWE, BEART, THGBART, Y9OJIOHV, RAU6GGA in the last 72 hours. INR: No results for input(s): INR in the last 72 hours. Lactate: No results for input(s): LACTATE in the last 72 hours. Cultures:  -----------------------------------------------------------------  RAD:   XR CHEST PORTABLE   Final Result   Mild patchy opacity in the left lung base. May represent    pneumonia. Consider follow-up to ensure resolution. Assessment/Plan:   BLP is a 62year old female with a PMHx of Afib with RVR, HFpEF and Asthma who presents to [facility] complaining of progressive shortness of breath.      Dyspnea 2/2 CHF exacerbation vs Asthma - Progressively worsening SOB that started a few days ago. Given 20 mg IV Lasix in ED and got breathing treatment with some improvement. -  Duonebs PRN  -  Monitor vitals and give O2 when necessary  -  Treat CHF and Asthma per below      HFpEF - Last Echo was in 3/17/2021 showed normal diastolic dysfunction with EF of 55-60%. Echo on 05/09/18 showed grade 1 diastolic dysfunction. Patient reports drinking more fluids since onset of SOB, worsening her symptoms. Also CXR concerning for Pulmonary Edema. Diuresed well. -  Lasix 40 mg IV BID  -  Repeat Echo  -  Strict Is and Os  -  Daily Weights       Asthma Exacerbation - Patient is wheezy on examination and has PMHx of asthma. Could be in exacerbation.   -  Budesonide inhaler  -  Levalbuterol PRN      Atrial Fibrillation with RVR - Hx of paroxysmal atrial fib with as needed flecainide 50 mg twice daily. Not currently on A/C. Has a chadsvasc score of 2-3, so likely needs anticoagulation.   - Eliquis PO 5 mg BID  - Dilt gtt -> Dilt PO  - Cardiology consult  - continuous tele       Acute Cystitis - U/A consistent with UTI (nitrite positive/ small leukocyte esterase/ 10-20 WBC and bacteria 4+)  - Ciprofloxacin   - Urine Culture    6. Microcytic Anemia - Hgb of 11.1 with an MCV of 77.5. Ferritin is 13.8 (low) and RDW is 18.5 (high), waiting on results of other studies but looks like VAN. Code Status: Full Code  FEN: Regular carb controlled diet with low salt  PPX: Protonix;  Eliquis  DISPO: Avelino Lucio MD, PGY-1  01/10/23  9:17 AM    This patient has been staffed and discussed with Fritz Birmingham MD.

## 2023-01-10 NOTE — PLAN OF CARE
Problem: ABCDS Injury Assessment  Goal: Absence of physical injury  Outcome: Progressing     Problem: Respiratory - Adult  Goal: Achieves optimal ventilation and oxygenation  Outcome: Progressing     Problem: Cardiovascular - Adult  Goal: Maintains optimal cardiac output and hemodynamic stability  Outcome: Progressing  Goal: Absence of cardiac dysrhythmias or at baseline  Outcome: Progressing     Problem: Metabolic/Fluid and Electrolytes - Adult  Goal: Electrolytes maintained within normal limits  Outcome: Progressing  Goal: Hemodynamic stability and optimal renal function maintained  Outcome: Progressing     Problem: Discharge Planning  Goal: Discharge to home or other facility with appropriate resources  Outcome: Progressing     Problem: Chronic Conditions and Co-morbidities  Goal: Patient's chronic conditions and co-morbidity symptoms are monitored and maintained or improved  Outcome: Progressing

## 2023-01-10 NOTE — DISCHARGE INSTR - DIET
F  Heart Failure Nutrition Therapy  This diet will help you feel better and support your heart by reducing symptoms of fluid retention, shortness of breath and swelling.   You should focus on:  Limiting sodium in your diet by reading labels and limiting foods high in sodium.  Limit your daily sodium intake to 2,000 mg per day.  Select foods with 140 mg of sodium or less per serving.  Foods with more than 300 mg of sodium per serving may not fit into a reduced-sodium meal plan.  Check serving sizes. If you eat more than 1 serving, you will get more sodium than the amount listed.   Limiting fluid in your diet.  Ask your doctor how much fluid you can have per day  Remember foods that are liquid at room temperature such as popsicles, soup, ice cream and Jell-O are fluids.   Checking your weight to make sure you're not retaining too much fluid.  Weigh yourself every morning. If you gain 3 or more pounds in one day or 5 pounds within 1 week, call your doctor.  Foods to choose and avoid:  Avoid processed foods. Eat more fresh foods.  Fresh and frozen fruits and vegetables are good choices.  Choose fresh meats. Avoid processed meats such as gramajo, sausage and hot dogs.  Do not add salt to your food while cooking or at the table.  Try dry or fresh herbs, pepper, lemon juice, or a sodium-free seasoning blend such as Mrs. Dash to add flavor to food.   Do not use a salt substitute.  Use caution at restaurants  Restaurant foods are high in sodium. Ask for your food to be cooked without salt and request sauces and dressing to come “on the side.”

## 2023-01-10 NOTE — PROGRESS NOTES
Pharmacy  Note  - Admission Medication History    List of [id number] medications is complete. I reviewed Rx fill history via \"Complete Dispense Report\" in Epic, and spoke to patient      The following changes made to [id number] medication list:    Dose or Frequency CHANGE:  1) ropinirole 1 mg BID--> 1.5 mg BID   2) ipratropium-albuterol Q4H--> Q4H PRN     REMOVED:  1) azelastine  2) docusate   3) famotidine   4) pantoprazole   5) metformin       Please Note:  1) pt's last dose of omalizumab was 12/28/22    Current Outpatient Medications   Medication Instructions    acetaminophen-codeine (TYLENOL/CODEINE #3) 300-30 MG per tablet 1 tablet, Oral, 2 times daily, Intended supply: 3 days. Take lowest dose possible to manage pain    albuterol sulfate HFA (PROVENTIL;VENTOLIN;PROAIR) 108 (90 Base) MCG/ACT inhaler INHALE 2 PUFFS BY MOUTH EVERY 4 HOURS AS NEEDED FOR WHEEZING OR SHORTNESS OF BREATH(SPACE OUT TO EVERY 6 HOURS AS SYMPTOMS IMPROVE)    cyclobenzaprine (FLEXERIL) 10 mg, Oral, 3 TIMES DAILY PRN    DULERA 200-5 MCG/ACT inhaler INHALE TWO PUFFS BY MOUTH TWICE A DAY    EPINEPHrine (EPIPEN) 0.3 MG/0.3ML SOAJ injection INJECT INTO UPPER THIGH AS DIRECTED AS NEEDED FOR ANAPHYLAXIS    flecainide (TAMBOCOR) 50 mg, Oral, 2 TIMES DAILY PRN    furosemide (LASIX) 20 mg, Oral, DAILY    HYDROcodone-acetaminophen (NORCO) 7.5-325 MG per tablet 1 tablet, Oral, DAILY, Intended supply: 3 days.  Take lowest dose possible to manage pain    ipratropium-albuterol (DUONEB) 0.5-2.5 (3) MG/3ML SOLN nebulizer solution 1 vial, Inhalation, EVERY 4 HOURS PRN    omalizumab (XOLAIR) 300 mg, SubCUTAneous, EVERY 28 DAYS    Respiratory Therapy Supplies (NEBULIZER/TUBING/MOUTHPIECE) KIT Continuous    rOPINIRole (REQUIP) 1.5 mg, Oral, 2 times daily            1/9/2023 9:42 PM  Nasim Jin  PharmD Candidate   Class of 2023

## 2023-01-10 NOTE — PLAN OF CARE
Problem: ABCDS Injury Assessment  Goal: Absence of physical injury  Outcome: Progressing  Flowsheets (Taken 1/10/2023 1748)  Absence of Physical Injury: Implement safety measures based on patient assessment     Problem: Respiratory - Adult  Goal: Achieves optimal ventilation and oxygenation  Outcome: Progressing  Flowsheets (Taken 1/10/2023 1748)  Achieves optimal ventilation and oxygenation:   Assess for changes in respiratory status   Assess for changes in mentation and behavior   Position to facilitate oxygenation and minimize respiratory effort   Oxygen supplementation based on oxygen saturation or arterial blood gases   Encourage broncho-pulmonary hygiene including cough, deep breathe, incentive spirometry   Assess and instruct to report shortness of breath or any respiratory difficulty  Note: Assessed expiratory wheezes. Patient SpO2 95% room air. Will continue to monitor. Problem: Cardiovascular - Adult  Goal: Maintains optimal cardiac output and hemodynamic stability  Outcome: Progressing  Flowsheets (Taken 1/10/2023 1749)  Maintains optimal cardiac output and hemodynamic stability:   Monitor blood pressure and heart rate   Monitor urine output and notify Licensed Independent Practitioner for values outside of normal range   Assess for signs of decreased cardiac output   Administer vasoactive medications as ordered  Note: Patient HR 88. Patient on a diltiazem drip at 10 mg/hr. Will continue to monitor. Problem: Pain  Goal: Verbalizes/displays adequate comfort level or baseline comfort level  Outcome: Progressing  Note: Patient's pain assessed and states, \"2/10\". Patient states she doesn't want any pain medications. Will continue to monitor.

## 2023-01-10 NOTE — PROGRESS NOTES
Notification of IV to PO Conversion     IV To Po Conversion:   Will convert ciprofloxacin 400mg IV q12hr to ciprofloxacin 500mg PO q12hr based on [facility] IV to PO policy (see below). Patient tolerating PO medications, afebrile and no leukocytosis     Please call with questions.   Varun Garrison  PharmD Candidate [id number]      Criteria for conversion from IV to PO therapy per [facility] IV to PO Protocol:   Patients should meet all of the following inclusion criteria and none of the exclusion criteria    Criteria to initiate medication route switch (Inclusion Criteria)  IV therapy for > 24 hours (antibiotics only)  Tolerating diet more advanced than clear liquids  Tolerating oral (PO) medications  Does not require vasopressor therapy for blood pressure support  No seizures for 72hrs (antiepileptic medications only)      Criteria indicating that the patient is NOT a candidate for IV to PO conversion (Exclusion Criteria)  Infections requiring IV therapy (ie: meningitis, endocarditis, osteomyelitis, pancreatitis)  Nausea and/or vomiting or severe diarrhea within past 24 hours  Has gastrectomy, ileus, gastric outlet or bowel obstruction, or malabsorption syndromes  Has significant, painful oral ulceration  TPN with an NPO order  Active GI bleed  Unable to swallow  Nothing by Mouth (NPO) status  Febrile in the last 24 hours- (antibiotics only)  Clinical deteriorating or unstable - (antibiotics only)  Pediatric patients and patients who are not euthyroid (not on oral levothyroxine/not stabilized on oral levothyroxine) - (Levothyroxine only)  Patients being treated for myxedema coma or during the organ donation process - (Levothyroxine only)    Other notes-   Patients may be given suppositories when available for the product being ordered if no contraindications exist (ie: rectal surgery or infection)   Oral solutions/suspensions may be considered for patients with a functioning enteral tube not on continuous suction (if medication is available in this formulation)

## 2023-01-11 ENCOUNTER — ANESTHESIA EVENT (OUTPATIENT)
Dept: CARDIAC CATH/INVASIVE PROCEDURES | Age: 58
DRG: 194 | End: 2023-01-11
Payer: MEDICAID

## 2023-01-11 ENCOUNTER — ANESTHESIA (OUTPATIENT)
Dept: CARDIAC CATH/INVASIVE PROCEDURES | Age: 58
DRG: 194 | End: 2023-01-11
Payer: MEDICAID

## 2023-01-11 LAB
ANION GAP SERPL CALCULATED.3IONS-SCNC: 11 MMOL/L (ref 3–16)
BASOPHILS ABSOLUTE: 0 K/UL (ref 0–0.2)
BASOPHILS RELATIVE PERCENT: 0.6 %
BUN BLDV-MCNC: 20 MG/DL (ref 7–20)
CALCIUM SERPL-MCNC: 9.1 MG/DL (ref 8.3–10.6)
CHLORIDE BLD-SCNC: 98 MMOL/L (ref 99–110)
CO2: 25 MMOL/L (ref 21–32)
CREAT SERPL-MCNC: 0.9 MG/DL (ref 0.6–1.1)
EOSINOPHILS ABSOLUTE: 0.3 K/UL (ref 0–0.6)
EOSINOPHILS RELATIVE PERCENT: 3.8 %
GFR SERPL CREATININE-BSD FRML MDRD: >60 ML/MIN/{1.73_M2}
GLUCOSE BLD-MCNC: 111 MG/DL (ref 70–99)
HCT VFR BLD CALC: 37.7 % (ref 36–48)
HEMOGLOBIN: 12 G/DL (ref 12–16)
LYMPHOCYTES ABSOLUTE: 1.9 K/UL (ref 1–5.1)
LYMPHOCYTES RELATIVE PERCENT: 27.7 %
MAGNESIUM: 2.1 MG/DL (ref 1.8–2.4)
MCH RBC QN AUTO: 24.5 PG (ref 26–34)
MCHC RBC AUTO-ENTMCNC: 31.8 G/DL (ref 31–36)
MCV RBC AUTO: 77.1 FL (ref 80–100)
MONOCYTES ABSOLUTE: 0.7 K/UL (ref 0–1.3)
MONOCYTES RELATIVE PERCENT: 10.8 %
NEUTROPHILS ABSOLUTE: 3.9 K/UL (ref 1.7–7.7)
NEUTROPHILS RELATIVE PERCENT: 57.1 %
ORGANISM: ABNORMAL
PDW BLD-RTO: 18.4 % (ref 12.4–15.4)
PLATELET # BLD: 335 K/UL (ref 135–450)
PMV BLD AUTO: 8.7 FL (ref 5–10.5)
POTASSIUM REFLEX MAGNESIUM: 3.9 MMOL/L (ref 3.5–5.1)
RBC # BLD: 4.89 M/UL (ref 4–5.2)
SODIUM BLD-SCNC: 134 MMOL/L (ref 136–145)
URINE CULTURE, ROUTINE: ABNORMAL
WBC # BLD: 6.7 K/UL (ref 4–11)

## 2023-01-11 PROCEDURE — 2060000000 HC ICU INTERMEDIATE R&B

## 2023-01-11 PROCEDURE — 94640 AIRWAY INHALATION TREATMENT: CPT

## 2023-01-11 PROCEDURE — 93312 ECHO TRANSESOPHAGEAL: CPT

## 2023-01-11 PROCEDURE — 6360000002 HC RX W HCPCS: Performed by: NURSE ANESTHETIST, CERTIFIED REGISTERED

## 2023-01-11 PROCEDURE — B24BZZ4 ULTRASONOGRAPHY OF HEART WITH AORTA, TRANSESOPHAGEAL: ICD-10-PCS | Performed by: INTERNAL MEDICINE

## 2023-01-11 PROCEDURE — 2580000003 HC RX 258: Performed by: NURSE ANESTHETIST, CERTIFIED REGISTERED

## 2023-01-11 PROCEDURE — 2500000003 HC RX 250 WO HCPCS: Performed by: NURSE ANESTHETIST, CERTIFIED REGISTERED

## 2023-01-11 PROCEDURE — 83735 ASSAY OF MAGNESIUM: CPT

## 2023-01-11 PROCEDURE — 2580000003 HC RX 258: Performed by: STUDENT IN AN ORGANIZED HEALTH CARE EDUCATION/TRAINING PROGRAM

## 2023-01-11 PROCEDURE — 92960 CARDIOVERSION ELECTRIC EXT: CPT

## 2023-01-11 PROCEDURE — 93320 DOPPLER ECHO COMPLETE: CPT

## 2023-01-11 PROCEDURE — 85025 COMPLETE CBC W/AUTO DIFF WBC: CPT

## 2023-01-11 PROCEDURE — 6370000000 HC RX 637 (ALT 250 FOR IP)

## 2023-01-11 PROCEDURE — 94761 N-INVAS EAR/PLS OXIMETRY MLT: CPT

## 2023-01-11 PROCEDURE — 85610 PROTHROMBIN TIME: CPT

## 2023-01-11 PROCEDURE — 80048 BASIC METABOLIC PNL TOTAL CA: CPT

## 2023-01-11 PROCEDURE — 6360000002 HC RX W HCPCS: Performed by: STUDENT IN AN ORGANIZED HEALTH CARE EDUCATION/TRAINING PROGRAM

## 2023-01-11 PROCEDURE — 93005 ELECTROCARDIOGRAM TRACING: CPT | Performed by: INTERNAL MEDICINE

## 2023-01-11 PROCEDURE — 93325 DOPPLER ECHO COLOR FLOW MAPG: CPT

## 2023-01-11 PROCEDURE — 6370000000 HC RX 637 (ALT 250 FOR IP): Performed by: STUDENT IN AN ORGANIZED HEALTH CARE EDUCATION/TRAINING PROGRAM

## 2023-01-11 PROCEDURE — 5A2204Z RESTORATION OF CARDIAC RHYTHM, SINGLE: ICD-10-PCS | Performed by: INTERNAL MEDICINE

## 2023-01-11 PROCEDURE — 2500000003 HC RX 250 WO HCPCS: Performed by: STUDENT IN AN ORGANIZED HEALTH CARE EDUCATION/TRAINING PROGRAM

## 2023-01-11 PROCEDURE — 36415 COLL VENOUS BLD VENIPUNCTURE: CPT

## 2023-01-11 RX ORDER — SODIUM CHLORIDE 9 MG/ML
INJECTION, SOLUTION INTRAVENOUS PRN
OUTPATIENT
Start: 2023-01-11

## 2023-01-11 RX ORDER — ONDANSETRON 2 MG/ML
4 INJECTION INTRAMUSCULAR; INTRAVENOUS
OUTPATIENT
Start: 2023-01-11 | End: 2023-01-12

## 2023-01-11 RX ORDER — DILTIAZEM HYDROCHLORIDE 240 MG/1
240 CAPSULE, COATED, EXTENDED RELEASE ORAL DAILY
Status: DISCONTINUED | OUTPATIENT
Start: 2023-01-12 | End: 2023-01-12 | Stop reason: HOSPADM

## 2023-01-11 RX ORDER — LABETALOL HYDROCHLORIDE 5 MG/ML
10 INJECTION, SOLUTION INTRAVENOUS
OUTPATIENT
Start: 2023-01-11

## 2023-01-11 RX ORDER — FLECAINIDE ACETATE 100 MG/1
50 TABLET ORAL EVERY 12 HOURS SCHEDULED
Status: DISCONTINUED | OUTPATIENT
Start: 2023-01-11 | End: 2023-01-12 | Stop reason: HOSPADM

## 2023-01-11 RX ORDER — SODIUM CHLORIDE 0.9 % (FLUSH) 0.9 %
5-40 SYRINGE (ML) INJECTION PRN
OUTPATIENT
Start: 2023-01-11

## 2023-01-11 RX ORDER — PROCHLORPERAZINE EDISYLATE 5 MG/ML
5 INJECTION INTRAMUSCULAR; INTRAVENOUS
OUTPATIENT
Start: 2023-01-11 | End: 2023-01-12

## 2023-01-11 RX ORDER — PROPOFOL 10 MG/ML
INJECTION, EMULSION INTRAVENOUS CONTINUOUS PRN
Status: DISCONTINUED | OUTPATIENT
Start: 2023-01-11 | End: 2023-01-11 | Stop reason: SDUPTHER

## 2023-01-11 RX ORDER — ACETAMINOPHEN 325 MG/1
650 TABLET ORAL
OUTPATIENT
Start: 2023-01-11 | End: 2023-01-12

## 2023-01-11 RX ORDER — SODIUM CHLORIDE 9 MG/ML
INJECTION, SOLUTION INTRAVENOUS CONTINUOUS PRN
Status: DISCONTINUED | OUTPATIENT
Start: 2023-01-11 | End: 2023-01-11 | Stop reason: SDUPTHER

## 2023-01-11 RX ORDER — PROPOFOL 10 MG/ML
INJECTION, EMULSION INTRAVENOUS PRN
Status: DISCONTINUED | OUTPATIENT
Start: 2023-01-11 | End: 2023-01-11 | Stop reason: SDUPTHER

## 2023-01-11 RX ORDER — DILTIAZEM HYDROCHLORIDE 120 MG/1
120 CAPSULE, COATED, EXTENDED RELEASE ORAL DAILY
Status: CANCELLED | OUTPATIENT
Start: 2023-01-11

## 2023-01-11 RX ORDER — IPRATROPIUM BROMIDE AND ALBUTEROL SULFATE 2.5; .5 MG/3ML; MG/3ML
1 SOLUTION RESPIRATORY (INHALATION)
OUTPATIENT
Start: 2023-01-11 | End: 2023-01-12

## 2023-01-11 RX ORDER — LIDOCAINE HYDROCHLORIDE 20 MG/ML
INJECTION, SOLUTION INFILTRATION; PERINEURAL PRN
Status: DISCONTINUED | OUTPATIENT
Start: 2023-01-11 | End: 2023-01-11 | Stop reason: SDUPTHER

## 2023-01-11 RX ORDER — SODIUM CHLORIDE 0.9 % (FLUSH) 0.9 %
5-40 SYRINGE (ML) INJECTION EVERY 12 HOURS SCHEDULED
OUTPATIENT
Start: 2023-01-11

## 2023-01-11 RX ADMIN — APIXABAN 5 MG: 5 TABLET, FILM COATED ORAL at 20:18

## 2023-01-11 RX ADMIN — CIPROFLOXACIN 500 MG: 500 TABLET, FILM COATED ORAL at 08:36

## 2023-01-11 RX ADMIN — DILTIAZEM HYDROCHLORIDE 10 MG/HR: 5 INJECTION INTRAVENOUS at 06:15

## 2023-01-11 RX ADMIN — BUDESONIDE 250 MCG: 0.25 SUSPENSION RESPIRATORY (INHALATION) at 20:04

## 2023-01-11 RX ADMIN — SODIUM CHLORIDE, PRESERVATIVE FREE 10 ML: 5 INJECTION INTRAVENOUS at 20:19

## 2023-01-11 RX ADMIN — SODIUM CHLORIDE, PRESERVATIVE FREE 10 ML: 5 INJECTION INTRAVENOUS at 08:36

## 2023-01-11 RX ADMIN — CIPROFLOXACIN 500 MG: 500 TABLET, FILM COATED ORAL at 20:18

## 2023-01-11 RX ADMIN — PROPOFOL 50 MG: 10 INJECTION, EMULSION INTRAVENOUS at 14:37

## 2023-01-11 RX ADMIN — FUROSEMIDE 40 MG: 10 INJECTION, SOLUTION INTRAMUSCULAR; INTRAVENOUS at 17:20

## 2023-01-11 RX ADMIN — FUROSEMIDE 40 MG: 10 INJECTION, SOLUTION INTRAMUSCULAR; INTRAVENOUS at 08:36

## 2023-01-11 RX ADMIN — BUDESONIDE 250 MCG: 0.25 SUSPENSION RESPIRATORY (INHALATION) at 08:51

## 2023-01-11 RX ADMIN — LIDOCAINE HYDROCHLORIDE 100 MG: 20 INJECTION, SOLUTION INFILTRATION; PERINEURAL at 14:37

## 2023-01-11 RX ADMIN — SODIUM CHLORIDE: 9 INJECTION, SOLUTION INTRAVENOUS at 14:26

## 2023-01-11 RX ADMIN — PROPOFOL 150 MCG/KG/MIN: 10 INJECTION, EMULSION INTRAVENOUS at 14:37

## 2023-01-11 RX ADMIN — PROPOFOL 20 MG: 10 INJECTION, EMULSION INTRAVENOUS at 14:39

## 2023-01-11 RX ADMIN — APIXABAN 5 MG: 5 TABLET, FILM COATED ORAL at 08:36

## 2023-01-11 ASSESSMENT — ENCOUNTER SYMPTOMS
DIARRHEA: 0
BACK PAIN: 0
CONSTIPATION: 0
SHORTNESS OF BREATH: 1
ABDOMINAL PAIN: 0
NAUSEA: 0
VOMITING: 0

## 2023-01-11 NOTE — ANESTHESIA PRE PROCEDURE
Department of Anesthesiology  Preprocedure Note       Name:  Maryjane Haque   Age:  62 y.o.  :  1965                                          MRN:  [id number]         Date:  2023      Surgeon: * Surgery not found *    Procedure:     Medications prior to admission:   Prior to Admission medications    Medication Sig Start Date End Date Taking? Authorizing Provider   rOPINIRole (REQUIP) 1 MG tablet Take 1.5 mg by mouth in the morning and at bedtime   Yes Historical Provider, MD   ipratropium-albuterol (DUONEB) 0.5-2.5 (3) MG/3ML SOLN nebulizer solution Inhale 1 vial into the lungs every 4 hours as needed for Shortness of Breath   Yes Historical Provider, MD   omalizumab [employer]) 150 MG/ML SOSY injection Inject 300 mg into the skin every 28 days   Yes Historical Provider, MD   acetaminophen-codeine (TYLENOL/CODEINE #3) 300-30 MG per tablet Take 1 tablet by mouth in the morning and at bedtime for 30 days. Intended supply: 3 days.  Take lowest dose possible to manage pain Max Daily Amount: 2 tablets 23  Geo Ramirez MD   flecainide (TAMBOCOR) 50 MG tablet Take 1 tablet by mouth 2 times daily as needed (episdoes of atrial fibrillation) 22   STEPHANIE De Leon CNP   cyclobenzaprine (FLEXERIL) 10 MG tablet Take 1 tablet by mouth 3 times daily as needed for Muscle spasms 11/10/22   Geo Ramirez MD   furosemide (LASIX) 20 MG tablet Take 1 tablet by mouth daily 22   STEPHANIE De Leon CNP   Respiratory Therapy Supplies (NEBULIZER/TUBING/MOUTHPIECE) KIT Continuous 10/10/22   Geo Ramirez MD   albuterol sulfate HFA (PROVENTIL;VENTOLIN;PROAIR) 108 (90 Base) MCG/ACT inhaler INHALE 2 PUFFS BY MOUTH EVERY 4 HOURS AS NEEDED FOR WHEEZING OR SHORTNESS OF BREATH(SPACE OUT TO EVERY 6 HOURS AS SYMPTOMS IMPROVE) 22   Frida Figueroa MD   EPINEPHrine (EPIPEN) 0.3 MG/0.3ML SOAJ injection INJECT INTO UPPER THIGH AS DIRECTED AS NEEDED FOR ANAPHYLAXIS 3/8/22   Historical Provider, MD Lindy Patrick 200-5 MCG/ACT inhaler INHALE TWO PUFFS BY MOUTH TWICE A DAY 5/29/21   Colton Salas MD       Current medications:    Current Facility-Administered Medications   Medication Dose Route Frequency Provider Last Rate Last Admin    ciprofloxacin (CIPRO) tablet 500 mg  500 mg Oral 2 times per day Kylie Burns MD   500 mg at 01/11/23 0836    benzonatate (TESSALON) capsule 100 mg  100 mg Oral TID PRN Milad Levo, MD   100 mg at 01/10/23 2042    levalbuterol (XOPENEX) nebulization 0.63 mg  0.63 mg Nebulization Q6H PRN Kylie Burns MD   0.63 mg at 01/10/23 2154    dilTIAZem 125 mg in dextrose 5 % 125 mL infusion  2.5-15 mg/hr IntraVENous Continuous Kylie Burns MD 10 mL/hr at 01/11/23 0615 10 mg/hr at 01/11/23 0615    furosemide (LASIX) injection 40 mg  40 mg IntraVENous BID Kylie Burns MD   40 mg at 01/11/23 0836    sodium chloride flush 0.9 % injection 5-40 mL  5-40 mL IntraVENous 2 times per day Kylie Burns MD   10 mL at 01/11/23 0836    sodium chloride flush 0.9 % injection 5-40 mL  5-40 mL IntraVENous PRN Kylie Burns MD        0.9 % sodium chloride infusion   IntraVENous PRN Kylie Burns MD        ondansetron (ZOFRAN-ODT) disintegrating tablet 4 mg  4 mg Oral Q8H PRN Kylie Burns MD        Or    ondansetron [facility]) injection 4 mg  4 mg IntraVENous Q6H PRN Kylie Burns MD        polyethylene glycol [facility]) packet 17 g  17 g Oral Daily PRN Kylie Burns MD        acetaminophen (TYLENOL) tablet 650 mg  650 mg Oral Q6H PRN Klyie Burns MD   650 mg at 01/09/23 1251    Or    acetaminophen (TYLENOL) suppository 650 mg  650 mg Rectal Q6H PRN Kylie Burns MD        perflutren lipid microspheres (DEFINITY) injection 1.5 mL  1.5 mL IntraVENous ONCE PRN Kylie Burns MD        budesonide (PULMICORT) nebulizer suspension 250 mcg  0.25 mg Nebulization BID Kylie Burns MD   250 mcg at 01/11/23 0851    apixaban (ELIQUIS) tablet 5 mg  5 mg Oral BID Joshua Alex MD   5 mg at 01/11/23 0836       Allergies:    Allergies   Allergen Reactions   • Latex Anaphylaxis and Rash   • Aspirin Swelling and Other (See Comments)     Swelling in lower legs   • Nsaids Swelling   • Penicillins Hives and Rash   • Sulfa Antibiotics Hives, Swelling and Rash   • Meperidine Nausea And Vomiting, Rash and Other (See Comments)   • Ranitidine Hcl Rash       Problem List:    Patient Active Problem List   Diagnosis Code   • Asthma J45.909   • MDD (major depressive disorder), recurrent severe, without psychosis ([facility]) F33.2   • Overdose T50.901A   • Restless leg syndrome G25.81   • Sliding hiatal hernia K44.9   • HTN (hypertension) I10   • Atrial flutter ([facility]) I48.92   • SVT (supraventricular tachycardia) ([facility]) I47.1   • Paroxysmal atrial fibrillation ([facility]) I48.0   • Sinus bradycardia R00.1   • Acute lateral meniscus tear of right knee S83.281A   • Acute medial meniscus tear of right knee S83.241A   • Localized edema R60.0   • Acute pain of right knee M25.561   • Chronic diastolic congestive heart failure ([facility]) I50.32   • Primary osteoarthritis of right knee M17.11   • Contusion of multiple sites of right leg S80.11XA   • Gastrocnemius strain, left S86.112A   • Ventral incisional hernia K43.2   • Recurrent incisional hernia K43.2   • Pain of upper abdomen R10.10   • Incisional hernia with obstruction, without gangrene K43.0   • Rhinitis, chronic J31.0   • Vocal cord dysfunction J38.3   • Iron deficiency anemia, unspecified D50.9   • Idiopathic urticaria L50.1   • Carpal tunnel syndrome, bilateral G56.03   • Arthritis of carpometacarpal (CMC) joint of left thumb M18.12   • Neck pain M54.2   • Acute alcoholic intoxication ([facility]) F10.929   • Marijuana user F12.90   • Mood disorder ([facility]) F39   • Acute systolic (congestive) heart failure ([facility]) I50.21       Past Medical History:        Diagnosis Date   • Acute lateral meniscus tear of right knee 02/2019   • Acute  medial meniscus tear of right knee 02/2019    Anesthesia complication     woke up during one surgery    Anxiety     Arthritis     Asthma     Atrial fibrillation ([address]     new dx 4 weeks ago, first of  Sept 2017    CHF (congestive heart failure) (HCC)     Edema     Fibromyalgia     GERD (gastroesophageal reflux disease)     reflux    Hiatal hernia     History of blood transfusion     Hx of major depression     Idiopathic urticaria 10/29/2020    SVT (supraventricular tachycardia) (HCC)     hx svt       Past Surgical History:        Procedure Laterality Date    ABLATION OF DYSRHYTHMIC FOCUS  04/2019    afib    BLADDER SUSPENSION  2004    CERVICAL FUSION N/A 9/30/2022    ANTERIOR CERVICAL DISCECTOMY AND FUSION CERVICAL 5 - CERVICAL 6, CERVICAL 6 - CERVICAL 7 WITH DEPUY ALLOGRAFT, PLATE AND SCREWS AND EVOKES               **LATEX SENSITIVE** performed by Fortino Valentin MD at [facility] 8/4/2022    COLONOSCOPY performed by Sandip Packer at [address] ([address] [facility])  2004    KNEE ARTHROSCOPY Right 7/11/2019    VIDEO ARTHROSCOPY RIGHT KNEE, PARTIAL MEDIAL AND LATERAL MENISCECTOMY,, MEDIAL MICRO FRACTURE CHONDROPLASTY performed by Cindy Phillips MD at [facility] GASTRIC BYPASS  2011    TONSILLECTOMY AND ADENOIDECTOMY      UPPER GASTROINTESTINAL ENDOSCOPY N/A 8/4/2022    EGD BIOPSY performed by Sandip Packer at [address] [facility] N/A 8/26/2019    DIAGNOSTIC LAPAROSCOPY, LAPAROSCOPIC LYSIS OF ADHESIONS, LAPAROSCOPIC REDUCTION OF RECURRENT INCISIONAL HERNIA WITH MESH performed by Maegan Sinclair MD at [facility] History:    Social History     Tobacco Use    Smoking status: Never    Smokeless tobacco: Never   Substance Use Topics    Alcohol use: Yes     Comment: 0-1 drinks per month Counseling given: Not Answered      Vital Signs (Current):   Vitals:    01/10/23 2357 01/11/23 0328 01/11/23 0836 01/11/23 0852   BP: 125/80 100/66 118/77    Pulse: 93 95  96   Resp: 18 20  20   Temp: 98 °F (36.7 °C) 98 °F (36.7 °C)     TempSrc: Oral Oral     SpO2: 94% 94%  95%   Weight:  (!) 347 lb 3.6 oz (157.5 kg)     Height:                                                  BP Readings from Last 3 Encounters:   01/11/23 118/77   12/02/22 110/68   11/22/22 120/82       NPO Status:                                                                                 BMI:   Wt Readings from Last 3 Encounters:   01/11/23 (!) 347 lb 3.6 oz (157.5 kg)   01/03/23 (!) 345 lb (156.5 kg)   12/14/22 (!) 346 lb (156.9 kg)     Body mass index is 49.82 kg/m². CBC:   Lab Results   Component Value Date/Time    WBC 6.7 01/11/2023 04:46 AM    RBC 4.89 01/11/2023 04:46 AM    HGB 12.0 01/11/2023 04:46 AM    HCT 37.7 01/11/2023 04:46 AM    MCV 77.1 01/11/2023 04:46 AM    RDW 18.4 01/11/2023 04:46 AM     01/11/2023 04:46 AM       CMP:   Lab Results   Component Value Date/Time     01/11/2023 04:47 AM    K 3.9 01/11/2023 04:47 AM    CL 98 01/11/2023 04:47 AM    CO2 25 01/11/2023 04:47 AM    BUN 20 01/11/2023 04:47 AM    CREATININE 0.9 01/11/2023 04:47 AM    GFRAA >60 09/28/2022 09:39 AM    GFRAA 87 12/21/2011 05:25 AM    AGRATIO 1.4 09/28/2022 09:39 AM    LABGLOM >60 01/11/2023 04:47 AM    GLUCOSE 111 01/11/2023 04:47 AM    PROT 6.6 09/28/2022 09:39 AM    CALCIUM 9.1 01/11/2023 04:47 AM    BILITOT 0.5 09/28/2022 09:39 AM    ALKPHOS 109 09/28/2022 09:39 AM    AST 16 09/28/2022 09:39 AM    ALT 14 09/28/2022 09:39 AM       POC Tests: No results for input(s): POCGLU, POCNA, POCK, POCCL, POCBUN, POCHEMO, POCHCT in the last 72 hours.     Coags:   Lab Results   Component Value Date/Time    PROTIME 10.6 02/25/2020 07:23 AM    INR 0.91 02/25/2020 07:23 AM    APTT 29.9 01/09/2023 05:05 AM       HCG (If Applicable): No results found for: PREGTESTUR, PREGSERUM, HCG, HCGQUANT     ABGs:   Lab Results   Component Value Date/Time    PHART 7.39 12/17/2011 03:00 AM    PO2ART 83 12/17/2011 03:00 AM    VVG3KUE 38 12/17/2011 03:00 AM    PQT4OJN 22 12/17/2011 03:00 AM    BEART -2.0 12/17/2011 03:00 AM        Type & Screen (If Applicable):  No results found for: LABABO, LABRH    Drug/Infectious Status (If Applicable):  No results found for: HIV, HEPCAB    COVID-19 Screening (If Applicable):   Lab Results   Component Value Date/Time    COVID19 Not Detected 01/09/2023 01:55 AM           Anesthesia Evaluation  Patient summary reviewed and Nursing notes reviewed  Airway: Mallampati: II  TM distance: >3 FB   Neck ROM: full  Mouth opening: > = 3 FB   Dental:          Pulmonary: breath sounds clear to auscultation  (+) asthma: exercise-induced asthma,                            Cardiovascular:  Exercise tolerance: good (>4 METS),   (+) hypertension:, CHF: systolic and diastolic,         Rhythm: regular  Rate: normal                    Neuro/Psych:   (+) neuromuscular disease:, psychiatric history:            GI/Hepatic/Renal:   (+) hiatal hernia, GERD:,           Endo/Other:                     Abdominal:   (+) obese,           Vascular: Other Findings:           Anesthesia Plan      TIVA     ASA 4       Induction: intravenous. Anesthetic plan and risks discussed with patient. Plan discussed with CRNA.                     Barney Phelps MD   1/11/2023

## 2023-01-11 NOTE — PROGRESS NOTES
Progress Note    Admit Date: 1/9/2023  Day: 3  Diet: Diet NPO    CC: SOB    Interval history:   - Patient heart has been rate controlled all night  - Echo showed EF of 50-55% with no mention of diastolic function  - Urinated 1.7 L of fluid out yesterday  - AAOx3 and reports feeling better    Medications:     Scheduled Meds:   ciprofloxacin  500 mg Oral 2 times per day    furosemide  40 mg IntraVENous BID    sodium chloride flush  5-40 mL IntraVENous 2 times per day    budesonide  0.25 mg Nebulization BID    apixaban  5 mg Oral BID     Continuous Infusions:   dilTIAZem 10 mg/hr (01/11/23 0615)    sodium chloride       PRN Meds:benzonatate, levalbuterol, sodium chloride flush, sodium chloride, ondansetron **OR** ondansetron, polyethylene glycol, acetaminophen **OR** acetaminophen, perflutren lipid microspheres    Objective:   Vitals:   T-max:  Patient Vitals for the past 8 hrs:   BP Temp Temp src Pulse Resp SpO2 Weight   01/11/23 0328 100/66 98 °F (36.7 °C) Oral 95 20 94 % (!) 347 lb 3.6 oz (157.5 kg)   01/10/23 2357 125/80 98 °F (36.7 °C) Oral 93 18 94 % --       Intake/Output Summary (Last 24 hours) at 1/11/2023 0646  Last data filed at 1/10/2023 1823  Gross per 24 hour   Intake 962.2 ml   Output 1700 ml   Net -737.8 ml       Review of Systems   Constitutional:  Positive for fatigue. Negative for chills and fever. Respiratory:  Positive for shortness of breath. Cardiovascular:  Negative for chest pain and palpitations. Gastrointestinal:  Negative for abdominal pain, constipation, diarrhea, nausea and vomiting. Musculoskeletal:  Negative for back pain. Neurological:  Negative for headaches. Physical Exam  Cardiovascular:      Rate and Rhythm: Normal rate. Rhythm irregular. Pulses: Normal pulses. Heart sounds: Normal heart sounds. Pulmonary:      Breath sounds: Wheezing and rhonchi present. Abdominal:      General: Abdomen is flat. Palpations: Abdomen is soft.    Neurological: Mental Status: She is alert and oriented to person, place, and time. LABS:    CBC:   Recent Labs     01/09/23  0526 01/10/23  0257 01/11/23  0446   WBC 10.0 6.8 6.7   HGB 11.1* 11.1* 12.0   HCT 35.3* 35.4* 37.7    299 335   MCV 76.6* 77.5* 77.1*     Renal:    Recent Labs     01/09/23  0930 01/10/23  0909 01/11/23  0447    138 134*   K 4.2 3.9 3.9    99 98*   CO2 23 24 25   BUN 16 17 20   CREATININE 0.9 1.0 0.9   GLUCOSE 104* 105* 111*   CALCIUM 9.0 9.1 9.1   MG 1.90 2.00 2.10   ANIONGAP 16 15 11     Hepatic: No results for input(s): AST, ALT, BILITOT, BILIDIR, PROT, LABALBU, ALKPHOS in the last 72 hours. Troponin:   Recent Labs     01/09/23  0156   TROPONINI <0.01     BNP: No results for input(s): BNP in the last 72 hours. Lipids: No results for input(s): CHOL, HDL in the last 72 hours. Invalid input(s): LDLCALCU, TRIGLYCERIDE  ABGs:  No results for input(s): PHART, RHZ3EBB, PO2ART, FZE7MKT, BEART, THGBART, I7MSSZMX, BFL9RWK in the last 72 hours. INR: No results for input(s): INR in the last 72 hours. Lactate: No results for input(s): LACTATE in the last 72 hours. Cultures:  -----------------------------------------------------------------  RAD:   XR CHEST PORTABLE   Final Result   Mild patchy opacity in the left lung base. May represent    pneumonia. Consider follow-up to ensure resolution. Assessment/Plan:   MARIANA is a 62year old female with a PMHx of Afib with RVR, HFpEF and Asthma who presents to [facility] complaining of progressive shortness of breath. Dyspnea 2/2 CHF exacerbation vs Asthma - Progressively worsening SOB that started a few days ago. Given 20 mg IV Lasix in ED and got breathing treatment with some improvement. -  Duonebs PRN  -  Monitor vitals and give O2 when necessary  -  Treat CHF and Asthma per below      HFpEF - Last Echo was in 3/17/2021 showed normal diastolic dysfunction with EF of 55-60%.  Echo on 05/09/18 showed grade 1 diastolic dysfunction. Patient reports drinking more fluids since onset of SOB, worsening her symptoms. Also CXR concerning for Pulmonary Edema. Diuresed well. Repeat Echo showed EF of 50-55% with no mentioned of diastolic function.  -  Lasix 40 mg IV BID  -  Strict Is and Os  -  Daily Weights       Asthma Exacerbation - Patient is wheezy on examination and has PMHx of asthma. Could be in exacerbation.   -  Budesonide inhaler  -  Levalbuterol PRN      Atrial Fibrillation with RVR - Hx of paroxysmal atrial fib with as needed flecainide 50 mg twice daily. Not currently on A/C. Has a chadsvasc score of 2-3, so likely needs anticoagulation.   - Will be going for SHEY with cardioversion today  - Eliquis PO 5 mg BID  - Dilt gtt  - Cardiology consult;   -  Continuous tele       Acute Cystitis - U/A consistent with UTI (nitrite positive/ small leukocyte esterase/ 10-20 WBC and bacteria 4+)  - Ciprofloxacin   - Urine Culture     6. Microcytic Anemia - Hgb of 11.1 with an MCV of 77.5. Ferritin is 13.8 (low) and RDW is 18.5 (high), waiting on results of other studies but looks like VAN. Code Status: Full Code  FEN: Regular carb controlled diet with low salt  PPX: Protonix;  Eliquis  DISPO: Merary Pitts MD, PGY-1  01/11/23  6:47 AM    This patient has been staffed and discussed with Keeley Nix MD.

## 2023-01-11 NOTE — PLAN OF CARE
Problem: ABCDS Injury Assessment  Goal: Absence of physical injury  1/11/2023 0234 by Naldo Ott RN  Outcome: Progressing  1/10/2023 1752 by Fatemeh Barker RN  Outcome: Progressing  Flowsheets (Taken 1/10/2023 1748)  Absence of Physical Injury: Implement safety measures based on patient assessment     Problem: Respiratory - Adult  Goal: Achieves optimal ventilation and oxygenation  1/11/2023 0234 by Naldo Ott RN  Outcome: Progressing  1/10/2023 1752 by Fatemeh Barker RN  Outcome: Progressing  Flowsheets (Taken 1/10/2023 1748)  Achieves optimal ventilation and oxygenation:   Assess for changes in respiratory status   Assess for changes in mentation and behavior   Position to facilitate oxygenation and minimize respiratory effort   Oxygen supplementation based on oxygen saturation or arterial blood gases   Encourage broncho-pulmonary hygiene including cough, deep breathe, incentive spirometry   Assess and instruct to report shortness of breath or any respiratory difficulty  Note: Assessed expiratory wheezes. Patient SpO2 95% room air. Will continue to monitor. Problem: Cardiovascular - Adult  Goal: Maintains optimal cardiac output and hemodynamic stability  1/11/2023 0234 by Naldo Ott RN  Outcome: Progressing  1/10/2023 1752 by Fatemeh Barker RN  Outcome: Progressing  Flowsheets (Taken 1/10/2023 1749)  Maintains optimal cardiac output and hemodynamic stability:   Monitor blood pressure and heart rate   Monitor urine output and notify Licensed Independent Practitioner for values outside of normal range   Assess for signs of decreased cardiac output   Administer vasoactive medications as ordered  Note: Patient HR 88. Patient on a diltiazem drip at 10 mg/hr. Will continue to monitor.    Goal: Absence of cardiac dysrhythmias or at baseline  1/11/2023 0234 by Naldo Ott RN  Outcome: Progressing  1/10/2023 1752 by Fatemeh Barker RN  Outcome: Progressing     Problem: Metabolic/Fluid and Electrolytes - Adult  Goal: Electrolytes maintained within normal limits  1/11/2023 0234 by Jaja Thomas RN  Outcome: Progressing  1/10/2023 1752 by Rod Armas RN  Outcome: Progressing  Goal: Hemodynamic stability and optimal renal function maintained  1/11/2023 0234 by Jaja Thomas RN  Outcome: Progressing  1/10/2023 1752 by Rod Armas RN  Outcome: Progressing     Problem: Discharge Planning  Goal: Discharge to home or other facility with appropriate resources  1/11/2023 0234 by Jaja Thomas RN  Outcome: Progressing  1/10/2023 1752 by Rod Armas RN  Outcome: Progressing     Problem: Chronic Conditions and Co-morbidities  Goal: Patient's chronic conditions and co-morbidity symptoms are monitored and maintained or improved  1/11/2023 0234 by Jaja Thomas RN  Outcome: Progressing  1/10/2023 1752 by Rod Armas RN  Outcome: Progressing     Problem: Pain  Goal: Verbalizes/displays adequate comfort level or baseline comfort level  1/11/2023 0234 by Jaja Thomas RN  Outcome: Progressing  1/10/2023 1752 by Rod Armas RN  Outcome: Progressing  Note: Patient's pain assessed and states, \"2/10\". Patient states she doesn't want any pain medications. Will continue to monitor.

## 2023-01-12 VITALS
RESPIRATION RATE: 18 BRPM | OXYGEN SATURATION: 97 % | SYSTOLIC BLOOD PRESSURE: 120 MMHG | TEMPERATURE: 97.7 F | BODY MASS INDEX: 41.95 KG/M2 | DIASTOLIC BLOOD PRESSURE: 86 MMHG | HEART RATE: 72 BPM | HEIGHT: 70 IN | WEIGHT: 293 LBS

## 2023-01-12 LAB
ANION GAP SERPL CALCULATED.3IONS-SCNC: 14 MMOL/L (ref 3–16)
BASOPHILS ABSOLUTE: 0.1 K/UL (ref 0–0.2)
BASOPHILS RELATIVE PERCENT: 0.9 %
BUN BLDV-MCNC: 30 MG/DL (ref 7–20)
CALCIUM SERPL-MCNC: 9.5 MG/DL (ref 8.3–10.6)
CHLORIDE BLD-SCNC: 98 MMOL/L (ref 99–110)
CO2: 25 MMOL/L (ref 21–32)
CREAT SERPL-MCNC: 1 MG/DL (ref 0.6–1.1)
EKG ATRIAL RATE: 83 BPM
EKG DIAGNOSIS: NORMAL
EKG P AXIS: 45 DEGREES
EKG P-R INTERVAL: 190 MS
EKG Q-T INTERVAL: 404 MS
EKG QRS DURATION: 86 MS
EKG QTC CALCULATION (BAZETT): 474 MS
EKG R AXIS: 87 DEGREES
EKG T AXIS: 18 DEGREES
EKG VENTRICULAR RATE: 83 BPM
EOSINOPHILS ABSOLUTE: 0.2 K/UL (ref 0–0.6)
EOSINOPHILS RELATIVE PERCENT: 2.6 %
GFR SERPL CREATININE-BSD FRML MDRD: >60 ML/MIN/{1.73_M2}
GLUCOSE BLD-MCNC: 95 MG/DL (ref 70–99)
HCT VFR BLD CALC: 39.2 % (ref 36–48)
HEMOGLOBIN: 12.3 G/DL (ref 12–16)
LYMPHOCYTES ABSOLUTE: 2.5 K/UL (ref 1–5.1)
LYMPHOCYTES RELATIVE PERCENT: 34.3 %
MAGNESIUM: 2.4 MG/DL (ref 1.8–2.4)
MCH RBC QN AUTO: 24.1 PG (ref 26–34)
MCHC RBC AUTO-ENTMCNC: 31.4 G/DL (ref 31–36)
MCV RBC AUTO: 76.9 FL (ref 80–100)
MONOCYTES ABSOLUTE: 0.7 K/UL (ref 0–1.3)
MONOCYTES RELATIVE PERCENT: 9.6 %
NEUTROPHILS ABSOLUTE: 3.8 K/UL (ref 1.7–7.7)
NEUTROPHILS RELATIVE PERCENT: 52.6 %
PDW BLD-RTO: 18 % (ref 12.4–15.4)
PLATELET # BLD: 369 K/UL (ref 135–450)
PMV BLD AUTO: 9.1 FL (ref 5–10.5)
POTASSIUM REFLEX MAGNESIUM: 4.3 MMOL/L (ref 3.5–5.1)
RBC # BLD: 5.1 M/UL (ref 4–5.2)
SODIUM BLD-SCNC: 137 MMOL/L (ref 136–145)
WBC # BLD: 7.2 K/UL (ref 4–11)

## 2023-01-12 PROCEDURE — 85025 COMPLETE CBC W/AUTO DIFF WBC: CPT

## 2023-01-12 PROCEDURE — 94761 N-INVAS EAR/PLS OXIMETRY MLT: CPT

## 2023-01-12 PROCEDURE — 6370000000 HC RX 637 (ALT 250 FOR IP): Performed by: STUDENT IN AN ORGANIZED HEALTH CARE EDUCATION/TRAINING PROGRAM

## 2023-01-12 PROCEDURE — 6360000002 HC RX W HCPCS: Performed by: STUDENT IN AN ORGANIZED HEALTH CARE EDUCATION/TRAINING PROGRAM

## 2023-01-12 PROCEDURE — 36415 COLL VENOUS BLD VENIPUNCTURE: CPT

## 2023-01-12 PROCEDURE — 6370000000 HC RX 637 (ALT 250 FOR IP): Performed by: INTERNAL MEDICINE

## 2023-01-12 PROCEDURE — 2580000003 HC RX 258: Performed by: STUDENT IN AN ORGANIZED HEALTH CARE EDUCATION/TRAINING PROGRAM

## 2023-01-12 PROCEDURE — 6370000000 HC RX 637 (ALT 250 FOR IP)

## 2023-01-12 PROCEDURE — 93010 ELECTROCARDIOGRAM REPORT: CPT | Performed by: INTERNAL MEDICINE

## 2023-01-12 PROCEDURE — 83735 ASSAY OF MAGNESIUM: CPT

## 2023-01-12 PROCEDURE — 6360000002 HC RX W HCPCS: Performed by: INTERNAL MEDICINE

## 2023-01-12 PROCEDURE — 99232 SBSQ HOSP IP/OBS MODERATE 35: CPT | Performed by: NURSE PRACTITIONER

## 2023-01-12 PROCEDURE — 2580000003 HC RX 258: Performed by: INTERNAL MEDICINE

## 2023-01-12 PROCEDURE — 80048 BASIC METABOLIC PNL TOTAL CA: CPT

## 2023-01-12 PROCEDURE — 94640 AIRWAY INHALATION TREATMENT: CPT

## 2023-01-12 RX ORDER — CEFDINIR 300 MG/1
300 CAPSULE ORAL 2 TIMES DAILY
Qty: 6 CAPSULE | Refills: 0 | Status: SHIPPED | OUTPATIENT
Start: 2023-01-13 | End: 2023-01-16

## 2023-01-12 RX ORDER — DIPHENHYDRAMINE HCL 25 MG
50 TABLET ORAL ONCE
Status: COMPLETED | OUTPATIENT
Start: 2023-01-12 | End: 2023-01-12

## 2023-01-12 RX ORDER — FLECAINIDE ACETATE 50 MG/1
50 TABLET ORAL EVERY 12 HOURS SCHEDULED
Qty: 60 TABLET | Refills: 3 | Status: SHIPPED | OUTPATIENT
Start: 2023-01-12

## 2023-01-12 RX ORDER — FUROSEMIDE 40 MG/1
40 TABLET ORAL DAILY
Status: DISCONTINUED | OUTPATIENT
Start: 2023-01-12 | End: 2023-01-12 | Stop reason: HOSPADM

## 2023-01-12 RX ORDER — DILTIAZEM HYDROCHLORIDE 240 MG/1
240 CAPSULE, COATED, EXTENDED RELEASE ORAL DAILY
Qty: 30 CAPSULE | Refills: 3 | Status: SHIPPED | OUTPATIENT
Start: 2023-01-13

## 2023-01-12 RX ORDER — DIPHENHYDRAMINE HCL 25 MG
50 TABLET ORAL EVERY 6 HOURS PRN
Status: DISCONTINUED | OUTPATIENT
Start: 2023-01-12 | End: 2023-01-12 | Stop reason: HOSPADM

## 2023-01-12 RX ORDER — DIPHENHYDRAMINE HCL 25 MG
25 TABLET ORAL EVERY 6 HOURS PRN
Status: DISCONTINUED | OUTPATIENT
Start: 2023-01-12 | End: 2023-01-12

## 2023-01-12 RX ORDER — FUROSEMIDE 40 MG/1
40 TABLET ORAL DAILY
Qty: 60 TABLET | Refills: 3 | Status: SHIPPED | OUTPATIENT
Start: 2023-01-12

## 2023-01-12 RX ADMIN — DIPHENHYDRAMINE HYDROCHLORIDE 50 MG: 25 TABLET ORAL at 06:43

## 2023-01-12 RX ADMIN — DIPHENHYDRAMINE HYDROCHLORIDE 50 MG: 25 TABLET ORAL at 11:44

## 2023-01-12 RX ADMIN — SODIUM CHLORIDE, PRESERVATIVE FREE 10 ML: 5 INJECTION INTRAVENOUS at 08:06

## 2023-01-12 RX ADMIN — FLECAINIDE ACETATE 50 MG: 100 TABLET ORAL at 08:05

## 2023-01-12 RX ADMIN — FUROSEMIDE 40 MG: 10 INJECTION, SOLUTION INTRAMUSCULAR; INTRAVENOUS at 08:05

## 2023-01-12 RX ADMIN — DILTIAZEM HYDROCHLORIDE 240 MG: 240 CAPSULE, COATED, EXTENDED RELEASE ORAL at 08:05

## 2023-01-12 RX ADMIN — DIPHENHYDRAMINE HYDROCHLORIDE 50 MG: 25 TABLET ORAL at 05:42

## 2023-01-12 RX ADMIN — IRON SUCROSE 300 MG: 20 INJECTION, SOLUTION INTRAVENOUS at 12:44

## 2023-01-12 RX ADMIN — FLECAINIDE ACETATE 50 MG: 100 TABLET ORAL at 00:06

## 2023-01-12 RX ADMIN — CIPROFLOXACIN 500 MG: 500 TABLET, FILM COATED ORAL at 08:06

## 2023-01-12 RX ADMIN — APIXABAN 5 MG: 5 TABLET, FILM COATED ORAL at 08:05

## 2023-01-12 RX ADMIN — BUDESONIDE 250 MCG: 0.25 SUSPENSION RESPIRATORY (INHALATION) at 08:46

## 2023-01-12 ASSESSMENT — ENCOUNTER SYMPTOMS
BACK PAIN: 0
COUGH: 1
SHORTNESS OF BREATH: 1
ABDOMINAL PAIN: 0

## 2023-01-12 NOTE — PROCEDURES
[address]     Electrophysiology Procedure Note       Date of Procedure: 1/11/2023  Patient's Name: Karen Keith  YOB: 1965  Medical Record Number: [id number]  Procedure Performed by: Jessica Cortés MD    Procedures performed:  External Electrical cardioversion   SHEY   Sedation provided by anesthesia     Indication of the procedure: Paroxsymal atrial fibrillation with rapid ventricular response    Details of procedure: The patient was brought to the cath lab area in a fasting and non-sedated state. The risks, benefits and alternatives of the procedure were discussed with the patient. The patient opted to proceed with the procedure. Written informed consent was signed and placed in the chart. A timeout protocol was completed to identify the patient and the procedure being performed. SHEY was performed which did not show any ANGELIQUE/LA clot/thrombus. Patient is on chronic anticoagulation therapy for less that 30 days. On propofol, administered by anesthesia team electrical DC cardioversion was perfomred using 200J, synchronized shock. Patient was converted to sinus rhythm. The patient tolerated the procedure well and there were no complications. Assessment:   Successful external DC cardioversion of paroxysmal atrial fibrillation   SHEY with preserved biventricular function, mild valvular disease, no ANGELIQUE/LA thrombus. See separate report for SHEY.     Plan:  Continue chronic oral anticoagulation  Discontinue IV diltiazem  Start oral diltiazem 240mg XR  Start flecainide 50mg BID   Patient should follow up in electrophysiology clinic in 1-2 months    Jessica Cortés MD  [address]   Office: [phone]  Fax: [phone]

## 2023-01-12 NOTE — PLAN OF CARE
Problem: Respiratory - Adult  Goal: Achieves optimal ventilation and oxygenation  Outcome: Progressing   Patient on room air. Denies any SOB. Problem: Cardiovascular - Adult  Goal: Maintains optimal cardiac output and hemodynamic stability  Outcome: Progressing   V.S.S. SR on telemetry. S/P SHEY cardioversion yesterday, Flecanide started. Problem: Metabolic/Fluid and Electrolytes - Adult  Goal: Electrolytes maintained within normal limits  Outcome: Progressing   See lab values. Problem: Discharge Planning  Goal: Discharge to home or other facility with appropriate resources  Outcome: Progressing   Plan to return home. Problem: Pain  Goal: Verbalizes/displays adequate comfort level or baseline comfort level  Outcome: Progressing   Denies any pain at this time.

## 2023-01-12 NOTE — ANESTHESIA POSTPROCEDURE EVALUATION
Department of Anesthesiology  Postprocedure Note    Patient: Dread Browning  MRN: [id number]  YOB: 1965  Date of evaluation: 1/11/2023      Procedure Summary     Date: 01/11/23 Room / Location: [place] [address] Cath Lab    Anesthesia Start: 7655 Anesthesia Stop: 0813    Procedure: [facility] 113 CARDIO/SHEY W ECHO AND ANES Diagnosis:     Scheduled Providers:  Responsible Provider: Felipe Tyler MD    Anesthesia Type: TIVA ASA Status: 4          Anesthesia Type: No value filed.     Savita Phase I:      Savita Phase II:        Anesthesia Post Evaluation    Patient location during evaluation: PACU  Patient participation: complete - patient participated  Level of consciousness: awake and alert  Pain score: 0  Airway patency: patent  Nausea & Vomiting: no nausea and no vomiting  Complications: no  Cardiovascular status: hemodynamically stable  Respiratory status: acceptable  Hydration status: euvolemic

## 2023-01-12 NOTE — H&P
[address]     Electrophysiology Procedure Note       Date of Procedure: 1/11/2023  Patient's Name: Ruby Patrick  YOB: 1965   Medical Record Number: [id number]    Indication: Atrial fibrillation with RVR      Consent: I have discussed with the patient and/or the patient representative the indication, alternatives, and the possible risks and/or complications of the planned procedure and the anesthesia methods. The patient and/or patient representative appear to understand and agree to proceed.     Past Medical History:   Diagnosis Date    Acute lateral meniscus tear of right knee 02/2019    Acute medial meniscus tear of right knee 02/2019    Anesthesia complication     woke up during one surgery    Anxiety     Arthritis     Asthma     Atrial fibrillation ([address]     new dx 4 weeks ago, first of  Sept 2017    CHF (congestive heart failure) ([facility])     Edema     Fibromyalgia     GERD (gastroesophageal reflux disease)     reflux    Hiatal hernia     History of blood transfusion     Hx of major depression     Idiopathic urticaria 10/29/2020    SVT (supraventricular tachycardia) ([facility])     hx svt       Past Surgical History:   Procedure Laterality Date    ABLATION OF DYSRHYTHMIC FOCUS  04/2019    afib    BLADDER SUSPENSION  2004    CERVICAL FUSION N/A 9/30/2022    ANTERIOR CERVICAL DISCECTOMY AND FUSION CERVICAL 5 - CERVICAL 6, CERVICAL 6 - CERVICAL 7 WITH DEPUY ALLOGRAFT, PLATE AND SCREWS AND EVOKES               **LATEX SENSITIVE** performed by Ann Killian MD at [address] [place] [address] N/A 8/4/2022    COLONOSCOPY performed by Vivien Lyons at [facility] ([address] [place] [address])  2004    KNEE ARTHROSCOPY Right 7/11/2019    VIDEO ARTHROSCOPY RIGHT KNEE, PARTIAL MEDIAL AND LATERAL MENISCECTOMY,, MEDIAL MICRO FRACTURE CHONDROPLASTY performed by Maria T Alexander MD at [place] [address]  2011 TONSILLECTOMY AND ADENOIDECTOMY      UPPER GASTROINTESTINAL ENDOSCOPY N/A 8/4/2022    EGD BIOPSY performed by Vivien Eloy at [address] N/A 8/26/2019    DIAGNOSTIC LAPAROSCOPY, LAPAROSCOPIC LYSIS OF ADHESIONS, LAPAROSCOPIC REDUCTION OF RECURRENT INCISIONAL HERNIA WITH MESH performed by Jesusa Felty, MD at [address]       Prior to Admission medications    Medication Sig Start Date End Date Taking? Authorizing Provider   rOPINIRole (REQUIP) 1 MG tablet Take 1.5 mg by mouth in the morning and at bedtime   Yes Historical Provider, MD   ipratropium-albuterol (DUONEB) 0.5-2.5 (3) MG/3ML SOLN nebulizer solution Inhale 1 vial into the lungs every 4 hours as needed for Shortness of Breath   Yes Historical Provider, MD   omalizumab Sudha Hernández) 150 MG/ML SOSY injection Inject 300 mg into the skin every 28 days   Yes Historical Provider, MD   acetaminophen-codeine (TYLENOL/CODEINE #3) 300-30 MG per tablet Take 1 tablet by mouth in the morning and at bedtime for 30 days. Intended supply: 3 days.  Take lowest dose possible to manage pain Max Daily Amount: 2 tablets 1/5/23 2/4/23  Edil Card MD   flecainide (TAMBOCOR) 50 MG tablet Take 1 tablet by mouth 2 times daily as needed (episdoes of atrial fibrillation) 12/2/22   STEPHANIE Anderson CNP   cyclobenzaprine (FLEXERIL) 10 MG tablet Take 1 tablet by mouth 3 times daily as needed for Muscle spasms 11/10/22   Edil Card MD   furosemide (LASIX) 20 MG tablet Take 1 tablet by mouth daily 11/7/22   STEPHANIE Anderson CNP   Respiratory Therapy Supplies (NEBULIZER/TUBING/MOUTHPIECE) KIT Continuous 10/10/22   Edil Card MD   albuterol sulfate HFA (PROVENTIL;VENTOLIN;PROAIR) 108 (90 Base) MCG/ACT inhaler INHALE 2 PUFFS BY MOUTH EVERY 4 HOURS AS NEEDED FOR WHEEZING OR SHORTNESS OF BREATH(SPACE OUT TO EVERY 6 HOURS AS SYMPTOMS IMPROVE) 8/17/22   Jimy Rebollar MD   EPINEPHrine (EPIPEN) 0.3 MG/0.3ML Armando Garcia injection INJECT INTO UPPER THIGH AS DIRECTED AS NEEDED FOR ANAPHYLAXIS 3/8/22   Historical Provider, MD Radha Belcher 200-5 MCG/ACT inhaler INHALE TWO PUFFS BY MOUTH TWICE A DAY 5/29/21   Oneyda Salomon MD       Pre-Sedation Documentation and Exam:   I have personally completed a history, physical exam & review of systems for this patient (see my consult notes).      Mallampati Airway Assessment:  normal     Prior History of Anesthesia Complications:   none     ASA Classification:  Class 3 - A patient with severe systemic disease that limits activity but is not incapacitating     Sedation/ Anesthesia Plan:   Anesthesia provided    Patient is an appropriate candidate for plan of sedation: yes    Betzaida Terry MD  [facility]   Office: [phone]  Fax: [phone]

## 2023-01-12 NOTE — PROGRESS NOTES
Discussed discharge instructions with patient. Pt verbalized understanding. Removed L arm IV w/o complaint.

## 2023-01-12 NOTE — PROGRESS NOTES
Progress Note    Admit Date: 1/9/2023  Day: 4  Diet: ADULT DIET; Regular; 3 carb choices (45 gm/meal); Low Sodium (2 gm)    CC: SOB    Interval history:   - Patient had cardioversion procedure done yesterday; back to NSR  - Patient off dilt gtt, now on dilt 240 mg and flecainide 50 mg PO BID  - 400 ml of urine recorded; asked patient and she says she made much more. - AAOx3; VSS - on room air  Medications:     Scheduled Meds:   furosemide  40 mg Oral Daily    dilTIAZem  240 mg Oral Daily    flecainide  50 mg Oral 2 times per day    ciprofloxacin  500 mg Oral 2 times per day    sodium chloride flush  5-40 mL IntraVENous 2 times per day    budesonide  0.25 mg Nebulization BID    apixaban  5 mg Oral BID     Continuous Infusions:   sodium chloride       PRN Meds:diphenhydrAMINE, dilTIAZem, benzonatate, levalbuterol, sodium chloride flush, sodium chloride, ondansetron **OR** ondansetron, polyethylene glycol, acetaminophen **OR** acetaminophen    Objective:   Vitals:   T-max:  Patient Vitals for the past 8 hrs:   BP Temp Temp src Pulse Resp SpO2   01/12/23 0847 -- -- -- 80 18 98 %   01/12/23 0805 129/89 -- -- 73 -- --   01/12/23 0458 (!) 134/95 97.9 °F (36.6 °C) Oral 78 18 93 %       Intake/Output Summary (Last 24 hours) at 1/12/2023 0908  Last data filed at 1/12/2023 0806  Gross per 24 hour   Intake 490 ml   Output 400 ml   Net 90 ml       Review of Systems   Constitutional:  Negative for chills, fatigue and fever. Respiratory:  Positive for cough and shortness of breath. Cardiovascular:  Negative for chest pain. Gastrointestinal:  Negative for abdominal pain. Musculoskeletal:  Negative for back pain. Neurological:  Negative for headaches. Physical Exam  Cardiovascular:      Rate and Rhythm: Normal rate and regular rhythm. Pulses: Normal pulses. Heart sounds: Normal heart sounds. Pulmonary:      Effort: Pulmonary effort is normal.      Breath sounds: Wheezing (faint wheezing) present.   Abdominal:      General: Abdomen is flat.      Palpations: Abdomen is soft.   Neurological:      Mental Status: She is alert and oriented to person, place, and time.       LABS:    CBC:   Recent Labs     01/10/23  0257 01/11/23 0446 01/12/23 0438   WBC 6.8 6.7 7.2   HGB 11.1* 12.0 12.3   HCT 35.4* 37.7 39.2    335 369   MCV 77.5* 77.1* 76.9*     Renal:    Recent Labs     01/10/23  0909 01/11/23 0447 01/12/23 0438    134* 137   K 3.9 3.9 4.3   CL 99 98* 98*   CO2 24 25 25   BUN 17 20 30*   CREATININE 1.0 0.9 1.0   GLUCOSE 105* 111* 95   CALCIUM 9.1 9.1 9.5   MG 2.00 2.10 2.40   ANIONGAP 15 11 14     Hepatic: No results for input(s): AST, ALT, BILITOT, BILIDIR, PROT, LABALBU, ALKPHOS in the last 72 hours.  Troponin: No results for input(s): TROPONINI in the last 72 hours.  BNP: No results for input(s): BNP in the last 72 hours.  Lipids: No results for input(s): CHOL, HDL in the last 72 hours.    Invalid input(s): LDLCALCU, TRIGLYCERIDE  ABGs:  No results for input(s): PHART, WXM5SBI, PO2ART, OVW3JCU, BEART, THGBART, X0UGLJWQ, WRY1BFB in the last 72 hours.    INR: No results for input(s): INR in the last 72 hours.  Lactate: No results for input(s): LACTATE in the last 72 hours.  Cultures:  -----------------------------------------------------------------  RAD:   XR CHEST PORTABLE   Final Result   Mild patchy opacity in the left lung base.  May represent    pneumonia.  Consider follow-up to ensure resolution.              Assessment/Plan:   MARIANA is a 57 year old female with a PMHx of Afib with RVR, HFpEF and Asthma who presents to [facility] complaining of progressive shortness of breath.     Dyspnea 2/2 CHF exacerbation vs Asthma - Progressively worsening SOB that started a few days ago. Given 20 mg IV Lasix in ED and got breathing treatment with some improvement.   -  Duonebs PRN  -  Monitor vitals and give O2 when necessary  -  Treat CHF and Asthma per below      HFpEF - Last Echo was in 3/17/2021 showed  normal diastolic dysfunction with EF of 55-60%. Echo on 05/09/18 showed grade 1 diastolic dysfunction. Patient reports drinking more fluids since onset of SOB, worsening her symptoms. Also CXR concerning for Pulmonary Edema. Diuresed well. Repeat Echo showed EF of 50-55% with no mentioned of diastolic function.  -  Lasix 40 mg PO  -  Strict Is and Os  -  Daily Weights       Asthma Exacerbation - Patient is wheezy on examination and has PMHx of asthma. Could be in exacerbation.   -  Budesonide inhaler  -  Levalbuterol PRN      Atrial Fibrillation with RVR - Hx of paroxysmal atrial fib with as needed flecainide 50 mg twice daily. Not currently on A/C. Has a chadsvasc score of 2-3, so likely needs anticoagulation. s/p cardioversion; patient in NSR.  - Eliquis PO 5 mg BID  - Diltiazem 240 mg PO qd  - Flecainide 50 mg PO q12h  - Cardiology on board   - Continuous tele       Acute Cystitis - U/A consistent with UTI (nitrite positive/ small leukocyte esterase/ 10-20 WBC and bacteria 4+)  - Ciprofloxacin   - Urine Culture     6. Microcytic Anemia - Hgb of 11.1 with an MCV of 77.5. Ferritin is 13.8 (low) and RDW is 18.5 (high), waiting on results of other studies but looks like VAN. Code Status: Full Code  FEN: Regular carb controlled diet with low salt  PPX: Protonix;  Eliquis  DISPO: D/C    Víctor Felder MD, PGY-1  01/12/23  9:08 AM    This patient has been staffed and discussed with German Jones MD.

## 2023-01-12 NOTE — PROGRESS NOTES
Electrophysiology - PROGRESS NOTE    Admit Date: 1/9/2023     Chief Complaint: Paroxysmal AF     Interval History:   Patient seen and examined and notes reviewed. Patient is being followed for paroxysmal AF. Patient is well-known to our practice with a history of paroxysmal atrial fibrillation. She had presented with increased shortness of breath and cough. She was negative for the flu, COVID or RSV. She was also noted to be in AF/RVR. She was asymptomatic with her A. fib. She had been on diltiazem 240 mg daily outpatient. She was using Eliquis and flecainide as a pill in the pocket approach. She has not had to use it. She did undergo a SHEY/DCCV to NSR on 1/11/2023. She is now being placed on flecainide and Eliquis continuously. She states that she is feels better in regular rhythm. Her breathing is better. Her BNP was elevated at greater than 1500 when she came in. She has been placed on Lasix 40 mg BID IV. She is -0.8 L. She does take torsemide 20 mg twice a day at home.     In: 500 [P.O.:480; I.V.:20]  Out: 400    Wt Readings from Last 2 Encounters:   01/11/23 (!) 347 lb 3.6 oz (157.5 kg)   01/03/23 (!) 345 lb (156.5 kg)       Data:   Scheduled Meds:   Scheduled Meds:   dilTIAZem  240 mg Oral Daily    flecainide  50 mg Oral 2 times per day    ciprofloxacin  500 mg Oral 2 times per day    furosemide  40 mg IntraVENous BID    sodium chloride flush  5-40 mL IntraVENous 2 times per day    budesonide  0.25 mg Nebulization BID    apixaban  5 mg Oral BID     Continuous Infusions:   sodium chloride       PRN Meds:.diphenhydrAMINE, dilTIAZem, benzonatate, levalbuterol, sodium chloride flush, sodium chloride, ondansetron **OR** ondansetron, polyethylene glycol, acetaminophen **OR** acetaminophen  Continuous Infusions:   sodium chloride         Intake/Output Summary (Last 24 hours) at 1/12/2023 0839  Last data filed at 1/12/2023 0806  Gross per 24 hour Intake 490 ml   Output 400 ml   Net 90 ml       CBC:   Lab Results   Component Value Date/Time    WBC 7.2 01/12/2023 04:38 AM    HGB 12.3 01/12/2023 04:38 AM     01/12/2023 04:38 AM     BMP:  Lab Results   Component Value Date/Time     01/12/2023 04:38 AM    K 4.3 01/12/2023 04:38 AM    CL 98 01/12/2023 04:38 AM    CO2 25 01/12/2023 04:38 AM    BUN 30 01/12/2023 04:38 AM    CREATININE 1.0 01/12/2023 04:38 AM    GLUCOSE 95 01/12/2023 04:38 AM     INR:   Lab Results   Component Value Date/Time    INR 0.91 02/25/2020 07:23 AM    INR 0.95 08/25/2019 12:32 PM    INR 1.07 05/07/2019 07:18 AM        CARDIAC LABS  ENZYMES:No results for input(s): CKMB, CKMBINDEX, TROPONINI in the last 72 hours. Invalid input(s): CKTOTAL;3  FASTING LIPID PANEL:  Lab Results   Component Value Date/Time    HDL 82 05/25/2022 08:01 AM    LDLCALC 96 05/25/2022 08:01 AM    TRIG 81 05/25/2022 08:01 AM    TSH 2.97 05/25/2022 08:01 AM     LIVER PROFILE:  Lab Results   Component Value Date/Time    AST 16 09/28/2022 09:39 AM    AST 14 08/26/2022 11:08 AM    ALT 14 09/28/2022 09:39 AM    ALT 11 08/26/2022 11:08 AM       -----------------------------------------------------------------  Telemetry: Personally reviewed NSR    Objective:   Vitals: /89   Pulse 73   Temp 97.9 °F (36.6 °C) (Oral)   Resp 18   Ht 5' 10\" (1.778 m)   Wt (!) 347 lb 3.6 oz (157.5 kg)   SpO2 93%   BMI 49.82 kg/m²   General appearance: alert, appears stated age and cooperative, No acute distress   Eyes: Conjunctiva and pupils normal and reactive  Skin: Skin color, texture, turgor normal. No rashes or ecchymosis.   Neck: no JVD, supple, symmetrical, trachea midline   Lungs: , no accessory muscle use, no respiratory distress, coarse lung sounds throughout  Heart: RRR  Abdomen: soft, non-tender; bowel sounds normal  Extremities: No edema, DP +  Psychiatric: normal insight and affect    Patient Active Problem List:     Asthma     MDD (major depressive disorder), recurrent severe, without psychosis ([address]     Overdose     Restless leg syndrome     Sliding hiatal hernia     HTN (hypertension)     Atrial flutter (HCC)     SVT (supraventricular tachycardia) (HCC)     Paroxysmal atrial fibrillation with rapid ventricular response (HCC)     Sinus bradycardia     Acute lateral meniscus tear of right knee     Acute medial meniscus tear of right knee     Localized edema     Acute pain of right knee     Chronic diastolic congestive heart failure (HCC)     Primary osteoarthritis of right knee     Contusion of multiple sites of right leg     Gastrocnemius strain, left     Ventral incisional hernia     Recurrent incisional hernia     Pain of upper abdomen     Incisional hernia with obstruction, without gangrene     Rhinitis, chronic     Vocal cord dysfunction     Iron deficiency anemia, unspecified     Idiopathic urticaria     Carpal tunnel syndrome, bilateral     Arthritis of carpometacarpal (CMC) joint of left thumb     Neck pain     Acute alcoholic intoxication ([address]     Marijuana user     Mood disorder ([address]     Acute systolic (congestive) heart failure ([address]        Assessment & Plan:      AF/RVR  SOB/cough  Acute on chronic dCHF      Cardiac HX: Shirin Pyle is a 62 y.o. woman with a h/o anxiety, GERD, fibromyalgia, morbid obesity, ASTRID improved with tonsillectomy, asthma, and pAF/AFL, had been on Rythmol, s/p DCCV to NSR (8/2018), recurrent AF/AFL (9/7/18) w/ spontaneous conversion to NSR, s/p RFA for AFL (5/7/19, Dr. Freedom Erwin), on diltiazem 240 mg QD and Eliquis/flecainide w/ pill in a pocket approach who p/w worsening SOB, cough, noted to be in AF/RVR with rates up to 160 bpm, placed on dilt gtt, s/p SHEY/DCCV to NSR (1/11/2023, Dr. Sarahi Gutierrez), now in NSR.   [id number] 2. TSH 2.46 (2/12/2021).        AF/AFL  - In NSR  - S/p RFA for typical AFL on 5/7/19  - S/p SHEY/DCCV to NSR 1/11/2023  - On cardizem 240 mg QD  - On Eliquis 5 mg BID continuous   - Flecainide 50 mg BID - EKG in 1 week  - Sleep study - patient not interested  - MPI (2017) showed no reversible ischemia, echo in March 2021 showed a normal EF  - Echo - LA 4.7/44.5  - F/u in 4 weeks  - Discuss RFA for AF outpatient  - Ok to be d/c'd per EP  - ECG ordered and results personally reviewed      Chronic diastolic HF  - EF 64-24%  - BNP 1,507 on admission  - On lasix 40 mg IV BID - neg 0.8L  - Lifestyle modification - weight loss, diet, exercise  - On torsemide 20 mg BID at home  - Keep K+ > 4.0 and Mg > 2.0  - Reviewed recent labs       EF of 55-60%  No known HF  No known CAD  Anticoagulation for AF   No Tobacco use. All questions and concerns were addressed to the patient/family. Alternatives to my treatment were discussed. The note was completed using EMR. Every effort was made to ensure accuracy; however, inadvertent computerized transcription errors may be present.        Mary Jo Taylor [address]

## 2023-01-13 ENCOUNTER — CARE COORDINATION (OUTPATIENT)
Dept: CASE MANAGEMENT | Age: 58
End: 2023-01-13

## 2023-01-13 LAB
INR BLD: 1.6 (ref 0.88–1.12)
IRON SATURATION: 8 % (ref 15–50)
IRON: 31 UG/DL (ref 37–145)
TOTAL IRON BINDING CAPACITY: 365 UG/DL (ref 260–445)

## 2023-01-13 NOTE — DISCHARGE SUMMARY
No lab work is necessary    Needs a pap and mammo    Could get the mammo now meet sousa call [phone] to set that up   INTERNAL MEDICINE DEPARTMENT AT [address] [place] [address]  DISCHARGE SUMMARY    Patient ID: Yasmeen Rankin                                             Discharge Date: 1/13/2023   Patient's PCP: Grecia Delgado MD                                          Discharge Physician: Cally Earl MD MD  Admit Date: 1/9/2023   Admitting Physician: Karl Gallegos MD    PROBLEMS DURING HOSPITALIZATION:  Present on Admission:   Acute diastolic (congestive) heart failure (HCC)   Paroxysmal atrial fibrillation with rapid ventricular response ([address]      DISCHARGE DIAGNOSES:  1) Acute Diastolic Heart Failure  2) Paroxysmal Atrial Fibrillation with RVR  3) UTI  4) Asthma exacerbation      HPI: This patient presented to the ED complaining of several day history of shortness of breath. Her BNP was elevated and CXR showed cephalization. She also reported a cough with sputum production. Moreover, she was found to be in atrial fibrillation upon arrival. She has a history of paroxysmal atrial fibrillation and takes home flecainide to help manage these episodes. Furthermore, on exam she had diffuse wheezes and has a past medical history of asthma. Lastly, she had urinalysis indicative of a UTI. For her UTI, we started her on ciprofloxacin because she is allergic to cephalosporins. For her asthma, we treated her with daily nebulizer solutions and gave her Pulmicort. For her heart failure exacerbation, we kept her on a strict sodium diet, monitored her ins and outs and treated her with Lasix 40 mg IV. Lastly, for her atrial fibrillation, we rate controlled her with a diltiazem drip and gave her eliquis for anti-coagulation. Once she was rate controlled, we switched her diltiazem drip to PO diltiazem and gave her her home flecainide. She was then cardioverted back to normal sinus rhythm. She was discharged with follow up with EP to have her arrhythmia ablated.       The following issues were addressed during hospitalization:  1) Acute Diastolic Heart Failure - Treated her with lasix until she was deemed euvolemic  2) Paroxysmal Atrial Fibrillation with RVR - Rate controlled with diltiazem and then cardioverted back to normal sinus rhythm. 3) UTI - We treated her UTI with ciprofloxacin  4) Asthma exacerbation - We treated her with 4 times daily nebulizers. Physical Exam:  Physical Exam  Cardiovascular:      Rate and Rhythm: Normal rate and regular rhythm. Pulses: Normal pulses. Heart sounds: Normal heart sounds. Pulmonary:      Effort: Pulmonary effort is normal.      Breath sounds: Normal breath sounds. Abdominal:      General: Abdomen is flat. Palpations: Abdomen is soft. Musculoskeletal:         General: Swelling (lower extremity swelling) present. Neurological:      Mental Status: She is alert and oriented to person, place, and time.         Consults: cardiology  Significant Diagnostic Studies:  ECHO of heart  Treatments: diltiazem; lasix; duonebs; pulmicort; ciprofloxacin; apixaban  Disposition: home  Discharged Condition: Stable  Follow Up: Primary Care Physician in one week    DISCHARGE MEDICATION:       Medication List        START taking these medications      apixaban 5 MG Tabs tablet  Commonly known as: ELIQUIS  Take 1 tablet by mouth 2 times daily     cefdinir 300 MG capsule  Commonly known as: OMNICEF  Take 1 capsule by mouth 2 times daily for 3 days     dilTIAZem 240 MG extended release capsule  Commonly known as: CARDIZEM CD  Take 1 capsule by mouth daily            CHANGE how you take these medications      flecainide 50 MG tablet  Commonly known as: TAMBOCOR  Take 1 tablet by mouth every 12 hours  What changed:   when to take this  reasons to take this     furosemide 40 MG tablet  Commonly known as: LASIX  Take 1 tablet by mouth daily  What changed:   medication strength  how much to take            CONTINUE taking these medications      acetaminophen-codeine 300-30 MG per tablet  Commonly known as: TYLENOL/CODEINE #3  Take 1 tablet by mouth in the morning and at bedtime for 30 days. Intended supply: 3 days.  Take lowest dose possible to manage pain Max Daily Amount: 2 tablets     albuterol sulfate  (90 Base) MCG/ACT inhaler  Commonly known as: PROVENTIL;VENTOLIN;PROAIR  INHALE 2 PUFFS BY MOUTH EVERY 4 HOURS AS NEEDED FOR WHEEZING OR SHORTNESS OF BREATH(SPACE OUT TO EVERY 6 HOURS AS SYMPTOMS IMPROVE)     cyclobenzaprine 10 MG tablet  Commonly known as: FLEXERIL  Take 1 tablet by mouth 3 times daily as needed for Muscle spasms     Dulera 200-5 MCG/ACT inhaler  Generic drug: mometasone-formoterol  INHALE TWO PUFFS BY MOUTH TWICE A DAY     EPINEPHrine 0.3 MG/0.3ML Soaj injection  Commonly known as: EPIPEN     ipratropium-albuterol 0.5-2.5 (3) MG/3ML Soln nebulizer solution  Commonly known as: DUONEB     Nebulizer/Tubing/Mouthpiece Kit  Continuous     omalizumab 150 MG/ML Sosy injection  Commonly known as: XOLAIR     rOPINIRole 1 MG tablet  Commonly known as: REQUIP            STOP taking these medications      HYDROcodone-acetaminophen 7.5-325 MG per tablet  Commonly known as: Norco               Where to Get Your Medications        These medications were sent to [address], [address] [place] [address]      Phone: [phone]   apixaban 5 MG Tabs tablet  cefdinir 300 MG capsule  dilTIAZem 240 MG extended release capsule  flecainide 50 MG tablet  furosemide 40 MG tablet          Activity: activity as tolerated  Diet: regular diet  Wound Care: none needed    Time Spent on discharge is more than 30 minutes    Signed:  Guille Benoit MD, PGY-1  1/13/2023

## 2023-01-13 NOTE — CARE COORDINATION
[facility] Care Transitions Initial Follow Up Call    Call within 2 business days of discharge:  yes     Care Transition Nurse contacted the patient by telephone to perform post hospital discharge assessment. Verified name with patient as identifier. Provided introduction to self, and explanation of the Care Transition Nurse role. Patient: Salvador Reddy Patient :  1965  MRN: [id number]  Reason for Admission:  HF  Discharge Date:    RARS: 9      Last Discharge [address]       Date Complaint Diagnosis Description Type Department Provider    23 Shortness of Breath Moderate persistent asthma with exacerbation . .. ED to Hosp-Admission (Discharged) (ADMITTED) [facility] 4 PCU Steve Dao MD; Alia Pop. .. Challenges to be reviewed by the provider   Additional needs identified to be addressed with provider:   HFU needed    AMB CC Provider Discharge Needs:  none          Method of communication with provider : chart routing    SUMMARY  CTC spoke to the Pt who reports the following:    -\"Feel 100% better than when I went into the hospital\". -C/O continued cough pt had prior to hospital admission on . Voice hoarseness d/t excessive coughing today. Advised to try warm liquids, salt water gargles, and resting her voice to sooth cough and throat. Denies fever, chills nausea, vomiting, and sinus / chest congestion. -C/O pain of left arm feeling \"irritated\". States  hit a nerve while attempting to draw blood on Tuesday / \"I believe\". CTN provided number to Patient Relations.    -Hx of HF and does not own a scale. States she is \"looking for a good one\" and plans to purchase soon. Denies LE edema and states the \"look great\". Denies SOB / DB, chest congestion, and SOB / DB with lying flat.  [facility] provided Pt education on when and how to obtain daily weight, importance of daily weight, tracking information to share with physicians, when to call physician regarding weight changes, and importance of following fluid / sodium restrictions set by physician. Denies chest pain, feeling lightheaded, dizziness, and heart palpitations / flutters, fatigue, and weakness.   -Denies issues with fluid intake (advised to follow fluid restriction set by doctor d/t hx of HF), appetite, urination, and LBM was today. -Declined to review all meds with CTC but confirmed she is aware of changes.    -Declined assistance with scheduling HFU with PCP and Dr Laurence Lynn. -HFU with pulmonology 1/23. -HFU with cardiology 1/19 and 1/20.  -Confirms she is aware to complete EKG one week before appt with electrophysiologist, Dr Laurence Lynn, in one month. Pt will take all meds as prescribed and schedule / keep doctor's appt. CTC provided education on s/s that require medical attention and when to seek medical attention. CTC advised Pt of use urgent care or physician's 24 hr access line if assistance is needed after hours or on the weekend. Patient denies any needs or concerns and is agreeable with additional calls. Was this a readmission? No      Care Transition Nurse reviewed discharge instructions, medical action plan and red flags with patient who verbalized understanding. Patient given an opportunity to ask questions and does not have any further questions or concerns at this time. Were discharge instructions available to patient? Yes     Reviewed appropriate site of care based on symptoms and resources available to patient including:     PCP  Specialist  After hours contact number-  Benefits related nurse triage line  Urgent care clinics  [facility] 24/7  When to call [address] [place] [address] for reactivation or family member permission [phone]  Condition related references. The patient agrees to contact the PCP office for questions related to their healthcare. The patient agrees to contact the PCP office for questions related to their healthcare.     Advance Care Planning: Does patient have an Advance Directive: patient declined education    Patient verbalizes understanding of administration of home medications. Medications reviewed, 1111F entered:  no    Was patient discharged with a pulse oximeter? No    Non-face-to-face services provided:  Obtained and reviewed discharge summary and/or continuity of care documents  Education of patient/family/caregiver/guardian to support self-management-  Assessment and support for treatment adherence and medication management-    Offered patient enrollment in the Remote Patient Monitoring (RPM) program for in-home monitoring: Patient is not eligible for RPM program - insurance plan      Care Transitions 24 Hour Call    Do you have a copy of your discharge instructions?: Yes  Do you have all of your prescriptions and are they filled?: Yes  Have you been contacted by a [facility] Pharmacist?: No  Have you scheduled your follow up appointment?: Yes  Patient DME: Sree serrano  Do you have support at home?: Partner/Spouse/SO  Are you an active caregiver in your home?: No  Care Transitions Interventions            Future Appointments   Date Time Provider Wil Chatterjee   1/19/2023  1:30 PM STEPHANIE Babin [facility] Card [facility]   1/20/2023 10:00 AM SCHEDULE, [address] [employer] Card [facility]   1/23/2023  2:00 PM MD Alex Gary P/CC [facility]   2/6/2023 11:00 AM STEPHANIE oCttrell [facility] Card [facility]   2/8/2023  2:00 PM ANN-MARIE Jimenes FM PSY [facility]   2/9/2023  1:30 PM STEPHANIE Cottrell [facility] Card [facility]         CTN provided contact information for future needs. Plan for f/u call in 3-5 days based on severity of symptoms and risk factors. Plan for next call:   symptom management  self-management  follow up appointment  medication management  Purchase scale. Adelfo Elias, BSN, RN  Care Transition Coordinator  Contact Number:  [phone]

## 2023-01-15 ENCOUNTER — FOLLOWUP TELEPHONE ENCOUNTER (OUTPATIENT)
Dept: INPATIENT UNIT | Age: 58
End: 2023-01-15

## 2023-01-17 ENCOUNTER — CARE COORDINATION (OUTPATIENT)
Dept: CASE MANAGEMENT | Age: 58
End: 2023-01-17

## 2023-01-18 NOTE — CARE COORDINATION
[facility] Care Transitions Follow Up Call    Care Transition Nurse contacted the patient by telephone to perform post hospital discharge assessment. Verified name and  with patient as identifiers. Provided introduction to self, and explanation of the Care Transition Nurse role. Patient: Mario Branch Patient :  1965  MRN: [id number]  Reason for Admission:  HF  Discharge Date:    RARS: 9    Challenges to be reviewed by the provider   Additional needs identified to be addressed with provider:     no    AMB CC Provider Discharge Needs:  none         Method of communication with provider : none      SUMMARY  CTN spoke to the Pt who reports the following:    -Productive cough with white-tan sputum continues but is improving.  -Has history of HF and still does not have a scale and states she will purchase as soon as she get money to. Denies SOB, LE, and SOB / DB with lying flat. -C/o continued right arm pain d/t lab stick per pt. Right arm is no longer bruised but is still painful to the touch and with use of arm. Right wrist is swollen and tender. Spoke with Patient Relations earlier today and waiting for call back. Pt confirms she is elevating right arm when possible. -Denies fever, chills, nausea, vomiting, sinus / chest congestion, chest pain, feeling lightheaded, dizziness, and heart palpitations / flutters, weakness, and fatigue.  -Denies issues with fluid intake (advised to follow fluid restriction recommended by physician d/t hx of HF), appetite, urination, and LBM was today. -Appt for EKG  before f/u with Dr Tyler Cedillo / electrophysiologist on . -Declined HFU with PCP. -RPM not eligible / insurance plan. Pt confirms they have all meds and taking as prescribed. No med changes since last conversation with CTN. From CDC: Are you at higher risk for severe illness? Wash your hands often. Avoid close contact (6 feet, which is about two arm lengths) w/people who are sick.    Put distance between yourself and other people if COVID-19 is spreading in your community. Clean and disinfect frequently touched surfaces. Avoid all cruise travel and non-essential air travel. Call your healthcare professional if you have concerns about COVID-19 and your underlying condition or if you are sick. Pt will take all meds as prescribed and schedule / keep doctor's appt. CTC provided education on s/s that require medical attention and when to seek medical attention. CTC advised Pt of use urgent care or physician's 24 hr access line if assistance is needed after hours or on the weekend. Patient denies any needs or concerns and is agreeable with additional calls. Addressed changes since last contact:  medications- no changes    Discussed follow-up appointments. If no appointment was previously scheduled, appointment scheduling offered: Yes     Is follow up appointment scheduled within 7 days of discharge No    Care Transitions 24 Hour Call    Do you have any ongoing symptoms?: Yes  Patient-reported symptoms: Cough, Pain  Do you have all of your prescriptions and are they filled?: Yes  Were you discharged with any Home Care or Post Acute Services: No  Patient DME: Christiano serrano  Do you have support at home?: Partner/Spouse/SO  Are you an active caregiver in your home?: No  Care Transitions Interventions         Non-[facility] follow up appointment(s):    Care Transition Nurse reviewed discharge instructions, medical action plan and red flags with patient and discussed any barriers to care and/or understanding of plan of care after discharge. Discussed appropriate site of care based on symptoms and resources available to patient including:    Specialist  After hours contact number  Benefits related nurse triage line  Urgent care clinics  [facility] 24/7  When to call [address] [place] [address] for reactivation or family member permission [phone].      The patient agrees to contact the PCP office for questions related to their healthcare. Patients top risk factors for readmission:   functional cognitive ability  functional physical ability  falls  lack of knowledge about disease  medical condition  medication management  multiple health system providers    Interventions to address risk factors:  Education of patient/family/caregiver/guardian to support self-management  Assessment and support for treatment adherence and medication management     Offered patient enrollment in the Remote Patient Monitoring (RPM) program for in-home monitoring: Patient is not eligible for RPM program    Care Transitions Subsequent and Final Call    Subsequent and Final Calls  Do you have any ongoing symptoms?: Yes  Patient-reported symptoms: Cough, Pain  Have your medications changed?: No  Do you have any questions related to your medications?: No  Do you currently have any active services?: No  Do you have any needs or concerns that I can assist you with?: No  Care Transitions Interventions  Other Interventions:              Care Transition Nurse provided contact information for future needs. Plan for f/u call in 7-10 days based on severity of symptoms and risk factors. Plan for next call:   symptom management  self-management  follow up appointment  medication management      Future Appointments   Date Time Provider Wil Chatterjee   1/19/2023  1:30 PM STEPHANIE Crabtree - CNP [place] Card [facility]   1/20/2023 11:30 AM Nicole Schneider MD [address]  [place] - DYD   1/23/2023  2:00 PM MD Alex Frank P/CC [facility]   2/6/2023 11:00 AM STEPHANIE Shen [facility] Card [facility]   2/8/2023  2:00 PM Truman Horn PSYD KENWD  PSY [facility]   2/8/2023  3:00 PM Karan Peraza MD [facility] Card [facility]       Britney Solis.  REGULO Hernandez, RN  Care Transition Coordinator  Contact Number:  [phone]

## 2023-01-19 ENCOUNTER — OFFICE VISIT (OUTPATIENT)
Dept: CARDIOLOGY CLINIC | Age: 58
End: 2023-01-19
Payer: MEDICAID

## 2023-01-19 VITALS
HEART RATE: 101 BPM | HEIGHT: 70 IN | DIASTOLIC BLOOD PRESSURE: 78 MMHG | WEIGHT: 293 LBS | BODY MASS INDEX: 41.95 KG/M2 | SYSTOLIC BLOOD PRESSURE: 102 MMHG

## 2023-01-19 DIAGNOSIS — I10 PRIMARY HYPERTENSION: ICD-10-CM

## 2023-01-19 DIAGNOSIS — Z79.899 ENCOUNTER FOR MONITORING FLECAINIDE THERAPY: Primary | ICD-10-CM

## 2023-01-19 DIAGNOSIS — Z51.81 ENCOUNTER FOR MONITORING FLECAINIDE THERAPY: Primary | ICD-10-CM

## 2023-01-19 DIAGNOSIS — I50.31 ACUTE HEART FAILURE WITH PRESERVED EJECTION FRACTION (HFPEF) (HCC): ICD-10-CM

## 2023-01-19 DIAGNOSIS — I48.0 PAROXYSMAL ATRIAL FIBRILLATION (HCC): ICD-10-CM

## 2023-01-19 PROCEDURE — G8417 CALC BMI ABV UP PARAM F/U: HCPCS | Performed by: NURSE PRACTITIONER

## 2023-01-19 PROCEDURE — 3074F SYST BP LT 130 MM HG: CPT | Performed by: NURSE PRACTITIONER

## 2023-01-19 PROCEDURE — 3078F DIAST BP <80 MM HG: CPT | Performed by: NURSE PRACTITIONER

## 2023-01-19 PROCEDURE — 1036F TOBACCO NON-USER: CPT | Performed by: NURSE PRACTITIONER

## 2023-01-19 PROCEDURE — 3017F COLORECTAL CA SCREEN DOC REV: CPT | Performed by: NURSE PRACTITIONER

## 2023-01-19 PROCEDURE — G8427 DOCREV CUR MEDS BY ELIG CLIN: HCPCS | Performed by: NURSE PRACTITIONER

## 2023-01-19 PROCEDURE — 99214 OFFICE O/P EST MOD 30 MIN: CPT | Performed by: NURSE PRACTITIONER

## 2023-01-19 PROCEDURE — 93000 ELECTROCARDIOGRAM COMPLETE: CPT | Performed by: NURSE PRACTITIONER

## 2023-01-19 PROCEDURE — G8484 FLU IMMUNIZE NO ADMIN: HCPCS | Performed by: NURSE PRACTITIONER

## 2023-01-19 PROCEDURE — 1111F DSCHRG MED/CURRENT MED MERGE: CPT | Performed by: NURSE PRACTITIONER

## 2023-01-19 RX ORDER — DIGOXIN 250 MCG
125 TABLET ORAL DAILY
Qty: 30 TABLET | Refills: 4 | Status: SHIPPED | OUTPATIENT
Start: 2023-01-19

## 2023-01-19 NOTE — PROGRESS NOTES
lasCC/HPI:    62 y.o. patient of Dr Kamran Ramirez with LE edema,  pAF, hx SVT ablation who presented for follow up  SHEY/DCCV to NSR. ECG showed A Fib RVR     She c/o SOB, weight gain, LE edema, and abdominal bloating. She has occasional LH/dizziness. She had a mechanical fall but denies syncope. She denies cp, palpitations, n/v/d, fever or Gi/ bleeding.        Past Medical History:   Diagnosis Date    Acute lateral meniscus tear of right knee 02/2019    Acute medial meniscus tear of right knee 02/2019    Anesthesia complication     woke up during one surgery    Anxiety     Arthritis     Asthma     Atrial fibrillation ([address]     new dx 4 weeks ago, first of  Sept 2017    CHF (congestive heart failure) (HCC)     Edema     Fibromyalgia     GERD (gastroesophageal reflux disease)     reflux    Hiatal hernia     History of blood transfusion     Hx of major depression     Idiopathic urticaria 10/29/2020    SVT (supraventricular tachycardia) (HCC)     hx svt     Past Surgical History:   Procedure Laterality Date    ABLATION OF DYSRHYTHMIC FOCUS  04/2019    afib    BLADDER SUSPENSION  2004    CERVICAL FUSION N/A 9/30/2022    ANTERIOR CERVICAL DISCECTOMY AND FUSION CERVICAL 5 - CERVICAL 6, CERVICAL 6 - CERVICAL 7 WITH DEPUY ALLOGRAFT, PLATE AND SCREWS AND EVOKES               **LATEX SENSITIVE** performed by Kate Cruz MD at [address] N/A 8/4/2022    COLONOSCOPY performed by Olga Faustin at [address] [facility] ([facility])  2004    KNEE ARTHROSCOPY Right 7/11/2019    VIDEO ARTHROSCOPY RIGHT KNEE, PARTIAL MEDIAL AND LATERAL MENISCECTOMY,, MEDIAL MICRO FRACTURE CHONDROPLASTY performed by Roxi Sims MD at [address] [facility] [address] ENDOSCOPY N/A 8/4/2022    EGD BIOPSY performed by Olga Faustin at [address] [facility] UVULOPALATOPHARYGOPLASTY      VENTRAL HERNIA REPAIR N/A 8/26/2019    DIAGNOSTIC LAPAROSCOPY, LAPAROSCOPIC LYSIS OF ADHESIONS, LAPAROSCOPIC REDUCTION OF RECURRENT INCISIONAL HERNIA WITH MESH performed by Jose Maynard MD at [address] OR     Family History   Problem Relation Age of Onset    Cancer Mother         lung    Arthritis Mother     Cirrhosis Father     Asthma Maternal Aunt      Social History     Tobacco Use    Smoking status: Never    Smokeless tobacco: Never   Vaping Use    Vaping Use: Never used   Substance Use Topics    Alcohol use: Yes     Comment: 0-1 drinks per month    Drug use: Yes     Types: Marijuana Haider Gigi), Other-see comments     Comment: Medical Marijuana use     Allergies:Latex, Aspirin, Nsaids, Penicillins, Sulfa antibiotics, Meperidine, and Ranitidine hcl    Review of Systems  General: + Fatigue and chronic pain   HEENT: No blurry or decreased vision. No changes in hearing, nasal discharge or sore throat. Cardiovascular:  See HPI. Respiratory: No cough, hemoptysis, or wheezing. + orthopnea   Gastrointestinal:  No abdominal pain, melana, constipation, diarrhea, or history of GI ulcers. +  Genito-Urinary: No dysuria or hematuria. No urgency or polyuria. Musculoskeletal:  + LE edema   Neurological:  No dizziness, headaches, numbness/tingling, speech problems or weakness. Psychological:  No anxiety or depression. Hematological and Lymphatic: No abnormal bleeding or bruising, blood clots, jaundice or swollen lymph nodes. Endocrine:   No malaise/lethargy, palpitations, polydipsia/polyuria, temperature intolerance or unexpected weight changes  Skin:  No rashes or non-healing ulcers. Physical Exam:  Ht 5' 10\" (1.778 m)   Wt (!) 340 lb (154.2 kg)   BMI 48.78 kg/m²    General (appearance):  No acute distress  Eyes: anicteric   Neck: soft, No JVD  Ears/Nose/Mouth/Thorat: No cyanosis  CV: Irreg, tachy  Respiratory:  Clear, normal effort  GI: soft, non-tender, non-distended  Skin: Warm, dry.  No gerald  Neuro/Psych: Alert and oriented x 3. Appropriate behavior  Ext:  No c/c. + LE edema   Pulses:  2+ radial     Weight  Wt Readings from Last 3 Encounters:   23 (!) 340 lb (154.2 kg)   23 (!) 330 lb 4 oz (149.8 kg)   23 (!) 345 lb (156.5 kg)          CBC:   Lab Results   Component Value Date    WBC 7.2 2023    HGB 12.3 2023    HCT 39.2 2023    MCV 76.9 (L) 2023     2023     BMP:  Lab Results   Component Value Date    CREATININE 1.0 2023    BUN 30 (H) 2023     2023    K 4.3 2023    CL 98 (L) 2023    CO2 25 2023     Mag:   Lab Results   Component Value Date/Time    MG 2.40 2023 04:38 AM     LIVER PROFILE:   Lab Results   Component Value Date    ALT 14 2022    AST 16 2022    ALKPHOS 109 2022    BILITOT 0.5 2022     PT/INR:   Lab Results   Component Value Date    INR 1.60 (H) 2023    INR 0.91 2020    INR 0.95 2019    PROTIME 10.6 2020    PROTIME 10.8 2019    PROTIME 12.2 2019     Pro-BNP   Lab Results   Component Value Date/Time    PROBNP 671 01/10/2023 03:00 AM    PROBNP 1,507 2023 01:56 AM    PROBNP 183 2020 04:07 PM     LIPIDS:  No components found for: CHLPL  Lab Results   Component Value Date    TRIG 81 2022    TRIG 104 2019    TRIG 61 2017     Lab Results   Component Value Date    HDL 82 (H) 2022    HDL 87 (H) 2019    HDL 99 (H) 2017     Lab Results   Component Value Date    LDLCALC 96 2022    LDLCALC 80 2019    LDLCALC 71 2017     Lab Results   Component Value Date    LABVLDL 16 2022    LABVLDL 21 2019    LABVLDL 12 2017     TSH:  Lab Results   Component Value Date    TSH 2.97 2022       IMAGIN2023 ECg: A fib RVR    3/2021 Echo   Normal left ventricle size and systolic function with an estimated ejection   fraction of 55-60%.    Mild concentric left ventricular hypertrophy. No regional wall motion abnormalities are seen. Normal diastolic function. Mild mitral and tricuspid regurgitation. 2/8/2021 ECG: A fib HR 99    2018 Echo:   Left ventricular cavity size is normal. There is mild concentric left   ventricular hypertrophy. Left ventricular function is low normal with ejection fraction estimated at 50%. No diagnostic regional wall motion abnormalities. Diastolic filling parameters suggest grade I diastolic dysfunction. Mild mitral regurgitation is present. Bi-atrial enlargement. Estimated pulmonary artery systolic pressure is at 43 mmHg assuming a right atrial pressure of 3 mmHg. 2017 Nuc stress:     1. Left ventricular ejection fraction of 57 %. 2. SPECT Myocardial Perfusion Imaging results are considered   negative for acute ischemia. Medications:   Current Outpatient Medications   Medication Sig Dispense Refill    flecainide (TAMBOCOR) 50 MG tablet Take 1 tablet by mouth every 12 hours 60 tablet 3    apixaban (ELIQUIS) 5 MG TABS tablet Take 1 tablet by mouth 2 times daily 60 tablet 2    dilTIAZem (CARDIZEM CD) 240 MG extended release capsule Take 1 capsule by mouth daily 30 capsule 3    furosemide (LASIX) 40 MG tablet Take 1 tablet by mouth daily 60 tablet 3    rOPINIRole (REQUIP) 1 MG tablet Take 1.5 mg by mouth in the morning and at bedtime      ipratropium-albuterol (DUONEB) 0.5-2.5 (3) MG/3ML SOLN nebulizer solution Inhale 1 vial into the lungs every 4 hours as needed for Shortness of Breath      omalizumab (XOLAIR) 150 MG/ML SOSY injection Inject 300 mg into the skin every 28 days      acetaminophen-codeine (TYLENOL/CODEINE #3) 300-30 MG per tablet Take 1 tablet by mouth in the morning and at bedtime for 30 days. Intended supply: 3 days.  Take lowest dose possible to manage pain Max Daily Amount: 2 tablets 60 tablet 0    cyclobenzaprine (FLEXERIL) 10 MG tablet Take 1 tablet by mouth 3 times daily as needed for Muscle spasms 60 tablet 1    Respiratory Therapy Supplies (NEBULIZER/TUBING/MOUTHPIECE) KIT Continuous 1 kit 0    albuterol sulfate HFA (PROVENTIL;VENTOLIN;PROAIR) 108 (90 Base) MCG/ACT inhaler INHALE 2 PUFFS BY MOUTH EVERY 4 HOURS AS NEEDED FOR WHEEZING OR SHORTNESS OF BREATH(SPACE OUT TO EVERY 6 HOURS AS SYMPTOMS IMPROVE) 8.5 g 11    EPINEPHrine (EPIPEN) 0.3 MG/0.3ML SOAJ injection INJECT INTO UPPER THIGH AS DIRECTED AS NEEDED FOR ANAPHYLAXIS      DULERA 200-5 MCG/ACT inhaler INHALE TWO PUFFS BY MOUTH TWICE A DAY 1 Inhaler 11     No current facility-administered medications for this visit. Assessment:  1. Encounter for monitoring flecainide therapy    2. Acute heart failure with preserved ejection fraction (HFpEF) ([facility])    3. Paroxysmal atrial fibrillation ([facility]    4.  Primary hypertension      Plan:  Acute HFpEF   + weight gain, edema and sob   Increase lasix to 40 mg po bid x 3 days then 40 mg po daily   Low salt diet, daily weights,    Discussed s/s decompensated HF    Hx AF: acute   AF RVR   Digoxin 250 mcg po daily   Flecainide 50 mg po bid   Diltiazem 240 mg po daily     HTN: stable    /78   Diltiazem     Follow up with EP in 2-3 weeks     Discussed plan of care with Sherry Prajapati

## 2023-01-20 ENCOUNTER — OFFICE VISIT (OUTPATIENT)
Dept: FAMILY MEDICINE CLINIC | Age: 58
End: 2023-01-20

## 2023-01-20 ENCOUNTER — HOSPITAL ENCOUNTER (OUTPATIENT)
Dept: GENERAL RADIOLOGY | Age: 58
Discharge: HOME | End: 2023-01-20
Payer: MEDICAID

## 2023-01-20 VITALS
WEIGHT: 293 LBS | HEIGHT: 70 IN | TEMPERATURE: 97.2 F | OXYGEN SATURATION: 91 % | DIASTOLIC BLOOD PRESSURE: 84 MMHG | HEART RATE: 87 BPM | SYSTOLIC BLOOD PRESSURE: 124 MMHG | BODY MASS INDEX: 41.95 KG/M2

## 2023-01-20 DIAGNOSIS — J45.41 MODERATE PERSISTENT ASTHMA WITH EXACERBATION: ICD-10-CM

## 2023-01-20 DIAGNOSIS — I48.0 PAROXYSMAL ATRIAL FIBRILLATION WITH RVR (HCC): ICD-10-CM

## 2023-01-20 DIAGNOSIS — S99.911A INJURY OF RIGHT ANKLE, INITIAL ENCOUNTER: ICD-10-CM

## 2023-01-20 DIAGNOSIS — I50.31 ACUTE DIASTOLIC HEART FAILURE (HCC): Primary | ICD-10-CM

## 2023-01-20 DIAGNOSIS — M25.571 ACUTE RIGHT ANKLE PAIN: ICD-10-CM

## 2023-01-20 DIAGNOSIS — Z09 HOSPITAL DISCHARGE FOLLOW-UP: ICD-10-CM

## 2023-01-20 PROCEDURE — 73610 X-RAY EXAM OF ANKLE: CPT

## 2023-01-20 RX ORDER — LEVALBUTEROL TARTRATE 45 UG/1
1 AEROSOL, METERED ORAL EVERY 4 HOURS PRN
Qty: 1 EACH | Refills: 3 | Status: SHIPPED | OUTPATIENT
Start: 2023-01-20 | End: 2024-01-20

## 2023-01-20 ASSESSMENT — PATIENT HEALTH QUESTIONNAIRE - PHQ9: DEPRESSION UNABLE TO ASSESS: PT REFUSES

## 2023-01-20 NOTE — PROGRESS NOTES
Post-Discharge Transitional Care  Follow Up      Clay Brewer   YOB: 1965    Date of Office Visit:  1/20/2023  Date of Hospital Admission: 1/9/23  Date of Hospital Discharge: 1/12/23  Risk of hospital readmission (high >=14%. Medium >=10%) :Readmission Risk Score: 9.3      Care management risk score Rising risk (score 2-5) and Complex Care (Scores >=6): No Risk Score On File     Non face to face  following discharge, date last encounter closed (first attempt may have been earlier): *No documented post hospital discharge outreach found in the last 14 days    Call initiated 2 business days of discharge: *No response recorded in the last 14 days    ASSESSMENT/PLAN:   Acute diastolic heart failure (HCC)  Paroxysmal atrial fibrillation with RVR (HCC)  Moderate persistent asthma with exacerbation  Acute right ankle pain  -     XR ANKLE RIGHT (MIN 3 VIEWS); Future  Injury of right ankle, initial encounter  -     XR ANKLE RIGHT (MIN 3 VIEWS); Future  Hospital discharge follow-up  -     MA DISCHARGE MEDS RECONCILED W/ CURRENT OUTPATIENT MED LIST      Medical Decision Making: moderate complexity  Return if symptoms worsen or fail to improve. On this date 1/20/2023 I have spent 45 minutes reviewing previous notes, test results and face to face with the patient discussing the diagnosis and importance of compliance with the treatment plan as well as documenting on the day of the visit. Subjective:   HPI:  Follow up of Hospital problems/diagnosis(es): All problems are currently stable except for recent injury to her right ankle by twisting it. She had some bruising 2 days ago but it is improving however the pain still persist.  No numbness tingling sensation or weakness of right foot. No worsening swelling at this time. She has been icing it and elevation and resting it. Recommended continuing ice as well as Ace bandage as well. Do an x-ray for further evaluation.       Inpatient course: Discharge summary reviewed- see chart. Interval history/Current status: Her symptoms are currently stable. No worsening cough, chest pain, palpitations, shortness of breath or any wheezing any orthopnea or PND at this time. Patient is still in atrial fibrillation however rate controlled. She has had her appointment with her cardiologist yesterday. She is also meeting with electrophysiologist for possible ablation next month. She is also has an appointment scheduled next Monday with her pulmonologist.  Patient felt like she felt a much better relief with the Xopenex then with the albuterol. We will send a prescription for the same at this time. Recommended patient to discuss this with the pulmonologist as well.       Patient Active Problem List   Diagnosis    Asthma    MDD (major depressive disorder), recurrent severe, without psychosis ([address]    Overdose    Restless leg syndrome    Sliding hiatal hernia    HTN (hypertension)    Atrial flutter (HCC)    SVT (supraventricular tachycardia) (HCC)    Paroxysmal atrial fibrillation with rapid ventricular response (HCC)    Sinus bradycardia    Acute lateral meniscus tear of right knee    Acute medial meniscus tear of right knee    Localized edema    Acute pain of right knee    Chronic diastolic congestive heart failure (HCC)    Primary osteoarthritis of right knee    Contusion of multiple sites of right leg    Gastrocnemius strain, left    Ventral incisional hernia    Recurrent incisional hernia    Pain of upper abdomen    Incisional hernia with obstruction, without gangrene    Rhinitis, chronic    Vocal cord dysfunction    Iron deficiency anemia, unspecified    Idiopathic urticaria    Carpal tunnel syndrome, bilateral    Arthritis of carpometacarpal (CMC) joint of left thumb    Neck pain    Acute alcoholic intoxication ([address]    Marijuana user    Mood disorder ([address]    Acute systolic (congestive) heart failure (HCC)       Medications listed as ordered at the time of discharge from hospital     Medication List            Accurate as of January 20, 2023  1:27 PM. If you have any questions, ask your nurse or doctor. CONTINUE taking these medications      acetaminophen-codeine 300-30 MG per tablet  Commonly known as: TYLENOL/CODEINE #3  Take 1 tablet by mouth in the morning and at bedtime for 30 days. Intended supply: 3 days.  Take lowest dose possible to manage pain Max Daily Amount: 2 tablets     albuterol sulfate  (90 Base) MCG/ACT inhaler  Commonly known as: PROVENTIL;VENTOLIN;PROAIR  INHALE 2 PUFFS BY MOUTH EVERY 4 HOURS AS NEEDED FOR WHEEZING OR SHORTNESS OF BREATH(SPACE OUT TO EVERY 6 HOURS AS SYMPTOMS IMPROVE)     apixaban 5 MG Tabs tablet  Commonly known as: ELIQUIS  Take 1 tablet by mouth 2 times daily     cyclobenzaprine 10 MG tablet  Commonly known as: FLEXERIL  Take 1 tablet by mouth 3 times daily as needed for Muscle spasms     digoxin 250 MCG tablet  Commonly known as: LANOXIN  Take 0.5 tablets by mouth daily     dilTIAZem 240 MG extended release capsule  Commonly known as: CARDIZEM CD  Take 1 capsule by mouth daily     Dulera 200-5 MCG/ACT inhaler  Generic drug: mometasone-formoterol  INHALE TWO PUFFS BY MOUTH TWICE A DAY     EPINEPHrine 0.3 MG/0.3ML Soaj injection  Commonly known as: EPIPEN     flecainide 50 MG tablet  Commonly known as: TAMBOCOR  Take 1 tablet by mouth every 12 hours     furosemide 40 MG tablet  Commonly known as: LASIX  Take 1 tablet by mouth daily     ipratropium-albuterol 0.5-2.5 (3) MG/3ML Soln nebulizer solution  Commonly known as: DUONEB     Nebulizer/Tubing/Mouthpiece Kit  Continuous     omalizumab 150 MG/ML Sosy injection  Commonly known as: XOLAIR     rOPINIRole 1 MG tablet  Commonly known as: REQUIP                Medications marked \"taking\" at this time  Outpatient Medications Marked as Taking for the 1/20/23 encounter (Office Visit) with Juan David Rodriguez MD   Medication Sig Dispense Refill    digoxin (LANOXIN) 250 MCG tablet Take 0.5 tablets by mouth daily 30 tablet 4    flecainide (TAMBOCOR) 50 MG tablet Take 1 tablet by mouth every 12 hours 60 tablet 3    apixaban (ELIQUIS) 5 MG TABS tablet Take 1 tablet by mouth 2 times daily 60 tablet 2    dilTIAZem (CARDIZEM CD) 240 MG extended release capsule Take 1 capsule by mouth daily 30 capsule 3    furosemide (LASIX) 40 MG tablet Take 1 tablet by mouth daily 60 tablet 3    rOPINIRole (REQUIP) 1 MG tablet Take 1.5 mg by mouth in the morning and at bedtime      ipratropium-albuterol (DUONEB) 0.5-2.5 (3) MG/3ML SOLN nebulizer solution Inhale 1 vial into the lungs every 4 hours as needed for Shortness of Breath      omalizumab (XOLAIR) 150 MG/ML SOSY injection Inject 300 mg into the skin every 28 days      acetaminophen-codeine (TYLENOL/CODEINE #3) 300-30 MG per tablet Take 1 tablet by mouth in the morning and at bedtime for 30 days. Intended supply: 3 days.  Take lowest dose possible to manage pain Max Daily Amount: 2 tablets 60 tablet 0    cyclobenzaprine (FLEXERIL) 10 MG tablet Take 1 tablet by mouth 3 times daily as needed for Muscle spasms 60 tablet 1    Respiratory Therapy Supplies (NEBULIZER/TUBING/MOUTHPIECE) KIT Continuous 1 kit 0    albuterol sulfate HFA (PROVENTIL;VENTOLIN;PROAIR) 108 (90 Base) MCG/ACT inhaler INHALE 2 PUFFS BY MOUTH EVERY 4 HOURS AS NEEDED FOR WHEEZING OR SHORTNESS OF BREATH(SPACE OUT TO EVERY 6 HOURS AS SYMPTOMS IMPROVE) 8.5 g 11    EPINEPHrine (EPIPEN) 0.3 MG/0.3ML SOAJ injection INJECT INTO UPPER THIGH AS DIRECTED AS NEEDED FOR ANAPHYLAXIS      DULERA 200-5 MCG/ACT inhaler INHALE TWO PUFFS BY MOUTH TWICE A DAY 1 Inhaler 11        Medications patient taking as of now reconciled against medications ordered at time of hospital discharge: Yes    Pertinent positive and negative symptoms noted in HPI      Objective:    /84   Pulse 87   Temp 97.2 °F (36.2 °C)   Ht 5' 10\" (1.778 m)   Wt (!) 340 lb (154.2 kg)   SpO2 91%   BMI 48.78 kg/m² Constitutional: Patient appears well-nourished, not in any distress. HENT:  Head: Normocephalic and atraumatic. Eyes: Conjunctivae and EOM are normal  Right Ear: External ear normal.  Left Ear: External ear normal.   Nose: Nose normal.  Mouth/Throat: Oropharynx is clear and moist.   Neck: Normal range of motion. Neck supple. Lymphatic: No cervical lymphadenopathy  Cardiovascular: Normal rate, regular rhythm, normal heart sounds and intact distal pulses. Pulmonary/Chest: Effort normal and breath sounds normal, no wheezes or rhonchi. Neurological:Patient is alert, oriented to person, place, and time. No obvious focal neurological deficits  Examination of the right ankle reveals tenderness to palpation at the lateral malleolus. Some bruising seen below the lateral malleolus. Range of motion is restricted secondary to pain. Mild swelling noted at this time. Skin: Skin is warm and moist.  Psychiatric:Patient has a normal mood and affect, behavior is normal. Judgment and thought content normal.      An electronic signature was used to authenticate this note.   --Elif Stevens MD

## 2023-01-23 ENCOUNTER — OFFICE VISIT (OUTPATIENT)
Dept: PULMONOLOGY | Age: 58
End: 2023-01-23
Payer: MEDICAID

## 2023-01-23 VITALS
DIASTOLIC BLOOD PRESSURE: 78 MMHG | BODY MASS INDEX: 41.95 KG/M2 | WEIGHT: 293 LBS | HEART RATE: 88 BPM | OXYGEN SATURATION: 94 % | HEIGHT: 70 IN | SYSTOLIC BLOOD PRESSURE: 124 MMHG

## 2023-01-23 DIAGNOSIS — I48.19 PERSISTENT ATRIAL FIBRILLATION (HCC): Chronic | ICD-10-CM

## 2023-01-23 DIAGNOSIS — J45.20 MILD INTERMITTENT ASTHMA WITHOUT COMPLICATION: Primary | ICD-10-CM

## 2023-01-23 PROCEDURE — 3078F DIAST BP <80 MM HG: CPT | Performed by: INTERNAL MEDICINE

## 2023-01-23 PROCEDURE — 3074F SYST BP LT 130 MM HG: CPT | Performed by: INTERNAL MEDICINE

## 2023-01-23 PROCEDURE — G8417 CALC BMI ABV UP PARAM F/U: HCPCS | Performed by: INTERNAL MEDICINE

## 2023-01-23 PROCEDURE — G8484 FLU IMMUNIZE NO ADMIN: HCPCS | Performed by: INTERNAL MEDICINE

## 2023-01-23 PROCEDURE — 99213 OFFICE O/P EST LOW 20 MIN: CPT | Performed by: INTERNAL MEDICINE

## 2023-01-23 PROCEDURE — G8427 DOCREV CUR MEDS BY ELIG CLIN: HCPCS | Performed by: INTERNAL MEDICINE

## 2023-01-23 PROCEDURE — 1111F DSCHRG MED/CURRENT MED MERGE: CPT | Performed by: INTERNAL MEDICINE

## 2023-01-23 PROCEDURE — 3017F COLORECTAL CA SCREEN DOC REV: CPT | Performed by: INTERNAL MEDICINE

## 2023-01-23 PROCEDURE — 1036F TOBACCO NON-USER: CPT | Performed by: INTERNAL MEDICINE

## 2023-01-23 NOTE — PROGRESS NOTES
[address] Monica (:  1965) is a 62 y.o. female,Established patient, here for evaluation of the following chief complaint(s):  Follow-up (4 mo asthma)         ASSESSMENT/PLAN:  1. Mild intermittent asthma without complication  2. Persistent atrial fibrillation (HCC)  Asthma remains under good control with ICS/LABA and S TOYA, twice daily. She recently had an upper respiratory infection with bronchitis but did not apparently have exacerbation of asthma. Recent hospitalization apparently triggered by atrial fib with RVR and cardiac dysfunction. Heart rate controlled, fibrillation persists. Ablation therapy under consideration    Return in about 6 months (around 2023). Subjective   SUBJECTIVE/OBJECTIVE:  MARK ANTHONY Mas returns for follow-up for asthma, after recent hospitalization. She described having an upper respiratory infection migrating into her chest and resulting in apparent bronchitis, with incessant cough. She developed increased shortness of breath and presented to the hospital, was found to be in atrial fibrillation with rapid ventricular response. She was treated for her cardiac problems, and simply maintained on her usual inhaled glucocorticoid and bronchodilator therapy. She did not require acute treatment for airways disease. She has recovered, cough has resolved, acute dyspnea cleared. Overall, she has had fairly stable respiratory status. She has not had episodes of asthma or limitation of activity because of shortness of breath. She continues on Dulera inhaler twice daily, and albuterol inhaler usually twice daily. No chronic cough. No nocturnal chest symptoms. Objective   Physical Exam  Vitals reviewed. Constitutional:       Appearance: She is well-developed. She is obese. HENT:      Head: Normocephalic and atraumatic. Mouth/Throat:      Mouth: Mucous membranes are moist.      Pharynx: Oropharynx is clear. No oropharyngeal exudate. Eyes:      General: No scleral icterus. Right eye: No discharge. Left eye: No discharge. Neck:      Thyroid: No thyromegaly. Trachea: No tracheal deviation. Cardiovascular:      Rate and Rhythm: Normal rate. Rhythm irregular. Pulses:           Radial pulses are 1+ on the right side and 1+ on the left side. Heart sounds: Normal heart sounds. No murmur heard. Pulmonary:      Effort: Pulmonary effort is normal.      Comments: Breath sounds mildly reduced bilaterally. No adventitious breath sounds. Musculoskeletal:      Right lower leg: No edema. Left lower leg: No edema. Lymphadenopathy:      Cervical: No cervical adenopathy. Skin:     General: Skin is warm and dry. Neurological:      Mental Status: She is alert and oriented to person, place, and time. Psychiatric:         Mood and Affect: Mood normal.         Behavior: Behavior normal.         Thought Content: Thought content normal.         Judgment: Judgment normal.          On this date 1/23/2023 I have spent 24 minutes reviewing previous notes, test results and face to face with the patient discussing the diagnosis and importance of compliance with the treatment plan as well as documenting on the day of the visit. An electronic signature was used to authenticate this note.     --Carlota Vera MD

## 2023-01-25 ENCOUNTER — CARE COORDINATION (OUTPATIENT)
Dept: CASE MANAGEMENT | Age: 58
End: 2023-01-25

## 2023-01-25 NOTE — ED PROVIDER NOTES
mouth daily    DILTIAZEM (CARDIZEM) 60 MG TABLET    Take 1 tablet by mouth every 8 hours as needed (Palpitations)    DULERA 200-5 MCG/ACT INHALER    INHALE ONE PUFF INTO THE LUNGS TWO TIMES A DAY    FLUOCINONIDE (LIDEX) 0.05 % OINTMENT    APPLY SPARINGLY TO AFFECTED AREA(S) TWO TIMES A DAY AS NEEDED FOR FLARES, UP TO 2 WEEKS AT A TIME. DO NOT APPLY ON CLEAR SKIN. HANDICAP PLACARD MISC    by Does not apply route    HYDROCODONE-ACETAMINOPHEN (NORCO) 5-325 MG PER TABLET    Take 1 tablet by mouth 2 times daily as needed for Pain for up to 30 days. Take lowest dose possible to manage pain    HYDROXYZINE (VISTARIL) 50 MG CAPSULE    Take 1 capsule by mouth 3 times daily as needed for Anxiety (sleep)    LYSINE 1000 MG TABS    Take 1 tablet by mouth daily as needed     ROPINIROLE (REQUIP) 0.5 MG TABLET    Take 3 tablets by mouth every evening    SERTRALINE (ZOLOFT) 100 MG TABLET    TAKE 1 TABLET BY MOUTH ONE TIME DAILY    SPACER/AERO-HOLDING CHAMBERS (AEROCHAMBER PLUS-FLOW SIGNAL) MISC    Use with MDI as instructed    SPIRONOLACTONE (ALDACTONE) 25 MG TABLET    Take 0.5 tablets by mouth daily    TORSEMIDE (DEMADEX) 20 MG TABLET    Take 1 tablet by mouth 2 times daily    TRAZODONE (DESYREL) 100 MG TABLET    Take 100 mg by mouth nightly as needed for Sleep    TRAZODONE (DESYREL) 100 MG TABLET    TAKE 1 TABLET BY MOUTH EVERY NIGHT    VITAMIN B-12 (CYANOCOBALAMIN) 1000 MCG TABLET    Take 1,000 mcg by mouth daily       Allergies     She is allergic to latex; aspirin; demerol; meperidine; nsaids; pcn [penicillins]; ranitidine hcl; and sulfa antibiotics. Physical Exam     INITIAL VITALS: /80   Pulse 139   Resp 19   SpO2 98%    Physical Exam   Constitutional: She is oriented to person, place, and time. She appears well-developed and well-nourished. She appears distressed. HENT:   Head: Normocephalic and atraumatic. Eyes: Pupils are equal, round, and reactive to light.  Conjunctivae and EOM are normal.   Neck: Normal diltiazem injection 10 mg     diltiazem 125 mg in dextrose 5 % 125 mL infusion    HYDROmorphone (DILAUDID) injection 1 mg            CONSULTS:  None    MEDICAL DECISION MAKING     Liz Plunkett is a 48 y.o. female with past medical history of chronic diastolic heart failure (NYHA grade II), pAF, chronic knee pain with injuries including lateral, medial meniscus tear of the right knee, trace acute fracture of the right knee 2/2019, spinal pain and lumbar spondylosis on chronic Norco, ASTRID, asthma, and GERD that presents with shortness of breath and knee pain. Her subjective history of worsening shortness of breath, wheezing not improved with inhalers, leg edema and recent resumption of her heart failure/pAF medications after medication non-compliance are concerning for acute on chronic heart failure. This is supported by her objective physical exam findings such as leg edema, egophony on pulmonary exam and irregular heart rhythm suggest worsening of her chronic diastolic heart failure. The patient was in severe pain on passive movement, so her right knee exam was limited but did demonstrate increase discomfort to varus stress with flexion and no anterior/medial/lateral tenderness. Workup included EKG that demonstrated possible atrial flutter with poorly defined P-waves, /87. Troponin <0.01, Pro-BNP 2,866 from 267 three days prior. CXR read as no acute cardiopulmonary abnormalities. Her lactate was 0.91 and HgB/Hct stable within the last week, general decrease since 9/2018. Re-evaluation:  The patient had an XR of the knee that showed mild degenerative changes. Given her history of a, \"pop\" and the above exam, she most likely has an acute on chronic meniscal problem. Her leg swelling secondary to her acute on chronic diastolic heart failure is likely contributing to lower extremity pain as well.  A one time dose of dilaudid helped and was repeated a few hours later with no respiratory distress, Benefits, risks, and possible complications of procedure explained to patient/caregiver who verbalized understanding and gave verbal consent.

## 2023-01-25 NOTE — CARE COORDINATION
[facility] Transitions Follow Up Call    2023    Patient: Avtar Abarca  Patient : 1965   MRN: [id number]  Reason for Admission: HF  Discharge Date: 23 RARS: Readmission Risk Score: 9.3         Spoke with: NA    Attempted to reach patient via phone for transition call. VM left stating purpose of call along with my contact information requesting a return call. Rafael Ortega LPN [address] [place] [address]  Care Transitions  [phone]    Care Transitions Subsequent and Final Call    Subsequent and Final Calls  Care Transitions Interventions  Other Interventions:              Follow Up  Future Appointments   Date Time Provider Wil Chatterjee   2023  2:00 PM Samantha [facility] PSY [facility] [place]   2023  3:00 PM MD Monique Monroe Card [facility] [place]   2023 11:00 AM MD Monique Knox P/CC [facility] [place]       Rafael Ortega LPN

## 2023-01-27 ENCOUNTER — CARE COORDINATION (OUTPATIENT)
Dept: CASE MANAGEMENT | Age: 58
End: 2023-01-27

## 2023-01-27 NOTE — PROGRESS NOTES
[facility]   Electrophysiology Follow up     Date: 2/8/2023  I had the privilege of visiting Luis A Harrington in the office. CC: palpitations, shortness of breath, fatigue    HPI: Luis A Harrington is a 62 y.o. female with a history of gastric bypass, ASTRID (treated with surgery), heart failure with preserved ejection fraction, paroxysmal atrial fibrillation and atrial flutter, status post RFA for typical atrial flutter 5/7/2019 previously on Rythmol, monitor in 2021 noted 30% symptomatic atrial fibrillation, previously seen by EP in August 2022 at that time was on pill in pocket flecainide, on 1/9/2023 presented to [facility]. with symptomatic atrial fibrillation and RVR, underwent successful SHEY cardioversion and flecainide was switched from pill in pocket to twice daily dosing to 50 mg, discharged with diltiazem. Patient presents the office today for follow-up. ECG unfortunately demonstrates recurrence of atrial fibrillation.-She is symptomatic with complaints of shortness of breath on exertion, fatigue and palpitations. She thinks that she may have flipped back into atrial fibrillation approximately 2 to 3 days following cardioversion. She has notable improvement in symptoms during sinus rhythm. Review of System:  [x] Full ROS obtained and negative except as mentioned in HPI      Prior to Admission medications    Medication Sig Start Date End Date Taking? Authorizing Provider   acetaminophen-codeine (TYLENOL/CODEINE #3) 300-30 MG per tablet Take 1 tablet by mouth in the morning and at bedtime for 30 days.  Max Daily Amount: 2 tablets 2/6/23 3/8/23 Yes Giovanna Beckwith MD   benzonatate (TESSALON) 200 MG capsule Take 1 capsule by mouth 3 times daily as needed for Cough 2/1/23 2/8/23 Yes Giovanna Beckwith MD   levalbuterol [facility] HFA) 45 MCG/ACT inhaler Inhale 1 puff into the lungs every 4 hours as needed for Wheezing 1/20/23 1/20/24 Yes Giovanna Beckwith MD   digoxin (LANOXIN) 250 MCG tablet Take 0.5 tablets by mouth daily 1/19/23  Yes STEPHANIE Koehler CNP   flecainide (TAMBOCOR) 50 MG tablet Take 1 tablet by mouth every 12 hours 1/12/23  Yes Magalis Harris DO   apixaban (ELIQUIS) 5 MG TABS tablet Take 1 tablet by mouth 2 times daily 1/12/23 2/11/23 Yes Wicho Benjamin, DO   dilTIAZem (CARDIZEM CD) 240 MG extended release capsule Take 1 capsule by mouth daily 1/13/23  Yes Wicho Brine, DO   furosemide (LASIX) 40 MG tablet Take 1 tablet by mouth daily 1/12/23  Yes Wicho Benjamin, DO   rOPINIRole (REQUIP) 1 MG tablet Take 1.5 mg by mouth in the morning and at bedtime   Yes Historical Provider, MD   ipratropium-albuterol (DUONEB) 0.5-2.5 (3) MG/3ML SOLN nebulizer solution Inhale 1 vial into the lungs every 4 hours as needed for Shortness of Breath   Yes Historical Provider, MD   omalizumab Adolfo Mckinnon) 150 MG/ML SOSY injection Inject 300 mg into the skin every 28 days   Yes Historical Provider, MD   cyclobenzaprine (FLEXERIL) 10 MG tablet Take 1 tablet by mouth 3 times daily as needed for Muscle spasms 11/10/22  Yes Alley Valadez MD   Respiratory Therapy Supplies (NEBULIZER/TUBING/MOUTHPIECE) KIT Continuous 10/10/22  Yes Alley Valadez MD   albuterol sulfate HFA (PROVENTIL;VENTOLIN;PROAIR) 108 (90 Base) MCG/ACT inhaler INHALE 2 PUFFS BY MOUTH EVERY 4 HOURS AS NEEDED FOR WHEEZING OR SHORTNESS OF BREATH(SPACE OUT TO EVERY 6 HOURS AS SYMPTOMS IMPROVE) 8/17/22  Yes Roxanne Miller MD   EPINEPHrine (EPIPEN) 0.3 MG/0.3ML SOAJ injection INJECT INTO UPPER THIGH AS DIRECTED AS NEEDED FOR ANAPHYLAXIS 3/8/22  Yes Historical Provider, MD Gold Och 200-5 MCG/ACT inhaler INHALE TWO PUFFS BY MOUTH TWICE A DAY 5/29/21  Yes Roxanne Miller MD       Past Medical History:   Diagnosis Date    Acute lateral meniscus tear of right knee 02/2019    Acute medial meniscus tear of right knee 02/2019    Anesthesia complication     woke up during one surgery    Anxiety     Arthritis     Asthma     Atrial fibrillation ([address]     new dx 4 weeks ago, first of  Sept 2017    CHF (congestive heart failure) ([facility])     Edema     Fibromyalgia     GERD (gastroesophageal reflux disease)     reflux    Hiatal hernia     History of blood transfusion     Hx of major depression     Idiopathic urticaria 10/29/2020    SVT (supraventricular tachycardia) ([facility])     hx svt        Past Surgical History:   Procedure Laterality Date    ABLATION OF DYSRHYTHMIC FOCUS  04/2019    afib    BLADDER SUSPENSION  2004    CERVICAL FUSION N/A 9/30/2022    ANTERIOR CERVICAL DISCECTOMY AND FUSION CERVICAL 5 - CERVICAL 6, CERVICAL 6 - CERVICAL 7 WITH DEPUY ALLOGRAFT, PLATE AND SCREWS AND EVOKES               **LATEX SENSITIVE** performed by Tariq Trujillo MD at [address] N/A 8/4/2022    COLONOSCOPY performed by Ayla Robert at [address] [facility] ([address])  2004    KNEE ARTHROSCOPY Right 7/11/2019    VIDEO ARTHROSCOPY RIGHT KNEE, PARTIAL MEDIAL AND LATERAL MENISCECTOMY,, MEDIAL MICRO FRACTURE CHONDROPLASTY performed by Jenna Palmer., MD at [address] [facility] [address] ENDOSCOPY N/A 8/4/2022    EGD BIOPSY performed by Ayla Robert at [address] N/A 8/26/2019    DIAGNOSTIC LAPAROSCOPY, LAPAROSCOPIC LYSIS OF ADHESIONS, LAPAROSCOPIC REDUCTION OF RECURRENT INCISIONAL HERNIA WITH MESH performed by Olivia Irene MD at [address]   Allergen Reactions    Latex Anaphylaxis and Rash    Aspirin Swelling and Other (See Comments)     Swelling in lower legs    Nsaids Swelling    Penicillins Hives and Rash    Sulfa Antibiotics Hives, Swelling and Rash    Meperidine Nausea And Vomiting, Rash and Other (See Comments)    Ranitidine Hcl Rash       Social History:  Reviewed. reports that she has never smoked.  She has never used smokeless tobacco. She reports current alcohol use. She reports current drug use. Drugs: Marijuana (Weed) and [employer] comments. Family History:  Reviewed. No family history of SCD. Physical Examination:  Vitals:    02/08/23 1506   BP: 110/74   Pulse: 96      Wt Readings from Last 3 Encounters:   02/08/23 (!) 342 lb 3.2 oz (155.2 kg)   01/23/23 (!) 343 lb 3.2 oz (155.7 kg)   01/20/23 (!) 340 lb (154.2 kg)     No acute distress  Moist mucosa, nonicteric conjunctiva  Chest is clear, nonlabored, no rhonchi or wheeze  Heart is regular rate, regular rhythm, no murmurs, trace lower extremity swelling, extremities are warm and well-perfused  Abdomen is soft and nontender  Strength is grossly preserved, normal gait, normal tone  No focal neurologic deficits  Appropriate mood and affect  No rashes or ecchymoses    Labs:  Lab Results   Component Value Date    ALT 14 09/28/2022    AST 16 09/28/2022     01/12/2023    K 4.3 01/12/2023    HGB 12.3 01/12/2023    CL 98 (L) 01/12/2023    CREATININE 1.0 01/12/2023    BUN 30 (H) 01/12/2023    TSH 2.97 05/25/2022    INR 1.60 (H) 01/11/2023     Imaging:  ECG 2/8/23  AFIB, QTcH 363,    SHEY 01/11/2023   Summary   Limited study. Rhythm is atrial fibrillation with rapid ventricular response   Mildly reduced left ventricular function in the setting of arrhythmia and   rapid heart rate   Mildly thickened aortic valve, no other significant valvular disease   No left atrial or atrial appendage thrombus present    Echo 03/17/21   Summary   Normal left ventricle size and systolic function with an estimated ejection   fraction of 55-60%. Mild concentric left ventricular hypertrophy. No regional wall motion abnormalities are seen. Normal diastolic function. Mild mitral and tricuspid regurgitation. Echo 05/09/2018   Summary   Left ventricular cavity size is normal. There is mild concentric left   ventricular hypertrophy.    Left ventricular function is low normal with ejection fraction estimated at   50%. No diagnostic regional wall motion abnormalities. Diastolic filling parameters suggest grade I diastolic dysfunction. Mild mitral regurgitation is present. Bi-atrial enlargement. Estimated pulmonary artery systolic pressure is at 43 mmHg assuming a right   atrial pressure of 3 mmHg. Assessment/plan:    Paroxsymal now persistent Atrial Fibrillation   - dx 09/12/17  - ECG today shows atrial fibrillation   - symptomatic w/ SOB, fatigue, and palpitations   - s/p RFA for typical AFL on 05/07/19  - s/p DCCV 01/11/2023 with recurrence ~3 days following  - previously on Propafenone (stopped taking on her own)  - recurrence of AF on flecainide 50mg BID  - Continue Eliquis 5 mg for thromboembolic risk reduction, Patient has a [id number] Score of 3 (gender,htn,chf)  - Continue on Digoxin, Diltiazem  - Stop flecainide, start amiodarone 200 mg twice daily for 10 days followed by 200 mg daily  - discussed atrial fibrillation including symptoms, risk of stroke, potential effect on heart function  - discussed management strategies including a rate vs rhythm approach, role in improving symptoms and reducing AF burden, discussed rhythm strategies including medication and/or ablation  -Given that the patient is young, symptomatic with early recurrence we discussed goal of reduced AF burden, options including antiarrhythmic medication and ablation. Patient has untreated obstructive sleep apnea is overweight and we discussed these as risks for recurrence of atrial fibrillation. We discussed success rates of reducing atrial fibrillation burden in paroxysmal and persistent diagnoses, diminished in the later especially with weight/ASTRID. With that said I do believe this is the best approach +/- AAD going forward. Knowing this she would like to proceed.  Will schedule for RF PVI ablation    HTN  - Controlled: 110/74  - BP goal <130/80  - Home BP monitoring encouraged, printed information provided on how to accurately measure BP at home. - Counseled to follow a low salt diet to assure blood pressure remains controlled for cardiovascular risk factor modification.   - The patient is counseled to get regular exercise 3-5 times per week and maintain a healthy weight reduce cardiovascular risk factors. ASTRID  - not using CPAP  - referral to sleep    Obesity  Body mass index is 49.1 kg/m². - Excessive weight is complicating assessment and treatment. It is placing patient at risk for multiple co-morbidities as well as early death and contributing to the patient's presentation.  - Discussed weight management with diet and exercise        Stop flecainide, start amiodarone, ECG in 10 days to reassess rhythm, sleep referral, ablation      Scribe attestation: This note was scribed in the presence of Lolly Stevens MD by Cresencio Choudhury LPN    Physician Attestation: I, Dr. Lolly Stevens, confirm that the scribe's documentation has been prepared under my direction and personally reviewed by me in its entirety. I also confirm that the note above accurately reflects all work, treatment, procedures, and medical decision making performed by me. NOTE: This report was transcribed using voice recognition software. Every effort was made to ensure accuracy, however, inadvertent computerized transcription errors may be present.      Lolly Stevens MD  [facility]   Office: [phone]  Fax: [phone]

## 2023-01-27 NOTE — CARE COORDINATION
[facility] Transitions Follow Up Call    2023    Patient: Louise Valenzuela  Patient : 1965   MRN: [id number]  Reason for Admission: HF  Discharge Date: 23 RARS: Readmission Risk Score: 9.3         Spoke with: VALORIE    Second and final attempt to reach patient via phone for transition call. VM left stating purpose of call along with my contact information requesting a return call. Episode ended d/t unsuccessful contact. Sung Ashby LPN [address] [place] [address]  Care Transitions  [phone]      Care Transitions Subsequent and Final Call    Subsequent and Final Calls  Care Transitions Interventions  Other Interventions:              Follow Up  Future Appointments   Date Time Provider Wil Chatterjee   2023  2:00 PM [facility] PSY [facility]   2023  3:00 PM Zina Case MD  [facility] Card [facility]   2023 11:00 AM Adilia Martinez MD  [place] P/CC [facility]       Sung Ashby LPN

## 2023-01-30 NOTE — PLAN OF CARE
Problem: Cardiac Output - Decreased:  Goal: Hemodynamic stability will improve  Hemodynamic stability will improve   Outcome: Ongoing  VSS at current time, vital signs being checked q4h.  Pt on continuous heart monitoring    Problem: Falls - Risk of:  Goal: Absence of physical injury  Absence of physical injury  Outcome: Ongoing  Fall precautions in place, call light and bedside table within reach [Home] : at home, [unfilled] , at the time of the visit. [Medical Office: ([facility])___] : at the medical office located in  [Verbal consent obtained from patient] : the patient, [unfilled] [FreeTextEntry1] : LAST OFFICE VISIT: 1/10/22\par \par PCP: Dr. Weber\par \par INTERIM HISTORY:\par Started on CNB at the last visit. Seeing some efficacy. However, getting increasingly drowsy in the daytime and would like to come off of one of the ASM. 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 Patient was previously followed by multiple epileptologists/neurologists, most recently by Dr. Lester at [facility].\par \par Epilepsy onset at age 22. Seizures were first noted during daytime, where pt would have facial tingling sensation, distorted facial movements, deep inhalation (sucking air into lungs) and hypersalivation. Remained awareness and able to respond during the seizures. Each seizure lasted 3-10s and occurred up to multiple times daily.\par \par In a few years, daytime seizures decreased in frequency, but nocturnal seizures started to emerge, and eventually transitioned to seize exclusively in sleep. Seizures at night are described as sudden arousal, sits up fully aware, whole-body tenses up, “sucking air through my clenched teeth”, excessive salivation, pressing his temples using his hands thinking this would suppress intensity of the seizure, guttural sound, heart rate increases. Multiple seizures have been recorded in the past in [facility] and [facility] without ictal pattern on scalp EEG. Of the 8 seizures [facility] captured, 2 seizures provided some lateralizing signs with immobile right hand in a semi-dystonic posturing while the left hand rubbed the face.\par \par Tried different ASMs, never achieved seizure freedom, up to 8-10 seizures per night. ESL has been most efficacious. Last managed by Dr. Lester, who discussed surgical options with pt, but pt was hesitant to pursue due to nonlesional imaging and scalp-negative EEG. High functioning . CURRENT ASM: LEV ER 3000mg QHS; ESL 1600mg QHS.\par \par \par SEIZURE DESCRIPTION AND TYPE:\par Type #1\par Severity: scalp-negative seizures\par Onset: 21yo\par Quality & associated signs/symptoms: Sudden arousal and sits up from bed, facial tingling/tightening, facial grimacing, whole-body tenses up, “sucking air through my clenched teeth,” hypersalivation, preserved awareness during majority of the seizures. Two seizures recorded in the past had immobile right hand in a semi-dystonic posturing while the left hand rubbed the face.\par Duration: 3-10s when occurred in daytime, 15-45s in sleep\par Timing: max 8-10 sz/night -> currently 1-3 sz/night\par Modifying factors: unknown trigger \par Circadian Variation: currently only occurring in sleep, first sz usually 15m after falling asleep\par \par EPILEPSY TYPE: focal epilepsy\par HISTORY OF TONIC-CLONIC SZ: no\par HISTORY OF STATUS EPILEPTICUS: no  \par \par SEIZURE RISK FACTORS:\par Unknown FH or birth hx as he was adopted. No history of febrile seizure, TBI, CNS infection.\par \par PREVIOUS ASM:\par CZP - very drowsy\par LTG - worsened sz\par ZNS 100mg BID - inefficacious \par CBZ ER 800mg QHS - helpful, switched to ESL \par LCM – recurrence of daytime seizures\par PHT – inefficacious \par \par IMAGING: \par PET/MRI 9/13/17 ([facility]): no structural MRI or focal FDG correlate to symptoms. Few scattered supratentorial WM hyperintensities may correlate with age and/or cerebrovascular risk factors. Otherwise normal contrast MRI of the head.\par \par MRI w/o 3/4/2011 ([facility]): unremarkable \par \par NEUROPHYSIOLOGY:\par rEEG 1/7/22 ([facility]): independent, intermittent slowing in LT > RT\par \par vEEG 7/2017 ([facility]): This is a normal EEG capturing awake and asleep states. Four typical seizures were captured. They had no ictal EEG correlate, however the ictal semiology was stereotypical indicative of a surface negative EEG perhaps frontal lobe epilepsy.\par \par vEEG 5/23 – 5/24/2011 ([facility]): The semiology of the eight events was similar. He awoke from sleep looking confused and rubbed his face and head with both hands while making a guttural noise. In the two of the eight events, his right hand was immobile with a semi-dystonic posturing while rubbing his face with his left hand. The events lasted 20 to 30 seconds each. Following one of the events, nursing was present and he was noted to speak appropriately immediately after the seizure. The EEG for the eight events was also stereotyped with an onset marked by a diffuse slow wave followed by 1 to 2 seconds of attenuation before EMG artifact obscured the recording.  Normal volume, rate, productivity, spontaneity and articulation

## 2023-02-01 ENCOUNTER — TELEPHONE (OUTPATIENT)
Dept: FAMILY MEDICINE CLINIC | Age: 58
End: 2023-02-01

## 2023-02-01 DIAGNOSIS — J20.9 ACUTE BRONCHITIS, UNSPECIFIED ORGANISM: Primary | ICD-10-CM

## 2023-02-01 RX ORDER — AZITHROMYCIN 250 MG/1
250 TABLET, FILM COATED ORAL SEE ADMIN INSTRUCTIONS
Qty: 6 TABLET | Refills: 0 | Status: SHIPPED | OUTPATIENT
Start: 2023-02-01 | End: 2023-02-06

## 2023-02-01 RX ORDER — BENZONATATE 200 MG/1
200 CAPSULE ORAL 3 TIMES DAILY PRN
Qty: 30 CAPSULE | Refills: 0 | Status: SHIPPED | OUTPATIENT
Start: 2023-02-01 | End: 2023-02-08

## 2023-02-01 NOTE — TELEPHONE ENCOUNTER
Prescription sent. Patient continues to have any worsening wheezing, shortness of breath, advised to be seen immediately or evaluated at the ER.

## 2023-02-01 NOTE — TELEPHONE ENCOUNTER
----- Message from Naila Mendieta sent at 2/1/2023 10:48 AM EST -----  Subject: Message to Provider      Pt wants to know if PCP, Padmini Quintero will call   her in a prescription for a cough that she has. PT says it just like the cough she had before and the PCP called her in a cough medicine and an   antibiotic. Please reach out to the PT to discuss. - called - no josé miguel JUNG   ---------------------------------------------------------------------------  --------------  Jorge Andrade [facility]  [phone]; OK to leave message on voicemail

## 2023-02-01 NOTE — TELEPHONE ENCOUNTER
LMOM informing pt that medications have been sent and left instructions to go to ER with any worsening wheezing, and SOB. Callback number left in case pt would like to call back to discuss.

## 2023-02-06 DIAGNOSIS — G89.29 CHRONIC GENERALIZED PAIN DISORDER: ICD-10-CM

## 2023-02-06 DIAGNOSIS — M54.12 CERVICAL RADICULOPATHY: ICD-10-CM

## 2023-02-06 DIAGNOSIS — R52 CHRONIC GENERALIZED PAIN DISORDER: ICD-10-CM

## 2023-02-06 DIAGNOSIS — M79.7 FIBROMYALGIA: ICD-10-CM

## 2023-02-06 RX ORDER — PYRAZINAMIDE 500 MG/1
1 TABLET ORAL 2 TIMES DAILY
Qty: 60 TABLET | Refills: 0 | OUTPATIENT
Start: 2023-02-06 | End: 2023-03-08

## 2023-02-06 RX ORDER — PYRAZINAMIDE 500 MG/1
1 TABLET ORAL 2 TIMES DAILY
Qty: 60 TABLET | Refills: 0 | Status: SHIPPED | OUTPATIENT
Start: 2023-02-06 | End: 2023-03-09 | Stop reason: SDUPTHER

## 2023-02-06 NOTE — TELEPHONE ENCOUNTER
Pt's cough is improving, wanted to let  know. Pt would like blood work to test her iron as she had an iron infusion in hospital and would like to see if it has helped.

## 2023-02-08 ENCOUNTER — OFFICE VISIT (OUTPATIENT)
Dept: CARDIOLOGY CLINIC | Age: 58
End: 2023-02-08
Payer: MEDICAID

## 2023-02-08 ENCOUNTER — TELEMEDICINE (OUTPATIENT)
Dept: PSYCHOLOGY | Age: 58
End: 2023-02-08
Payer: MEDICAID

## 2023-02-08 VITALS
BODY MASS INDEX: 49.1 KG/M2 | WEIGHT: 293 LBS | SYSTOLIC BLOOD PRESSURE: 110 MMHG | HEART RATE: 96 BPM | DIASTOLIC BLOOD PRESSURE: 74 MMHG

## 2023-02-08 DIAGNOSIS — F33.1 MAJOR DEPRESSIVE DISORDER, RECURRENT EPISODE, MODERATE WITH ANXIOUS DISTRESS (HCC): Primary | ICD-10-CM

## 2023-02-08 DIAGNOSIS — I47.1 SVT (SUPRAVENTRICULAR TACHYCARDIA) (HCC): ICD-10-CM

## 2023-02-08 DIAGNOSIS — I48.3 TYPICAL ATRIAL FLUTTER (HCC): Primary | ICD-10-CM

## 2023-02-08 DIAGNOSIS — I48.19 PERSISTENT ATRIAL FIBRILLATION (HCC): ICD-10-CM

## 2023-02-08 DIAGNOSIS — I48.0 PAROXYSMAL ATRIAL FIBRILLATION WITH RAPID VENTRICULAR RESPONSE (HCC): ICD-10-CM

## 2023-02-08 PROCEDURE — 90832 PSYTX W PT 30 MINUTES: CPT | Performed by: PSYCHOLOGIST

## 2023-02-08 PROCEDURE — 3078F DIAST BP <80 MM HG: CPT | Performed by: INTERNAL MEDICINE

## 2023-02-08 PROCEDURE — 3017F COLORECTAL CA SCREEN DOC REV: CPT | Performed by: INTERNAL MEDICINE

## 2023-02-08 PROCEDURE — G8417 CALC BMI ABV UP PARAM F/U: HCPCS | Performed by: INTERNAL MEDICINE

## 2023-02-08 PROCEDURE — 1036F TOBACCO NON-USER: CPT | Performed by: INTERNAL MEDICINE

## 2023-02-08 PROCEDURE — 99214 OFFICE O/P EST MOD 30 MIN: CPT | Performed by: INTERNAL MEDICINE

## 2023-02-08 PROCEDURE — 1111F DSCHRG MED/CURRENT MED MERGE: CPT | Performed by: INTERNAL MEDICINE

## 2023-02-08 PROCEDURE — G8484 FLU IMMUNIZE NO ADMIN: HCPCS | Performed by: INTERNAL MEDICINE

## 2023-02-08 PROCEDURE — G8427 DOCREV CUR MEDS BY ELIG CLIN: HCPCS | Performed by: INTERNAL MEDICINE

## 2023-02-08 PROCEDURE — 93000 ELECTROCARDIOGRAM COMPLETE: CPT | Performed by: INTERNAL MEDICINE

## 2023-02-08 PROCEDURE — 3074F SYST BP LT 130 MM HG: CPT | Performed by: INTERNAL MEDICINE

## 2023-02-08 RX ORDER — AMIODARONE HYDROCHLORIDE 200 MG/1
TABLET ORAL
Qty: 110 TABLET | Refills: 1 | Status: SHIPPED | OUTPATIENT
Start: 2023-02-08

## 2023-02-08 NOTE — LETTER
[facility]  EP Procedure Sheet    2/8/23  You Roman  1965  EP Procedures  [] Pacemaker implant (single/dual) [] EP Study   [] ICD implant (single/dual) [] Atrial flutter ablation (SHEY Y/N)   [] Biv implant ICD [] Tilt Table   [] Biv implant PPM [x] Atrial fibrillation ablation (SHEY Yes)   [] Generator Change (PPM/ICD/BiV) [] SVT ablation   [] Lead revision (RV/LA/RA) (<1 month) [] PVC ablation     [] Lead extraction +/- upgrade (BiV/PPM/ICD) [] VT Ischemic/ non-ischemic   [] Loop implant/ removal [] VT RVOT   [] Cardioversion [] VT Left sided   [] SHEY [] AVN ablation   Equipment  [] Ceram Hyd  [] ROMI Mapping System   [] [place] [x] [address]   [] [employer] [] CryoAblation   [] Biotronik [] Laser Lead Extraction   EP Procedures Scheduling Request  # hours Requested  []1 []2 [x]2-4 [] 4-6 Scheduled  Date:   Specific Day  Completed    Anesthesia [x]yes []no F/u Date:   CT surgery backup []yes [x]no     Overnight stay      Performing MD []RMM []MXA   []MKW [x] CMV First vs repeat   [x]1st [] 2nd [] 3rd   Pre-Procedure Labs / Imaging  [] PT/INR [] Type & cross   [] CBC [] Units PRBC   [] BMP/Mg [] Units FFP   [] Venogram [] Cardiac CTA for Pulmonary vein mapping     RN INITIALS: KBLPN   Patient Instructions  Do not eat or drink after midnight the night prior to procedure  Dx: Persistent AF  ICD-10 code: I48.19  Medication Instructions: Hold []Xarelto [x]Eliquis []Coumadin []pradaxa the night before the procedure and the morning of the procedure.

## 2023-02-08 NOTE — TELEPHONE ENCOUNTER
Patient had blood work before she left the hospital on on January 12 her hemoglobin was 12.9. I would recommend waiting 3 months before we repeat the blood work.

## 2023-02-08 NOTE — PROGRESS NOTES
Behavioral Health Consultation  Hieu Virk Psy.D. Psychologist  2/8/2023  1:30-2 PM    Time spent with Patient: 30 minutes  This is patient's ninth [facility] appointment. Her last [address] [place] [address] visit occurred in July 2022. Reason for Consult: Depression, chronic pain  Referring Provider: Kenzie Green MD  [address] [facility] [address] [facility],First Floor [address]    TELEHEALTH VISIT -- Audio/Visual (During MVOQQ-18 public health emergency)  }  Usha Wolfe was evaluated through a synchronous (real-time) audio-video encounter using HIPAA-compliant technology. The patient (or guardian, if applicable) is aware that this is a billable service, which includes applicable co-pays. This Virtual Visit was conducted with patient's (and/or legal guardian's) consent. The visit was conducted pursuant to the emergency declaration under the [address] [place] [address] authority and the [employer] [facility] [facility] General Act. Patient identification was verified, and a caregiver was present when appropriate. The patient was located in a state where the provider was licensed to provide care. Conducted a risk-benefit analysis and determined that the patient's presenting problems are consistent with the use of telepsychology. Determined that the patient has sufficient knowledge and skills in the use of technology enabling them to adequately benefit from telepsychology. It was determined that this patient was able to be properly treated without an in-person session. Patient verified current location at the beginning of the visit.     Verified the following information:  Patient's identification: Yes  Patient location: [address] [place] [id number]  Patient's call back number: [phone]  Patient's emergency contact's name and number, as well as permission to contact them if needed:  Extended Emergency Contact Information  Primary Emergency Contact: Lilian Miller States of [address] [place] [address] Phone: [phone]  Mobile Phone: [phone]  Relation: Child  Secondary Emergency Contact: [facility]  Address: [address] [facility]           [place], [address] of [address] [facility] Phone: [phone]  Mobile Phone: [phone]  Relation: Domestic Partner    Provider location: Carlin Dance:  Pt has been dealing with stress in her household. Dealing with significant knee pain. She plans to pursue weight loss surgery through  but that clinic requires her to wait 6 months to ensure stability d/t her past suicide attempts. Pt denied recent SI, just a desire to travel. Pt needs an ablation to address A fib. Pt has been caring for her grandchildren often. Since her last hospitalization, pt has tried to stop allowing others to stress her out, which has helped. Pt's sister came in pt's room during visit and started an argument, which led pt to yell and become tearful in response during this visit. O:  Interventions:  -Supportive techniques  -Processed recent stressors, concerns and experiences  -Highlighted areas of progress  -Processed pt's reaction to her sister during this visit  -Conducted risk assessment. Appropriate for outpatient / telehealth care at this time.       A:  MSE:  Appearance: good hygiene  and appropriate attire  Attitude: cooperative, friendly and mild distress  Consciousness: alert  Orientation: oriented to person, place, time, general circumstance  Memory: recent and remote memory intact  Attention/Concentration: intact during session  Psychomotor Activity: normal  Eye Contact: normal  Speech: normal rate and volume, well-articulated  Mood: generally euthymic, angry and upset when her sister came in the room  Affect: congruent  Perception: within normal limits  Thought Content: within normal limits  Thought Process: logical, coherent and goal-directed  Insight: good  Judgment: intact  Ability to understand instructions: Yes  Ability to respond meaningfully: Yes  Morbid Ideation: passive thoughts of death  Suicide Assessment: less frequent suicidal ideation without plan or intent recently. Appropriate for outpatient / telehealth care at this time. Homicidal Ideation: no    Safety: No imminent risk of danger to self/others based on the factors considered below. Appropriate for outpatient level of care. Safety plan includes: 911, PES, hotlines, and interventions discussed today. Risk factors: Depressed mood, intermittent suicidal ideation, past suicide attempts, chronic pain or medical illness  Protective factors: Age >24 and <55, female gender, denies current suicidal ideation, does not have lethal plan, does not have access to guns or weapons, patient is eros for safety, no family h/o suicide, no substance abuse, patient has social or family support, no active psychosis or cognitive dysfunction, already has outpatient services in place, compliant with recommended medications, and patient is future-oriented. PHQ Scores 11/22/2022 10/11/2022 9/28/2022 8/26/2022 4/28/2022 12/16/2021 5/10/2017   PHQ2 Score 2 0 0 0 2 4 0   PHQ9 Score 12 0 0 6 6 18 0     Interpretation of Total Score Depression Severity: 1-4 = Minimal depression, 5-9 = Mild depression, 10-14 = Moderate depression, 15-19 = Moderately severe depression, 20-27 = Severe depression    Diagnosis:    1.  Major depressive disorder, recurrent episode, moderate with anxious distress [facility])          Patient Active Problem List   Diagnosis    Asthma    MDD (major depressive disorder), recurrent severe, without psychosis ([facility] [address]    Overdose    Restless leg syndrome    Sliding hiatal hernia    HTN (hypertension)    Atrial flutter (HCC)    SVT (supraventricular tachycardia) (HCC)    Paroxysmal atrial fibrillation with rapid ventricular response (HCC)    Sinus bradycardia    Acute lateral meniscus tear of right knee    Acute medial meniscus tear of right knee    Localized edema    Acute pain of right knee    Chronic diastolic congestive heart failure (HCC)    Primary osteoarthritis of right knee    Contusion of multiple sites of right leg    Gastrocnemius strain, left    Ventral incisional hernia    Recurrent incisional hernia    Pain of upper abdomen    Incisional hernia with obstruction, without gangrene    Rhinitis, chronic    Vocal cord dysfunction    Iron deficiency anemia, unspecified    Idiopathic urticaria    Carpal tunnel syndrome, bilateral    Arthritis of carpometacarpal (CMC) joint of left thumb    Neck pain    Acute alcoholic intoxication ([address]    Marijuana user    Mood disorder ([address]    Acute systolic (congestive) heart failure ([address]         Plan:  Pt interventions:  Supportive techniques, Emphasized self-care as important for managing overall health, Cognitive strategies to target balanced thinking, and Completed risk evaluation. Pt Behavioral Change Plan:  Pt set the following goals:  1. Read The Sleep Solution by Julia Carlson MD  2. Schedule an appointment with Shae Isbell psychiatric NP, to discuss medication options  3. Learn more about self-compassion at www.self-compassion.org. Watch the video on self-compassion vs self-esteem. Pt scheduled a F/U virtual visit.

## 2023-02-08 NOTE — TELEPHONE ENCOUNTER
Pt questioning if PCP saw the iron saturation level and ferritin levels and stated that she has very little energy and is very fatigued. Any other recommendations?

## 2023-02-08 NOTE — PATIENT INSTRUCTIONS
Goals:  1. Read The Sleep Solution by TULIO Cervantes MD  2. Schedule an appointment with Silverio Fatima, psychiatric NP, to discuss medication options  3. Learn more about self-compassion at www.self-compassion.org. Watch the video on self-compassion vs self-esteem.

## 2023-02-08 NOTE — PATIENT INSTRUCTIONS
ATRIAL FIB ABLATION INFORMATION    Our  will be contacting you to schedule your procedure. The morning of your ablation you will need to check in at the registration desk in the main lobby. PRE-PROCEDURE INSTRUCTIONS -   -You will be called with a time to arrive for you ablation.  -Do not eat or drink anything after midnight the night before your ablation.  -You may take all of your other medications with a sip of water.  -You will need to have a responsible adult to drive you home. -CVU will provide you with discharge instructions.  -You will need to hold your Eliquis the night before and the morning of the procedure    You will be scheduled for a 6-8 week follow up with cardiology. This will be done prior to your discharge instructions. If you have any questions regarding the procedure itself or medications, please call [phone] and ask to speak to an EP nurse.

## 2023-02-09 ENCOUNTER — TELEPHONE (OUTPATIENT)
Dept: CARDIOLOGY CLINIC | Age: 58
End: 2023-02-09

## 2023-02-09 NOTE — TELEPHONE ENCOUNTER
[facility] for patient to return call to get scheduled for procedure. The morning of your ablation you will need to check in at the  outpatient desk on the first floor. PRE-PROCEDURE INSTRUCTIONS -  -You will be called with a time to arrive for you ablation.    -Do not eat or drink anything after midnight the night before  your ablation.    -You may take all of your other medications with a sip of water.    -You will need to have a responsible adult to drive you home. -CVU will provide you with discharge instructions.    -You will need to hold your Eliquis the night before and the  morning of the procedure    You will be scheduled for a 6-8 week follow up with cardiology. This will  be done prior to your discharge instructions. If you have any questions regarding the procedure itself or medications,  please call [phone] and ask to speak to an EP nurse. no

## 2023-02-09 NOTE — TELEPHONE ENCOUNTER
Patient returned a call to speak with [employer]. Patient was informed that [employer] was at lunch and would call her back, Patient confirmed verbal understanding.

## 2023-02-09 NOTE — TELEPHONE ENCOUNTER
Patient returned a missed call from [facility] for scheduling a procedure.  Call back [phone]

## 2023-02-10 NOTE — TELEPHONE ENCOUNTER
Spoked with the patient and got her scheduled for the procedure. We went over the instructions below and she verbalized understanding. Procedure - SHEY/Afib Abl (SHEY possibly)  Date: 3/7/2023   Arrival time: 6:30 am   Procedure time: 8:00 am     The morning of your ablation you will need to check in at the  outpatient desk on the first floor. PRE-PROCEDURE INSTRUCTIONS -  -You will be called with a time to arrive for you ablation.     -Do not eat or drink anything after midnight the night before  your ablation.     -You may take all of your other medications with a sip of water.     -You will need to have a responsible adult to drive you home. -CVU will provide you with discharge instructions.     -You will need to hold your Eliquis the night before and the  morning of the procedure     You will be scheduled for a 6-8 week follow up with cardiology. This will  be done prior to your discharge instructions. If you have any questions regarding the procedure itself or medications,  please call [phone] and ask to speak to an EP nurse.     LISSETHgenda update / alerted cath lab (fax)

## 2023-02-24 ENCOUNTER — NURSE ONLY (OUTPATIENT)
Dept: CARDIOLOGY CLINIC | Age: 58
End: 2023-02-24
Payer: MEDICAID

## 2023-02-24 ENCOUNTER — TELEPHONE (OUTPATIENT)
Dept: CARDIOLOGY CLINIC | Age: 58
End: 2023-02-24

## 2023-02-24 DIAGNOSIS — I48.0 PAROXYSMAL ATRIAL FIBRILLATION WITH RAPID VENTRICULAR RESPONSE (HCC): ICD-10-CM

## 2023-02-24 DIAGNOSIS — I50.21 ACUTE SYSTOLIC (CONGESTIVE) HEART FAILURE (HCC): Primary | ICD-10-CM

## 2023-02-24 DIAGNOSIS — I48.3 TYPICAL ATRIAL FLUTTER (HCC): ICD-10-CM

## 2023-02-24 DIAGNOSIS — I47.1 SVT (SUPRAVENTRICULAR TACHYCARDIA) (HCC): ICD-10-CM

## 2023-02-24 PROCEDURE — 93000 ELECTROCARDIOGRAM COMPLETE: CPT | Performed by: INTERNAL MEDICINE

## 2023-02-24 NOTE — LETTER
[facility]  EP Procedure Sheet    2/27/23  Makenna Timmons  1965  EP Procedures  [] Pacemaker implant (single/dual) [] EP Study   [] ICD implant (single/dual) [] Atrial flutter ablation (SHEY Y/N)   [] Biv implant ICD [] Tilt Table   [] Biv implant PPM [] Atrial fibrillation ablation (SHEY Yes)   [] Generator Change (PPM/ICD/BiV) [] SVT ablation   [] Lead revision (RV/LA/RA) (<1 month) [] PVC ablation     [] Lead extraction +/- upgrade (BiV/PPM/ICD) [] VT Ischemic/ non-ischemic   [] Loop implant/ removal [] VT RVOT   [x] Cardioversion [] VT Left sided   [] SHEY [] AVN ablation   Equipment  [] [email or web address]  [] ROMI Mapping System   [] [place] [] [address]   [] [employer] [] CryoAblation   [] Biotronik [] Laser Lead Extraction   EP Procedures Scheduling Request  # hours Requested  []1 []2 []2-4 [] 4-6 Scheduled  Date:   Specific Day 02/27 or 02/28  Completed    Anesthesia []yes [x]no F/u Date:   CT surgery backup []yes [x]no     Overnight stay      Performing MD []RMM []MXA   []MKW [x] CMV First vs repeat   []1st [] 2nd [] 3rd   Pre-Procedure Labs / Imaging  [] PT/INR [] Type & cross   [] CBC [] Units PRBC   [] BMP/Mg [] Units FFP   [] Venogram [] Cardiac CTA for Pulmonary vein mapping     RN INITIALS: KBLPN   Patient Instructions  Do not eat or drink after midnight the night prior to procedure  Dx:PAF  ICD-10 code: I48.0    PRE-PROCEDURE INSTRUCTIONS  Do not eat or drink anything after midnight the night before your procedure. Take all your medications with a sip of water in the morning. Do not hold your Eliquis   Please have a responsible adult to drive you home after your procedure.

## 2023-02-24 NOTE — TELEPHONE ENCOUNTER
Patient had EKG completed today. Is back in Afib, unable to self convert by bearing down. HR is controlled at 84. Has SOB and feelings of anxiety/feels like her heart is racing. She also states she  sometimes experiences some episodes sharp stabbing chest pain that occurs sudden and without warning but resolves quickly. She said she does not feel these episodes correlate to any particular activity. Please advise.

## 2023-02-27 NOTE — TELEPHONE ENCOUNTER
Spoke with the patient and got her scheduled for her CV procedure. We went over the instructions below and she verbalized understanding. Procedure- CV  Date: 2/28/2023   Arrival time: 11:00 am   Procedure time: 12:00 pm    PRE-PROCEDURE INSTRUCTIONS -  -Do not eat or drink anything after midnight the night before  your ablation.     -You may take all of your other medications with a sip of water.     -You will need to have a responsible adult to drive you home.      -CVU will provide you with discharge instructions.     -Do not hold Elijosep Jones updated / alerted cath lab Delia Patterson)

## 2023-02-28 ENCOUNTER — HOSPITAL ENCOUNTER (OUTPATIENT)
Dept: CARDIAC CATH/INVASIVE PROCEDURES | Age: 58
Discharge: HOME | End: 2023-03-02
Payer: MEDICAID

## 2023-02-28 VITALS — HEIGHT: 70 IN | TEMPERATURE: 98.1 F | OXYGEN SATURATION: 98 % | BODY MASS INDEX: 41.95 KG/M2 | WEIGHT: 293 LBS

## 2023-02-28 LAB
A/G RATIO: 1.4 (ref 1.1–2.2)
ALBUMIN SERPL-MCNC: 4 G/DL (ref 3.4–5)
ALP BLD-CCNC: 122 U/L (ref 40–129)
ALT SERPL-CCNC: 10 U/L (ref 10–40)
ANION GAP SERPL CALCULATED.3IONS-SCNC: 11 MMOL/L (ref 3–16)
AST SERPL-CCNC: 12 U/L (ref 15–37)
BILIRUB SERPL-MCNC: 0.6 MG/DL (ref 0–1)
BUN BLDV-MCNC: 18 MG/DL (ref 7–20)
CALCIUM SERPL-MCNC: 9 MG/DL (ref 8.3–10.6)
CHLORIDE BLD-SCNC: 104 MMOL/L (ref 99–110)
CO2: 26 MMOL/L (ref 21–32)
CREAT SERPL-MCNC: 0.9 MG/DL (ref 0.6–1.1)
EKG ATRIAL RATE: 61 BPM
EKG ATRIAL RATE: 94 BPM
EKG DIAGNOSIS: NORMAL
EKG DIAGNOSIS: NORMAL
EKG P AXIS: 44 DEGREES
EKG P-R INTERVAL: 242 MS
EKG Q-T INTERVAL: 378 MS
EKG Q-T INTERVAL: 406 MS
EKG QRS DURATION: 92 MS
EKG QRS DURATION: 94 MS
EKG QTC CALCULATION (BAZETT): 408 MS
EKG QTC CALCULATION (BAZETT): 439 MS
EKG R AXIS: 22 DEGREES
EKG R AXIS: 42 DEGREES
EKG T AXIS: 128 DEGREES
EKG T AXIS: 75 DEGREES
EKG VENTRICULAR RATE: 61 BPM
EKG VENTRICULAR RATE: 81 BPM
GFR SERPL CREATININE-BSD FRML MDRD: >60 ML/MIN/{1.73_M2}
GLUCOSE BLD-MCNC: 97 MG/DL (ref 70–99)
INR BLD: 1.1 (ref 0.87–1.14)
POTASSIUM SERPL-SCNC: 4 MMOL/L (ref 3.5–5.1)
PROTHROMBIN TIME: 14.2 SEC (ref 11.7–14.5)
SODIUM BLD-SCNC: 141 MMOL/L (ref 136–145)
TOTAL PROTEIN: 6.9 G/DL (ref 6.4–8.2)

## 2023-02-28 PROCEDURE — 80053 COMPREHEN METABOLIC PANEL: CPT

## 2023-02-28 PROCEDURE — 85610 PROTHROMBIN TIME: CPT

## 2023-02-28 PROCEDURE — 92960 CARDIOVERSION ELECTRIC EXT: CPT

## 2023-02-28 PROCEDURE — 93005 ELECTROCARDIOGRAM TRACING: CPT | Performed by: INTERNAL MEDICINE

## 2023-02-28 PROCEDURE — 93010 ELECTROCARDIOGRAM REPORT: CPT | Performed by: INTERNAL MEDICINE

## 2023-02-28 PROCEDURE — 94761 N-INVAS EAR/PLS OXIMETRY MLT: CPT

## 2023-02-28 PROCEDURE — 2700000000 HC OXYGEN THERAPY PER DAY

## 2023-02-28 PROCEDURE — 92960 CARDIOVERSION ELECTRIC EXT: CPT | Performed by: INTERNAL MEDICINE

## 2023-02-28 NOTE — H&P
[address]    Electrophysiology Procedure Note       Date of Procedure: 2/28/2023  Patient's Name: Elif Edge  YOB: 1965   Medical Record Number: [id number]    Indication:  Cardioversion for atrial fibrillation    Consent:   I have discussed with the patient and/or the patient representative the indication, alternatives, and the possible risks and/or complications of the planned procedure and the anesthesia methods. The patient and/or patient representative appear to understand and agree to proceed.     Past Medical History:   Diagnosis Date    Acute lateral meniscus tear of right knee 02/2019    Acute medial meniscus tear of right knee 02/2019    Anesthesia complication     woke up during one surgery    Anxiety     Arthritis     Asthma     Atrial fibrillation ([address]     new dx 4 weeks ago, first of  Sept 2017    CHF (congestive heart failure) ([facility])     Edema     Fibromyalgia     GERD (gastroesophageal reflux disease)     reflux    Hiatal hernia     History of blood transfusion     Hx of major depression     Idiopathic urticaria 10/29/2020    SVT (supraventricular tachycardia) ([facility])     hx svt       Past Surgical History:   Procedure Laterality Date    ABLATION OF DYSRHYTHMIC FOCUS  04/2019    afib    BLADDER SUSPENSION  2004    CERVICAL FUSION N/A 9/30/2022    ANTERIOR CERVICAL DISCECTOMY AND FUSION CERVICAL 5 - CERVICAL 6, CERVICAL 6 - CERVICAL 7 WITH DEPUY ALLOGRAFT, PLATE AND SCREWS AND EVOKES               **LATEX SENSITIVE** performed by Neris Dodd MD at [address] N/A 8/4/2022    COLONOSCOPY performed by Trina Willson at [address] ([address] [place] [address])  2004    KNEE ARTHROSCOPY Right 7/11/2019    VIDEO ARTHROSCOPY RIGHT KNEE, PARTIAL MEDIAL AND LATERAL MENISCECTOMY,, MEDIAL MICRO FRACTURE CHONDROPLASTY performed by Hayden Del Rosario MD at [facility] 2  2011 TONSILLECTOMY AND ADENOIDECTOMY      UPPER GASTROINTESTINAL ENDOSCOPY N/A 8/4/2022    EGD BIOPSY performed by Cornelius Pruitt at [address] N/A 8/26/2019    DIAGNOSTIC LAPAROSCOPY, LAPAROSCOPIC LYSIS OF ADHESIONS, LAPAROSCOPIC REDUCTION OF RECURRENT INCISIONAL HERNIA WITH MESH performed by Rachelle Alejandro MD at [address]       Prior to Admission medications    Medication Sig Start Date End Date Taking? Authorizing Provider   amiodarone (CORDARONE) 200 MG tablet Take 1 tablet by mouth twice daily for 10 days, then take 1 tablet by mouth daily thereafter 2/8/23   Daisy Duff MD   acetaminophen-codeine (TYLENOL/CODEINE #3) 300-30 MG per tablet Take 1 tablet by mouth in the morning and at bedtime for 30 days.  Max Daily Amount: 2 tablets 2/6/23 3/8/23  Oumar Washington MD   levalbuterol [facility]) 45 MCG/ACT inhaler Inhale 1 puff into the lungs every 4 hours as needed for Wheezing 1/20/23 1/20/24  Oumar Washington MD   digoxin (LANOXIN) 250 MCG tablet Take 0.5 tablets by mouth daily 1/19/23   STEPHANIE Gorman - CNP   apixaban (ELIQUIS) 5 MG TABS tablet Take 1 tablet by mouth 2 times daily 1/12/23 2/28/23  [facility], DO   dilTIAZem (CARDIZEM CD) 240 MG extended release capsule Take 1 capsule by mouth daily 1/13/23   Vera East, DO   furosemide (LASIX) 40 MG tablet Take 1 tablet by mouth daily 1/12/23   [facility], DO   rOPINIRole (REQUIP) 1 MG tablet Take 1.5 mg by mouth in the morning and at bedtime    Historical Provider, MD   ipratropium-albuterol (DUONEB) 0.5-2.5 (3) MG/3ML SOLN nebulizer solution Inhale 1 vial into the lungs every 4 hours as needed for Shortness of Breath    Historical Provider, MD   omalizumab Sharonlashawn Moya) 150 MG/ML SOSY injection Inject 300 mg into the skin every 28 days    Historical Provider, MD   cyclobenzaprine (FLEXERIL) 10 MG tablet Take 1 tablet by mouth 3 times daily as needed for Muscle spasms  Patient not taking: Reported on 2/28/2023 11/10/22   Eden Ramachandran MD   Respiratory Therapy Supplies (NEBULIZER/TUBING/MOUTHPIECE) KIT Continuous 10/10/22   Eden Ramachandran MD   albuterol sulfate HFA (PROVENTIL;VENTOLIN;PROAIR) 108 (90 Base) MCG/ACT inhaler INHALE 2 PUFFS BY MOUTH EVERY 4 HOURS AS NEEDED FOR WHEEZING OR SHORTNESS OF BREATH(SPACE OUT TO EVERY 6 HOURS AS SYMPTOMS IMPROVE) 8/17/22   Jabari Cruz MD   EPINEPHrine (EPIPEN) 0.3 MG/0.3ML SOAJ injection INJECT INTO UPPER THIGH AS DIRECTED AS NEEDED FOR ANAPHYLAXIS 3/8/22   Historical Provider, MD Flavio Krueger 200-5 MCG/ACT inhaler INHALE TWO PUFFS BY MOUTH TWICE A DAY 5/29/21   Jabari Cruz MD         Pre-sedation Documentation and Exam:  I have personally completed a history, physical exam & review of systems for this patient (see notes).     Symptomatic early persistent AF on flecainide with DCCV and increase in flecainide dose, had recurrence of AF at office visit, placed on amiodarone, here for repeat DCCV now on amiodarone, plan for ablation at future date    Temp 98.1 °F (36.7 °C) (Oral)   Ht 5' 10\" (1.778 m)   Wt (!) 344 lb (156 kg)   SpO2 98%   BMI 49.36 kg/m²   NAD  Chest clear non labored  Heart irregular rhythm, regular rate, trace no pitting LE edema  Abd soft non tender  No focal neuro deficits  Appropriate mood and affect       Mallampati Airway Assessment:  III     ASA Classification:  Class 3 - A patient with severe systemic disease that limits activity but is not incapacitating     Sedation/Anesthesia Plan:  moderate     Medications Planned:  Brevital intravenously        Vanessa Campos MD  [address]   Office: [phone]  Fax: [phone]

## 2023-02-28 NOTE — DISCHARGE INSTRUCTIONS
[facility]  Cardiovascular Special Procedures  Cardioversion  Patient Discharge Instructions    No driving for 24 hours. We strongly recommend that a responsible adult stay with you for the next 24 hours. Hydrocortisone 1% cream to reddened areas as needed. [facility]  Cardiovascular Special Procedures  General Discharge Instructions    PROCEDURE: cardioversion    __X__ You may be drowsy or lightheaded after receiving sedation. DO NOT operate a vehicle (automobile, bicycle, motorcycle, machinery, or power tools), make any important decisions or sign any important/legal documents, or drink alcoholic beverages for the next 24 hours  __X__ We strongly suggest that a responsible adult be with you for the next 24 hours for your protection and safety  ____ If the intravenous catheter site is painful, apply warm wet compresses on the site until the soreness is relieved and elevate the arm above the heart. Call your physician if no improvement in 2 to 3 days    DIETARY INSTRUCTIONS:    ____ Drink extra fluids over the next 24 hours (If not contraindicated by illness or by physician order)  ____ Start with clear liquids and progress to normal diet as you feel like eating. If you experience nausea or repeated episodes of vomiting, which persist beyond 12-24 hours, notify your doctor        __X__ Resume your previous diet    ACTIVITY INSTRUCTIONS:    ____ See other instructions  ____ No special instructions  ____ Rest for 24 hours    ____ Up as tolerated  ____ Increase activity as tolerated    Wound/Dressing Instructions:  ____ See other instructions  ____ May shower, tomorrow  ____ Remove bandage within 24 hours    MEDICATION INSTRUCTIONS:    ____ See Medication Reconciliation Sheet        FOLLOW-UP APPOINTMENT    Follow up with MD as directed. Belongings returned to patient and/or family: Yes. The Discharge Instructions have been explained to me.   I understand and can verbalize these instructions.

## 2023-02-28 NOTE — PROCEDURES
[address]     Electrophysiology Procedure Note       Date of Procedure: 2/28/2023  Patient's Name: Bharti Clemente  YOB: 1965   Medical Record Number: [id number]  Procedure Performed by: Emily Verdin MD    Procedures performed:  External Electrical cardioversion   IV sedation. Start time: 1210  Stop time: 1225    Medications Used: Brevital    Brevital Sodium: 100 Mg     Indication of the procedure: di  Symptomatic early persistent AF on flecainide with DCCV and increase in flecainide dose, had recurrence of AF at office visit, placed on amiodarone, here for repeat DCCV now on amiodarone, plan for ablation at future date    DCCV on therapeutic uninterrupted anticoagulation since last SHEY, no repeat SHEY indicated    Details of procedure: The patient was brought to the cath lab area in a fasting and non-sedated state. The risks, benefits and alternatives of the procedure were discussed with the patient. The patient opted to proceed with the procedure. Written informed consent was signed and placed in the chart. A timeout protocol was completed to identify the patient and the procedure being performed. 100mg Brevital was given by physician prior to cardioversion     Cardioversion  100 mg Brevital was given by me as one dose, and electrical DC cardioversion was perfomred using 200J synchronized shock. Patient was converted to sinus rhythm. The patient tolerated the procedure well and there were no complications.      Assessment:   Successful external DC cardioversion of atrial fibrillation    Plan:  Continue amiodarone 200mg daily  Continue eliquis 5mg BID  Continue digoxin, diltiazem  Plan for AF ablation  Follow up in electrophysiolgy clinic with Dr Queen Milian in 2-3 months    MD MALAIKA [address]   Office: [phone]  Fax: [phone]

## 2023-03-06 NOTE — PRE-PROCEDURE INSTRUCTIONS
Called patient about procedure. Told to be here at 0630 for procedure at 0800. NPO after midnight, but can take morning medication with sips of water, patient stated they will hold their Eliquis starting tonight per office. To have a responsible adult be with patient take them home and stay with them afterwards, if they do not get admitted to [address] [place] [address]. And if available bring current list of medications. No other questions or concerns.

## 2023-03-07 ENCOUNTER — ANESTHESIA (OUTPATIENT)
Dept: CARDIAC CATH/INVASIVE PROCEDURES | Age: 58
End: 2023-03-07

## 2023-03-07 ENCOUNTER — HOSPITAL ENCOUNTER (OUTPATIENT)
Dept: CARDIAC CATH/INVASIVE PROCEDURES | Age: 58
Setting detail: OBSERVATION
Discharge: HOME | End: 2023-03-08
Attending: INTERNAL MEDICINE | Admitting: INTERNAL MEDICINE
Payer: MEDICAID

## 2023-03-07 ENCOUNTER — ANESTHESIA EVENT (OUTPATIENT)
Dept: CARDIAC CATH/INVASIVE PROCEDURES | Age: 58
End: 2023-03-07

## 2023-03-07 PROBLEM — I48.91 ATRIAL FIBRILLATION (HCC): Status: ACTIVE | Noted: 2023-03-07

## 2023-03-07 LAB
EKG ATRIAL RATE: 416 BPM
EKG DIAGNOSIS: NORMAL
EKG Q-T INTERVAL: 382 MS
EKG QRS DURATION: 88 MS
EKG QTC CALCULATION (BAZETT): 420 MS
EKG R AXIS: 19 DEGREES
EKG T AXIS: 97 DEGREES
EKG VENTRICULAR RATE: 73 BPM
HCT VFR BLD CALC: 37 % (ref 36–48)
HCT VFR BLD CALC: 40.4 % (ref 36–48)
HEMOGLOBIN: 11.7 G/DL (ref 12–16)
HEMOGLOBIN: 12.3 G/DL (ref 12–16)
MCH RBC QN AUTO: 24.5 PG (ref 26–34)
MCH RBC QN AUTO: 24.9 PG (ref 26–34)
MCHC RBC AUTO-ENTMCNC: 30.5 G/DL (ref 31–36)
MCHC RBC AUTO-ENTMCNC: 31.8 G/DL (ref 31–36)
MCV RBC AUTO: 78.5 FL (ref 80–100)
MCV RBC AUTO: 80.4 FL (ref 80–100)
PDW BLD-RTO: 19.4 % (ref 12.4–15.4)
PDW BLD-RTO: 19.8 % (ref 12.4–15.4)
PLATELET # BLD: 289 K/UL (ref 135–450)
PLATELET # BLD: 307 K/UL (ref 135–450)
PMV BLD AUTO: 8.7 FL (ref 5–10.5)
PMV BLD AUTO: 8.8 FL (ref 5–10.5)
POC ACT LR: 248 SEC
POC ACT LR: 288 SEC
POC ACT LR: 306 SEC
POC ACT LR: 324 SEC
POC ACT LR: 344 SEC
POC ACT LR: 358 SEC
POC ACT LR: 366 SEC
POC ACT LR: 379 SEC
POC ACT LR: 387 SEC
POC ACT LR: 393 SEC
POC ACT LR: 393 SEC
POC ACT LR: >400 SEC
RBC # BLD: 4.71 M/UL (ref 4–5.2)
RBC # BLD: 5.02 M/UL (ref 4–5.2)
TROPONIN: 1.65 NG/ML
WBC # BLD: 12.7 K/UL (ref 4–11)
WBC # BLD: 6.5 K/UL (ref 4–11)

## 2023-03-07 PROCEDURE — 2580000003 HC RX 258: Performed by: INTERNAL MEDICINE

## 2023-03-07 PROCEDURE — 85027 COMPLETE CBC AUTOMATED: CPT

## 2023-03-07 PROCEDURE — 2709999900 HC NON-CHARGEABLE SUPPLY

## 2023-03-07 PROCEDURE — G0378 HOSPITAL OBSERVATION PER HR: HCPCS

## 2023-03-07 PROCEDURE — C1760 CLOSURE DEV, VASC: HCPCS

## 2023-03-07 PROCEDURE — 93655 ICAR CATH ABLTJ DSCRT ARRHYT: CPT

## 2023-03-07 PROCEDURE — C1766 INTRO/SHEATH,STRBLE,NON-PEEL: HCPCS

## 2023-03-07 PROCEDURE — C1894 INTRO/SHEATH, NON-LASER: HCPCS

## 2023-03-07 PROCEDURE — 2500000003 HC RX 250 WO HCPCS: Performed by: NURSE ANESTHETIST, CERTIFIED REGISTERED

## 2023-03-07 PROCEDURE — 3700000000 HC ANESTHESIA ATTENDED CARE

## 2023-03-07 PROCEDURE — 84484 ASSAY OF TROPONIN QUANT: CPT

## 2023-03-07 PROCEDURE — 93656 COMPRE EP EVAL ABLTJ ATR FIB: CPT

## 2023-03-07 PROCEDURE — 2500000003 HC RX 250 WO HCPCS

## 2023-03-07 PROCEDURE — 36415 COLL VENOUS BLD VENIPUNCTURE: CPT

## 2023-03-07 PROCEDURE — C1759 CATH, INTRA ECHOCARDIOGRAPHY: HCPCS

## 2023-03-07 PROCEDURE — 6360000002 HC RX W HCPCS: Performed by: NURSE ANESTHETIST, CERTIFIED REGISTERED

## 2023-03-07 PROCEDURE — C1730 CATH, EP, 19 OR FEW ELECT: HCPCS

## 2023-03-07 PROCEDURE — C1732 CATH, EP, DIAG/ABL, 3D/VECT: HCPCS

## 2023-03-07 PROCEDURE — 85347 COAGULATION TIME ACTIVATED: CPT

## 2023-03-07 PROCEDURE — 6370000000 HC RX 637 (ALT 250 FOR IP): Performed by: INTERNAL MEDICINE

## 2023-03-07 PROCEDURE — 93622 COMP EP EVAL L VENTR PAC&REC: CPT

## 2023-03-07 PROCEDURE — 93005 ELECTROCARDIOGRAM TRACING: CPT | Performed by: INTERNAL MEDICINE

## 2023-03-07 PROCEDURE — 93010 ELECTROCARDIOGRAM REPORT: CPT | Performed by: INTERNAL MEDICINE

## 2023-03-07 PROCEDURE — 2580000003 HC RX 258: Performed by: NURSE ANESTHETIST, CERTIFIED REGISTERED

## 2023-03-07 PROCEDURE — 6360000002 HC RX W HCPCS

## 2023-03-07 PROCEDURE — 3700000001 HC ADD 15 MINUTES (ANESTHESIA)

## 2023-03-07 PROCEDURE — G0379 DIRECT REFER HOSPITAL OBSERV: HCPCS

## 2023-03-07 RX ORDER — ACETAMINOPHEN 325 MG/1
650 TABLET ORAL EVERY 6 HOURS PRN
Status: DISCONTINUED | OUTPATIENT
Start: 2023-03-07 | End: 2023-03-08 | Stop reason: HOSPADM

## 2023-03-07 RX ORDER — SODIUM CHLORIDE 9 MG/ML
INJECTION, SOLUTION INTRAVENOUS PRN
Status: DISCONTINUED | OUTPATIENT
Start: 2023-03-07 | End: 2023-03-07

## 2023-03-07 RX ORDER — FENTANYL CITRATE 50 UG/ML
INJECTION, SOLUTION INTRAMUSCULAR; INTRAVENOUS PRN
Status: DISCONTINUED | OUTPATIENT
Start: 2023-03-07 | End: 2023-03-07 | Stop reason: SDUPTHER

## 2023-03-07 RX ORDER — POLYETHYLENE GLYCOL 3350 17 G/17G
17 POWDER, FOR SOLUTION ORAL DAILY PRN
Status: DISCONTINUED | OUTPATIENT
Start: 2023-03-07 | End: 2023-03-08 | Stop reason: HOSPADM

## 2023-03-07 RX ORDER — ACETAMINOPHEN 650 MG/1
650 SUPPOSITORY RECTAL EVERY 6 HOURS PRN
Status: DISCONTINUED | OUTPATIENT
Start: 2023-03-07 | End: 2023-03-08 | Stop reason: HOSPADM

## 2023-03-07 RX ORDER — BUDESONIDE 0.5 MG/2ML
0.5 INHALANT ORAL 2 TIMES DAILY
Status: DISCONTINUED | OUTPATIENT
Start: 2023-03-07 | End: 2023-03-08 | Stop reason: HOSPADM

## 2023-03-07 RX ORDER — AMIODARONE HYDROCHLORIDE 200 MG/1
200 TABLET ORAL DAILY
Status: DISCONTINUED | OUTPATIENT
Start: 2023-03-08 | End: 2023-03-08 | Stop reason: HOSPADM

## 2023-03-07 RX ORDER — ACETAMINOPHEN 325 MG/1
650 TABLET ORAL EVERY 4 HOURS PRN
Status: DISCONTINUED | OUTPATIENT
Start: 2023-03-07 | End: 2023-03-07

## 2023-03-07 RX ORDER — HYDROCODONE BITARTRATE AND ACETAMINOPHEN 5; 325 MG/1; MG/1
1 TABLET ORAL EVERY 6 HOURS PRN
Status: DISCONTINUED | OUTPATIENT
Start: 2023-03-07 | End: 2023-03-08 | Stop reason: HOSPADM

## 2023-03-07 RX ORDER — ROCURONIUM BROMIDE 10 MG/ML
INJECTION, SOLUTION INTRAVENOUS PRN
Status: DISCONTINUED | OUTPATIENT
Start: 2023-03-07 | End: 2023-03-07 | Stop reason: SDUPTHER

## 2023-03-07 RX ORDER — DIPHENHYDRAMINE HYDROCHLORIDE 50 MG/ML
12.5 INJECTION INTRAMUSCULAR; INTRAVENOUS
Status: ACTIVE | OUTPATIENT
Start: 2023-03-07 | End: 2023-03-08

## 2023-03-07 RX ORDER — SODIUM CHLORIDE 0.9 % (FLUSH) 0.9 %
5-40 SYRINGE (ML) INJECTION PRN
Status: DISCONTINUED | OUTPATIENT
Start: 2023-03-07 | End: 2023-03-08 | Stop reason: HOSPADM

## 2023-03-07 RX ORDER — SODIUM CHLORIDE, SODIUM LACTATE, POTASSIUM CHLORIDE, CALCIUM CHLORIDE 600; 310; 30; 20 MG/100ML; MG/100ML; MG/100ML; MG/100ML
INJECTION, SOLUTION INTRAVENOUS CONTINUOUS PRN
Status: DISCONTINUED | OUTPATIENT
Start: 2023-03-07 | End: 2023-03-07 | Stop reason: SDUPTHER

## 2023-03-07 RX ORDER — LABETALOL HYDROCHLORIDE 5 MG/ML
10 INJECTION, SOLUTION INTRAVENOUS
Status: DISCONTINUED | OUTPATIENT
Start: 2023-03-07 | End: 2023-03-08 | Stop reason: HOSPADM

## 2023-03-07 RX ORDER — PANTOPRAZOLE SODIUM 40 MG/1
40 TABLET, DELAYED RELEASE ORAL
Status: DISCONTINUED | OUTPATIENT
Start: 2023-03-07 | End: 2023-03-08 | Stop reason: HOSPADM

## 2023-03-07 RX ORDER — PROPOFOL 10 MG/ML
INJECTION, EMULSION INTRAVENOUS PRN
Status: DISCONTINUED | OUTPATIENT
Start: 2023-03-07 | End: 2023-03-07 | Stop reason: SDUPTHER

## 2023-03-07 RX ORDER — ONDANSETRON 2 MG/ML
4 INJECTION INTRAMUSCULAR; INTRAVENOUS EVERY 6 HOURS PRN
Status: DISCONTINUED | OUTPATIENT
Start: 2023-03-07 | End: 2023-03-08 | Stop reason: HOSPADM

## 2023-03-07 RX ORDER — SUCCINYLCHOLINE/SOD CL,ISO/PF 100 MG/5ML
SYRINGE (ML) INTRAVENOUS PRN
Status: DISCONTINUED | OUTPATIENT
Start: 2023-03-07 | End: 2023-03-07 | Stop reason: SDUPTHER

## 2023-03-07 RX ORDER — SODIUM CHLORIDE 0.9 % (FLUSH) 0.9 %
5-40 SYRINGE (ML) INJECTION EVERY 12 HOURS SCHEDULED
Status: DISCONTINUED | OUTPATIENT
Start: 2023-03-07 | End: 2023-03-08 | Stop reason: HOSPADM

## 2023-03-07 RX ORDER — MAGNESIUM SULFATE IN WATER 40 MG/ML
2000 INJECTION, SOLUTION INTRAVENOUS ONCE
Status: DISCONTINUED | OUTPATIENT
Start: 2023-03-07 | End: 2023-03-08 | Stop reason: HOSPADM

## 2023-03-07 RX ORDER — MIDAZOLAM HYDROCHLORIDE 1 MG/ML
INJECTION INTRAMUSCULAR; INTRAVENOUS PRN
Status: DISCONTINUED | OUTPATIENT
Start: 2023-03-07 | End: 2023-03-07 | Stop reason: SDUPTHER

## 2023-03-07 RX ORDER — SODIUM CHLORIDE 9 MG/ML
INJECTION, SOLUTION INTRAVENOUS PRN
Status: DISCONTINUED | OUTPATIENT
Start: 2023-03-07 | End: 2023-03-08 | Stop reason: HOSPADM

## 2023-03-07 RX ORDER — DILTIAZEM HYDROCHLORIDE 240 MG/1
240 CAPSULE, COATED, EXTENDED RELEASE ORAL DAILY
Status: DISCONTINUED | OUTPATIENT
Start: 2023-03-08 | End: 2023-03-08 | Stop reason: HOSPADM

## 2023-03-07 RX ORDER — LIDOCAINE HYDROCHLORIDE 20 MG/ML
INJECTION, SOLUTION EPIDURAL; INFILTRATION; INTRACAUDAL; PERINEURAL PRN
Status: DISCONTINUED | OUTPATIENT
Start: 2023-03-07 | End: 2023-03-07 | Stop reason: SDUPTHER

## 2023-03-07 RX ORDER — METOCLOPRAMIDE HYDROCHLORIDE 5 MG/ML
10 INJECTION INTRAMUSCULAR; INTRAVENOUS
Status: ACTIVE | OUTPATIENT
Start: 2023-03-07 | End: 2023-03-08

## 2023-03-07 RX ORDER — ONDANSETRON 4 MG/1
4 TABLET, ORALLY DISINTEGRATING ORAL EVERY 8 HOURS PRN
Status: DISCONTINUED | OUTPATIENT
Start: 2023-03-07 | End: 2023-03-08 | Stop reason: HOSPADM

## 2023-03-07 RX ORDER — PROTAMINE SULFATE 10 MG/ML
INJECTION, SOLUTION INTRAVENOUS PRN
Status: DISCONTINUED | OUTPATIENT
Start: 2023-03-07 | End: 2023-03-07 | Stop reason: SDUPTHER

## 2023-03-07 RX ORDER — HYDRALAZINE HYDROCHLORIDE 20 MG/ML
10 INJECTION INTRAMUSCULAR; INTRAVENOUS
Status: DISCONTINUED | OUTPATIENT
Start: 2023-03-07 | End: 2023-03-08 | Stop reason: HOSPADM

## 2023-03-07 RX ORDER — FUROSEMIDE 10 MG/ML
INJECTION INTRAMUSCULAR; INTRAVENOUS PRN
Status: DISCONTINUED | OUTPATIENT
Start: 2023-03-07 | End: 2023-03-07 | Stop reason: SDUPTHER

## 2023-03-07 RX ORDER — SODIUM CHLORIDE 9 MG/ML
25 INJECTION, SOLUTION INTRAVENOUS PRN
Status: DISCONTINUED | OUTPATIENT
Start: 2023-03-07 | End: 2023-03-08 | Stop reason: HOSPADM

## 2023-03-07 RX ORDER — FUROSEMIDE 40 MG/1
40 TABLET ORAL DAILY
Status: DISCONTINUED | OUTPATIENT
Start: 2023-03-07 | End: 2023-03-08 | Stop reason: HOSPADM

## 2023-03-07 RX ORDER — HEPARIN SODIUM 10000 [USP'U]/ML
INJECTION, SOLUTION INTRAVENOUS; SUBCUTANEOUS PRN
Status: DISCONTINUED | OUTPATIENT
Start: 2023-03-07 | End: 2023-03-07 | Stop reason: SDUPTHER

## 2023-03-07 RX ORDER — ARFORMOTEROL TARTRATE 15 UG/2ML
15 SOLUTION RESPIRATORY (INHALATION) 2 TIMES DAILY
Status: DISCONTINUED | OUTPATIENT
Start: 2023-03-07 | End: 2023-03-08 | Stop reason: HOSPADM

## 2023-03-07 RX ADMIN — HEPARIN SODIUM 2000 UNITS: 10000 INJECTION, SOLUTION INTRAVENOUS; SUBCUTANEOUS at 11:32

## 2023-03-07 RX ADMIN — ROPINIROLE HYDROCHLORIDE 1.5 MG: 0.5 TABLET, FILM COATED ORAL at 21:32

## 2023-03-07 RX ADMIN — HEPARIN SODIUM 5000 UNITS: 10000 INJECTION, SOLUTION INTRAVENOUS; SUBCUTANEOUS at 09:18

## 2023-03-07 RX ADMIN — Medication 200 MG: at 08:13

## 2023-03-07 RX ADMIN — HEPARIN SODIUM 5000 UNITS: 10000 INJECTION, SOLUTION INTRAVENOUS; SUBCUTANEOUS at 09:48

## 2023-03-07 RX ADMIN — HEPARIN SODIUM 12000 UNITS: 10000 INJECTION, SOLUTION INTRAVENOUS; SUBCUTANEOUS at 08:39

## 2023-03-07 RX ADMIN — ROCURONIUM BROMIDE 5 MG: 10 INJECTION INTRAVENOUS at 08:11

## 2023-03-07 RX ADMIN — LIDOCAINE HYDROCHLORIDE 50 MG: 20 INJECTION, SOLUTION EPIDURAL; INFILTRATION; INTRACAUDAL; PERINEURAL at 08:11

## 2023-03-07 RX ADMIN — SODIUM CHLORIDE, SODIUM LACTATE, POTASSIUM CHLORIDE, AND CALCIUM CHLORIDE: .6; .31; .03; .02 INJECTION, SOLUTION INTRAVENOUS at 10:41

## 2023-03-07 RX ADMIN — FENTANYL CITRATE 50 MCG: 50 INJECTION, SOLUTION INTRAMUSCULAR; INTRAVENOUS at 13:47

## 2023-03-07 RX ADMIN — PHENYLEPHRINE HYDROCHLORIDE 25 MCG/MIN: 10 INJECTION INTRAVENOUS at 08:35

## 2023-03-07 RX ADMIN — HYDROCODONE BITARTRATE AND ACETAMINOPHEN 1 TABLET: 5; 325 TABLET ORAL at 21:32

## 2023-03-07 RX ADMIN — HEPARIN SODIUM 1500 UNITS: 10000 INJECTION, SOLUTION INTRAVENOUS; SUBCUTANEOUS at 12:12

## 2023-03-07 RX ADMIN — MIDAZOLAM HYDROCHLORIDE 2 MG: 2 INJECTION, SOLUTION INTRAMUSCULAR; INTRAVENOUS at 08:30

## 2023-03-07 RX ADMIN — FUROSEMIDE 40 MG: 40 TABLET ORAL at 18:33

## 2023-03-07 RX ADMIN — SUGAMMADEX 200 MG: 100 INJECTION, SOLUTION INTRAVENOUS at 10:07

## 2023-03-07 RX ADMIN — HEPARIN SODIUM 4000 UNITS: 10000 INJECTION, SOLUTION INTRAVENOUS; SUBCUTANEOUS at 09:33

## 2023-03-07 RX ADMIN — PROPOFOL 200 MG: 10 INJECTION, EMULSION INTRAVENOUS at 08:11

## 2023-03-07 RX ADMIN — SODIUM CHLORIDE, SODIUM LACTATE, POTASSIUM CHLORIDE, AND CALCIUM CHLORIDE: .6; .31; .03; .02 INJECTION, SOLUTION INTRAVENOUS at 08:00

## 2023-03-07 RX ADMIN — APIXABAN 5 MG: 5 TABLET, FILM COATED ORAL at 18:32

## 2023-03-07 RX ADMIN — ISOPROTERENOL HYDROCHLORIDE 10 MCG/MIN: 0.2 INJECTION, SOLUTION INTRAMUSCULAR; INTRAVENOUS at 12:51

## 2023-03-07 RX ADMIN — FUROSEMIDE 20 MG: 10 INJECTION, SOLUTION INTRAMUSCULAR; INTRAVENOUS at 13:42

## 2023-03-07 RX ADMIN — ISOPROTERENOL HYDROCHLORIDE 5 MCG/MIN: 0.2 INJECTION, SOLUTION INTRAMUSCULAR; INTRAVENOUS at 11:59

## 2023-03-07 RX ADMIN — HEPARIN SODIUM 3000 UNITS: 10000 INJECTION, SOLUTION INTRAVENOUS; SUBCUTANEOUS at 10:40

## 2023-03-07 RX ADMIN — ROCURONIUM BROMIDE 50 MG: 10 INJECTION INTRAVENOUS at 09:16

## 2023-03-07 RX ADMIN — PROTAMINE SULFATE 10 MG: 10 INJECTION, SOLUTION INTRAVENOUS at 13:27

## 2023-03-07 RX ADMIN — FENTANYL CITRATE 50 MCG: 50 INJECTION, SOLUTION INTRAMUSCULAR; INTRAVENOUS at 14:00

## 2023-03-07 RX ADMIN — SODIUM CHLORIDE, PRESERVATIVE FREE 10 ML: 5 INJECTION INTRAVENOUS at 21:33

## 2023-03-07 RX ADMIN — HEPARIN SODIUM 4000 UNITS: 10000 INJECTION, SOLUTION INTRAVENOUS; SUBCUTANEOUS at 09:00

## 2023-03-07 RX ADMIN — ROCURONIUM BROMIDE 45 MG: 10 INJECTION INTRAVENOUS at 08:30

## 2023-03-07 RX ADMIN — HEPARIN SODIUM 1500 UNITS: 10000 INJECTION, SOLUTION INTRAVENOUS; SUBCUTANEOUS at 11:58

## 2023-03-07 RX ADMIN — HEPARIN SODIUM 1000 UNITS: 10000 INJECTION, SOLUTION INTRAVENOUS; SUBCUTANEOUS at 12:46

## 2023-03-07 ASSESSMENT — PAIN DESCRIPTION - LOCATION: LOCATION: GENERALIZED

## 2023-03-07 ASSESSMENT — PAIN DESCRIPTION - DESCRIPTORS: DESCRIPTORS: ACHING

## 2023-03-07 ASSESSMENT — PAIN - FUNCTIONAL ASSESSMENT: PAIN_FUNCTIONAL_ASSESSMENT: PREVENTS OR INTERFERES SOME ACTIVE ACTIVITIES AND ADLS

## 2023-03-07 ASSESSMENT — PAIN SCALES - GENERAL
PAINLEVEL_OUTOF10: 0
PAINLEVEL_OUTOF10: 6
PAINLEVEL_OUTOF10: 0

## 2023-03-07 ASSESSMENT — PAIN DESCRIPTION - PAIN TYPE: TYPE: CHRONIC PAIN

## 2023-03-07 ASSESSMENT — PAIN DESCRIPTION - FREQUENCY: FREQUENCY: INTERMITTENT

## 2023-03-07 ASSESSMENT — PAIN DESCRIPTION - ONSET: ONSET: PROGRESSIVE

## 2023-03-07 NOTE — PROCEDURES
[facility]     Electrophysiology Procedure Note       Date of Procedure: 3/7/2023  Patient's Name: Avtar Abarca  YOB: 1965   Medical Record Number: [id number]  Procedure Performed by: Janeth Terry MD    Procedure performed:     Electrophysiology study with radiofrequency ablation of atrial fibrillation and pulmonary vein isolation   3-D electroanatomical mapping of the left atrium  Transseptal puncture through an intact septum    Intracardiac echocardiography. IV drug infusion with Isuprel       Indications for procedure:   Symptomatic atrial fibrillation    Avtar Abarca is a 62 y.o. female with hx of atrial flutter status post CTI ablation, early persistent symptomatic atrial fibrillation who had early recurrence of atrial fibrillation following cardioversion on flecainide and early recurrence following cardioversion on amiodarone. She remains symptomatic with shortness of breath and fatigue, given persistent symptomatic nature we discussed proceeding with atrial fibrillation ablation. Details of Procedure: The risks, benefits and alternatives of the ablation procedure were discussed with the patient. The risks including, but not limited to, the risks of bleeding, infection, radiation exposure, injury to vascular, cardiac and surrounding structures (including pneumothorax), stroke, cardiac perforation, tamponade, need for emergent open heart surgery, need for pacemaker implantation, esophageal injury and fistula, myocardial infarction and death were discussed in detail. The patient opted to proceed with the ablation. Written informed consent was signed and placed in the chart. Patient was brought to the EP lab in a fasting non-sedate state. Patient underwent general anesthesia by anesthesia team. We initially performed a transesophageal echo that showed no left atrial appendage clot/thrombus. Groins were prepped in a sterile fashion.      Femoral venous access was obtained using a modified Seldinger technique under live ultrasound guidance. A wire was passed to the level of the right atrium. This was repeated twice for a total of 3 wires. Over wire an 8F sheath was passed and the wire and dilator was removed. This was repeated once. Over the final wire a short 6 Fr sheath was passed and the wire and dilator was removed. A coronary sinus catheter and intracardiac echo catheter was advanced to the RA through the respective 8 Fr sheaths. The CS cathter was placed inside the coronary sinus under fluoroscopy for recording and mapping of the left atrium. Using ICE we delineated the left pulmonary vein, left atrial appendage,  mitral valve, and right superior and right inferior pulmonary veins. Prior to full heparinization, ICE images did not reveal any significant pericardial effusion. Patient received a bolus of heparin prior transseptal puncture followed by additional heparin bolus / heparin drip with continuous monitoring of the ACT during the procedure to keep the ACT more than 350 seconds. Over wire the 6 Fr sheath was exchanged for a steerable 8.5 Fr sheath. Under ICE the sheath was advanced to the level of the SVC. Transseptal punctures through intact septum were done using ICE, and pressure monitoring and using [employer] system. Using [employer] and [address] navigation system a three dimensional electro anatomical mapping of the left atrium, in addition to right and left sided pulmonary vein was created. Using [place] ray catheter, a voltage map of the left atrium was created during atrial fibrillation (rhythm at start of case). This demonstrated normal posterior wall voltage and patchy low voltage regions adjacent to the pulmonary vein OS along the posterior wall. An esophageal temperature probe in addition to a quadripolar catheter was advanced into the esophagus to delineate its course and measure esophageal temperature.   The esophagus was mid posterior wall of the left atrium. Ablation stopped if the temperature was rising for more than ~ 0.5 C. All pulmonary veins had PV fascicles, the triggers for the atrial fibrillation. Then wide area circumferential ablation for right and left sided pulmonary veins were performed. Linear RF lesions (48 W for up to 10 seconds along the posterior wall and 48 W for up to 15 seconds along the anterior wall) was placed around both superior and inferior pulmonary vein in right and left side well outside the pulmonary vein ostium till all four pulmonary veins were isolated. On the posterior wall careful monitoring of esophageal temperature was done to prevent injury to esophagus. On the right side before ablating the anterior wall high amp pacing was performed to check and locate phrenic capture and avoid injury during ablation. First-pass isolation of the right-sided pulmonary veins was obtained. The patient remained in atrial fibrillation. The patient was electrically cardioverted with 200 J synchronized cardioversion into sinus rhythm. During mapping a PAC earliest along the distal CS (distal to proximal activation) resulted in atrial fibrillation. The patient was again electrically cardioverted using above settings and to sinus rhythm. While mapping the patient developed an atrial tachycardia (cycle length 255 ms) with a distal to proximal CS activation. While attempting to map within the left atrium rhythm devolved into atrial fibrillation. A third and final cardioversion was performed. Pacing from the coronary sinus an activation/voltage map of the left atrium was performed demonstrating entrance block into the right pulmonary veins, normal posterior wall voltage and earliest activation within the left sided pulmonary veins along the saundra.   Additional radiofrequency lesions were administered, using settings above, ultimately requiring a carinal line, resulting in entrance block of the inferior veins, and additional RF lesions along the roof and posterior wall resulting in entrance block of the superior vein. Pacing along the anterior aspect deeper in the left superior pulmonary vein resulted in atrial capture. We suspected capture of the left atrial appendage. The sheath was removed and the decapolar catheter was advanced into the left atrium through the single transseptal site. Pacing from the appendage interestingly did not appear to advance the signal to the pacing stimulus, suggesting persistent connection of the left superior pulmonary vein. It would be unusual for the superior pulmonary vein to be isolated along their posterior, superior and inferior aspect but not along the anterior aspect. This signal may have been isabelle as it was not sharp/large amplitude within the left superior pulmonary vein. The anatomy of the left superior pulmonary vein was such that it rested firmly against the appendage which explains exit despite low output pacing along its anterior aspect. While a persistent connection could not be excluded this seems unlikely or that an epicardial connection exists connecting deep within an isolated [id number] along the anterior aspect of the left superior pulmonary vein. Isuprel IV was started to check for induction of any other arrhythmia and recurrence of atrial fibrillation. Burst pacing up to 200 ms resulted in what appeared to be a focal atrial tachycardia (cycle length 348ms) with earliest activation along the CS (mid) that was mapped to the floor of the left atrium prior to spontaneous termination. Multiple attempts at burst pacing using similar settings down to 200 ms was performed without reinduction of this tachycardia and thus further mapping and ablation could not be performed. Aggressive burst pacing down to 200 ms did not result in atrial fibrillation which was easily induced with catheter manipulation and pacing at the start of/during the case. After ablation EP study and programmed stimulation was performed to rule out any other arrhythmia. The ablation catheter were moved from the left atrium to the left ventricular apex and His bundle position. His bundle potentials was recorded and pacing and recordings were performed from right atrium, coronary sinus and LV apex with the following results:     AH interval was 76 ms  HV interval was 52 ms  Pacing from right atrium, 1:1 conduction over AV node with (AV wenckebach) was 550 ms  Pacing from atrium, AV christie ERP was 700/470 ms   Pacing from LV apex, no retrograde conduction over AV node noted. Procedure was performed with intracardiac ultrasound and 3D mapping system without using fluoroscopy. Procedure was unusually complex and prolonged due to several factors, including difficulting in isolating the left sided pulmonary veins, difficult transseptal access (obtaining imaging with ICE) and induction/mapping of atrial tachycardias    At the end of the procedure, using ICE we confirmed the lack of any pericardial effusion. 10mg Protamine was given to reverse the heapin. Figure of 8 suture and vascade was used and sheaths were removed using manual compression. The patient tolerated the procedure well. Following sheath removal and closure but prior to transferring the patient to the stretcher she developed bleeding and hematoma from the right groin access sites. Additional pressure was maintained to provide hemostasis. A prolonged bedrest and overnight observation in the hospital will be performed. Patient was extubated and transferred to the floor in stable condition. EBL less than 20 ml.      Afib      Post sinus      Post sinus      Assessment:   Pulmonary vein isolations using wide area circumferential radiofrequency ablation   Induction of atrial tachycardia (CL 255ms), proximal to distal CS activation that devolved into atrial and thus could be effectively mapped/ablated  Induction of second focal atrial tachycardia (CL 348ms), on isuprel, mid CS earliest activation that was insufficiently mapped to the floor of the left atrium prior to spontaneous termination. Could not be re induced for further mapping and ablation  Aggressive burst pacing, down to 200ms both on and off isuprel did not induce atrial fibrillation  Synchronized cardioversion for recurrent atrial fibrillation during mapping and ablation and prior to bilateral pulmonary vein isolation  Right-sided groin hematoma following manual pressure and Vascade requiring additional manual pressure and prolonged bedrest      Plan:   1. 4 hour bed rest following sheath removal, modified bed rest to follow for additional 2 hours prior to ambulation  2. Continue anticoagulation, next dose of eliquis 4 hours after sheath pull, 1830  3. Stop digoxin  4. Continue amiodarone  5. Continue diltiazem  6. Telemetry overnight  7. PPI for 1 month post ablation  8. Follow up in EP clinic in 3 months post ablation with Dr Hiram Gandhi MD  [facility]   Office: [phone]

## 2023-03-07 NOTE — DISCHARGE INSTRUCTIONS
[facility]  Electrophysiology  Dr. Juan Pablo Leroy CNP  Josephine Arnett [address], RN  [phone]    Cardiac Catheter Ablation  Discharge Instructions      WHAT YOU SHOULD KNOW:   Your heart has a special electrical system built into it that controls your heart rhythm. Sometimes there is a problem with this electrical system in the heart muscle. This problem may cause an arrhythmia (ux-BIIY-[id number]-ah), or abnormal heart rhythm. If medicine does not correct the problem, or if you do not wish to take medicines long-term, you may need a cardiac ablation (ab-LAY-shun). An ablation may also be called a catheter ablation, or a radiofrequency ablation. An ablation procedure is usually done at the same time as an electrophysiology study. This test is used to [employer]" the electrical pathways in your heart that control your heart rhythm. This test helps your doctor find the exact spot where the ablation needs to be done. During an ablation, energy is sent through a special catheter to the area of your heart that has the electrical problem. This energy causes a tiny area of the heart muscle to scar, stopping the electrical problem and allowing your heart to beat regularly. Ask your caregiver for more information about your heart problem, and tests and treatments that may be done for it. AFTER YOU LEAVE:     Home care: Activity: After your ablation, rest for one to two days. If the ablation catheter was put in your groin, avoid using stairs for a few days. When you must use stairs, step up with the leg that was not used for the ablation. Straighten this leg to move the other leg up to the next step without putting stress on it. Bathing and catheter site care: You may need to keep the catheter site clean and dry for at least 24 hours after your ablation. Ask your caregiver if you may shower 24 hours after your ablation.  (You may need to wait up to a week to take a tub bath, swim, or soak in water.) Once it is OK to shower, gently wash the catheter site with soap and water. If the site is oozing or bleeding slightly, place a small bandage over it to protect your clothes. Change the bandage daily, and more often if it gets wet or dirty. Once the site has stopped oozing, you may leave it uncovered. Do not lift anything weighing over ten pounds for five days after the ablation. It is normal to have a bruise and soreness where the ablation catheter went in. Draw a line with a pen around the edges of the bruise. This will show you if the bruise starts to get bigger. Gravity may cause the bruise color to travel down your leg. If this happens, call your caregiver. If the catheter site starts to bleed, use your hand to put pressure on the bandage. If you do not have a bandage, use a clean cloth to hold pressure over and just above the puncture site. It is better if someone else holds pressure for you. While holding pressure, call your caregiver. Hold the pressure for 30 minutes, even after the bleeding has stopped. After the bleeding has stopped, lie flat for at least an hour. If the bleeding does not stop within 15 minutes of holding pressure, go to the nearest hospital or clinic. Do not walk, and do not drive yourself. If you are bleeding a lot, dial 911 or 0 () to call an ambulance. Do not strain while having a bowel movement. If you are constipated, take an over the counter stool softener such as Metamucil or Citracel. Food and liquids: You may eat your usual diet when you get home. Drink plenty of liquids. Most people should drink at least eight (8-ounce) glasses of water each day. Limit the amount of caffeine you drink such as coffee, tea, and soda. Do not drink alcohol for 24 hours after the test. Follow your caregiver's advice if you need to change the amount of liquids that you drink. CONTACT A CAREGIVER IF:   You have a fever (increased body temperature). The catheter site becomes red, or has pus or foul-smelling drainage coming from it. This may mean it is infected. You have increasing pain at the catheter site. (It is normal to have some soreness, but this should get better, not worse.)    You have questions or concerns about your ablation, medicines, or health condition. SEEK CARE IMMEDIATELY IF:   Your leg or arm becomes numb (loses feeling), is very painful, or changes color. The bruise at the catheter site starts to get bigger or the area has new swelling. If you have any of the following, it is an emergency ! Call 911 or 0 () to get to the nearest hospital or clinic. Do not drive yourself! The ablation catheter site is bleeding a lot or you cannot stop the bleeding. You have signs of a stroke (brain attack). Having new weakness or trouble moving one side of your face or body may be signs of a stroke. Other signs include new trouble thinking or speaking clearly, and new changes in vision. Medicines:   Keep a list of your medicines: Keep a written list of the medicines you take, the amounts, and when and why you take them. Bring the list of your medicines or the pill bottles when you see your caregivers. Do not take any medicines, over-the-counter drugs, vitamins, herbs, or food supplements without first talking to caregivers. Take your medicine as directed: Always take your medicine as directed by caregivers. Call your caregiver if you think your medicines are not helping or if you feel you are having side effects. Do not quit taking your medicines until you discuss it with your caregiver. Aspirin or blood thinners: Your caregiver may want you to take aspirin or another blood thinner for 2 to 4 weeks after your ablation. This is to keep blood clots from forming in your heart. Do not take more or less medicine than caregivers say to take.  If caregivers tell you to take aspirin, do not take acetaminophen or ibuprofen instead. Over-the-counter pain medicine: You may use over-the-counter (OTC) pain medicines, such as ibuprofen or acetaminophen, for pain or soreness. These medicines are safe for most people to use. However, they can cause serious problems when they are not used correctly. People with certain medical conditions, or using certain other medicines are at a higher risk for problems. Using too much, or using these medicines for longer than the label says can also cause problems. Follow directions on the label carefully. If you have questions, talk to your caregiver. Ask your caregiver when to return for a follow-up visit. Keep all appointments. Write down any questions you may have. This way you will remember to ask these questions during your next visit. FOLLOW-UP APPOINTMENTS    Follow-up with  *** in ***  [address] Phone: [phone]. If you are unable to make this appointment, please call to reschedule. Extra Heart Failure sites:   [email or web address]   --- this is American Heart Association interactive Healthier Living with Heart Failure guidebook. Please copy and paste link into search bar. Use your mouse to scroll through the pages. Lots and lots of info / tips    [facility] [place] jasmin  --- free smart phone jasmin available for [employer] and [employer]. Use your phone to track sodium / fluid intake,  symptoms, weight, etc.    Piccsy. org - website-- [employer] is a dialysis company. All dialysis patients follow a renal diet which IS low sodium! This website offers free seasonal cookbooks.   Each quarter, they will release 25-30 new recipes with a breakdown of calories, sodium, glucose, etc    [email or web address] -- more free recipes

## 2023-03-07 NOTE — H&P
[facility]    Electrophysiology Procedure Note       Date of Procedure: 3/7/2023  Patient's Name: Erinn Dominguez  YOB: 1965   Medical Record Number: [id number]    Indication:  Atrial fibrillation ablation    Consent:   I have discussed with the patient and/or the patient representative the indication, alternatives, and the possible risks and/or complications of the planned procedure and the anesthesia methods. The patient and/or patient representative appear to understand and agree to proceed.     Past Medical History:   Diagnosis Date    Acute lateral meniscus tear of right knee 02/2019    Acute medial meniscus tear of right knee 02/2019    Anesthesia complication     woke up during one surgery    Anxiety     Arthritis     Asthma     Atrial fibrillation ([address]     new dx 4 weeks ago, first of  Sept 2017    CHF (congestive heart failure) ([facility])     Edema     Fibromyalgia     GERD (gastroesophageal reflux disease)     reflux    Hiatal hernia     History of blood transfusion     Hx of major depression     Idiopathic urticaria 10/29/2020    SVT (supraventricular tachycardia) ([facility])     hx svt       Past Surgical History:   Procedure Laterality Date    ABLATION OF DYSRHYTHMIC FOCUS  04/2019    afib    BLADDER SUSPENSION  2004    CERVICAL FUSION N/A 9/30/2022    ANTERIOR CERVICAL DISCECTOMY AND FUSION CERVICAL 5 - CERVICAL 6, CERVICAL 6 - CERVICAL 7 WITH DEPUY ALLOGRAFT, PLATE AND SCREWS AND EVOKES               **LATEX SENSITIVE** performed by Marlene Perkins MD at [address] N/A 8/4/2022    COLONOSCOPY performed by Kriss Beck at [facility] ([address] [place] [address])  2004    KNEE ARTHROSCOPY Right 7/11/2019    VIDEO ARTHROSCOPY RIGHT KNEE, PARTIAL MEDIAL AND LATERAL MENISCECTOMY,, MEDIAL MICRO FRACTURE CHONDROPLASTY performed by Alan Wallace MD at [place] [address]  2011 TONSILLECTOMY AND ADENOIDECTOMY      UPPER GASTROINTESTINAL ENDOSCOPY N/A 8/4/2022    EGD BIOPSY performed by Rajesh Paula at [address] N/A 8/26/2019    DIAGNOSTIC LAPAROSCOPY, LAPAROSCOPIC LYSIS OF ADHESIONS, LAPAROSCOPIC REDUCTION OF RECURRENT INCISIONAL HERNIA WITH MESH performed by Ankush Carrero MD at [address]       Prior to Admission medications    Medication Sig Start Date End Date Taking? Authorizing Provider   amiodarone (CORDARONE) 200 MG tablet Take 1 tablet by mouth twice daily for 10 days, then take 1 tablet by mouth daily thereafter 2/8/23   Miki Brice MD   acetaminophen-codeine (TYLENOL/CODEINE #3) 300-30 MG per tablet Take 1 tablet by mouth in the morning and at bedtime for 30 days.  Max Daily Amount: 2 tablets 2/6/23 3/8/23  Angela Dawn MD   levalbuterol [facility] HFA) 45 MCG/ACT inhaler Inhale 1 puff into the lungs every 4 hours as needed for Wheezing 1/20/23 1/20/24  Angela Dawn MD   digoxin (LANOXIN) 250 MCG tablet Take 0.5 tablets by mouth daily 1/19/23   STEPHANIE Purcell - CNP   apixaban (ELIQUIS) 5 MG TABS tablet Take 1 tablet by mouth 2 times daily 1/12/23 3/7/23  Asa Carcamo DO   dilTIAZem (CARDIZEM CD) 240 MG extended release capsule Take 1 capsule by mouth daily 1/13/23   Asa Carcamo DO   furosemide (LASIX) 40 MG tablet Take 1 tablet by mouth daily 1/12/23   Asa Carcamo DO   rOPINIRole (REQUIP) 1 MG tablet Take 1.5 mg by mouth in the morning and at bedtime    Historical Provider, MD   ipratropium-albuterol (DUONEB) 0.5-2.5 (3) MG/3ML SOLN nebulizer solution Inhale 1 vial into the lungs every 4 hours as needed for Shortness of Breath    Historical Provider, MD   omalizumab Eppie Orts) 150 MG/ML SOSY injection Inject 300 mg into the skin every 28 days    Historical Provider, MD   Respiratory Therapy Supplies (NEBULIZER/TUBING/MOUTHPIECE) KIT Continuous 10/10/22   Angela Dawn MD   albuterol sulfate HFA (PROVENTIL;VENTOLIN;PROAIR) 108 (90 Base) MCG/ACT inhaler INHALE 2 PUFFS BY MOUTH EVERY 4 HOURS AS NEEDED FOR WHEEZING OR SHORTNESS OF BREATH(SPACE OUT TO EVERY 6 HOURS AS SYMPTOMS IMPROVE) 8/17/22   Pat Armendariz MD   EPINEPHrine (EPIPEN) 0.3 MG/0.3ML SOAJ injection INJECT INTO UPPER THIGH AS DIRECTED AS NEEDED FOR ANAPHYLAXIS 3/8/22   Historical Provider, MD Kasi Salmon 200-5 MCG/ACT inhaler INHALE TWO PUFFS BY MOUTH TWICE A DAY 5/29/21   Pat Armendariz MD         Pre-sedation Documentation and Exam:  I have personally completed a history, physical exam & review of systems for this patient (see notes). 26-year-old female history of early persistent symptomatic atrial fibrillation who had early recurrence of atrial fibrillation following cardioversion on flecainide as well as cardioversion on amiodarone. Given symptomatic, persistent nature we discussed atrial fibrillation ablation. She has not missed any doses of anticoagulation in the last 30 days and thus the benefit of SHEY is outweighed by risk.     Temp 98.1 °F (36.7 °C) (Oral)   Ht 5' 10\" (1.778 m)   Wt (!) 347 lb 6.4 oz (157.6 kg)   BMI 49.85 kg/m²   NAD  Chest clear non labored  Heart irregular rhythm, regular rate, no LE edema  Abd soft non tender  No focal neuro deficits  Appropriate mood and affect       Mallampati Airway Assessment:  III     ASA Classification:  Class 3 - A patient with severe systemic disease that limits activity but is not incapacitating     Sedation/Anesthesia Plan:  Anesthesia, general       Jan Ratliff MD  [address]   Office: [phone]  Fax: [phone]

## 2023-03-07 NOTE — ANESTHESIA POSTPROCEDURE EVALUATION
Department of Anesthesiology  Postprocedure Note    Patient: Avtar Abarca  MRN: [id number]  YOB: 1965  Date of evaluation: 3/7/2023      Procedure Summary     Date: 03/07/23 Room / Location: [facility] Cath Lab    Anesthesia Start: 9349 Anesthesia Stop: 9061    Procedure: [facility] A-FIB ABLATION W ANES Diagnosis: Atrial fibrillation ([facility] [address]    Scheduled Providers: Dariana Vidal MD Responsible Provider: Dariana Vidal MD    Anesthesia Type: general ASA Status: 3          Anesthesia Type: No value filed.     Savita Phase I:      Savita Phase II:        Anesthesia Post Evaluation    Patient location during evaluation: PACU  Level of consciousness: awake  Complications: no  Multimodal analgesia pain management approach

## 2023-03-07 NOTE — ANESTHESIA PRE PROCEDURE
Department of Anesthesiology  Preprocedure Note       Name:  Brayden Larson   Age:  62 y.o.  :  1965                                          MRN:  [id number]         Date:  3/7/2023      Surgeon: * No surgeons listed *    Procedure: * No procedures listed *    Medications prior to admission:   Prior to Admission medications    Medication Sig Start Date End Date Taking? Authorizing Provider   amiodarone (CORDARONE) 200 MG tablet Take 1 tablet by mouth twice daily for 10 days, then take 1 tablet by mouth daily thereafter 23   Devorah Vieira MD   acetaminophen-codeine (TYLENOL/CODEINE #3) 300-30 MG per tablet Take 1 tablet by mouth in the morning and at bedtime for 30 days.  Max Daily Amount: 2 tablets 2/6/23 3/8/23  Jacoby Green MD   [facility]) 45 MCG/ACT inhaler Inhale 1 puff into the lungs every 4 hours as needed for Wheezing 23  Jacoby Green MD   digoxin (LANOXIN) 250 MCG tablet Take 0.5 tablets by mouth daily 23   STEPHANIE Leigh - CNP   apixaban (ELIQUIS) 5 MG TABS tablet Take 1 tablet by mouth 2 times daily 1/12/23 3/7/23  Jenni Delgado, DO   dilTIAZem (CARDIZEM CD) 240 MG extended release capsule Take 1 capsule by mouth daily 23   Jenni Breed, DO   furosemide (LASIX) 40 MG tablet Take 1 tablet by mouth daily 23   Jenni Breed, DO   rOPINIRole (REQUIP) 1 MG tablet Take 1.5 mg by mouth in the morning and at bedtime    Historical Provider, MD   ipratropium-albuterol (DUONEB) 0.5-2.5 (3) MG/3ML SOLN nebulizer solution Inhale 1 vial into the lungs every 4 hours as needed for Shortness of Breath    Historical Provider, MD   omalizumarachana Jarquin) 150 MG/ML SOSY injection Inject 300 mg into the skin every 28 days    Historical Provider, MD   Respiratory Therapy Supplies (NEBULIZER/TUBING/MOUTHPIECE) KIT Continuous 10/10/22   Jacoby Green MD   albuterol sulfate HFA (PROVENTIL;VENTOLIN;PROAIR) 108 (90 Base) MCG/ACT inhaler INHALE 2 PUFFS BY MOUTH EVERY 4 HOURS AS NEEDED FOR WHEEZING OR SHORTNESS OF BREATH(SPACE OUT TO EVERY 6 HOURS AS SYMPTOMS IMPROVE) 8/17/22   Claudene Dada, MD   EPINEPHrine (EPIPEN) 0.3 MG/0.3ML SOAJ injection INJECT INTO UPPER THIGH AS DIRECTED AS NEEDED FOR ANAPHYLAXIS 3/8/22   Historical Provider, MD Wallace Yepez 200-5 MCG/ACT inhaler INHALE TWO PUFFS BY MOUTH TWICE A DAY 5/29/21   Claudene Dada, MD       Current medications:    Current Outpatient Medications   Medication Sig Dispense Refill    amiodarone (CORDARONE) 200 MG tablet Take 1 tablet by mouth twice daily for 10 days, then take 1 tablet by mouth daily thereafter 110 tablet 1    acetaminophen-codeine (TYLENOL/CODEINE #3) 300-30 MG per tablet Take 1 tablet by mouth in the morning and at bedtime for 30 days.  Max Daily Amount: 2 tablets 60 tablet 0    levalbuterol (XOPENEX HFA) 45 MCG/ACT inhaler Inhale 1 puff into the lungs every 4 hours as needed for Wheezing 1 each 3    digoxin (LANOXIN) 250 MCG tablet Take 0.5 tablets by mouth daily 30 tablet 4    apixaban (ELIQUIS) 5 MG TABS tablet Take 1 tablet by mouth 2 times daily 60 tablet 2    dilTIAZem (CARDIZEM CD) 240 MG extended release capsule Take 1 capsule by mouth daily 30 capsule 3    furosemide (LASIX) 40 MG tablet Take 1 tablet by mouth daily 60 tablet 3    rOPINIRole (REQUIP) 1 MG tablet Take 1.5 mg by mouth in the morning and at bedtime      ipratropium-albuterol (DUONEB) 0.5-2.5 (3) MG/3ML SOLN nebulizer solution Inhale 1 vial into the lungs every 4 hours as needed for Shortness of Breath      omalizumab (XOLAIR) 150 MG/ML SOSY injection Inject 300 mg into the skin every 28 days      Respiratory Therapy Supplies (NEBULIZER/TUBING/MOUTHPIECE) KIT Continuous 1 kit 0    albuterol sulfate HFA (PROVENTIL;VENTOLIN;PROAIR) 108 (90 Base) MCG/ACT inhaler INHALE 2 PUFFS BY MOUTH EVERY 4 HOURS AS NEEDED FOR WHEEZING OR SHORTNESS OF BREATH(SPACE OUT TO EVERY 6 HOURS AS SYMPTOMS IMPROVE) 8.5 g 11    EPINEPHrine (EPIPEN) 0.3 MG/0.3ML SOAJ injection INJECT INTO UPPER THIGH AS DIRECTED AS NEEDED FOR ANAPHYLAXIS      DULERA 200-5 MCG/ACT inhaler INHALE TWO PUFFS BY MOUTH TWICE A DAY 1 Inhaler 11     No current facility-administered medications for this encounter. Allergies:     Allergies   Allergen Reactions    Latex Anaphylaxis and Rash    Aspirin Swelling and Other (See Comments)     Swelling in lower legs    Nsaids Swelling    Penicillins Hives and Rash    Sulfa Antibiotics Hives, Swelling and Rash    Meperidine Nausea And Vomiting, Rash and Other (See Comments)    Ranitidine Hcl Rash       Problem List:    Patient Active Problem List   Diagnosis Code    Asthma J45.909    Persistent atrial fibrillation (HCC) I48.19    MDD (major depressive disorder), recurrent severe, without psychosis [facility] F33.2    Overdose T50.901A    Restless leg syndrome G25.81    Sliding hiatal hernia K44.9    HTN (hypertension) I10    Atrial flutter ([facility]) I48.92    SVT (supraventricular tachycardia) ([facility]) I47.1    Paroxysmal atrial fibrillation with rapid ventricular response ([facility]) I48.0    Sinus bradycardia R00.1    Acute lateral meniscus tear of right knee S83.281A    Acute medial meniscus tear of right knee S83.241A    Localized edema R60.0    Acute pain of right knee M25.561    Chronic diastolic congestive heart failure (HCC) I50.32    Primary osteoarthritis of right knee M17.11    Contusion of multiple sites of right leg S80.11XA    Gastrocnemius strain, left S86.112A    Ventral incisional hernia K43.2    Recurrent incisional hernia K43.2    Pain of upper abdomen R10.10    Incisional hernia with obstruction, without gangrene K43.0    Rhinitis, chronic J31.0    Vocal cord dysfunction J38.3    Iron deficiency anemia, unspecified D50.9    Idiopathic urticaria L50.1    Carpal tunnel syndrome, bilateral G56.03    Arthritis of carpometacarpal (CMC) joint of left thumb M18.12    Neck pain M54.2    [facility] F10.929    Marijuana user F12.90    Mood disorder ([facility] S01    Acute systolic (congestive) heart failure (HCC) I50.21       Past Medical History:        Diagnosis Date    Acute lateral meniscus tear of right knee 02/2019    Acute medial meniscus tear of right knee 02/2019    Anesthesia complication     woke up during one surgery    Anxiety     Arthritis     Asthma     Atrial fibrillation ([facility]     new dx 4 weeks ago, first of  Sept 2017    CHF (congestive heart failure) ([facility])     Edema     Fibromyalgia     GERD (gastroesophageal reflux disease)     reflux    Hiatal hernia     History of blood transfusion     Hx of major depression     Idiopathic urticaria 10/29/2020    SVT (supraventricular tachycardia) ([facility])     hx svt       Past Surgical History:        Procedure Laterality Date    ABLATION OF DYSRHYTHMIC FOCUS  04/2019    afib    BLADDER SUSPENSION  2004    CERVICAL FUSION N/A 9/30/2022    ANTERIOR CERVICAL DISCECTOMY AND FUSION CERVICAL 5 - CERVICAL 6, CERVICAL 6 - CERVICAL 7 WITH DEPUY ALLOGRAFT, PLATE AND SCREWS AND EVOKES               **LATEX SENSITIVE** performed by Tushar Do MD at [id number] [place] [address] N/A 8/4/2022    COLONOSCOPY performed by Mike Monsivais at [address] ([address] [facility])  2004    KNEE ARTHROSCOPY Right 7/11/2019    VIDEO ARTHROSCOPY RIGHT KNEE, PARTIAL MEDIAL AND LATERAL MENISCECTOMY,, MEDIAL MICRO FRACTURE CHONDROPLASTY performed by Bob Montez MD at [facility] GASTRIC BYPASS  2011    TONSILLECTOMY AND ADENOIDECTOMY      UPPER GASTROINTESTINAL ENDOSCOPY N/A 8/4/2022    EGD BIOPSY performed by Mike Monsivais at [address] [facility] N/A 8/26/2019    DIAGNOSTIC LAPAROSCOPY, LAPAROSCOPIC LYSIS OF ADHESIONS, LAPAROSCOPIC REDUCTION OF RECURRENT INCISIONAL HERNIA WITH MESH performed by Tea Steinberg MD at [address]  History:    Social History     Tobacco Use    Smoking status: Never    Smokeless tobacco: Never   Substance Use Topics    Alcohol use: Yes     Comment: 0-1 drinks per month                                Counseling given: Not Answered      Vital Signs (Current):   Vitals:    03/07/23 0705   Temp: 98.1 °F (36.7 °C)   TempSrc: Oral   Weight: (!) 347 lb 6.4 oz (157.6 kg)   Height: 5' 10\" (1.778 m)                                              BP Readings from Last 3 Encounters:   02/08/23 110/74   01/23/23 124/78   01/20/23 124/84       NPO Status:                                                                                 BMI:   Wt Readings from Last 3 Encounters:   03/07/23 (!) 347 lb 6.4 oz (157.6 kg)   02/28/23 (!) 344 lb (156 kg)   02/08/23 (!) 342 lb 3.2 oz (155.2 kg)     Body mass index is 49.85 kg/m². CBC:   Lab Results   Component Value Date/Time    WBC 6.5 03/07/2023 06:55 AM    RBC 4.71 03/07/2023 06:55 AM    HGB 11.7 03/07/2023 06:55 AM    HCT 37.0 03/07/2023 06:55 AM    MCV 78.5 03/07/2023 06:55 AM    RDW 19.4 03/07/2023 06:55 AM     03/07/2023 06:55 AM       CMP:   Lab Results   Component Value Date/Time     02/28/2023 11:19 AM    K 4.0 02/28/2023 11:19 AM    K 4.3 01/12/2023 04:38 AM     02/28/2023 11:19 AM    CO2 26 02/28/2023 11:19 AM    BUN 18 02/28/2023 11:19 AM    CREATININE 0.9 02/28/2023 11:19 AM    GFRAA >60 09/28/2022 09:39 AM    GFRAA 87 12/21/2011 05:25 AM    AGRATIO 1.4 02/28/2023 11:19 AM    LABGLOM >60 02/28/2023 11:19 AM    GLUCOSE 97 02/28/2023 11:19 AM    PROT 6.9 02/28/2023 11:19 AM    CALCIUM 9.0 02/28/2023 11:19 AM    BILITOT 0.6 02/28/2023 11:19 AM    ALKPHOS 122 02/28/2023 11:19 AM    AST 12 02/28/2023 11:19 AM    ALT 10 02/28/2023 11:19 AM       POC Tests: No results for input(s): POCGLU, POCNA, POCK, POCCL, POCBUN, POCHEMO, POCHCT in the last 72 hours.     Coags:   Lab Results   Component Value Date/Time PROTIME 14.2 02/28/2023 11:19 AM    INR 1.10 02/28/2023 11:19 AM    APTT 29.9 01/09/2023 05:05 AM       HCG (If Applicable): No results found for: PREGTESTUR, PREGSERUM, HCG, HCGQUANT     ABGs:   Lab Results   Component Value Date/Time    PHART 7.39 12/17/2011 03:00 AM    PO2ART 83 12/17/2011 03:00 AM    EDR0JCU 38 12/17/2011 03:00 AM    TSU6GBZ 22 12/17/2011 03:00 AM    BEART -2.0 12/17/2011 03:00 AM        Type & Screen (If Applicable):  No results found for: LABABO, LABRH    Drug/Infectious Status (If Applicable):  No results found for: HIV, HEPCAB    COVID-19 Screening (If Applicable):   Lab Results   Component Value Date/Time    COVID19 Not Detected 01/09/2023 01:55 AM           Anesthesia Evaluation  Patient summary reviewed and Nursing notes reviewed no history of anesthetic complications:   Airway: Mallampati: II  TM distance: >3 FB   Neck ROM: full  Mouth opening: > = 3 FB   Dental:          Pulmonary:   (+) asthma:                            Cardiovascular:    (+) hypertension:, dysrhythmias: atrial fibrillation, CHF:,                   Neuro/Psych:   (+) psychiatric history:            GI/Hepatic/Renal:   (+) hiatal hernia, GERD:,           Endo/Other: Negative Endo/Other ROS                    Abdominal:             Vascular: Other Findings:           Anesthesia Plan      general     ASA 1    (35-year-old female presents for A-FIB ABLATION. Plan general anesthesia with ASA standard monitors. Questions answered. Patient agreeable with anesthetic plan.  )  Induction: intravenous. Anesthetic plan and risks discussed with patient. Plan discussed with CRNA.     Attending anesthesiologist reviewed and agrees with Jono Goldman MD   3/7/2023

## 2023-03-08 VITALS
HEART RATE: 77 BPM | DIASTOLIC BLOOD PRESSURE: 79 MMHG | TEMPERATURE: 99 F | BODY MASS INDEX: 41.95 KG/M2 | SYSTOLIC BLOOD PRESSURE: 119 MMHG | RESPIRATION RATE: 18 BRPM | OXYGEN SATURATION: 97 % | HEIGHT: 70 IN | WEIGHT: 293 LBS

## 2023-03-08 LAB
HCT VFR BLD CALC: 31.1 % (ref 36–48)
HEMOGLOBIN: 10.1 G/DL (ref 12–16)
MCH RBC QN AUTO: 25.3 PG (ref 26–34)
MCHC RBC AUTO-ENTMCNC: 32.5 G/DL (ref 31–36)
MCV RBC AUTO: 77.8 FL (ref 80–100)
PDW BLD-RTO: 19.5 % (ref 12.4–15.4)
PLATELET # BLD: 255 K/UL (ref 135–450)
PMV BLD AUTO: 8.1 FL (ref 5–10.5)
RBC # BLD: 4 M/UL (ref 4–5.2)
TROPONIN: 1.72 NG/ML
WBC # BLD: 8.5 K/UL (ref 4–11)

## 2023-03-08 PROCEDURE — 36415 COLL VENOUS BLD VENIPUNCTURE: CPT

## 2023-03-08 PROCEDURE — 84484 ASSAY OF TROPONIN QUANT: CPT

## 2023-03-08 PROCEDURE — 2580000003 HC RX 258: Performed by: INTERNAL MEDICINE

## 2023-03-08 PROCEDURE — 94640 AIRWAY INHALATION TREATMENT: CPT

## 2023-03-08 PROCEDURE — G0378 HOSPITAL OBSERVATION PER HR: HCPCS

## 2023-03-08 PROCEDURE — 6370000000 HC RX 637 (ALT 250 FOR IP): Performed by: INTERNAL MEDICINE

## 2023-03-08 PROCEDURE — 94761 N-INVAS EAR/PLS OXIMETRY MLT: CPT

## 2023-03-08 PROCEDURE — 85027 COMPLETE CBC AUTOMATED: CPT

## 2023-03-08 PROCEDURE — 6360000002 HC RX W HCPCS: Performed by: INTERNAL MEDICINE

## 2023-03-08 RX ORDER — COLCHICINE 0.6 MG/1
0.3 TABLET ORAL ONCE
Status: COMPLETED | OUTPATIENT
Start: 2023-03-08 | End: 2023-03-08

## 2023-03-08 RX ORDER — PANTOPRAZOLE SODIUM 40 MG/1
40 TABLET, DELAYED RELEASE ORAL
Qty: 30 TABLET | Refills: 0 | Status: SHIPPED | OUTPATIENT
Start: 2023-03-09 | End: 2023-04-08

## 2023-03-08 RX ORDER — COLCHICINE 0.6 MG/1
0.3 TABLET ORAL 2 TIMES DAILY
Qty: 30 TABLET | Refills: 2 | Status: SHIPPED | OUTPATIENT
Start: 2023-03-08

## 2023-03-08 RX ADMIN — COLCHICINE 0.3 MG: 0.6 TABLET, FILM COATED ORAL at 08:30

## 2023-03-08 RX ADMIN — ARFORMOTEROL TARTRATE 15 MCG: 15 SOLUTION RESPIRATORY (INHALATION) at 07:47

## 2023-03-08 RX ADMIN — SODIUM CHLORIDE, PRESERVATIVE FREE 10 ML: 5 INJECTION INTRAVENOUS at 08:33

## 2023-03-08 RX ADMIN — DILTIAZEM HYDROCHLORIDE 240 MG: 240 CAPSULE, COATED, EXTENDED RELEASE ORAL at 08:36

## 2023-03-08 RX ADMIN — APIXABAN 5 MG: 5 TABLET, FILM COATED ORAL at 08:37

## 2023-03-08 RX ADMIN — ROPINIROLE HYDROCHLORIDE 1.5 MG: 0.5 TABLET, FILM COATED ORAL at 08:32

## 2023-03-08 RX ADMIN — HYDROCODONE BITARTRATE AND ACETAMINOPHEN 1 TABLET: 5; 325 TABLET ORAL at 03:56

## 2023-03-08 RX ADMIN — BUDESONIDE 500 MCG: 0.5 SUSPENSION RESPIRATORY (INHALATION) at 07:47

## 2023-03-08 RX ADMIN — FUROSEMIDE 40 MG: 40 TABLET ORAL at 08:32

## 2023-03-08 RX ADMIN — AMIODARONE HYDROCHLORIDE 200 MG: 200 TABLET ORAL at 08:33

## 2023-03-08 RX ADMIN — PANTOPRAZOLE SODIUM 40 MG: 40 TABLET, DELAYED RELEASE ORAL at 08:37

## 2023-03-08 ASSESSMENT — PAIN DESCRIPTION - ORIENTATION: ORIENTATION: MID

## 2023-03-08 ASSESSMENT — PAIN DESCRIPTION - LOCATION
LOCATION: GENERALIZED
LOCATION: CHEST

## 2023-03-08 ASSESSMENT — PAIN SCALES - GENERAL
PAINLEVEL_OUTOF10: 9
PAINLEVEL_OUTOF10: 6
PAINLEVEL_OUTOF10: 6
PAINLEVEL_OUTOF10: 0
PAINLEVEL_OUTOF10: 4

## 2023-03-08 ASSESSMENT — PAIN DESCRIPTION - ONSET: ONSET: PROGRESSIVE

## 2023-03-08 ASSESSMENT — PAIN DESCRIPTION - DESCRIPTORS
DESCRIPTORS: ACHING;PRESSURE;SHARP
DESCRIPTORS: ACHING

## 2023-03-08 ASSESSMENT — PAIN DESCRIPTION - PAIN TYPE: TYPE: CHRONIC PAIN

## 2023-03-08 ASSESSMENT — PAIN DESCRIPTION - FREQUENCY: FREQUENCY: INTERMITTENT

## 2023-03-08 ASSESSMENT — PAIN - FUNCTIONAL ASSESSMENT
PAIN_FUNCTIONAL_ASSESSMENT: ACTIVITIES ARE NOT PREVENTED
PAIN_FUNCTIONAL_ASSESSMENT: PREVENTS OR INTERFERES SOME ACTIVE ACTIVITIES AND ADLS

## 2023-03-08 NOTE — PLAN OF CARE
Problem: Chronic Conditions and Co-morbidities  Goal: Patient's chronic conditions and co-morbidity symptoms are monitored and maintained or improved  3/8/2023 0938 by Axel Celestin RN  Outcome: Progressing  Flowsheets (Taken 3/8/2023 6699)  Care Plan - Patient's Chronic Conditions and Co-Morbidity Symptoms are Monitored and Maintained or Improved:   Monitor and assess patient's chronic conditions and comorbid symptoms for stability, deterioration, or improvement   Collaborate with multidisciplinary team to address chronic and comorbid conditions and prevent exacerbation or deterioration   Update acute care plan with appropriate goals if chronic or comorbid symptoms are exacerbated and prevent overall improvement and discharge     Problem: Discharge Planning  Goal: Discharge to home or other facility with appropriate resources  Outcome: Progressing  Flowsheets (Taken 3/8/2023 2781)  Discharge to home or other facility with appropriate resources:   Identify barriers to discharge with patient and caregiver   Arrange for needed discharge resources and transportation as appropriate   Identify discharge learning needs (meds, wound care, etc)     Problem: Pain  Goal: Verbalizes/displays adequate comfort level or baseline comfort level  3/8/2023 0938 by Axel Celestin RN  Outcome: Progressing  Flowsheets (Taken 3/8/2023 0722)  Verbalizes/displays adequate comfort level or baseline comfort level:   Encourage patient to monitor pain and request assistance   Assess pain using appropriate pain scale   Administer analgesics based on type and severity of pain and evaluate response     Problem: ABCDS Injury Assessment  Goal: Absence of physical injury  3/8/2023 0938 by Axel Celestin RN  Outcome: Progressing  Flowsheets (Taken 3/8/2023 5068)  Absence of Physical Injury: Implement safety measures based on patient assessment

## 2023-03-08 NOTE — PROGRESS NOTES
4 Eyes Admission Assessment     I agree as the admission nurse that 2 RN's have performed a thorough Head to Toe Skin Assessment on the patient. ALL assessment sites listed below have been assessed on admission. Areas assessed by both nurses: Gustavo Ritter  [x]   Head, Face, and Ears   [x]   Shoulders, Back, and Chest  [x]   Arms, Elbows, and Hands   []   Coccyx, Sacrum, and Ischium - refused this portion of exam  [x]   Legs, Feet, and Heels    Bruising noted to R groin from puncture site and surrounding area        Does the Patient have Skin Breakdown?   No         Teodoro Prevention initiated:  No   Wound Care Orders initiated:  No      [facility] nurse consulted for Pressure Injury (Stage 3,4, Unstageable, DTI, NWPT, and Complex wounds) or Teodoro score 18 or lower:  No      Nurse 1 eSignature: Electronically signed by Papi Zee RN on 3/7/23 at 10:36 PM EST    **SHARE this note so that the co-signing nurse is able to place an eSignature**    Nurse 2 eSignature: Electronically signed by Minh Luque RN on 3/7/23 at 9:20 PM EST

## 2023-03-08 NOTE — PLAN OF CARE
Problem: Chronic Conditions and Co-morbidities  Goal: Patient's chronic conditions and co-morbidity symptoms are monitored and maintained or improved  Outcome: Progressing     Problem: Pain  Goal: Verbalizes/displays adequate comfort level or baseline comfort level  Outcome: Progressing     No falls noted thus far this shift, bed in lowest position, non-skid socks on, call light within reach, hourly checks, safety maintained, will continue to monitor. Pt educated on safety and being up ad chaz.     Problem: ABCDS Injury Assessment  Goal: Absence of physical injury  Outcome: Progressing

## 2023-03-08 NOTE — PROGRESS NOTES
Post ablation groin check. Pt is alert and oriented x4. VSS. Right groin soft with no active bleeding noted. Bruising noted to pubic area. Denies flank pain. Pt does state she has chest pain more noticeable with a deep breath. Dr. Leon Booth made aware and new order for colchicine 0.3mg po x 1 ordered. Call placed to pharmacy to confirm dosage on colchicine and spoke with patients nurse Jesús Peter to confirm patient is to get 0.3mg of colchicine.

## 2023-03-08 NOTE — PROGRESS NOTES
Reviewed discharge instructions with patient who verbalized understanding and willingness to comply. All personal items are in pt's possession. Uneventfully removed IV, site is unremarkable. Bandage to site. Original copy of AVS was given to patient. Will discharge when her ride home becomes available.

## 2023-03-08 NOTE — CARE COORDINATION
CM following for discharge planning. Pt from home w , brother, and sister. States she is at baseline mobility, denies any home care or DME needs at discharge.  to transport home.    Thank you  Cat Chris SALMERON, BSN, CM  PCU   [phone]

## 2023-03-08 NOTE — DISCHARGE SUMMARY
[address]    DISCHARGE SUMMARY      Patient ID:  Avtar Abarca  [id number] y.o. 1965    Admit date: 3/7/2023    Discharge date:      Admitting Physician: Janeth Terry MD     Discharge Physician: Janeth Terry MD     Admission Diagnoses: Atrial fibrillation [facility]) [I48.91]    Discharge Diagnoses:   Patient Active Problem List   Diagnosis    Asthma    Persistent atrial fibrillation ([facility]    MDD (major depressive disorder), recurrent severe, without psychosis ([facility]    Overdose    Restless leg syndrome    Sliding hiatal hernia    HTN (hypertension)    Atrial flutter (HCC)    SVT (supraventricular tachycardia) (HCC)    Paroxysmal atrial fibrillation with rapid ventricular response (HCC)    Sinus bradycardia    Acute lateral meniscus tear of right knee    Acute medial meniscus tear of right knee    Localized edema    Acute pain of right knee    Chronic diastolic congestive heart failure (HCC)    Primary osteoarthritis of right knee    Contusion of multiple sites of right leg    Gastrocnemius strain, left    Ventral incisional hernia    Recurrent incisional hernia    Pain of upper abdomen    Incisional hernia with obstruction, without gangrene    Rhinitis, chronic    Vocal cord dysfunction    Iron deficiency anemia, unspecified    Idiopathic urticaria    Carpal tunnel syndrome, bilateral    Arthritis of carpometacarpal (CMC) joint of left thumb    Neck pain    Acute alcoholic intoxication ([facility]    Marijuana user    Mood disorder ([facility]    Acute systolic (congestive) heart failure ([facility]    Atrial fibrillation [facility])        Discharged Condition: good    Hospital Course: Avtar Abarca was admitted for AF ablation of early persistent AF, sp PVI, had two different atrial tachycardias, non sustained and no sufficiently mapped for ablation. No further AF with aggressive burst pacing down to 200ms both on/off isuprel.  Remained in sinus rhythm post . Post op groin hematoma that was stabilized prior to admitting to floor. Chest pain, started on colchicine. Will continue on amiodarone and diltiazem. Colchicine 0.3mg BID and protonix 40mg daily for 1 month post ablation. Procedure Summary:    Assessment:   Pulmonary vein isolations using wide area circumferential radiofrequency ablation   Induction of atrial tachycardia (CL 255ms), proximal to distal CS activation that devolved into atrial and thus could be effectively mapped/ablated  Induction of second focal atrial tachycardia (CL 348ms), on isuprel, mid CS earliest activation that was insufficiently mapped to the floor of the left atrium prior to spontaneous termination. Could not be re induced for further mapping and ablation  Aggressive burst pacing, down to 200ms both on and off isuprel did not induce atrial fibrillation  Synchronized cardioversion for recurrent atrial fibrillation during mapping and ablation and prior to bilateral pulmonary vein isolation  Right-sided groin hematoma following manual pressure and Vascade requiring additional manual pressure and prolonged bedrest        Plan:   1. 4 hour bed rest following sheath removal, modified bed rest to follow for additional 2 hours prior to ambulation  2. Continue anticoagulation, next dose of eliquis 4 hours after sheath pull, 1830  3. Stop digoxin  4. Continue amiodarone  5. Continue diltiazem  6. Telemetry overnight  7. PPI for 1 month post ablation  8.  Follow up in EP clinic in 3 months post ablation with Dr Blank Lozano: none    /79   Pulse 77   Temp 99 °F (37.2 °C) (Oral)   Resp 18   Ht 5' 10\" (1.778 m)   Wt (!) 349 lb 13.9 oz (158.7 kg)   SpO2 97%   BMI 50.20 kg/m²   NAD  Chest clear  Heart regular rate and rhythm, no murmur  Abd soft non tender  Strength grossly preserved  No focal neuro deficits  Appropriate mood and affect   No rashes, right sided groin ecchymosis, mild tenderness, no firmness/swelling      Significant Diagnostic Studies:     Labs: Lab Results   Component Value Date    CREATININE 0.9 02/28/2023    BUN 18 02/28/2023     02/28/2023    K 4.0 02/28/2023     02/28/2023    CO2 26 02/28/2023      Lab Results   Component Value Date    WBC 8.5 03/08/2023    HGB 10.1 (L) 03/08/2023    HCT 31.1 (L) 03/08/2023    MCV 77.8 (L) 03/08/2023     03/08/2023      Lab Results   Component Value Date    INR 1.10 02/28/2023    PROTIME 14.2 02/28/2023      Lab Results   Component Value Date    .0 (H) 08/07/2018         Disposition: home    Patient Instructions:      Medication List        START taking these medications      colchicine 0.6 MG tablet  Commonly known as: Colcrys  Take 0.5 tablets by mouth 2 times daily     pantoprazole 40 MG tablet  Commonly known as: PROTONIX  Take 1 tablet by mouth every morning (before breakfast)  Start taking on: March 9, 2023            CONTINUE taking these medications      acetaminophen-codeine 300-30 MG per tablet  Commonly known as: TYLENOL/CODEINE #3  Take 1 tablet by mouth in the morning and at bedtime for 30 days.  Max Daily Amount: 2 tablets     albuterol sulfate  (90 Base) MCG/ACT inhaler  Commonly known as: PROVENTIL;VENTOLIN;PROAIR  INHALE 2 PUFFS BY MOUTH EVERY 4 HOURS AS NEEDED FOR WHEEZING OR SHORTNESS OF BREATH(SPACE OUT TO EVERY 6 HOURS AS SYMPTOMS IMPROVE)     amiodarone 200 MG tablet  Commonly known as: CORDARONE  Take 1 tablet by mouth twice daily for 10 days, then take 1 tablet by mouth daily thereafter     apixaban 5 MG Tabs tablet  Commonly known as: ELIQUIS  Take 1 tablet by mouth 2 times daily     dilTIAZem 240 MG extended release capsule  Commonly known as: CARDIZEM CD  Take 1 capsule by mouth daily     Dulera 200-5 MCG/ACT inhaler  Generic drug: mometasone-formoterol  INHALE TWO PUFFS BY MOUTH TWICE A DAY     EPINEPHrine 0.3 MG/0.3ML Soaj injection  Commonly known as: EPIPEN     furosemide 40 MG tablet  Commonly known as: LASIX  Take 1 tablet by mouth daily ipratropium-albuterol 0.5-2.5 (3) MG/3ML Soln nebulizer solution  Commonly known as: DUONEB     levalbuterol 45 MCG/ACT inhaler  Commonly known as: Xopenex HFA  Inhale 1 puff into the lungs every 4 hours as needed for Wheezing     Nebulizer/Tubing/Mouthpiece Kit  Continuous     omalizumab 150 MG/ML Sosy injection  Commonly known as: XOLAIR     rOPINIRole 1 MG tablet  Commonly known as: REQUIP            STOP taking these medications      digoxin 250 MCG tablet  Commonly known as: LANOXIN               Where to Get Your Medications        These medications were sent to [place] [address] [address] [facility], [address] [place] [address] [phone]  [address] [place] [address] F[place]adares [address]      Phone: [phone]   colchicine 0.6 MG tablet  pantoprazole 40 MG tablet       Current Discharge Medication List        START taking these medications    Details   pantoprazole (PROTONIX) 40 MG tablet Take 1 tablet by mouth every morning (before breakfast)  Qty: 30 tablet, Refills: 0      colchicine (COLCRYS) 0.6 MG tablet Take 0.5 tablets by mouth 2 times daily  Qty: 30 tablet, Refills: 2           CONTINUE these medications which have NOT CHANGED    Details   amiodarone (CORDARONE) 200 MG tablet Take 1 tablet by mouth twice daily for 10 days, then take 1 tablet by mouth daily thereafter  Qty: 110 tablet, Refills: 1      acetaminophen-codeine (TYLENOL/CODEINE #3) 300-30 MG per tablet Take 1 tablet by mouth in the morning and at bedtime for 30 days.  Max Daily Amount: 2 tablets  Qty: 60 tablet, Refills: 0    Comments: Reduce doses taken as pain becomes manageable  Associated Diagnoses: Chronic generalized pain disorder; Fibromyalgia; Cervical radiculopathy      levalbuterol (XOPENEX HFA) 45 MCG/ACT inhaler Inhale 1 puff into the lungs every 4 hours as needed for Wheezing  Qty: 1 each, Refills: 3      apixaban (ELIQUIS) 5 MG TABS tablet Take 1 tablet by mouth 2 times daily  Qty: 60 tablet, Refills: 2      dilTIAZem (CARDIZEM CD) 240 MG extended release capsule Take 1 capsule by mouth daily  Qty: 30 capsule, Refills: 3      furosemide (LASIX) 40 MG tablet Take 1 tablet by mouth daily  Qty: 60 tablet, Refills: 3      rOPINIRole (REQUIP) 1 MG tablet Take 1.5 mg by mouth in the morning and at bedtime      ipratropium-albuterol (DUONEB) 0.5-2.5 (3) MG/3ML SOLN nebulizer solution Inhale 1 vial into the lungs every 4 hours as needed for Shortness of Breath      omalizumab (XOLAIR) 150 MG/ML SOSY injection Inject 300 mg into the skin every 28 days      Respiratory Therapy Supplies (NEBULIZER/TUBING/MOUTHPIECE) KIT Continuous  Qty: 1 kit, Refills: 0    Associated Diagnoses: Moderate persistent asthma with acute exacerbation      albuterol sulfate HFA (PROVENTIL;VENTOLIN;PROAIR) 108 (90 Base) MCG/ACT inhaler INHALE 2 PUFFS BY MOUTH EVERY 4 HOURS AS NEEDED FOR WHEEZING OR SHORTNESS OF BREATH(SPACE OUT TO EVERY 6 HOURS AS SYMPTOMS IMPROVE)  Qty: 8.5 g, Refills: 11    Associated Diagnoses: SOB (shortness of breath)      EPINEPHrine (EPIPEN) 0.3 MG/0.3ML SOAJ injection INJECT INTO UPPER THIGH AS DIRECTED AS NEEDED FOR ANAPHYLAXIS      DULERA 200-5 MCG/ACT inhaler INHALE TWO PUFFS BY MOUTH TWICE A DAY  Qty: 1 Inhaler, Refills: 11    Associated Diagnoses: Moderate persistent asthma without complication           STOP taking these medications       digoxin (LANOXIN) 250 MCG tablet Comments:   Reason for Stopping:                Follow-up with Dr Vianney De La Cruz in clinic in 2-3 months     Signed:  Noelle Burroughs MD, 3/8/2023, 1:17 PM

## 2023-03-08 NOTE — PROGRESS NOTES
Patient was escorted off the unit via wheelchair for discharge. Discharged home as ordered. All personal items were released with patient at time of discharge.

## 2023-03-09 ENCOUNTER — CARE COORDINATION (OUTPATIENT)
Dept: CASE MANAGEMENT | Age: 58
End: 2023-03-09

## 2023-03-09 DIAGNOSIS — M54.12 CERVICAL RADICULOPATHY: ICD-10-CM

## 2023-03-09 DIAGNOSIS — G89.29 CHRONIC GENERALIZED PAIN DISORDER: ICD-10-CM

## 2023-03-09 DIAGNOSIS — R52 CHRONIC GENERALIZED PAIN DISORDER: ICD-10-CM

## 2023-03-09 DIAGNOSIS — I48.91 ATRIAL FIBRILLATION, UNSPECIFIED TYPE (HCC): Primary | ICD-10-CM

## 2023-03-09 DIAGNOSIS — F33.2 MDD (MAJOR DEPRESSIVE DISORDER), RECURRENT SEVERE, WITHOUT PSYCHOSIS (HCC): ICD-10-CM

## 2023-03-09 DIAGNOSIS — I10 HYPERTENSION, UNSPECIFIED TYPE: ICD-10-CM

## 2023-03-09 DIAGNOSIS — J45.909 UNCOMPLICATED ASTHMA, UNSPECIFIED ASTHMA SEVERITY, UNSPECIFIED WHETHER PERSISTENT: ICD-10-CM

## 2023-03-09 DIAGNOSIS — M79.7 FIBROMYALGIA: ICD-10-CM

## 2023-03-09 DIAGNOSIS — G25.81 RESTLESS LEG SYNDROME: ICD-10-CM

## 2023-03-09 RX ORDER — CYCLOBENZAPRINE HCL 10 MG
10 TABLET ORAL 3 TIMES DAILY PRN
Qty: 60 TABLET | Refills: 1 | Status: SHIPPED | OUTPATIENT
Start: 2023-03-09

## 2023-03-09 RX ORDER — PYRAZINAMIDE 500 MG/1
1 TABLET ORAL 2 TIMES DAILY
Qty: 60 TABLET | Refills: 0 | Status: SHIPPED | OUTPATIENT
Start: 2023-03-09 | End: 2023-04-08

## 2023-03-09 NOTE — CARE COORDINATION
[facility] Care Transitions Initial Follow Up Call    Call within 2 business days of discharge: Yes  Patient: Charmayne Sack Patient : 1965   MRN: [id number]  Reason for Admission: Atrial fibrillation   Discharge Date: 3/8/23 RARS: Readmission Risk Score: 9.3      Patient Current Location:  Home: [facility] [address] [place] [address]    LPN Care Coordinator contacted the patient by telephone to perform post hospital discharge assessment. Verified name and  with patient as identifiers. Provided introduction to self, and explanation of the LPN Care Coordinator role. Last Discharge [address]       Date Complaint Diagnosis Description Type Department Provider    3/7/23   Admission (Discharged) from [address] [place], [place] [address]U Delvis Flores MD            Was this an external facility discharge? No Discharge Facility: [facility] to be reviewed by the provider   Additional needs identified to be addressed with provider: No  none                 Attempted to contact patient for initial follow up transition call. Left voicemail message to return call with an update on condition since discharge. Contact information provided. Will try again. Method of communication with provider: none. Patient stated she was good. She has soreness. Eating and drinking well. No urinary or bowel problems. She denies weakness, fatigue, swelling, cp, fever,palpitations or chills. Has a cough and some sob. Using inhalers. No hhc needed. Patient hasn't been COVID 19 vaccinated. Taking medications as prescribed. Medication review and 1111f completed. Patient agreeable to future calls. LPN Care Coordinator reviewed discharge instructions and medical action plan with patient who verbalized understanding. The patient was given an opportunity to ask questions and does not have any further questions or concerns at this time. Were discharge instructions available to patient?  Yes. Reviewed appropriate site of care based on symptoms and resources available to patient including: PCP  Specialist  Urgent care clinics  When to call 911. The patient agrees to contact the PCP office for questions related to their healthcare. Advance Care Planning:   Does patient have an Advance Directive:  No .    Medication reconciliation was performed with patient, who verbalizes understanding of administration of home medications. Medications reviewed, 1111F entered: yes    Was patient discharged with a pulse oximeter? no    Non-face-to-face services provided:  Scheduled appointment with PCP-3/16/2023 2:00 pm  Obtained and reviewed discharge summary and/or continuity of care documents    Offered patient enrollment in the Remote Patient Monitoring (RPM) program for in-home monitoring:  offer on another call . Care Transitions 24 Hour Call    Do you have a copy of your discharge instructions?: Yes  Do you have all of your prescriptions and are they filled?: Yes  Have you been contacted by a [employer]?: No  Patient DME: Lisa serrano  Do you have support at home?: Partner/Spouse/SO  Do you feel like you have everything you need to keep you well at home?: Yes  Are you an active caregiver in your home?: No  Care Transitions Interventions         Discussed follow-up appointments. If no appointment was previously scheduled, appointment scheduling offered: Yes. Is follow up appointment scheduled within 7 days of discharge? Yes. LPN Care Coordinator provided contact information. Plan for follow-up call in 3-5 days based on severity of symptoms and risk factors.   Plan for next call: symptom management-cough, sob  self management-monitoring s/s    Jillian Province, LPN       Last Discharge [address]       Date Complaint Diagnosis Description Type Department Provider    3/7/23   Admission (Discharged) from [address] [facility] [address] MD            Attempted to contact patient for initial follow up transition call. Left voicemail message to return call with an update on condition since discharge. Contact information provided. Will try again.       Follow Up  Future Appointments   Date Time Provider Wil Chatterjee   3/16/2023  2:00 PM Jyoti Kirkland MD [facility] [address] [facility] [place] - DYD   3/22/2023  1:30 PM CHUCKY CornejoYD [facility]   3/29/2023 11:00 AM Victoriano Monzon MD [facility] [place]   7/18/2023 11:00 AM Liz Engle MD [facility] P/CC [facility]     Goyo Dubose LPN

## 2023-03-16 ENCOUNTER — OFFICE VISIT (OUTPATIENT)
Dept: FAMILY MEDICINE CLINIC | Age: 58
End: 2023-03-16

## 2023-03-16 VITALS
WEIGHT: 293 LBS | HEIGHT: 70 IN | TEMPERATURE: 97.4 F | OXYGEN SATURATION: 95 % | HEART RATE: 78 BPM | SYSTOLIC BLOOD PRESSURE: 116 MMHG | DIASTOLIC BLOOD PRESSURE: 74 MMHG | BODY MASS INDEX: 41.95 KG/M2

## 2023-03-16 DIAGNOSIS — F51.01 PRIMARY INSOMNIA: Primary | ICD-10-CM

## 2023-03-16 DIAGNOSIS — I48.19 PERSISTENT ATRIAL FIBRILLATION (HCC): ICD-10-CM

## 2023-03-16 DIAGNOSIS — J45.40 MODERATE PERSISTENT ASTHMA WITHOUT COMPLICATION: ICD-10-CM

## 2023-03-16 DIAGNOSIS — R39.9 UTI SYMPTOMS: ICD-10-CM

## 2023-03-16 DIAGNOSIS — G89.29 CHRONIC GENERALIZED PAIN DISORDER: ICD-10-CM

## 2023-03-16 DIAGNOSIS — R52 CHRONIC GENERALIZED PAIN DISORDER: ICD-10-CM

## 2023-03-16 LAB
BILIRUBIN, POC: NEGATIVE
BLOOD URINE, POC: NEGATIVE
CLARITY, POC: CLEAR
COLOR, POC: NORMAL
GLUCOSE URINE, POC: NEGATIVE
KETONES, POC: NEGATIVE
LEUKOCYTE EST, POC: NEGATIVE
NITRITE, POC: NEGATIVE
PH, POC: 6.5
PROTEIN, POC: NEGATIVE
SPECIFIC GRAVITY, POC: 1.01
UROBILINOGEN, POC: NORMAL

## 2023-03-16 RX ORDER — CYANOCOBALAMIN 1000 UG/ML
1000 INJECTION, SOLUTION INTRAMUSCULAR; SUBCUTANEOUS ONCE
Status: COMPLETED | OUTPATIENT
Start: 2023-03-16 | End: 2023-03-16

## 2023-03-16 RX ORDER — ESZOPICLONE 1 MG/1
1 TABLET, FILM COATED ORAL NIGHTLY
Qty: 30 TABLET | Refills: 0 | Status: SHIPPED | OUTPATIENT
Start: 2023-03-16 | End: 2023-04-15

## 2023-03-16 RX ADMIN — CYANOCOBALAMIN 1000 MCG: 1000 INJECTION, SOLUTION INTRAMUSCULAR; SUBCUTANEOUS at 14:34

## 2023-03-16 SDOH — ECONOMIC STABILITY: INCOME INSECURITY: HOW HARD IS IT FOR YOU TO PAY FOR THE VERY BASICS LIKE FOOD, HOUSING, MEDICAL CARE, AND HEATING?: NOT HARD AT ALL

## 2023-03-16 SDOH — ECONOMIC STABILITY: FOOD INSECURITY: WITHIN THE PAST 12 MONTHS, THE FOOD YOU BOUGHT JUST DIDN'T LAST AND YOU DIDN'T HAVE MONEY TO GET MORE.: NEVER TRUE

## 2023-03-16 SDOH — ECONOMIC STABILITY: HOUSING INSECURITY
IN THE LAST 12 MONTHS, WAS THERE A TIME WHEN YOU DID NOT HAVE A STEADY PLACE TO SLEEP OR SLEPT IN A SHELTER (INCLUDING NOW)?: NO

## 2023-03-16 SDOH — ECONOMIC STABILITY: FOOD INSECURITY: WITHIN THE PAST 12 MONTHS, YOU WORRIED THAT YOUR FOOD WOULD RUN OUT BEFORE YOU GOT MONEY TO BUY MORE.: NEVER TRUE

## 2023-03-16 ASSESSMENT — PATIENT HEALTH QUESTIONNAIRE - PHQ9
SUM OF ALL RESPONSES TO PHQ QUESTIONS 1-9: 1
6. FEELING BAD ABOUT YOURSELF - OR THAT YOU ARE A FAILURE OR HAVE LET YOURSELF OR YOUR FAMILY DOWN: 0
5. POOR APPETITE OR OVEREATING: 0
3. TROUBLE FALLING OR STAYING ASLEEP: 1
SUM OF ALL RESPONSES TO PHQ QUESTIONS 1-9: 1
7. TROUBLE CONCENTRATING ON THINGS, SUCH AS READING THE NEWSPAPER OR WATCHING TELEVISION: 0
8. MOVING OR SPEAKING SO SLOWLY THAT OTHER PEOPLE COULD HAVE NOTICED. OR THE OPPOSITE, BEING SO FIGETY OR RESTLESS THAT YOU HAVE BEEN MOVING AROUND A LOT MORE THAN USUAL: 0
10. IF YOU CHECKED OFF ANY PROBLEMS, HOW DIFFICULT HAVE THESE PROBLEMS MADE IT FOR YOU TO DO YOUR WORK, TAKE CARE OF THINGS AT HOME, OR GET ALONG WITH OTHER PEOPLE: 0
2. FEELING DOWN, DEPRESSED OR HOPELESS: 0
SUM OF ALL RESPONSES TO PHQ9 QUESTIONS 1 & 2: 0
SUM OF ALL RESPONSES TO PHQ QUESTIONS 1-9: 1
SUM OF ALL RESPONSES TO PHQ QUESTIONS 1-9: 1
4. FEELING TIRED OR HAVING LITTLE ENERGY: 0
9. THOUGHTS THAT YOU WOULD BE BETTER OFF DEAD, OR OF HURTING YOURSELF: 0
1. LITTLE INTEREST OR PLEASURE IN DOING THINGS: 0

## 2023-03-17 ENCOUNTER — CARE COORDINATION (OUTPATIENT)
Dept: CASE MANAGEMENT | Age: 58
End: 2023-03-17

## 2023-03-17 PROBLEM — I48.91 ATRIAL FIBRILLATION (HCC): Status: RESOLVED | Noted: 2023-03-07 | Resolved: 2023-03-17

## 2023-03-17 NOTE — CARE COORDINATION
[address] Care Transitions Follow Up Call    Care Transition Nurse contacted the patient by telephone to perform post hospital discharge assessment. Verified name with patient as identifier. Provided introduction to self, and explanation of the Care Transition Nurse role. Patient: Adama Dupont Patient :  1965  MRN: [id number]  Reason for Admission:  A-Fib  Discharge Date:    RARS: 0    Challenges to be reviewed by the provider   Additional needs identified to be addressed with provider:     no    AMB CC Provider Discharge Needs:  none          Method of communication with provider : none      SUMMARY  CTN spoke to the Pt who reports the following:    -C/O feeling tired. -Denies LE edema. Does not have a scale but plans to purchase when she can. Saw PCP yesterday and down 3 lbs. CTC provided Pt education on when and how to obtain daily weight, importance of daily weight, tracking information to share with physicians, when to call physician regarding weight changes, and importance of following fluid / sodium restrictions set by physician.    -Denies fever, chills, nausea, vomiting, cough, congestion, SOB / DB, chest pain, feeling lightheaded, dizziness, and heart palpitations/ flutters. -Denies issues with fluid intake (advised to follow fluid restriction recommended by physician d/t hx of HF), appetite, urination, and bowel habits.   -Confirms she has all meds and taking as prescribed. From CDC: Are you at higher risk for severe illness? Wash your hands often. Avoid close contact (6 feet, which is about two arm lengths) w/people who are sick. Put distance between yourself and other people if COVID-19 is spreading in your community. Clean and disinfect frequently touched surfaces. Avoid all cruise travel and non-essential air travel. Call your healthcare professional if you have concerns about COVID-19 and your underlying condition or if you are sick.     Pt will take all meds as prescribed and schedule / keep doctor's appt. CTC provided education on s/s that require medical attention and when to seek medical attention. [facility] advised Pt of use urgent care or physician's 24 hr access line if assistance is needed after hours or on the weekend. Patient denies any needs or concerns and is agreeable with additional calls. Addressed changes since last contact:  medications-no changes     Discussed follow-up appointments. If no appointment was previously scheduled, appointment scheduling offered: No     Is follow up appointment scheduled within 7 days of discharge? No    Care Transitions 24 Hour Call    Do you have any ongoing symptoms?: Yes  Patient-reported symptoms: Fatigue, Other (Comment: right groin / swelling)  Interventions for patient-reported symptoms: Other  Do you have all of your prescriptions and are they filled?: Yes  Were you discharged with any Home Care or Post Acute Services: No  Patient DME: Nicolas serrano  Do you have support at home?: Partner/Spouse/SO  Are you an active caregiver in your home?: No  Care Transitions Interventions         [facility] follow up appointment(s):    Care Transition Nurse reviewed discharge instructions, medical action plan and red flags with patient and discussed any barriers to care and/or understanding of plan of care after discharge. Discussed appropriate site of care based on symptoms and resources available to patient including:    Specialist  After hours contact number  Benefits related nurse triage line  Urgent care clinics  [facility] 24/7  When to call [address] [place] [address] for reactivation or family member permission [phone]. The patient agrees to contact the PCP office for questions related to their healthcare.     Advance Care Planning: Does patient have an Advance Directive: patient declined education    Patients top risk factors for readmission:   functional physical ability  lack of knowledge about disease  medical condition  medication management    Interventions to address risk factors:   Education of patient/family/caregiver/guardian to support self-management  Assessment and support for treatment adherence and medication management       Care Transitions Subsequent and Final Call    Subsequent and Final Calls  Do you have any ongoing symptoms?: Yes  Patient-reported symptoms: Fatigue, Other  Interventions for patient-reported symptoms: Other  Have your medications changed?: No  Do you have any questions related to your medications?: No  Do you currently have any active services?: No  Do you have any needs or concerns that I can assist you with?: No  Care Transitions Interventions  Other Interventions:              Care Transition Nurse provided contact information for future needs. Plan for f/u call in 7-10 days based on severity of symptoms and risk factors. Plan for next call:   symptom management  self-management  follow up appointment  medication management      Future Appointments   Date Time Provider Wil Chatterjee   3/22/2023  1:30 PM Juvenal Beckley [facility] PSY [facility]   4/19/2023  2:15 PM iKmberly Lowry MD [facility]   6/16/2023 11:30 AM Dinesh Hernandez MD [address] [place] - [facility]   7/18/2023 11:00 AM Rick Emmanuel MD [facility] P/CC [facility]       REGULO Hein, RN  Care Transition Coordinator  Contact Number:  [phone]

## 2023-03-17 NOTE — PROGRESS NOTES
Portions of this chart may have been created with voice recognition software. Occasional wrong-word or \"sound-alike\" substitutions may have occurred due to the inherent limitations of voice recognition software. Read the chart carefully and recognize, using context, where these substitutions have occurred        Chief Complaint     Follow-Up from [address] [place] [address] is a 62 y.o. female here for follow-up after she had a recent ablation done for her atrial fibrillation by her cardiologist.        1. Primary insomnia  Patient's main concern today is her insomnia. She feels like none of the medications that we have started on her back on is not helping her. She is continuing to suffer from insomnia and she states that she cannot spend any more sleepless nights as it would affect her performance the next day as well. At this time she has tried amitriptyline, doxepin, trazodone in the past without any relief. We will start her on Lunesta and see if that will help her symptoms. Otherwise no other extremes of mood changes noted. - eszopiclone (LUNESTA) 1 MG TABS; Take 1 tablet by mouth nightly for 30 days. Max Daily Amount: 1 mg  Dispense: 30 tablet; Refill: 0    2. UTI symptoms  Patient has some low back pain and which is dull aching in nature. She denies any dysuria as such but however wants to check her urine to make sure she does not have a UTI.  - POCT Urinalysis no Micro    3. Moderate persistent asthma without complication  Patient requested refill of her Dulera. No current exacerbation of asthma symptoms. No chest pain palpitation shortness of breath wheezing or cough. - mometasone-formoterol (DULERA) 200-5 MCG/ACT inhaler; INHALE TWO PUFFS BY MOUTH TWICE A DAY  Dispense: 1 each; Refill: 11    4. Persistent atrial fibrillation (HCC)  S/p ablation. Has a follow-up with cardiologist as scheduled.     5. Chronic generalized pain disorder  Patient states that she has been started on colchicine for inflammation for period of 1 month post ablation. She states that it is helping her much with her arthritis and her pain levels and is wondering if she can be on it for a long-term basis however given this history of gastric bypass surgery and her other chronic problems recommended that she discontinue the colchicine after 1 month. Of time and we can start on other medications to help her with the pain symptoms.                 REVIEW OF SYSTEMS:  Pertinent positive and negative symptoms noted in HPI        Lab Results   Component Value Date    WBC 8.5 03/08/2023    HGB 10.1 (L) 03/08/2023    HCT 31.1 (L) 03/08/2023    MCV 77.8 (L) 03/08/2023     03/08/2023      Lab Results   Component Value Date    LABA1C 5.4 09/07/2018     Lab Results   Component Value Date    .3 09/07/2018     Lab Results   Component Value Date    TSH 2.97 05/25/2022     Lab Results   Component Value Date    CHOL 194 05/25/2022     Lab Results   Component Value Date    TRIG 81 05/25/2022     Lab Results   Component Value Date    HDL 82 (H) 05/25/2022     Lab Results   Component Value Date    LDLCALC 96 05/25/2022     Lab Results   Component Value Date    LABVLDL 16 05/25/2022     No results found for: [facility]   Lab Results   Component Value Date     02/28/2023    K 4.0 02/28/2023     02/28/2023    CO2 26 02/28/2023    BUN 18 02/28/2023    CREATININE 0.9 02/28/2023    GLUCOSE 97 02/28/2023    CALCIUM 9.0 02/28/2023    PROT 6.9 02/28/2023    LABALBU 4.0 02/28/2023    BILITOT 0.6 02/28/2023    ALKPHOS 122 02/28/2023    AST 12 (L) 02/28/2023    ALT 10 02/28/2023    LABGLOM >60 02/28/2023    GFRAA >60 09/28/2022    AGRATIO 1.4 02/28/2023    GLOB 3.4 08/24/2021           Current Outpatient Medications   Medication Sig Dispense Refill    mometasone-formoterol (DULERA) 200-5 MCG/ACT inhaler INHALE TWO PUFFS BY MOUTH TWICE A DAY 1 each 11    eszopiclone (LUNESTA) 1 MG TABS Take 1 tablet by mouth nightly for 30 days. Max Daily Amount: 1 mg 30 tablet 0    acetaminophen-codeine (TYLENOL/CODEINE #3) 300-30 MG per tablet Take 1 tablet by mouth in the morning and at bedtime for 30 days.  Max Daily Amount: 2 tablets 60 tablet 0    cyclobenzaprine (FLEXERIL) 10 MG tablet Take 1 tablet by mouth 3 times daily as needed for Muscle spasms 60 tablet 1    pantoprazole (PROTONIX) 40 MG tablet Take 1 tablet by mouth every morning (before breakfast) 30 tablet 0    colchicine (COLCRYS) 0.6 MG tablet Take 0.5 tablets by mouth 2 times daily 30 tablet 2    amiodarone (CORDARONE) 200 MG tablet Take 1 tablet by mouth twice daily for 10 days, then take 1 tablet by mouth daily thereafter (Patient taking differently: Take 200 mg by mouth daily Take 1 tablet by mouth twice daily for 10 days, then take 1 tablet by mouth daily thereafter) 110 tablet 1    levalbuterol (XOPENEX HFA) 45 MCG/ACT inhaler Inhale 1 puff into the lungs every 4 hours as needed for Wheezing 1 each 3    dilTIAZem (CARDIZEM CD) 240 MG extended release capsule Take 1 capsule by mouth daily 30 capsule 3    furosemide (LASIX) 40 MG tablet Take 1 tablet by mouth daily 60 tablet 3    rOPINIRole (REQUIP) 1 MG tablet Take 1.5 mg by mouth in the morning and at bedtime      ipratropium-albuterol (DUONEB) 0.5-2.5 (3) MG/3ML SOLN nebulizer solution Inhale 1 vial into the lungs every 4 hours as needed for Shortness of Breath      omalizumab (XOLAIR) 150 MG/ML SOSY injection Inject 300 mg into the skin every 28 days      Respiratory Therapy Supplies (NEBULIZER/TUBING/MOUTHPIECE) KIT Continuous 1 kit 0    albuterol sulfate HFA (PROVENTIL;VENTOLIN;PROAIR) 108 (90 Base) MCG/ACT inhaler INHALE 2 PUFFS BY MOUTH EVERY 4 HOURS AS NEEDED FOR WHEEZING OR SHORTNESS OF BREATH(SPACE OUT TO EVERY 6 HOURS AS SYMPTOMS IMPROVE) 8.5 g 11    EPINEPHrine (EPIPEN) 0.3 MG/0.3ML SOAJ injection INJECT INTO UPPER THIGH AS DIRECTED AS NEEDED FOR ANAPHYLAXIS      apixaban (ELIQUIS) 5 MG TABS tablet Take 1 tablet by mouth 2 times daily 60 tablet 2     No current facility-administered medications for this visit. Allergies   Allergen Reactions    Latex Anaphylaxis and Rash    Aspirin Swelling and Other (See Comments)     Swelling in lower legs    Nsaids Swelling    Penicillins Hives and Rash    Sulfa Antibiotics Hives, Swelling and Rash    Meperidine Nausea And Vomiting, Rash and Other (See Comments)    Ranitidine Hcl Rash         Past medical, Surgical,Family, Social History Reviewed and Updated in chart today. OBJECTIVE:      Vitals:    03/16/23 1356   BP: 116/74   Pulse: 78   Temp: 97.4 °F (36.3 °C)   SpO2: 95%   Weight: (!) 346 lb 3.2 oz (157 kg)   Height: 5' 10\" (1.778 m)         Constitutional: Patient appears well-nourished, not in any distress. HENT:  Head: Normocephalic and atraumatic. Eyes: Conjunctivae and EOM are normal  Right Ear: External ear normal.  Left Ear: External ear normal.   Nose: Nose normal.  Mouth/Throat: Oropharynx is clear and moist.   Neck: Normal range of motion. Neck supple. Lymphatic: No cervical lymphadenopathy  Cardiovascular: Normal rate, regular rhythm, normal heart sounds and intact distal pulses. Pulmonary/Chest: Effort normal and breath sounds normal, no wheezes or rhonchi. Neurological:Patient is alert, oriented to person, place, and time. No obvious focal neurological deficits  Skin: Skin is warm and moist.  Psychiatric:Patient has a normal mood and affect, behavior is normal. Judgment and thought content normal.    ASSESSMENT AND PLAN    Tyler Mesa was seen today for follow-up from hospital.    Diagnoses and all orders for this visit:    Primary insomnia  -     eszopiclone (LUNESTA) 1 MG TABS; Take 1 tablet by mouth nightly for 30 days.  Max Daily Amount: 1 mg    UTI symptoms  -     POCT Urinalysis no Micro    Moderate persistent asthma without complication  -     mometasone-formoterol (DULERA) 200-5 MCG/ACT inhaler; INHALE TWO PUFFS BY MOUTH TWICE A DAY    Persistent atrial fibrillation (HCC)    Chronic generalized pain disorder    Other orders  -     cyanocobalamin injection 1,000 mcg           DISCHARGE MEDICATION LIST        Medication List            Accurate as of March 16, 2023 11:59 PM. If you have any questions, ask your nurse or doctor.                START taking these medications      eszopiclone 1 MG Tabs  Commonly known as: Lunesta  Take 1 tablet by mouth nightly for 30 days. Max Daily Amount: 1 mg  Started by: Maggie Camara MD            CHANGE how you take these medications      amiodarone 200 MG tablet  Commonly known as: CORDARONE  Take 1 tablet by mouth twice daily for 10 days, then take 1 tablet by mouth daily thereafter  What changed:   how much to take  how to take this  when to take this            CONTINUE taking these medications      acetaminophen-codeine 300-30 MG per tablet  Commonly known as: TYLENOL/CODEINE #3  Take 1 tablet by mouth in the morning and at bedtime for 30 days. Max Daily Amount: 2 tablets     albuterol sulfate  (90 Base) MCG/ACT inhaler  Commonly known as: PROVENTIL;VENTOLIN;PROAIR  INHALE 2 PUFFS BY MOUTH EVERY 4 HOURS AS NEEDED FOR WHEEZING OR SHORTNESS OF BREATH(SPACE OUT TO EVERY 6 HOURS AS SYMPTOMS IMPROVE)     apixaban 5 MG Tabs tablet  Commonly known as: ELIQUIS  Take 1 tablet by mouth 2 times daily     colchicine 0.6 MG tablet  Commonly known as: Colcrys  Take 0.5 tablets by mouth 2 times daily     cyclobenzaprine 10 MG tablet  Commonly known as: FLEXERIL  Take 1 tablet by mouth 3 times daily as needed for Muscle spasms     dilTIAZem 240 MG extended release capsule  Commonly known as: CARDIZEM CD  Take 1 capsule by mouth daily     EPINEPHrine 0.3 MG/0.3ML Soaj injection  Commonly known as: EPIPEN     furosemide 40 MG tablet  Commonly known as: LASIX  Take 1 tablet by mouth daily     ipratropium-albuterol 0.5-2.5 (3) MG/3ML Soln nebulizer solution  Commonly known as: DUONEB     levalbuterol 45 MCG/ACT  inhaler  Commonly known as: Xopenex HFA  Inhale 1 puff into the lungs every 4 hours as needed for Wheezing     mometasone-formoterol 200-5 MCG/ACT inhaler  Commonly known as: Dulera  INHALE TWO PUFFS BY MOUTH TWICE A DAY     Nebulizer/Tubing/Mouthpiece Kit  Continuous     omalizumab 150 MG/ML Sosy injection  Commonly known as: XOLAIR     pantoprazole 40 MG tablet  Commonly known as: PROTONIX  Take 1 tablet by mouth every morning (before breakfast)     rOPINIRole 1 MG tablet  Commonly known as: REQUIP               Where to Get Your Medications        These medications were sent to [facility]  #[id number] [address], [place] [address]      Phone: [phone]   eszopiclone 1 MG Tabs  mometasone-formoterol 200-5 MCG/ACT inhaler          Return in about 3 months (around 6/16/2023), or if symptoms worsen or fail to improve. Please refer to diagnosis, orders and patient instructions for further recommendations given. All patient's questions and concerns were addressed appropriately. All orders, handouts were reviewed in detail with the patient and patient voiced understanding verbally.

## 2023-03-22 ENCOUNTER — TELEMEDICINE (OUTPATIENT)
Dept: PSYCHOLOGY | Age: 58
End: 2023-03-22
Payer: MEDICAID

## 2023-03-22 DIAGNOSIS — F33.1 MAJOR DEPRESSIVE DISORDER, RECURRENT EPISODE, MODERATE WITH ANXIOUS DISTRESS (HCC): Primary | ICD-10-CM

## 2023-03-22 DIAGNOSIS — G89.4 CHRONIC PAIN SYNDROME: ICD-10-CM

## 2023-03-22 PROCEDURE — 90832 PSYTX W PT 30 MINUTES: CPT | Performed by: PSYCHOLOGIST

## 2023-03-24 ENCOUNTER — CARE COORDINATION (OUTPATIENT)
Dept: CASE MANAGEMENT | Age: 58
End: 2023-03-24

## 2023-03-24 NOTE — CARE COORDINATION
[facility] Transitions Initial Follow Up Call    Patient:  Marian Blair Patient :  1965  MRN:  [id number]   Reason for Admission:  A-Fib  Discharge Date:  3/8/23  RARS: 0      Transitions of Care Initial Call    Was this an external facility discharge? no    Discharge Facility: [facility]      Challenges to be reviewed by the provider   Additional needs identified to be addressed with provider:    ian    AMB CC Provider Discharge Needs: none            CTC attempt to reach Pt regarding recent hospital discharge. CTC left voice recording with call back number requesting a call back. Follow up appointments:    Future Appointments   Date Time Provider Wil Chatterjee   2023  2:15 PM Kelsi Mendoza MD [facility]   5/10/2023  1:00 PM ANN-MARIE AdanWD  PSY [facility]   2023 11:30 AM Connie Brewer MD [facility] [address]  [place] - DYD   2023 11:00 AM Mckinley Garcia MD [facility] P/CC [facility]       REGULO Garcia, RN  Care Transition Coordinator  Contact Number:  [phone]

## 2023-03-31 ENCOUNTER — CARE COORDINATION (OUTPATIENT)
Dept: CASE MANAGEMENT | Age: 58
End: 2023-03-31

## 2023-04-19 ENCOUNTER — OFFICE VISIT (OUTPATIENT)
Dept: CARDIOLOGY CLINIC | Age: 58
End: 2023-04-19
Payer: MEDICAID

## 2023-04-19 VITALS
SYSTOLIC BLOOD PRESSURE: 120 MMHG | DIASTOLIC BLOOD PRESSURE: 70 MMHG | HEIGHT: 70 IN | HEART RATE: 80 BPM | WEIGHT: 293 LBS | BODY MASS INDEX: 41.95 KG/M2

## 2023-04-19 DIAGNOSIS — I48.0 PAROXYSMAL ATRIAL FIBRILLATION WITH RAPID VENTRICULAR RESPONSE (HCC): Primary | ICD-10-CM

## 2023-04-19 PROCEDURE — G8417 CALC BMI ABV UP PARAM F/U: HCPCS | Performed by: INTERNAL MEDICINE

## 2023-04-19 PROCEDURE — 3078F DIAST BP <80 MM HG: CPT | Performed by: INTERNAL MEDICINE

## 2023-04-19 PROCEDURE — 3017F COLORECTAL CA SCREEN DOC REV: CPT | Performed by: INTERNAL MEDICINE

## 2023-04-19 PROCEDURE — G8427 DOCREV CUR MEDS BY ELIG CLIN: HCPCS | Performed by: INTERNAL MEDICINE

## 2023-04-19 PROCEDURE — 1036F TOBACCO NON-USER: CPT | Performed by: INTERNAL MEDICINE

## 2023-04-19 PROCEDURE — 99214 OFFICE O/P EST MOD 30 MIN: CPT | Performed by: INTERNAL MEDICINE

## 2023-04-19 PROCEDURE — 3074F SYST BP LT 130 MM HG: CPT | Performed by: INTERNAL MEDICINE

## 2023-04-19 PROCEDURE — 93000 ELECTROCARDIOGRAM COMPLETE: CPT | Performed by: INTERNAL MEDICINE

## 2023-04-25 ENCOUNTER — OFFICE VISIT (OUTPATIENT)
Dept: FAMILY MEDICINE CLINIC | Age: 58
End: 2023-04-25
Payer: MEDICAID

## 2023-04-25 VITALS
TEMPERATURE: 97.3 F | BODY MASS INDEX: 41.95 KG/M2 | WEIGHT: 293 LBS | HEIGHT: 70 IN | HEART RATE: 67 BPM | DIASTOLIC BLOOD PRESSURE: 80 MMHG | OXYGEN SATURATION: 98 % | SYSTOLIC BLOOD PRESSURE: 100 MMHG

## 2023-04-25 DIAGNOSIS — I48.19 PERSISTENT ATRIAL FIBRILLATION (HCC): ICD-10-CM

## 2023-04-25 DIAGNOSIS — F51.01 PRIMARY INSOMNIA: ICD-10-CM

## 2023-04-25 DIAGNOSIS — J45.41 MODERATE PERSISTENT ASTHMA WITH EXACERBATION: Primary | ICD-10-CM

## 2023-04-25 PROCEDURE — 3074F SYST BP LT 130 MM HG: CPT | Performed by: FAMILY MEDICINE

## 2023-04-25 PROCEDURE — 1036F TOBACCO NON-USER: CPT | Performed by: FAMILY MEDICINE

## 2023-04-25 PROCEDURE — 3079F DIAST BP 80-89 MM HG: CPT | Performed by: FAMILY MEDICINE

## 2023-04-25 PROCEDURE — G8427 DOCREV CUR MEDS BY ELIG CLIN: HCPCS | Performed by: FAMILY MEDICINE

## 2023-04-25 PROCEDURE — 99214 OFFICE O/P EST MOD 30 MIN: CPT | Performed by: FAMILY MEDICINE

## 2023-04-25 PROCEDURE — G8417 CALC BMI ABV UP PARAM F/U: HCPCS | Performed by: FAMILY MEDICINE

## 2023-04-25 PROCEDURE — 3017F COLORECTAL CA SCREEN DOC REV: CPT | Performed by: FAMILY MEDICINE

## 2023-04-25 RX ORDER — PREDNISONE 20 MG/1
20 TABLET ORAL 2 TIMES DAILY
Qty: 20 TABLET | Refills: 0 | Status: SHIPPED | OUTPATIENT
Start: 2023-04-25 | End: 2023-05-05

## 2023-04-25 RX ORDER — AZITHROMYCIN 250 MG/1
250 TABLET, FILM COATED ORAL SEE ADMIN INSTRUCTIONS
Qty: 6 TABLET | Refills: 0 | Status: SHIPPED | OUTPATIENT
Start: 2023-04-25 | End: 2023-04-30

## 2023-04-25 RX ORDER — ESZOPICLONE 1 MG/1
1 TABLET, FILM COATED ORAL NIGHTLY
Qty: 30 TABLET | Refills: 2 | Status: SHIPPED | OUTPATIENT
Start: 2023-04-25 | End: 2023-07-24

## 2023-04-25 NOTE — PROGRESS NOTES
Portions of this chart may have been created with voice recognition software. Occasional wrong-word or \"sound-alike\" substitutions may have occurred due to the inherent limitations of voice recognition software. Read the chart carefully and recognize, using context, where these substitutions have occurred        Chief Complaint     Chest Congestion (Rattling sensation in chest, feels like coughing up mucous but cannot get anything out. )               Mayte Cruz is a 62 y.o. female here for symptoms of persistent cough, shortness of breath, wheezing. Also complains of  sinus pressure, congestion symptoms, rhinitis, postnasal drainage, bilateral ear pressure. No fever chills body aches or any worsening shortness of breath. Negative for any orthopnea PND any pedal edema any chest pain palpitations lightheadedness dizziness. Patient has not been using her nebulizer since the start of the symptoms. Recommended continued use of nebulizers as needed for symptom control. No chest pain as such. History of atrial fibrillation, post ablation and continuing to be anticoagulated as per cardiology recent visit. Patient was started on Lunesta for insomnia and she states that it is working well for her. She requests a refill for the same. No side effects noted from the medication. Review of Systems  Pertinent positive and negative symptoms noted in HPI               Past medical, surgical, social, family history reviewed and updated as of today. OBJECTIVE:      Vitals:    04/25/23 1433   BP: 100/80   Pulse: 67   Temp: 97.3 °F (36.3 °C)   SpO2: 98%   Weight: (!) 351 lb 9.6 oz (159.5 kg)   Height: 5' 10\" (1.778 m)         Constitutional: Patient appears well-nourished, not in any distress. HEENT:  Head: Normocephalic and atraumatic.    Eyes: Conjunctivae and EOM are normal  Right Ear: External ear normal.  Left Ear: External ear normal.   Nose: Nasal mucosa is congested  Mouth/Throat:

## 2023-05-03 ENCOUNTER — TELEPHONE (OUTPATIENT)
Dept: ORTHOPEDIC SURGERY | Age: 58
End: 2023-05-03

## 2023-05-03 DIAGNOSIS — M17.10 KNEE ARTHROPATHY: Primary | ICD-10-CM

## 2023-05-03 RX ORDER — ROPINIROLE 1 MG/1
1.5 TABLET, FILM COATED ORAL 2 TIMES DAILY
Qty: 90 TABLET | Refills: 1 | Status: SHIPPED | OUTPATIENT
Start: 2023-05-03

## 2023-05-03 NOTE — TELEPHONE ENCOUNTER
Pt would like a referral to an orthopedic surgeon for the pain in her knees, she needs a provider that will do surgery without requiring her to lose weight.

## 2023-05-03 NOTE — TELEPHONE ENCOUNTER
General Question     Subject: PATIENT WOULD LIKE TO ASK A FEW QUESTIONS REGARDING SURGERY BEFORE COMING IN FOR A CONSULT. PLEASE ADVISE.   Patient Peg Stratford  Contact Number: [phone]

## 2023-05-03 NOTE — TELEPHONE ENCOUNTER
Called and left a detailed message informing the patient her Rx was phoned in - also a referral was put in for an orthopedist - Dr. Eduard Duran name, address, and phone number were left for patient. Baldemar Jaimes MD  [place] [address]  Phone: [phone]  Advised to call back if she had any other questions or concerns.

## 2023-05-04 ENCOUNTER — TELEPHONE (OUTPATIENT)
Dept: ORTHOPEDIC SURGERY | Age: 58
End: 2023-05-04

## 2023-05-08 ENCOUNTER — OFFICE VISIT (OUTPATIENT)
Dept: ORTHOPEDIC SURGERY | Age: 58
End: 2023-05-08

## 2023-05-08 VITALS — HEIGHT: 70 IN | BODY MASS INDEX: 41.95 KG/M2 | WEIGHT: 293 LBS

## 2023-05-08 DIAGNOSIS — M16.12 PRIMARY LOCALIZED OSTEOARTHRITIS OF LEFT HIP: ICD-10-CM

## 2023-05-08 DIAGNOSIS — M25.562 ACUTE PAIN OF BOTH KNEES: ICD-10-CM

## 2023-05-08 DIAGNOSIS — M25.552 LEFT HIP PAIN: ICD-10-CM

## 2023-05-08 DIAGNOSIS — M25.561 ACUTE PAIN OF BOTH KNEES: ICD-10-CM

## 2023-05-08 DIAGNOSIS — M17.0 BILATERAL PRIMARY OSTEOARTHRITIS OF KNEE: Primary | ICD-10-CM

## 2023-05-08 NOTE — PROGRESS NOTES
Dr Marco Hamilton      Date /Time 5/8/2023       11:34 AM EDT  Name Handy Wang             1965   Location  [facility]  MRN [id number]                Chief Complaint   Patient presents with    Knee Pain     NP bilateral knees  Rt greater than Lt  No recent xr    Hip Pain     NP left hip pain fall  No imaging        History of Present Illness  Handy Wang is a 62 y.o. female who presents with right worse than left bilateral knee pain. She has known bone-on-bone osteoarthritis which is a chronic issue. She is unable to take NSAIDs secondary to Eliquis therapy. She is on Eliquis for A-fib with an elevated [id number] score. Her A-fib has been ablated and she has been cleared for surgery by cardiology. Other than NSAIDs she takes Tylenol 3 as needed. She has also tried physical therapy in the past and modifying her activities and has had multiple steroid and viscosupplementation injections which have provided no benefit. Despite these conservative measures she continues to have pain with daily activities and had a recent fall due to knee pain. She has daughterstaking care of the grandchildren which she has a hard time doing due to knee pain. Her general mobility and ability to be active is also compromised by right worse than left knee pain. She is frustrated by her inability to be active secondary to knee pain. She states she has to do a lot of hiking and cannot do this anymore. She has tried to lose weight with dietary measures but has plateaued. She has had a gastric bypass and is following up with  to discuss this in the near future. She is otherwise out of options with weight loss.     Sent in consultation by Arletha Bernheim, MD.        Past History  Past Medical History:   Diagnosis Date    Acute lateral meniscus tear of right knee 02/2019    Acute medial meniscus tear of right knee 02/2019    Anesthesia complication     woke up during one surgery    Anxiety

## 2023-05-10 ENCOUNTER — TELEMEDICINE (OUTPATIENT)
Dept: PSYCHOLOGY | Age: 58
End: 2023-05-10
Payer: MEDICAID

## 2023-05-10 DIAGNOSIS — G89.4 CHRONIC PAIN SYNDROME: ICD-10-CM

## 2023-05-10 DIAGNOSIS — F33.1 MAJOR DEPRESSIVE DISORDER, RECURRENT EPISODE, MODERATE WITH ANXIOUS DISTRESS (HCC): Primary | ICD-10-CM

## 2023-05-10 PROCEDURE — 90832 PSYTX W PT 30 MINUTES: CPT | Performed by: PSYCHOLOGIST

## 2023-05-10 NOTE — PROGRESS NOTES
Behavioral Health Consultation  Meron Oakley Psy.D. Psychologist  5/10/2023  1:08-1:30 PM    Time spent with Patient: 22 minutes (pt arrived 8 minutes late)  This is patient's  eleventh  [facility] appointment. Reason for Consult: Depression, chronic pain  Referring Provider: Phyllis Terry MD  [address] [facility] [address] [facility],First Floor [address]    TELEHEALTH VISIT -- Audio/Visual (During [id number]-43 public health emergency)  }  [facility] a synchronous (real-time) audio-video encounter using HIPAA-compliant technology. The patient (or guardian, if applicable) is aware that this is a billable service, which includes applicable co-pays. This Virtual Visit was conducted with patient's (and/or legal guardian's) consent. The visit was conducted pursuant to the emergency declaration under the [address] [place] [address], [address] [place] [address] authority and the [employer] and [employer] [place] General Act. Patient identification was verified, and a caregiver was present when appropriate. The patient was located in a state where the provider was licensed to provide care. Conducted a risk-benefit analysis and determined that the patient's presenting problems are consistent with the use of telepsychology. Determined that the patient has sufficient knowledge and skills in the use of technology enabling them to adequately benefit from telepsychology. It was determined that this patient was able to be properly treated without an in-person session. Patient verified current location at the beginning of the visit.     Verified the following information:  Patient's identification: Yes  Patient location: Daughter's home @ [address] [place] [address]  Patient's call back number: [phone]  Patient's emergency contact's name and number, as well as permission to contact them if needed:  Extended Emergency Contact Information  Primary Emergency Contact:

## 2023-05-10 NOTE — PATIENT INSTRUCTIONS
Goals: 1. Read The Sleep Solution by Yogesh Washington MD  2. Schedule an appointment with Keiko Harvey, psychiatric NP, to discuss medication options  3. Learn more about self-compassion at www.self-compassion.org. Watch the video on self-compassion vs self-esteem.

## 2023-05-11 ENCOUNTER — OFFICE VISIT (OUTPATIENT)
Dept: FAMILY MEDICINE CLINIC | Age: 58
End: 2023-05-11
Payer: MEDICAID

## 2023-05-11 VITALS
BODY MASS INDEX: 41.95 KG/M2 | HEART RATE: 61 BPM | OXYGEN SATURATION: 97 % | DIASTOLIC BLOOD PRESSURE: 78 MMHG | SYSTOLIC BLOOD PRESSURE: 106 MMHG | HEIGHT: 70 IN | WEIGHT: 293 LBS | TEMPERATURE: 97.5 F

## 2023-05-11 DIAGNOSIS — J45.40 MODERATE PERSISTENT ASTHMA WITHOUT COMPLICATION: ICD-10-CM

## 2023-05-11 DIAGNOSIS — I48.19 PERSISTENT ATRIAL FIBRILLATION (HCC): ICD-10-CM

## 2023-05-11 DIAGNOSIS — E66.01 MORBID OBESITY (HCC): ICD-10-CM

## 2023-05-11 DIAGNOSIS — B37.2 CANDIDAL INTERTRIGO: ICD-10-CM

## 2023-05-11 DIAGNOSIS — M17.0 PRIMARY OSTEOARTHRITIS OF BOTH KNEES: Primary | ICD-10-CM

## 2023-05-11 PROBLEM — F33.2 MDD (MAJOR DEPRESSIVE DISORDER), RECURRENT SEVERE, WITHOUT PSYCHOSIS (HCC): Status: RESOLVED | Noted: 2018-03-12 | Resolved: 2023-05-11

## 2023-05-11 LAB
25(OH)D3 SERPL-MCNC: 20.6 NG/ML
ABO + RH BLD: NORMAL
ALBUMIN SERPL-MCNC: 4.2 G/DL (ref 3.4–5)
ALBUMIN/GLOB SERPL: 1.6 {RATIO} (ref 1.1–2.2)
ALP SERPL-CCNC: 130 U/L (ref 40–129)
ALT SERPL-CCNC: 12 U/L (ref 10–40)
ANION GAP SERPL CALCULATED.3IONS-SCNC: 9 MMOL/L (ref 3–16)
APTT BLD: 30.2 SEC (ref 22.7–35.9)
AST SERPL-CCNC: 14 U/L (ref 15–37)
BASOPHILS # BLD: 0.1 K/UL (ref 0–0.2)
BASOPHILS NFR BLD: 1 %
BILIRUB SERPL-MCNC: 0.6 MG/DL (ref 0–1)
BILIRUB UR QL STRIP.AUTO: NEGATIVE
BLD GP AB SCN SERPL QL: NORMAL
BUN SERPL-MCNC: 12 MG/DL (ref 7–20)
CALCIUM SERPL-MCNC: 8.8 MG/DL (ref 8.3–10.6)
CHLORIDE SERPL-SCNC: 107 MMOL/L (ref 99–110)
CLARITY UR: CLEAR
CO2 SERPL-SCNC: 26 MMOL/L (ref 21–32)
COLOR UR: YELLOW
CREAT SERPL-MCNC: 0.9 MG/DL (ref 0.6–1.1)
CRP SERPL-MCNC: <3 MG/L (ref 0–5.1)
DEPRECATED RDW RBC AUTO: 19.5 % (ref 12.4–15.4)
EOSINOPHIL # BLD: 0.3 K/UL (ref 0–0.6)
EOSINOPHIL NFR BLD: 3.9 %
ERYTHROCYTE [SEDIMENTATION RATE] IN BLOOD BY WESTERGREN METHOD: 33 MM/HR (ref 0–30)
GFR SERPLBLD CREATININE-BSD FMLA CKD-EPI: >60 ML/MIN/{1.73_M2}
GLUCOSE SERPL-MCNC: 91 MG/DL (ref 70–99)
GLUCOSE UR STRIP.AUTO-MCNC: NEGATIVE MG/DL
HCT VFR BLD AUTO: 38.1 % (ref 36–48)
HGB BLD-MCNC: 11.9 G/DL (ref 12–16)
HGB UR QL STRIP.AUTO: NEGATIVE
INR PPP: 0.91 (ref 0.84–1.16)
KETONES UR STRIP.AUTO-MCNC: NEGATIVE MG/DL
LEUKOCYTE ESTERASE UR QL STRIP.AUTO: NEGATIVE
LYMPHOCYTES # BLD: 1.8 K/UL (ref 1–5.1)
LYMPHOCYTES NFR BLD: 26.1 %
MCH RBC QN AUTO: 25.3 PG (ref 26–34)
MCHC RBC AUTO-ENTMCNC: 31.2 G/DL (ref 31–36)
MCV RBC AUTO: 81.1 FL (ref 80–100)
MONOCYTES # BLD: 0.5 K/UL (ref 0–1.3)
MONOCYTES NFR BLD: 7.9 %
NEUTROPHILS # BLD: 4.1 K/UL (ref 1.7–7.7)
NEUTROPHILS NFR BLD: 61.1 %
NITRITE UR QL STRIP.AUTO: NEGATIVE
PH UR STRIP.AUTO: 6 [PH] (ref 5–8)
PLATELET # BLD AUTO: 313 K/UL (ref 135–450)
PMV BLD AUTO: 9.5 FL (ref 5–10.5)
POTASSIUM SERPL-SCNC: 4.3 MMOL/L (ref 3.5–5.1)
PREALB SERPL-MCNC: 16.5 MG/DL (ref 20–40)
PROT SERPL-MCNC: 6.8 G/DL (ref 6.4–8.2)
PROT UR STRIP.AUTO-MCNC: NEGATIVE MG/DL
PROTHROMBIN TIME: 12.3 SEC (ref 11.5–14.8)
RBC # BLD AUTO: 4.7 M/UL (ref 4–5.2)
SODIUM SERPL-SCNC: 142 MMOL/L (ref 136–145)
SP GR UR STRIP.AUTO: 1.01 (ref 1–1.03)
TRANSFERRIN SERPL-MCNC: 298 MG/DL (ref 200–360)
UA DIPSTICK W REFLEX MICRO PNL UR: NORMAL
URN SPEC COLLECT METH UR: NORMAL
UROBILINOGEN UR STRIP-ACNC: 0.2 E.U./DL
WBC # BLD AUTO: 6.7 K/UL (ref 4–11)

## 2023-05-11 PROCEDURE — G8427 DOCREV CUR MEDS BY ELIG CLIN: HCPCS | Performed by: FAMILY MEDICINE

## 2023-05-11 PROCEDURE — 1036F TOBACCO NON-USER: CPT | Performed by: FAMILY MEDICINE

## 2023-05-11 PROCEDURE — G8417 CALC BMI ABV UP PARAM F/U: HCPCS | Performed by: FAMILY MEDICINE

## 2023-05-11 PROCEDURE — 3017F COLORECTAL CA SCREEN DOC REV: CPT | Performed by: FAMILY MEDICINE

## 2023-05-11 PROCEDURE — 99214 OFFICE O/P EST MOD 30 MIN: CPT | Performed by: FAMILY MEDICINE

## 2023-05-11 PROCEDURE — 3074F SYST BP LT 130 MM HG: CPT | Performed by: FAMILY MEDICINE

## 2023-05-11 PROCEDURE — 3078F DIAST BP <80 MM HG: CPT | Performed by: FAMILY MEDICINE

## 2023-05-11 RX ORDER — TIZANIDINE 2 MG/1
2 TABLET ORAL 3 TIMES DAILY
Qty: 90 TABLET | Refills: 1 | Status: SHIPPED | OUTPATIENT
Start: 2023-05-11

## 2023-05-11 RX ORDER — NYSTATIN 100000 U/G
OINTMENT TOPICAL
Qty: 30 G | Refills: 3 | Status: SHIPPED | OUTPATIENT
Start: 2023-05-11

## 2023-05-11 NOTE — PROGRESS NOTES
Portions of this chart may have been created with voice recognition software. Occasional wrong-word or \"sound-alike\" substitutions may have occurred due to the inherent limitations of voice recognition software. Read the chart carefully and recognize, using context, where these substitutions have occurred    Chief Complaint    Pre-operative evaluation    SUBJECTIVE:    Handy Wang is a 62 y.o. female who is here for pre operative evaluation. Handy Wang is undergoing right total knee arthroplasty on 6/8/2023 by Dr. Brian Cuevas at the [facility], [facility]. under general anesthesia. PERIOPERATIVE RISKS:    Perioperative cardiovascular risk; patient has been cleared by her cardiologist for surgery. Perioperative respiratory risk: Patient has been assessed by Loye Noa respiratory failure index and has less than 10 risk factors; hence patient has a 0.5% perioperative risk of respiratory failure. Perioperative liver and renal risks: These have been assessed with laboratory investigations. However, the patient is at average risk for hepatic and renal complications perioperatively. Perioperative adrenal risk: Patient has been on oral corticosteroids on and off but not continuously in the last 6 months because of asthma exacerbations. ; hence is at a moderate risk for adrenal failure perioperatively. Comorbid conditions: Patient was advised to refrain from any aspirin, any NSAIDS' and other medications as directed by surgeon prior to procedure. Perioperative intubation risk; The patient does not have any history of rheumatoid arthritis: hence, she is at a low risk for any cervical subluxation spinal injuries during intubation.  Patient has a airway Mallampati classification of class 1.    GI prophylaxis will be left to the discretion of the surgeon, anesthesiologist, or physician taking care of the patient in the hospital.    Functional Capacity: Overall assessment of patient's functional capacity meets

## 2023-05-12 ENCOUNTER — TELEPHONE (OUTPATIENT)
Dept: ORTHOPEDIC SURGERY | Age: 58
End: 2023-05-12

## 2023-05-12 LAB
BACTERIA UR CULT: NORMAL
EST. AVERAGE GLUCOSE BLD GHB EST-MCNC: 111.2 MG/DL
HBA1C MFR BLD: 5.5 %
MRSA DNA SPEC QL NAA+PROBE: NORMAL

## 2023-05-12 NOTE — TELEPHONE ENCOUNTER
PageMarderrickyusuf Ro [id number]    Date: 06/08/23 09/06/23  Type of SX:  Outpatient  Location: [facility]  CPT: 77108   DX Code: M17.11  SX area:  knee  Insurance: [facility]

## 2023-05-15 DIAGNOSIS — G89.29 CHRONIC GENERALIZED PAIN DISORDER: ICD-10-CM

## 2023-05-15 DIAGNOSIS — M79.7 FIBROMYALGIA: ICD-10-CM

## 2023-05-15 DIAGNOSIS — M54.12 CERVICAL RADICULOPATHY: ICD-10-CM

## 2023-05-15 DIAGNOSIS — R52 CHRONIC GENERALIZED PAIN DISORDER: ICD-10-CM

## 2023-05-15 RX ORDER — PYRAZINAMIDE 500 MG/1
1 TABLET ORAL 2 TIMES DAILY
Qty: 60 TABLET | Refills: 0 | Status: SHIPPED | OUTPATIENT
Start: 2023-05-15 | End: 2023-06-14

## 2023-05-26 ENCOUNTER — TELEPHONE (OUTPATIENT)
Dept: ORTHOPEDIC SURGERY | Age: 58
End: 2023-05-26

## 2023-05-31 ENCOUNTER — TELEPHONE (OUTPATIENT)
Dept: CARDIOLOGY CLINIC | Age: 58
End: 2023-05-31

## 2023-05-31 RX ORDER — DOXYCYCLINE HYCLATE 100 MG
100 TABLET ORAL EVERY 12 HOURS
Qty: 20 TABLET | Refills: 0 | Status: ON HOLD | COMMUNITY
Start: 2023-05-30 | End: 2023-06-08

## 2023-05-31 NOTE — TELEPHONE ENCOUNTER
Nailni Her Pre-admission testing calling for a cardiac clearance and approval for pt to stop taking Eliquis prior total knee replacement of right knee.  Please advise

## 2023-06-01 ENCOUNTER — ANESTHESIA EVENT (OUTPATIENT)
Dept: OPERATING ROOM | Age: 58
End: 2023-06-01
Payer: MEDICAID

## 2023-06-08 ENCOUNTER — HOSPITAL ENCOUNTER (OUTPATIENT)
Age: 58
Setting detail: OBSERVATION
Discharge: HOME | End: 2023-06-10
Attending: STUDENT IN AN ORGANIZED HEALTH CARE EDUCATION/TRAINING PROGRAM | Admitting: STUDENT IN AN ORGANIZED HEALTH CARE EDUCATION/TRAINING PROGRAM
Payer: MEDICAID

## 2023-06-08 ENCOUNTER — ANESTHESIA (OUTPATIENT)
Dept: OPERATING ROOM | Age: 58
End: 2023-06-08
Payer: MEDICAID

## 2023-06-08 ENCOUNTER — APPOINTMENT (OUTPATIENT)
Dept: GENERAL RADIOLOGY | Age: 58
End: 2023-06-08
Attending: STUDENT IN AN ORGANIZED HEALTH CARE EDUCATION/TRAINING PROGRAM
Payer: MEDICAID

## 2023-06-08 DIAGNOSIS — Z96.651 S/P TOTAL KNEE ARTHROPLASTY, RIGHT: Primary | ICD-10-CM

## 2023-06-08 LAB
ABO + RH BLD: NORMAL
BLD GP AB SCN SERPL QL: NORMAL

## 2023-06-08 PROCEDURE — 2700000000 HC OXYGEN THERAPY PER DAY

## 2023-06-08 PROCEDURE — 6360000002 HC RX W HCPCS: Performed by: ANESTHESIOLOGY

## 2023-06-08 PROCEDURE — 6370000000 HC RX 637 (ALT 250 FOR IP): Performed by: ANESTHESIOLOGY

## 2023-06-08 PROCEDURE — 3700000000 HC ANESTHESIA ATTENDED CARE: Performed by: STUDENT IN AN ORGANIZED HEALTH CARE EDUCATION/TRAINING PROGRAM

## 2023-06-08 PROCEDURE — G0378 HOSPITAL OBSERVATION PER HR: HCPCS

## 2023-06-08 PROCEDURE — 2709999900 HC NON-CHARGEABLE SUPPLY: Performed by: STUDENT IN AN ORGANIZED HEALTH CARE EDUCATION/TRAINING PROGRAM

## 2023-06-08 PROCEDURE — 86850 RBC ANTIBODY SCREEN: CPT

## 2023-06-08 PROCEDURE — 2500000003 HC RX 250 WO HCPCS: Performed by: STUDENT IN AN ORGANIZED HEALTH CARE EDUCATION/TRAINING PROGRAM

## 2023-06-08 PROCEDURE — 6360000002 HC RX W HCPCS: Performed by: STUDENT IN AN ORGANIZED HEALTH CARE EDUCATION/TRAINING PROGRAM

## 2023-06-08 PROCEDURE — 2500000003 HC RX 250 WO HCPCS: Performed by: ANESTHESIOLOGY

## 2023-06-08 PROCEDURE — 94761 N-INVAS EAR/PLS OXIMETRY MLT: CPT

## 2023-06-08 PROCEDURE — 3700000001 HC ADD 15 MINUTES (ANESTHESIA): Performed by: STUDENT IN AN ORGANIZED HEALTH CARE EDUCATION/TRAINING PROGRAM

## 2023-06-08 PROCEDURE — 64447 NJX AA&/STRD FEMORAL NRV IMG: CPT | Performed by: ANESTHESIOLOGY

## 2023-06-08 PROCEDURE — 6370000000 HC RX 637 (ALT 250 FOR IP): Performed by: STUDENT IN AN ORGANIZED HEALTH CARE EDUCATION/TRAINING PROGRAM

## 2023-06-08 PROCEDURE — 86900 BLOOD TYPING SEROLOGIC ABO: CPT

## 2023-06-08 PROCEDURE — 97530 THERAPEUTIC ACTIVITIES: CPT

## 2023-06-08 PROCEDURE — 97116 GAIT TRAINING THERAPY: CPT

## 2023-06-08 PROCEDURE — 2580000003 HC RX 258: Performed by: STUDENT IN AN ORGANIZED HEALTH CARE EDUCATION/TRAINING PROGRAM

## 2023-06-08 PROCEDURE — 97161 PT EVAL LOW COMPLEX 20 MIN: CPT

## 2023-06-08 PROCEDURE — 86901 BLOOD TYPING SEROLOGIC RH(D): CPT

## 2023-06-08 PROCEDURE — 6360000002 HC RX W HCPCS: Performed by: NURSE ANESTHETIST, CERTIFIED REGISTERED

## 2023-06-08 PROCEDURE — 94640 AIRWAY INHALATION TREATMENT: CPT

## 2023-06-08 PROCEDURE — C1776 JOINT DEVICE (IMPLANTABLE): HCPCS | Performed by: STUDENT IN AN ORGANIZED HEALTH CARE EDUCATION/TRAINING PROGRAM

## 2023-06-08 PROCEDURE — A4217 STERILE WATER/SALINE, 500 ML: HCPCS | Performed by: STUDENT IN AN ORGANIZED HEALTH CARE EDUCATION/TRAINING PROGRAM

## 2023-06-08 PROCEDURE — 7100000000 HC PACU RECOVERY - FIRST 15 MIN: Performed by: STUDENT IN AN ORGANIZED HEALTH CARE EDUCATION/TRAINING PROGRAM

## 2023-06-08 PROCEDURE — 2580000003 HC RX 258: Performed by: NURSE ANESTHETIST, CERTIFIED REGISTERED

## 2023-06-08 PROCEDURE — C1713 ANCHOR/SCREW BN/BN,TIS/BN: HCPCS | Performed by: STUDENT IN AN ORGANIZED HEALTH CARE EDUCATION/TRAINING PROGRAM

## 2023-06-08 PROCEDURE — 3600000005 HC SURGERY LEVEL 5 BASE: Performed by: STUDENT IN AN ORGANIZED HEALTH CARE EDUCATION/TRAINING PROGRAM

## 2023-06-08 PROCEDURE — 3600000015 HC SURGERY LEVEL 5 ADDTL 15MIN: Performed by: STUDENT IN AN ORGANIZED HEALTH CARE EDUCATION/TRAINING PROGRAM

## 2023-06-08 PROCEDURE — 2720000010 HC SURG SUPPLY STERILE: Performed by: STUDENT IN AN ORGANIZED HEALTH CARE EDUCATION/TRAINING PROGRAM

## 2023-06-08 PROCEDURE — 2500000003 HC RX 250 WO HCPCS: Performed by: NURSE ANESTHETIST, CERTIFIED REGISTERED

## 2023-06-08 PROCEDURE — 7100000001 HC PACU RECOVERY - ADDTL 15 MIN: Performed by: STUDENT IN AN ORGANIZED HEALTH CARE EDUCATION/TRAINING PROGRAM

## 2023-06-08 PROCEDURE — 73560 X-RAY EXAM OF KNEE 1 OR 2: CPT

## 2023-06-08 DEVICE — IMPLANTABLE DEVICE
Type: IMPLANTABLE DEVICE | Site: KNEE | Status: FUNCTIONAL
Brand: PERSONA®

## 2023-06-08 DEVICE — CEMENT BONE 40GM HI VISC PALACOS R: Type: IMPLANTABLE DEVICE | Site: KNEE | Status: FUNCTIONAL

## 2023-06-08 DEVICE — PSN TIB STM 5 DEG SZ G R: Type: IMPLANTABLE DEVICE | Site: KNEE | Status: FUNCTIONAL

## 2023-06-08 DEVICE — IMPLANTABLE DEVICE
Type: IMPLANTABLE DEVICE | Site: KNEE | Status: FUNCTIONAL
Brand: PERSONA® VIVACIT-E®

## 2023-06-08 RX ORDER — ONDANSETRON 2 MG/ML
INJECTION INTRAMUSCULAR; INTRAVENOUS PRN
Status: DISCONTINUED | OUTPATIENT
Start: 2023-06-08 | End: 2023-06-08 | Stop reason: SDUPTHER

## 2023-06-08 RX ORDER — SENNA PLUS 8.6 MG/1
1 TABLET ORAL DAILY PRN
Status: DISCONTINUED | OUTPATIENT
Start: 2023-06-08 | End: 2023-06-10 | Stop reason: HOSPADM

## 2023-06-08 RX ORDER — PROPOFOL 10 MG/ML
INJECTION, EMULSION INTRAVENOUS PRN
Status: DISCONTINUED | OUTPATIENT
Start: 2023-06-08 | End: 2023-06-08 | Stop reason: SDUPTHER

## 2023-06-08 RX ORDER — SODIUM CHLORIDE 0.9 % (FLUSH) 0.9 %
5-40 SYRINGE (ML) INJECTION PRN
Status: DISCONTINUED | OUTPATIENT
Start: 2023-06-08 | End: 2023-06-08 | Stop reason: HOSPADM

## 2023-06-08 RX ORDER — LABETALOL HYDROCHLORIDE 5 MG/ML
INJECTION, SOLUTION INTRAVENOUS PRN
Status: DISCONTINUED | OUTPATIENT
Start: 2023-06-08 | End: 2023-06-08 | Stop reason: SDUPTHER

## 2023-06-08 RX ORDER — SODIUM CHLORIDE, SODIUM LACTATE, POTASSIUM CHLORIDE, CALCIUM CHLORIDE 600; 310; 30; 20 MG/100ML; MG/100ML; MG/100ML; MG/100ML
INJECTION, SOLUTION INTRAVENOUS CONTINUOUS
Status: DISCONTINUED | OUTPATIENT
Start: 2023-06-08 | End: 2023-06-08 | Stop reason: HOSPADM

## 2023-06-08 RX ORDER — MORPHINE SULFATE 2 MG/ML
2 INJECTION, SOLUTION INTRAMUSCULAR; INTRAVENOUS
Status: DISCONTINUED | OUTPATIENT
Start: 2023-06-08 | End: 2023-06-09

## 2023-06-08 RX ORDER — ZOLPIDEM TARTRATE 5 MG/1
5 TABLET ORAL NIGHTLY PRN
Status: DISCONTINUED | OUTPATIENT
Start: 2023-06-08 | End: 2023-06-10 | Stop reason: HOSPADM

## 2023-06-08 RX ORDER — SODIUM CHLORIDE 9 MG/ML
INJECTION, SOLUTION INTRAVENOUS PRN
Status: DISCONTINUED | OUTPATIENT
Start: 2023-06-08 | End: 2023-06-10 | Stop reason: HOSPADM

## 2023-06-08 RX ORDER — DEXAMETHASONE SODIUM PHOSPHATE 4 MG/ML
INJECTION, SOLUTION INTRA-ARTICULAR; INTRALESIONAL; INTRAMUSCULAR; INTRAVENOUS; SOFT TISSUE PRN
Status: DISCONTINUED | OUTPATIENT
Start: 2023-06-08 | End: 2023-06-08 | Stop reason: SDUPTHER

## 2023-06-08 RX ORDER — TRANEXAMIC ACID 100 MG/ML
INJECTION, SOLUTION INTRAVENOUS PRN
Status: DISCONTINUED | OUTPATIENT
Start: 2023-06-08 | End: 2023-06-08 | Stop reason: SDUPTHER

## 2023-06-08 RX ORDER — SODIUM CHLORIDE 0.9 % (FLUSH) 0.9 %
5-40 SYRINGE (ML) INJECTION EVERY 12 HOURS SCHEDULED
Status: DISCONTINUED | OUTPATIENT
Start: 2023-06-08 | End: 2023-06-08 | Stop reason: HOSPADM

## 2023-06-08 RX ORDER — SODIUM CHLORIDE 9 MG/ML
INJECTION, SOLUTION INTRAVENOUS PRN
Status: DISCONTINUED | OUTPATIENT
Start: 2023-06-08 | End: 2023-06-08 | Stop reason: HOSPADM

## 2023-06-08 RX ORDER — MORPHINE SULFATE 4 MG/ML
4 INJECTION, SOLUTION INTRAMUSCULAR; INTRAVENOUS
Status: DISCONTINUED | OUTPATIENT
Start: 2023-06-08 | End: 2023-06-09

## 2023-06-08 RX ORDER — OXYCODONE HYDROCHLORIDE 5 MG/1
10 TABLET ORAL EVERY 4 HOURS PRN
Status: DISCONTINUED | OUTPATIENT
Start: 2023-06-08 | End: 2023-06-10 | Stop reason: HOSPADM

## 2023-06-08 RX ORDER — ACETAMINOPHEN 325 MG/1
650 TABLET ORAL EVERY 6 HOURS
Status: DISCONTINUED | OUTPATIENT
Start: 2023-06-08 | End: 2023-06-10 | Stop reason: HOSPADM

## 2023-06-08 RX ORDER — ONDANSETRON 2 MG/ML
4 INJECTION INTRAMUSCULAR; INTRAVENOUS EVERY 30 MIN PRN
Status: DISCONTINUED | OUTPATIENT
Start: 2023-06-08 | End: 2023-06-08 | Stop reason: HOSPADM

## 2023-06-08 RX ORDER — MAGNESIUM HYDROXIDE 1200 MG/15ML
LIQUID ORAL CONTINUOUS PRN
Status: COMPLETED | OUTPATIENT
Start: 2023-06-08 | End: 2023-06-08

## 2023-06-08 RX ORDER — SODIUM CHLORIDE 0.9 % (FLUSH) 0.9 %
5-40 SYRINGE (ML) INJECTION PRN
Status: DISCONTINUED | OUTPATIENT
Start: 2023-06-08 | End: 2023-06-10 | Stop reason: HOSPADM

## 2023-06-08 RX ORDER — OXYCODONE HYDROCHLORIDE 5 MG/1
10 TABLET ORAL PRN
Status: COMPLETED | OUTPATIENT
Start: 2023-06-08 | End: 2023-06-08

## 2023-06-08 RX ORDER — IPRATROPIUM BROMIDE AND ALBUTEROL SULFATE 2.5; .5 MG/3ML; MG/3ML
1 SOLUTION RESPIRATORY (INHALATION) EVERY 4 HOURS PRN
Status: DISCONTINUED | OUTPATIENT
Start: 2023-06-08 | End: 2023-06-10 | Stop reason: HOSPADM

## 2023-06-08 RX ORDER — GLYCOPYRROLATE 0.2 MG/ML
INJECTION INTRAMUSCULAR; INTRAVENOUS PRN
Status: DISCONTINUED | OUTPATIENT
Start: 2023-06-08 | End: 2023-06-08 | Stop reason: SDUPTHER

## 2023-06-08 RX ORDER — POLYETHYLENE GLYCOL 3350 17 G/17G
17 POWDER, FOR SOLUTION ORAL DAILY
Status: DISCONTINUED | OUTPATIENT
Start: 2023-06-08 | End: 2023-06-10 | Stop reason: HOSPADM

## 2023-06-08 RX ORDER — ROPINIROLE 0.5 MG/1
1.5 TABLET, FILM COATED ORAL 2 TIMES DAILY
Status: DISCONTINUED | OUTPATIENT
Start: 2023-06-08 | End: 2023-06-10 | Stop reason: HOSPADM

## 2023-06-08 RX ORDER — ACETAMINOPHEN 500 MG
1000 TABLET ORAL ONCE
Status: COMPLETED | OUTPATIENT
Start: 2023-06-08 | End: 2023-06-08

## 2023-06-08 RX ORDER — SODIUM CHLORIDE, SODIUM LACTATE, POTASSIUM CHLORIDE, CALCIUM CHLORIDE 600; 310; 30; 20 MG/100ML; MG/100ML; MG/100ML; MG/100ML
INJECTION, SOLUTION INTRAVENOUS CONTINUOUS
Status: DISCONTINUED | OUTPATIENT
Start: 2023-06-08 | End: 2023-06-10 | Stop reason: HOSPADM

## 2023-06-08 RX ORDER — DILTIAZEM HYDROCHLORIDE 240 MG/1
240 CAPSULE, COATED, EXTENDED RELEASE ORAL DAILY
Status: DISCONTINUED | OUTPATIENT
Start: 2023-06-09 | End: 2023-06-10 | Stop reason: HOSPADM

## 2023-06-08 RX ORDER — SODIUM CHLORIDE 0.9 % (FLUSH) 0.9 %
5-40 SYRINGE (ML) INJECTION EVERY 12 HOURS SCHEDULED
Status: DISCONTINUED | OUTPATIENT
Start: 2023-06-08 | End: 2023-06-10 | Stop reason: HOSPADM

## 2023-06-08 RX ORDER — MAGNESIUM SULFATE IN WATER 40 MG/ML
2000 INJECTION, SOLUTION INTRAVENOUS ONCE
Status: COMPLETED | OUTPATIENT
Start: 2023-06-08 | End: 2023-06-08

## 2023-06-08 RX ORDER — OXYCODONE HYDROCHLORIDE 5 MG/1
5 TABLET ORAL PRN
Status: COMPLETED | OUTPATIENT
Start: 2023-06-08 | End: 2023-06-08

## 2023-06-08 RX ORDER — FENTANYL CITRATE 50 UG/ML
INJECTION, SOLUTION INTRAMUSCULAR; INTRAVENOUS PRN
Status: DISCONTINUED | OUTPATIENT
Start: 2023-06-08 | End: 2023-06-08 | Stop reason: SDUPTHER

## 2023-06-08 RX ORDER — BUPIVACAINE HYDROCHLORIDE AND EPINEPHRINE 5; 5 MG/ML; UG/ML
INJECTION, SOLUTION EPIDURAL; INTRACAUDAL; PERINEURAL PRN
Status: DISCONTINUED | OUTPATIENT
Start: 2023-06-08 | End: 2023-06-08 | Stop reason: ALTCHOICE

## 2023-06-08 RX ORDER — PANTOPRAZOLE SODIUM 40 MG/1
40 TABLET, DELAYED RELEASE ORAL
Status: DISCONTINUED | OUTPATIENT
Start: 2023-06-09 | End: 2023-06-10 | Stop reason: HOSPADM

## 2023-06-08 RX ORDER — LIDOCAINE HYDROCHLORIDE 10 MG/ML
1 INJECTION, SOLUTION EPIDURAL; INFILTRATION; INTRACAUDAL; PERINEURAL
Status: DISCONTINUED | OUTPATIENT
Start: 2023-06-08 | End: 2023-06-08 | Stop reason: HOSPADM

## 2023-06-08 RX ORDER — OXYCODONE HYDROCHLORIDE 5 MG/1
5 TABLET ORAL EVERY 4 HOURS PRN
Status: DISCONTINUED | OUTPATIENT
Start: 2023-06-08 | End: 2023-06-10 | Stop reason: HOSPADM

## 2023-06-08 RX ORDER — SODIUM CHLORIDE, SODIUM LACTATE, POTASSIUM CHLORIDE, CALCIUM CHLORIDE 600; 310; 30; 20 MG/100ML; MG/100ML; MG/100ML; MG/100ML
INJECTION, SOLUTION INTRAVENOUS CONTINUOUS PRN
Status: DISCONTINUED | OUTPATIENT
Start: 2023-06-08 | End: 2023-06-08 | Stop reason: SDUPTHER

## 2023-06-08 RX ORDER — BUPIVACAINE HYDROCHLORIDE AND EPINEPHRINE 2.5; 5 MG/ML; UG/ML
INJECTION, SOLUTION EPIDURAL; INFILTRATION; INTRACAUDAL; PERINEURAL
Status: COMPLETED | OUTPATIENT
Start: 2023-06-08 | End: 2023-06-08

## 2023-06-08 RX ORDER — ALBUTEROL SULFATE 90 UG/1
1 AEROSOL, METERED RESPIRATORY (INHALATION) EVERY 4 HOURS PRN
Status: DISCONTINUED | OUTPATIENT
Start: 2023-06-08 | End: 2023-06-10 | Stop reason: HOSPADM

## 2023-06-08 RX ORDER — ROCURONIUM BROMIDE 10 MG/ML
INJECTION, SOLUTION INTRAVENOUS PRN
Status: DISCONTINUED | OUTPATIENT
Start: 2023-06-08 | End: 2023-06-08 | Stop reason: SDUPTHER

## 2023-06-08 RX ORDER — LIDOCAINE HYDROCHLORIDE 10 MG/ML
2 INJECTION, SOLUTION INFILTRATION; PERINEURAL
Status: DISCONTINUED | OUTPATIENT
Start: 2023-06-08 | End: 2023-06-08 | Stop reason: HOSPADM

## 2023-06-08 RX ORDER — FUROSEMIDE 40 MG/1
40 TABLET ORAL DAILY
Status: DISCONTINUED | OUTPATIENT
Start: 2023-06-08 | End: 2023-06-10 | Stop reason: HOSPADM

## 2023-06-08 RX ORDER — DEXAMETHASONE SODIUM PHOSPHATE 4 MG/ML
4 INJECTION, SOLUTION INTRA-ARTICULAR; INTRALESIONAL; INTRAMUSCULAR; INTRAVENOUS; SOFT TISSUE EVERY 6 HOURS
Status: DISCONTINUED | OUTPATIENT
Start: 2023-06-08 | End: 2023-06-10 | Stop reason: HOSPADM

## 2023-06-08 RX ORDER — LIDOCAINE HYDROCHLORIDE 20 MG/ML
INJECTION, SOLUTION INFILTRATION; PERINEURAL PRN
Status: DISCONTINUED | OUTPATIENT
Start: 2023-06-08 | End: 2023-06-08 | Stop reason: SDUPTHER

## 2023-06-08 RX ORDER — TIZANIDINE 4 MG/1
2 TABLET ORAL 3 TIMES DAILY
Status: DISCONTINUED | OUTPATIENT
Start: 2023-06-08 | End: 2023-06-10 | Stop reason: HOSPADM

## 2023-06-08 RX ADMIN — Medication 2 PUFF: at 20:29

## 2023-06-08 RX ADMIN — FENTANYL CITRATE 50 MCG: 50 INJECTION, SOLUTION INTRAMUSCULAR; INTRAVENOUS at 07:35

## 2023-06-08 RX ADMIN — GLYCOPYRROLATE 0.2 MG: 0.2 INJECTION, SOLUTION INTRAMUSCULAR; INTRAVENOUS at 08:24

## 2023-06-08 RX ADMIN — TRANEXAMIC ACID 1000 MG: 100 INJECTION, SOLUTION INTRAVENOUS at 08:27

## 2023-06-08 RX ADMIN — ONDANSETRON 4 MG: 2 INJECTION INTRAMUSCULAR; INTRAVENOUS at 08:48

## 2023-06-08 RX ADMIN — Medication 1 PUFF: at 20:35

## 2023-06-08 RX ADMIN — MAGNESIUM SULFATE IN WATER 2000 MG: 40 INJECTION, SOLUTION INTRAVENOUS at 08:03

## 2023-06-08 RX ADMIN — PROPOFOL 50 MG: 10 INJECTION, EMULSION INTRAVENOUS at 07:38

## 2023-06-08 RX ADMIN — PROPOFOL 50 MG: 10 INJECTION, EMULSION INTRAVENOUS at 09:06

## 2023-06-08 RX ADMIN — DEXAMETHASONE SODIUM PHOSPHATE 4 MG: 4 INJECTION, SOLUTION INTRAMUSCULAR; INTRAVENOUS at 20:58

## 2023-06-08 RX ADMIN — CEFAZOLIN 3000 MG: 10 INJECTION, POWDER, FOR SOLUTION INTRAVENOUS at 19:05

## 2023-06-08 RX ADMIN — ACETAMINOPHEN 650 MG: 325 TABLET ORAL at 14:36

## 2023-06-08 RX ADMIN — OXYCODONE HYDROCHLORIDE 10 MG: 5 TABLET ORAL at 21:02

## 2023-06-08 RX ADMIN — SODIUM CHLORIDE, SODIUM LACTATE, POTASSIUM CHLORIDE, AND CALCIUM CHLORIDE: .6; .31; .03; .02 INJECTION, SOLUTION INTRAVENOUS at 07:30

## 2023-06-08 RX ADMIN — ACETAMINOPHEN 650 MG: 325 TABLET ORAL at 20:55

## 2023-06-08 RX ADMIN — SODIUM CHLORIDE, POTASSIUM CHLORIDE, SODIUM LACTATE AND CALCIUM CHLORIDE: 600; 310; 30; 20 INJECTION, SOLUTION INTRAVENOUS at 14:32

## 2023-06-08 RX ADMIN — Medication 10 ML: at 21:05

## 2023-06-08 RX ADMIN — FENTANYL CITRATE 50 MCG: 50 INJECTION, SOLUTION INTRAMUSCULAR; INTRAVENOUS at 08:12

## 2023-06-08 RX ADMIN — ROCURONIUM BROMIDE 50 MG: 10 SOLUTION INTRAVENOUS at 07:36

## 2023-06-08 RX ADMIN — ROCURONIUM BROMIDE 20 MG: 10 SOLUTION INTRAVENOUS at 08:15

## 2023-06-08 RX ADMIN — GLYCOPYRROLATE 0.2 MG: 0.2 INJECTION, SOLUTION INTRAMUSCULAR; INTRAVENOUS at 08:19

## 2023-06-08 RX ADMIN — SUGAMMADEX 200 MG: 100 INJECTION, SOLUTION INTRAVENOUS at 09:15

## 2023-06-08 RX ADMIN — TRANEXAMIC ACID 1000 MG: 100 INJECTION, SOLUTION INTRAVENOUS at 07:57

## 2023-06-08 RX ADMIN — PROPOFOL 150 MG: 10 INJECTION, EMULSION INTRAVENOUS at 07:36

## 2023-06-08 RX ADMIN — OXYCODONE HYDROCHLORIDE 5 MG: 5 TABLET ORAL at 10:31

## 2023-06-08 RX ADMIN — FENTANYL CITRATE 50 MCG: 50 INJECTION, SOLUTION INTRAMUSCULAR; INTRAVENOUS at 08:06

## 2023-06-08 RX ADMIN — Medication 3000 MG: at 07:50

## 2023-06-08 RX ADMIN — LIDOCAINE HYDROCHLORIDE 60 MG: 20 INJECTION, SOLUTION INFILTRATION; PERINEURAL at 07:35

## 2023-06-08 RX ADMIN — RIVAROXABAN 10 MG: 10 TABLET, FILM COATED ORAL at 19:06

## 2023-06-08 RX ADMIN — DEXAMETHASONE SODIUM PHOSPHATE 4 MG: 4 INJECTION, SOLUTION INTRAMUSCULAR; INTRAVENOUS at 14:36

## 2023-06-08 RX ADMIN — MORPHINE SULFATE 4 MG: 4 INJECTION, SOLUTION INTRAMUSCULAR; INTRAVENOUS at 14:36

## 2023-06-08 RX ADMIN — BUPIVACAINE HYDROCHLORIDE AND EPINEPHRINE BITARTRATE 20 ML: 2.5; .005 INJECTION, SOLUTION EPIDURAL; INFILTRATION; INTRACAUDAL; PERINEURAL at 07:05

## 2023-06-08 RX ADMIN — ROPINIROLE HYDROCHLORIDE 1.5 MG: 0.5 TABLET, FILM COATED ORAL at 20:55

## 2023-06-08 RX ADMIN — FENTANYL CITRATE 50 MCG: 50 INJECTION, SOLUTION INTRAMUSCULAR; INTRAVENOUS at 07:57

## 2023-06-08 RX ADMIN — OXYCODONE HYDROCHLORIDE 10 MG: 5 TABLET ORAL at 16:29

## 2023-06-08 RX ADMIN — HYDROMORPHONE HYDROCHLORIDE 0.25 MG: 1 INJECTION, SOLUTION INTRAMUSCULAR; INTRAVENOUS; SUBCUTANEOUS at 12:20

## 2023-06-08 RX ADMIN — DEXAMETHASONE SODIUM PHOSPHATE 8 MG: 4 INJECTION, SOLUTION INTRAMUSCULAR; INTRAVENOUS at 07:45

## 2023-06-08 RX ADMIN — HYDROMORPHONE HYDROCHLORIDE 0.5 MG: 1 INJECTION, SOLUTION INTRAMUSCULAR; INTRAVENOUS; SUBCUTANEOUS at 09:39

## 2023-06-08 RX ADMIN — MORPHINE SULFATE 4 MG: 4 INJECTION, SOLUTION INTRAMUSCULAR; INTRAVENOUS at 19:08

## 2023-06-08 RX ADMIN — ACETAMINOPHEN 1000 MG: 500 TABLET ORAL at 07:28

## 2023-06-08 RX ADMIN — OXYCODONE HYDROCHLORIDE 5 MG: 5 TABLET ORAL at 12:28

## 2023-06-08 RX ADMIN — HYDROMORPHONE HYDROCHLORIDE 0.5 MG: 1 INJECTION, SOLUTION INTRAMUSCULAR; INTRAVENOUS; SUBCUTANEOUS at 09:48

## 2023-06-08 RX ADMIN — TIZANIDINE 2 MG: 4 TABLET ORAL at 20:55

## 2023-06-08 RX ADMIN — HYDROMORPHONE HYDROCHLORIDE 0.5 MG: 1 INJECTION, SOLUTION INTRAMUSCULAR; INTRAVENOUS; SUBCUTANEOUS at 10:02

## 2023-06-08 RX ADMIN — HYDROMORPHONE HYDROCHLORIDE 0.5 MG: 1 INJECTION, SOLUTION INTRAMUSCULAR; INTRAVENOUS; SUBCUTANEOUS at 10:18

## 2023-06-08 RX ADMIN — LABETALOL HYDROCHLORIDE 10 MG: 5 INJECTION, SOLUTION INTRAVENOUS at 08:06

## 2023-06-08 ASSESSMENT — PAIN DESCRIPTION - DESCRIPTORS
DESCRIPTORS: ACHING
DESCRIPTORS: THROBBING
DESCRIPTORS: ACHING;DISCOMFORT
DESCRIPTORS: ACHING;DISCOMFORT
DESCRIPTORS: ACHING
DESCRIPTORS: ACHING;DISCOMFORT
DESCRIPTORS: ACHING
DESCRIPTORS: ACHING
DESCRIPTORS: SHARP
DESCRIPTORS: ACHING;SHARP
DESCRIPTORS: ACHING;THROBBING
DESCRIPTORS: SHARP

## 2023-06-08 ASSESSMENT — PAIN SCALES - GENERAL
PAINLEVEL_OUTOF10: 8
PAINLEVEL_OUTOF10: 7
PAINLEVEL_OUTOF10: 6
PAINLEVEL_OUTOF10: 8
PAINLEVEL_OUTOF10: 6
PAINLEVEL_OUTOF10: 5
PAINLEVEL_OUTOF10: 7
PAINLEVEL_OUTOF10: 10
PAINLEVEL_OUTOF10: 8
PAINLEVEL_OUTOF10: 5
PAINLEVEL_OUTOF10: 8

## 2023-06-08 ASSESSMENT — PAIN - FUNCTIONAL ASSESSMENT
PAIN_FUNCTIONAL_ASSESSMENT: PREVENTS OR INTERFERES WITH MANY ACTIVE NOT PASSIVE ACTIVITIES
PAIN_FUNCTIONAL_ASSESSMENT: PREVENTS OR INTERFERES WITH ALL ACTIVE AND SOME PASSIVE ACTIVITIES

## 2023-06-08 ASSESSMENT — PAIN DESCRIPTION - ORIENTATION
ORIENTATION: RIGHT

## 2023-06-08 ASSESSMENT — PAIN DESCRIPTION - LOCATION
LOCATION: KNEE
LOCATION: LEG
LOCATION: KNEE

## 2023-06-08 ASSESSMENT — PAIN DESCRIPTION - FREQUENCY: FREQUENCY: CONTINUOUS

## 2023-06-08 ASSESSMENT — PAIN DESCRIPTION - PAIN TYPE: TYPE: SURGICAL PAIN

## 2023-06-08 ASSESSMENT — PAIN DESCRIPTION - ONSET: ONSET: ON-GOING

## 2023-06-08 NOTE — H&P
Update History & Physical    The patient's History and Physical of May 11, 2023 was reviewed with the patient and I examined the patient. There was no change. The surgical site was confirmed by the patient and me. Plan for overnight observation and medrol dose pack at d/c.     Plan: The risks, benefits, expected outcome, and alternative to the recommended procedure have been discussed with the patient. Patient understands and wants to proceed with the procedure.      Electronically signed by Tea Frey MD on 6/3/8945 at 7:16 AM

## 2023-06-08 NOTE — DISCHARGE INSTRUCTIONS
over the counter laxative or stool softener as needed to prevent/treat constipation as well, we recommend Senokot S OTC. We recommend that you consider taking these medications the entire time you are taking pain medication. Cold packs/Ice packs/Machine  May be used as much as necessary to control swelling/inflammation/soreness  Be sure to have a barrier (cloth, clothing, towel) between the site and the ice pack to prevent frostbite  Contact [facility] office if  Increased redness, swelling, drainage of any kind, and/or pain to surgery site. As well as new onset fevers and or chills. These could signify an infection. Calf or thigh tenderness to touch as well as increased swelling or redness. This could signify a clot formation. Numbness or tingling to an area around the incision site or below the incision site (toes). Any rash appears, increased  or new onset nausea/vomiting occur. This may indicate a reaction to a medication. Phone # [phone]. [address] and [facility]. Follow up with Surgeon at scheduled appointment time. I acknowledge that I have received luci hose and understand the instructions on how and when to wear them   __________________________________  Discharging RN who has gone over instructions and acknowledges luci hose have been received   ____________________________________________   Please remove [employer] wrist band on Post Operative Day #4  Please continue to use your Incentive Spirometer every hour while awake.

## 2023-06-08 NOTE — ANESTHESIA PRE PROCEDURE
Reported on 6/8/2023 1/20/23 1/20/24  Richard Elias MD   dilTIAZem (CARDIZEM CD) 240 MG extended release capsule Take 1 capsule by mouth daily  Patient taking differently: Take 1 capsule by mouth daily In am 1/13/23   Joshua Kimbrough DO   furosemide (LASIX) 40 MG tablet Take 1 tablet by mouth daily  Patient taking differently: Take 1 tablet by mouth daily In am 1/12/23   Joshua Kimbrough DO   ipratropium-albuterol (DUONEB) 0.5-2.5 (3) MG/3ML SOLN nebulizer solution Inhale 3 mLs into the lungs every 4 hours as needed for Shortness of Breath    Historical Provider, MD   omalizumab Johnathan Menjivar) 150 MG/ML SOSY injection Inject 300 mg into the skin every 28 days    Historical Provider, MD   Respiratory Therapy Supplies (NEBULIZER/TUBING/MOUTHPIECE) KIT Continuous 10/10/22   Richard Elias MD   albuterol sulfate HFA (PROVENTIL;VENTOLIN;PROAIR) 108 (90 Base) MCG/ACT inhaler INHALE 2 PUFFS BY MOUTH EVERY 4 HOURS AS NEEDED FOR WHEEZING OR SHORTNESS OF BREATH(SPACE OUT TO EVERY 6 HOURS AS SYMPTOMS IMPROVE) 8/17/22   Karla Iraheta MD   EPINEPHrine (EPIPEN) 0.3 MG/0.3ML SOAJ injection INJECT INTO UPPER THIGH AS DIRECTED AS NEEDED FOR ANAPHYLAXIS 3/8/22   Historical Provider, MD       Current medications:    Current Facility-Administered Medications   Medication Dose Route Frequency Provider Last Rate Last Admin    ceFAZolin (ANCEF) 3000 mg in sodium chloride 0.9% 100 mL IVPB  3,000 mg IntraVENous On Call to 20 Martha Saulsbury, MD        lidocaine PF 1 % injection 1 mL  1 mL IntraDERmal Once PRN Tere Kenney MD        acetaminophen (TYLENOL) tablet 1,000 mg  1,000 mg Oral Once Tere Kenney MD        lactated ringers IV soln infusion   IntraVENous Continuous Tere Kenney MD        sodium chloride flush 0.9 % injection 5-40 mL  5-40 mL IntraVENous 2 times per day Tere Kenney MD        sodium chloride flush 0.9 % injection 5-40 mL  5-40 mL IntraVENous PRN Isidoro Almaraz

## 2023-06-08 NOTE — OP NOTE
aprox 2 weeks as scheduled.        Electronically signed by James Bradford MD on 9/0/9445 at 8:51 AM

## 2023-06-08 NOTE — ANESTHESIA PROCEDURE NOTES
Peripheral Block    Patient location during procedure: pre-op  Reason for block: post-op pain management and at surgeon's request  Start time: 6/8/2023 7:05 AM  End time: 6/8/2023 7:12 AM  Staffing  Performed: anesthesiologist   Anesthesiologist: Radha Márquez MD  Preanesthetic Checklist  Completed: patient identified and anesthesia consent given  Peripheral Block   Patient position: supine  Prep: ChloraPrep  Provider prep: sterile gloves  Patient monitoring: responsive to questions  Block type: Femoral  Adductor canal  Laterality: right  Injection technique: single-shot  Guidance: ultrasound guided    Needle   Needle type: short-bevel   Needle gauge: 20 G  Needle localization: ultrasound guidance  Needle length: 10 cm  Assessment   Injection assessment: negative aspiration for heme, no paresthesia on injection, local visualized surrounding nerve on ultrasound and no intravascular symptoms  Paresthesia pain: none  Slow fractionated injection: yes  Hemodynamics: stable  Real-time US image taken/store: yes  Outcomes: patient tolerated procedure well    Medications Administered  bupivacaine 0.25%-EPINEPHrine injection [id number] - Perineural   20 mL - 6/8/2023 7:05:00 AM

## 2023-06-08 NOTE — ANESTHESIA POSTPROCEDURE EVALUATION
Department of Anesthesiology  Postprocedure Note    Patient: Jose Juan Camacho  MRN: [id number]  YOB: 1965  Date of evaluation: 6/8/2023      Procedure Summary     Date: 06/08/23 Room / Location: [facility]    Anesthesia Start: 0730 Anesthesia Stop: 8106    Procedure: RIGHT TOTAL KNEE ARTHROPLASTY  (Right: Knee) Diagnosis:       Primary osteoarthritis of right knee      (Primary osteoarthritis of right knee)    Surgeons: Sarah Cornell MD Responsible Provider: Ashwin Calles MD    Anesthesia Type: general ASA Status: 3          Anesthesia Type: No value filed.     Savita Phase I: Savita Score: 9    Savita Phase II:        Anesthesia Post Evaluation    Patient location during evaluation: PACU  Level of consciousness: awake and alert  Airway patency: patent  Nausea & Vomiting: no vomiting  Complications: no  Cardiovascular status: blood pressure returned to baseline  Respiratory status: acceptable  Hydration status: stable

## 2023-06-09 LAB
ANION GAP SERPL CALCULATED.3IONS-SCNC: 13 MMOL/L (ref 3–16)
BASOPHILS # BLD: 0 K/UL (ref 0–0.2)
BASOPHILS NFR BLD: 0 %
BUN SERPL-MCNC: 15 MG/DL (ref 7–20)
CALCIUM SERPL-MCNC: 8.6 MG/DL (ref 8.3–10.6)
CHLORIDE SERPL-SCNC: 99 MMOL/L (ref 99–110)
CO2 SERPL-SCNC: 20 MMOL/L (ref 21–32)
CREAT SERPL-MCNC: 0.9 MG/DL (ref 0.6–1.1)
DEPRECATED RDW RBC AUTO: 19.5 % (ref 12.4–15.4)
EOSINOPHIL # BLD: 0 K/UL (ref 0–0.6)
EOSINOPHIL NFR BLD: 0 %
GFR SERPLBLD CREATININE-BSD FMLA CKD-EPI: >60 ML/MIN/{1.73_M2}
GLUCOSE SERPL-MCNC: 182 MG/DL (ref 70–99)
HCT VFR BLD AUTO: 32.9 % (ref 36–48)
HGB BLD-MCNC: 10.4 G/DL (ref 12–16)
LYMPHOCYTES # BLD: 0.7 K/UL (ref 1–5.1)
LYMPHOCYTES NFR BLD: 6.3 %
MCH RBC QN AUTO: 25.9 PG (ref 26–34)
MCHC RBC AUTO-ENTMCNC: 31.8 G/DL (ref 31–36)
MCV RBC AUTO: 81.7 FL (ref 80–100)
MONOCYTES # BLD: 0.4 K/UL (ref 0–1.3)
MONOCYTES NFR BLD: 3.8 %
NEUTROPHILS # BLD: 9.7 K/UL (ref 1.7–7.7)
NEUTROPHILS NFR BLD: 89.9 %
PLATELET # BLD AUTO: 302 K/UL (ref 135–450)
PMV BLD AUTO: 8.8 FL (ref 5–10.5)
POTASSIUM SERPL-SCNC: 4.9 MMOL/L (ref 3.5–5.1)
RBC # BLD AUTO: 4.03 M/UL (ref 4–5.2)
SODIUM SERPL-SCNC: 132 MMOL/L (ref 136–145)
WBC # BLD AUTO: 10.7 K/UL (ref 4–11)

## 2023-06-09 PROCEDURE — 97530 THERAPEUTIC ACTIVITIES: CPT

## 2023-06-09 PROCEDURE — 2700000000 HC OXYGEN THERAPY PER DAY

## 2023-06-09 PROCEDURE — 6360000002 HC RX W HCPCS: Performed by: STUDENT IN AN ORGANIZED HEALTH CARE EDUCATION/TRAINING PROGRAM

## 2023-06-09 PROCEDURE — 97116 GAIT TRAINING THERAPY: CPT

## 2023-06-09 PROCEDURE — 99024 POSTOP FOLLOW-UP VISIT: CPT | Performed by: SPECIALIST/TECHNOLOGIST

## 2023-06-09 PROCEDURE — 97110 THERAPEUTIC EXERCISES: CPT

## 2023-06-09 PROCEDURE — G0378 HOSPITAL OBSERVATION PER HR: HCPCS

## 2023-06-09 PROCEDURE — 80048 BASIC METABOLIC PNL TOTAL CA: CPT

## 2023-06-09 PROCEDURE — APPNB45 APP NON BILLABLE 31-45 MINUTES: Performed by: SPECIALIST/TECHNOLOGIST

## 2023-06-09 PROCEDURE — 36415 COLL VENOUS BLD VENIPUNCTURE: CPT

## 2023-06-09 PROCEDURE — 2580000003 HC RX 258: Performed by: STUDENT IN AN ORGANIZED HEALTH CARE EDUCATION/TRAINING PROGRAM

## 2023-06-09 PROCEDURE — 94640 AIRWAY INHALATION TREATMENT: CPT

## 2023-06-09 PROCEDURE — 94761 N-INVAS EAR/PLS OXIMETRY MLT: CPT

## 2023-06-09 PROCEDURE — 6370000000 HC RX 637 (ALT 250 FOR IP): Performed by: STUDENT IN AN ORGANIZED HEALTH CARE EDUCATION/TRAINING PROGRAM

## 2023-06-09 PROCEDURE — 85025 COMPLETE CBC W/AUTO DIFF WBC: CPT

## 2023-06-09 PROCEDURE — 97166 OT EVAL MOD COMPLEX 45 MIN: CPT

## 2023-06-09 RX ADMIN — DEXAMETHASONE SODIUM PHOSPHATE 4 MG: 4 INJECTION, SOLUTION INTRAMUSCULAR; INTRAVENOUS at 02:38

## 2023-06-09 RX ADMIN — FUROSEMIDE 40 MG: 40 TABLET ORAL at 08:24

## 2023-06-09 RX ADMIN — CEFAZOLIN 3000 MG: 10 INJECTION, POWDER, FOR SOLUTION INTRAVENOUS at 02:43

## 2023-06-09 RX ADMIN — Medication 1 PUFF: at 20:37

## 2023-06-09 RX ADMIN — RIVAROXABAN 10 MG: 10 TABLET, FILM COATED ORAL at 18:33

## 2023-06-09 RX ADMIN — PANTOPRAZOLE SODIUM 40 MG: 40 TABLET, DELAYED RELEASE ORAL at 05:01

## 2023-06-09 RX ADMIN — ROPINIROLE HYDROCHLORIDE 1.5 MG: 0.5 TABLET, FILM COATED ORAL at 08:24

## 2023-06-09 RX ADMIN — OXYCODONE HYDROCHLORIDE 10 MG: 5 TABLET ORAL at 12:14

## 2023-06-09 RX ADMIN — ACETAMINOPHEN 650 MG: 325 TABLET ORAL at 20:01

## 2023-06-09 RX ADMIN — Medication 1 PUFF: at 14:26

## 2023-06-09 RX ADMIN — Medication 10 ML: at 20:01

## 2023-06-09 RX ADMIN — TIZANIDINE 2 MG: 4 TABLET ORAL at 20:00

## 2023-06-09 RX ADMIN — DILTIAZEM HYDROCHLORIDE 240 MG: 240 CAPSULE, EXTENDED RELEASE ORAL at 08:24

## 2023-06-09 RX ADMIN — OXYCODONE HYDROCHLORIDE 10 MG: 5 TABLET ORAL at 20:00

## 2023-06-09 RX ADMIN — ACETAMINOPHEN 650 MG: 325 TABLET ORAL at 02:38

## 2023-06-09 RX ADMIN — MORPHINE SULFATE 4 MG: 4 INJECTION, SOLUTION INTRAMUSCULAR; INTRAVENOUS at 08:31

## 2023-06-09 RX ADMIN — OXYCODONE HYDROCHLORIDE 10 MG: 5 TABLET ORAL at 16:07

## 2023-06-09 RX ADMIN — TIZANIDINE 2 MG: 4 TABLET ORAL at 14:36

## 2023-06-09 RX ADMIN — Medication 2 PUFF: at 20:37

## 2023-06-09 RX ADMIN — TIZANIDINE 2 MG: 4 TABLET ORAL at 08:24

## 2023-06-09 RX ADMIN — DEXAMETHASONE SODIUM PHOSPHATE 4 MG: 4 INJECTION, SOLUTION INTRAMUSCULAR; INTRAVENOUS at 08:24

## 2023-06-09 RX ADMIN — MORPHINE SULFATE 4 MG: 4 INJECTION, SOLUTION INTRAMUSCULAR; INTRAVENOUS at 04:41

## 2023-06-09 RX ADMIN — OXYCODONE HYDROCHLORIDE 10 MG: 5 TABLET ORAL at 02:38

## 2023-06-09 RX ADMIN — ROPINIROLE HYDROCHLORIDE 1.5 MG: 0.5 TABLET, FILM COATED ORAL at 20:00

## 2023-06-09 RX ADMIN — ACETAMINOPHEN 650 MG: 325 TABLET ORAL at 08:23

## 2023-06-09 RX ADMIN — DEXAMETHASONE SODIUM PHOSPHATE 4 MG: 4 INJECTION, SOLUTION INTRAMUSCULAR; INTRAVENOUS at 20:01

## 2023-06-09 RX ADMIN — OXYCODONE HYDROCHLORIDE 10 MG: 5 TABLET ORAL at 06:33

## 2023-06-09 RX ADMIN — Medication 10 ML: at 08:26

## 2023-06-09 RX ADMIN — DEXAMETHASONE SODIUM PHOSPHATE 4 MG: 4 INJECTION, SOLUTION INTRAMUSCULAR; INTRAVENOUS at 14:36

## 2023-06-09 RX ADMIN — ACETAMINOPHEN 650 MG: 325 TABLET ORAL at 14:36

## 2023-06-09 ASSESSMENT — PAIN DESCRIPTION - ORIENTATION
ORIENTATION: RIGHT

## 2023-06-09 ASSESSMENT — PAIN SCALES - GENERAL
PAINLEVEL_OUTOF10: 8
PAINLEVEL_OUTOF10: 8
PAINLEVEL_OUTOF10: 7
PAINLEVEL_OUTOF10: 9
PAINLEVEL_OUTOF10: 7

## 2023-06-09 ASSESSMENT — PAIN DESCRIPTION - LOCATION
LOCATION: KNEE

## 2023-06-09 ASSESSMENT — PAIN DESCRIPTION - DESCRIPTORS: DESCRIPTORS: ACHING;DISCOMFORT

## 2023-06-09 NOTE — CONSULTS
Hospital Medicine Consult History & Physical    Date of Service: Pt seen/examined in consultation on 6/9/23 at the request of Yasmine Ramon MD for medical management. Chief Complaint: S/P RIGHT TOTAL KNEE ARTHROPLASTY     Presenting Admission History:   62 y.o. female with a PMH of gastric bypass, ASTRID (treated with surgery), heart failure with preserved ejection fraction, paroxysmal atrial fibrillation, atrial flutter, GERD, Asthma, Depression and Obesity who presented to [facility] for a [address] on 1/9/98 with Dr. Alex Reid. Patient participating with PT/OT, she is concerned about going home. Patient reports she lives with spouse. Assessment/Plan:    Current Principal Problem:  S/P total knee arthroplasty, right    S/P RIGHT TOTAL KNEE ARTHROPLASTY 6/8  -Ortho primary  -Vte proph xarelto  -PT/OT  -Pain control  -Bowel regimen    PAF-continue xarelto, cardizem    Asthma-stable, continue inhalers    HTN-elevated, ccb, lasix    ASTRID-home cpap    GERD-continue PPI    Depression-mood stable    Morbid obesity: Body mass index is 49.93kg/m². Complicating assessment and treatment. Placing patient at risk for multiple co-morbidities as well as early death and contributing to the patient's presentation. Counseled on weight loss        Physical Exam Performed:  BP (!) 131/90   Pulse 65   Temp 97.9 °F (36.6 °C) (Oral)   Resp 18   Ht 5' 10\" (1.778 m)   Wt (!) 348 lb (157.9 kg)   SpO2 92%   BMI 49.93 kg/m²     General appearance: Obese. No apparent distress, appears stated age and cooperative. HEENT:  Pupils equal, round, and reactive to light. Conjunctivae/corneas clear. Respiratory:  Normal respiratory effort. Clear to auscultation, bilaterally without Rales/Wheezes/Rhonchi. Cardiovascular:  Regular rate and rhythm with normal S1/S2 without murmurs, rubs or gallops. Abdomen:  Soft, non-tender, non-distended with normal bowel sounds.   Musculoskelatal:  ace wrap, dsg

## 2023-06-09 NOTE — CARE COORDINATION
CM met at bedside with patient to discuss DME. Bariatric rolling walker being delivered today, per Joey Martinez with [employer]. Therapy team recommended 3:1 toilet. Joey Martinez does not have and his agency cannot order. Pt adds that she \"prefers to stay one more day\". Provider notified at afternoon huddle.

## 2023-06-09 NOTE — CARE COORDINATION
Chart reviewed by  for potential needs. Triggered by observation status. Pt s/p right total knee replacement on 6/8. Therapy recommending bariatric rolling walker. Ten Velasquez with [employer] notified.

## 2023-06-10 VITALS
TEMPERATURE: 97.9 F | BODY MASS INDEX: 41.95 KG/M2 | HEART RATE: 79 BPM | WEIGHT: 293 LBS | HEIGHT: 70 IN | OXYGEN SATURATION: 92 % | RESPIRATION RATE: 16 BRPM | SYSTOLIC BLOOD PRESSURE: 136 MMHG | DIASTOLIC BLOOD PRESSURE: 77 MMHG

## 2023-06-10 LAB
BASOPHILS # BLD: 0 K/UL (ref 0–0.2)
BASOPHILS NFR BLD: 0.1 %
DEPRECATED RDW RBC AUTO: 19.8 % (ref 12.4–15.4)
EOSINOPHIL # BLD: 0 K/UL (ref 0–0.6)
EOSINOPHIL NFR BLD: 0 %
HCT VFR BLD AUTO: 31.1 % (ref 36–48)
HGB BLD-MCNC: 9.5 G/DL (ref 12–16)
LYMPHOCYTES # BLD: 0.7 K/UL (ref 1–5.1)
LYMPHOCYTES NFR BLD: 4.9 %
MCH RBC QN AUTO: 24.7 PG (ref 26–34)
MCHC RBC AUTO-ENTMCNC: 30.6 G/DL (ref 31–36)
MCV RBC AUTO: 80.9 FL (ref 80–100)
MONOCYTES # BLD: 0.5 K/UL (ref 0–1.3)
MONOCYTES NFR BLD: 3.5 %
NEUTROPHILS # BLD: 13.3 K/UL (ref 1.7–7.7)
NEUTROPHILS NFR BLD: 91.5 %
PLATELET # BLD AUTO: 315 K/UL (ref 135–450)
PMV BLD AUTO: 8.6 FL (ref 5–10.5)
RBC # BLD AUTO: 3.84 M/UL (ref 4–5.2)
WBC # BLD AUTO: 14.6 K/UL (ref 4–11)

## 2023-06-10 PROCEDURE — 97530 THERAPEUTIC ACTIVITIES: CPT

## 2023-06-10 PROCEDURE — G0378 HOSPITAL OBSERVATION PER HR: HCPCS

## 2023-06-10 PROCEDURE — 6360000002 HC RX W HCPCS: Performed by: STUDENT IN AN ORGANIZED HEALTH CARE EDUCATION/TRAINING PROGRAM

## 2023-06-10 PROCEDURE — 85025 COMPLETE CBC W/AUTO DIFF WBC: CPT

## 2023-06-10 PROCEDURE — 36415 COLL VENOUS BLD VENIPUNCTURE: CPT

## 2023-06-10 PROCEDURE — 2580000003 HC RX 258: Performed by: STUDENT IN AN ORGANIZED HEALTH CARE EDUCATION/TRAINING PROGRAM

## 2023-06-10 PROCEDURE — 6370000000 HC RX 637 (ALT 250 FOR IP): Performed by: STUDENT IN AN ORGANIZED HEALTH CARE EDUCATION/TRAINING PROGRAM

## 2023-06-10 PROCEDURE — 94640 AIRWAY INHALATION TREATMENT: CPT

## 2023-06-10 RX ORDER — OXYCODONE HYDROCHLORIDE 5 MG/1
5-10 TABLET ORAL
Qty: 30 TABLET | Refills: 0 | Status: SHIPPED | OUTPATIENT
Start: 2023-06-10 | End: 2023-06-17

## 2023-06-10 RX ADMIN — PANTOPRAZOLE SODIUM 40 MG: 40 TABLET, DELAYED RELEASE ORAL at 09:05

## 2023-06-10 RX ADMIN — POLYETHYLENE GLYCOL 3350 17 G: 17 POWDER, FOR SOLUTION ORAL at 09:05

## 2023-06-10 RX ADMIN — ACETAMINOPHEN 650 MG: 325 TABLET ORAL at 09:06

## 2023-06-10 RX ADMIN — ACETAMINOPHEN 650 MG: 325 TABLET ORAL at 03:07

## 2023-06-10 RX ADMIN — Medication 10 ML: at 09:07

## 2023-06-10 RX ADMIN — DILTIAZEM HYDROCHLORIDE 240 MG: 240 CAPSULE, EXTENDED RELEASE ORAL at 09:05

## 2023-06-10 RX ADMIN — Medication 2 PUFF: at 07:36

## 2023-06-10 RX ADMIN — DEXAMETHASONE SODIUM PHOSPHATE 4 MG: 4 INJECTION, SOLUTION INTRAMUSCULAR; INTRAVENOUS at 03:08

## 2023-06-10 RX ADMIN — OXYCODONE HYDROCHLORIDE 10 MG: 5 TABLET ORAL at 04:05

## 2023-06-10 RX ADMIN — OXYCODONE HYDROCHLORIDE 10 MG: 5 TABLET ORAL at 00:02

## 2023-06-10 RX ADMIN — ROPINIROLE HYDROCHLORIDE 1.5 MG: 0.5 TABLET, FILM COATED ORAL at 09:06

## 2023-06-10 RX ADMIN — TIZANIDINE 2 MG: 4 TABLET ORAL at 09:06

## 2023-06-10 RX ADMIN — Medication 1 PUFF: at 07:36

## 2023-06-10 RX ADMIN — DEXAMETHASONE SODIUM PHOSPHATE 4 MG: 4 INJECTION, SOLUTION INTRAMUSCULAR; INTRAVENOUS at 09:06

## 2023-06-10 RX ADMIN — OXYCODONE HYDROCHLORIDE 10 MG: 5 TABLET ORAL at 09:06

## 2023-06-10 ASSESSMENT — PAIN SCALES - GENERAL
PAINLEVEL_OUTOF10: 7
PAINLEVEL_OUTOF10: 9
PAINLEVEL_OUTOF10: 6
PAINLEVEL_OUTOF10: 7

## 2023-06-10 ASSESSMENT — PAIN DESCRIPTION - LOCATION: LOCATION: KNEE

## 2023-06-10 ASSESSMENT — PAIN DESCRIPTION - ORIENTATION: ORIENTATION: RIGHT

## 2023-06-10 NOTE — PROGRESS NOTES
At bedside to do MDI, patient in bathroom.
Department of Orthopedic Surgery  Physician Assistant   Progress Note    Subjective:       Systemic or Specific Complaints:Pain Control and still feels weak on her feet. Requesting additional night for pain control. Has been ambulating with assistance by staff.  No family at bedside    Objective:     Patient Vitals for the past 24 hrs:   BP Temp Temp src Pulse Resp SpO2   06/09/23 0823 (!) 131/90 97.9 °F (36.6 °C) Oral 65 18 92 %   06/09/23 0703 -- -- -- -- 18 --   06/09/23 0633 -- -- -- -- 18 --   06/09/23 0511 -- -- -- -- 16 --   06/09/23 0308 -- -- -- -- 16 --   06/09/23 0243 (!) 144/95 98 °F (36.7 °C) Oral 76 17 93 %   06/08/23 2251 -- -- -- 66 -- --   06/08/23 2132 -- -- -- -- 16 --   06/08/23 2102 -- -- -- -- 16 --   06/08/23 2030 -- -- -- 65 -- 93 %   06/08/23 2025 (!) 146/89 98.3 °F (36.8 °C) Oral 66 16 94 %   06/08/23 1938 -- -- -- -- 16 --   06/08/23 1629 -- -- -- -- 18 --   06/08/23 1404 (!) 144/76 98.3 °F (36.8 °C) -- 72 14 94 %   06/08/23 1300 124/86 -- -- 66 11 91 %   06/08/23 1255 102/69 -- -- 65 11 94 %   06/08/23 1250 (!) 161/149 -- -- 66 12 91 %   06/08/23 1245 (!) 140/93 -- -- 63 11 92 %   06/08/23 1240 126/67 -- -- 66 13 (!) 88 %   06/08/23 1235 137/84 -- -- 63 14 90 %   06/08/23 1230 (!) 156/94 -- -- 67 17 90 %   06/08/23 1225 -- -- -- 67 (!) 6 92 %   06/08/23 1220 (!) 144/85 -- -- 69 15 (!) 88 %   06/08/23 1215 137/81 -- -- 65 13 93 %   06/08/23 1210 -- -- -- 65 14 91 %   06/08/23 1205 (!) 142/84 -- -- 65 15 92 %   06/08/23 1200 (!) 168/91 -- -- 69 14 (!) 88 %   06/08/23 1155 (!) 153/73 -- -- 71 27 91 %   06/08/23 1150 (!) 158/78 -- -- 62 11 (!) 89 %   06/08/23 1145 (!) 162/114 -- -- 66 12 (!) 88 %   06/08/23 1140 (!) 146/82 -- -- 70 15 92 %   06/08/23 1135 (!) 147/86 -- -- 66 10 93 %   06/08/23 1130 (!) 146/92 -- -- 73 15 94 %   06/08/23 1125 111/85 -- -- 66 17 93 %   06/08/23 1120 (!) 133/97 -- -- 65 16 93 %   06/08/23 1115 138/80 -- -- 68 15 93 %   06/08/23 1110 (!) 141/81 -- -- 67 15 92 %
Hosp PS regarding Consult-
Patient admitted to [facility] 9 in preparation for surgery, VSS. Consent confirmed. IV inserted into left AC, LR infusing. Belongings in PACU. NPO since 2200.
Patient admitted to the floor. VSS. Call light in place. Pain assessed. Patients dentures left in PACU. The floor nurse call PACU. The dentures were there and will be brought up to the room by the PACU nurse.
Patient and family given discharge instructions. All questions and concerns were addressed. Patient dressing was  clean dry and intact. Patient wheeled to car with all patient belongings.  IV removed per protocol
Patient arrived to PACU bay 5, phase one initiated. Placed on bedside monitor, VSS. Report obtained from OR RN and anesthesia. Patient on O2 via nasal cannula at 3L. Assessment WNL. Warm blankets applied. Side rails in place, will monitor patient closely.
Pt tolerating po intake. Pain level tolerable at 5/10. Visitor at bedside. Bed request placed for C5.
99 06/09/2023 05:56 AM    CO2 20 06/09/2023 05:56 AM    BUN 15 06/09/2023 05:56 AM    CREATININE 0.9 06/09/2023 05:56 AM    GLUCOSE 182 06/09/2023 05:56 AM    CALCIUM 8.6 06/09/2023 05:56 AM      Estimated Blood Loss (mL): 300       Assessment:      right total knee arthroplasty, postop day 2. Plan:      1:  weightbearing as tolerated to the right lower extremity with assistive device. Flexion and extension scratch, no flexion greater than 90 degrees for the first week postop  2:  Continue Deep venous thrombosis prophylaxis-Xarelto 10 mg daily for 1 month  3:  Continue Pain Control  4:  PT/OT outpatient starting 1 week postop. Patient requires walker to complete ADLs that could not be done without a crutch or cane.   5:  Two doses IV ancef completed post op   6:  Discharge today    Renzo Julien, [address]
Bearing  Lower Extremity Weight Bearing Restrictions  Right Lower Extremity Weight Bearing: Weight Bearing As Tolerated  Position Activity Restriction  Other position/activity restrictions: IV, 2L O2, external catheter, wound vac     Subjective    Subjective  Subjective: pt agreeable to PT treatment. Pain: 5/10 at rest.  Orientation  Overall Orientation Status: Within Normal Limits  Orientation Level: Oriented X4  Cognition  Overall Cognitive Status: WFL     Objective   Vitals  Pulse: 71  SpO2: 91 %  Comment: HR 71 BPM, SPO2 91% on room air, /77  Bed Mobility Training  Bed Mobility Training: Yes  Overall Level of Assistance: Minimum assistance; Additional time; Adaptive equipment (with HOB elevated and use of BR.)  Interventions: Verbal cues  Supine to Sit: Minimum assistance; Additional time; Adaptive equipment  Sit to Supine: Minimum assistance; Additional time; Adaptive equipment  Scooting: Stand-by assistance; Additional time; Adaptive equipment (to EOB.)  Balance  Sitting: Intact  Standing: Impaired (with bariatric RW.)  Standing - Static: Constant support;Good  Standing - Dynamic: Constant support; Fair  Transfer Training  Transfer Training: Yes  Overall Level of Assistance: Stand-by assistance; Additional time; Adaptive equipment (with bariatric RW.)  Interventions: Safety awareness training;Verbal cues  Sit to Stand: Stand-by assistance; Additional time; Adaptive equipment  Stand to Sit: Stand-by assistance; Additional time; Adaptive equipment  Toilet Transfer: Minimum assistance; Moderate assistance; Additional time; Adaptive equipment (grab bars and RW)  Gait Training  Gait Training: Yes  Right Side Weight Bearing: As tolerated  Gait  Overall Level of Assistance: Contact-guard assistance;Stand-by assistance; Additional time; Adaptive equipment (initially CGA fading to SBA with RW.)  Interventions: Verbal cues; Safety awareness training  Speed/Yolanda: Slow  Step Length: Right shortened;Left shortened  Gait
Sit: Other (comment) (already up)  Sit to Supine: Minimum assistance; Additional time (to RLE only)    Balance  Sitting: Intact  Standing: Impaired  Standing - Static: Constant support;Good (RW)  Standing - Dynamic: Constant support;Good (RW)    Transfer Training  Transfer Training: Yes  Interventions: Safety awareness training;Verbal cues  Sit to Stand: Stand-by assistance; Additional time;Contact-guard assistance  Stand to Sit: Stand-by assistance; Additional time  Toilet Transfer: Minimum assistance; Moderate assistance; Additional time; Adaptive equipment (grab bars and RW)    Gait Training  Gait Training: Yes  Right Side Weight Bearing: As tolerated  Gait  Overall Level of Assistance: Stand-by assistance  Interventions: Verbal cues  Speed/Yolanda: Slow  Gait Abnormalities: Antalgic  Distance (ft): 40 Feet (+15)  Assistive Device: Gait belt;Walker, rolling     PT Exercises  Exercise Treatment: RLE: AP, QS, GS, heelsides (seated), SAQ, and SLR X 10     Safety Devices  Type of Devices: All rubens prominences offloaded;Bed alarm in place;Call light within reach;Gait belt;Left in bed;Nurse notified       Goals  Short Term Goals  Time Frame for Short Term Goals: to be met by 6/11/23  Short Term Goal 1: 1. Pt will perform bed mobility with Supervision; 6/9 min A  Short Term Goal 2: 2. Pt will perform transfers with Supervision and use of Rolling Walker; 6/9 SBA/CGA  Short Term Goal 3: 3. Pt will ambulate 50 ft with SBA/CGA and use of Rolling Walker; 6/9 40' SBA  Short Term Goal 4: 4.  Pt will perform 10 reps of BLE exercises by 6/9/23; 6/9 met with Assist  Patient Goals   Patient Goals : \"to walk with a walker and a little help\"--6/9 GOAL MET    Education       Therapy Time   Individual Concurrent Group Co-treatment   Time In 0940         Time Out [address] [facility]
EOB and toilet, use of grab bars; VC for hand placement  Pt able to ambulate 10 ft x2 with SBA/CGA and Rolling Walker, decreased endurance; VC for technique and sequencing    Disease Specific Education:   Pt educated on weight bearing status, post-op precautions, appropriate DME, and safe mobility with AD. Pt verbalized understanding. Pt provided TKA Folder    Pt educated and provided written instruction on safety with car transfers with use of assistive device:  [x] Pt verbalized understanding of appropriate technique, maintaining any ordered precautions for car transfer training s/p TJR. Assessment: see above    Plan:   [x] Pt to be seen BID 7 days/week while in acute care setting for therapeutic exercises, bed mobility, transfers, progressive gait training, balance training, and family/patient education.   [] No further PT needs at this time; pt safe to d/c home with assist from family and/or friends    PT Goals: to be met by 6/11/23  Pt will perform bed mobility with Supervision  Pt will perform transfers with Supervision and use of Rolling Walker  Pt will ambulate 50 ft with SBA/CGA and use of Rolling Walker  Pt will perform 10 reps of BLE exercises by 6/9/23    Pt specific goal: \"to walk with a walker and a little help\"--6/9 GOAL MET    Therapy Time:  Time In: 1700  Time Out: 1735    Total Treatment Minutes:  25 minutes (10 minute eval)      Electronically signed by James Vo PT on 6/8/2023 at 5:05 PM
Oral QAM AC    rOPINIRole  1.5 mg Oral BID    tiZANidine  2 mg Oral TID    sodium chloride flush  5-40 mL IntraVENous 2 times per day    acetaminophen  650 mg Oral Q6H    polyethylene glycol  17 g Oral Daily    dexamethasone  4 mg IntraVENous Q6H    rivaroxaban  10 mg Oral Daily     PRN Meds: albuterol sulfate HFA, zolpidem, ipratropium 0.5 mg-albuterol 2.5 mg, sodium chloride flush, sodium chloride, oxyCODONE **OR** oxyCODONE, senna     Labs:  Personally reviewed and interpreted for clinical significance. Recent Labs     06/09/23  0556 06/10/23  0615   WBC 10.7 14.6*   HGB 10.4* 9.5*   HCT 32.9* 31.1*    315     Recent Labs     06/09/23  0556   *   K 4.9   CL 99   CO2 20*   BUN 15   CREATININE 0.9   CALCIUM 8.6     No results for input(s): PROBNP, TROPHS in the last 72 hours. No results for input(s): LABA1C in the last 72 hours. No results for input(s): AST, ALT, BILIDIR, BILITOT, ALKPHOS in the last 72 hours. No results for input(s): INR, LACTA, TSH in the last 72 hours. Urine Cultures:   Lab Results   Component Value Date/Time    LABURIN  05/11/2023 02:02 PM     <10,000 CFU/ml mixed skin/urogenital sanyd.  No further workup     Blood Cultures: No results found for: BC  No results found for: BLOODCULT2  Organism:   Lab Results   Component Value Date/Time    ORG Escherichia coli 01/09/2023 08:00 AM         STEPHANIE Adame - CNP
Minutes 12         Timed Code Treatment Minutes: 12 Minutes       If patient is discharged prior to next treatment session, this note will serve as the discharge summary.   Maryellen Castro, OTR/L #[id number]
Score  AM-PAC Inpatient Daily Activity Raw Score: 14 (06/09/23 1209)  AM-PAC Inpatient ADL T-Scale Score : 33.39 (06/09/23 1209)  ADL Inpatient CMS 0-100% Score: 59.67 (06/09/23 1209)  ADL Inpatient CMS G-Code Modifier : CK (06/09/23 1209)    Goals  Short Term Goals  Time Frame for Short Term Goals: one week by 6/14/2023 unless otherwise noted  Short Term Goal 1: pt will compete toilet transfer BSC Jeremi x1 6/12/2023  Short Term Goal 2: pt will complete LB dressing Jeremi  Short Term Goal 3: pt will complete gromming task mod I    Therapy Time   Individual Concurrent Group Co-treatment   Time In 0833         Time Out 0910         Minutes 37         Timed Code Treatment Minutes: 25 Minutes (12 min eval)    Joseline Brasher, OTR/L

## 2023-06-10 NOTE — DISCHARGE SUMMARY
of the pain medication will be re-evaluated at her post op visit in 2 weeks. Patient was educated on dosing expectations and limits of prescribing as a result of the new [facility] Board rules enacted August 31, 2017. Please also note that this is not the initial opoid prescription issued to this patient but a continuation of medication utilized during the hospital admission as noted in the medical record. [place] report has also been utilized to screen for any abuse history or suspicious activity as outlined in [place]. All efforts have been taken to prevent abuse potential and misuse of opioid medications including education, screening, and close clinical follow up.

## 2023-06-19 ENCOUNTER — CARE COORDINATION (OUTPATIENT)
Dept: CASE MANAGEMENT | Age: 58
End: 2023-06-19

## 2023-06-19 NOTE — CARE COORDINATION
[facility] Care Transitions Follow Up Call  Patient: Fred Colon  Patient : 1965   MRN: [id number]  Reason for Admission: s/p  total knee arthroplasty, right  Discharge Date: 6/10/23 RARS: Readmission Risk Score: 9.3    Attempted to reach patient via phone for transition call. VM left stating purpose of call along with my contact information requesting a return call.         Follow Up  Future Appointments   Date Time Provider Wil Chatterjee   2023  7:45 AM Patricia Khat TJ[facility]Z INESSA PT [facility]   2023  8:00 AM Patricia Khat TJ[facility]Z INESSA PT [facility]   2023  4:43 AM Vandana Street MD [facility]   2023  7:30 AM Ruthell Condon TJHZ INESSA PT [facility]   2023  7:30 AM Ruthell Condon TJHZ INESSA PT [facility]   2023  8:15 AM Ruthell Condon TJHZ INESSA PT [facility]   2023  8:30 AM Ruthell Condon TJHZ INESSA PT [facility]   2023  1:30 PM Álvaro Nava PSYD BLUE AFM PSY [facility]   2023  7:30 AM Ruthell Condon TJHZ INESSA PT [facility]   2023  9:00 AM MD Alex Abdalla P/CC [facility]   10/25/2023  1:15 PM Mau Bermeo MD [facility]      Care Transitions Subsequent and Final Call    Subsequent and Final Calls  Care Transitions Interventions  Other Interventions:             Michael Curry RN

## 2023-06-21 ENCOUNTER — TELEPHONE (OUTPATIENT)
Dept: ORTHOPEDIC SURGERY | Age: 58
End: 2023-06-21

## 2023-06-21 DIAGNOSIS — Z96.651 S/P TOTAL KNEE ARTHROPLASTY, RIGHT: ICD-10-CM

## 2023-06-21 RX ORDER — OXYCODONE HYDROCHLORIDE 5 MG/1
5 TABLET ORAL EVERY 6 HOURS PRN
Qty: 28 TABLET | Refills: 0 | Status: SHIPPED | OUTPATIENT
Start: 2023-06-21 | End: 2023-06-28

## 2023-06-21 NOTE — TELEPHONE ENCOUNTER
Spoke with pt states doing ok, states she was put on steroids and pt knew she had reaction to steroids and her legs both swelled up. Pt did speak with FLORIDA Riddle, has been icing and elevating. Pt feels like swelling is going down. Negative for holmans sign. Discussed not getting into pool until follow up with Dr. Devoria Gaucher and he gives the ok. Incision status: No drainage or redness    Edema/Swelling/Teds: Very swollen but is slowly going down. Pain level and status: Managing pretty well. Use of pain medications: Using as needed. Blood thinner: Eliquis with no issues. Bowels: Has been taking stool softeners. Has had BMs since surgery     Home Care Agency active: NA    Outpatient therapy: Has PT tomorrow. Do you have all of your medications: Yes    Changes in medications: no    Instructed pt to call Nurse Navigator or surgeon's office with any questions or concerns. Signed off from pt. Instructed pt to call RN or Surgeon's office with any issues or concerns.       Follow up appointments:    Future Appointments   Date Time Provider Wil Chatterjee   6/22/2023  7:45 AM Glenetta Knee TJHZ INES[facility] PT [facility]   6/26/2023  8:00 AM Glenetta Knee TJHZ INESSA PT [facility]   6/26/2023  5:14 AM Winter Davenport MD [facility]   6/28/2023  7:30 AM Darrol Port Hadlock TJHZ INESSA [facility]   7/5/2023  7:30 AM Darrol [place]lock TJHZ INESSA PT [facility]   7/7/2023  8:15 AM Darrol [place]lock TJHZ INESSA PT [facility]   7/11/2023  8:30 AM Darrol [place] Hadlock TJHZ INESSA PT [facility]   7/11/2023  1:30 PM ANN-MARIE Stone AFM PSY [facility]   7/14/2023  7:30 AM Darrol [place] Hadlock TJHZ INESSA [facility]   8/24/2023  9:00 AM MD Alex Otero P/CC [facility]   10/25/2023  1:15 PM MD Alex Tenorio [facility]

## 2023-06-22 ENCOUNTER — HOSPITAL ENCOUNTER (OUTPATIENT)
Dept: PHYSICAL THERAPY | Age: 58
Setting detail: THERAPIES SERIES
Discharge: HOME | End: 2023-06-22
Payer: MEDICAID

## 2023-06-22 PROCEDURE — 97140 MANUAL THERAPY 1/> REGIONS: CPT | Performed by: SPECIALIST/TECHNOLOGIST

## 2023-06-22 PROCEDURE — 97016 VASOPNEUMATIC DEVICE THERAPY: CPT | Performed by: SPECIALIST/TECHNOLOGIST

## 2023-06-22 PROCEDURE — 97110 THERAPEUTIC EXERCISES: CPT | Performed by: SPECIALIST/TECHNOLOGIST

## 2023-06-22 NOTE — FLOWSHEET NOTE
[facility] and [address],  Sports Performance and Rehabilitation, [facility] [address]  [address] [facility],5Th Floor   Abril Garland  Phone: [phone]  Fax: [phone]      Physical Therapy Daily Treatment Note  Date:  2023    Patient Name:  Akua Gonzalez    :  1965  MRN: [id number]  Restrictions/Precautions:    Medical/Treatment Diagnosis Information:  Localized osteoarthritis of right knee [M17.11]  Treatment Diagnosis: THR (Z96.659), Stiffness (R) (M24.461), Effusion (R) (M25.461) , Pain (R) (M25.561), Difficulty with walking (R26.2), Gait Abnormality (R26.9), Weakness (K06.0)  Insurance/Certification information:  PT Insurance Information: [facility] Comm 30 visits Jakobi 69  Physician Information:  Sumaya Hussein MD    Has the plan of care been signed (Y/N):        []  Yes  [x]  No     Date of Patient follow up with Physician: 2023       Is this a Progress Report:     []  Yes  [x]  No        If Yes:  Date Range for reporting period:  Beginning 2023  Ending     Progress report will be due (10 Rx or 30 days whichever is less):       Recertification will be due (POC Duration  / 90 days whichever is less): 2023        Visit # Insurance Allowable Auth Required   3 30 [x]  Yes []  No          OUTCOME MEASURE DATE SCORE   LEFS 2023 Deficit: 81%            Latex Allergy:  []NO      [x]YES  Preferred Language for Healthcare:   [x]English       []other:    Pain level:  4/10     SUBJECTIVE:  Pt. Reports she has had a lot of RLE swelling D/T taking steroids. Pt. Reports her knee feels really tight over the last week.       OBJECTIVE:               ROM PROM -  AROM - 6/22/3 Overpressure Comment     L R L R L R     Flexion   108 ERIM    103         Extension       -7                                                Deferred d/t PO status  Strength L R Comment   Quad         Hamstring         Gastroc         Hip  flexion

## 2023-06-23 ENCOUNTER — CARE COORDINATION (OUTPATIENT)
Dept: CASE MANAGEMENT | Age: 58
End: 2023-06-23

## 2023-06-23 NOTE — CARE COORDINATION
[facility] Care Transitions Follow Up Call    Patient Current Location:  [facility] [address]    Care Transition Nurse contacted the patient by telephone to follow up after admission. Verified name and  with patient as identifiers. Patient: Juan Pete  Patient : 1965   MRN: [id number]  Reason for Admission:  s/p  total knee arthroplasty, right  Discharge Date: 6/10/23 RARS: Readmission Risk Score: 9.3      Needs to be reviewed by the provider   Additional needs identified to be addressed with provider: No  none             Method of communication with provider: none. Patient answered call and verified . Patient pleasant and agreeable to transition call. Continues to get stronger and more flexibility in her knee. Patient going to outpatient PT now and progressing well. Continues to ice and elevate knee and helping with the swelling. Denied any acute needs at present time. Agreeable to f/u calls. Educated on the use of urgent care or physicians 24 hr access line if assistance is needed after hours. Addressed changes since last contact:  none  Discussed follow-up appointments. If no appointment was previously scheduled, appointment scheduling offered: Yes. Is follow up appointment scheduled within 7 days of discharge?  No.    Follow Up  Future Appointments   Date Time Provider Wil Chatterjee   2023  8:00 AM Alex JOSHIHZ INESSA PT [facility]   2023  7:30 AM Debra Xiong TJHZ INESSA PT [facility]   2023  2:63 AM Shan Guthrie MD [facility]   2023  7:30 AM Debra Xiong TJHZ INESSA PT [facility]   2023  8:15 AM Debra JOSHIHZ INESSA PT [facility]   2023  8:30 AM Debra Xiong TJHZ INESSA PT [facility]   2023  1:30 PM ANN-MARIE Stark AFM PSY [facility]   2023  7:30 AM Debra Xiong TJHZ INESSA PT [facility]   2023  9:00 AM MD Alex Rodriguez/CC [facility]   10/25/2023  1:15 PM MD Alex Calhonu [facility]

## 2023-06-26 ENCOUNTER — HOSPITAL ENCOUNTER (OUTPATIENT)
Dept: PHYSICAL THERAPY | Age: 58
Setting detail: THERAPIES SERIES
Discharge: HOME | End: 2023-06-26
Payer: MEDICAID

## 2023-06-26 PROCEDURE — 97016 VASOPNEUMATIC DEVICE THERAPY: CPT | Performed by: SPECIALIST/TECHNOLOGIST

## 2023-06-26 PROCEDURE — 97110 THERAPEUTIC EXERCISES: CPT | Performed by: SPECIALIST/TECHNOLOGIST

## 2023-06-26 PROCEDURE — 97140 MANUAL THERAPY 1/> REGIONS: CPT | Performed by: SPECIALIST/TECHNOLOGIST

## 2023-06-28 ENCOUNTER — OFFICE VISIT (OUTPATIENT)
Dept: ORTHOPEDIC SURGERY | Age: 58
End: 2023-06-28

## 2023-06-28 ENCOUNTER — HOSPITAL ENCOUNTER (OUTPATIENT)
Dept: PHYSICAL THERAPY | Age: 58
Setting detail: THERAPIES SERIES
Discharge: HOME | End: 2023-06-28
Payer: MEDICAID

## 2023-06-28 ENCOUNTER — TELEPHONE (OUTPATIENT)
Dept: ORTHOPEDIC SURGERY | Age: 58
End: 2023-06-28

## 2023-06-28 VITALS — HEIGHT: 70 IN | BODY MASS INDEX: 41.95 KG/M2 | WEIGHT: 293 LBS

## 2023-06-28 DIAGNOSIS — Z96.651 S/P TOTAL KNEE ARTHROPLASTY, RIGHT: Primary | ICD-10-CM

## 2023-06-28 PROCEDURE — 97110 THERAPEUTIC EXERCISES: CPT | Performed by: PHYSICAL THERAPIST

## 2023-06-28 PROCEDURE — 97016 VASOPNEUMATIC DEVICE THERAPY: CPT | Performed by: PHYSICAL THERAPIST

## 2023-06-28 PROCEDURE — 97530 THERAPEUTIC ACTIVITIES: CPT | Performed by: PHYSICAL THERAPIST

## 2023-06-28 PROCEDURE — 99024 POSTOP FOLLOW-UP VISIT: CPT | Performed by: STUDENT IN AN ORGANIZED HEALTH CARE EDUCATION/TRAINING PROGRAM

## 2023-06-28 RX ORDER — HYDROCODONE BITARTRATE AND ACETAMINOPHEN 5; 325 MG/1; MG/1
1 TABLET ORAL EVERY 6 HOURS PRN
Qty: 28 TABLET | Refills: 0 | Status: SHIPPED | OUTPATIENT
Start: 2023-06-28 | End: 2023-07-05

## 2023-06-28 RX ORDER — TRAMADOL HYDROCHLORIDE 50 MG/1
50 TABLET ORAL EVERY 4 HOURS PRN
Qty: 42 TABLET | Refills: 0 | Status: SHIPPED | OUTPATIENT
Start: 2023-06-28 | End: 2023-07-05

## 2023-06-28 RX ORDER — MELOXICAM 15 MG/1
15 TABLET ORAL DAILY PRN
Qty: 30 TABLET | Refills: 0 | Status: SHIPPED | OUTPATIENT
Start: 2023-06-28

## 2023-06-28 RX ORDER — MELOXICAM 15 MG/1
15 TABLET ORAL DAILY
Qty: 30 TABLET | Refills: 0 | Status: CANCELLED | OUTPATIENT
Start: 2023-06-28

## 2023-06-29 DIAGNOSIS — M17.0 BILATERAL PRIMARY OSTEOARTHRITIS OF KNEE: Primary | ICD-10-CM

## 2023-06-30 ENCOUNTER — CARE COORDINATION (OUTPATIENT)
Dept: CASE MANAGEMENT | Age: 58
End: 2023-06-30

## 2023-07-03 NOTE — PROGRESS NOTES
Meghana Miguelito    Age 62 y.o.    female    1965    MRN [id number]    7/27/2023  Arrival Time_____________  OR Time____________[address]     Procedure(s):  LEFT TOTAL KNEE ARTHROPLASTY             SALLY      **LATEX SENSITIVE** NOTE: ADDUCTOR CANAL BLOCK                      General   Surgeon(s):  Tanja Phoenix, MD      DAY ADMIT ___  SDS/OP ___  OUTPT IN BED ___        Phone [phone] (home)     PCP _____________________ Phone_________________ Epic ( ) Epic CE ( ) Appt ________    ADDITIONAL INFO __________________________________ Cardio/Consult _____________    NOTES _____________________________________________________________________    ____________________________________________________________________________    PAT APPT DATE:________ TIME: ________  FAXED QAD: _______  (__) H&P w/ Hospitalist  __________________________________________________________________________  Preop Nurse phone screen complete: _____________  (__) CBC     (__) W/ DIFF ___________     (__) Hgb A1C    ___________  (__) CHEST X RAY   __________  (__) LIPID PROFILE  ___________  (__) EKG   __________  (__) PT/PTT   ___________  (__) PFT's   __________  (__) BMP   ___________  (__) CAROTIDS  __________  (__) CMP   ___________  (__) VEIN MAPPING  __________  (__) U/A   ___________  (__) HISTORY & PHYSICAL __________  (__) URINE C & S  ___________  (__) CARDIAC CLEARANCE __________  (__) U/A W/ FLEX  ___________  (__) PULM.  CLEARANCE __________  (__) SERUM PREGNANCY ___________  (__) Check Epic DOS orders __________  (__) TYPE & SCREEN __________repeat ( ) (__)  __________________ __________  (__) ALBUMIN   ___________  (__)  __________________ __________  (__) TRANSFERRIN  ___________  (__)  __________________ __________  (__) LIVER PROFILE  ___________  (__)  __________________ __________  (__) MRSA NASAL SWAB ___________  (__) URINE PREG DOS __________  (__) SED RATE  ___________  (__) BLOOD SUGAR DOS __________  (__) C-REACTIVE PROTEIN ___________    (__) VITAMIN D HYDROXY ___________  (__) BLOOD THINNERS __________    (__) ACE/ ARBS: _____________________     (__) BETABLOCKERS __________________

## 2023-07-05 ENCOUNTER — HOSPITAL ENCOUNTER (OUTPATIENT)
Dept: PHYSICAL THERAPY | Age: 58
Setting detail: THERAPIES SERIES
Discharge: HOME | End: 2023-07-05

## 2023-07-05 ENCOUNTER — TELEPHONE (OUTPATIENT)
Dept: ORTHOPEDIC SURGERY | Age: 58
End: 2023-07-05

## 2023-07-05 NOTE — FLOWSHEET NOTE
[facility] and [address] [place] [address] [address],  Sports Performance and Rehabilitation, [address] [place] [address], [address] [place] [address] Avenue  Phone: [phone]  Fax: [phone]      Physical Therapy  Cancellation/No-show Note  Patient Name:  Arben Burns  :  1965   Date:  2023  Cancelled visits to date: 1  No-shows to date: 0    For today's appointment patient:  [x]  Cancelled  []  Rescheduled appointment  []  No-show     Reason given by patient:  []  Patient ill  []  Conflicting appointment   []  No transportation    []  Conflict with work  [x]  No reason given  []  Other:     Comments:      Electronically signed by:  Arti Alejandre PT

## 2023-07-06 ENCOUNTER — CARE COORDINATION (OUTPATIENT)
Dept: CASE MANAGEMENT | Age: 58
End: 2023-07-06

## 2023-07-07 ENCOUNTER — HOSPITAL ENCOUNTER (OUTPATIENT)
Dept: PHYSICAL THERAPY | Age: 58
Setting detail: THERAPIES SERIES
Discharge: HOME | End: 2023-07-07

## 2023-07-07 PROCEDURE — 97112 NEUROMUSCULAR REEDUCATION: CPT | Performed by: PHYSICAL THERAPIST

## 2023-07-07 PROCEDURE — 97016 VASOPNEUMATIC DEVICE THERAPY: CPT | Performed by: PHYSICAL THERAPIST

## 2023-07-07 PROCEDURE — 97110 THERAPEUTIC EXERCISES: CPT | Performed by: PHYSICAL THERAPIST

## 2023-07-07 NOTE — FLOWSHEET NOTE
[facility] and [address] [place] [address] [address],  Sports Performance and Rehabilitation, [address] [place] [address]  200 [facility], [address] [place] [address] Avenue  Phone: [phone]  Fax: [phone]      Physical Therapy Daily Treatment Note  Date:  2023    Patient Name:  Joshua Bauer    :  1965  MRN: [id number]  Restrictions/Precautions:    Medical/Treatment Diagnosis Information:  Localized osteoarthritis of right knee [M17.11]  Treatment Diagnosis: THR (Z96.659), Stiffness (R) (M24.461), Effusion (R) (M25.461) , Pain (R) (M25.561), Difficulty with walking (R26.2), Gait Abnormality (R26.9), Weakness (L87.3)  Insurance/Certification information:  PT Insurance Information: [facility] Comm 30 visits Harini Aragon (expires ); New insurance TBD  Physician Information:  Prema Barth MD    Has the plan of care been signed (Y/N):        []  Yes  [x]  No     Date of Patient follow up with Physician: 2023       Is this a Progress Report:     []  Yes  [x]  No        If Yes:  Date Range for reporting period:  Beginning 2023  Ending     Progress report will be due (10 Rx or 30 days whichever is less):       Recertification will be due (POC Duration  / 90 days whichever is less): 2023        Visit # Insurance Allowable Auth Required   1  5 TBD (new plan  30 (plan through ) [x]  Yes []  No          OUTCOME MEASURE DATE SCORE   LEFS 2023 Deficit: 81%            Latex Allergy:  []NO      [x]YES  Preferred Language for Healthcare:   [x]English       []other:    Pain level:  2/10     SUBJECTIVE:  Patient reports feeling overall good on this date despite feeling sore. Pt reports having slept better and overall is moving better. Pt reports that she is starting to try to move without her cane more. She reports that she knows she continues to favor the left side when she walks.  She additionally reports that her left hip has been feeling some \"muscular spasms\"

## 2023-07-10 NOTE — TELEPHONE ENCOUNTER
I called patient, she is suppose to get the new insurance information and call me back with it today.  KB

## 2023-07-11 ENCOUNTER — CLINICAL DOCUMENTATION (OUTPATIENT)
Dept: PSYCHOLOGY | Age: 58
End: 2023-07-11

## 2023-07-11 ENCOUNTER — HOSPITAL ENCOUNTER (OUTPATIENT)
Dept: PHYSICAL THERAPY | Age: 58
Setting detail: THERAPIES SERIES
Discharge: HOME | End: 2023-07-11
Payer: MEDICAID

## 2023-07-11 PROCEDURE — 97016 VASOPNEUMATIC DEVICE THERAPY: CPT | Performed by: PHYSICAL THERAPIST

## 2023-07-11 PROCEDURE — 97110 THERAPEUTIC EXERCISES: CPT | Performed by: PHYSICAL THERAPIST

## 2023-07-11 PROCEDURE — 97112 NEUROMUSCULAR REEDUCATION: CPT | Performed by: PHYSICAL THERAPIST

## 2023-07-11 NOTE — FLOWSHEET NOTE
[facility] and [address] [place] [address] [address],  Sports Performance and Rehabilitation, [address] [place] [address]  200 [facility], [address] [place] [address] Avenue  Phone: [phone]  Fax: [phone]      Physical Therapy Daily Treatment Note  Date:  2023    Patient Name:  Joana Shelton    :  1965  MRN: [id number]  Restrictions/Precautions:    Medical/Treatment Diagnosis Information:  Localized osteoarthritis of right knee [M17.11]  Treatment Diagnosis: THR (Z96.659), Stiffness (R) (M24.461), Effusion (R) (M25.461) , Pain (R) (M25.561), Difficulty with walking (R26.2), Gait Abnormality (R26.9), Weakness (I90.3)  Insurance/Certification information:  PT Insurance Information: [facility] Comm 30 visits Priyank Martínez (expires ); New insurance TBD  Physician Information:  Zain Hayden MD    Has the plan of care been signed (Y/N):        [x]  Yes  []  No     Date of Patient follow up with Physician: 2023       Is this a Progress Report:     []  Yes  [x]  No        If Yes:  Date Range for reporting period:  Beginning 2023  Ending     Progress report will be due (10 Rx or 30 days whichever is less):       Recertification will be due (POC Duration  / 90 days whichever is less): 2023      PN NV  Visit # Insurance Allowable Auth Required   2  5 TBD (new plan)  30 (plan through ) [x]  Yes []  No          OUTCOME MEASURE DATE SCORE   LEFS 2023 Deficit: 81%            Latex Allergy:  []NO      [x]YES  Preferred Language for Healthcare:   [x]English       []other:    Pain level:  2/10     SUBJECTIVE:  Patient reports feeling good on this date with minimal to no soreness. Pt reports having soreness following last session which dissipated after \"a few days\". Pt arrives on this date with no cane and reporting that she has not been using her cane regularly. She reports that she knows she continues to favor the left side when she walks.  She additionally

## 2023-07-14 ENCOUNTER — HOSPITAL ENCOUNTER (OUTPATIENT)
Dept: PHYSICAL THERAPY | Age: 58
Setting detail: THERAPIES SERIES
Discharge: HOME | End: 2023-07-14
Payer: MEDICAID

## 2023-07-14 NOTE — TELEPHONE ENCOUNTER
Auth: NPR  Date: 07/27/23  Reference # None  Spoke with: None  Type of SX: Observation  Location: [facility]  CPT: 38194   DX: M17.12  SX area: Lt knee  Insurance: [place] Medicaid

## 2023-07-17 ENCOUNTER — HOSPITAL ENCOUNTER (OUTPATIENT)
Dept: PHYSICAL THERAPY | Age: 58
Setting detail: THERAPIES SERIES
Discharge: HOME | End: 2023-07-17
Payer: MEDICAID

## 2023-07-17 NOTE — FLOWSHEET NOTE
[facility] and [address] [place] [address] [address],  Sports Performance and Rehabilitation, [address] [place] [address]  200 [facility], [address] [place] [address] Avenue  Phone: [phone]  Fax: [phone]      Physical Therapy  Cancellation/No-show Note  Patient Name:  Kam Daugherty  :  1965   Date:  2023  Cancelled visits to date: 3  No-shows to date: 0    For today's appointment patient:  [x]  Cancelled  []  Rescheduled appointment  []  No-show     Reason given by patient:  []  Patient ill  []  Conflicting appointment   []  No transportation    []  Conflict with work  []  No reason given  [x]  Other:     Comments:  Sick grandchild    Electronically signed by:  Josephine Schwarz PT

## 2023-07-19 ENCOUNTER — HOSPITAL ENCOUNTER (OUTPATIENT)
Dept: PHYSICAL THERAPY | Age: 58
Setting detail: THERAPIES SERIES
Discharge: HOME | End: 2023-07-19
Payer: MEDICAID

## 2023-07-19 ENCOUNTER — TELEPHONE (OUTPATIENT)
Dept: ORTHOPEDIC SURGERY | Age: 58
End: 2023-07-19

## 2023-07-19 PROCEDURE — 97110 THERAPEUTIC EXERCISES: CPT | Performed by: PHYSICAL THERAPIST

## 2023-07-19 PROCEDURE — 97112 NEUROMUSCULAR REEDUCATION: CPT | Performed by: PHYSICAL THERAPIST

## 2023-07-19 NOTE — PROGRESS NOTES
[facility] and [address] [place] [address] [address],  Sports Performance and Rehabilitation, [address] [place] [address]  200 [facility], [address] [place] [address] Avenue  Phone: [phone]  Fax: [phone]      Physical Therapy Daily Treatment Note  Date:  2023    Patient Name:  Estefanía Herrera    :  1965  MRN: [id number]  Restrictions/Precautions:    Medical/Treatment Diagnosis Information:  Localized osteoarthritis of right knee [M17.11]  Treatment Diagnosis: THR (Z96.659), Stiffness (R) (M24.461), Effusion (R) (M25.461) , Pain (R) (M25.561), Difficulty with walking (R26.2), Gait Abnormality (R26.9), Weakness (C22.0)  Insurance/Certification information:  PT Insurance Information: [facility] Comm 30 visits Elana Clancy (expires ); New insurance Medicaid  Physician Information:  Gracia Kenyon MD    Has the plan of care been signed (Y/N):        [x]  Yes  []  No     Date of Patient follow up with Physician: 2023       Is this a Progress Report:     [x]  Yes  []  No        If Yes:  Date Range for reporting period:  Beginning 2023  Endin23    Progress report will be due (10 Rx or 30 days whichever is less):       Recertification will be due (POC Duration  / 90 days whichever is less): 2023        Visit # Insurance Allowable Auth Required   3  5 TBD (Medicaid)  30 (plan through ) [x]  Yes []  No          OUTCOME MEASURE DATE SCORE   LEFS 2023 Deficit: 81%   LEFS 23 45/80 = 56%, 44% deficit        Latex Allergy:  []NO      [x]YES  Preferred Language for Healthcare:   [x]English       []other:    Pain level:  2/10     SUBJECTIVE:  Patient reports knee is feeling fine on this date although she feels overall systemic fatigue. Pt reports that she believes her iron is low. Pt reports no longer using cane regularly and practicing more appropriate gait. Pt reports that spasm in left hip has dissipated.  Pt reports that she continues to be bicycle

## 2023-07-21 ENCOUNTER — TELEPHONE (OUTPATIENT)
Dept: CARDIOLOGY CLINIC | Age: 58
End: 2023-07-21

## 2023-07-21 ENCOUNTER — ANESTHESIA EVENT (OUTPATIENT)
Dept: OPERATING ROOM | Age: 58
End: 2023-07-21
Payer: MEDICAID

## 2023-07-21 NOTE — PROGRESS NOTES
Surgery Date and Time: 7/2723 @ 07:30 a,     Arrival Time: 05:30 am    The instructions given when and if a patient needs to stop oral intake prior to surgery varies. Follow the instructions you were given by your surgeon or RN during   preop call. _X_Nothing to eat or to drink after Midnight the night before the surgery. NO gum, mints, candy or ice chips day of surgery. Only take the following medications with a small sip of water the morning of surgery: diltazem       Aspirin, Ibuprofen, Advil, Naproxen, Vitamin E and other Anti-inflammatory products and supplements should be stopped for 5 -7days before surgery or as  directed   by your physician. Check with your Surgeon/PCP/Cardiologist regarding stopping Plavix, Coumadin, Eliquis, Lovenox, Effient, Pradaxa, Xarelto, Fragmin or other blood thinners and   follow their instructions. Medication:    eliquis     Last dose: pt reports last dose 7/13/23 (per cardio per pt, & Erlichman's office)      Do not smoke or vape, and do not drink any alcoholic beverages 24 hours prior to surgery, this includes NA Beer. Refrain from using any recreational drugs,    including non-prescribed prescription drugs. You may brush your teeth and gargle the morning of surgery. DO NOT SWALLOW WATER. You MUST plan for a responsible adult to stay on site while you are here and take you home after your surgery. You will not be allowed to leave alone or drive               yourself home. It is requested someone stay with you the first 24 hrs. Your surgery will be cancelled if you do not have a ride home with a responsible adult. Please wear simple, loose-fitting clothing to the hospital. Christy Clement not bring valuables (money, credit cards, checkbooks, etc.) Do not wear any makeup (including no eye               makeup) and no nail polish if applicable. DO NOT wear any jewelry or body piercings day of surgery.   All body

## 2023-07-21 NOTE — TELEPHONE ENCOUNTER
CARDIAC CLEARANCE     What type of procedure are you having? Left Knee surgery    Which physician is performing your procedure? Dr. Dale Kocher     When is your procedure scheduled for? 7/27/23    Where are you having this procedure? [address] [facility]    Are you taking Blood Thinners? no   If so what? (Name/dose/frequesncy)    Does the surgeon want you to stop your blood thinner? If so for how long?     Phone Number and Contact Name for Physicians office: Dr. Dale Kocher [phone]    Fax number to send information: [phone]

## 2023-07-21 NOTE — TELEPHONE ENCOUNTER
Patient called and stated she received a cardiac clearance letter in June for her 1st knee surgery. She wanted to know if she had to be seen by Dr. Shirin Christianson again for another clearance for her 2nd knee surgery. She wants a call back [phone].

## 2023-07-24 ENCOUNTER — OFFICE VISIT (OUTPATIENT)
Dept: FAMILY MEDICINE CLINIC | Age: 58
End: 2023-07-24
Payer: MEDICAID

## 2023-07-24 ENCOUNTER — HOSPITAL ENCOUNTER (OUTPATIENT)
Dept: PHYSICAL THERAPY | Age: 58
Setting detail: THERAPIES SERIES
Discharge: HOME | End: 2023-07-24
Payer: MEDICAID

## 2023-07-24 VITALS
DIASTOLIC BLOOD PRESSURE: 84 MMHG | TEMPERATURE: 97.1 F | HEART RATE: 81 BPM | BODY MASS INDEX: 41.95 KG/M2 | WEIGHT: 293 LBS | OXYGEN SATURATION: 99 % | HEIGHT: 70 IN | SYSTOLIC BLOOD PRESSURE: 134 MMHG

## 2023-07-24 DIAGNOSIS — Z98.1 STATUS POST CERVICAL SPINAL FUSION: ICD-10-CM

## 2023-07-24 DIAGNOSIS — Z98.890 S/P ABLATION OF ATRIAL FIBRILLATION: ICD-10-CM

## 2023-07-24 DIAGNOSIS — Z01.818 PRE-OP EVALUATION: ICD-10-CM

## 2023-07-24 DIAGNOSIS — I50.42 CHRONIC COMBINED SYSTOLIC AND DIASTOLIC CONGESTIVE HEART FAILURE (HCC): ICD-10-CM

## 2023-07-24 DIAGNOSIS — E53.8 B12 DEFICIENCY: ICD-10-CM

## 2023-07-24 DIAGNOSIS — Z86.79 S/P ABLATION OF ATRIAL FIBRILLATION: ICD-10-CM

## 2023-07-24 DIAGNOSIS — J30.9 CHRONIC ALLERGIC RHINITIS: ICD-10-CM

## 2023-07-24 DIAGNOSIS — M47.22 CERVICAL SPONDYLOSIS WITH RADICULOPATHY: ICD-10-CM

## 2023-07-24 DIAGNOSIS — J45.40 MODERATE PERSISTENT ASTHMA WITHOUT COMPLICATION: ICD-10-CM

## 2023-07-24 DIAGNOSIS — M17.0 BILATERAL PRIMARY OSTEOARTHRITIS OF KNEE: ICD-10-CM

## 2023-07-24 DIAGNOSIS — M17.12 ARTHRITIS OF LEFT KNEE: Primary | ICD-10-CM

## 2023-07-24 PROBLEM — I50.32 CHRONIC DIASTOLIC CONGESTIVE HEART FAILURE (HCC): Status: RESOLVED | Noted: 2019-05-16 | Resolved: 2023-07-24

## 2023-07-24 PROBLEM — S80.11XA CONTUSION OF MULTIPLE SITES OF RIGHT LEG: Status: RESOLVED | Noted: 2019-07-02 | Resolved: 2023-07-24

## 2023-07-24 PROBLEM — I50.22 CHRONIC SYSTOLIC CONGESTIVE HEART FAILURE (HCC): Status: ACTIVE | Noted: 2023-01-09

## 2023-07-24 PROBLEM — S86.112A GASTROCNEMIUS STRAIN, LEFT: Status: RESOLVED | Noted: 2019-07-02 | Resolved: 2023-07-24

## 2023-07-24 PROBLEM — K43.0 INCISIONAL HERNIA WITH OBSTRUCTION, WITHOUT GANGRENE: Status: RESOLVED | Noted: 2019-08-25 | Resolved: 2023-07-24

## 2023-07-24 PROBLEM — F10.929 ACUTE ALCOHOLIC INTOXICATION (HCC): Status: RESOLVED | Noted: 2022-07-06 | Resolved: 2023-07-24

## 2023-07-24 PROBLEM — S83.281A ACUTE LATERAL MENISCUS TEAR OF RIGHT KNEE: Chronic | Status: RESOLVED | Noted: 2019-02-01 | Resolved: 2023-07-24

## 2023-07-24 PROBLEM — S83.241A ACUTE MEDIAL MENISCUS TEAR OF RIGHT KNEE: Chronic | Status: RESOLVED | Noted: 2019-02-01 | Resolved: 2023-07-24

## 2023-07-24 PROBLEM — L50.1 IDIOPATHIC URTICARIA: Status: RESOLVED | Noted: 2020-10-29 | Resolved: 2023-07-24

## 2023-07-24 PROBLEM — R60.0 LOCALIZED EDEMA: Status: RESOLVED | Noted: 2019-04-26 | Resolved: 2023-07-24

## 2023-07-24 PROBLEM — J38.3 VOCAL CORD DYSFUNCTION: Status: RESOLVED | Noted: 2020-03-06 | Resolved: 2023-07-24

## 2023-07-24 LAB
25(OH)D3 SERPL-MCNC: 31.3 NG/ML
ALBUMIN SERPL-MCNC: 4.2 G/DL (ref 3.4–5)
ALBUMIN/GLOB SERPL: 1.6 {RATIO} (ref 1.1–2.2)
ALP SERPL-CCNC: 128 U/L (ref 40–129)
ALT SERPL-CCNC: 11 U/L (ref 10–40)
ANION GAP SERPL CALCULATED.3IONS-SCNC: 12 MMOL/L (ref 3–16)
APTT BLD: 28.4 SEC (ref 22.7–35.9)
AST SERPL-CCNC: 16 U/L (ref 15–37)
BACTERIA URNS QL MICRO: ABNORMAL /HPF
BASOPHILS # BLD: 0 K/UL (ref 0–0.2)
BASOPHILS NFR BLD: 0.6 %
BILIRUB SERPL-MCNC: 0.8 MG/DL (ref 0–1)
BILIRUB UR QL STRIP.AUTO: NEGATIVE
BUN SERPL-MCNC: 10 MG/DL (ref 7–20)
CALCIUM SERPL-MCNC: 8.8 MG/DL (ref 8.3–10.6)
CHLORIDE SERPL-SCNC: 104 MMOL/L (ref 99–110)
CLARITY UR: ABNORMAL
CO2 SERPL-SCNC: 25 MMOL/L (ref 21–32)
COLOR UR: YELLOW
CREAT SERPL-MCNC: 0.8 MG/DL (ref 0.6–1.1)
DEPRECATED RDW RBC AUTO: 19.3 % (ref 12.4–15.4)
EOSINOPHIL # BLD: 0.1 K/UL (ref 0–0.6)
EOSINOPHIL NFR BLD: 2.2 %
EPI CELLS #/AREA URNS AUTO: 8 /HPF (ref 0–5)
GFR SERPLBLD CREATININE-BSD FMLA CKD-EPI: >60 ML/MIN/{1.73_M2}
GLUCOSE SERPL-MCNC: 97 MG/DL (ref 70–99)
GLUCOSE UR STRIP.AUTO-MCNC: NEGATIVE MG/DL
HCT VFR BLD AUTO: 33.1 % (ref 36–48)
HGB BLD-MCNC: 10.1 G/DL (ref 12–16)
HGB UR QL STRIP.AUTO: NEGATIVE
HYALINE CASTS #/AREA URNS AUTO: 1 /LPF (ref 0–8)
INR PPP: 0.99 (ref 0.84–1.16)
KETONES UR STRIP.AUTO-MCNC: NEGATIVE MG/DL
LEUKOCYTE ESTERASE UR QL STRIP.AUTO: ABNORMAL
LYMPHOCYTES # BLD: 1.5 K/UL (ref 1–5.1)
LYMPHOCYTES NFR BLD: 25.2 %
MCH RBC QN AUTO: 23.6 PG (ref 26–34)
MCHC RBC AUTO-ENTMCNC: 30.6 G/DL (ref 31–36)
MCV RBC AUTO: 77.3 FL (ref 80–100)
MONOCYTES # BLD: 0.7 K/UL (ref 0–1.3)
MONOCYTES NFR BLD: 10.9 %
NEUTROPHILS # BLD: 3.7 K/UL (ref 1.7–7.7)
NEUTROPHILS NFR BLD: 61.1 %
NITRITE UR QL STRIP.AUTO: POSITIVE
PH UR STRIP.AUTO: 5.5 [PH] (ref 5–8)
PLATELET # BLD AUTO: 378 K/UL (ref 135–450)
PMV BLD AUTO: 8.6 FL (ref 5–10.5)
POTASSIUM SERPL-SCNC: 4.8 MMOL/L (ref 3.5–5.1)
PROT SERPL-MCNC: 6.8 G/DL (ref 6.4–8.2)
PROT UR STRIP.AUTO-MCNC: NEGATIVE MG/DL
PROTHROMBIN TIME: 13.1 SEC (ref 11.5–14.8)
RBC # BLD AUTO: 4.29 M/UL (ref 4–5.2)
RBC CLUMPS #/AREA URNS AUTO: 1 /HPF (ref 0–4)
SODIUM SERPL-SCNC: 141 MMOL/L (ref 136–145)
SP GR UR STRIP.AUTO: 1.02 (ref 1–1.03)
TRANSFERRIN SERPL-MCNC: 335 MG/DL (ref 200–360)
UA COMPLETE W REFLEX CULTURE PNL UR: YES
UA DIPSTICK W REFLEX MICRO PNL UR: YES
URN SPEC COLLECT METH UR: ABNORMAL
UROBILINOGEN UR STRIP-ACNC: 0.2 E.U./DL
WBC # BLD AUTO: 6 K/UL (ref 4–11)
WBC #/AREA URNS AUTO: 21 /HPF (ref 0–5)

## 2023-07-24 PROCEDURE — 99214 OFFICE O/P EST MOD 30 MIN: CPT | Performed by: FAMILY MEDICINE

## 2023-07-24 PROCEDURE — 97110 THERAPEUTIC EXERCISES: CPT | Performed by: PHYSICAL THERAPIST

## 2023-07-24 PROCEDURE — 3079F DIAST BP 80-89 MM HG: CPT | Performed by: FAMILY MEDICINE

## 2023-07-24 PROCEDURE — 97112 NEUROMUSCULAR REEDUCATION: CPT | Performed by: PHYSICAL THERAPIST

## 2023-07-24 PROCEDURE — 3075F SYST BP GE 130 - 139MM HG: CPT | Performed by: FAMILY MEDICINE

## 2023-07-24 RX ORDER — CYANOCOBALAMIN 1000 UG/ML
1000 INJECTION, SOLUTION INTRAMUSCULAR; SUBCUTANEOUS ONCE
Status: COMPLETED | OUTPATIENT
Start: 2023-07-24 | End: 2023-07-24

## 2023-07-24 RX ORDER — LEVOCETIRIZINE DIHYDROCHLORIDE 5 MG/1
5 TABLET, FILM COATED ORAL NIGHTLY
Qty: 90 TABLET | Refills: 1 | Status: SHIPPED | OUTPATIENT
Start: 2023-07-24

## 2023-07-24 RX ORDER — MONTELUKAST SODIUM 10 MG/1
10 TABLET ORAL DAILY
Qty: 90 TABLET | Refills: 1 | Status: SHIPPED | OUTPATIENT
Start: 2023-07-24

## 2023-07-24 RX ADMIN — CYANOCOBALAMIN 1000 MCG: 1000 INJECTION, SOLUTION INTRAMUSCULAR; SUBCUTANEOUS at 10:10

## 2023-07-24 ASSESSMENT — PATIENT HEALTH QUESTIONNAIRE - PHQ9
4. FEELING TIRED OR HAVING LITTLE ENERGY: 0
9. THOUGHTS THAT YOU WOULD BE BETTER OFF DEAD, OR OF HURTING YOURSELF: 0
6. FEELING BAD ABOUT YOURSELF - OR THAT YOU ARE A FAILURE OR HAVE LET YOURSELF OR YOUR FAMILY DOWN: 0
1. LITTLE INTEREST OR PLEASURE IN DOING THINGS: 0
10. IF YOU CHECKED OFF ANY PROBLEMS, HOW DIFFICULT HAVE THESE PROBLEMS MADE IT FOR YOU TO DO YOUR WORK, TAKE CARE OF THINGS AT HOME, OR GET ALONG WITH OTHER PEOPLE: 0
3. TROUBLE FALLING OR STAYING ASLEEP: 0
SUM OF ALL RESPONSES TO PHQ QUESTIONS 1-9: 0
5. POOR APPETITE OR OVEREATING: 0
SUM OF ALL RESPONSES TO PHQ QUESTIONS 1-9: 0
2. FEELING DOWN, DEPRESSED OR HOPELESS: 0
SUM OF ALL RESPONSES TO PHQ QUESTIONS 1-9: 0
SUM OF ALL RESPONSES TO PHQ9 QUESTIONS 1 & 2: 0
7. TROUBLE CONCENTRATING ON THINGS, SUCH AS READING THE NEWSPAPER OR WATCHING TELEVISION: 0
8. MOVING OR SPEAKING SO SLOWLY THAT OTHER PEOPLE COULD HAVE NOTICED. OR THE OPPOSITE, BEING SO FIGETY OR RESTLESS THAT YOU HAVE BEEN MOVING AROUND A LOT MORE THAN USUAL: 0
SUM OF ALL RESPONSES TO PHQ QUESTIONS 1-9: 0

## 2023-07-24 NOTE — TELEPHONE ENCOUNTER
PT called back stating she needed to hold her Eliquis 7 days prior and PT is currently holding and her surgery is on 07/27/23 .[phone]

## 2023-07-24 NOTE — TELEPHONE ENCOUNTER
Can you please provide CV risk assessment in CV's absence? Pt is scheduled for L knee arthroplasty on 8/78/54 by Dr. Zarina Sotelo. Surgeon was requesting Eliquis to be held for 7 days and apparently pt has already started to hold. S/p AF ablation 3/7/23 per CV.

## 2023-07-24 NOTE — FLOWSHEET NOTE
[facility] and [address] [place] [address] [address],  Sports Performance and Rehabilitation, [address] [place] [address]  200 [facility], [address] [place] [address] Avenue  Phone: [phone]  Fax: [phone]      Physical Therapy Daily Treatment Note  Date:  2023    Patient Name:  Kam Daugherty    :  1965  MRN: [id number]  Restrictions/Precautions:    Medical/Treatment Diagnosis Information:  Localized osteoarthritis of right knee [M17.11]  Treatment Diagnosis: THR (Z96.659), Stiffness (R) (M24.461), Effusion (R) (M25.461) , Pain (R) (M25.561), Difficulty with walking (R26.2), Gait Abnormality (R26.9), Weakness (Y09.0)  Insurance/Certification information:  PT Insurance Information: [facility] Comm 30 visits Stephenbritt Cruz (expires ); New insurance Medicaid  Physician Information:  Kee Kruse MD    Has the plan of care been signed (Y/N):        [x]  Yes  []  No     Date of Patient follow up with Physician: 2023       Is this a Progress Report:     []  Yes  [x]  No        If Yes:  Date Range for reporting period:  Beginning 2023  Endin23    Progress report will be due (10 Rx or 30 days whichever is less): 10/41/8816      Recertification will be due (POC Duration  / 90 days whichever is less): 2023        Visit # Insurance Allowable Auth Required   4  5 TBD (Medicaid)  30 (plan through ) [x]  Yes []  No          OUTCOME MEASURE DATE SCORE   LEFS 2023 Deficit: 81%   LEFS 23 45/80 = 56%, 44% deficit        Latex Allergy:  []NO      [x]YES  Preferred Language for Healthcare:   [x]English       []other:    Pain level:  2/10     SUBJECTIVE:  Patient reports that she is overall doing well and that she is pleased with her progress. Pt reports that she thinks she may have a bit more swelling on this date because she was in the pool more this weekend. Pt reports that she is still working on walking and that she feels it is improving.  Pt reports that she

## 2023-07-24 NOTE — TELEPHONE ENCOUNTER
Low cardiac risk. Eliquis is typically on hold for two days before the surgery. If the surgeon feels the risk of bleeding is high, then reasonable to hold for longer.

## 2023-07-25 LAB
EST. AVERAGE GLUCOSE BLD GHB EST-MCNC: 108.3 MG/DL
HBA1C MFR BLD: 5.4 %
MRSA SPEC QL CULT: NORMAL

## 2023-07-26 ENCOUNTER — HOSPITAL ENCOUNTER (OUTPATIENT)
Dept: PHYSICAL THERAPY | Age: 58
Setting detail: THERAPIES SERIES
Discharge: HOME | End: 2023-07-26
Payer: MEDICAID

## 2023-07-26 LAB
BACTERIA UR CULT: ABNORMAL
ORGANISM: ABNORMAL

## 2023-07-26 NOTE — FLOWSHEET NOTE
[facility] and [address] [place] [address] [address],  Sports Performance and Rehabilitation, [address] [place] [address] [address] [facility] [address]  [address], [address] [place] [address] Avenue  Phone: [phone]  Fax: [phone]      Physical Therapy  Cancellation/No-show Note  Patient Name:  Xi Jimenez  :  1965   Date:  2023  Cancelled visits to date: 4  No-shows to date: 0    For today's appointment patient:  [x]  Cancelled  []  Rescheduled appointment  []  No-show     Reason given by patient:  []  Patient ill  []  Conflicting appointment   []  No transportation    []  Conflict with work  [x]  No reason given  []  Other:     Comments:      Electronically signed by:  Josh Sandhu PT

## 2023-07-27 ENCOUNTER — HOSPITAL ENCOUNTER (OUTPATIENT)
Age: 58
Setting detail: OBSERVATION
Discharge: HOME | End: 2023-07-28
Attending: STUDENT IN AN ORGANIZED HEALTH CARE EDUCATION/TRAINING PROGRAM | Admitting: STUDENT IN AN ORGANIZED HEALTH CARE EDUCATION/TRAINING PROGRAM
Payer: MEDICAID

## 2023-07-27 ENCOUNTER — APPOINTMENT (OUTPATIENT)
Dept: GENERAL RADIOLOGY | Age: 58
End: 2023-07-27
Attending: STUDENT IN AN ORGANIZED HEALTH CARE EDUCATION/TRAINING PROGRAM
Payer: MEDICAID

## 2023-07-27 ENCOUNTER — ANESTHESIA (OUTPATIENT)
Dept: OPERATING ROOM | Age: 58
End: 2023-07-27
Payer: MEDICAID

## 2023-07-27 DIAGNOSIS — Z96.652 S/P TOTAL KNEE ARTHROPLASTY, LEFT: Primary | ICD-10-CM

## 2023-07-27 PROBLEM — E66.01 MORBID OBESITY (HCC): Status: ACTIVE | Noted: 2023-07-27

## 2023-07-27 PROBLEM — I48.91 A-FIB (HCC): Status: ACTIVE | Noted: 2018-09-07

## 2023-07-27 LAB
ABO + RH BLD: NORMAL
ANION GAP SERPL CALCULATED.3IONS-SCNC: 10 MMOL/L (ref 3–16)
BLD GP AB SCN SERPL QL: NORMAL
BLD GP AB SCN SERPL QL: NORMAL
BUN SERPL-MCNC: 12 MG/DL (ref 7–20)
CALCIUM SERPL-MCNC: 8.8 MG/DL (ref 8.3–10.6)
CHLORIDE SERPL-SCNC: 102 MMOL/L (ref 99–110)
CO2 SERPL-SCNC: 26 MMOL/L (ref 21–32)
CREAT SERPL-MCNC: 0.8 MG/DL (ref 0.6–1.1)
GFR SERPLBLD CREATININE-BSD FMLA CKD-EPI: >60 ML/MIN/{1.73_M2}
GLUCOSE BLD-MCNC: 116 MG/DL (ref 70–99)
GLUCOSE BLD-MCNC: 222 MG/DL (ref 70–99)
GLUCOSE SERPL-MCNC: 106 MG/DL (ref 70–99)
PERFORMED ON: ABNORMAL
PERFORMED ON: ABNORMAL
POTASSIUM SERPL-SCNC: 4.1 MMOL/L (ref 3.5–5.1)
SODIUM SERPL-SCNC: 138 MMOL/L (ref 136–145)

## 2023-07-27 PROCEDURE — G0378 HOSPITAL OBSERVATION PER HR: HCPCS

## 2023-07-27 PROCEDURE — 2700000000 HC OXYGEN THERAPY PER DAY

## 2023-07-27 PROCEDURE — 94640 AIRWAY INHALATION TREATMENT: CPT

## 2023-07-27 PROCEDURE — 6360000002 HC RX W HCPCS: Performed by: STUDENT IN AN ORGANIZED HEALTH CARE EDUCATION/TRAINING PROGRAM

## 2023-07-27 PROCEDURE — 6360000002 HC RX W HCPCS: Performed by: ANESTHESIOLOGY

## 2023-07-27 PROCEDURE — 2580000003 HC RX 258: Performed by: STUDENT IN AN ORGANIZED HEALTH CARE EDUCATION/TRAINING PROGRAM

## 2023-07-27 PROCEDURE — 80048 BASIC METABOLIC PNL TOTAL CA: CPT

## 2023-07-27 PROCEDURE — 3700000000 HC ANESTHESIA ATTENDED CARE: Performed by: STUDENT IN AN ORGANIZED HEALTH CARE EDUCATION/TRAINING PROGRAM

## 2023-07-27 PROCEDURE — 86850 RBC ANTIBODY SCREEN: CPT

## 2023-07-27 PROCEDURE — 64447 NJX AA&/STRD FEMORAL NRV IMG: CPT | Performed by: ANESTHESIOLOGY

## 2023-07-27 PROCEDURE — C9399 UNCLASSIFIED DRUGS OR BIOLOG: HCPCS | Performed by: NURSE ANESTHETIST, CERTIFIED REGISTERED

## 2023-07-27 PROCEDURE — 6370000000 HC RX 637 (ALT 250 FOR IP): Performed by: ANESTHESIOLOGY

## 2023-07-27 PROCEDURE — 94761 N-INVAS EAR/PLS OXIMETRY MLT: CPT

## 2023-07-27 PROCEDURE — 2720000010 HC SURG SUPPLY STERILE: Performed by: STUDENT IN AN ORGANIZED HEALTH CARE EDUCATION/TRAINING PROGRAM

## 2023-07-27 PROCEDURE — 97110 THERAPEUTIC EXERCISES: CPT

## 2023-07-27 PROCEDURE — 2500000003 HC RX 250 WO HCPCS: Performed by: NURSE ANESTHETIST, CERTIFIED REGISTERED

## 2023-07-27 PROCEDURE — 3700000001 HC ADD 15 MINUTES (ANESTHESIA): Performed by: STUDENT IN AN ORGANIZED HEALTH CARE EDUCATION/TRAINING PROGRAM

## 2023-07-27 PROCEDURE — 73560 X-RAY EXAM OF KNEE 1 OR 2: CPT

## 2023-07-27 PROCEDURE — 27447 TOTAL KNEE ARTHROPLASTY: CPT | Performed by: STUDENT IN AN ORGANIZED HEALTH CARE EDUCATION/TRAINING PROGRAM

## 2023-07-27 PROCEDURE — C1713 ANCHOR/SCREW BN/BN,TIS/BN: HCPCS | Performed by: STUDENT IN AN ORGANIZED HEALTH CARE EDUCATION/TRAINING PROGRAM

## 2023-07-27 PROCEDURE — 6370000000 HC RX 637 (ALT 250 FOR IP): Performed by: STUDENT IN AN ORGANIZED HEALTH CARE EDUCATION/TRAINING PROGRAM

## 2023-07-27 PROCEDURE — 3600000005 HC SURGERY LEVEL 5 BASE: Performed by: STUDENT IN AN ORGANIZED HEALTH CARE EDUCATION/TRAINING PROGRAM

## 2023-07-27 PROCEDURE — 86901 BLOOD TYPING SEROLOGIC RH(D): CPT

## 2023-07-27 PROCEDURE — 7100000000 HC PACU RECOVERY - FIRST 15 MIN: Performed by: STUDENT IN AN ORGANIZED HEALTH CARE EDUCATION/TRAINING PROGRAM

## 2023-07-27 PROCEDURE — A4217 STERILE WATER/SALINE, 500 ML: HCPCS | Performed by: STUDENT IN AN ORGANIZED HEALTH CARE EDUCATION/TRAINING PROGRAM

## 2023-07-27 PROCEDURE — 6360000002 HC RX W HCPCS: Performed by: NURSE ANESTHETIST, CERTIFIED REGISTERED

## 2023-07-27 PROCEDURE — 2709999900 HC NON-CHARGEABLE SUPPLY: Performed by: STUDENT IN AN ORGANIZED HEALTH CARE EDUCATION/TRAINING PROGRAM

## 2023-07-27 PROCEDURE — 7100000001 HC PACU RECOVERY - ADDTL 15 MIN: Performed by: STUDENT IN AN ORGANIZED HEALTH CARE EDUCATION/TRAINING PROGRAM

## 2023-07-27 PROCEDURE — 2580000003 HC RX 258: Performed by: ANESTHESIOLOGY

## 2023-07-27 PROCEDURE — 97161 PT EVAL LOW COMPLEX 20 MIN: CPT

## 2023-07-27 PROCEDURE — 3600000015 HC SURGERY LEVEL 5 ADDTL 15MIN: Performed by: STUDENT IN AN ORGANIZED HEALTH CARE EDUCATION/TRAINING PROGRAM

## 2023-07-27 PROCEDURE — 2500000003 HC RX 250 WO HCPCS: Performed by: STUDENT IN AN ORGANIZED HEALTH CARE EDUCATION/TRAINING PROGRAM

## 2023-07-27 PROCEDURE — C1776 JOINT DEVICE (IMPLANTABLE): HCPCS | Performed by: STUDENT IN AN ORGANIZED HEALTH CARE EDUCATION/TRAINING PROGRAM

## 2023-07-27 PROCEDURE — 97116 GAIT TRAINING THERAPY: CPT

## 2023-07-27 PROCEDURE — 86900 BLOOD TYPING SEROLOGIC ABO: CPT

## 2023-07-27 PROCEDURE — A4216 STERILE WATER/SALINE, 10 ML: HCPCS | Performed by: STUDENT IN AN ORGANIZED HEALTH CARE EDUCATION/TRAINING PROGRAM

## 2023-07-27 DEVICE — IMPLANTABLE DEVICE
Type: IMPLANTABLE DEVICE | Site: KNEE | Status: FUNCTIONAL
Brand: PERSONA® VIVACIT-E®

## 2023-07-27 DEVICE — CEMENT BONE 40GM HI VISC PALACOS R: Type: IMPLANTABLE DEVICE | Site: KNEE | Status: FUNCTIONAL

## 2023-07-27 DEVICE — IMPLANTABLE DEVICE
Type: IMPLANTABLE DEVICE | Site: KNEE | Status: FUNCTIONAL
Brand: PERSONA®

## 2023-07-27 DEVICE — PSN TIB STM 5 DEG SZ F L: Type: IMPLANTABLE DEVICE | Site: KNEE | Status: FUNCTIONAL

## 2023-07-27 RX ORDER — SODIUM CHLORIDE 0.9 % (FLUSH) 0.9 %
5-40 SYRINGE (ML) INJECTION EVERY 12 HOURS SCHEDULED
Status: DISCONTINUED | OUTPATIENT
Start: 2023-07-27 | End: 2023-07-28 | Stop reason: HOSPADM

## 2023-07-27 RX ORDER — ALBUTEROL SULFATE 90 UG/1
1 AEROSOL, METERED RESPIRATORY (INHALATION) EVERY 4 HOURS PRN
Status: DISCONTINUED | OUTPATIENT
Start: 2023-07-27 | End: 2023-07-28 | Stop reason: HOSPADM

## 2023-07-27 RX ORDER — DEXAMETHASONE SODIUM PHOSPHATE 4 MG/ML
INJECTION, SOLUTION INTRA-ARTICULAR; INTRALESIONAL; INTRAMUSCULAR; INTRAVENOUS; SOFT TISSUE PRN
Status: DISCONTINUED | OUTPATIENT
Start: 2023-07-27 | End: 2023-07-27 | Stop reason: SDUPTHER

## 2023-07-27 RX ORDER — SODIUM CHLORIDE, SODIUM LACTATE, POTASSIUM CHLORIDE, CALCIUM CHLORIDE 600; 310; 30; 20 MG/100ML; MG/100ML; MG/100ML; MG/100ML
INJECTION, SOLUTION INTRAVENOUS CONTINUOUS
Status: DISCONTINUED | OUTPATIENT
Start: 2023-07-27 | End: 2023-07-27 | Stop reason: HOSPADM

## 2023-07-27 RX ORDER — POLYETHYLENE GLYCOL 3350 17 G/17G
17 POWDER, FOR SOLUTION ORAL DAILY
Status: DISCONTINUED | OUTPATIENT
Start: 2023-07-27 | End: 2023-07-28 | Stop reason: HOSPADM

## 2023-07-27 RX ORDER — LIDOCAINE HYDROCHLORIDE 10 MG/ML
1 INJECTION, SOLUTION EPIDURAL; INFILTRATION; INTRACAUDAL; PERINEURAL
Status: DISCONTINUED | OUTPATIENT
Start: 2023-07-27 | End: 2023-07-27 | Stop reason: HOSPADM

## 2023-07-27 RX ORDER — SODIUM CHLORIDE 0.9 % (FLUSH) 0.9 %
5-40 SYRINGE (ML) INJECTION EVERY 12 HOURS SCHEDULED
Status: DISCONTINUED | OUTPATIENT
Start: 2023-07-27 | End: 2023-07-27 | Stop reason: HOSPADM

## 2023-07-27 RX ORDER — SODIUM CHLORIDE 9 MG/ML
INJECTION, SOLUTION INTRAVENOUS PRN
Status: DISCONTINUED | OUTPATIENT
Start: 2023-07-27 | End: 2023-07-28 | Stop reason: HOSPADM

## 2023-07-27 RX ORDER — SODIUM CHLORIDE 0.9 % (FLUSH) 0.9 %
5-40 SYRINGE (ML) INJECTION PRN
Status: DISCONTINUED | OUTPATIENT
Start: 2023-07-27 | End: 2023-07-28 | Stop reason: HOSPADM

## 2023-07-27 RX ORDER — DEXAMETHASONE SODIUM PHOSPHATE 4 MG/ML
4 INJECTION, SOLUTION INTRA-ARTICULAR; INTRALESIONAL; INTRAMUSCULAR; INTRAVENOUS; SOFT TISSUE EVERY 6 HOURS
Status: DISCONTINUED | OUTPATIENT
Start: 2023-07-27 | End: 2023-07-28

## 2023-07-27 RX ORDER — ONDANSETRON 2 MG/ML
INJECTION INTRAMUSCULAR; INTRAVENOUS PRN
Status: DISCONTINUED | OUTPATIENT
Start: 2023-07-27 | End: 2023-07-27 | Stop reason: SDUPTHER

## 2023-07-27 RX ORDER — CETIRIZINE HYDROCHLORIDE 10 MG/1
5 TABLET ORAL DAILY
Status: DISCONTINUED | OUTPATIENT
Start: 2023-07-27 | End: 2023-07-28 | Stop reason: HOSPADM

## 2023-07-27 RX ORDER — SODIUM CHLORIDE 9 MG/ML
INJECTION, SOLUTION INTRAVENOUS PRN
Status: DISCONTINUED | OUTPATIENT
Start: 2023-07-27 | End: 2023-07-27 | Stop reason: HOSPADM

## 2023-07-27 RX ORDER — FUROSEMIDE 40 MG/1
40 TABLET ORAL DAILY
Status: DISCONTINUED | OUTPATIENT
Start: 2023-07-27 | End: 2023-07-28 | Stop reason: HOSPADM

## 2023-07-27 RX ORDER — MIDAZOLAM HYDROCHLORIDE 1 MG/ML
INJECTION INTRAMUSCULAR; INTRAVENOUS PRN
Status: DISCONTINUED | OUTPATIENT
Start: 2023-07-27 | End: 2023-07-27 | Stop reason: SDUPTHER

## 2023-07-27 RX ORDER — SENNOSIDES A AND B 8.6 MG/1
1 TABLET, FILM COATED ORAL DAILY PRN
Status: DISCONTINUED | OUTPATIENT
Start: 2023-07-27 | End: 2023-07-28 | Stop reason: HOSPADM

## 2023-07-27 RX ORDER — ONDANSETRON 2 MG/ML
4 INJECTION INTRAMUSCULAR; INTRAVENOUS
Status: DISCONTINUED | OUTPATIENT
Start: 2023-07-27 | End: 2023-07-27 | Stop reason: HOSPADM

## 2023-07-27 RX ORDER — DILTIAZEM HYDROCHLORIDE 240 MG/1
240 CAPSULE, COATED, EXTENDED RELEASE ORAL DAILY
Status: DISCONTINUED | OUTPATIENT
Start: 2023-07-28 | End: 2023-07-28 | Stop reason: HOSPADM

## 2023-07-27 RX ORDER — MORPHINE SULFATE 2 MG/ML
2 INJECTION, SOLUTION INTRAMUSCULAR; INTRAVENOUS
Status: DISCONTINUED | OUTPATIENT
Start: 2023-07-27 | End: 2023-07-28

## 2023-07-27 RX ORDER — MONTELUKAST SODIUM 10 MG/1
10 TABLET ORAL DAILY
Status: DISCONTINUED | OUTPATIENT
Start: 2023-07-27 | End: 2023-07-28 | Stop reason: HOSPADM

## 2023-07-27 RX ORDER — OXYCODONE HYDROCHLORIDE 5 MG/1
10 TABLET ORAL EVERY 4 HOURS PRN
Status: DISCONTINUED | OUTPATIENT
Start: 2023-07-27 | End: 2023-07-28 | Stop reason: HOSPADM

## 2023-07-27 RX ORDER — KETAMINE HCL IN NACL, ISO-OSM 100MG/10ML
SYRINGE (ML) INJECTION PRN
Status: DISCONTINUED | OUTPATIENT
Start: 2023-07-27 | End: 2023-07-27 | Stop reason: SDUPTHER

## 2023-07-27 RX ORDER — SODIUM CHLORIDE, SODIUM LACTATE, POTASSIUM CHLORIDE, CALCIUM CHLORIDE 600; 310; 30; 20 MG/100ML; MG/100ML; MG/100ML; MG/100ML
INJECTION, SOLUTION INTRAVENOUS CONTINUOUS
Status: DISCONTINUED | OUTPATIENT
Start: 2023-07-27 | End: 2023-07-28

## 2023-07-27 RX ORDER — LIDOCAINE HYDROCHLORIDE 20 MG/ML
INJECTION, SOLUTION EPIDURAL; INFILTRATION; INTRACAUDAL; PERINEURAL PRN
Status: DISCONTINUED | OUTPATIENT
Start: 2023-07-27 | End: 2023-07-27 | Stop reason: SDUPTHER

## 2023-07-27 RX ORDER — TIZANIDINE 4 MG/1
2 TABLET ORAL 3 TIMES DAILY
Status: DISCONTINUED | OUTPATIENT
Start: 2023-07-27 | End: 2023-07-28 | Stop reason: HOSPADM

## 2023-07-27 RX ORDER — MORPHINE SULFATE 4 MG/ML
4 INJECTION, SOLUTION INTRAMUSCULAR; INTRAVENOUS
Status: DISCONTINUED | OUTPATIENT
Start: 2023-07-27 | End: 2023-07-28

## 2023-07-27 RX ORDER — TRANEXAMIC ACID 100 MG/ML
INJECTION, SOLUTION INTRAVENOUS PRN
Status: DISCONTINUED | OUTPATIENT
Start: 2023-07-27 | End: 2023-07-27 | Stop reason: SDUPTHER

## 2023-07-27 RX ORDER — ROPINIROLE 0.5 MG/1
1.5 TABLET, FILM COATED ORAL 2 TIMES DAILY
Status: DISCONTINUED | OUTPATIENT
Start: 2023-07-27 | End: 2023-07-28 | Stop reason: HOSPADM

## 2023-07-27 RX ORDER — CYCLOBENZAPRINE HCL 10 MG
10 TABLET ORAL 3 TIMES DAILY PRN
Status: DISCONTINUED | OUTPATIENT
Start: 2023-07-27 | End: 2023-07-28 | Stop reason: HOSPADM

## 2023-07-27 RX ORDER — LABETALOL HYDROCHLORIDE 5 MG/ML
5 INJECTION, SOLUTION INTRAVENOUS EVERY 10 MIN PRN
Status: DISCONTINUED | OUTPATIENT
Start: 2023-07-27 | End: 2023-07-27 | Stop reason: HOSPADM

## 2023-07-27 RX ORDER — OXYCODONE HYDROCHLORIDE 5 MG/1
10 TABLET ORAL PRN
Status: COMPLETED | OUTPATIENT
Start: 2023-07-27 | End: 2023-07-27

## 2023-07-27 RX ORDER — BUPIVACAINE HYDROCHLORIDE 2.5 MG/ML
INJECTION, SOLUTION EPIDURAL; INFILTRATION; INTRACAUDAL
Status: COMPLETED | OUTPATIENT
Start: 2023-07-27 | End: 2023-07-27

## 2023-07-27 RX ORDER — OXYCODONE HYDROCHLORIDE 5 MG/1
5 TABLET ORAL EVERY 4 HOURS PRN
Status: DISCONTINUED | OUTPATIENT
Start: 2023-07-27 | End: 2023-07-28 | Stop reason: HOSPADM

## 2023-07-27 RX ORDER — HYDROMORPHONE HYDROCHLORIDE 2 MG/ML
INJECTION, SOLUTION INTRAMUSCULAR; INTRAVENOUS; SUBCUTANEOUS PRN
Status: DISCONTINUED | OUTPATIENT
Start: 2023-07-27 | End: 2023-07-27 | Stop reason: SDUPTHER

## 2023-07-27 RX ORDER — DIPHENHYDRAMINE HYDROCHLORIDE 50 MG/ML
12.5 INJECTION INTRAMUSCULAR; INTRAVENOUS
Status: DISCONTINUED | OUTPATIENT
Start: 2023-07-27 | End: 2023-07-27 | Stop reason: HOSPADM

## 2023-07-27 RX ORDER — ROCURONIUM BROMIDE 10 MG/ML
INJECTION, SOLUTION INTRAVENOUS PRN
Status: DISCONTINUED | OUTPATIENT
Start: 2023-07-27 | End: 2023-07-27 | Stop reason: SDUPTHER

## 2023-07-27 RX ORDER — PANTOPRAZOLE SODIUM 40 MG/1
40 TABLET, DELAYED RELEASE ORAL
Status: DISCONTINUED | OUTPATIENT
Start: 2023-07-28 | End: 2023-07-28 | Stop reason: HOSPADM

## 2023-07-27 RX ORDER — ACETAMINOPHEN 325 MG/1
650 TABLET ORAL EVERY 6 HOURS
Status: DISCONTINUED | OUTPATIENT
Start: 2023-07-27 | End: 2023-07-28 | Stop reason: HOSPADM

## 2023-07-27 RX ORDER — PROPOFOL 10 MG/ML
INJECTION, EMULSION INTRAVENOUS PRN
Status: DISCONTINUED | OUTPATIENT
Start: 2023-07-27 | End: 2023-07-27 | Stop reason: SDUPTHER

## 2023-07-27 RX ORDER — SODIUM CHLORIDE 0.9 % (FLUSH) 0.9 %
5-40 SYRINGE (ML) INJECTION PRN
Status: DISCONTINUED | OUTPATIENT
Start: 2023-07-27 | End: 2023-07-27 | Stop reason: HOSPADM

## 2023-07-27 RX ORDER — MAGNESIUM HYDROXIDE 1200 MG/15ML
LIQUID ORAL CONTINUOUS PRN
Status: COMPLETED | OUTPATIENT
Start: 2023-07-27 | End: 2023-07-27

## 2023-07-27 RX ORDER — FENTANYL CITRATE 50 UG/ML
INJECTION, SOLUTION INTRAMUSCULAR; INTRAVENOUS PRN
Status: DISCONTINUED | OUTPATIENT
Start: 2023-07-27 | End: 2023-07-27 | Stop reason: SDUPTHER

## 2023-07-27 RX ORDER — OXYCODONE HYDROCHLORIDE 5 MG/1
5 TABLET ORAL PRN
Status: COMPLETED | OUTPATIENT
Start: 2023-07-27 | End: 2023-07-27

## 2023-07-27 RX ORDER — MAGNESIUM SULFATE IN WATER 40 MG/ML
2000 INJECTION, SOLUTION INTRAVENOUS ONCE
Status: COMPLETED | OUTPATIENT
Start: 2023-07-27 | End: 2023-07-27

## 2023-07-27 RX ORDER — ACETAMINOPHEN 500 MG
1000 TABLET ORAL ONCE
Status: COMPLETED | OUTPATIENT
Start: 2023-07-27 | End: 2023-07-27

## 2023-07-27 RX ORDER — IPRATROPIUM BROMIDE AND ALBUTEROL SULFATE 2.5; .5 MG/3ML; MG/3ML
1 SOLUTION RESPIRATORY (INHALATION) EVERY 4 HOURS PRN
Status: DISCONTINUED | OUTPATIENT
Start: 2023-07-27 | End: 2023-07-28 | Stop reason: HOSPADM

## 2023-07-27 RX ADMIN — ROPINIROLE HYDROCHLORIDE 1.5 MG: 0.5 TABLET, FILM COATED ORAL at 13:38

## 2023-07-27 RX ADMIN — BUPIVACAINE HYDROCHLORIDE 20 ML: 2.5 INJECTION, SOLUTION EPIDURAL; INFILTRATION; INTRACAUDAL; PERINEURAL at 07:10

## 2023-07-27 RX ADMIN — FENTANYL CITRATE 50 MCG: 50 INJECTION, SOLUTION INTRAMUSCULAR; INTRAVENOUS at 07:38

## 2023-07-27 RX ADMIN — ROCURONIUM BROMIDE 50 MG: 50 INJECTION, SOLUTION INTRAVENOUS at 07:39

## 2023-07-27 RX ADMIN — MAGNESIUM SULFATE IN WATER 2000 MG: 40 INJECTION, SOLUTION INTRAVENOUS at 07:55

## 2023-07-27 RX ADMIN — ROPINIROLE HYDROCHLORIDE 1.5 MG: 0.5 TABLET, FILM COATED ORAL at 21:06

## 2023-07-27 RX ADMIN — Medication 1 PUFF: at 12:11

## 2023-07-27 RX ADMIN — Medication 1 PUFF: at 19:40

## 2023-07-27 RX ADMIN — TRANEXAMIC ACID 1000 MG: 100 INJECTION, SOLUTION INTRAVENOUS at 08:05

## 2023-07-27 RX ADMIN — FENTANYL CITRATE 50 MCG: 50 INJECTION, SOLUTION INTRAMUSCULAR; INTRAVENOUS at 08:15

## 2023-07-27 RX ADMIN — OXYCODONE HYDROCHLORIDE 10 MG: 5 TABLET ORAL at 14:12

## 2023-07-27 RX ADMIN — MONTELUKAST 10 MG: 10 TABLET, FILM COATED ORAL at 13:38

## 2023-07-27 RX ADMIN — POLYETHYLENE GLYCOL 3350 17 G: 17 POWDER, FOR SOLUTION ORAL at 13:38

## 2023-07-27 RX ADMIN — ROCURONIUM BROMIDE 20 MG: 50 INJECTION, SOLUTION INTRAVENOUS at 08:07

## 2023-07-27 RX ADMIN — SUGAMMADEX 400 MG: 100 INJECTION, SOLUTION INTRAVENOUS at 09:11

## 2023-07-27 RX ADMIN — DEXAMETHASONE SODIUM PHOSPHATE 8 MG: 4 INJECTION, SOLUTION INTRAMUSCULAR; INTRAVENOUS at 07:46

## 2023-07-27 RX ADMIN — LIDOCAINE HYDROCHLORIDE 100 MG: 20 INJECTION, SOLUTION EPIDURAL; INFILTRATION; INTRACAUDAL at 07:38

## 2023-07-27 RX ADMIN — SODIUM CHLORIDE, SODIUM LACTATE, POTASSIUM CHLORIDE, AND CALCIUM CHLORIDE: .6; .31; .03; .02 INJECTION, SOLUTION INTRAVENOUS at 09:11

## 2023-07-27 RX ADMIN — ROCURONIUM BROMIDE 20 MG: 50 INJECTION, SOLUTION INTRAVENOUS at 08:38

## 2023-07-27 RX ADMIN — ONDANSETRON 4 MG: 2 INJECTION INTRAMUSCULAR; INTRAVENOUS at 07:46

## 2023-07-27 RX ADMIN — Medication 30 MG: at 08:07

## 2023-07-27 RX ADMIN — Medication 10 ML: at 17:43

## 2023-07-27 RX ADMIN — TIZANIDINE 2 MG: 4 TABLET ORAL at 21:09

## 2023-07-27 RX ADMIN — MORPHINE SULFATE 4 MG: 4 INJECTION, SOLUTION INTRAMUSCULAR; INTRAVENOUS at 17:42

## 2023-07-27 RX ADMIN — SODIUM CHLORIDE, POTASSIUM CHLORIDE, SODIUM LACTATE AND CALCIUM CHLORIDE: 600; 310; 30; 20 INJECTION, SOLUTION INTRAVENOUS at 13:37

## 2023-07-27 RX ADMIN — TIZANIDINE 2 MG: 4 TABLET ORAL at 14:12

## 2023-07-27 RX ADMIN — HYDROMORPHONE HYDROCHLORIDE 0.5 MG: 1 INJECTION, SOLUTION INTRAMUSCULAR; INTRAVENOUS; SUBCUTANEOUS at 09:45

## 2023-07-27 RX ADMIN — ACETAMINOPHEN 1000 MG: 500 TABLET ORAL at 07:03

## 2023-07-27 RX ADMIN — MORPHINE SULFATE 4 MG: 4 INJECTION, SOLUTION INTRAMUSCULAR; INTRAVENOUS at 23:25

## 2023-07-27 RX ADMIN — PROPOFOL 40 MG: 10 INJECTION, EMULSION INTRAVENOUS at 09:13

## 2023-07-27 RX ADMIN — CETIRIZINE HYDROCHLORIDE 5 MG: 10 TABLET, FILM COATED ORAL at 13:38

## 2023-07-27 RX ADMIN — HYDROMORPHONE HYDROCHLORIDE 0.25 MG: 1 INJECTION, SOLUTION INTRAMUSCULAR; INTRAVENOUS; SUBCUTANEOUS at 11:05

## 2023-07-27 RX ADMIN — HYDROMORPHONE HYDROCHLORIDE 1 MG: 2 INJECTION, SOLUTION INTRAMUSCULAR; INTRAVENOUS; SUBCUTANEOUS at 09:20

## 2023-07-27 RX ADMIN — Medication 3000 MG: at 07:42

## 2023-07-27 RX ADMIN — SODIUM CHLORIDE, SODIUM LACTATE, POTASSIUM CHLORIDE, AND CALCIUM CHLORIDE: .6; .31; .03; .02 INJECTION, SOLUTION INTRAVENOUS at 07:34

## 2023-07-27 RX ADMIN — HYDROMORPHONE HYDROCHLORIDE 0.5 MG: 1 INJECTION, SOLUTION INTRAMUSCULAR; INTRAVENOUS; SUBCUTANEOUS at 09:38

## 2023-07-27 RX ADMIN — CEFAZOLIN 3000 MG: 10 INJECTION, POWDER, FOR SOLUTION INTRAVENOUS at 16:18

## 2023-07-27 RX ADMIN — HYDROMORPHONE HYDROCHLORIDE 0.5 MG: 1 INJECTION, SOLUTION INTRAMUSCULAR; INTRAVENOUS; SUBCUTANEOUS at 10:21

## 2023-07-27 RX ADMIN — HYDROMORPHONE HYDROCHLORIDE 1 MG: 2 INJECTION, SOLUTION INTRAMUSCULAR; INTRAVENOUS; SUBCUTANEOUS at 09:14

## 2023-07-27 RX ADMIN — DEXAMETHASONE SODIUM PHOSPHATE 4 MG: 4 INJECTION, SOLUTION INTRAMUSCULAR; INTRAVENOUS at 21:08

## 2023-07-27 RX ADMIN — Medication 10 ML: at 21:09

## 2023-07-27 RX ADMIN — FUROSEMIDE 40 MG: 40 TABLET ORAL at 13:38

## 2023-07-27 RX ADMIN — ACETAMINOPHEN 650 MG: 325 TABLET ORAL at 21:06

## 2023-07-27 RX ADMIN — PROPOFOL 200 MG: 10 INJECTION, EMULSION INTRAVENOUS at 07:38

## 2023-07-27 RX ADMIN — ACETAMINOPHEN 650 MG: 325 TABLET ORAL at 13:38

## 2023-07-27 RX ADMIN — OXYCODONE HYDROCHLORIDE 5 MG: 5 TABLET ORAL at 19:37

## 2023-07-27 RX ADMIN — OXYCODONE HYDROCHLORIDE 10 MG: 5 TABLET ORAL at 10:08

## 2023-07-27 RX ADMIN — HYDROMORPHONE HYDROCHLORIDE 0.5 MG: 1 INJECTION, SOLUTION INTRAMUSCULAR; INTRAVENOUS; SUBCUTANEOUS at 09:52

## 2023-07-27 RX ADMIN — MIDAZOLAM 2 MG: 1 INJECTION INTRAMUSCULAR; INTRAVENOUS at 07:10

## 2023-07-27 RX ADMIN — DEXAMETHASONE SODIUM PHOSPHATE 4 MG: 4 INJECTION, SOLUTION INTRAMUSCULAR; INTRAVENOUS at 13:35

## 2023-07-27 ASSESSMENT — PAIN DESCRIPTION - ORIENTATION
ORIENTATION: LEFT

## 2023-07-27 ASSESSMENT — PAIN DESCRIPTION - LOCATION
LOCATION: KNEE
LOCATION: LEG
LOCATION: KNEE
LOCATION: KNEE
LOCATION: LEG
LOCATION: KNEE

## 2023-07-27 ASSESSMENT — PAIN SCALES - GENERAL
PAINLEVEL_OUTOF10: 2
PAINLEVEL_OUTOF10: 4
PAINLEVEL_OUTOF10: 5
PAINLEVEL_OUTOF10: 2
PAINLEVEL_OUTOF10: 4
PAINLEVEL_OUTOF10: 9
PAINLEVEL_OUTOF10: 3
PAINLEVEL_OUTOF10: 6
PAINLEVEL_OUTOF10: 5
PAINLEVEL_OUTOF10: 9
PAINLEVEL_OUTOF10: 7
PAINLEVEL_OUTOF10: 8
PAINLEVEL_OUTOF10: 6
PAINLEVEL_OUTOF10: 3
PAINLEVEL_OUTOF10: 5
PAINLEVEL_OUTOF10: 7

## 2023-07-27 ASSESSMENT — PAIN - FUNCTIONAL ASSESSMENT
PAIN_FUNCTIONAL_ASSESSMENT: 0-10
PAIN_FUNCTIONAL_ASSESSMENT: PREVENTS OR INTERFERES WITH ALL ACTIVE AND SOME PASSIVE ACTIVITIES
PAIN_FUNCTIONAL_ASSESSMENT: ACTIVITIES ARE NOT PREVENTED
PAIN_FUNCTIONAL_ASSESSMENT: ACTIVITIES ARE NOT PREVENTED

## 2023-07-27 ASSESSMENT — PAIN DESCRIPTION - DESCRIPTORS
DESCRIPTORS: THROBBING;SHARP
DESCRIPTORS: ACHING;THROBBING;SHARP
DESCRIPTORS: SHARP;ACHING
DESCRIPTORS: ACHING;STABBING;THROBBING
DESCRIPTORS: ACHING;STABBING
DESCRIPTORS: ACHING

## 2023-07-27 ASSESSMENT — PAIN DESCRIPTION - FREQUENCY: FREQUENCY: INTERMITTENT

## 2023-07-27 ASSESSMENT — PAIN DESCRIPTION - PAIN TYPE: TYPE: SURGICAL PAIN

## 2023-07-27 ASSESSMENT — PAIN SCALES - WONG BAKER
WONGBAKER_NUMERICALRESPONSE: 0
WONGBAKER_NUMERICALRESPONSE: 0

## 2023-07-27 NOTE — PROGRESS NOTES
Occupational Therapy  Attempted to see pt this afternoon. PT in room evaluating pt and reported no OT needs at this time. Please re-order if needs arise. Thank you.     Dunia Fuller, OTR/L

## 2023-07-27 NOTE — PROGRESS NOTES
Pt a/o. Pre op completed. NPO since 0400 7/27 (pt took a sip with cardizem and protonix). SCD on RLE, IS given and performed, pt took out upper partials and placed in purse, belongings labeled, mepilex on bottom, non skid footwear on, and green hat on for latex allergy. Writer performed a L knee block time out with Dr. Megan Agudelo at bedside. Friend Юлия at bedside.

## 2023-07-27 NOTE — ANESTHESIA PRE PROCEDURE
Anesthesia Evaluation  Patient summary reviewed and Nursing notes reviewed  Airway: Mallampati: II  TM distance: >3 FB   Neck ROM: full  Mouth opening: > = 3 FB   Dental: normal exam         Pulmonary:   (+) decreased breath sounds asthma:                            Cardiovascular:    (+) hypertension:, CHF:,         Rhythm: regular  Rate: normal                    Neuro/Psych:   (+) neuromuscular disease:, psychiatric history:            GI/Hepatic/Renal:   (+) hiatal hernia, GERD:, morbid obesity          Endo/Other: Negative Endo/Other ROS                    Abdominal:   (+) obese,           Vascular: negative vascular ROS. Other Findings:           Anesthesia Plan      general     ASA 4       Induction: intravenous. MIPS: Postoperative opioids intended and Prophylactic antiemetics administered. Anesthetic plan and risks discussed with patient. Plan discussed with CRNA.           Post-op pain plan if not by surgeon: single peripheral nerve block            Silvia Pearl MD   7/27/2023

## 2023-07-27 NOTE — ANESTHESIA PROCEDURE NOTES
Peripheral Block    Patient location during procedure: PACU  Reason for block: post-op pain management and at surgeon's request  Start time: 7/27/2023 7:10 AM  End time: 7/27/2023 7:20 AM  Staffing  Performed: anesthesiologist   Anesthesiologist: Keya Pimentel MD  Preanesthetic Checklist  Completed: patient identified, IV checked, site marked, risks and benefits discussed, surgical/procedural consents, equipment checked, pre-op evaluation, timeout performed, anesthesia consent given, oxygen available and monitors applied/VS acknowledged  Peripheral Block   Patient position: supine  Prep: ChloraPrep  Provider prep: mask and sterile gloves  Patient monitoring: cardiac monitor, continuous pulse ox, frequent blood pressure checks and IV access  Block type: Femoral  Adductor canal  Laterality: left  Injection technique: single-shot  Guidance: ultrasound guided  Local infiltration: lidocaine  Infiltration strength: 1 %  Local infiltration: lidocaine    Needle   Needle type: combined needle/nerve stimulator   Needle gauge: 22 G  Needle localization: ultrasound guidance  Needle length: 5 cm  Assessment   Injection assessment: negative aspiration for heme, no paresthesia on injection and local visualized surrounding nerve on ultrasound  Slow fractionated injection: yes  Hemodynamics: stable  Real-time US image taken/store: yes    Additional Notes  Sartorius and Vastus Medialis Muscle, Femoral artery and Saphenous nerve are identified; the tip of the needle and the spread of the local anesthetic around the Saphenous nerve are visualized. The Saphenous nerve appeared to be anatomically normal and there were no abnormal pathologically findings seen.    Medications Administered  bupivacaine (MARCAINE) PF injection 0.25% - Perineural   20 mL - 7/27/2023 7:10:00 AM

## 2023-07-27 NOTE — DISCHARGE INSTRUCTIONS
*** Please contact [email or web address] with any questions or concerns after your discharge. *** Mon- Fri 9am- 5pm [phone]. If you have any issues or concerns after 5pm or on the weekend please call your surgeon's office. I will be contacting you in a few days to follow up. If you need a pain medication refill please contact your surgeon's office. Please contact [facility] Orthopaedic Nurse Navigator with any questions or concerns after your discharge. Mon- Fri 101 Dates  [phone]. If you have any issues or concerns after 5pm or on the weekend please call your surgeon's office. I will be contacting you in a few days to follow up. If you need a pain medication refill please contact your surgeon's office. Dr. Dale Kocher Total Knee Replacement  Discharge Instructions      Activity: The first week after surgery is all about Ice, Elevation and Rest!        [place] way to elevate knee above heart, while keeping the knee straight. Pillows should be placed under the FOOT and leg, such that the leg is held straight. You should feel stretching in the back of the knee. If there is too much pain add pillows under the knee, but the leg should be as straight as possible  Placing pillows under the knee only and keeping the knee bent will make it very difficult to get it straight with physical therapy. Use a walker when ambulating. Work on knee bending when sitting up in a chair    Physical therapy. Typically starts 10-14 days after surgery unless otherwise instructed  Referral placed to outpatient location discussed with surgeon or for home PT if you do not have transportation for outpatient PT    To prevent Clot formation, you have been placed on the following medication:  Home dose eliquis 5mg twice daily    Surgical Site Care:    Remove Prevena on post-operative day # 7.  The battery pack attached to the purple dressing will automatically turn off 7 days after your

## 2023-07-27 NOTE — PROGRESS NOTES
Physical Therapy  Facility/Department: [place] [address] - MED SURG/ORTHO  Physical Therapy Initial Assessment/Treatment     Name: Rafael Davies  : 1965  MRN: [id number]  Date of Service: 2023    Discharge Recommendations:  24 hour supervision or assist   PT Equipment Recommendations  Equipment Needed: No  Other: has RW      Patient Diagnosis(es): There were no encounter diagnoses. Past Medical History:  has a past medical history of Acute alcoholic intoxication ([address]), Acute lateral meniscus tear of right knee, Acute medial meniscus tear of right knee, Anesthesia complication, Anxiety, Arthritis, Asthma, Atrial fibrillation ([address]), CHF (congestive heart failure) ([address]), Edema, Fibromyalgia, GERD (gastroesophageal reflux disease), Hiatal hernia, History of blood transfusion, History of suicide attempt, Hx of major depression, Hypertension, Idiopathic urticaria, Insomnia, PTSD (post-traumatic stress disorder), SVT (supraventricular tachycardia) ([address]), and Vocal cord dysfunction. Past Surgical History:  has a past surgical history that includes hernia repair (); Hysterectomy (); Tonsillectomy and adenoidectomy; Romie-en-Y Gastric Bypass (); UPPP; ovarian cyst removal; ablation of dysrhythmic focus (2019); Knee arthroscopy (Right, 2019); bladder suspension (); ventral hernia repair (N/A, 2019); Upper gastrointestinal endoscopy (N/A, 2022); Colonoscopy (N/A, 2022); cervical fusion (N/A, 2022); and Total knee arthroplasty (Right, 2023). Assessment   Body Structures, Functions, Activity Limitations Requiring Skilled Therapeutic Intervention: Decreased functional mobility ; Decreased endurance; Increased pain;Decreased balance;Decreased strength;Decreased ROM  Assessment: Pt to [facility] for L TKA on . PTA pt lives with spouse in single story house with 1 small step to enter. Pt was independent with transfers and ambulation with SPC.  Pt currently is able to complete bed

## 2023-07-27 NOTE — OP NOTE
Operative Note      Patient: Arias Johnson  YOB: 1965  MRN: [id number]    Date of Procedure: 7/27/2023    Pre-Op Diagnosis Codes:     * Osteoarthritis of left knee, unspecified osteoarthritis type [M17.12]    Post-Op Diagnosis: Same       Procedure(s):  LEFT TOTAL KNEE ARTHROPLASTY     Surgeon(s):  Therese Hughes MD    Assistant:   Surgical Assistant: Nellie Spence    Anesthesia: General    Estimated Blood Loss (mL): less than 720     Complications: None    Specimens:   * No specimens in log *    Implants:  Implant Name Type Inv. Item Serial No.  Lot No. LRB No. Used Action   CEMENT BONE 40GM HI VISC PALACOS R - [id number]  CEMENT BONE 40GM HI VISC PALACOS R  [facility]- [id number] Left 2 Implanted   EXTENSION STEM L30MM [id number] KNEE TAPR DERRICK PERSONA - [id number]  EXTENSION STEM L30MM [id number] KNEE TAPR DERRICK PERSONA  [employer]- [id number] Left 1 Implanted   PSN TIB STM 5 DEG SZ F L - [id number]  PSN TIB STM 5 DEG SZ F L  [employer]- [id number] Left 1 Implanted   COMPONENT FEM SZ 8 L KNEE CO CHROM ERIKA CRUCE RET DERRICK - [id number]  COMPONENT FEM SZ 8 L KNEE CO CHROM ERIKA CRUCE RET DERRICK  [employer]- [id number] Left 1 Implanted   PSN MC VE ASF L 10MM 8-11 EF - [id number]  PSN MC VE ASF L 10MM 8-11 EF  [employer]- [id number] Left 1 Implanted         Drains:   Negative Pressure Wound Therapy (Active)       Negative Pressure Wound Therapy Knee Left; Anterior (Active)       Findings: severe OA      Detailed Description of Procedure:   Clinical Indications: This is a 61 y/o F that was evaluated for L knee OA by myself, noted to have failed extensive conservative measures and desired to have surgical intervention. Did well with recent R TKA. Risks benefits limitations and alternatives to L TKA were discussed with the patient who wished to proceed.      The patient was met in the preoperative holding area last-minute

## 2023-07-27 NOTE — H&P
Update History & Physical    The patient's History and Physical of July 24, 2023 was reviewed with the patient and I examined the patient. There was no change. The surgical site was confirmed by the patient and me. Plan: The risks, benefits, expected outcome, and alternative to the recommended procedure have been discussed with the patient. Patient understands and wants to proceed with the procedure.      Electronically signed by Prema Barth MD on 9/61/7454 at 7:30 AM

## 2023-07-28 VITALS
SYSTOLIC BLOOD PRESSURE: 162 MMHG | DIASTOLIC BLOOD PRESSURE: 86 MMHG | OXYGEN SATURATION: 95 % | BODY MASS INDEX: 41.95 KG/M2 | WEIGHT: 293 LBS | HEIGHT: 70 IN | HEART RATE: 89 BPM | RESPIRATION RATE: 18 BRPM | TEMPERATURE: 98.3 F

## 2023-07-28 LAB
ANION GAP SERPL CALCULATED.3IONS-SCNC: 14 MMOL/L (ref 3–16)
BASOPHILS # BLD: 0 K/UL (ref 0–0.2)
BASOPHILS NFR BLD: 0.3 %
BUN SERPL-MCNC: 16 MG/DL (ref 7–20)
CALCIUM SERPL-MCNC: 9 MG/DL (ref 8.3–10.6)
CHLORIDE SERPL-SCNC: 98 MMOL/L (ref 99–110)
CO2 SERPL-SCNC: 22 MMOL/L (ref 21–32)
CREAT SERPL-MCNC: 0.9 MG/DL (ref 0.6–1.1)
DEPRECATED RDW RBC AUTO: 19.3 % (ref 12.4–15.4)
EOSINOPHIL # BLD: 0 K/UL (ref 0–0.6)
EOSINOPHIL NFR BLD: 0 %
GFR SERPLBLD CREATININE-BSD FMLA CKD-EPI: >60 ML/MIN/{1.73_M2}
GLUCOSE SERPL-MCNC: 172 MG/DL (ref 70–99)
HCT VFR BLD AUTO: 31.6 % (ref 36–48)
HGB BLD-MCNC: 9.7 G/DL (ref 12–16)
LYMPHOCYTES # BLD: 0.6 K/UL (ref 1–5.1)
LYMPHOCYTES NFR BLD: 5.4 %
MCH RBC QN AUTO: 23.9 PG (ref 26–34)
MCHC RBC AUTO-ENTMCNC: 30.8 G/DL (ref 31–36)
MCV RBC AUTO: 77.8 FL (ref 80–100)
MONOCYTES # BLD: 0.4 K/UL (ref 0–1.3)
MONOCYTES NFR BLD: 3.5 %
NEUTROPHILS # BLD: 10.2 K/UL (ref 1.7–7.7)
NEUTROPHILS NFR BLD: 90.8 %
PLATELET # BLD AUTO: 320 K/UL (ref 135–450)
PMV BLD AUTO: 8.6 FL (ref 5–10.5)
POTASSIUM SERPL-SCNC: 4.9 MMOL/L (ref 3.5–5.1)
RBC # BLD AUTO: 4.06 M/UL (ref 4–5.2)
SODIUM SERPL-SCNC: 134 MMOL/L (ref 136–145)
WBC # BLD AUTO: 11.2 K/UL (ref 4–11)

## 2023-07-28 PROCEDURE — 6370000000 HC RX 637 (ALT 250 FOR IP): Performed by: STUDENT IN AN ORGANIZED HEALTH CARE EDUCATION/TRAINING PROGRAM

## 2023-07-28 PROCEDURE — 97530 THERAPEUTIC ACTIVITIES: CPT

## 2023-07-28 PROCEDURE — 94761 N-INVAS EAR/PLS OXIMETRY MLT: CPT

## 2023-07-28 PROCEDURE — 97116 GAIT TRAINING THERAPY: CPT

## 2023-07-28 PROCEDURE — 94640 AIRWAY INHALATION TREATMENT: CPT

## 2023-07-28 PROCEDURE — 96375 TX/PRO/DX INJ NEW DRUG ADDON: CPT

## 2023-07-28 PROCEDURE — APPNB45 APP NON BILLABLE 31-45 MINUTES: Performed by: SPECIALIST/TECHNOLOGIST

## 2023-07-28 PROCEDURE — 2580000003 HC RX 258: Performed by: STUDENT IN AN ORGANIZED HEALTH CARE EDUCATION/TRAINING PROGRAM

## 2023-07-28 PROCEDURE — 6360000002 HC RX W HCPCS: Performed by: STUDENT IN AN ORGANIZED HEALTH CARE EDUCATION/TRAINING PROGRAM

## 2023-07-28 PROCEDURE — G0378 HOSPITAL OBSERVATION PER HR: HCPCS

## 2023-07-28 PROCEDURE — 36415 COLL VENOUS BLD VENIPUNCTURE: CPT

## 2023-07-28 PROCEDURE — 6370000000 HC RX 637 (ALT 250 FOR IP): Performed by: SPECIALIST/TECHNOLOGIST

## 2023-07-28 PROCEDURE — 85025 COMPLETE CBC W/AUTO DIFF WBC: CPT

## 2023-07-28 PROCEDURE — 99024 POSTOP FOLLOW-UP VISIT: CPT | Performed by: SPECIALIST/TECHNOLOGIST

## 2023-07-28 PROCEDURE — 94760 N-INVAS EAR/PLS OXIMETRY 1: CPT

## 2023-07-28 PROCEDURE — 96374 THER/PROPH/DIAG INJ IV PUSH: CPT

## 2023-07-28 PROCEDURE — 80048 BASIC METABOLIC PNL TOTAL CA: CPT

## 2023-07-28 RX ORDER — POLYETHYLENE GLYCOL 3350 17 G/17G
17 POWDER, FOR SOLUTION ORAL DAILY
Qty: 10 PACKET | Refills: 0 | Status: SHIPPED | OUTPATIENT
Start: 2023-07-29 | End: 2023-08-08

## 2023-07-28 RX ORDER — METHYLPREDNISOLONE 4 MG/1
TABLET ORAL
Qty: 1 KIT | Refills: 0 | Status: SHIPPED | OUTPATIENT
Start: 2023-07-28 | End: 2023-07-28 | Stop reason: HOSPADM

## 2023-07-28 RX ORDER — OXYCODONE HYDROCHLORIDE 5 MG/1
5 TABLET ORAL EVERY 6 HOURS PRN
Qty: 28 TABLET | Refills: 0 | Status: SHIPPED | OUTPATIENT
Start: 2023-07-28 | End: 2023-08-02 | Stop reason: SDUPTHER

## 2023-07-28 RX ORDER — NITROFURANTOIN 25; 75 MG/1; MG/1
100 CAPSULE ORAL EVERY 12 HOURS SCHEDULED
Qty: 14 CAPSULE | Refills: 0 | Status: SHIPPED | OUTPATIENT
Start: 2023-07-28 | End: 2023-08-04

## 2023-07-28 RX ORDER — NITROFURANTOIN 25; 75 MG/1; MG/1
100 CAPSULE ORAL EVERY 12 HOURS SCHEDULED
Status: DISCONTINUED | OUTPATIENT
Start: 2023-07-28 | End: 2023-07-28 | Stop reason: HOSPADM

## 2023-07-28 RX ORDER — ACETAMINOPHEN 325 MG/1
650 TABLET ORAL EVERY 6 HOURS
Qty: 120 TABLET | Refills: 3 | Status: SHIPPED | OUTPATIENT
Start: 2023-07-28

## 2023-07-28 RX ADMIN — CETIRIZINE HYDROCHLORIDE 5 MG: 10 TABLET, FILM COATED ORAL at 09:56

## 2023-07-28 RX ADMIN — OXYCODONE HYDROCHLORIDE 10 MG: 5 TABLET ORAL at 12:50

## 2023-07-28 RX ADMIN — ACETAMINOPHEN 650 MG: 325 TABLET ORAL at 05:47

## 2023-07-28 RX ADMIN — DEXAMETHASONE SODIUM PHOSPHATE 4 MG: 4 INJECTION, SOLUTION INTRAMUSCULAR; INTRAVENOUS at 03:24

## 2023-07-28 RX ADMIN — NITROFURANTOIN MONOHYDRATE/MACROCRYSTALS 100 MG: 75; 25 CAPSULE ORAL at 10:06

## 2023-07-28 RX ADMIN — Medication 1 PUFF: at 08:51

## 2023-07-28 RX ADMIN — OXYCODONE HYDROCHLORIDE 10 MG: 5 TABLET ORAL at 08:19

## 2023-07-28 RX ADMIN — MORPHINE SULFATE 4 MG: 4 INJECTION, SOLUTION INTRAMUSCULAR; INTRAVENOUS at 05:35

## 2023-07-28 RX ADMIN — POLYETHYLENE GLYCOL 3350 17 G: 17 POWDER, FOR SOLUTION ORAL at 09:56

## 2023-07-28 RX ADMIN — PANTOPRAZOLE SODIUM 40 MG: 40 TABLET, DELAYED RELEASE ORAL at 05:47

## 2023-07-28 RX ADMIN — TIZANIDINE 2 MG: 4 TABLET ORAL at 09:56

## 2023-07-28 RX ADMIN — CEFAZOLIN 3000 MG: 10 INJECTION, POWDER, FOR SOLUTION INTRAVENOUS at 01:11

## 2023-07-28 RX ADMIN — MONTELUKAST 10 MG: 10 TABLET, FILM COATED ORAL at 09:56

## 2023-07-28 RX ADMIN — ROPINIROLE HYDROCHLORIDE 1.5 MG: 0.5 TABLET, FILM COATED ORAL at 09:55

## 2023-07-28 RX ADMIN — DILTIAZEM HYDROCHLORIDE 240 MG: 240 CAPSULE, EXTENDED RELEASE ORAL at 09:56

## 2023-07-28 RX ADMIN — OXYCODONE HYDROCHLORIDE 10 MG: 5 TABLET ORAL at 03:23

## 2023-07-28 RX ADMIN — ACETAMINOPHEN 650 MG: 325 TABLET ORAL at 03:23

## 2023-07-28 RX ADMIN — APIXABAN 5 MG: 5 TABLET, FILM COATED ORAL at 10:03

## 2023-07-28 ASSESSMENT — PAIN DESCRIPTION - ORIENTATION
ORIENTATION: LEFT

## 2023-07-28 ASSESSMENT — PAIN DESCRIPTION - PAIN TYPE
TYPE: SURGICAL PAIN

## 2023-07-28 ASSESSMENT — PAIN DESCRIPTION - DESCRIPTORS
DESCRIPTORS: ACHING;THROBBING;SHARP
DESCRIPTORS: ACHING;SHARP
DESCRIPTORS: ACHING
DESCRIPTORS: THROBBING;ACHING

## 2023-07-28 ASSESSMENT — PAIN SCALES - GENERAL
PAINLEVEL_OUTOF10: 5
PAINLEVEL_OUTOF10: 7
PAINLEVEL_OUTOF10: 6
PAINLEVEL_OUTOF10: 5
PAINLEVEL_OUTOF10: 5
PAINLEVEL_OUTOF10: 8
PAINLEVEL_OUTOF10: 7
PAINLEVEL_OUTOF10: 8
PAINLEVEL_OUTOF10: 5
PAINLEVEL_OUTOF10: 5

## 2023-07-28 ASSESSMENT — PAIN DESCRIPTION - LOCATION
LOCATION: KNEE
LOCATION: LEG

## 2023-07-28 NOTE — PROGRESS NOTES
Pt ok for discharge per MD. Discharge instructions given. Prescriptions picked up from outpatient pharmacy. No questions or concerns at this time. Transported by wheelchair to personal car with all belongings.

## 2023-07-28 NOTE — PLAN OF CARE
Problem: Chronic Conditions and Co-morbidities  Goal: Patient's chronic conditions and co-morbidity symptoms are monitored and maintained or improved  7/28/2023 1041 by Kelly Garcia RN  Outcome: Adequate for Discharge     Problem: Discharge Planning  Goal: Discharge to home or other facility with appropriate resources  7/28/2023 1041 by Kelly Garcia RN  Outcome: Adequate for Discharge     Problem: Pain  Goal: Verbalizes/displays adequate comfort level or baseline comfort level  7/28/2023 1041 by Kelly Garcia RN  Outcome: Adequate for Discharge     Problem: Safety - Adult  Goal: Free from fall injury  7/28/2023 1041 by Kelly Garcia RN  Outcome: Adequate for Discharge     Problem: ABCDS Injury Assessment  Goal: Absence of physical injury  7/28/2023 1041 by Kelly Garcia RN  Outcome: Adequate for Discharge

## 2023-07-28 NOTE — PROGRESS NOTES
Patient C/O of some discomfort to IV site, area noted with mild swelling. IV removed, no bleeding noted. Patient declined to have another IV place stating she is a difficult stick and normally needs IV place with ultrasound. Patient stated she is suppose to go home today and would rather wait to see if she will need another. Day shift RN made aware will make MD aware when rounding.

## 2023-07-28 NOTE — PROGRESS NOTES
Physical Therapy  Facility/Department: [place] [address] - MED SURG/ORTHO  Daily Treatment Note  NAME: Ct Spencer  : 1965  MRN: [id number]    Date of Service: 2023    Discharge Recommendations:  24 hour supervision or assist   PT Equipment Recommendations  Equipment Needed: No  Other: has RW    Patient Diagnosis(es): The encounter diagnosis was S/P total knee arthroplasty, left. Assessment   Assessment: Pt with good participation in therapy this date. Able to complete bed mobility with SBA, transfers with CGA progressing to SBA, and ambulate in yarbrough ~100ft with CGA progressing to SBA with RW. Pt able to navigate up<>down 1 curb step with RW and cues for sequencing. Pt demos good balance and quad control with all mobility and while standing at sink. Pt is safe to DC home with initial 24/ assist when deemed medically appropriate. Will continue to follow. Activity Tolerance: Patient tolerated evaluation without incident;Patient tolerated treatment well;Patient limited by fatigue  Equipment Needed: No  Other: has RW     Plan    Physcial Therapy Plan  General Plan: 2 times a day 7 days a week  Current Treatment Recommendations: Strengthening;ROM;Balance training;Gait training;Home exercise program;Therapeutic activities; Safety education & training;Stair training;Functional mobility training;Transfer training;Neuromuscular re-education;Patient/Caregiver education & training;Pain management; Endurance training;Positioning     Restrictions  Restrictions/Precautions  Restrictions/Precautions: Weight Bearing, Up as Tolerated  Required Braces or Orthoses?: No  Lower Extremity Weight Bearing Restrictions  Left Lower Extremity Weight Bearing: Weight Bearing As Tolerated     Subjective    Subjective  Subjective: pt agreeable to PT treatment.   Pain: Reports 4/10 pain at rest, states she had pain medication about 1 hour ago  Orientation  Overall Orientation Status: Within Normal Limits  Orientation Level: Oriented X4 please let this note serve as discharge summary. Please see case management note for discharge disposition. Thank you.

## 2023-07-28 NOTE — CARE COORDINATION
Case Management Assessment  Initial Evaluation    Date/Time of Evaluation: 7/28/2023 8:40 AM  Assessment Completed by: Carrillo Smith RN    If patient is discharged prior to next notation, then this note serves as note for discharge by case management. Patient Name: Josiah Daigle                   YOB: 1965  Diagnosis: Osteoarthritis of left knee, unspecified osteoarthritis type [M17.12]  S/P total knee arthroplasty, left [Z96.652]                   Date / Time: 7/27/2023  5:16 AM    Patient Admission Status: Observation   Readmission Risk (Low < 19, Mod (19-27), High > 27): Readmission Risk Score: 9.3    Current PCP: Valery Hicks MD  PCP verified by CM? Yes    Chart Reviewed: Yes      History Provided by: Patient  Patient Orientation: Alert and Oriented, Person, Place, Situation, Self    Patient Cognition: Alert    Hospitalization in the last 30 days (Readmission):  No    If yes, Readmission Assessment in CM Navigator will be completed. Advance Directives:      Code Status: Full Code   Patient's Primary Decision Maker is: Legal Next of Kin    Primary Decision Maker: Lilian Miller - Child - [phone]    Supplemental (Other) Decision Maker: Belle silas - Domestic Partner - [phone]    Discharge Planning:    Patient lives with: Spouse/Significant Other Type of Home: House  Primary Care Giver: Self  Patient Support Systems include: Spouse/Significant Other, Family Members, Children   Current Financial resources: Medicaid  Current community resources: None  Current services prior to admission: None            Current DME:              Type of Home Care services:  None    ADLS  Prior functional level: Independent in ADLs/IADLs  Current functional level: Assistance with the following:, Bathing, Dressing, Toileting, Mobility    PT AM-PAC: 20 /24  OT AM-PAC:   /24    Family can provide assistance at DC:  Yes  Would you like Case Management to discuss the discharge plan with any other Patient Representative Agree with the Discharge Plan?       Rosio Quiroz RN  Case Management Department

## 2023-07-31 ENCOUNTER — HOSPITAL ENCOUNTER (OUTPATIENT)
Dept: PHYSICAL THERAPY | Age: 58
Setting detail: THERAPIES SERIES
Discharge: HOME | End: 2023-07-31
Payer: MEDICARE

## 2023-07-31 ENCOUNTER — CARE COORDINATION (OUTPATIENT)
Dept: CASE MANAGEMENT | Age: 58
End: 2023-07-31

## 2023-07-31 ENCOUNTER — TELEPHONE (OUTPATIENT)
Dept: ORTHOPEDIC SURGERY | Age: 58
End: 2023-07-31

## 2023-07-31 NOTE — CARE COORDINATION
[facility][1] Care Transitions Initial Follow Up Call    Call within 2 business days of discharge: Yes    Patient Current Location:  Home: [address] [place] [address]  [place] [id number]    Care Transition Nurse contacted the patient by telephone to perform post hospital discharge assessment. Verified name and  with patient as identifiers. Provided introduction to self, and explanation of the Care Transition Nurse role. Patient: Arias Johnson Patient : 1965   MRN: [id number]  Reason for Admission: s/p left TKR OBS home w/ outpatient PT [facility] PCP  Discharge Date: 23 RARS: Readmission Risk Score: 9.3      Last Discharge [address] [facility]       Date Complaint Diagnosis Description Type Department Provider    23  S/P total knee arthroplasty, left Admission (Discharged) Brianne Walker MD            Was this an external facility discharge? No Discharge Facility:     Challenges to be reviewed by the provider   Additional needs identified to be addressed with provider: No  none               Method of communication with provider: none. Patient answered call and verified . Patient pleasant, but request a call back in about half hour or so. Stated she was going to bathroom. CTN rescheduled call. CTN called patient back. Patient tearful in regards to not having needed support at home. Stated her  is suppose to help her, but has not been available for her. Patient difficult to understand at times. Stated that her  has been talking down to her and not helping when she needs assistance. Patient has a neighbor that stops by daily to check on her. CTN asked out home care or SNF placement options. Patient stated that Dr Bernardo Rutherford arranged [facility] care for in home physical therapy. Spoke to [facility] at [facility] and [facility] completed with SN and PT on 23. PT visit scheduled for routine visit today.  CTN asking about possible placement and call was transferred to clinical manager at [facility]

## 2023-07-31 NOTE — FLOWSHEET NOTE
[facility] [place] and [address] [place] [address] [address],  [facility], [address] [place] [address], [address] [place] [address] Avenue  Phone: [phone]  Fax: [phone]      Physical Therapy  Cancellation/No-show Note  Patient Name:  Andrzej Madrid  :  1965   Date:  2023  Cancelled visits to date: 4  No-shows to date: 1    For today's appointment patient:  []  Cancelled  []  Rescheduled appointment  [x]  No-show     Reason given by patient:  []  Patient ill  []  Conflicting appointment   []  No transportation    []  Conflict with work  []  No reason given  []  Other:     Comments:      Electronically signed by:  Suma Cali PT

## 2023-08-01 ENCOUNTER — CARE COORDINATION (OUTPATIENT)
Dept: CASE MANAGEMENT | Age: 58
End: 2023-08-01

## 2023-08-01 ENCOUNTER — TELEPHONE (OUTPATIENT)
Dept: ORTHOPEDIC SURGERY | Age: 58
End: 2023-08-01

## 2023-08-01 NOTE — TELEPHONE ENCOUNTER
Spoke with pt, doing ok. States a rough few days but seems to be getting a little better. Called office for refill of pain meds. States this knee hurts more that KR in June. Incision status: No drainage or redness    Edema/Swelling/Teds: Has been icing and elevating very frequently. Pain level and status: States pain is at times unmanageable. Use of pain medications: Using 1-2 fabi every 4-6 hours. Blood thinner: Eliquis- no issues. Bowels: Glycolax- moving everyday. Home Care Agency active: [address] [facility]    Outpatient therapy: [facility] PT is going well    Do you have all of your medications: Yes    Changes in medications: no    Instructed pt to call Nurse Navigator or surgeon's office with any questions or concerns.      Follow up appointments:    Future Appointments   Date Time Provider [facility] [address]   8/9/2023 72:04 AM [facility] [place] [facility]   8/14/2023 12:45 PM Suhail WYLIE PT [facility]   8/17/2023 10:45 AM Suhail WYLIE PT [facility]   8/24/2023  9:00 AM Jonnie Caceres MD [facility]   10/25/2023  1:15 PM Nayeli Armstrong MD [facility]

## 2023-08-01 NOTE — TELEPHONE ENCOUNTER
Prescription Refill     Medication Name:  oxycodone  Pharmacy: [facility]  Patient Contact Number:  [phone]

## 2023-08-02 DIAGNOSIS — Z96.652 S/P TOTAL KNEE ARTHROPLASTY, LEFT: ICD-10-CM

## 2023-08-02 RX ORDER — ROPINIROLE 1 MG/1
TABLET, FILM COATED ORAL
Qty: 270 TABLET | OUTPATIENT
Start: 2023-08-02

## 2023-08-02 RX ORDER — ROPINIROLE 1 MG/1
1.5 TABLET, FILM COATED ORAL 2 TIMES DAILY
Qty: 90 TABLET | Refills: 1 | Status: SHIPPED | OUTPATIENT
Start: 2023-08-02

## 2023-08-02 RX ORDER — TIZANIDINE 2 MG/1
2 TABLET ORAL 3 TIMES DAILY
Qty: 90 TABLET | Refills: 1 | Status: SHIPPED | OUTPATIENT
Start: 2023-08-02

## 2023-08-02 RX ORDER — OXYCODONE HYDROCHLORIDE 5 MG/1
5 TABLET ORAL EVERY 6 HOURS PRN
Qty: 28 TABLET | Refills: 0 | Status: SHIPPED | OUTPATIENT
Start: 2023-08-02 | End: 2023-08-09

## 2023-08-03 ENCOUNTER — APPOINTMENT (OUTPATIENT)
Dept: PHYSICAL THERAPY | Age: 58
End: 2023-08-03
Payer: MEDICARE

## 2023-08-04 ENCOUNTER — TELEPHONE (OUTPATIENT)
Dept: ORTHOPEDIC SURGERY | Age: 58
End: 2023-08-04

## 2023-08-04 ENCOUNTER — CARE COORDINATION (OUTPATIENT)
Dept: CASE MANAGEMENT | Age: 58
End: 2023-08-04

## 2023-08-04 NOTE — TELEPHONE ENCOUNTER
Submitted PA for Levocetirizine Dihydrochloride 5MG tablets Via CMM Key: [id number] STATUS: PENDING. Follow up done daily; if no response in three days we will refax for status check. If another three days goes by with no response we will call the insurance for status.

## 2023-08-07 ENCOUNTER — APPOINTMENT (OUTPATIENT)
Dept: PHYSICAL THERAPY | Age: 58
End: 2023-08-07
Payer: MEDICARE

## 2023-08-07 NOTE — TELEPHONE ENCOUNTER
Received DENIAL for Levocetirizine Dihydrochloride 5MG tablets; denial letter attached. If this requires a response please respond to the pool ( P [id number] [phone]). Thank you please advise patient.

## 2023-08-09 ENCOUNTER — CARE COORDINATION (OUTPATIENT)
Dept: CASE MANAGEMENT | Age: 58
End: 2023-08-09

## 2023-08-09 ENCOUNTER — TELEPHONE (OUTPATIENT)
Dept: ORTHOPEDIC SURGERY | Age: 58
End: 2023-08-09

## 2023-08-09 ENCOUNTER — OFFICE VISIT (OUTPATIENT)
Dept: ORTHOPEDIC SURGERY | Age: 58
End: 2023-08-09

## 2023-08-09 VITALS — BODY MASS INDEX: 41.95 KG/M2 | HEIGHT: 70 IN | WEIGHT: 293 LBS

## 2023-08-09 DIAGNOSIS — Z96.652 S/P TOTAL KNEE REPLACEMENT, LEFT: Primary | ICD-10-CM

## 2023-08-09 PROCEDURE — 99024 POSTOP FOLLOW-UP VISIT: CPT | Performed by: STUDENT IN AN ORGANIZED HEALTH CARE EDUCATION/TRAINING PROGRAM

## 2023-08-09 RX ORDER — OXYCODONE HYDROCHLORIDE 5 MG/1
5 TABLET ORAL EVERY 6 HOURS PRN
Qty: 28 TABLET | Refills: 0 | Status: SHIPPED | OUTPATIENT
Start: 2023-08-09 | End: 2023-08-16

## 2023-08-09 RX ORDER — TRAMADOL HYDROCHLORIDE 50 MG/1
50 TABLET ORAL EVERY 4 HOURS PRN
Qty: 42 TABLET | Refills: 0 | Status: SHIPPED | OUTPATIENT
Start: 2023-08-09 | End: 2023-08-16

## 2023-08-09 NOTE — TELEPHONE ENCOUNTER
Attempted to contact pt, left voicemail with purpose and call back number. Irving Garcia  Orthopedic Nurse Navigator  Phone number: [phone]    Follow up appointments:    Future Appointments   Date Time Provider [address]   8/9/2023 99:60 AM Matilda Marks MD [facility]   8/14/2023 12:45 PM Hayden WYLIE PT [facility]   8/17/2023 10:45 AM Hayden WYLIE PT [facility]   8/24/2023  9:00 AM MD Alex Quiles P/[facility]   10/25/2023  1:15 PM MD Alex Milian [facility]     Signed off from pt. Instructed pt to call RN or Surgeon's office with any issues or concerns.

## 2023-08-09 NOTE — CARE COORDINATION
plan with patient and discussed any barriers to care and/or understanding of plan of care after discharge. Discussed appropriate site of care based on symptoms and resources available to patient including: PCP  Specialist. The patient agrees to contact the PCP office for questions related to their healthcare. Advance Care Planning:   reviewed and current. Patients top risk factors for readmission: functional physical ability  Interventions to address risk factors: Education of patient/family/caregiver/guardian to support self-management-     Offered patient enrollment in the Remote Patient Monitoring (RPM) program for in-home monitoring: Patient is not eligible for RPM program.     Care Transitions Subsequent and Final Call    Subsequent and Final Calls  Do you have any ongoing symptoms?: No  Have your medications changed?: No  Do you have any questions related to your medications?: No  Do you currently have any active services?: No  Are you currently active with any services?: Home Health  Do you have any needs or concerns that I can assist you with?: No  Identified Barriers: None  Care Transitions Interventions  Other Interventions:             Care Transition Nurse provided contact information for future needs. Plan for follow-up call in 10-14 days based on severity of symptoms and risk factors.   Plan for next call: self management-request f/u call after PT    Mindy Farnsworth RN

## 2023-08-10 ENCOUNTER — APPOINTMENT (OUTPATIENT)
Dept: PHYSICAL THERAPY | Age: 58
End: 2023-08-10
Payer: MEDICARE

## 2023-08-10 NOTE — PROGRESS NOTES
History: 70-year-old female presents for follow-up of left total knee replacement on July 27. This is the second and staged bilateral knee replacements and she is doing well with the right. She is doing home physical therapy which is progressing well. She has had some minor pain but more so swelling control issues. She is unfortunately unable to take steroids or oral anti-inflammatories. She continues on Tylenol and and Zanaflex for pain control. She is on Eliquis for DVT prophylaxis. Other than some swelling control she is doing well. Exam: Incisions healing well without erythema or drainage. Dependent left lower extremity edema knee effusion is well-controlled. Range of motion is 0-100. No ligamentous instability or evidence of neurovascular compromise distally    Imaging: postop imaging reviewed with the patient    Assessment: 70-year-old female 2 weeks status post left TKA. Plan: She will start physical therapy at [facility] next week outpatient. Continue Eliquis for DVT prophylaxis. Will add tramadol and Voltaren gel for pain control. Refilled oxycodone. Unfortunately she has not had any luck with compression stockings and says they have not fit so I advised aggressive ice and elevation and consideration for looser fitting compression stocking. We attempted to fit her for these today in clinic but she did not want to pay the out-of-pocket cost for this. We will plan to follow-up in 3 to 4 weeks for a final postop check.

## 2023-08-14 ENCOUNTER — HOSPITAL ENCOUNTER (OUTPATIENT)
Dept: PHYSICAL THERAPY | Age: 58
Setting detail: THERAPIES SERIES
Discharge: HOME | End: 2023-08-14
Payer: MEDICARE

## 2023-08-14 DIAGNOSIS — M25.662 KNEE STIFF, LEFT: ICD-10-CM

## 2023-08-14 DIAGNOSIS — M25.462 SWELLING OF LEFT KNEE JOINT: ICD-10-CM

## 2023-08-14 DIAGNOSIS — R53.1 WEAKNESS: ICD-10-CM

## 2023-08-14 DIAGNOSIS — M17.12 OSTEOARTHRITIS OF LEFT KNEE, UNSPECIFIED OSTEOARTHRITIS TYPE: Primary | ICD-10-CM

## 2023-08-14 PROCEDURE — 97110 THERAPEUTIC EXERCISES: CPT | Performed by: PHYSICAL THERAPIST

## 2023-08-14 PROCEDURE — 97161 PT EVAL LOW COMPLEX 20 MIN: CPT | Performed by: PHYSICAL THERAPIST

## 2023-08-14 PROCEDURE — 97140 MANUAL THERAPY 1/> REGIONS: CPT | Performed by: PHYSICAL THERAPIST

## 2023-08-14 NOTE — FLOWSHEET NOTE
Treatment Progress Update:  [] Patient is progressing as expected towards functional goals listed. [] Progression is slowed due to complexities/Impairments listed. [] Progression has been slowed due to co-morbidities. [x] Plan just implemented, too soon to assess goals progression <30days   [] Goals require adjustment due to lack of progress  [] Patient is not progressing as expected and requires additional follow up with physician  [] Other:    CHARGE CAPTURE     MEDICARE GRID: Add Kx modifier when patient reaches $2150 if continued care is deemed medically necessary. @[facility]@   CPT Code (TIMED) minutes # CPT Code (UNTIMED) #     [x] Therex (95365)  22   [x] EVAL:LOW (02635) 1    [] Neuromusc. Re-ed (19734)    [] Re-Eval (41820)     [x] Manual (03749) 8   [] Estim Unattended (62319)     [] Ther. Act (28409)    [] Elise Cool. Traction (86390)     [] Gait (07371)    [] Dry Needle 1-2 muscle (80691)     [] Aquatic Therex (23156)    [] Dry Needle 3+ muscle (61781)     [] Iontophoresis (13560)    [] VASO (85234)     [] Ultrasound (19452)    [] Group Therapy (45214)     [] Other:    [] Other: Total Timed Code Tx Minutes 30        Total Treatment Minutes 45       Charge Justification:  (08496) THERAPEUTIC EXERCISE - Provided verbal/tactile cueing for activities related to strengthening, flexibility, endurance, ROM performed to prevent loss of range of motion, maintain or improve muscular strength or increase flexibility, following either an injury or surgery. (23787) [facility]- Reviewed/Progressed HEP activities related to strengthening, flexibility, endurance, ROM performed to prevent loss of range of motion, maintain or improve muscular strength or increase flexibility, following either an injury or surgery.   (00097) MANUAL THERAPY-  Manual therapy techniques, 1 or more regions, each 15 minutes (Mobilization/manipulation, manual lymphatic drainage, manual traction) for the purpose of modulating pain,

## 2023-08-16 ENCOUNTER — TELEPHONE (OUTPATIENT)
Dept: ORTHOPEDIC SURGERY | Age: 58
End: 2023-08-16

## 2023-08-16 DIAGNOSIS — Z96.652 S/P TOTAL KNEE REPLACEMENT, LEFT: Primary | ICD-10-CM

## 2023-08-16 RX ORDER — OXYCODONE HYDROCHLORIDE 5 MG/1
5 TABLET ORAL EVERY 6 HOURS PRN
Qty: 28 TABLET | Refills: 0 | Status: SHIPPED | OUTPATIENT
Start: 2023-08-16 | End: 2023-08-23

## 2023-08-16 NOTE — TELEPHONE ENCOUNTER
Prescription Refill     Medication Name:  OXYCODONE   Pharm[facility]: [facility] [address]  Patient Contact Number:  [phone]

## 2023-08-17 ENCOUNTER — HOSPITAL ENCOUNTER (OUTPATIENT)
Dept: PHYSICAL THERAPY | Age: 58
Setting detail: THERAPIES SERIES
Discharge: HOME | End: 2023-08-17
Payer: MEDICARE

## 2023-08-17 ENCOUNTER — OFFICE VISIT (OUTPATIENT)
Dept: FAMILY MEDICINE CLINIC | Age: 58
End: 2023-08-17
Payer: MEDICARE

## 2023-08-17 VITALS
TEMPERATURE: 97.5 F | SYSTOLIC BLOOD PRESSURE: 128 MMHG | BODY MASS INDEX: 41.95 KG/M2 | OXYGEN SATURATION: 96 % | WEIGHT: 293 LBS | HEIGHT: 70 IN | DIASTOLIC BLOOD PRESSURE: 80 MMHG | HEART RATE: 70 BPM

## 2023-08-17 DIAGNOSIS — F41.9 ANXIETY: Primary | ICD-10-CM

## 2023-08-17 DIAGNOSIS — F39 MOOD DISORDER (HCC): ICD-10-CM

## 2023-08-17 DIAGNOSIS — R06.02 SOB (SHORTNESS OF BREATH): ICD-10-CM

## 2023-08-17 DIAGNOSIS — B37.2 CANDIDAL INTERTRIGO: ICD-10-CM

## 2023-08-17 PROCEDURE — 3017F COLORECTAL CA SCREEN DOC REV: CPT | Performed by: FAMILY MEDICINE

## 2023-08-17 PROCEDURE — G8417 CALC BMI ABV UP PARAM F/U: HCPCS | Performed by: FAMILY MEDICINE

## 2023-08-17 PROCEDURE — 97016 VASOPNEUMATIC DEVICE THERAPY: CPT | Performed by: PHYSICAL THERAPIST

## 2023-08-17 PROCEDURE — 97110 THERAPEUTIC EXERCISES: CPT | Performed by: PHYSICAL THERAPIST

## 2023-08-17 PROCEDURE — 3074F SYST BP LT 130 MM HG: CPT | Performed by: FAMILY MEDICINE

## 2023-08-17 PROCEDURE — G8427 DOCREV CUR MEDS BY ELIG CLIN: HCPCS | Performed by: FAMILY MEDICINE

## 2023-08-17 PROCEDURE — 99214 OFFICE O/P EST MOD 30 MIN: CPT | Performed by: FAMILY MEDICINE

## 2023-08-17 PROCEDURE — 1036F TOBACCO NON-USER: CPT | Performed by: FAMILY MEDICINE

## 2023-08-17 PROCEDURE — 3079F DIAST BP 80-89 MM HG: CPT | Performed by: FAMILY MEDICINE

## 2023-08-17 RX ORDER — LORAZEPAM 0.5 MG/1
0.5 TABLET ORAL NIGHTLY PRN
Qty: 10 TABLET | Refills: 0 | Status: SHIPPED | OUTPATIENT
Start: 2023-08-17 | End: 2023-08-27

## 2023-08-17 RX ORDER — CLOTRIMAZOLE AND BETAMETHASONE DIPROPIONATE 10; .64 MG/G; MG/G
CREAM TOPICAL
Qty: 45 G | Refills: 3 | Status: SHIPPED | OUTPATIENT
Start: 2023-08-17

## 2023-08-17 RX ORDER — FLUVOXAMINE MALEATE 25 MG
25 TABLET ORAL NIGHTLY
Qty: 30 TABLET | Refills: 3 | Status: SHIPPED | OUTPATIENT
Start: 2023-08-17

## 2023-08-17 NOTE — FLOWSHEET NOTE
Met: [] Not Met: [] Adjusted     Long Term Goals: To be achieved in:  12  weeks  Disability index score of 50% or less for the LEFS to assist with return top prior level of function. Status:             [] Progressing: [] Met: [] Not Met: [] Adjusted  Improve knee PROM to 120 degrees or   to allow for proper joint functioning as indicated by patients functional deficits. Status:             [] Progressing: [] Met: [] Not Met: [] Adjusted  Pt to improve strength to [facility] of quadriceps and hamstrings to allow for proper muscle and joint use in functional mobility, ADLs and prior level of function   Status:             [] Progressing: [] Met: [] Not Met: [] Adjusted  Patient will return to  up/down 1 flight of stairs  without increased symptoms or restriction to work towards return to prior level of function. Status:             [] Progressing: [] Met: [] Not Met: [] Adjusted  Patient will increase LE function to allow independence in all self-care activities. Status:             [] Progressing: [] Met: [] Not Met: [] Adjusted      Overall Progression Towards Functional goals/ Treatment Progress Update:  [] Patient is progressing as expected towards functional goals listed. [] Progression is slowed due to complexities/Impairments listed. [] Progression has been slowed due to co-morbidities. [x] Plan just implemented, too soon to assess goals progression <30days   [] Goals require adjustment due to lack of progress  [] Patient is not progressing as expected and requires additional follow up with physician  [] Other:    CHARGE CAPTURE     MEDICARE GRID: Add Kx modifier when patient reaches $2150 if continued care is deemed medically necessary.  [unfilled]   CPT Code (TIMED) minutes # CPT Code (UNTIMED) #     [x] Therex (34438)  33 2  [] EVAL:LOW

## 2023-08-17 NOTE — TELEPHONE ENCOUNTER
If appropriate please refill pending medication     Last office visit : 1/23/2023   Next office visit : 8/24/23    Thank you

## 2023-08-17 NOTE — PROGRESS NOTES
Portions of this chart may have been created with voice recognition software. Occasional wrong-word or \"sound-alike\" substitutions may have occurred due to the inherent limitations of voice recognition software. Read the chart carefully and recognize, using context, where these substitutions have occurred        Chief Complaint     Discuss Medications (Discuss clotrimazole for yeast under breast area and refill for ativan that patient used in the past. Pt states she has been more anxious the usual lately.)               Junie Martínez is a 62 y.o. female here for Discuss Medications (Discuss clotrimazole for yeast under breast area and refill for ativan that patient used in the past. Pt states she has been more anxious the usual lately.)           1. Anxiety  2. Mood disorder (HCC)    -Longstanding history of anxiety, depression, mood disorder, insomnia on several medications in the past and does not tolerate many medications also. She has had person living situations and currently is undergoing financial stress as well as a court case that is making her very restless and anxious and sleeplessness. She request a refill of the Ativan that she had before also will start on Luvox at this time. No extremes of mood changes suicidal thoughts or ideations. - LORazepam (ATIVAN) 0.5 MG tablet; Take 1 tablet by mouth nightly as needed for Anxiety for up to 10 days. Max Daily Amount: 0.5 mg  Dispense: 10 tablet; Refill: 0    3. Candidal intertrigo  Patient has persisting symptom profile candidal intertrigo under breasts as well as in the gluteal region as well as groin. Request a refill of the medications.   No other open sores or any worsening symptoms                REVIEW OF SYSTEMS:  Pertinent positive and negative symptoms noted in HPI        Lab Results   Component Value Date    WBC 11.2 (H) 07/28/2023    HGB 9.7 (L) 07/28/2023    HCT 31.6 (L) 07/28/2023    MCV 77.8 (L) 07/28/2023

## 2023-08-18 ENCOUNTER — CARE COORDINATION (OUTPATIENT)
Dept: CASE MANAGEMENT | Age: 58
End: 2023-08-18

## 2023-08-18 RX ORDER — ALBUTEROL SULFATE 90 UG/1
AEROSOL, METERED RESPIRATORY (INHALATION)
Qty: 18 G | Refills: 4 | Status: SHIPPED | OUTPATIENT
Start: 2023-08-18

## 2023-08-18 NOTE — CARE COORDINATION
[facility] Care Transitions Follow Up Call      Patient: Rosana Feliciano  Patient : 1965   MRN: [id number]  Reason for Admission: s/p left TKR OBS home w/ outpatient PT JASPER PCP  Discharge Date: 23 RARS: Readmission Risk Score: 9.3    Attempted to reach patient via phone for transition call.   \"voice mailbox has not been set up yet\" no option to leave message      Follow Up  Future Appointments   Date Time Provider [address]   2023  1:45 PM Alex Keys, PT Fidel Flatten PT [facility] HOD   2023  9:00 AM Jonah Parham MD [facility] P/[facility]   2023 10:30 AM Allyne Money INESSA PT [facility] HOD   2023 11:30 AM Alex Keys PT Fidel Flatten PT [facility] HOD   2023  3:15 PM Allyne Money INESSA PT [facility] HOD   2023 12:15 PM Allyne Money INESSA PT [facility] HOD   2023 77:98 AM Gallito Jackman MD [facility]   2023 11:15 AM Allyne Money INESSA PT [facility] HOD   2023 11:30 AM Yasmeen COPELAND INESSA PT [facility] \[facility]\""   2023 12:15 PM Allyne Money INESSA PT [facility] HOD   10/25/2023  1:[facility]      Care Transitions Subsequent and Final Call    Subsequent and Final Calls  Care Transitions Interventions  Other Interventions:             Juan Munoz RN

## 2023-08-21 ENCOUNTER — HOSPITAL ENCOUNTER (OUTPATIENT)
Dept: PHYSICAL THERAPY | Age: 58
Setting detail: THERAPIES SERIES
Discharge: HOME | End: 2023-08-21
Payer: MEDICARE

## 2023-08-21 NOTE — FLOWSHEET NOTE
[facility] and [address] [place] [address] [address],  [facility], [address] [place] [address], [address] [place] [address] Avenue  Phone: [phone]  Fax: [phone]      Physical Therapy  Cancellation/No-show Note  Patient Name:  Estefanía Herrera  :  1965   Date:  2023  Cancelled visits to date: 5  No-shows to date: 1    For today's appointment patient:  [x]  Cancelled  []  Rescheduled appointment  []  No-show     Reason given by patient:  [x]  Patient ill  []  Conflicting appointment   []  No transportation    []  Conflict with work  []  No reason given  []  Other:     Comments:      Electronically signed by:  Yvan Lan PTA

## 2023-08-22 ENCOUNTER — CARE COORDINATION (OUTPATIENT)
Dept: CASE MANAGEMENT | Age: 58
End: 2023-08-22

## 2023-08-23 DIAGNOSIS — Z96.652 S/P TOTAL KNEE REPLACEMENT, LEFT: Primary | ICD-10-CM

## 2023-08-23 NOTE — TELEPHONE ENCOUNTER
Prescription Refill     Medication Name:  Oxycodone   Pharmacy: [facility] [address]   Patient Contact Number:  [phone]    Patient call and to see if she can get this refill. Please Advise.

## 2023-08-24 ENCOUNTER — TELEPHONE (OUTPATIENT)
Dept: ORTHOPEDIC SURGERY | Age: 58
End: 2023-08-24

## 2023-08-24 ENCOUNTER — OFFICE VISIT (OUTPATIENT)
Dept: PULMONOLOGY | Age: 58
End: 2023-08-24
Payer: MEDICARE

## 2023-08-24 VITALS
DIASTOLIC BLOOD PRESSURE: 82 MMHG | WEIGHT: 293 LBS | BODY MASS INDEX: 41.95 KG/M2 | HEART RATE: 63 BPM | HEIGHT: 70 IN | SYSTOLIC BLOOD PRESSURE: 138 MMHG | OXYGEN SATURATION: 96 %

## 2023-08-24 DIAGNOSIS — Z96.652 S/P TOTAL KNEE ARTHROPLASTY, LEFT: ICD-10-CM

## 2023-08-24 DIAGNOSIS — I48.0 PAROXYSMAL ATRIAL FIBRILLATION WITH RAPID VENTRICULAR RESPONSE (HCC): ICD-10-CM

## 2023-08-24 DIAGNOSIS — K91.89 IRON DEFICIENCY ANEMIA AFTER GASTRECTOMY: ICD-10-CM

## 2023-08-24 DIAGNOSIS — D50.8 IRON DEFICIENCY ANEMIA AFTER GASTRECTOMY: ICD-10-CM

## 2023-08-24 DIAGNOSIS — J45.40 MODERATE PERSISTENT ASTHMA WITHOUT COMPLICATION: Primary | ICD-10-CM

## 2023-08-24 DIAGNOSIS — Z96.651 S/P TOTAL KNEE ARTHROPLASTY, RIGHT: ICD-10-CM

## 2023-08-24 PROCEDURE — G8417 CALC BMI ABV UP PARAM F/U: HCPCS | Performed by: INTERNAL MEDICINE

## 2023-08-24 PROCEDURE — G8427 DOCREV CUR MEDS BY ELIG CLIN: HCPCS | Performed by: INTERNAL MEDICINE

## 2023-08-24 PROCEDURE — 3075F SYST BP GE 130 - 139MM HG: CPT | Performed by: INTERNAL MEDICINE

## 2023-08-24 PROCEDURE — 99214 OFFICE O/P EST MOD 30 MIN: CPT | Performed by: INTERNAL MEDICINE

## 2023-08-24 PROCEDURE — 3017F COLORECTAL CA SCREEN DOC REV: CPT | Performed by: INTERNAL MEDICINE

## 2023-08-24 PROCEDURE — 3079F DIAST BP 80-89 MM HG: CPT | Performed by: INTERNAL MEDICINE

## 2023-08-24 PROCEDURE — 1036F TOBACCO NON-USER: CPT | Performed by: INTERNAL MEDICINE

## 2023-08-24 RX ORDER — OXYCODONE HYDROCHLORIDE 5 MG/1
5 TABLET ORAL EVERY 6 HOURS PRN
Qty: 28 TABLET | Refills: 0 | Status: SHIPPED | OUTPATIENT
Start: 2023-08-24 | End: 2023-08-31

## 2023-08-24 NOTE — TELEPHONE ENCOUNTER
Submitted PA for LORazepam 0.5MG tablets  Via CMM Key: [id number] STATUS: Available without authorization. If this requires a response please respond to the pool ( P MHCX [phone]). Thank you please advise patient.

## 2023-08-24 NOTE — PROGRESS NOTES
Akanksha Samuel (:  1965) is a 62 y.o. female,Established patient, here for evaluation of the following chief complaint(s):  6 Month Follow-Up (Asthma)         ASSESSMENT/PLAN:  1. Moderate persistent asthma without complication  2. 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 Paroxysmal atrial fibrillation with rapid ventricular response (HCC)  4. S/P total knee arthroplasty, left  5. S/P total knee arthroplasty, right  Asthma remains very well controlled with ICS/LABA and omalizumab injections. Rare need for short acting bronchodilator. No nocturnal chest symptoms. No exacerbations in the past year. Continue current treatment with Dulera inhaler, Xolair injections she asked about secretions in her throat, which are particularly noticeable after eating. This appears to be related to postnasal drainage, a chronic problem triggered by many factors. She has a sense of tiredness that reminds her of times when anemia was problematic. In past 2 months her blood count has been down slightly, likely attributable to blood loss with surgery. She has iron deficiency, has required Venofer infusion in the past because of gastric surgery limiting iron absorption from the gut. Follow-up months and iron level recommended she follow-up again with hematologist at [facility]. Atrial fibrillation was the cause of her hospitalization last January. This has been treated with ablation, successfully. She has undergone bilateral TKR and is recovering well. Return in about 6 months (around 2024). Subjective   SUBJECTIVE/OBJECTIVE:  MARK ANTHONY Berger returns for regular follow-up for asthma. She was last seen in January when hospitalized with shortness of breath. That occasion was triggered by atrial fibrillation. She has not had any exacerbations of asthma in the past year. Her daily functional status is very good.

## 2023-08-25 ENCOUNTER — HOSPITAL ENCOUNTER (OUTPATIENT)
Dept: PHYSICAL THERAPY | Age: 58
Setting detail: THERAPIES SERIES
Discharge: HOME | End: 2023-08-25
Payer: MEDICARE

## 2023-08-25 LAB
BASOPHILS # BLD: 0.1 K/UL (ref 0–0.2)
BASOPHILS NFR BLD: 1.3 %
DEPRECATED RDW RBC AUTO: 20 % (ref 12.4–15.4)
EOSINOPHIL # BLD: 0.2 K/UL (ref 0–0.6)
EOSINOPHIL NFR BLD: 3.8 %
FERRITIN SERPL IA-MCNC: 47.7 NG/ML (ref 15–150)
FERRITIN SERPL IA-MCNC: 48.6 NG/ML (ref 15–150)
HCT VFR BLD AUTO: 32.3 % (ref 36–48)
HGB BLD-MCNC: 9.9 G/DL (ref 12–16)
IRON SATN MFR SERPL: 6 % (ref 15–50)
IRON SERPL-MCNC: 23 UG/DL (ref 37–145)
LYMPHOCYTES # BLD: 1.6 K/UL (ref 1–5.1)
LYMPHOCYTES NFR BLD: 33.7 %
MCH RBC QN AUTO: 23 PG (ref 26–34)
MCHC RBC AUTO-ENTMCNC: 30.5 G/DL (ref 31–36)
MCV RBC AUTO: 75.2 FL (ref 80–100)
MONOCYTES # BLD: 0.5 K/UL (ref 0–1.3)
MONOCYTES NFR BLD: 11.7 %
NEUTROPHILS # BLD: 2.3 K/UL (ref 1.7–7.7)
NEUTROPHILS NFR BLD: 49.5 %
PLATELET # BLD AUTO: 375 K/UL (ref 135–450)
PMV BLD AUTO: 9 FL (ref 5–10.5)
RBC # BLD AUTO: 4.3 M/UL (ref 4–5.2)
TIBC SERPL-MCNC: 381 UG/DL (ref 260–445)
WBC # BLD AUTO: 4.6 K/UL (ref 4–11)

## 2023-08-25 PROCEDURE — 97530 THERAPEUTIC ACTIVITIES: CPT | Performed by: PHYSICAL THERAPIST

## 2023-08-25 PROCEDURE — 97110 THERAPEUTIC EXERCISES: CPT | Performed by: PHYSICAL THERAPIST

## 2023-08-28 ENCOUNTER — HOSPITAL ENCOUNTER (OUTPATIENT)
Dept: PHYSICAL THERAPY | Age: 58
Setting detail: THERAPIES SERIES
Discharge: HOME | End: 2023-08-28
Payer: MEDICARE

## 2023-08-28 PROCEDURE — 97110 THERAPEUTIC EXERCISES: CPT | Performed by: PHYSICAL THERAPIST

## 2023-08-28 PROCEDURE — 97530 THERAPEUTIC ACTIVITIES: CPT | Performed by: PHYSICAL THERAPIST

## 2023-08-28 NOTE — FLOWSHEET NOTE
[address] [place] [address] and Therapy [facility] [address]Anuel [address]ie [address] office: [phone] fax: [phone]    Physical Therapy Treatment Note/ Progress Report:         Physical Therapy: TREATMENT/PROGRESS NOTE   Patient: Lizeth Freed (32 y.o. female)   Treatment Date: 2023   :  1965 MRN: [id number]   Visit #: 10    Insurance: Payor: Tyronne Hodgkins / Plan: [employer] / Product Type: *No Product type* /   Insurance ID: [id number] - (Medicare Managed)  Secondary Insurance (if applicable): [place]ICA[place]   Treatment Diagnosis:        ICD-10-CM     1. Osteoarthritis of left knee, unspecified osteoarthritis type  M17.12         2. Weakness  R53.1         3. Swelling of left knee joint  M25.462         4. Knee stiff, left  M25.662        Medical Diagnosis:       Localized osteoarthritis of left knee [M17.12]   Referring Physician: Vamsi Drake MD  PCP: Teresa Mcdaniels MD                             Plan of care signed (Y/N): Y    Date of Patient follow up with Physician: 23     Progress Report: NO    Progress report due (10 Rx/or 30 days whichever is less): 64     Recertification due (POC duration/ or 90 days whichever is less): 23     Visit # Insurance Allowable Auth Needed   10 TBD []Yes    []No     Latex Allergy:  [x]NO      []YES  Preferred Language for Healthcare:   [x]English       []other:      SUBJECTIVE EXAMINATION     SUBJECTIVE:  Pt reports that her knee is doing well. She is using a SPC for ambulation. She notes that her pain is well managed. She notes that she does still have to manage stairs 1 step at a time. OBJECTIVE EXAMINATION     OBJECTIVE:   Observation: visible swelling of B/L limbs down to feet. Gait normalized with SPC, slow gait speed.  Incisions healing well without signs of infection     Test used Initial score 2023   Pain Summary VAS 4-9/10 2/10 current, 9/10 worst   Functional questionnaire

## 2023-08-29 ENCOUNTER — CARE COORDINATION (OUTPATIENT)
Dept: CASE MANAGEMENT | Age: 58
End: 2023-08-29

## 2023-08-31 ENCOUNTER — HOSPITAL ENCOUNTER (OUTPATIENT)
Dept: PHYSICAL THERAPY | Age: 58
Setting detail: THERAPIES SERIES
Discharge: HOME | End: 2023-08-31
Payer: MEDICARE

## 2023-08-31 PROCEDURE — 97110 THERAPEUTIC EXERCISES: CPT | Performed by: PHYSICAL THERAPIST

## 2023-08-31 PROCEDURE — 97140 MANUAL THERAPY 1/> REGIONS: CPT | Performed by: PHYSICAL THERAPIST

## 2023-08-31 NOTE — FLOWSHEET NOTE
Patient is not progressing as expected and requires additional follow up with physician  [] Other:    CHARGE CAPTURE     MEDICARE GRID: Add Kx modifier when patient reaches 10 visits if continued care is deemed medically necessary. CPT Code (TIMED) minutes # CPT Code (UNTIMED) #     [x] Therex (22426)  15 1  [] EVAL:LOW (38091)     [] Neuromusc. Re-ed (47263)    [] Re-Eval (20306)     [x] Manual ([id number]) 12   [] Estim Unattended (11499)     [] Ther. Act (22852)    [] Thersia Villanueva. Traction (36440)     [] Gait (65517)    [] Dry Needle 1-2 muscle (37020)     [] Aquatic Therex (63374)    [] Dry Needle 3+ muscle (53977)     [] Iontophoresis (00625)    [] VASO (41151)     [] Ultrasound (21662)    [] Group Therapy (37044)     [] Other:    [] Other: Total Timed Code Tx Minutes 27        Total Treatment Minutes 27       Charge Justification:  (43536) THERAPEUTIC EXERCISE - Provided verbal/tactile cueing for activities related to strengthening, flexibility, endurance, ROM performed to prevent loss of range of motion, maintain or improve muscular strength or increase flexibility, following either an injury or surgery. (09036) MANUAL THERAPY-  Manual therapy techniques, 1 or more regions, each 15 minutes (Mobilization/manipulation, manual lymphatic drainage, manual traction) for the purpose of modulating pain, promoting relaxation,  increasing ROM, reducing/eliminating soft tissue swelling/inflammation/restriction, improving soft tissue extensibility and allowing for proper ROM for normal function with self care, mobility, lifting and ambulation    TREATMENT PLAN   Plan: Cont POC- Continue emphasis/focus on exercise progression, improving proper muscle recruitment and activation/motor control patterns, modulating pain, increasing ROM, reduce/eliminate soft tissue swelling/inflammation/restriction, and static and dynamic balance.  Next visit plan to progress weights, progress reps, add new exercises, and adjust HEP

## 2023-09-01 ENCOUNTER — TELEPHONE (OUTPATIENT)
Dept: ORTHOPEDIC SURGERY | Age: 58
End: 2023-09-01

## 2023-09-01 DIAGNOSIS — Z96.651 S/P TOTAL KNEE ARTHROPLASTY, RIGHT: Primary | ICD-10-CM

## 2023-09-01 RX ORDER — HYDROCODONE BITARTRATE AND ACETAMINOPHEN 5; 325 MG/1; MG/1
1 TABLET ORAL EVERY 6 HOURS PRN
Qty: 28 TABLET | Refills: 0 | Status: SHIPPED | OUTPATIENT
Start: 2023-09-01 | End: 2023-09-08

## 2023-09-01 RX ORDER — TRAMADOL HYDROCHLORIDE 50 MG/1
50 TABLET ORAL EVERY 4 HOURS PRN
Qty: 42 TABLET | Refills: 0 | Status: SHIPPED | OUTPATIENT
Start: 2023-09-01 | End: 2023-09-08

## 2023-09-01 NOTE — TELEPHONE ENCOUNTER
Prescription Refill     Medication Name: TRAMADOL, [address] [facility]  Pharmacy: [facility]  Patient Contact Number:  [phone]    PATIENT CALLED STATING SHE WOULD LIKE A TRAMADOL PRESCRIPTION AND VICODIN PRESCRIPTION. PATIENT STATED SHE DOES NOT WANT TO TAKE OXYCODONE ANYMORE. PATIENT USES [facility] IN [place] ON [address] PLEASE CALL PT BACK AT THE ABOVE NUMBER.

## 2023-09-05 ENCOUNTER — HOSPITAL ENCOUNTER (OUTPATIENT)
Dept: PHYSICAL THERAPY | Age: 58
Setting detail: THERAPIES SERIES
Discharge: HOME | End: 2023-09-05
Payer: MEDICARE

## 2023-09-05 NOTE — FLOWSHEET NOTE
[facility] and [address] [place] [address] [address],  [facility], [address] [place] [address], [address] [place] [address] Avenue  Phone: [phone]  Fax: [phone]      Physical Therapy  Cancellation/No-show Note  Patient Name:  Arben Burns  :  1965   Date:  2023  Cancelled visits to date: 10  No-shows to date: 1    For today's appointment patient:  [x]  Cancelled  []  Rescheduled appointment  []  No-show     Reason given by patient:  []  Patient ill  []  Conflicting appointment   []  No transportation    []  Conflict with work  [x]  No reason given  []  Other:     Comments:      Electronically signed by:  Arti Alejandre PT, DPT

## 2023-09-08 ENCOUNTER — TELEPHONE (OUTPATIENT)
Dept: ORTHOPEDIC SURGERY | Age: 58
End: 2023-09-08

## 2023-09-08 ENCOUNTER — HOSPITAL ENCOUNTER (OUTPATIENT)
Dept: PHYSICAL THERAPY | Age: 58
Setting detail: THERAPIES SERIES
Discharge: HOME | End: 2023-09-08
Payer: MEDICARE

## 2023-09-08 PROCEDURE — 97140 MANUAL THERAPY 1/> REGIONS: CPT | Performed by: PHYSICAL THERAPIST

## 2023-09-08 PROCEDURE — 97110 THERAPEUTIC EXERCISES: CPT | Performed by: PHYSICAL THERAPIST

## 2023-09-08 NOTE — FLOWSHEET NOTE
[address] [place] [address] and Therapy [facility], [address], [facility], [address] [place] [address] office: [phone] fax: [phone]    Physical Therapy Treatment Note/ Progress Report:         Physical Therapy: TREATMENT/PROGRESS NOTE   Patient: Jono Luciano (64 y.o. female)   Treatment Date: 2023   :  1965 MRN: [id number]   Visit #: 12    Insurance: Payor: Ilya Johnson / Plan: MYOMO / Product Type: *No Product type* /   Insurance ID: [id number] - (Medicare Managed)  Secondary Insurance (if applicable): [place]ICA[place]   Treatment Diagnosis:        ICD-10-CM     1. Osteoarthritis of left knee, unspecified osteoarthritis type  M17.12         2. Weakness  R53.1         3. Swelling of left knee joint  M25.462         4. Knee stiff, left  M25.662        Medical Diagnosis:       Localized osteoarthritis of left knee [M17.12]  S/P L TKR 23   Referring Physician: Lesly Chowdhury MD  PCP: Patrcia Libman, MD                             Plan of care signed (Y/N): Y    Date of Patient follow up with Physician: 23     Progress Report: NO    Progress report due (10 Rx/or 30 days whichever is less):      Recertification due (POC duration/ or 90 days whichever is less): 23     Visit # Insurance Allowable Auth Needed   12 24 visits approved  - 11/3/23 []Yes    []No     Latex Allergy:  [x]NO      []YES  Preferred Language for Healthcare:   [x]English       []other:      SUBJECTIVE EXAMINATION     SUBJECTIVE:  Pt feeling better today. Had a mis-step two days ago where she had to catch herself quickly with her L leg and it's been sore since then but progressively improving. OBJECTIVE EXAMINATION     OBJECTIVE:   Observation: visible swelling of B/L limbs down to feet. Gait normalized with SPC, slow gait speed.  Incisions healing well without signs of infection     Test used Initial score 2023   Pain Summary VAS 4-9/10 3-8/10   Functional

## 2023-09-08 NOTE — TELEPHONE ENCOUNTER
PER [facility] NO MORE CONTROLLED MEDS, NEEDS TO BE SEEN IN THE OFFICE  FOR FOLLOW UP. STEW    TRAMADOL AND  NORCO BOTH FILLED 9/1/2023 BY [facility]. STEW

## 2023-09-12 ENCOUNTER — HOSPITAL ENCOUNTER (OUTPATIENT)
Dept: PHYSICAL THERAPY | Age: 58
Setting detail: THERAPIES SERIES
Discharge: HOME | End: 2023-09-12
Payer: MEDICARE

## 2023-09-12 PROCEDURE — 97530 THERAPEUTIC ACTIVITIES: CPT | Performed by: PHYSICAL THERAPIST

## 2023-09-12 PROCEDURE — 97110 THERAPEUTIC EXERCISES: CPT | Performed by: PHYSICAL THERAPIST

## 2023-09-12 NOTE — FLOWSHEET NOTE
Met: [] Adjusted  Patient will have a decrease in pain to 4/10 to help  facilitate improvement in movement, function, and ADLs as indicated by functional deficits. Status:             [] Progressing: [] Met: [] Not Met: [] Adjusted     Long Term Goals: To be achieved in:  12  weeks  Disability index score of 50% or less for the LEFS to assist with return top prior level of function. Status:             [] Progressing: [] Met: [] Not Met: [] Adjusted  Improve knee PROM to 120 degrees or   to allow for proper joint functioning as indicated by patients functional deficits. Status:             [] Progressing: [] Met: [] Not Met: [] Adjusted  Pt to improve strength to [facility] of quadriceps and hamstrings to allow for proper muscle and joint use in functional mobility, ADLs and prior level of function   Status:             [] Progressing: [] Met: [] Not Met: [] Adjusted  Patient will return to  up/down 1 flight of stairs  without increased symptoms or restriction to work towards return to prior level of function. Status:             [] Progressing: [] Met: [] Not Met: [] Adjusted  Patient will increase LE function to allow independence in all self-care activities. Status:             [] Progressing: [] Met: [] Not Met: [] Adjusted      Overall Progression Towards Functional goals/ Treatment Progress Update:  [] Patient is progressing as expected towards functional goals listed. [] Progression is slowed due to complexities/Impairments listed. [] Progression has been slowed due to co-morbidities.   [x] Plan just implemented, too soon to assess goals progression <30days   [] Goals require adjustment due to lack of progress  [] Patient is not progressing as expected and requires additional follow up with physician  [] Other:    Maribell Knee

## 2023-09-14 ENCOUNTER — HOSPITAL ENCOUNTER (OUTPATIENT)
Dept: PHYSICAL THERAPY | Age: 58
Setting detail: THERAPIES SERIES
Discharge: HOME | End: 2023-09-14
Payer: MEDICARE

## 2023-09-14 ENCOUNTER — NURSE ONLY (OUTPATIENT)
Dept: FAMILY MEDICINE CLINIC | Age: 58
End: 2023-09-14
Payer: MEDICARE

## 2023-09-14 DIAGNOSIS — Z23 NEED FOR IMMUNIZATION AGAINST INFLUENZA: Primary | ICD-10-CM

## 2023-09-14 PROCEDURE — 97110 THERAPEUTIC EXERCISES: CPT | Performed by: PHYSICAL THERAPIST

## 2023-09-14 PROCEDURE — G0008 ADMIN INFLUENZA VIRUS VAC: HCPCS | Performed by: FAMILY MEDICINE

## 2023-09-14 PROCEDURE — 90674 CCIIV4 VAC NO PRSV 0.5 ML IM: CPT | Performed by: FAMILY MEDICINE

## 2023-09-14 PROCEDURE — 97530 THERAPEUTIC ACTIVITIES: CPT | Performed by: PHYSICAL THERAPIST

## 2023-09-14 NOTE — PLAN OF CARE
[address] [place] [address] and Therapy [address] [place] [address]., [address] office: [phone] fax: [phone]    Physical Therapy Treatment Note/ Progress Report:         Physical Therapy: TREATMENT/PROGRESS NOTE   Patient: Orlando Maria (89 y.o. female)   Treatment Date: 2023   :  1965 MRN: [id number]   Visit #: 14    Insurance: Payor: Ebenezer Talley / Plan: [employer] / Product Type: *No Product type* /   Insurance ID: [id number] - (Medicare Managed)  Secondary Insurance (if applicable): [place]   Treatment Diagnosis:        ICD-10-CM     1. Osteoarthritis of left knee, unspecified osteoarthritis type  M17.12         2. Weakness  R53.1         3. Swelling of left knee joint  M25.462         4. Knee stiff, left  M25.662        Medical Diagnosis:       Localized osteoarthritis of left knee [M17.12]  S/P L TKR 23  S/P R TKR 23   Referring Physician: Ankush Guajardo MD  PCP: Pranav Burton MD                             Plan of care signed (Y/N): Y    Date of Patient follow up with Physician: RIGOBERTO     Progress Report: YES and Date Range for this report: 23 - 23    Plan of Care report due (10 Rx/or 30 days whichever is less): 10/14/23     Visit # Insurance Allowable Auth Needed   14   24 visits approved  - 11/3/23 [x]Yes    []No     Latex Allergy:  [x]NO      []YES  Preferred Language for Healthcare:   [x]English       []other:      SUBJECTIVE EXAMINATION     SUBJECTIVE:  Pt went for a short walk yesterday with good knee tolerance. Low back/R hip were somewhat bothersome by the end. Still feels weak espcially with sitting/standing from chairs, stairs, heavier household duties, community involvement, shopping, etc.     OBJECTIVE EXAMINATION     OBJECTIVE:   Observation: Gait normalized at slow gait speed. Incisions healing well without signs of infection.      Test used Initial score 2023   Pain Summary VAS 4-9/10

## 2023-09-15 DIAGNOSIS — G89.29 CHRONIC GENERALIZED PAIN DISORDER: ICD-10-CM

## 2023-09-15 DIAGNOSIS — M54.12 CERVICAL RADICULOPATHY: ICD-10-CM

## 2023-09-15 DIAGNOSIS — R52 CHRONIC GENERALIZED PAIN DISORDER: ICD-10-CM

## 2023-09-15 DIAGNOSIS — M79.7 FIBROMYALGIA: ICD-10-CM

## 2023-09-15 RX ORDER — HYDROXYZINE PAMOATE 50 MG/1
50 CAPSULE ORAL 3 TIMES DAILY PRN
Qty: 90 CAPSULE | Refills: 1 | Status: SHIPPED | OUTPATIENT
Start: 2023-09-15

## 2023-09-15 RX ORDER — ACETAMINOPHEN AND CODEINE PHOSPHATE 300; 30 MG/1; MG/1
1 TABLET ORAL 2 TIMES DAILY
Qty: 60 TABLET | Refills: 0 | Status: SHIPPED | OUTPATIENT
Start: 2023-09-15 | End: 2023-10-15

## 2023-09-15 NOTE — TELEPHONE ENCOUNTER
-- DO NOT REPLY / DO NOT REPLY ALL --  -- Message is from Engagement Center Operations (ECO) --    General Patient Message:patient calling to speak with doctor bita please call back  [phone]     Caller Information       Type Contact Phone/Fax    09/15/2023 02:52 PM CDT Phone (Incoming) Brianna Cummins (Self) [phone] (H)        Alternative phone number:none    Can a detailed message be left? Yes    Message Turnaround:     Is it Working Hours? Yes - Working Hours     [place]:    Please give this turnaround time to the caller:   \"This message will be sent to [state Provider's name]. The clinical team will fulfill your request as soon as they review your message.\"                 Marvin Elliott called stating she saw Dr. Nagi Tompkins for a VV on 8/21/2020, but the visit was cut short. There were two things she did not get a chance to mention at her visit. 1.  She would like to know if Dr. Nagi Tompkins will prescribe triamterene-hydrochlorothiazide PRN for her leg swelling. She states Demedex is too strong and hurts her kidneys. She has taken triamterine-hydrochlorothiazide in the past with no issues. 2.  She would also like to know if Dr. Nagi Tompkins will prescribe her tramadol 50 mg PRN to help with her arthritis pain. She states he prescribed meloxicam 15 mg on 8/21/2020, but she is worried it will upset her stomach. She was thinking she could maybe alternate the tramadol and meloxicam as needed for her arthritis pain. Please advise. Thanks.         [id number] Tushar Elliott [phone] (home)

## 2023-09-18 ENCOUNTER — HOSPITAL ENCOUNTER (OUTPATIENT)
Dept: PHYSICAL THERAPY | Age: 58
Setting detail: THERAPIES SERIES
Discharge: HOME | End: 2023-09-18
Payer: MEDICARE

## 2023-09-18 NOTE — FLOWSHEET NOTE
[facility] and [address] [place] [address] [address],  [facility], [address] [place] [address], [address] [place] [address] Avenue  Phone: [phone]  Fax: [phone]      Physical Therapy  Cancellation/No-show Note  Patient Name:  Jono Luciano  :  1965   Date:  2023  Cancelled visits to date: 10  No-shows to date: 2    For today's appointment patient:  []  Cancelled  []  Rescheduled appointment  [x]  No-show     Reason given by patient:  []  Patient ill  []  Conflicting appointment   []  No transportation    []  Conflict with work  []  No reason given  []  Other:     Comments:      Electronically signed by:  Weston Medrano, PT, DPT

## 2023-09-21 ENCOUNTER — APPOINTMENT (OUTPATIENT)
Dept: PHYSICAL THERAPY | Age: 58
End: 2023-09-21
Payer: MEDICARE

## 2023-09-21 ENCOUNTER — TELEPHONE (OUTPATIENT)
Dept: FAMILY MEDICINE CLINIC | Age: 58
End: 2023-09-21

## 2023-09-21 NOTE — TELEPHONE ENCOUNTER
Pt need a letter from her psychologist and PCP detailing her diagnosis and treatment regarding her bariatric surgery, as well as any additional comments of recommendations for pre and post care.  Please fax to [address] [facility] [facility], [phone]

## 2023-09-22 NOTE — TELEPHONE ENCOUNTER
Called [facility] as we never heard back from therapist regarding what was needed in letter. There is a form that patient received that details more about the letter, they will fax this over to our office for review and give to Dr. Amezcua Medicine or PCP upon review.

## 2023-09-25 ENCOUNTER — APPOINTMENT (OUTPATIENT)
Dept: PHYSICAL THERAPY | Age: 58
End: 2023-09-25
Payer: MEDICARE

## 2023-09-27 ENCOUNTER — APPOINTMENT (OUTPATIENT)
Dept: PHYSICAL THERAPY | Age: 58
End: 2023-09-27
Payer: MEDICARE

## 2023-09-28 ENCOUNTER — TELEPHONE (OUTPATIENT)
Dept: FAMILY MEDICINE CLINIC | Age: 58
End: 2023-09-28

## 2023-09-28 RX ORDER — ROPINIROLE 1 MG/1
TABLET, FILM COATED ORAL
Qty: 90 TABLET | Refills: 1 | OUTPATIENT
Start: 2023-09-28

## 2023-09-28 RX ORDER — TIZANIDINE 2 MG/1
2 TABLET ORAL 3 TIMES DAILY
Qty: 90 TABLET | Refills: 1 | OUTPATIENT
Start: 2023-09-28

## 2023-09-28 NOTE — TELEPHONE ENCOUNTER
Called and spoke with pt in regards to medication refills for rOPINIRole (REQUIP) 1 MG tablet and tiZANidine (ZANAFLEX) 2 MG tablet pt was informed by pharmacy that these were denied. I explained to pt that was denied because they were requested too soon as pt should have a refill for both at the pharmacy. Pt also asking for a medication adjustment for fluvoxaMINE (LUVOX) 25 MG tablet pt feels like this medication is working but she feels like she needs an increase. Pt also inquiring about letter again that is needed for her surgery. Explained to pt I would have to check to see if the letter that was faxed from 9-21-23 was received by our office and get back to her as I am not sure if it was received. Pt is questioning again is Verito Steinberg is able to provide what is needed. Discussed with pt that Vania Tong has stated previously that she is not able to due to the fact that she does not have the training needed for the evaluation. Pt stated that Vania Tong does not have to do an evaluation, just provide a letter that she is under her care and is a patient of hers, documentation about diagnosis, etc. Explained to pt again I would have to find the additional details that were faxed and get back to her once PCP is back in office. No further questions at this time.

## 2023-10-03 ENCOUNTER — TELEPHONE (OUTPATIENT)
Dept: PULMONOLOGY | Age: 58
End: 2023-10-03

## 2023-10-03 RX ORDER — ROPINIROLE 1 MG/1
TABLET, FILM COATED ORAL
Qty: 270 TABLET | OUTPATIENT
Start: 2023-10-03

## 2023-10-03 NOTE — TELEPHONE ENCOUNTER
Called back to gain more information from pt. Pt stated she was unable to get letter uploaded via [employer] but called back with a phone and fax number to call and have letter template re-faxed. Informed pt I would call and get back to her ASAP.      Ph: [phone]  Fax: [phone]

## 2023-10-03 NOTE — TELEPHONE ENCOUNTER
Antoine Mittal is not covered by [employer], Symbicort HFA and Breo Ellipta powder are, could you please prescribe one of these and send to her pharmacy?   Thanks

## 2023-10-05 RX ORDER — BUDESONIDE AND FORMOTEROL FUMARATE DIHYDRATE 160; 4.5 UG/1; UG/1
2 AEROSOL RESPIRATORY (INHALATION) 2 TIMES DAILY
Qty: 1 EACH | Refills: 11 | Status: SHIPPED | OUTPATIENT
Start: 2023-10-05

## 2023-10-06 NOTE — TELEPHONE ENCOUNTER
Called  again to have note refaxed for review.  Letter to be scanned into media so Dr. Carlos Dallas can review

## 2023-10-09 RX ORDER — ROPINIROLE 1 MG/1
TABLET, FILM COATED ORAL
Qty: 270 TABLET | OUTPATIENT
Start: 2023-10-09

## 2023-10-17 DIAGNOSIS — R52 CHRONIC GENERALIZED PAIN DISORDER: ICD-10-CM

## 2023-10-17 DIAGNOSIS — M79.7 FIBROMYALGIA: ICD-10-CM

## 2023-10-17 DIAGNOSIS — M54.12 CERVICAL RADICULOPATHY: ICD-10-CM

## 2023-10-17 DIAGNOSIS — G89.29 CHRONIC GENERALIZED PAIN DISORDER: ICD-10-CM

## 2023-10-17 RX ORDER — ACETAMINOPHEN AND CODEINE PHOSPHATE 300; 30 MG/1; MG/1
1 TABLET ORAL 2 TIMES DAILY
Qty: 60 TABLET | Refills: 0 | Status: SHIPPED | OUTPATIENT
Start: 2023-10-17 | End: 2023-11-16

## 2023-10-24 NOTE — CARE COORDINATION
Ambulatory Care Coordination Note  6/10/2020  CM Risk Score: 6  Charlson 10 Year Mortality Risk Score: 23%     ACC: Eloy Stevens, RN    Summary Note: Spoke with patient today who was very tearful. 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 Upon discussing patients medications she said she has not been taking her Eliquis for at least 30 days, said it is $200 a month and she has told the pharmacy she cannot afford that. I discussed the possible effects of having A fib and not taking the Eliquis, patient said she is aware but she cannot afford it. Pt is agreeable to continued calls. Pt said she doesn't feel much better today than yesterday, said she is tired of having so many health problems. I discussed Niki Sabine with her, she said she has used them before and it helped a little but she is not one to really talk to people. I reinforced that she needs to see Dr. Stephen Alan and discuss her depression, pt said she knows she needs to go see him. Will route note to cardiology office to make them aware she is not taking the Eliquis.  Will follow       Future Appointments   Date Time Provider Wil Chatterjee   6/16/2020  2:20 PM STEPHANIE Vargas - CNP [facility]   6/19/2020  1:00 PM Heladio Villalobos MD [facility]   6/26/2020 12:00 PM CHAIR 01 [facility]   7/27/2020 12:00 PM CHAIR 01 [facility] VISH RAJPUT, RN- [facility]   Associate Care Manager  [phone]
no

## 2023-11-01 DIAGNOSIS — M54.12 CERVICAL RADICULOPATHY: ICD-10-CM

## 2023-11-01 DIAGNOSIS — G89.29 CHRONIC GENERALIZED PAIN DISORDER: ICD-10-CM

## 2023-11-01 DIAGNOSIS — R52 CHRONIC GENERALIZED PAIN DISORDER: ICD-10-CM

## 2023-11-01 DIAGNOSIS — M79.7 FIBROMYALGIA: ICD-10-CM

## 2023-11-01 RX ORDER — ACETAMINOPHEN AND CODEINE PHOSPHATE 300; 30 MG/1; MG/1
1 TABLET ORAL 2 TIMES DAILY
Qty: 60 TABLET | Refills: 0 | Status: SHIPPED | OUTPATIENT
Start: 2023-11-16 | End: 2023-12-16

## 2023-11-15 ENCOUNTER — TELEPHONE (OUTPATIENT)
Dept: PULMONOLOGY | Age: 58
End: 2023-11-15

## 2023-11-15 NOTE — TELEPHONE ENCOUNTER
Pt called and lm stating that she would like to schedule an appointment. Called and lm for pt to call and schedule.

## 2023-11-21 ENCOUNTER — OFFICE VISIT (OUTPATIENT)
Dept: FAMILY MEDICINE CLINIC | Age: 58
End: 2023-11-21
Payer: MEDICARE

## 2023-11-21 ENCOUNTER — HOSPITAL ENCOUNTER (OUTPATIENT)
Dept: GENERAL RADIOLOGY | Age: 58
Discharge: HOME | End: 2023-11-21
Payer: MEDICARE

## 2023-11-21 VITALS
OXYGEN SATURATION: 95 % | BODY MASS INDEX: 44.62 KG/M2 | SYSTOLIC BLOOD PRESSURE: 130 MMHG | HEART RATE: 78 BPM | DIASTOLIC BLOOD PRESSURE: 88 MMHG | WEIGHT: 293 LBS

## 2023-11-21 DIAGNOSIS — J06.9 VIRAL URI: Primary | ICD-10-CM

## 2023-11-21 DIAGNOSIS — J06.9 VIRAL URI: ICD-10-CM

## 2023-11-21 PROCEDURE — G8482 FLU IMMUNIZE ORDER/ADMIN: HCPCS | Performed by: STUDENT IN AN ORGANIZED HEALTH CARE EDUCATION/TRAINING PROGRAM

## 2023-11-21 PROCEDURE — 3017F COLORECTAL CA SCREEN DOC REV: CPT | Performed by: STUDENT IN AN ORGANIZED HEALTH CARE EDUCATION/TRAINING PROGRAM

## 2023-11-21 PROCEDURE — G8417 CALC BMI ABV UP PARAM F/U: HCPCS | Performed by: STUDENT IN AN ORGANIZED HEALTH CARE EDUCATION/TRAINING PROGRAM

## 2023-11-21 PROCEDURE — 71046 X-RAY EXAM CHEST 2 VIEWS: CPT

## 2023-11-21 PROCEDURE — 3079F DIAST BP 80-89 MM HG: CPT | Performed by: STUDENT IN AN ORGANIZED HEALTH CARE EDUCATION/TRAINING PROGRAM

## 2023-11-21 PROCEDURE — 99213 OFFICE O/P EST LOW 20 MIN: CPT | Performed by: STUDENT IN AN ORGANIZED HEALTH CARE EDUCATION/TRAINING PROGRAM

## 2023-11-21 PROCEDURE — G8427 DOCREV CUR MEDS BY ELIG CLIN: HCPCS | Performed by: STUDENT IN AN ORGANIZED HEALTH CARE EDUCATION/TRAINING PROGRAM

## 2023-11-21 PROCEDURE — 3075F SYST BP GE 130 - 139MM HG: CPT | Performed by: STUDENT IN AN ORGANIZED HEALTH CARE EDUCATION/TRAINING PROGRAM

## 2023-11-21 PROCEDURE — 1036F TOBACCO NON-USER: CPT | Performed by: STUDENT IN AN ORGANIZED HEALTH CARE EDUCATION/TRAINING PROGRAM

## 2023-11-21 RX ORDER — PSEUDOEPHEDRINE HCL 120 MG/1
120 TABLET, FILM COATED, EXTENDED RELEASE ORAL 2 TIMES DAILY PRN
Qty: 20 TABLET | Refills: 2 | Status: SHIPPED | OUTPATIENT
Start: 2023-11-21 | End: 2023-12-21

## 2023-11-21 RX ORDER — GUAIFENESIN/DEXTROMETHORPHAN 100-10MG/5
5 SYRUP ORAL 3 TIMES DAILY PRN
Qty: 120 ML | Refills: 2 | Status: SHIPPED | OUTPATIENT
Start: 2023-11-21 | End: 2023-12-15

## 2023-11-21 NOTE — PROGRESS NOTES
Chief Complaint   Patient presents with    Congestion    Cough     Has been ongoing for a few days          HPI: Felicity Liao is a 62 y.o. female who presents for Cough/Congestion    #Cough  - Ongoing for a few days associated with congestion, Has productive cough with yellow and green mucous, has congestion both in her nose and chest  -Has been going on for 3 days, denies fevers above 100F, taking Tylenol which helps  - Also taking mucinex and Nyquil, also taking her inhalers which help some   -BP today 130/88   -Patient has been eating and drinking well at home, also takes Xyzal daily  Past Medical History:   Diagnosis Date    Acute alcoholic intoxication ([address]) 7/6/2022    Acute lateral meniscus tear of right knee 02/2019    Acute medial meniscus tear of right knee 02/2019    Anesthesia complication     woke up during one surgery    Anxiety     Arthritis     Asthma     Atrial fibrillation ([address])     new dx 4 weeks ago, first of  Sept 2017    CHF (congestive heart failure) (HCC)     Edema     Fibromyalgia     GERD (gastroesophageal reflux disease)     reflux    Hiatal hernia     History of blood transfusion     History of suicide attempt     Hx of major depression     Hypertension     Idiopathic urticaria 10/29/2020    Insomnia     PTSD (post-traumatic stress disorder)     SVT (supraventricular tachycardia)     hx svt    Vocal cord dysfunction 3/6/2020       Past Surgical History:   Procedure Laterality Date    ABLATION OF DYSRHYTHMIC FOCUS  04/2019    afib    BLADDER SUSPENSION  2004    CERVICAL FUSION N/A 9/30/2022    ANTERIOR CERVICAL DISCECTOMY AND FUSION CERVICAL 5 - CERVICAL 6, CERVICAL 6 - CERVICAL 7 WITH DEPUY ALLOGRAFT, PLATE AND SCREWS AND EVOKES               **LATEX SENSITIVE** performed by Sophie Gottlieb MD at [facility] 8/4/2022    COLONOSCOPY performed by Nini Reese at [facility] ([facility])  2004    KNEE

## 2023-11-29 ENCOUNTER — TELEPHONE (OUTPATIENT)
Dept: FAMILY MEDICINE CLINIC | Age: 58
End: 2023-11-29

## 2023-11-29 RX ORDER — AZITHROMYCIN 250 MG/1
250 TABLET, FILM COATED ORAL SEE ADMIN INSTRUCTIONS
Qty: 6 TABLET | Refills: 0 | Status: SHIPPED | OUTPATIENT
Start: 2023-11-29 | End: 2023-12-04

## 2023-11-29 NOTE — TELEPHONE ENCOUNTER
Saw Dr Brandy Wade usually gives antibiotic and cough medicine. He did not give her an antibiotic. She would like an antibiotic sent to the pharmacy. She has taken nyquil night and day and other medications she explained are not helping.      [phone] (home) [phone] (work)

## 2023-12-12 ENCOUNTER — OFFICE VISIT (OUTPATIENT)
Dept: ORTHOPEDIC SURGERY | Age: 58
End: 2023-12-12

## 2023-12-12 VITALS — HEIGHT: 70 IN | BODY MASS INDEX: 41.95 KG/M2 | WEIGHT: 293 LBS

## 2023-12-12 DIAGNOSIS — M79.641 PAIN OF RIGHT HAND: Primary | ICD-10-CM

## 2023-12-12 DIAGNOSIS — S60.211A CONTUSION OF RIGHT WRIST, INITIAL ENCOUNTER: ICD-10-CM

## 2023-12-15 NOTE — TELEPHONE ENCOUNTER
Requested Prescriptions     Pending Prescriptions Disp Refills    rOPINIRole (REQUIP) 1 MG tablet [Pharmacy Med Name: ROPINIROLE 1MG TABLETS] 270 tablet      Sig: TAKE 1 AND 1/2 TABLETS BY MOUTH IN THE MORNING AND AT BEDTIME          Last Office Visit: 11/21/2023     Next Office Visit: Visit date not found

## 2023-12-18 RX ORDER — ROPINIROLE 1 MG/1
TABLET, FILM COATED ORAL
Qty: 270 TABLET | OUTPATIENT
Start: 2023-12-18

## 2023-12-19 PROBLEM — I50.42 CHRONIC COMBINED SYSTOLIC AND DIASTOLIC CONGESTIVE HEART FAILURE (HCC): Chronic | Status: ACTIVE | Noted: 2023-01-09

## 2023-12-19 PROBLEM — E66.813 CLASS 3 SEVERE OBESITY DUE TO EXCESS CALORIES WITH SERIOUS COMORBIDITY AND BODY MASS INDEX (BMI) OF 45.0 TO 49.9 IN ADULT: Chronic | Status: ACTIVE | Noted: 2023-07-27

## 2023-12-19 PROBLEM — I10 HTN (HYPERTENSION): Chronic | Status: ACTIVE | Noted: 2018-05-30

## 2023-12-19 PROBLEM — E66.01 CLASS 3 SEVERE OBESITY DUE TO EXCESS CALORIES WITH SERIOUS COMORBIDITY AND BODY MASS INDEX (BMI) OF 45.0 TO 49.9 IN ADULT (HCC): Chronic | Status: ACTIVE | Noted: 2023-07-27

## 2023-12-19 PROBLEM — I48.91 A-FIB (HCC): Chronic | Status: ACTIVE | Noted: 2018-09-07

## 2023-12-26 ENCOUNTER — PATIENT MESSAGE (OUTPATIENT)
Dept: CARDIOLOGY CLINIC | Age: 58
End: 2023-12-26

## 2023-12-26 ENCOUNTER — TELEPHONE (OUTPATIENT)
Dept: CARDIOLOGY CLINIC | Age: 58
End: 2023-12-26

## 2023-12-26 NOTE — TELEPHONE ENCOUNTER
See other encounter from 12/26. Guido Current     DK    12/26/23  8:35 AM  Note     Pt called and stated that she needs to come in and be seen. Pt stated that she has been up all night and feels like she is in AFIB. Please contact and advise if overbook appt avail.

## 2023-12-26 NOTE — TELEPHONE ENCOUNTER
Spoke with patient who stated she does have an apple watch. Pt stated it has not been reading afib, but states that it states she is in \"afib 2% of the time\"    Took EKG while on phone: SR with HR 81. Walked pt through how to send the EKG through [employer]. Will route to provider.       Pt states she has been holding fluid, bloated in belly and legs, an woke up feeling like she was drowning on Friday before myron, and \"not feeling good at all\"    CMV OOT

## 2023-12-27 NOTE — TELEPHONE ENCOUNTER
Ruth Matias MD  [facility] Ep6 minutes ago (2:57 PM)       The recording demonstrates a regular narrow QRS complex rhythm at 71 bpm, likely sinus rhythm. If she is having intermittent palpitations, a 14-day V see event recorder would likely be helpful. Noted, will send this message in ongoing encounter.

## 2023-12-27 NOTE — TELEPHONE ENCOUNTER
Kit Love MD  [facility] Ep6 minutes ago (2:57 PM)       The recording demonstrates a regular narrow QRS complex rhythm at 71 bpm, likely sinus rhythm. If she is having intermittent palpitations, a 14-day V see event recorder would likely be helpful.

## 2024-01-09 ENCOUNTER — TELEPHONE (OUTPATIENT)
Dept: FAMILY MEDICINE CLINIC | Age: 59
End: 2024-01-09

## 2024-01-09 ENCOUNTER — TELEMEDICINE (OUTPATIENT)
Dept: FAMILY MEDICINE CLINIC | Age: 59
End: 2024-01-09
Payer: MEDICARE

## 2024-01-09 DIAGNOSIS — J20.9 ACUTE BRONCHITIS WITH ASTHMA WITH ACUTE EXACERBATION: Primary | ICD-10-CM

## 2024-01-09 DIAGNOSIS — J45.901 ACUTE BRONCHITIS WITH ASTHMA WITH ACUTE EXACERBATION: Primary | ICD-10-CM

## 2024-01-09 PROCEDURE — 99422 OL DIG E/M SVC 11-20 MIN: CPT | Performed by: FAMILY MEDICINE

## 2024-01-09 RX ORDER — AZITHROMYCIN 250 MG/1
250 TABLET, FILM COATED ORAL SEE ADMIN INSTRUCTIONS
Qty: 6 TABLET | Refills: 0 | Status: SHIPPED | OUTPATIENT
Start: 2024-01-09 | End: 2024-01-14

## 2024-01-09 RX ORDER — IPRATROPIUM BROMIDE AND ALBUTEROL SULFATE 2.5; .5 MG/3ML; MG/3ML
1 SOLUTION RESPIRATORY (INHALATION) EVERY 4 HOURS PRN
Qty: 360 ML | Refills: 1 | Status: SHIPPED | OUTPATIENT
Start: 2024-01-09

## 2024-01-09 RX ORDER — DEXTROMETHORPHAN HYDROBROMIDE AND PROMETHAZINE HYDROCHLORIDE 15; 6.25 MG/5ML; MG/5ML
5 SYRUP ORAL 4 TIMES DAILY PRN
Qty: 118 ML | Refills: 0 | Status: SHIPPED | OUTPATIENT
Start: 2024-01-09 | End: 2024-01-16

## 2024-01-09 RX ORDER — PREDNISONE 20 MG/1
40 TABLET ORAL DAILY
Qty: 20 TABLET | Refills: 0 | Status: SHIPPED | OUTPATIENT
Start: 2024-01-09 | End: 2024-01-19

## 2024-01-09 NOTE — TELEPHONE ENCOUNTER
Pt states she needs an order for nebulizer supplies, like tubing and the container for the medication. Does not need machine

## 2024-01-10 RX ORDER — NEBULIZER ACCESSORIES
1 KIT MISCELLANEOUS 4 TIMES DAILY
Qty: 1 KIT | Refills: 0 | Status: SHIPPED | OUTPATIENT
Start: 2024-01-10

## 2024-01-12 NOTE — PROGRESS NOTES
2024    TELEHEALTH EVALUATION -- Audio/Visual (During COVID-19 public health emergency)    HPI:    Dayami Miller (:  1965) has requested an audio/video evaluation for the following concern(s):    Patient calling with complaints of worsening cough, congestion symptoms, nasal drainage, wheezing, short of breath with an asthma exacerbation.  She had fever chills and bodyaches which have been progressively improving however worsening upper respiratory symptoms at this time.  No nausea vomiting abdominal symptoms at this time.  She has been using her inhalers and nebulizers as prescribed.  Request refill for them as well.  She has been monitoring her oxygen levels at home and they have been staying above 90%.  She has taken over-the-counter cough suppressants and patient is without much relief.      Review of Systems  Pertinent positive and negative symptoms noted in HPI    Prior to Visit Medications    Medication Sig Taking? Authorizing Provider   azithromycin (ZITHROMAX) 250 MG tablet Take 1 tablet by mouth See Admin Instructions for 5 days 500mg on day 1 followed by 250mg on days 2 - 5 Yes Maggie Camara MD   predniSONE (DELTASONE) 20 MG tablet Take 2 tablets by mouth daily for 10 days Yes Maggie Camara MD   promethazine-dextromethorphan (PROMETHAZINE-DM) 6.25-15 MG/5ML syrup Take 5 mLs by mouth 4 times daily as needed for Cough Yes Maggie Camara MD   ipratropium 0.5 mg-albuterol 2.5 mg (DUONEB) 0.5-2.5 (3) MG/3ML SOLN nebulizer solution Inhale 3 mLs into the lungs every 4 hours as needed for Shortness of Breath Yes Maggie Camara MD   Respiratory Therapy Supplies (NEBULIZER/TUBING/MOUTHPIECE) KIT 1 kit by Does not apply route in the morning, at noon, in the evening, and at bedtime  Maggie Camara MD   tiZANidine (ZANAFLEX) 2 MG tablet Take 1 tablet by mouth 3 times daily  Maggie Camara MD   acetaminophen-codeine (TYLENOL/CODEINE #3) 300-30 MG per tablet Take 1 tablet by mouth in the morning and at

## 2024-01-16 RX ORDER — DEXTROMETHORPHAN HYDROBROMIDE AND PROMETHAZINE HYDROCHLORIDE 15; 6.25 MG/5ML; MG/5ML
5 SYRUP ORAL 4 TIMES DAILY PRN
Qty: 118 ML | Refills: 0 | Status: SHIPPED | OUTPATIENT
Start: 2024-01-16 | End: 2024-01-23

## 2024-01-16 RX ORDER — ACETAMINOPHEN 325 MG/1
650 TABLET ORAL EVERY 6 HOURS
Qty: 120 TABLET | Refills: 3 | Status: SHIPPED | OUTPATIENT
Start: 2024-01-16

## 2024-01-17 DIAGNOSIS — M79.7 FIBROMYALGIA: ICD-10-CM

## 2024-01-17 DIAGNOSIS — M54.12 CERVICAL RADICULOPATHY: ICD-10-CM

## 2024-01-17 DIAGNOSIS — R52 CHRONIC GENERALIZED PAIN DISORDER: ICD-10-CM

## 2024-01-17 DIAGNOSIS — G89.29 CHRONIC GENERALIZED PAIN DISORDER: ICD-10-CM

## 2024-01-17 RX ORDER — ACETAMINOPHEN AND CODEINE PHOSPHATE 300; 30 MG/1; MG/1
1 TABLET ORAL 2 TIMES DAILY
Qty: 60 TABLET | Refills: 0 | Status: SHIPPED | OUTPATIENT
Start: 2024-01-17 | End: 2024-02-16

## 2024-01-21 ENCOUNTER — HOSPITAL ENCOUNTER (OUTPATIENT)
Dept: SLEEP CENTER | Age: 59
Discharge: HOME | End: 2024-01-21

## 2024-01-21 DIAGNOSIS — G47.33 OBSTRUCTIVE SLEEP APNEA SYNDROME: ICD-10-CM

## 2024-02-01 ENCOUNTER — TELEPHONE (OUTPATIENT)
Dept: FAMILY MEDICINE CLINIC | Age: 59
End: 2024-02-01

## 2024-02-01 NOTE — TELEPHONE ENCOUNTER
Noted by PCP. Also received IP summary from JORGE Tierney regarding admission on 1/29/24 for intentional self-harm/overdose. Because pt was not taking controlled medication appropriately, PCP will no longer be able to prescribe this going forward. Medication d/c in med list.    Needs HFU after discharge from [facility]. IP Summary scanned into media

## 2024-02-07 ENCOUNTER — TELEPHONE (OUTPATIENT)
Dept: FAMILY MEDICINE CLINIC | Age: 59
End: 2024-02-07

## 2024-02-07 NOTE — TELEPHONE ENCOUNTER
----- Message from Mandi Forbes MA sent at 2/7/2024  9:39 AM EST -----  Subject: Referral Request    Reason for referral request? pt calling in to get referral to therapist   and physiatrist, please advise and call back when placed.   Provider patient wants to be referred to(if known):     Provider Phone Number(if known):    Additional Information for Provider?   ---------------------------------------------------------------------------  --------------  CALL BACK INFO    [phone]; OK to leave message on voicemail  ---------------------------------------------------------------------------  --------------

## 2024-02-07 NOTE — TELEPHONE ENCOUNTER
Called and spoke with pt's domestic partner. Olvin stated pt is still admitted at [facility]. I advised for Olvin to hold off on any referrals at this time since pt is still admitted. Informed that pt will likely be given resources during discharge process and also advised for pt to contact insurance to see who will be in network with pt's insurance plan.     Olvin verbalized understanding.

## 2024-03-01 ENCOUNTER — TELEPHONE (OUTPATIENT)
Dept: FAMILY MEDICINE CLINIC | Age: 59
End: 2024-03-01

## 2024-03-18 ENCOUNTER — OFFICE VISIT (OUTPATIENT)
Dept: FAMILY MEDICINE CLINIC | Age: 59
End: 2024-03-18
Payer: MEDICARE

## 2024-03-18 VITALS
SYSTOLIC BLOOD PRESSURE: 100 MMHG | DIASTOLIC BLOOD PRESSURE: 68 MMHG | WEIGHT: 293 LBS | HEART RATE: 67 BPM | BODY MASS INDEX: 41.95 KG/M2 | HEIGHT: 70 IN | TEMPERATURE: 97.3 F | OXYGEN SATURATION: 97 %

## 2024-03-18 DIAGNOSIS — I10 PRIMARY HYPERTENSION: Chronic | ICD-10-CM

## 2024-03-18 DIAGNOSIS — F39 MOOD DISORDER (HCC): ICD-10-CM

## 2024-03-18 DIAGNOSIS — J45.40 MODERATE PERSISTENT ASTHMA WITHOUT COMPLICATION: ICD-10-CM

## 2024-03-18 DIAGNOSIS — I48.91 ATRIAL FIBRILLATION, UNSPECIFIED TYPE (HCC): ICD-10-CM

## 2024-03-18 DIAGNOSIS — Z09 HOSPITAL DISCHARGE FOLLOW-UP: ICD-10-CM

## 2024-03-18 DIAGNOSIS — I50.42 CHRONIC COMBINED SYSTOLIC AND DIASTOLIC CONGESTIVE HEART FAILURE (HCC): ICD-10-CM

## 2024-03-18 DIAGNOSIS — T14.91XA SUICIDE ATTEMPT (HCC): Primary | ICD-10-CM

## 2024-03-18 DIAGNOSIS — F51.01 PRIMARY INSOMNIA: ICD-10-CM

## 2024-03-18 DIAGNOSIS — M12.9 ARTHRITIS, MULTIPLE JOINT INVOLVEMENT: ICD-10-CM

## 2024-03-18 DIAGNOSIS — F33.2 SEVERE EPISODE OF RECURRENT MAJOR DEPRESSIVE DISORDER, WITHOUT PSYCHOTIC FEATURES (HCC): ICD-10-CM

## 2024-03-18 PROCEDURE — 3074F SYST BP LT 130 MM HG: CPT | Performed by: FAMILY MEDICINE

## 2024-03-18 PROCEDURE — G8427 DOCREV CUR MEDS BY ELIG CLIN: HCPCS | Performed by: FAMILY MEDICINE

## 2024-03-18 PROCEDURE — 3078F DIAST BP <80 MM HG: CPT | Performed by: FAMILY MEDICINE

## 2024-03-18 PROCEDURE — G8417 CALC BMI ABV UP PARAM F/U: HCPCS | Performed by: FAMILY MEDICINE

## 2024-03-18 PROCEDURE — 1036F TOBACCO NON-USER: CPT | Performed by: FAMILY MEDICINE

## 2024-03-18 PROCEDURE — G8482 FLU IMMUNIZE ORDER/ADMIN: HCPCS | Performed by: FAMILY MEDICINE

## 2024-03-18 PROCEDURE — 99214 OFFICE O/P EST MOD 30 MIN: CPT | Performed by: FAMILY MEDICINE

## 2024-03-18 PROCEDURE — 3017F COLORECTAL CA SCREEN DOC REV: CPT | Performed by: FAMILY MEDICINE

## 2024-03-18 RX ORDER — BUPROPION HYDROCHLORIDE 100 MG/1
100 TABLET ORAL 2 TIMES DAILY
COMMUNITY
End: 2024-03-18 | Stop reason: SDUPTHER

## 2024-03-18 RX ORDER — HYDROXYZINE PAMOATE 50 MG/1
50 CAPSULE ORAL 3 TIMES DAILY PRN
Qty: 90 CAPSULE | Refills: 1 | Status: SHIPPED | OUTPATIENT
Start: 2024-03-18

## 2024-03-18 RX ORDER — BUSPIRONE HYDROCHLORIDE 15 MG/1
30 TABLET ORAL 2 TIMES DAILY
Qty: 60 TABLET | Refills: 5 | Status: SHIPPED | OUTPATIENT
Start: 2024-03-18

## 2024-03-18 RX ORDER — BUPROPION HYDROCHLORIDE 100 MG/1
100 TABLET ORAL 2 TIMES DAILY
Qty: 60 TABLET | Refills: 5 | Status: SHIPPED | OUTPATIENT
Start: 2024-03-18

## 2024-03-18 RX ORDER — DESVENLAFAXINE 100 MG/1
100 TABLET, EXTENDED RELEASE ORAL DAILY
Qty: 30 TABLET | Refills: 5 | Status: SHIPPED | OUTPATIENT
Start: 2024-03-18

## 2024-03-18 RX ORDER — MULTIVIT-MIN/IRON/FOLIC ACID/K 18-600-40
CAPSULE ORAL
COMMUNITY

## 2024-03-18 RX ORDER — BUSPIRONE HYDROCHLORIDE 15 MG/1
30 TABLET ORAL 2 TIMES DAILY
COMMUNITY
End: 2024-03-18 | Stop reason: SDUPTHER

## 2024-03-18 RX ORDER — TRAZODONE HYDROCHLORIDE 100 MG/1
100 TABLET ORAL NIGHTLY
Qty: 30 TABLET | Refills: 5 | Status: SHIPPED | OUTPATIENT
Start: 2024-03-18

## 2024-03-18 RX ORDER — ROPINIROLE 1 MG/1
1.5 TABLET, FILM COATED ORAL 2 TIMES DAILY
Qty: 90 TABLET | Refills: 5 | Status: SHIPPED | OUTPATIENT
Start: 2024-03-18

## 2024-03-18 RX ORDER — TRAZODONE HYDROCHLORIDE 100 MG/1
100 TABLET ORAL NIGHTLY
COMMUNITY
End: 2024-03-18 | Stop reason: SDUPTHER

## 2024-03-18 RX ORDER — AMLODIPINE BESYLATE 5 MG/1
5 TABLET ORAL DAILY
COMMUNITY
End: 2024-03-18 | Stop reason: SDUPTHER

## 2024-03-18 RX ORDER — DESVENLAFAXINE 100 MG/1
TABLET, EXTENDED RELEASE ORAL DAILY
COMMUNITY
End: 2024-03-18 | Stop reason: SDUPTHER

## 2024-03-18 RX ORDER — TIZANIDINE 4 MG/1
4 TABLET ORAL EVERY 8 HOURS PRN
Qty: 90 TABLET | Refills: 3 | Status: SHIPPED | OUTPATIENT
Start: 2024-03-18

## 2024-03-18 RX ORDER — AMLODIPINE BESYLATE 5 MG/1
5 TABLET ORAL DAILY
Qty: 30 TABLET | Refills: 5 | Status: SHIPPED | OUTPATIENT
Start: 2024-03-18

## 2024-03-18 SDOH — ECONOMIC STABILITY: FOOD INSECURITY: WITHIN THE PAST 12 MONTHS, YOU WORRIED THAT YOUR FOOD WOULD RUN OUT BEFORE YOU GOT MONEY TO BUY MORE.: NEVER TRUE

## 2024-03-18 SDOH — ECONOMIC STABILITY: FOOD INSECURITY: WITHIN THE PAST 12 MONTHS, THE FOOD YOU BOUGHT JUST DIDN'T LAST AND YOU DIDN'T HAVE MONEY TO GET MORE.: NEVER TRUE

## 2024-03-18 SDOH — ECONOMIC STABILITY: INCOME INSECURITY: HOW HARD IS IT FOR YOU TO PAY FOR THE VERY BASICS LIKE FOOD, HOUSING, MEDICAL CARE, AND HEATING?: NOT HARD AT ALL

## 2024-03-19 RX ORDER — AMLODIPINE BESYLATE 5 MG/1
5 TABLET ORAL DAILY
Qty: 90 TABLET | OUTPATIENT
Start: 2024-03-19

## 2024-03-21 NOTE — PROGRESS NOTES
Portions of this chart may have been created with voice recognition software. Occasional wrong-word or \"sound-alike\" substitutions may have occurred due to the inherent limitations of voice recognition software. Read the chart carefully and recognize, using context, where these substitutions have occurred        Chief Complaint     Follow-Up from Hospital               SUBJECTIVE    Dayami Miller is a 58 y.o. female here for Follow-Up from Hospital       -Patient was recently admitted to the hospital for suicide attempt and was transferred to an outside inpatient rehab facility.  Reviewed hospital notes.  Since discharge patient states that she is in a much better place.  Her medications have been adjusted and she feels like this is working for her.  She states that she is in a very good mood she is not crying she is now noted to lose to powder diffusely situational recommend with her .  Currently feels like her symptoms are well under control and stable.  She requests a refill of all her medications until she sees her psychiatrist next week.  Follow-up as been established.  Follow-up with therapist also has been established with a new provider.  No other red flag signs or any other worsening symptoms at this time.  Sleep is also fair.  She requests a refill of her insomnia medications.    -Continues to be in pain from multiple joints because of her fibromyalgia as well as from arthritis.  She request a refill of her diclofenac gel for topical applications which helps some with her symptoms.  Recommended exercises, range of motion exercises and stretching to help with the symptoms additionally.  Recommended physical therapy if symptoms do not improve or worsen.  Patient is following up with the orthopedic surgeon for her knees and scheduled for follow-up.    -Also regularly following up with cardiologist.  Blood pressures are currently stable, well-controlled, symptoms of A-fib currently stable and

## 2024-03-30 ENCOUNTER — HOSPITAL ENCOUNTER (OUTPATIENT)
Dept: SLEEP CENTER | Age: 59
Discharge: HOME | End: 2024-03-30
Attending: INTERNAL MEDICINE

## 2024-04-10 ENCOUNTER — OFFICE VISIT (OUTPATIENT)
Dept: ORTHOPEDIC SURGERY | Age: 59
End: 2024-04-10
Payer: MEDICARE

## 2024-04-10 VITALS — HEIGHT: 70 IN | WEIGHT: 293 LBS | BODY MASS INDEX: 41.95 KG/M2

## 2024-04-10 DIAGNOSIS — Z96.652 S/P TOTAL KNEE REPLACEMENT, LEFT: Primary | ICD-10-CM

## 2024-04-10 DIAGNOSIS — Z96.651 S/P TOTAL KNEE ARTHROPLASTY, RIGHT: ICD-10-CM

## 2024-04-10 PROCEDURE — 3017F COLORECTAL CA SCREEN DOC REV: CPT | Performed by: STUDENT IN AN ORGANIZED HEALTH CARE EDUCATION/TRAINING PROGRAM

## 2024-04-10 PROCEDURE — G8417 CALC BMI ABV UP PARAM F/U: HCPCS | Performed by: STUDENT IN AN ORGANIZED HEALTH CARE EDUCATION/TRAINING PROGRAM

## 2024-04-10 PROCEDURE — 1036F TOBACCO NON-USER: CPT | Performed by: STUDENT IN AN ORGANIZED HEALTH CARE EDUCATION/TRAINING PROGRAM

## 2024-04-10 PROCEDURE — G8427 DOCREV CUR MEDS BY ELIG CLIN: HCPCS | Performed by: STUDENT IN AN ORGANIZED HEALTH CARE EDUCATION/TRAINING PROGRAM

## 2024-04-10 PROCEDURE — 99213 OFFICE O/P EST LOW 20 MIN: CPT | Performed by: STUDENT IN AN ORGANIZED HEALTH CARE EDUCATION/TRAINING PROGRAM

## 2024-04-10 NOTE — PROGRESS NOTES
[facility]   Electrophysiology Follow up     Date: 4/11/2024  I had the privilege of visiting Dayami Miller in the office.     CC: Follow-up ablation    HPI: Dayami Miller is a 58 y.o. female history of gastric bypass, obstructive sleep apnea treated with surgery, heart failure with preserved ejection fraction, paroxysmal atrial fibrillation and atrial flutter (prior CTI ablation 5/7/2019), previously managed on flecainide, increasing burden, previous cardioversion on flecainide with recurrence, started on amiodarone, underwent pulmonary vein isolation on 3/7/2023 and continued on amiodarone at discharge.    Patient presents today as a follow-up for the management of atrial fibrillation, following ablation.  Since the procedure she has stopped amlodipine due to fatigue.  She is currently on twice daily Lasix for bilateral lower extremity swelling that is improving.  Denies dizziness, lightheadedness, chest pain. Denies palpitations, currently in sinus rhythm.     Review of System:  [x] Full ROS obtained and negative except as mentioned in HPI      Prior to Admission medications    Medication Sig Start Date End Date Taking? Authorizing Provider   buPROPion (WELLBUTRIN) 100 MG tablet Take 1 tablet by mouth 2 times daily 3/18/24  Yes Maggie Camara MD   busPIRone (BUSPAR) 15 MG tablet Take 30 mg by mouth 2 times daily  Patient taking differently: Take 30 mg by mouth 2 times daily 30mg BID 3/18/24  Yes Maggie Camara MD   desvenlafaxine succinate (PRISTIQ) 100 MG TB24 extended release tablet Take 1 tablet by mouth daily 3/18/24  Yes Maggie Camara MD   hydrOXYzine pamoate (VISTARIL) 50 MG capsule Take 1 capsule by mouth 3 times daily as needed for Anxiety (sleep) 3/18/24  Yes Maggie Camara MD   rOPINIRole (REQUIP) 1 MG tablet Take 1.5 tablets by mouth in the morning and at bedtime 3/18/24  Yes Maggie Camara MD   tiZANidine (ZANAFLEX) 4 MG tablet Take 1 tablet by mouth every 8 hours as needed (muscle spasm)

## 2024-04-11 ENCOUNTER — OFFICE VISIT (OUTPATIENT)
Dept: CARDIOLOGY CLINIC | Age: 59
End: 2024-04-11
Payer: MEDICARE

## 2024-04-11 VITALS
SYSTOLIC BLOOD PRESSURE: 118 MMHG | DIASTOLIC BLOOD PRESSURE: 74 MMHG | OXYGEN SATURATION: 96 % | HEIGHT: 70 IN | BODY MASS INDEX: 41.95 KG/M2 | WEIGHT: 293 LBS

## 2024-04-11 DIAGNOSIS — I48.0 PAROXYSMAL ATRIAL FIBRILLATION WITH RAPID VENTRICULAR RESPONSE (HCC): Primary | ICD-10-CM

## 2024-04-11 PROCEDURE — 3078F DIAST BP <80 MM HG: CPT | Performed by: INTERNAL MEDICINE

## 2024-04-11 PROCEDURE — G8427 DOCREV CUR MEDS BY ELIG CLIN: HCPCS | Performed by: INTERNAL MEDICINE

## 2024-04-11 PROCEDURE — 3074F SYST BP LT 130 MM HG: CPT | Performed by: INTERNAL MEDICINE

## 2024-04-11 PROCEDURE — 3017F COLORECTAL CA SCREEN DOC REV: CPT | Performed by: INTERNAL MEDICINE

## 2024-04-11 PROCEDURE — 1036F TOBACCO NON-USER: CPT | Performed by: INTERNAL MEDICINE

## 2024-04-11 PROCEDURE — 93000 ELECTROCARDIOGRAM COMPLETE: CPT | Performed by: INTERNAL MEDICINE

## 2024-04-11 PROCEDURE — G8417 CALC BMI ABV UP PARAM F/U: HCPCS | Performed by: INTERNAL MEDICINE

## 2024-04-11 PROCEDURE — 99214 OFFICE O/P EST MOD 30 MIN: CPT | Performed by: INTERNAL MEDICINE

## 2024-04-12 ENCOUNTER — TELEPHONE (OUTPATIENT)
Dept: FAMILY MEDICINE CLINIC | Age: 59
End: 2024-04-12

## 2024-04-12 NOTE — TELEPHONE ENCOUNTER
[facility] skilled nurse Lu Zhou called stating pt is not doing well today. She stated she was depressed. She will not get out of bed. Last time she was seen she looked like she wasn't taking care of her self as far as hygiene and appearance. When she spoke with Lu on the phone she was sobbing and the nurse was concerned because the last time she saw Dr Camara everything seemed fine so she wanted to update Dr Camara and also suggest that they have Psych nurses she can see at [facility]. Any questions or concerns please contact Lu.    Lu   [phone]

## 2024-04-15 NOTE — TELEPHONE ENCOUNTER
Please get more information on the services by [facility].  If they do provide psych skilled nursing, I would rather have patient do it and inform the patient psychiatrist also

## 2024-04-17 ENCOUNTER — TELEPHONE (OUTPATIENT)
Dept: FAMILY MEDICINE CLINIC | Age: 59
End: 2024-04-17

## 2024-04-17 NOTE — TELEPHONE ENCOUNTER
Pt referred for physical therapy through orthopedic doctor, as well as skilled nursing after her being admitted to psych at [facility]. Nurse reports pt still being very depressed and tearful, reporting to her that her medications prescribed by psychiatrist are not helping. Nurse tried to convince pt to possibly go back to [facility] voluntarily, but pt was hesitant. Nurse is asking us to reach out to pt and see how she is doing and how we could assist, maybe even advising pt to be admitted again.

## 2024-04-17 NOTE — TELEPHONE ENCOUNTER
LVM for Shakira from [facility] to callback and discuss further. Any psych skilled nursing needs to be ordered and managed by pt psychiatrist, but PCP agrees pt may benefit from services.Wants to know if [facility] has reached out to psychiatrist for concerns regarding medications and symptoms

## 2024-04-24 ENCOUNTER — TELEPHONE (OUTPATIENT)
Dept: FAMILY MEDICINE CLINIC | Age: 59
End: 2024-04-24

## 2024-04-24 NOTE — TELEPHONE ENCOUNTER
JARON Rosenberg from [facility] called to update about pt. States that patient is in depressive episode and may be having trouble with psychiatrist. She did not have the name of psych. Advised her I would reach out to the patient and see who she is receiving care from and if we can assist in anyway.

## 2024-04-24 NOTE — TELEPHONE ENCOUNTER
Spoke with patient to check in with her. She states she does not want to go back to the psychiatrist she was seeing, as they only did phone call and never sent her further testing like they stated. I gave her the phone number for [facility] to see if they can get her scheduled for an appt.    No further questions or concerns

## 2024-05-10 ENCOUNTER — TELEPHONE (OUTPATIENT)
Dept: PULMONOLOGY | Age: 59
End: 2024-05-10

## 2024-05-10 DIAGNOSIS — J45.40 MODERATE PERSISTENT ASTHMA WITHOUT COMPLICATION: ICD-10-CM

## 2024-05-10 RX ORDER — ALBUTEROL SULFATE 90 UG/1
AEROSOL, METERED RESPIRATORY (INHALATION)
Qty: 1 EACH | Refills: 4 | Status: SHIPPED | OUTPATIENT
Start: 2024-05-10

## 2024-05-10 NOTE — TELEPHONE ENCOUNTER
Requested Prescriptions     Pending Prescriptions Disp Refills    albuterol sulfate HFA (VENTOLIN HFA) 108 (90 Base) MCG/ACT inhaler 1 each 4     Sig: USE 2 PUFFS BY MOUTH EVERY 4 TO 6 HOURS AS NEEDED    mometasone-formoterol (DULERA) 200-5 MCG/ACT inhaler 1 each 11     Sig: Inhale 2 puffs into the lungs 2 times daily     If appropriate please refill pending medication   Last office visit : 8/24/2023   Next office visit :  no current appt scheduled  Thank you

## 2024-05-13 NOTE — PROGRESS NOTES
HERNIA REPAIR  2001    HYSTERECTOMY (CERVIX STATUS UNKNOWN)  2004    KNEE ARTHROSCOPY Right 7/11/2019    VIDEO ARTHROSCOPY RIGHT KNEE, PARTIAL MEDIAL AND LATERAL MENISCECTOMY,, MEDIAL MICRO FRACTURE CHONDROPLASTY performed by Stanford Johns Jr., MD at [facility] OR    OVARIAN CYST REMOVAL      ARIAS-EN-Y GASTRIC BYPASS  2011    TONSILLECTOMY AND ADENOIDECTOMY      TOTAL KNEE ARTHROPLASTY Right 6/8/2023    RIGHT TOTAL KNEE ARTHROPLASTY  performed by Stanford Gore MD at [facility] OR    TOTAL KNEE ARTHROPLASTY Left 7/27/2023    LEFT TOTAL KNEE ARTHROPLASTY  performed by Stanford Gore MD at [facility] OR    UPPER GASTROINTESTINAL ENDOSCOPY N/A 8/4/2022    EGD BIOPSY performed by Jaye Cuello at [facility] ENDOSCOPY    UPPP UVULOPALATOPHARYGOPLASTY      VENTRAL HERNIA REPAIR N/A 8/26/2019    DIAGNOSTIC LAPAROSCOPY, LAPAROSCOPIC LYSIS OF ADHESIONS, LAPAROSCOPIC REDUCTION OF RECURRENT INCISIONAL HERNIA WITH MESH performed by Jose Juan Khan MD at [facility] OR     Social History     Tobacco Use    Smoking status: Never    Smokeless tobacco: Never   Substance Use Topics    Alcohol use: Yes     Comment: 0-1 drinks per month      Current Outpatient Medications on File Prior to Visit   Medication Sig Dispense Refill    Cholecalciferol (VITAMIN D) 50 MCG (2000 UT) CAPS capsule Take by mouth (Patient not taking: Reported on 4/11/2024)      amLODIPine (NORVASC) 5 MG tablet Take 1 tablet by mouth daily (Patient not taking: Reported on 4/11/2024) 30 tablet 5    buPROPion (WELLBUTRIN) 100 MG tablet Take 1 tablet by mouth 2 times daily 60 tablet 5    busPIRone (BUSPAR) 15 MG tablet Take 30 mg by mouth 2 times daily (Patient taking differently: Take 30 mg by mouth 2 times daily 30mg BID) 60 tablet 5    desvenlafaxine succinate (PRISTIQ) 100 MG TB24 extended release tablet Take 1 tablet by mouth daily 30 tablet 5    hydrOXYzine pamoate (VISTARIL) 50 MG capsule Take 1 capsule by mouth 3 times daily as needed for Anxiety (sleep) 90 capsule 1

## 2024-06-15 SDOH — HEALTH STABILITY: PHYSICAL HEALTH: ON AVERAGE, HOW MANY MINUTES DO YOU ENGAGE IN EXERCISE AT THIS LEVEL?: 20 MIN

## 2024-06-15 SDOH — HEALTH STABILITY: PHYSICAL HEALTH: ON AVERAGE, HOW MANY DAYS PER WEEK DO YOU ENGAGE IN MODERATE TO STRENUOUS EXERCISE (LIKE A BRISK WALK)?: 2 DAYS

## 2024-06-15 ASSESSMENT — LIFESTYLE VARIABLES
HOW OFTEN DURING THE LAST YEAR HAVE YOU FAILED TO DO WHAT WAS NORMALLY EXPECTED FROM YOU BECAUSE OF DRINKING: NEVER
HOW OFTEN DURING THE LAST YEAR HAVE YOU NEEDED AN ALCOHOLIC DRINK FIRST THING IN THE MORNING TO GET YOURSELF GOING AFTER A NIGHT OF HEAVY DRINKING: NEVER
HOW OFTEN DURING THE LAST YEAR HAVE YOU BEEN UNABLE TO REMEMBER WHAT HAPPENED THE NIGHT BEFORE BECAUSE YOU HAD BEEN DRINKING: NEVER
HOW OFTEN DO YOU HAVE A DRINK CONTAINING ALCOHOL: 2-4 TIMES A MONTH
HOW OFTEN DURING THE LAST YEAR HAVE YOU FOUND THAT YOU WERE NOT ABLE TO STOP DRINKING ONCE YOU HAD STARTED: NEVER
HAVE YOU OR SOMEONE ELSE BEEN INJURED AS A RESULT OF YOUR DRINKING: NO
HAVE YOU OR SOMEONE ELSE BEEN INJURED AS A RESULT OF YOUR DRINKING: NO
HOW OFTEN DURING THE LAST YEAR HAVE YOU NEEDED AN ALCOHOLIC DRINK FIRST THING IN THE MORNING TO GET YOURSELF GOING AFTER A NIGHT OF HEAVY DRINKING: NEVER
HOW OFTEN DURING THE LAST YEAR HAVE YOU FOUND THAT YOU WERE NOT ABLE TO STOP DRINKING ONCE YOU HAD STARTED: NEVER
HOW OFTEN DURING THE LAST YEAR HAVE YOU HAD A FEELING OF GUILT OR REMORSE AFTER DRINKING: NEVER
HOW MANY STANDARD DRINKS CONTAINING ALCOHOL DO YOU HAVE ON A TYPICAL DAY: 3 OR 4
HOW OFTEN DURING THE LAST YEAR HAVE YOU HAD A FEELING OF GUILT OR REMORSE AFTER DRINKING: NEVER
HAS A RELATIVE, FRIEND, DOCTOR, OR ANOTHER HEALTH PROFESSIONAL EXPRESSED CONCERN ABOUT YOUR DRINKING OR SUGGESTED YOU CUT DOWN: NO
HOW OFTEN DO YOU HAVE A DRINK CONTAINING ALCOHOL: 3
HOW MANY STANDARD DRINKS CONTAINING ALCOHOL DO YOU HAVE ON A TYPICAL DAY: 2
HOW OFTEN DO YOU HAVE SIX OR MORE DRINKS ON ONE OCCASION: 1
HOW OFTEN DURING THE LAST YEAR HAVE YOU FAILED TO DO WHAT WAS NORMALLY EXPECTED FROM YOU BECAUSE OF DRINKING: NEVER
HOW OFTEN DURING THE LAST YEAR HAVE YOU BEEN UNABLE TO REMEMBER WHAT HAPPENED THE NIGHT BEFORE BECAUSE YOU HAD BEEN DRINKING: NEVER
HAS A RELATIVE, FRIEND, DOCTOR, OR ANOTHER HEALTH PROFESSIONAL EXPRESSED CONCERN ABOUT YOUR DRINKING OR SUGGESTED YOU CUT DOWN: NO

## 2024-06-15 ASSESSMENT — PATIENT HEALTH QUESTIONNAIRE - PHQ9
1. LITTLE INTEREST OR PLEASURE IN DOING THINGS: SEVERAL DAYS
9. THOUGHTS THAT YOU WOULD BE BETTER OFF DEAD, OR OF HURTING YOURSELF: NOT AT ALL
10. IF YOU CHECKED OFF ANY PROBLEMS, HOW DIFFICULT HAVE THESE PROBLEMS MADE IT FOR YOU TO DO YOUR WORK, TAKE CARE OF THINGS AT HOME, OR GET ALONG WITH OTHER PEOPLE: SOMEWHAT DIFFICULT
5. POOR APPETITE OR OVEREATING: NOT AT ALL
SUM OF ALL RESPONSES TO PHQ QUESTIONS 1-9: 5
6. FEELING BAD ABOUT YOURSELF - OR THAT YOU ARE A FAILURE OR HAVE LET YOURSELF OR YOUR FAMILY DOWN: SEVERAL DAYS
7. TROUBLE CONCENTRATING ON THINGS, SUCH AS READING THE NEWSPAPER OR WATCHING TELEVISION: SEVERAL DAYS
SUM OF ALL RESPONSES TO PHQ QUESTIONS 1-9: 5
SUM OF ALL RESPONSES TO PHQ QUESTIONS 1-9: 5
2. FEELING DOWN, DEPRESSED OR HOPELESS: SEVERAL DAYS
SUM OF ALL RESPONSES TO PHQ9 QUESTIONS 1 & 2: 2
4. FEELING TIRED OR HAVING LITTLE ENERGY: SEVERAL DAYS
SUM OF ALL RESPONSES TO PHQ QUESTIONS 1-9: 5
3. TROUBLE FALLING OR STAYING ASLEEP: NOT AT ALL
8. MOVING OR SPEAKING SO SLOWLY THAT OTHER PEOPLE COULD HAVE NOTICED. OR THE OPPOSITE, BEING SO FIGETY OR RESTLESS THAT YOU HAVE BEEN MOVING AROUND A LOT MORE THAN USUAL: NOT AT ALL

## 2024-06-18 ENCOUNTER — OFFICE VISIT (OUTPATIENT)
Dept: PRIMARY CARE CLINIC | Age: 59
End: 2024-06-18
Payer: MEDICARE

## 2024-06-18 VITALS
WEIGHT: 293 LBS | RESPIRATION RATE: 18 BRPM | HEIGHT: 70 IN | SYSTOLIC BLOOD PRESSURE: 110 MMHG | HEART RATE: 79 BPM | TEMPERATURE: 98.3 F | OXYGEN SATURATION: 94 % | BODY MASS INDEX: 41.95 KG/M2 | DIASTOLIC BLOOD PRESSURE: 66 MMHG

## 2024-06-18 DIAGNOSIS — M79.7 FIBROMYALGIA: ICD-10-CM

## 2024-06-18 DIAGNOSIS — E55.9 VITAMIN D DEFICIENCY: ICD-10-CM

## 2024-06-18 DIAGNOSIS — N39.3 STRESS INCONTINENCE: ICD-10-CM

## 2024-06-18 DIAGNOSIS — J30.9 ALLERGIC RHINITIS, UNSPECIFIED SEASONALITY, UNSPECIFIED TRIGGER: ICD-10-CM

## 2024-06-18 DIAGNOSIS — F39 MOOD DISORDER (HCC): ICD-10-CM

## 2024-06-18 DIAGNOSIS — F33.2 SEVERE EPISODE OF RECURRENT MAJOR DEPRESSIVE DISORDER, WITHOUT PSYCHOTIC FEATURES (HCC): ICD-10-CM

## 2024-06-18 DIAGNOSIS — I50.42 CHRONIC COMBINED SYSTOLIC AND DIASTOLIC CONGESTIVE HEART FAILURE (HCC): Chronic | ICD-10-CM

## 2024-06-18 DIAGNOSIS — Z98.84 H/O GASTRIC BYPASS: ICD-10-CM

## 2024-06-18 DIAGNOSIS — D89.40 MAST CELL ACTIVATION SYNDROME (HCC): ICD-10-CM

## 2024-06-18 DIAGNOSIS — Z13.1 DIABETES MELLITUS SCREENING: ICD-10-CM

## 2024-06-18 DIAGNOSIS — I48.92 ATRIAL FLUTTER, UNSPECIFIED TYPE (HCC): ICD-10-CM

## 2024-06-18 DIAGNOSIS — E78.89 ELEVATED HDL: ICD-10-CM

## 2024-06-18 DIAGNOSIS — Z00.00 HEALTHCARE MAINTENANCE: Primary | ICD-10-CM

## 2024-06-18 DIAGNOSIS — Z23 IMMUNIZATION DUE: ICD-10-CM

## 2024-06-18 DIAGNOSIS — I48.19 PERSISTENT ATRIAL FIBRILLATION (HCC): ICD-10-CM

## 2024-06-18 DIAGNOSIS — R63.5 WEIGHT GAIN: ICD-10-CM

## 2024-06-18 PROBLEM — F12.90 MARIJUANA USER: Status: RESOLVED | Noted: 2022-07-06 | Resolved: 2024-06-18

## 2024-06-18 PROBLEM — E78.5 HYPERLIPIDEMIA: Status: ACTIVE | Noted: 2024-06-18

## 2024-06-18 PROBLEM — Z96.652 S/P TOTAL KNEE ARTHROPLASTY, LEFT: Status: RESOLVED | Noted: 2023-07-27 | Resolved: 2024-06-18

## 2024-06-18 PROBLEM — E78.5 HYPERLIPIDEMIA: Status: RESOLVED | Noted: 2024-06-18 | Resolved: 2024-06-18

## 2024-06-18 PROBLEM — Z96.651 S/P TOTAL KNEE ARTHROPLASTY, RIGHT: Status: RESOLVED | Noted: 2023-06-08 | Resolved: 2024-06-18

## 2024-06-18 PROCEDURE — 3017F COLORECTAL CA SCREEN DOC REV: CPT | Performed by: FAMILY MEDICINE

## 2024-06-18 PROCEDURE — G0402 INITIAL PREVENTIVE EXAM: HCPCS | Performed by: FAMILY MEDICINE

## 2024-06-18 PROCEDURE — 90471 IMMUNIZATION ADMIN: CPT | Performed by: FAMILY MEDICINE

## 2024-06-18 PROCEDURE — 90677 PCV20 VACCINE IM: CPT | Performed by: FAMILY MEDICINE

## 2024-06-18 PROCEDURE — 3074F SYST BP LT 130 MM HG: CPT | Performed by: FAMILY MEDICINE

## 2024-06-18 PROCEDURE — G0009 ADMIN PNEUMOCOCCAL VACCINE: HCPCS | Performed by: FAMILY MEDICINE

## 2024-06-18 PROCEDURE — 90715 TDAP VACCINE 7 YRS/> IM: CPT | Performed by: FAMILY MEDICINE

## 2024-06-18 PROCEDURE — 3078F DIAST BP <80 MM HG: CPT | Performed by: FAMILY MEDICINE

## 2024-06-18 RX ORDER — FLUTICASONE FUROATE 27.5 UG/1
2 SPRAY, METERED NASAL DAILY
Qty: 1 EACH | Refills: 1 | Status: SHIPPED | OUTPATIENT
Start: 2024-06-18

## 2024-06-18 ASSESSMENT — ENCOUNTER SYMPTOMS
BACK PAIN: 1
BLOOD IN STOOL: 0
RHINORRHEA: 0
ABDOMINAL PAIN: 0
SHORTNESS OF BREATH: 0
DIARRHEA: 0
COUGH: 0
VOMITING: 0
NAUSEA: 0
COLOR CHANGE: 0

## 2024-06-18 ASSESSMENT — LIFESTYLE VARIABLES
HOW MANY STANDARD DRINKS CONTAINING ALCOHOL DO YOU HAVE ON A TYPICAL DAY: 1 OR 2
HOW OFTEN DO YOU HAVE A DRINK CONTAINING ALCOHOL: 2-3 TIMES A WEEK

## 2024-06-18 NOTE — PATIENT INSTRUCTIONS
Reminder: Please call to schedule dental exam when able.    Micheline soda.    Please find our [facility] Lab Location Guide below for your convenience!    CENTRAL LOCATIONS    1) [facility]  [address]  [place], [place] [address]  Phone: [phone]    2) [facility]  [address], [place] [address]  Phone: [phone]    [facility] LOCATIONS    3) [facility]  [address]  [place], [place] [address]  Phone: [phone]    4) [facility]  [address] [place] [address]  Phone: [phone]    5) [facility]  [address] [place] [place] [address]  Phone: [phone]    6) [facility]  [address]  [place] [address]   Phone: [phone]    [place] LOCATIONS    7) [facility]  [address]  [place] [address]  Phone: [phone]    8) [facility]  [address] [place] [address]  Phone: [phone]    9) [facility]  [address] [place] [address]  Phone: [phone]    10) [facility]  [address]  [place] [address]  Phone: [phone]    [facility] LOCATIONS    11) [facility]  [address]  [place] [address]  Phone: [phone]    12) [facility]  [address]  [place] [address]  Phone: [phone]    [facility] LOCATIONS    13) [facility]  [address]  [place] [address]  Phone: [phone]    14) [facility]  [address] [place] [address]  Phone: [phone]

## 2024-06-18 NOTE — PROGRESS NOTES
Dayami Miller is a 58 y.o. year old female here for:    Chief Complaint:    Chief Complaint   Patient presents with    Annual Exam     Patient's Sex: female Medicare Member ID: [id number] - (Medicare Managed)    Ethnicity:    Non- / Non   Race:   White (non-)    Provider Name:  Michelle Cheek M.D.  [facility]  [address] [place] [address]  Dept: [phone]  Dept Fax: [phone]    Provider ID Type: NPI  Billing Provider ID:  [id number]    Patient Care Team:  Michelle Cheek MD as PCP - General (Family Medicine)  Maggie Camara MD as PCP - Empaneled Provider  Monty Carrillo MD as Consulting Physician (Pulmonology)  Vinay Wallace MD as Consulting Physician (Sleep Medicine Family Practice)  John Gandhi MD as Consulting Physician (Electrophysiology)  Kenny Wiseman MD as Consulting Physician (ALLERGY AND IMMUNOLOGY ALLERGY)    Current concerns:    See below:    Medical History:  Past Medical History:   Diagnosis Date    Acute alcoholic intoxication (HCC) 07/06/2022    Acute lateral meniscus tear of right knee 02/2019    Acute medial meniscus tear of right knee 02/2019    Anesthesia complication     woke up during one surgery    Anxiety     Arthritis     Asthma     Atrial fibrillation (HCC)     new dx 4 weeks ago, first of  Sept 2017    CHF (congestive heart failure) (HCC)     Edema     Fibromyalgia     GERD (gastroesophageal reflux disease)     reflux    Hiatal hernia     History of blood transfusion     History of suicide attempt     Hx of major depression     Hypertension     Idiopathic urticaria 10/29/2020    Insomnia     Mast cell activation syndrome (HCC)     Overdose 04/03/2013    Paroxysmal atrial fibrillation with rapid ventricular response (HCC)     PTSD (post-traumatic stress disorder)     SVT (supraventricular tachycardia) ([facility])     hx svt    Vocal cord dysfunction 03/06/2020       Patient Active Problem List   Diagnosis

## 2024-06-18 NOTE — ASSESSMENT & PLAN NOTE
Poorly controlled, will check TSH as well as perform DM2 screening. Pending results, can explore options and discuss weight.

## 2024-06-19 ENCOUNTER — TELEPHONE (OUTPATIENT)
Dept: PRIMARY CARE CLINIC | Age: 59
End: 2024-06-19

## 2024-06-19 NOTE — ASSESSMENT & PLAN NOTE
Will check vitamin levels as noted in orders below - unclear level of control, will replete PRN. She follows with [facility] for Fe infusions.

## 2024-06-19 NOTE — TELEPHONE ENCOUNTER
----- Message from Michelle Cheek MD sent at 6/18/2024  4:22 PM EDT -----  Can you help me with the colonoscopy recall interval for this patient please? Thank you

## 2024-06-19 NOTE — ASSESSMENT & PLAN NOTE
Poorly controlled, provided referral to go see PMNR provider. She will also check in with her neurosurgeon for epidural steroid injections PRN if appropriate. She prefers no opioids.

## 2024-06-19 NOTE — ASSESSMENT & PLAN NOTE
Overall doing well. Age appropriate screenings and immunizations provided as per orders below. Reminded to schedule dental exam when able. Spot on leg appears to be AK or possible SCC on skin, derm packet provided, she will call to schedule for eval. Other problems addressed today as otherwise noted.

## 2024-06-25 DIAGNOSIS — Z98.84 H/O GASTRIC BYPASS: ICD-10-CM

## 2024-06-25 DIAGNOSIS — R63.5 WEIGHT GAIN: ICD-10-CM

## 2024-06-25 DIAGNOSIS — E78.89 ELEVATED HDL: ICD-10-CM

## 2024-06-25 DIAGNOSIS — E55.9 VITAMIN D DEFICIENCY: ICD-10-CM

## 2024-06-25 DIAGNOSIS — Z13.1 DIABETES MELLITUS SCREENING: ICD-10-CM

## 2024-06-25 LAB
25(OH)D3 SERPL-MCNC: 29.1 NG/ML
FOLATE SERPL-MCNC: 12.46 NG/ML (ref 4.78–24.2)
VIT B12 SERPL-MCNC: 296 PG/ML (ref 211–911)

## 2024-06-26 LAB
ALBUMIN SERPL-MCNC: 4.2 G/DL (ref 3.4–5)
ALBUMIN/GLOB SERPL: 1.8 {RATIO} (ref 1.1–2.2)
ALP SERPL-CCNC: 126 U/L (ref 40–129)
ALT SERPL-CCNC: 13 U/L (ref 10–40)
ANION GAP SERPL CALCULATED.3IONS-SCNC: 11 MMOL/L (ref 3–16)
AST SERPL-CCNC: 13 U/L (ref 15–37)
BILIRUB SERPL-MCNC: 0.6 MG/DL (ref 0–1)
BUN SERPL-MCNC: 12 MG/DL (ref 7–20)
CALCIUM SERPL-MCNC: 9.1 MG/DL (ref 8.3–10.6)
CHLORIDE SERPL-SCNC: 107 MMOL/L (ref 99–110)
CHOLEST SERPL-MCNC: 189 MG/DL (ref 0–199)
CO2 SERPL-SCNC: 24 MMOL/L (ref 21–32)
CREAT SERPL-MCNC: 0.9 MG/DL (ref 0.6–1.1)
GFR SERPLBLD CREATININE-BSD FMLA CKD-EPI: 74 ML/MIN/{1.73_M2}
GLUCOSE SERPL-MCNC: 87 MG/DL (ref 70–99)
HDLC SERPL-MCNC: 105 MG/DL (ref 40–60)
LDLC SERPL CALC-MCNC: 71 MG/DL
POTASSIUM SERPL-SCNC: 4.6 MMOL/L (ref 3.5–5.1)
PROT SERPL-MCNC: 6.6 G/DL (ref 6.4–8.2)
SODIUM SERPL-SCNC: 142 MMOL/L (ref 136–145)
TRIGL SERPL-MCNC: 64 MG/DL (ref 0–150)
TSH SERPL DL<=0.005 MIU/L-ACNC: 2.48 UIU/ML (ref 0.27–4.2)
VLDLC SERPL CALC-MCNC: 13 MG/DL

## 2024-07-15 ENCOUNTER — TELEPHONE (OUTPATIENT)
Dept: PRIMARY CARE CLINIC | Age: 59
End: 2024-07-15

## 2024-07-15 DIAGNOSIS — M54.2 NECK PAIN: Primary | ICD-10-CM

## 2024-07-15 RX ORDER — TIZANIDINE 4 MG/1
4 TABLET ORAL EVERY 8 HOURS PRN
Qty: 270 TABLET | Refills: 0 | Status: SHIPPED | OUTPATIENT
Start: 2024-07-15 | End: 2024-10-13

## 2024-07-15 NOTE — TELEPHONE ENCOUNTER
Spoke with the patient regarding her Tizanidine. Re-iterated pur previous discussion from her first visit which is that I could prescribe this temporarily but long term would need PMNR or pain management. She had opted for the former and referral was provided to Dr. Nguyen - she has not yet scheduled, encouraged her to do so. 90 day supply sent in and advised that further refills would by per Dr. Nguyen and/or pain management. She understood and was amenable to this.

## 2024-07-18 ENCOUNTER — APPOINTMENT (OUTPATIENT)
Dept: GENERAL RADIOLOGY | Age: 59
End: 2024-07-18
Payer: MEDICARE

## 2024-07-18 ENCOUNTER — HOSPITAL ENCOUNTER (EMERGENCY)
Age: 59
Discharge: HOME | End: 2024-07-19
Attending: EMERGENCY MEDICINE
Payer: MEDICARE

## 2024-07-18 ENCOUNTER — APPOINTMENT (OUTPATIENT)
Dept: CT IMAGING | Age: 59
End: 2024-07-18
Payer: MEDICARE

## 2024-07-18 VITALS
RESPIRATION RATE: 18 BRPM | HEIGHT: 70 IN | DIASTOLIC BLOOD PRESSURE: 84 MMHG | TEMPERATURE: 98.5 F | OXYGEN SATURATION: 100 % | SYSTOLIC BLOOD PRESSURE: 134 MMHG | WEIGHT: 293 LBS | BODY MASS INDEX: 41.95 KG/M2 | HEART RATE: 70 BPM

## 2024-07-18 DIAGNOSIS — S80.12XA LEG HEMATOMA, LEFT, INITIAL ENCOUNTER: Primary | ICD-10-CM

## 2024-07-18 PROBLEM — Z13.1 DIABETES MELLITUS SCREENING: Status: RESOLVED | Noted: 2024-06-18 | Resolved: 2024-07-18

## 2024-07-18 PROBLEM — Z00.00 HEALTHCARE MAINTENANCE: Status: RESOLVED | Noted: 2024-06-18 | Resolved: 2024-07-18

## 2024-07-18 LAB
ANION GAP SERPL CALCULATED.3IONS-SCNC: 9 MMOL/L (ref 3–16)
BASOPHILS # BLD: 0 K/UL (ref 0–0.2)
BASOPHILS NFR BLD: 0.8 %
BUN SERPL-MCNC: 17 MG/DL (ref 7–20)
CALCIUM SERPL-MCNC: 8.6 MG/DL (ref 8.3–10.6)
CHLORIDE SERPL-SCNC: 107 MMOL/L (ref 99–110)
CO2 SERPL-SCNC: 26 MMOL/L (ref 21–32)
CREAT SERPL-MCNC: 0.7 MG/DL (ref 0.6–1.1)
DEPRECATED RDW RBC AUTO: 16.4 % (ref 12.4–15.4)
EOSINOPHIL # BLD: 0.2 K/UL (ref 0–0.6)
EOSINOPHIL NFR BLD: 3.9 %
GFR SERPLBLD CREATININE-BSD FMLA CKD-EPI: >90 ML/MIN/{1.73_M2}
GLUCOSE SERPL-MCNC: 105 MG/DL (ref 70–99)
HCT VFR BLD AUTO: 37.7 % (ref 36–48)
HGB BLD-MCNC: 12.1 G/DL (ref 12–16)
LYMPHOCYTES # BLD: 1.4 K/UL (ref 1–5.1)
LYMPHOCYTES NFR BLD: 21.9 %
MCH RBC QN AUTO: 29.8 PG (ref 26–34)
MCHC RBC AUTO-ENTMCNC: 32.1 G/DL (ref 31–36)
MCV RBC AUTO: 92.7 FL (ref 80–100)
MONOCYTES # BLD: 0.6 K/UL (ref 0–1.3)
MONOCYTES NFR BLD: 9.7 %
NEUTROPHILS # BLD: 3.9 K/UL (ref 1.7–7.7)
NEUTROPHILS NFR BLD: 63.7 %
PLATELET # BLD AUTO: 229 K/UL (ref 135–450)
PMV BLD AUTO: 8.6 FL (ref 5–10.5)
POTASSIUM SERPL-SCNC: 3.9 MMOL/L (ref 3.5–5.1)
RBC # BLD AUTO: 4.07 M/UL (ref 4–5.2)
SODIUM SERPL-SCNC: 142 MMOL/L (ref 136–145)
WBC # BLD AUTO: 6.2 K/UL (ref 4–11)

## 2024-07-18 PROCEDURE — 96375 TX/PRO/DX INJ NEW DRUG ADDON: CPT

## 2024-07-18 PROCEDURE — 73706 CT ANGIO LWR EXTR W/O&W/DYE: CPT

## 2024-07-18 PROCEDURE — 85025 COMPLETE CBC W/AUTO DIFF WBC: CPT

## 2024-07-18 PROCEDURE — 96374 THER/PROPH/DIAG INJ IV PUSH: CPT

## 2024-07-18 PROCEDURE — 73610 X-RAY EXAM OF ANKLE: CPT

## 2024-07-18 PROCEDURE — 73590 X-RAY EXAM OF LOWER LEG: CPT

## 2024-07-18 PROCEDURE — 6370000000 HC RX 637 (ALT 250 FOR IP)

## 2024-07-18 PROCEDURE — 6360000004 HC RX CONTRAST MEDICATION

## 2024-07-18 PROCEDURE — 80048 BASIC METABOLIC PNL TOTAL CA: CPT

## 2024-07-18 PROCEDURE — 6360000002 HC RX W HCPCS

## 2024-07-18 PROCEDURE — 99285 EMERGENCY DEPT VISIT HI MDM: CPT

## 2024-07-18 PROCEDURE — 36415 COLL VENOUS BLD VENIPUNCTURE: CPT

## 2024-07-18 RX ORDER — OXYCODONE HYDROCHLORIDE 5 MG/1
5 TABLET ORAL ONCE
Status: COMPLETED | OUTPATIENT
Start: 2024-07-18 | End: 2024-07-18

## 2024-07-18 RX ORDER — METHOCARBAMOL 500 MG/1
1000 TABLET, FILM COATED ORAL ONCE
Status: COMPLETED | OUTPATIENT
Start: 2024-07-18 | End: 2024-07-18

## 2024-07-18 RX ORDER — DROPERIDOL 2.5 MG/ML
1.25 INJECTION, SOLUTION INTRAMUSCULAR; INTRAVENOUS ONCE
Status: COMPLETED | OUTPATIENT
Start: 2024-07-18 | End: 2024-07-18

## 2024-07-18 RX ORDER — HYDROMORPHONE HYDROCHLORIDE 1 MG/ML
1 INJECTION, SOLUTION INTRAMUSCULAR; INTRAVENOUS; SUBCUTANEOUS ONCE
Status: COMPLETED | OUTPATIENT
Start: 2024-07-18 | End: 2024-07-18

## 2024-07-18 RX ORDER — ACETAMINOPHEN 500 MG
1000 TABLET ORAL ONCE
Status: COMPLETED | OUTPATIENT
Start: 2024-07-18 | End: 2024-07-18

## 2024-07-18 RX ADMIN — METHOCARBAMOL 1000 MG: 500 TABLET ORAL at 21:33

## 2024-07-18 RX ADMIN — HYDROMORPHONE HYDROCHLORIDE 1 MG: 1 INJECTION, SOLUTION INTRAMUSCULAR; INTRAVENOUS; SUBCUTANEOUS at 19:45

## 2024-07-18 RX ADMIN — OXYCODONE 5 MG: 5 TABLET ORAL at 21:33

## 2024-07-18 RX ADMIN — DROPERIDOL 1.25 MG: 2.5 INJECTION, SOLUTION INTRAMUSCULAR; INTRAVENOUS at 23:08

## 2024-07-18 RX ADMIN — ACETAMINOPHEN 1000 MG: 500 TABLET ORAL at 21:33

## 2024-07-18 RX ADMIN — IOPAMIDOL 75 ML: 755 INJECTION, SOLUTION INTRAVENOUS at 23:28

## 2024-07-18 ASSESSMENT — PAIN - FUNCTIONAL ASSESSMENT
PAIN_FUNCTIONAL_ASSESSMENT: 0-10
PAIN_FUNCTIONAL_ASSESSMENT: 0-10

## 2024-07-18 ASSESSMENT — PAIN DESCRIPTION - FREQUENCY: FREQUENCY: CONTINUOUS

## 2024-07-18 ASSESSMENT — PAIN SCALES - GENERAL
PAINLEVEL_OUTOF10: 9
PAINLEVEL_OUTOF10: 2

## 2024-07-18 ASSESSMENT — PAIN DESCRIPTION - DESCRIPTORS: DESCRIPTORS: ACHING;SHARP;SHOOTING

## 2024-07-18 ASSESSMENT — PAIN DESCRIPTION - PAIN TYPE: TYPE: ACUTE PAIN

## 2024-07-18 ASSESSMENT — PAIN DESCRIPTION - ORIENTATION: ORIENTATION: LEFT

## 2024-07-18 ASSESSMENT — PAIN DESCRIPTION - LOCATION: LOCATION: LEG

## 2024-07-19 ENCOUNTER — CARE COORDINATION (OUTPATIENT)
Dept: CARE COORDINATION | Age: 59
End: 2024-07-19

## 2024-07-19 ENCOUNTER — TELEPHONE (OUTPATIENT)
Dept: PRIMARY CARE CLINIC | Age: 59
End: 2024-07-19

## 2024-07-19 RX ORDER — METHOCARBAMOL 500 MG/1
750 TABLET, FILM COATED ORAL 4 TIMES DAILY
Qty: 60 TABLET | Refills: 0 | Status: SHIPPED | OUTPATIENT
Start: 2024-07-19 | End: 2024-07-29

## 2024-07-19 RX ORDER — OXYCODONE HYDROCHLORIDE 5 MG/1
5 TABLET ORAL EVERY 6 HOURS PRN
Qty: 12 TABLET | Refills: 0 | Status: SHIPPED | OUTPATIENT
Start: 2024-07-19 | End: 2024-07-22

## 2024-07-19 RX ORDER — ACETAMINOPHEN 500 MG
1000 TABLET ORAL EVERY 6 HOURS PRN
Qty: 120 TABLET | Refills: 0 | Status: SHIPPED | OUTPATIENT
Start: 2024-07-19

## 2024-07-19 ASSESSMENT — PAIN - FUNCTIONAL ASSESSMENT: PAIN_FUNCTIONAL_ASSESSMENT: 0-10

## 2024-07-19 ASSESSMENT — PAIN SCALES - GENERAL: PAINLEVEL_OUTOF10: 2

## 2024-07-19 NOTE — ED PROVIDER NOTES
ED Attending Attestation Note     Date of evaluation: 7/18/2024    This patient was seen by the resident.  I have seen and examined the patient, agree with the workup, evaluation, management and diagnosis. The care plan has been discussed.  My assessment reveals 58-year-old female with a large hematoma on the lateral aspect of the left leg.  Normal distal pulses.       Russ Stack MD  07/18/24 2111

## 2024-07-19 NOTE — ED PROVIDER NOTES
THE [facility]  EMERGENCY DEPARTMENT ENCOUNTER          EM RESIDENT NOTE     Date of evaluation: 7/18/2024    Chief Complaint     Leg Pain (Fall L leg injury. 100mcg fentanyl from EMS)    History of Present Illness     Dayami Miller is a 58 y.o. female who presents with left lower extremity pain following a mechanical fall.  Patient states she tripped over a corn hole board in her backyard landing on her left side.  She reports significant pain in her left lower leg with some associated tingling.  Denies head strike or loss of consciousness.  Denies use of blood thinners.    MEDICAL DECISION MAKING / ASSESSMENT / PLAN     INITIAL VITALS: BP: 134/84, Temp: 98.5 °F (36.9 °C), Pulse: 70, Respirations: 18, SpO2: 100 %    Dayami Miller is a 58 y.o. female who presents for evaluation and a mechanical fall.  She has notable soft tissue swelling and ecchymoses to the lateral aspect of her left lower leg without obvious bony deformity.  Does report some mild distal tingling in her left lower extremity but has intact sensation and compressible compartments.  Overall low clinical concern for compartment syndrome.  X-rays show subcutaneous hematoma but no fracture or dislocation.  Patient received 100 mcg of fentanyl en route by EMS but arrived in significant pain.  She was given an additional dose of Dilaudid 1 mg IV.  On reassessment patient was still teary-eyed and visibly uncomfortable.  She was given additional dose of multimodal pain control with Tylenol 1000 mg, Robaxin 1000 mg, and oxycodone 5 mg.  Additional laboratory studies were obtained and showed no significant drop in hemoglobin or other abnormalities.  On reassessment, she continued to endorse severe pain. A CTA of the left lower extremity was obtained given her severe, persistent, unexplained pain which showed a 8.3 x 3.9 cm subcutaneous hematoma without acute vascular abnormality or significant extravasation. She was given droperidol 1.25 mg IV

## 2024-07-19 NOTE — DISCHARGE INSTRUCTIONS
Seen in the emergency department for injury to your leg.  Your imaging revealed a hematoma - see separate handout for more details.    You have prescribed several pain medications.  Please take as directed.     Please return to the emergency department if you develop worsening tingling, numbness, or severe uncontrolled pain.

## 2024-07-19 NOTE — CARE COORDINATION
Ambulatory Care Coordination  ED Follow up Call    Reason for ED visit:  fall    Status:     not changed    Did you call your PCP prior to going to the ED?  No      Did you receive a discharge instructions from the Emergency Room? Yes  Review of Instructions:     Understands what to report/when to return?:  Yes   Understands discharge instructions?:  Yes   Following discharge instructions?:  Yes   If not why?     Are there any new complaints of pain? Yes   New Pain Meds? Yes    Constipation prophylaxis needed?  No    If you have a wound is the dressing clean, dry, and intact? Yes  Understands wound care regimen? Yes    Are there any other complaints/concerns that you wish to tell your provider?   No    FU appts/Provider:    Future Appointments   Date Time Provider Department Center   8/6/2024  1:45 PM Tanner Byrd MD  [facility]   1/21/2025  2:00 PM Michelle Cheek MD [facility]       Patient went to ED for a fall. Reported pain is about the same. AVS reviewed. Stated that family is out picking up her new pain prescriptions now. Politely declined scheduling appointment with PCP. Stated that she spoke with PCP this afternoon on the phone. Will call PCP office next week to schedule if pain is not improved. Declined any other questions or concerns at this time.     New Medications?:   Yes      Medication Reconciliation by phone - No  Understands Medications?  Yes  Taking Medications? Yes  Can you swallow your pills?  Yes    Any further needs in the home i.e. Equipment?  No    Link to services in community?:  No   Which services:

## 2024-07-19 NOTE — TELEPHONE ENCOUNTER
Spoke with the patient - had a mechanical fall (tripped over a toy), offered ED f/u visit. Declined. Call back/ED precautions discussed.

## 2024-07-24 ENCOUNTER — OFFICE VISIT (OUTPATIENT)
Dept: PRIMARY CARE CLINIC | Age: 59
End: 2024-07-24
Payer: MEDICARE

## 2024-07-24 VITALS
HEIGHT: 70 IN | DIASTOLIC BLOOD PRESSURE: 84 MMHG | RESPIRATION RATE: 16 BRPM | HEART RATE: 68 BPM | TEMPERATURE: 98.6 F | WEIGHT: 293 LBS | OXYGEN SATURATION: 94 % | SYSTOLIC BLOOD PRESSURE: 136 MMHG | BODY MASS INDEX: 41.95 KG/M2

## 2024-07-24 DIAGNOSIS — S89.92XS INJURY OF LEFT LOWER EXTREMITY, SEQUELA: Primary | ICD-10-CM

## 2024-07-24 PROBLEM — S89.92XA INJURY OF LEFT LEG: Status: ACTIVE | Noted: 2024-07-24

## 2024-07-24 PROCEDURE — 3017F COLORECTAL CA SCREEN DOC REV: CPT | Performed by: FAMILY MEDICINE

## 2024-07-24 PROCEDURE — 1036F TOBACCO NON-USER: CPT | Performed by: FAMILY MEDICINE

## 2024-07-24 PROCEDURE — G8427 DOCREV CUR MEDS BY ELIG CLIN: HCPCS | Performed by: FAMILY MEDICINE

## 2024-07-24 PROCEDURE — 3075F SYST BP GE 130 - 139MM HG: CPT | Performed by: FAMILY MEDICINE

## 2024-07-24 PROCEDURE — 3079F DIAST BP 80-89 MM HG: CPT | Performed by: FAMILY MEDICINE

## 2024-07-24 PROCEDURE — G8417 CALC BMI ABV UP PARAM F/U: HCPCS | Performed by: FAMILY MEDICINE

## 2024-07-24 PROCEDURE — 99213 OFFICE O/P EST LOW 20 MIN: CPT | Performed by: FAMILY MEDICINE

## 2024-07-24 ASSESSMENT — ENCOUNTER SYMPTOMS
SHORTNESS OF BREATH: 0
VOMITING: 0
RHINORRHEA: 0
COUGH: 0
ROS SKIN COMMENTS: PER HPI
BLOOD IN STOOL: 0
DIARRHEA: 0
NAUSEA: 0

## 2024-07-24 NOTE — ASSESSMENT & PLAN NOTE
Poorly controlled. Counseled on need for RICE, she will try this. Referral provided and sheduled with ortho for 2 days from now for eval, if not improved and then may need opening up to avoid skin breakdown/death due to tense hematoma. She was amenable to this plan. Call back/ED precautions discussed.

## 2024-07-24 NOTE — PROGRESS NOTES
Dayami Miller is a 58 y.o. year old female here for:    Chief Complaint:    Chief Complaint   Patient presents with    Leg Swelling     Subjective:    Today, her current concerns include:    HPI:  #Leg swelling  - Onset: ~1 week ago  - Context: Fell (tripped over a toy), went to ED, no fracture but bad sprain/hematoma  - Location: Left leg  - Quality: Tight and painful to walk on  - Severity: 8/10 at worst and when bearing weight - but can bear weight  - Timing/Duration: Constant since onset  - Progression: Not improved - possibly a little worse  - Modifiers: Has been trying to elevate, rare ice and no compression  - Associated Symptoms: Per ROS as otherwise stated in this note    Past Medical History:   Diagnosis Date    Acute alcoholic intoxication (HCC) 07/06/2022    Acute lateral meniscus tear of right knee 02/2019    Acute medial meniscus tear of right knee 02/2019    Anesthesia complication     woke up during one surgery    Anxiety     Arthritis     Asthma     Atrial fibrillation (HCC)     new dx 4 weeks ago, first of  Sept 2017    CHF (congestive heart failure) (HCC)     Edema     Fibromyalgia     GERD (gastroesophageal reflux disease)     reflux    Hiatal hernia     History of blood transfusion     History of suicide attempt     Hx of major depression     Hypertension     Idiopathic urticaria 10/29/2020    Insomnia     Mast cell activation syndrome (HCC)     Overdose 04/03/2013    Paroxysmal atrial fibrillation with rapid ventricular response (HCC)     PTSD (post-traumatic stress disorder)     SVT (supraventricular tachycardia) (HCC)     hx svt    Vocal cord dysfunction 03/06/2020     Social History     Tobacco Use    Smoking status: Never    Smokeless tobacco: Never   Vaping Use    Vaping Use: Never used   Substance Use Topics    Alcohol use: Yes     Comment: 0-1 drinks per month    Drug use: Yes     Types: Marijuana (Weed), Other-see comments     Comment: Medical Marijuana use     Family History

## 2024-07-26 ENCOUNTER — OFFICE VISIT (OUTPATIENT)
Dept: ORTHOPEDIC SURGERY | Age: 59
End: 2024-07-26

## 2024-07-26 VITALS — WEIGHT: 293 LBS | HEIGHT: 70 IN | BODY MASS INDEX: 41.95 KG/M2

## 2024-07-26 DIAGNOSIS — S80.12XA LEG HEMATOMA, LEFT, INITIAL ENCOUNTER: Primary | ICD-10-CM

## 2024-07-26 DIAGNOSIS — S93.402A MODERATE LEFT ANKLE SPRAIN, INITIAL ENCOUNTER: ICD-10-CM

## 2024-07-26 NOTE — PROGRESS NOTES
0    mometasone-formoterol (DULERA) 200-5 MCG/ACT inhaler Inhale 2 puffs into the lungs in the morning and 2 puffs in the evening.      fluticasone (FLONASE SENSIMIST) 27.5 MCG/SPRAY nasal spray 2 sprays by Each Nostril route daily 1 each 1    albuterol sulfate HFA (VENTOLIN HFA) 108 (90 Base) MCG/ACT inhaler USE 2 PUFFS BY MOUTH EVERY 4 TO 6 HOURS AS NEEDED 1 each 4    buPROPion (WELLBUTRIN) 100 MG tablet Take 1 tablet by mouth 2 times daily 60 tablet 5    busPIRone (BUSPAR) 15 MG tablet Take 30 mg by mouth 2 times daily (Patient taking differently: Take 30 mg by mouth 2 times daily 30mg BID) 60 tablet 5    desvenlafaxine succinate (PRISTIQ) 100 MG TB24 extended release tablet Take 1 tablet by mouth daily 30 tablet 5    hydrOXYzine pamoate (VISTARIL) 50 MG capsule Take 1 capsule by mouth 3 times daily as needed for Anxiety (sleep) 90 capsule 1    rOPINIRole (REQUIP) 1 MG tablet Take 1.5 tablets by mouth in the morning and at bedtime 90 tablet 5    traZODone (DESYREL) 100 MG tablet Take 1 tablet by mouth nightly 30 tablet 5    diclofenac sodium (VOLTAREN) 1 % GEL Apply 4 g topically 4 times daily 350 g 0    Respiratory Therapy Supplies (NEBULIZER/TUBING/MOUTHPIECE) KIT 1 kit by Does not apply route in the morning, at noon, in the evening, and at bedtime 1 kit 0    ipratropium 0.5 mg-albuterol 2.5 mg (DUONEB) 0.5-2.5 (3) MG/3ML SOLN nebulizer solution Inhale 3 mLs into the lungs every 4 hours as needed for Shortness of Breath 360 mL 1    furosemide (LASIX) 40 MG tablet Take 1 tablet by mouth daily (Patient taking differently: Take 1 tablet by mouth daily In am) 60 tablet 3     No current facility-administered medications for this visit.     Past Medical History:   Diagnosis Date    Acute alcoholic intoxication (HCC) 07/06/2022    Acute lateral meniscus tear of right knee 02/2019    Acute medial meniscus tear of right knee 02/2019    Anesthesia complication     woke up during one surgery    Anxiety     Arthritis

## 2024-07-29 ENCOUNTER — TELEPHONE (OUTPATIENT)
Dept: PRIMARY CARE CLINIC | Age: 59
End: 2024-07-29

## 2024-07-29 NOTE — TELEPHONE ENCOUNTER
Spoke with the patient, she is plugged in with ortho, leg is swollen foot>leg but improving with ACE bandage. Scheduled to see ortho in 10 days. Call back/ED precautions discussed.

## 2024-08-05 ENCOUNTER — OFFICE VISIT (OUTPATIENT)
Age: 59
End: 2024-08-05
Payer: MEDICARE

## 2024-08-05 VITALS — HEIGHT: 70 IN | BODY MASS INDEX: 41.95 KG/M2 | WEIGHT: 293 LBS

## 2024-08-05 DIAGNOSIS — S80.12XD LEG HEMATOMA, LEFT, SUBSEQUENT ENCOUNTER: Primary | ICD-10-CM

## 2024-08-05 DIAGNOSIS — S93.402D MODERATE LEFT ANKLE SPRAIN, SUBSEQUENT ENCOUNTER: ICD-10-CM

## 2024-08-05 PROCEDURE — G8427 DOCREV CUR MEDS BY ELIG CLIN: HCPCS | Performed by: ORTHOPAEDIC SURGERY

## 2024-08-05 PROCEDURE — 99212 OFFICE O/P EST SF 10 MIN: CPT | Performed by: ORTHOPAEDIC SURGERY

## 2024-08-05 PROCEDURE — 3017F COLORECTAL CA SCREEN DOC REV: CPT | Performed by: ORTHOPAEDIC SURGERY

## 2024-08-05 PROCEDURE — G8417 CALC BMI ABV UP PARAM F/U: HCPCS | Performed by: ORTHOPAEDIC SURGERY

## 2024-08-05 PROCEDURE — 1036F TOBACCO NON-USER: CPT | Performed by: ORTHOPAEDIC SURGERY

## 2024-08-05 NOTE — PROGRESS NOTES
ensure accuracy; however, inadvertent  Unintentional computerized transcription errors may be present.

## 2024-08-06 ENCOUNTER — PATIENT MESSAGE (OUTPATIENT)
Dept: PRIMARY CARE CLINIC | Age: 59
End: 2024-08-06

## 2024-08-07 ENCOUNTER — OFFICE VISIT (OUTPATIENT)
Dept: ORTHOPEDIC SURGERY | Age: 59
End: 2024-08-07
Payer: MEDICARE

## 2024-08-07 VITALS — HEIGHT: 70 IN | WEIGHT: 293 LBS | BODY MASS INDEX: 41.95 KG/M2

## 2024-08-07 DIAGNOSIS — Z98.1 S/P CERVICAL SPINAL FUSION: Primary | ICD-10-CM

## 2024-08-07 PROCEDURE — G8417 CALC BMI ABV UP PARAM F/U: HCPCS | Performed by: ORTHOPAEDIC SURGERY

## 2024-08-07 PROCEDURE — G8427 DOCREV CUR MEDS BY ELIG CLIN: HCPCS | Performed by: ORTHOPAEDIC SURGERY

## 2024-08-07 PROCEDURE — 3017F COLORECTAL CA SCREEN DOC REV: CPT | Performed by: ORTHOPAEDIC SURGERY

## 2024-08-07 PROCEDURE — 99214 OFFICE O/P EST MOD 30 MIN: CPT | Performed by: ORTHOPAEDIC SURGERY

## 2024-08-07 PROCEDURE — 1036F TOBACCO NON-USER: CPT | Performed by: ORTHOPAEDIC SURGERY

## 2024-08-07 NOTE — PROGRESS NOTES
any tenderness, deformity or injury. Range of motion is unremarkable. There is no gross instability.  There are no rashes, ulcerations or lesions. Strength and tone are normal.  LEFT UPPER EXTREMITY: Inspection/examination of the left upper extremity does not show any tenderness, deformity or injury. Range of motion is unremarkable. There is no gross instability.  There are no rashes, ulcerations or lesions. Strength and tone are normal.    Diagnostic Testing:  I reviewed AP and lateral x-rays of cervical spine obtained in the office today.  Those show post fusion changes C5-C6 C6-C7    I reviewed MRI images of her cervical spine from 1/3/2023 in the office today.  There show post fusion changes C5-6 C6-C7 with multilevel bilateral foraminal stenosis    I reviewed MRI images of her cervical spine from 9/21/22. They show degenerative disc changes with a right disc protrusion and moderate-severe right foraminal narrowing C3-4, and disc bulging and facet arthrosis C5-6 and C6-7 with severe bilateral foraminal narrowing.    I reviewed CT images of her cervical spine from 11/5/2021 the office today.  Those show spondylosis with moderate to severe bilateral foraminal stenosis C5-C6 and C6-C7    I reviewed an EMG of her upper extremities from 11/9/2021.  This showed bilateral median nerve lesions at the wrist and no evidence of radiculopathy.    Impression:   Cervical DDD with radiculopathy    Plan:    We discussed treatment options including observation, upper extremity EMG, physical therapy, epidural injections or ACDF. We discussed the risk and benefits of spinal injection including the risk of anaphylaxis, nerve injury, spinal cord injury, vessel injury, infection and CSF leak. She understands those risks and wishes to proceed with cervical epidural injection.

## 2024-09-03 NOTE — TELEPHONE ENCOUNTER
Dasha Medellin called requesting a refill on Norco 7.5-325. I let her know that this refill would require an appt. She states she is scheduled to see pain management on 1/28/2021 and Anjana Ashford has been prescribing her Noroc in the meantime. Pt had VV with Dr. Katia Ronquillo on 1/15/2021. Please advise. Thanks. Medication name: hydrocodone-acetaminophen (Norco)  Medication dose: 7.5-325 mg  Frequency: Take 1-2 tablets by mouth every 8 hours as needed for Pain for up to 10 days.  Intended supply: 30 days  Quantity: 60 tabs    [facility] [phone]      Last ov: VV 1/15/2021  Last labs: 7/3/2019 Pt up for discharge, pt requesting analgesic for HA prior to discharge.

## 2024-09-07 ENCOUNTER — APPOINTMENT (OUTPATIENT)
Age: 59
DRG: 918 | End: 2024-09-07
Payer: MEDICARE

## 2024-09-07 ENCOUNTER — HOSPITAL ENCOUNTER (INPATIENT)
Age: 59
LOS: 1 days | Discharge: PSYCHIATRIC OR SUBSTANCE USE FACILITY | DRG: 918 | End: 2024-09-08
Attending: EMERGENCY MEDICINE | Admitting: INTERNAL MEDICINE
Payer: MEDICARE

## 2024-09-07 DIAGNOSIS — R41.82 ALTERED MENTAL STATUS, UNSPECIFIED ALTERED MENTAL STATUS TYPE: ICD-10-CM

## 2024-09-07 DIAGNOSIS — I44.0 FIRST DEGREE AV BLOCK: ICD-10-CM

## 2024-09-07 DIAGNOSIS — T50.902A INTENTIONAL OVERDOSE, INITIAL ENCOUNTER (HCC): Primary | ICD-10-CM

## 2024-09-07 PROBLEM — F19.921 DRUG INTOXICATION WITH DELIRIUM (HCC): Status: ACTIVE | Noted: 2024-09-07

## 2024-09-07 LAB
ALBUMIN SERPL-MCNC: 4.3 G/DL (ref 3.4–5)
ALBUMIN/GLOB SERPL: 1.6 {RATIO}
ALP SERPL-CCNC: 106 U/L (ref 40–129)
ALT SERPL-CCNC: 12 U/L (ref 10–40)
ANION GAP SERPL CALCULATED.3IONS-SCNC: 12 MMOL/L (ref 3–16)
APAP SERPL-MCNC: <5 UG/ML (ref 10–30)
AST SERPL-CCNC: 22 U/L (ref 15–37)
BASOPHILS # BLD: 0.02 K/UL (ref 0–0.2)
BASOPHILS NFR BLD: 0 %
BILIRUB SERPL-MCNC: 0.5 MG/DL (ref 0–1)
BNP SERPL-MCNC: 388 PG/ML (ref 0–124)
BUN SERPL-MCNC: 16 MG/DL (ref 7–20)
CALCIUM SERPL-MCNC: 8.6 MG/DL (ref 8.3–10.6)
CHLORIDE SERPL-SCNC: 103 MMOL/L (ref 99–110)
CO2 SERPL-SCNC: 23 MMOL/L (ref 21–32)
COHGB MFR BLD: 1.8 % (ref 0–1.5)
CREAT SERPL-MCNC: 0.8 MG/DL (ref 0.5–1)
CRP SERPL HS-MCNC: 6 MG/L (ref 0–5.1)
EOSINOPHIL # BLD: 0.33 K/UL (ref 0–0.6)
EOSINOPHILS RELATIVE PERCENT: 5 %
ERYTHROCYTE [DISTWIDTH] IN BLOOD BY AUTOMATED COUNT: 13.8 % (ref 12.4–15.4)
ERYTHROCYTE [SEDIMENTATION RATE] IN BLOOD BY WESTERGREN METHOD: 13 MM/HR (ref 0–30)
ETHANOLAMINE SERPL-MCNC: 11 MG/DL (ref 0–0.08)
GFR, ESTIMATED: 81 ML/MIN/1.73M2
GLUCOSE SERPL-MCNC: 143 MG/DL (ref 70–99)
HCO3 VENOUS: 20.8 MMOL/L (ref 23–29)
HCT VFR BLD AUTO: 37.6 % (ref 36–48)
HGB BLD-MCNC: 12.2 G/DL (ref 12–16)
IMM GRANULOCYTES # BLD AUTO: 0.08 K/UL (ref 0–0.5)
IMM GRANULOCYTES NFR BLD: 1 %
INR PPP: 0.9 (ref 0.9–1.2)
LACTATE BLDV-SCNC: 1.6 MMOL/L (ref 0.4–1.9)
LIPASE SERPL-CCNC: 24 U/L (ref 13–60)
LYMPHOCYTES NFR BLD: 1.31 K/UL (ref 1–5.1)
LYMPHOCYTES RELATIVE PERCENT: 19 %
MCH RBC QN AUTO: 30.2 PG (ref 26–34)
MCHC RBC AUTO-ENTMCNC: 32.4 G/DL (ref 31–36)
MCV RBC AUTO: 93.1 FL (ref 80–100)
METHEMOGLOBIN: 0.2 % (ref 0–1.4)
MONOCYTES NFR BLD: 0.67 K/UL (ref 0–1.3)
MONOCYTES NFR BLD: 10 %
NEGATIVE BASE EXCESS, VEN: 4.1 MMOL/L
NEUTROPHILS NFR BLD: 66 %
NEUTS SEG NFR BLD: 4.6 K/UL (ref 1.7–7.7)
O2 SAT, VEN: 96.9 %
PCO2 VENOUS: 37.8 MM HG (ref 40–50)
PH VENOUS: 7.36 (ref 7.35–7.45)
PLATELET # BLD AUTO: 258 K/UL (ref 135–450)
PMV BLD AUTO: 10.5 FL
PO2 VENOUS: 106.9 MM HG
POTASSIUM SERPL-SCNC: 4.4 MMOL/L (ref 3.5–5.1)
PROCALCITONIN SERPL-MCNC: 0.03 NG/ML (ref 0–0.15)
PROT SERPL-MCNC: 7 G/DL (ref 6.4–8.2)
PROTHROMBIN TIME: 12.6 SEC (ref 11.9–14.9)
RBC # BLD AUTO: 4.04 M/UL (ref 4–5.2)
SALICYLATES SERPL-MCNC: <0.5 MG/DL (ref 15–30)
SODIUM SERPL-SCNC: 137 MMOL/L (ref 136–145)
TROPONIN I SERPL HS-MCNC: 10 NG/L (ref 0–14)
TROPONIN I SERPL HS-MCNC: 9 NG/L (ref 0–14)
WBC OTHER # BLD: 7 K/UL (ref 4–11)

## 2024-09-07 PROCEDURE — 82805 BLOOD GASES W/O2 SATURATION: CPT

## 2024-09-07 PROCEDURE — 85652 RBC SED RATE AUTOMATED: CPT

## 2024-09-07 PROCEDURE — 70450 CT HEAD/BRAIN W/O DYE: CPT

## 2024-09-07 PROCEDURE — G0378 HOSPITAL OBSERVATION PER HR: HCPCS

## 2024-09-07 PROCEDURE — 83690 ASSAY OF LIPASE: CPT

## 2024-09-07 PROCEDURE — 80143 DRUG ASSAY ACETAMINOPHEN: CPT

## 2024-09-07 PROCEDURE — 86140 C-REACTIVE PROTEIN: CPT

## 2024-09-07 PROCEDURE — 94640 AIRWAY INHALATION TREATMENT: CPT

## 2024-09-07 PROCEDURE — 36415 COLL VENOUS BLD VENIPUNCTURE: CPT

## 2024-09-07 PROCEDURE — 85025 COMPLETE CBC W/AUTO DIFF WBC: CPT

## 2024-09-07 PROCEDURE — 6360000002 HC RX W HCPCS: Performed by: EMERGENCY MEDICINE

## 2024-09-07 PROCEDURE — G0480 DRUG TEST DEF 1-7 CLASSES: HCPCS

## 2024-09-07 PROCEDURE — 99285 EMERGENCY DEPT VISIT HI MDM: CPT

## 2024-09-07 PROCEDURE — 84484 ASSAY OF TROPONIN QUANT: CPT

## 2024-09-07 PROCEDURE — 80053 COMPREHEN METABOLIC PANEL: CPT

## 2024-09-07 PROCEDURE — 83880 ASSAY OF NATRIURETIC PEPTIDE: CPT

## 2024-09-07 PROCEDURE — 85610 PROTHROMBIN TIME: CPT

## 2024-09-07 PROCEDURE — 84145 PROCALCITONIN (PCT): CPT

## 2024-09-07 PROCEDURE — 2060000000 HC ICU INTERMEDIATE R&B

## 2024-09-07 PROCEDURE — 80179 DRUG ASSAY SALICYLATE: CPT

## 2024-09-07 PROCEDURE — 96372 THER/PROPH/DIAG INJ SC/IM: CPT

## 2024-09-07 PROCEDURE — 96374 THER/PROPH/DIAG INJ IV PUSH: CPT

## 2024-09-07 PROCEDURE — 83605 ASSAY OF LACTIC ACID: CPT

## 2024-09-07 PROCEDURE — 6370000000 HC RX 637 (ALT 250 FOR IP): Performed by: EMERGENCY MEDICINE

## 2024-09-07 PROCEDURE — 71045 X-RAY EXAM CHEST 1 VIEW: CPT

## 2024-09-07 RX ORDER — LORAZEPAM 2 MG/ML
1 INJECTION INTRAMUSCULAR ONCE
Status: DISCONTINUED | OUTPATIENT
Start: 2024-09-07 | End: 2024-09-07

## 2024-09-07 RX ORDER — LORAZEPAM 2 MG/ML
2 INJECTION INTRAMUSCULAR ONCE
Status: COMPLETED | OUTPATIENT
Start: 2024-09-07 | End: 2024-09-08

## 2024-09-07 RX ORDER — NALOXONE HYDROCHLORIDE 0.4 MG/ML
0.4 INJECTION, SOLUTION INTRAMUSCULAR; INTRAVENOUS; SUBCUTANEOUS ONCE
Status: COMPLETED | OUTPATIENT
Start: 2024-09-07 | End: 2024-09-07

## 2024-09-07 RX ORDER — SODIUM CHLORIDE 9 MG/ML
INJECTION, SOLUTION INTRAVENOUS PRN
Status: DISCONTINUED | OUTPATIENT
Start: 2024-09-07 | End: 2024-09-08 | Stop reason: HOSPADM

## 2024-09-07 RX ORDER — DEXMEDETOMIDINE HYDROCHLORIDE 4 UG/ML
.1-1.5 INJECTION, SOLUTION INTRAVENOUS CONTINUOUS
Status: DISCONTINUED | OUTPATIENT
Start: 2024-09-07 | End: 2024-09-07

## 2024-09-07 RX ORDER — LANOLIN ALCOHOL/MO/W.PET/CERES
3 CREAM (GRAM) TOPICAL NIGHTLY PRN
Status: DISCONTINUED | OUTPATIENT
Start: 2024-09-07 | End: 2024-09-08 | Stop reason: HOSPADM

## 2024-09-07 RX ORDER — DROPERIDOL 2.5 MG/ML
1.25 INJECTION, SOLUTION INTRAMUSCULAR; INTRAVENOUS EVERY 6 HOURS PRN
Status: DISCONTINUED | OUTPATIENT
Start: 2024-09-07 | End: 2024-09-08 | Stop reason: HOSPADM

## 2024-09-07 RX ORDER — LORAZEPAM 2 MG/ML
2 INJECTION INTRAMUSCULAR ONCE
Status: COMPLETED | OUTPATIENT
Start: 2024-09-07 | End: 2024-09-07

## 2024-09-07 RX ORDER — PROMETHAZINE HYDROCHLORIDE 25 MG/1
12.5 TABLET ORAL EVERY 6 HOURS PRN
Status: DISCONTINUED | OUTPATIENT
Start: 2024-09-07 | End: 2024-09-08 | Stop reason: HOSPADM

## 2024-09-07 RX ORDER — ACETAMINOPHEN 325 MG/1
650 TABLET ORAL EVERY 6 HOURS PRN
Status: DISCONTINUED | OUTPATIENT
Start: 2024-09-07 | End: 2024-09-08 | Stop reason: HOSPADM

## 2024-09-07 RX ORDER — SODIUM CHLORIDE 0.9 % (FLUSH) 0.9 %
10 SYRINGE (ML) INJECTION EVERY 12 HOURS SCHEDULED
Status: DISCONTINUED | OUTPATIENT
Start: 2024-09-08 | End: 2024-09-08 | Stop reason: HOSPADM

## 2024-09-07 RX ORDER — ACETAMINOPHEN 650 MG/1
650 SUPPOSITORY RECTAL EVERY 6 HOURS PRN
Status: DISCONTINUED | OUTPATIENT
Start: 2024-09-07 | End: 2024-09-08 | Stop reason: HOSPADM

## 2024-09-07 RX ORDER — NICOTINE 21 MG/24HR
1 PATCH, TRANSDERMAL 24 HOURS TRANSDERMAL DAILY PRN
Status: DISCONTINUED | OUTPATIENT
Start: 2024-09-07 | End: 2024-09-08 | Stop reason: HOSPADM

## 2024-09-07 RX ORDER — DEXMEDETOMIDINE HYDROCHLORIDE 4 UG/ML
1 INJECTION, SOLUTION INTRAVENOUS CONTINUOUS
Status: DISCONTINUED | OUTPATIENT
Start: 2024-09-07 | End: 2024-09-07

## 2024-09-07 RX ORDER — SODIUM CHLORIDE 0.9 % (FLUSH) 0.9 %
10 SYRINGE (ML) INJECTION PRN
Status: DISCONTINUED | OUTPATIENT
Start: 2024-09-07 | End: 2024-09-08 | Stop reason: HOSPADM

## 2024-09-07 RX ORDER — ENOXAPARIN SODIUM 100 MG/ML
40 INJECTION SUBCUTANEOUS 2 TIMES DAILY
Status: DISCONTINUED | OUTPATIENT
Start: 2024-09-08 | End: 2024-09-08 | Stop reason: HOSPADM

## 2024-09-07 RX ORDER — SODIUM CHLORIDE 9 MG/ML
INJECTION, SOLUTION INTRAVENOUS CONTINUOUS
Status: CANCELLED | OUTPATIENT
Start: 2024-09-07 | End: 2024-09-08

## 2024-09-07 RX ORDER — ONDANSETRON 2 MG/ML
4 INJECTION INTRAMUSCULAR; INTRAVENOUS EVERY 6 HOURS PRN
Status: DISCONTINUED | OUTPATIENT
Start: 2024-09-07 | End: 2024-09-08 | Stop reason: HOSPADM

## 2024-09-07 RX ORDER — THIAMINE HYDROCHLORIDE 100 MG/ML
100 INJECTION, SOLUTION INTRAMUSCULAR; INTRAVENOUS ONCE
Status: DISCONTINUED | OUTPATIENT
Start: 2024-09-07 | End: 2024-09-08 | Stop reason: SDUPTHER

## 2024-09-07 RX ORDER — IPRATROPIUM BROMIDE AND ALBUTEROL SULFATE 2.5; .5 MG/3ML; MG/3ML
1 SOLUTION RESPIRATORY (INHALATION) ONCE
Status: COMPLETED | OUTPATIENT
Start: 2024-09-07 | End: 2024-09-07

## 2024-09-07 RX ADMIN — IPRATROPIUM BROMIDE AND ALBUTEROL SULFATE 1 DOSE: 2.5; .5 SOLUTION RESPIRATORY (INHALATION) at 22:40

## 2024-09-07 RX ADMIN — NALXONE HYDROCHLORIDE 0.4 MG: 0.4 INJECTION INTRAMUSCULAR; INTRAVENOUS; SUBCUTANEOUS at 19:04

## 2024-09-07 RX ADMIN — LORAZEPAM 2 MG: 2 INJECTION INTRAMUSCULAR; INTRAVENOUS at 21:20

## 2024-09-07 RX ADMIN — DROPERIDOL 1.25 MG: 2.5 INJECTION, SOLUTION INTRAMUSCULAR; INTRAVENOUS at 21:20

## 2024-09-07 ASSESSMENT — PAIN - FUNCTIONAL ASSESSMENT: PAIN_FUNCTIONAL_ASSESSMENT: NONE - DENIES PAIN

## 2024-09-08 ENCOUNTER — HOSPITAL ENCOUNTER (INPATIENT)
Age: 59
LOS: 5 days | Discharge: HOME | DRG: 885 | End: 2024-09-13
Attending: PSYCHIATRY & NEUROLOGY | Admitting: PSYCHIATRY & NEUROLOGY
Payer: MEDICARE

## 2024-09-08 VITALS
RESPIRATION RATE: 16 BRPM | OXYGEN SATURATION: 95 % | TEMPERATURE: 98 F | SYSTOLIC BLOOD PRESSURE: 149 MMHG | BODY MASS INDEX: 41.95 KG/M2 | HEIGHT: 70 IN | DIASTOLIC BLOOD PRESSURE: 86 MMHG | HEART RATE: 76 BPM | WEIGHT: 293 LBS

## 2024-09-08 DIAGNOSIS — F43.10 PTSD (POST-TRAUMATIC STRESS DISORDER): Primary | ICD-10-CM

## 2024-09-08 DIAGNOSIS — S89.92XS INJURY OF LEFT LOWER EXTREMITY, SEQUELA: ICD-10-CM

## 2024-09-08 PROBLEM — I48.0 PAROXYSMAL ATRIAL FIBRILLATION (HCC): Status: ACTIVE | Noted: 2024-09-08

## 2024-09-08 PROBLEM — I10 HYPERTENSION, ESSENTIAL: Status: ACTIVE | Noted: 2024-09-08

## 2024-09-08 PROBLEM — Z86.79 H/O PAROXYSMAL SUPRAVENTRICULAR TACHYCARDIA: Status: ACTIVE | Noted: 2024-09-08

## 2024-09-08 LAB
ALBUMIN SERPL-MCNC: 3.8 G/DL (ref 3.4–5)
ALBUMIN/GLOB SERPL: 1.6 {RATIO}
ALP SERPL-CCNC: 100 U/L (ref 40–129)
ALT SERPL-CCNC: 13 U/L (ref 10–40)
ANION GAP SERPL CALCULATED.3IONS-SCNC: 9 MMOL/L (ref 3–16)
AST SERPL-CCNC: 22 U/L (ref 15–37)
BASOPHILS # BLD: 0.02 K/UL (ref 0–0.2)
BASOPHILS NFR BLD: 0 %
BILIRUB SERPL-MCNC: 0.6 MG/DL (ref 0–1)
BUN SERPL-MCNC: 15 MG/DL (ref 7–20)
CALCIUM SERPL-MCNC: 8.5 MG/DL (ref 8.3–10.6)
CHLORIDE SERPL-SCNC: 107 MMOL/L (ref 99–110)
CO2 SERPL-SCNC: 25 MMOL/L (ref 21–32)
CREAT SERPL-MCNC: 0.7 MG/DL (ref 0.5–1)
EOSINOPHIL # BLD: 0.31 K/UL (ref 0–0.6)
EOSINOPHILS RELATIVE PERCENT: 5 %
ERYTHROCYTE [DISTWIDTH] IN BLOOD BY AUTOMATED COUNT: 13.9 % (ref 12.4–15.4)
GFR, ESTIMATED: >90 ML/MIN/1.73M2
GLUCOSE SERPL-MCNC: 118 MG/DL (ref 70–99)
HCT VFR BLD AUTO: 35.9 % (ref 36–48)
HGB BLD-MCNC: 11.8 G/DL (ref 12–16)
IMM GRANULOCYTES # BLD AUTO: 0.01 K/UL (ref 0–0.5)
IMM GRANULOCYTES NFR BLD: 0 %
LYMPHOCYTES NFR BLD: 1.24 K/UL (ref 1–5.1)
LYMPHOCYTES RELATIVE PERCENT: 21 %
MCH RBC QN AUTO: 30.5 PG (ref 26–34)
MCHC RBC AUTO-ENTMCNC: 32.9 G/DL (ref 31–36)
MCV RBC AUTO: 92.8 FL (ref 80–100)
MONOCYTES NFR BLD: 0.6 K/UL (ref 0–1.3)
MONOCYTES NFR BLD: 10 %
NEUTROPHILS NFR BLD: 64 %
NEUTS SEG NFR BLD: 3.82 K/UL (ref 1.7–7.7)
PLATELET # BLD AUTO: 240 K/UL (ref 135–450)
PMV BLD AUTO: 10.2 FL
POTASSIUM SERPL-SCNC: 3.9 MMOL/L (ref 3.5–5.1)
PROT SERPL-MCNC: 6.2 G/DL (ref 6.4–8.2)
RBC # BLD AUTO: 3.87 M/UL (ref 4–5.2)
SODIUM SERPL-SCNC: 141 MMOL/L (ref 136–145)
WBC OTHER # BLD: 6 K/UL (ref 4–11)

## 2024-09-08 PROCEDURE — 6360000002 HC RX W HCPCS: Performed by: INTERNAL MEDICINE

## 2024-09-08 PROCEDURE — 6370000000 HC RX 637 (ALT 250 FOR IP): Performed by: NURSE PRACTITIONER

## 2024-09-08 PROCEDURE — 6370000000 HC RX 637 (ALT 250 FOR IP): Performed by: INTERNAL MEDICINE

## 2024-09-08 PROCEDURE — 85025 COMPLETE CBC W/AUTO DIFF WBC: CPT

## 2024-09-08 PROCEDURE — 96372 THER/PROPH/DIAG INJ SC/IM: CPT

## 2024-09-08 PROCEDURE — 1240000000 HC EMOTIONAL WELLNESS R&B

## 2024-09-08 PROCEDURE — 6370000000 HC RX 637 (ALT 250 FOR IP): Performed by: PSYCHIATRY & NEUROLOGY

## 2024-09-08 PROCEDURE — 80053 COMPREHEN METABOLIC PANEL: CPT

## 2024-09-08 PROCEDURE — 99221 1ST HOSP IP/OBS SF/LOW 40: CPT | Performed by: NURSE PRACTITIONER

## 2024-09-08 PROCEDURE — 94761 N-INVAS EAR/PLS OXIMETRY MLT: CPT

## 2024-09-08 PROCEDURE — 94640 AIRWAY INHALATION TREATMENT: CPT

## 2024-09-08 PROCEDURE — G0378 HOSPITAL OBSERVATION PER HR: HCPCS

## 2024-09-08 PROCEDURE — 36415 COLL VENOUS BLD VENIPUNCTURE: CPT

## 2024-09-08 RX ORDER — IPRATROPIUM BROMIDE AND ALBUTEROL SULFATE 2.5; .5 MG/3ML; MG/3ML
1 SOLUTION RESPIRATORY (INHALATION) EVERY 4 HOURS PRN
Status: DISCONTINUED | OUTPATIENT
Start: 2024-09-08 | End: 2024-09-13 | Stop reason: HOSPADM

## 2024-09-08 RX ORDER — ACETAMINOPHEN 325 MG/1
650 TABLET ORAL EVERY 8 HOURS PRN
Status: DISCONTINUED | OUTPATIENT
Start: 2024-09-08 | End: 2024-09-13 | Stop reason: HOSPADM

## 2024-09-08 RX ORDER — THIAMINE HYDROCHLORIDE 100 MG/ML
100 INJECTION, SOLUTION INTRAMUSCULAR; INTRAVENOUS ONCE
Status: COMPLETED | OUTPATIENT
Start: 2024-09-08 | End: 2024-09-08

## 2024-09-08 RX ORDER — POLYETHYLENE GLYCOL 3350 17 G
2 POWDER IN PACKET (EA) ORAL
Status: DISCONTINUED | OUTPATIENT
Start: 2024-09-08 | End: 2024-09-13 | Stop reason: HOSPADM

## 2024-09-08 RX ORDER — HYDROXYZINE HYDROCHLORIDE 50 MG/1
50 TABLET, FILM COATED ORAL 3 TIMES DAILY PRN
Status: DISCONTINUED | OUTPATIENT
Start: 2024-09-08 | End: 2024-09-13 | Stop reason: HOSPADM

## 2024-09-08 RX ORDER — LORAZEPAM 2 MG/1
2 TABLET ORAL ONCE
Status: COMPLETED | OUTPATIENT
Start: 2024-09-08 | End: 2024-09-08

## 2024-09-08 RX ORDER — TRAZODONE HYDROCHLORIDE 50 MG/1
50 TABLET, FILM COATED ORAL NIGHTLY PRN
Status: DISCONTINUED | OUTPATIENT
Start: 2024-09-08 | End: 2024-09-09

## 2024-09-08 RX ORDER — ALBUTEROL SULFATE 90 UG/1
2 AEROSOL, METERED RESPIRATORY (INHALATION) EVERY 6 HOURS PRN
Status: DISCONTINUED | OUTPATIENT
Start: 2024-09-08 | End: 2024-09-13 | Stop reason: HOSPADM

## 2024-09-08 RX ORDER — BUDESONIDE AND FORMOTEROL FUMARATE DIHYDRATE 160; 4.5 UG/1; UG/1
2 AEROSOL RESPIRATORY (INHALATION)
Status: DISCONTINUED | OUTPATIENT
Start: 2024-09-08 | End: 2024-09-13 | Stop reason: HOSPADM

## 2024-09-08 RX ADMIN — DICLOFENAC SODIUM 4 G: 10 GEL TOPICAL at 17:04

## 2024-09-08 RX ADMIN — LORAZEPAM 2 MG: 2 TABLET ORAL at 11:03

## 2024-09-08 RX ADMIN — Medication 2 PUFF: at 21:17

## 2024-09-08 RX ADMIN — DICLOFENAC SODIUM 4 G: 10 GEL TOPICAL at 23:15

## 2024-09-08 RX ADMIN — THIAMINE HYDROCHLORIDE 100 MG: 100 INJECTION, SOLUTION INTRAMUSCULAR; INTRAVENOUS at 01:38

## 2024-09-08 RX ADMIN — HYDROXYZINE HYDROCHLORIDE 50 MG: 50 TABLET, FILM COATED ORAL at 23:15

## 2024-09-08 RX ADMIN — TRAZODONE HYDROCHLORIDE 50 MG: 50 TABLET ORAL at 23:15

## 2024-09-08 RX ADMIN — Medication 2 PUFF: at 13:42

## 2024-09-08 RX ADMIN — ALBUTEROL SULFATE 2 PUFF: 90 AEROSOL, METERED RESPIRATORY (INHALATION) at 13:42

## 2024-09-08 RX ADMIN — Medication 3 MG: at 01:37

## 2024-09-08 RX ADMIN — ALBUTEROL SULFATE 2 PUFF: 90 AEROSOL, METERED RESPIRATORY (INHALATION) at 21:18

## 2024-09-08 RX ADMIN — LORAZEPAM 2 MG: 2 INJECTION INTRAMUSCULAR; INTRAVENOUS at 00:06

## 2024-09-08 RX ADMIN — ACETAMINOPHEN 650 MG: 325 TABLET ORAL at 01:37

## 2024-09-08 ASSESSMENT — LIFESTYLE VARIABLES
HOW OFTEN DO YOU HAVE A DRINK CONTAINING ALCOHOL: 2-3 TIMES A WEEK
HOW MANY STANDARD DRINKS CONTAINING ALCOHOL DO YOU HAVE ON A TYPICAL DAY: 10 OR MORE
HOW MANY STANDARD DRINKS CONTAINING ALCOHOL DO YOU HAVE ON A TYPICAL DAY: 1 OR 2
HOW OFTEN DO YOU HAVE A DRINK CONTAINING ALCOHOL: 2-3 TIMES A WEEK

## 2024-09-08 ASSESSMENT — PAIN DESCRIPTION - FREQUENCY: FREQUENCY: INTERMITTENT

## 2024-09-08 ASSESSMENT — SLEEP AND FATIGUE QUESTIONNAIRES
DO YOU USE A SLEEP AID: YES
DO YOU HAVE DIFFICULTY SLEEPING: YES
DO YOU HAVE DIFFICULTY SLEEPING: YES
SLEEP PATTERN: DIFFICULTY FALLING ASLEEP;RESTLESSNESS;DISTURBED/INTERRUPTED SLEEP
AVERAGE NUMBER OF SLEEP HOURS: 5
DO YOU USE A SLEEP AID: YES
SLEEP PATTERN: DIFFICULTY FALLING ASLEEP;RESTLESSNESS

## 2024-09-08 ASSESSMENT — PATIENT HEALTH QUESTIONNAIRE - PHQ9
SUM OF ALL RESPONSES TO PHQ QUESTIONS 1-9: 19
SUM OF ALL RESPONSES TO PHQ QUESTIONS 1-9: 22
9. THOUGHTS THAT YOU WOULD BE BETTER OFF DEAD, OR OF HURTING YOURSELF: NEARLY EVERY DAY
SUM OF ALL RESPONSES TO PHQ QUESTIONS 1-9: 22
2. FEELING DOWN, DEPRESSED OR HOPELESS: NEARLY EVERY DAY
3. TROUBLE FALLING OR STAYING ASLEEP: NEARLY EVERY DAY
SUM OF ALL RESPONSES TO PHQ QUESTIONS 1-9: 22
6. FEELING BAD ABOUT YOURSELF - OR THAT YOU ARE A FAILURE OR HAVE LET YOURSELF OR YOUR FAMILY DOWN: NEARLY EVERY DAY
1. LITTLE INTEREST OR PLEASURE IN DOING THINGS: NEARLY EVERY DAY
8. MOVING OR SPEAKING SO SLOWLY THAT OTHER PEOPLE COULD HAVE NOTICED. OR THE OPPOSITE, BEING SO FIGETY OR RESTLESS THAT YOU HAVE BEEN MOVING AROUND A LOT MORE THAN USUAL: NOT AT ALL
SUM OF ALL RESPONSES TO PHQ9 QUESTIONS 1 & 2: 6
10. IF YOU CHECKED OFF ANY PROBLEMS, HOW DIFFICULT HAVE THESE PROBLEMS MADE IT FOR YOU TO DO YOUR WORK, TAKE CARE OF THINGS AT HOME, OR GET ALONG WITH OTHER PEOPLE: EXTREMELY DIFFICULT
7. TROUBLE CONCENTRATING ON THINGS, SUCH AS READING THE NEWSPAPER OR WATCHING TELEVISION: NEARLY EVERY DAY
5. POOR APPETITE OR OVEREATING: SEVERAL DAYS
4. FEELING TIRED OR HAVING LITTLE ENERGY: NEARLY EVERY DAY

## 2024-09-08 ASSESSMENT — PAIN DESCRIPTION - DESCRIPTORS
DESCRIPTORS: ACHING
DESCRIPTORS: ACHING

## 2024-09-08 ASSESSMENT — PAIN DESCRIPTION - ORIENTATION
ORIENTATION: RIGHT;LEFT
ORIENTATION: LEFT

## 2024-09-08 ASSESSMENT — PAIN DESCRIPTION - ONSET: ONSET: GRADUAL

## 2024-09-08 ASSESSMENT — PAIN SCALES - GENERAL
PAINLEVEL_OUTOF10: 6
PAINLEVEL_OUTOF10: 7

## 2024-09-08 ASSESSMENT — PAIN SCALES - WONG BAKER
WONGBAKER_NUMERICALRESPONSE: NO HURT
WONGBAKER_NUMERICALRESPONSE: NO HURT

## 2024-09-08 ASSESSMENT — PAIN DESCRIPTION - LOCATION
LOCATION: KNEE;BACK
LOCATION: KNEE

## 2024-09-08 ASSESSMENT — PAIN DESCRIPTION - PAIN TYPE: TYPE: ACUTE PAIN;CHRONIC PAIN

## 2024-09-09 PROBLEM — F32.A DEPRESSION, UNSPECIFIED: Status: ACTIVE | Noted: 2024-09-09

## 2024-09-09 PROBLEM — E66.01 MORBID OBESITY WITH BMI OF 50.0-59.9, ADULT: Status: ACTIVE | Noted: 2017-05-10

## 2024-09-09 PROBLEM — F43.10 PTSD (POST-TRAUMATIC STRESS DISORDER): Status: ACTIVE | Noted: 2024-09-09

## 2024-09-09 PROCEDURE — 97116 GAIT TRAINING THERAPY: CPT

## 2024-09-09 PROCEDURE — 6370000000 HC RX 637 (ALT 250 FOR IP)

## 2024-09-09 PROCEDURE — 94640 AIRWAY INHALATION TREATMENT: CPT

## 2024-09-09 PROCEDURE — 6370000000 HC RX 637 (ALT 250 FOR IP): Performed by: PSYCHIATRY & NEUROLOGY

## 2024-09-09 PROCEDURE — 99223 1ST HOSP IP/OBS HIGH 75: CPT | Performed by: PSYCHIATRY & NEUROLOGY

## 2024-09-09 PROCEDURE — 97530 THERAPEUTIC ACTIVITIES: CPT

## 2024-09-09 PROCEDURE — 1240000000 HC EMOTIONAL WELLNESS R&B

## 2024-09-09 PROCEDURE — 97162 PT EVAL MOD COMPLEX 30 MIN: CPT

## 2024-09-09 PROCEDURE — 94761 N-INVAS EAR/PLS OXIMETRY MLT: CPT

## 2024-09-09 PROCEDURE — 97165 OT EVAL LOW COMPLEX 30 MIN: CPT

## 2024-09-09 RX ORDER — TRAZODONE HYDROCHLORIDE 50 MG/1
50 TABLET, FILM COATED ORAL NIGHTLY
Status: DISCONTINUED | OUTPATIENT
Start: 2024-09-09 | End: 2024-09-10

## 2024-09-09 RX ORDER — ARIPIPRAZOLE 10 MG/1
5 TABLET ORAL DAILY
Status: DISCONTINUED | OUTPATIENT
Start: 2024-09-09 | End: 2024-09-13 | Stop reason: HOSPADM

## 2024-09-09 RX ORDER — LORAZEPAM 1 MG/1
1 TABLET ORAL ONCE
Status: COMPLETED | OUTPATIENT
Start: 2024-09-09 | End: 2024-09-09

## 2024-09-09 RX ADMIN — DICLOFENAC SODIUM 4 G: 10 GEL TOPICAL at 08:55

## 2024-09-09 RX ADMIN — DICLOFENAC SODIUM 4 G: 10 GEL TOPICAL at 13:07

## 2024-09-09 RX ADMIN — ACETAMINOPHEN 650 MG: 325 TABLET ORAL at 04:57

## 2024-09-09 RX ADMIN — ACETAMINOPHEN 650 MG: 325 TABLET ORAL at 16:00

## 2024-09-09 RX ADMIN — Medication 2 PUFF: at 08:10

## 2024-09-09 RX ADMIN — ALBUTEROL SULFATE 2 PUFF: 90 AEROSOL, METERED RESPIRATORY (INHALATION) at 08:10

## 2024-09-09 RX ADMIN — TRAZODONE HYDROCHLORIDE 50 MG: 50 TABLET ORAL at 21:33

## 2024-09-09 RX ADMIN — HYDROXYZINE HYDROCHLORIDE 50 MG: 50 TABLET, FILM COATED ORAL at 16:00

## 2024-09-09 RX ADMIN — Medication 2 PUFF: at 20:37

## 2024-09-09 RX ADMIN — ALBUTEROL SULFATE 2 PUFF: 90 AEROSOL, METERED RESPIRATORY (INHALATION) at 20:37

## 2024-09-09 RX ADMIN — ARIPIPRAZOLE 5 MG: 10 TABLET ORAL at 12:50

## 2024-09-09 RX ADMIN — LORAZEPAM 1 MG: 1 TABLET ORAL at 08:54

## 2024-09-09 ASSESSMENT — PAIN DESCRIPTION - LOCATION
LOCATION: GENERALIZED
LOCATION: KNEE;BACK
LOCATION: GENERALIZED
LOCATION: KNEE

## 2024-09-09 ASSESSMENT — PAIN DESCRIPTION - FREQUENCY
FREQUENCY: INTERMITTENT
FREQUENCY: INTERMITTENT
FREQUENCY: CONTINUOUS

## 2024-09-09 ASSESSMENT — PAIN DESCRIPTION - ONSET
ONSET: ON-GOING
ONSET: GRADUAL
ONSET: ON-GOING
ONSET: GRADUAL
ONSET: ON-GOING

## 2024-09-09 ASSESSMENT — PAIN SCALES - WONG BAKER
WONGBAKER_NUMERICALRESPONSE: HURTS LITTLE MORE
WONGBAKER_NUMERICALRESPONSE: NO HURT
WONGBAKER_NUMERICALRESPONSE: NO HURT

## 2024-09-09 ASSESSMENT — PAIN SCALES - GENERAL
PAINLEVEL_OUTOF10: 8
PAINLEVEL_OUTOF10: 5
PAINLEVEL_OUTOF10: 8
PAINLEVEL_OUTOF10: 6
PAINLEVEL_OUTOF10: 5
PAINLEVEL_OUTOF10: 0
PAINLEVEL_OUTOF10: 8

## 2024-09-09 ASSESSMENT — PAIN DESCRIPTION - DESCRIPTORS
DESCRIPTORS: ACHING
DESCRIPTORS: TENDER;TIGHTNESS;ACHING
DESCRIPTORS: ACHING

## 2024-09-09 ASSESSMENT — PAIN - FUNCTIONAL ASSESSMENT
PAIN_FUNCTIONAL_ASSESSMENT: ACTIVITIES ARE NOT PREVENTED

## 2024-09-09 ASSESSMENT — PAIN DESCRIPTION - PAIN TYPE
TYPE: CHRONIC PAIN
TYPE: ACUTE PAIN;CHRONIC PAIN
TYPE: CHRONIC PAIN
TYPE: CHRONIC PAIN
TYPE: ACUTE PAIN;CHRONIC PAIN

## 2024-09-09 ASSESSMENT — PAIN DESCRIPTION - ORIENTATION
ORIENTATION: RIGHT;LEFT
ORIENTATION: OTHER (COMMENT)
ORIENTATION: RIGHT;LEFT

## 2024-09-10 PROCEDURE — 6370000000 HC RX 637 (ALT 250 FOR IP): Performed by: PSYCHIATRY & NEUROLOGY

## 2024-09-10 PROCEDURE — 6370000000 HC RX 637 (ALT 250 FOR IP): Performed by: NURSE PRACTITIONER

## 2024-09-10 PROCEDURE — 1240000000 HC EMOTIONAL WELLNESS R&B

## 2024-09-10 PROCEDURE — 94640 AIRWAY INHALATION TREATMENT: CPT

## 2024-09-10 PROCEDURE — 94761 N-INVAS EAR/PLS OXIMETRY MLT: CPT

## 2024-09-10 RX ORDER — TRAZODONE HYDROCHLORIDE 100 MG/1
100 TABLET ORAL NIGHTLY
Status: DISCONTINUED | OUTPATIENT
Start: 2024-09-10 | End: 2024-09-11

## 2024-09-10 RX ADMIN — Medication 2 PUFF: at 08:29

## 2024-09-10 RX ADMIN — ARIPIPRAZOLE 5 MG: 10 TABLET ORAL at 08:32

## 2024-09-10 RX ADMIN — HYDROXYZINE HYDROCHLORIDE 50 MG: 50 TABLET, FILM COATED ORAL at 03:44

## 2024-09-10 RX ADMIN — ALBUTEROL SULFATE 2 PUFF: 90 AEROSOL, METERED RESPIRATORY (INHALATION) at 08:29

## 2024-09-10 RX ADMIN — HYDROXYZINE HYDROCHLORIDE 50 MG: 50 TABLET, FILM COATED ORAL at 23:03

## 2024-09-10 RX ADMIN — ACETAMINOPHEN 650 MG: 325 TABLET ORAL at 23:03

## 2024-09-10 RX ADMIN — ALBUTEROL SULFATE 2 PUFF: 90 AEROSOL, METERED RESPIRATORY (INHALATION) at 20:12

## 2024-09-10 RX ADMIN — DICLOFENAC SODIUM 4 G: 10 GEL TOPICAL at 08:32

## 2024-09-10 RX ADMIN — DICLOFENAC SODIUM 4 G: 10 GEL TOPICAL at 22:38

## 2024-09-10 RX ADMIN — ACETAMINOPHEN 650 MG: 325 TABLET ORAL at 03:42

## 2024-09-10 RX ADMIN — DICLOFENAC SODIUM 4 G: 10 GEL TOPICAL at 13:49

## 2024-09-10 RX ADMIN — TRAZODONE HYDROCHLORIDE 100 MG: 100 TABLET ORAL at 23:03

## 2024-09-10 RX ADMIN — Medication: at 18:53

## 2024-09-10 RX ADMIN — Medication 2 PUFF: at 20:12

## 2024-09-10 ASSESSMENT — PAIN SCALES - GENERAL
PAINLEVEL_OUTOF10: 5
PAINLEVEL_OUTOF10: 5
PAINLEVEL_OUTOF10: 3
PAINLEVEL_OUTOF10: 6
PAINLEVEL_OUTOF10: 6
PAINLEVEL_OUTOF10: 0
PAINLEVEL_OUTOF10: 0

## 2024-09-10 ASSESSMENT — PAIN DESCRIPTION - PAIN TYPE: TYPE: CHRONIC PAIN

## 2024-09-10 ASSESSMENT — PAIN - FUNCTIONAL ASSESSMENT
PAIN_FUNCTIONAL_ASSESSMENT: PREVENTS OR INTERFERES SOME ACTIVE ACTIVITIES AND ADLS
PAIN_FUNCTIONAL_ASSESSMENT: ACTIVITIES ARE NOT PREVENTED

## 2024-09-10 ASSESSMENT — PAIN DESCRIPTION - FREQUENCY: FREQUENCY: CONTINUOUS

## 2024-09-10 ASSESSMENT — PAIN DESCRIPTION - LOCATION
LOCATION: GENERALIZED
LOCATION: LEG
LOCATION: KNEE
LOCATION: LEG
LOCATION: KNEE

## 2024-09-10 ASSESSMENT — PAIN DESCRIPTION - DESCRIPTORS
DESCRIPTORS: ACHING;THROBBING;SHARP
DESCRIPTORS: ACHING
DESCRIPTORS: ACHING;THROBBING;SHARP
DESCRIPTORS: ACHING;THROBBING
DESCRIPTORS: ACHING;THROBBING;SHARP

## 2024-09-10 ASSESSMENT — PAIN SCALES - WONG BAKER
WONGBAKER_NUMERICALRESPONSE: HURTS A LITTLE BIT
WONGBAKER_NUMERICALRESPONSE: HURTS A LITTLE BIT

## 2024-09-10 ASSESSMENT — PAIN DESCRIPTION - ONSET: ONSET: ON-GOING

## 2024-09-10 ASSESSMENT — PAIN DESCRIPTION - DIRECTION: RADIATING_TOWARDS: THIGH

## 2024-09-10 ASSESSMENT — PAIN DESCRIPTION - ORIENTATION
ORIENTATION: LEFT
ORIENTATION: RIGHT;LEFT
ORIENTATION: RIGHT;LEFT

## 2024-09-11 ENCOUNTER — HOSPITAL ENCOUNTER (INPATIENT)
Age: 59
Discharge: HOME | DRG: 885 | End: 2024-09-13
Attending: PSYCHIATRY & NEUROLOGY
Payer: MEDICARE

## 2024-09-11 LAB — ECHO BSA: 2.82 M2

## 2024-09-11 PROCEDURE — 97110 THERAPEUTIC EXERCISES: CPT

## 2024-09-11 PROCEDURE — 94640 AIRWAY INHALATION TREATMENT: CPT

## 2024-09-11 PROCEDURE — 93971 EXTREMITY STUDY: CPT | Performed by: SURGERY

## 2024-09-11 PROCEDURE — 1240000000 HC EMOTIONAL WELLNESS R&B

## 2024-09-11 PROCEDURE — 6370000000 HC RX 637 (ALT 250 FOR IP)

## 2024-09-11 PROCEDURE — 93971 EXTREMITY STUDY: CPT

## 2024-09-11 PROCEDURE — 97140 MANUAL THERAPY 1/> REGIONS: CPT

## 2024-09-11 PROCEDURE — 6370000000 HC RX 637 (ALT 250 FOR IP): Performed by: PSYCHIATRY & NEUROLOGY

## 2024-09-11 PROCEDURE — 99233 SBSQ HOSP IP/OBS HIGH 50: CPT | Performed by: PSYCHIATRY & NEUROLOGY

## 2024-09-11 PROCEDURE — 97530 THERAPEUTIC ACTIVITIES: CPT

## 2024-09-11 PROCEDURE — 2500000003 HC RX 250 WO HCPCS

## 2024-09-11 PROCEDURE — 94761 N-INVAS EAR/PLS OXIMETRY MLT: CPT

## 2024-09-11 RX ORDER — DOXYCYCLINE HYCLATE 100 MG
100 TABLET ORAL EVERY 12 HOURS SCHEDULED
Status: DISCONTINUED | OUTPATIENT
Start: 2024-09-11 | End: 2024-09-13 | Stop reason: HOSPADM

## 2024-09-11 RX ORDER — LORAZEPAM 2 MG/1
2 TABLET ORAL NIGHTLY
Status: DISCONTINUED | OUTPATIENT
Start: 2024-09-11 | End: 2024-09-12

## 2024-09-11 RX ORDER — LORAZEPAM 1 MG/1
1 TABLET ORAL ONCE
Status: COMPLETED | OUTPATIENT
Start: 2024-09-11 | End: 2024-09-11

## 2024-09-11 RX ORDER — MIRTAZAPINE 15 MG/1
15 TABLET, FILM COATED ORAL NIGHTLY
Status: DISCONTINUED | OUTPATIENT
Start: 2024-09-11 | End: 2024-09-13 | Stop reason: HOSPADM

## 2024-09-11 RX ADMIN — Medication 2 PUFF: at 20:35

## 2024-09-11 RX ADMIN — DICLOFENAC SODIUM 4 G: 10 GEL TOPICAL at 17:43

## 2024-09-11 RX ADMIN — MICONAZOLE NITRATE: 20 POWDER TOPICAL at 08:35

## 2024-09-11 RX ADMIN — DICLOFENAC SODIUM 4 G: 10 GEL TOPICAL at 11:58

## 2024-09-11 RX ADMIN — DOXYCYCLINE HYCLATE 100 MG: 100 TABLET, COATED ORAL at 11:58

## 2024-09-11 RX ADMIN — Medication 2 PUFF: at 08:25

## 2024-09-11 RX ADMIN — ARIPIPRAZOLE 5 MG: 10 TABLET ORAL at 08:35

## 2024-09-11 RX ADMIN — LORAZEPAM 1 MG: 1 TABLET ORAL at 13:44

## 2024-09-11 RX ADMIN — ALBUTEROL SULFATE 2 PUFF: 90 AEROSOL, METERED RESPIRATORY (INHALATION) at 20:35

## 2024-09-11 RX ADMIN — MICONAZOLE NITRATE: 20 POWDER TOPICAL at 22:18

## 2024-09-11 RX ADMIN — HYDROXYZINE HYDROCHLORIDE 50 MG: 50 TABLET, FILM COATED ORAL at 07:29

## 2024-09-11 RX ADMIN — LORAZEPAM 2 MG: 2 TABLET ORAL at 22:18

## 2024-09-11 RX ADMIN — MIRTAZAPINE 15 MG: 15 TABLET, FILM COATED ORAL at 22:18

## 2024-09-11 RX ADMIN — ACETAMINOPHEN 650 MG: 325 TABLET ORAL at 06:18

## 2024-09-11 RX ADMIN — Medication 1 EACH: at 06:23

## 2024-09-11 RX ADMIN — DOXYCYCLINE HYCLATE 100 MG: 100 TABLET, COATED ORAL at 22:18

## 2024-09-11 RX ADMIN — DICLOFENAC SODIUM 4 G: 10 GEL TOPICAL at 08:36

## 2024-09-11 ASSESSMENT — PAIN SCALES - GENERAL
PAINLEVEL_OUTOF10: 7
PAINLEVEL_OUTOF10: 6
PAINLEVEL_OUTOF10: 3
PAINLEVEL_OUTOF10: 6
PAINLEVEL_OUTOF10: 7
PAINLEVEL_OUTOF10: 0
PAINLEVEL_OUTOF10: 4

## 2024-09-11 ASSESSMENT — PAIN - FUNCTIONAL ASSESSMENT
PAIN_FUNCTIONAL_ASSESSMENT: PREVENTS OR INTERFERES SOME ACTIVE ACTIVITIES AND ADLS

## 2024-09-11 ASSESSMENT — PAIN DESCRIPTION - LOCATION
LOCATION: LEG
LOCATION: KNEE;FOOT
LOCATION: KNEE

## 2024-09-11 ASSESSMENT — PAIN DESCRIPTION - DESCRIPTORS
DESCRIPTORS: ACHING
DESCRIPTORS: ACHING
DESCRIPTORS: ACHING;THROBBING
DESCRIPTORS: ACHING

## 2024-09-11 ASSESSMENT — PAIN DESCRIPTION - ORIENTATION
ORIENTATION: LEFT
ORIENTATION: RIGHT;LEFT
ORIENTATION: RIGHT;LEFT
ORIENTATION: LEFT

## 2024-09-11 ASSESSMENT — PAIN SCALES - WONG BAKER
WONGBAKER_NUMERICALRESPONSE: NO HURT
WONGBAKER_NUMERICALRESPONSE: HURTS A LITTLE BIT

## 2024-09-12 PROCEDURE — 6370000000 HC RX 637 (ALT 250 FOR IP)

## 2024-09-12 PROCEDURE — 94640 AIRWAY INHALATION TREATMENT: CPT

## 2024-09-12 PROCEDURE — 6370000000 HC RX 637 (ALT 250 FOR IP): Performed by: PSYCHIATRY & NEUROLOGY

## 2024-09-12 PROCEDURE — 94761 N-INVAS EAR/PLS OXIMETRY MLT: CPT

## 2024-09-12 PROCEDURE — 1240000000 HC EMOTIONAL WELLNESS R&B

## 2024-09-12 RX ORDER — LORAZEPAM 1 MG/1
1 TABLET ORAL NIGHTLY
Status: DISCONTINUED | OUTPATIENT
Start: 2024-09-12 | End: 2024-09-13 | Stop reason: HOSPADM

## 2024-09-12 RX ADMIN — DICLOFENAC SODIUM 4 G: 10 GEL TOPICAL at 13:00

## 2024-09-12 RX ADMIN — MICONAZOLE NITRATE: 20 POWDER TOPICAL at 21:43

## 2024-09-12 RX ADMIN — ALBUTEROL SULFATE 2 PUFF: 90 AEROSOL, METERED RESPIRATORY (INHALATION) at 07:56

## 2024-09-12 RX ADMIN — DICLOFENAC SODIUM 4 G: 10 GEL TOPICAL at 21:43

## 2024-09-12 RX ADMIN — ALBUTEROL SULFATE 2 PUFF: 90 AEROSOL, METERED RESPIRATORY (INHALATION) at 21:43

## 2024-09-12 RX ADMIN — LORAZEPAM 1 MG: 1 TABLET ORAL at 21:43

## 2024-09-12 RX ADMIN — Medication 2 PUFF: at 21:43

## 2024-09-12 RX ADMIN — ARIPIPRAZOLE 5 MG: 10 TABLET ORAL at 08:27

## 2024-09-12 RX ADMIN — HYDROXYZINE HYDROCHLORIDE 50 MG: 50 TABLET, FILM COATED ORAL at 12:31

## 2024-09-12 RX ADMIN — MIRTAZAPINE 15 MG: 15 TABLET, FILM COATED ORAL at 21:43

## 2024-09-12 RX ADMIN — DOXYCYCLINE HYCLATE 100 MG: 100 TABLET, COATED ORAL at 08:27

## 2024-09-12 RX ADMIN — DICLOFENAC SODIUM 4 G: 10 GEL TOPICAL at 17:07

## 2024-09-12 RX ADMIN — DOXYCYCLINE HYCLATE 100 MG: 100 TABLET, COATED ORAL at 21:43

## 2024-09-12 RX ADMIN — DICLOFENAC SODIUM 4 G: 10 GEL TOPICAL at 08:28

## 2024-09-12 RX ADMIN — ACETAMINOPHEN 650 MG: 325 TABLET ORAL at 08:28

## 2024-09-12 RX ADMIN — MICONAZOLE NITRATE: 20 POWDER TOPICAL at 08:35

## 2024-09-12 RX ADMIN — Medication 2 PUFF: at 07:57

## 2024-09-12 RX ADMIN — ACETAMINOPHEN 650 MG: 325 TABLET ORAL at 21:43

## 2024-09-12 ASSESSMENT — PAIN DESCRIPTION - ONSET
ONSET: ON-GOING

## 2024-09-12 ASSESSMENT — PAIN DESCRIPTION - ORIENTATION
ORIENTATION: OTHER (COMMENT)
ORIENTATION: MID
ORIENTATION: OTHER (COMMENT)
ORIENTATION: OTHER (COMMENT)
ORIENTATION: MID
ORIENTATION: OTHER (COMMENT)
ORIENTATION: OTHER (COMMENT)

## 2024-09-12 ASSESSMENT — PAIN SCALES - GENERAL
PAINLEVEL_OUTOF10: 4
PAINLEVEL_OUTOF10: 6
PAINLEVEL_OUTOF10: 3
PAINLEVEL_OUTOF10: 4
PAINLEVEL_OUTOF10: 3
PAINLEVEL_OUTOF10: 3
PAINLEVEL_OUTOF10: 5

## 2024-09-12 ASSESSMENT — PAIN DESCRIPTION - FREQUENCY
FREQUENCY: CONTINUOUS

## 2024-09-12 ASSESSMENT — PAIN DESCRIPTION - LOCATION
LOCATION: GENERALIZED
LOCATION: GENERALIZED
LOCATION: SHOULDER;NECK;KNEE
LOCATION: SHOULDER;NECK;KNEE
LOCATION: NECK

## 2024-09-12 ASSESSMENT — PAIN - FUNCTIONAL ASSESSMENT
PAIN_FUNCTIONAL_ASSESSMENT: ACTIVITIES ARE NOT PREVENTED

## 2024-09-12 ASSESSMENT — PAIN DESCRIPTION - PAIN TYPE
TYPE: CHRONIC PAIN

## 2024-09-12 ASSESSMENT — PAIN DESCRIPTION - DESCRIPTORS
DESCRIPTORS: ACHING
DESCRIPTORS: ACHING;DISCOMFORT;SHARP
DESCRIPTORS: ACHING;DISCOMFORT
DESCRIPTORS: SHARP

## 2024-09-13 ENCOUNTER — TELEPHONE (OUTPATIENT)
Dept: PRIMARY CARE CLINIC | Age: 59
End: 2024-09-13

## 2024-09-13 VITALS
DIASTOLIC BLOOD PRESSURE: 85 MMHG | RESPIRATION RATE: 18 BRPM | BODY MASS INDEX: 41.95 KG/M2 | SYSTOLIC BLOOD PRESSURE: 145 MMHG | OXYGEN SATURATION: 97 % | HEIGHT: 70 IN | HEART RATE: 72 BPM | WEIGHT: 293 LBS | TEMPERATURE: 98.4 F

## 2024-09-13 PROCEDURE — 6370000000 HC RX 637 (ALT 250 FOR IP): Performed by: PSYCHIATRY & NEUROLOGY

## 2024-09-13 PROCEDURE — 94640 AIRWAY INHALATION TREATMENT: CPT

## 2024-09-13 PROCEDURE — 6370000000 HC RX 637 (ALT 250 FOR IP)

## 2024-09-13 PROCEDURE — 5130000000 HC BRIDGE APPOINTMENT

## 2024-09-13 PROCEDURE — 94761 N-INVAS EAR/PLS OXIMETRY MLT: CPT

## 2024-09-13 RX ORDER — LORAZEPAM 1 MG/1
1 TABLET ORAL NIGHTLY PRN
Qty: 30 TABLET | Refills: 0 | Status: SHIPPED | OUTPATIENT
Start: 2024-09-13 | End: 2024-10-13

## 2024-09-13 RX ORDER — MIRTAZAPINE 15 MG/1
15 TABLET, FILM COATED ORAL NIGHTLY
Qty: 30 TABLET | Refills: 0 | Status: SHIPPED | OUTPATIENT
Start: 2024-09-13

## 2024-09-13 RX ORDER — ARIPIPRAZOLE 5 MG/1
5 TABLET ORAL DAILY
Qty: 30 TABLET | Refills: 0 | Status: SHIPPED | OUTPATIENT
Start: 2024-09-14

## 2024-09-13 RX ORDER — DOXYCYCLINE HYCLATE 100 MG
100 TABLET ORAL EVERY 12 HOURS SCHEDULED
Qty: 9 TABLET | Refills: 0 | Status: SHIPPED | OUTPATIENT
Start: 2024-09-13 | End: 2024-09-18

## 2024-09-13 RX ADMIN — DICLOFENAC SODIUM 4 G: 10 GEL TOPICAL at 09:28

## 2024-09-13 RX ADMIN — ACETAMINOPHEN 650 MG: 325 TABLET ORAL at 06:54

## 2024-09-13 RX ADMIN — Medication 2 PUFF: at 10:29

## 2024-09-13 RX ADMIN — DOXYCYCLINE HYCLATE 100 MG: 100 TABLET, COATED ORAL at 09:28

## 2024-09-13 RX ADMIN — HYDROXYZINE HYDROCHLORIDE 50 MG: 50 TABLET, FILM COATED ORAL at 06:54

## 2024-09-13 RX ADMIN — ARIPIPRAZOLE 5 MG: 10 TABLET ORAL at 09:28

## 2024-09-13 RX ADMIN — ALBUTEROL SULFATE 2 PUFF: 90 AEROSOL, METERED RESPIRATORY (INHALATION) at 10:29

## 2024-09-13 ASSESSMENT — PAIN - FUNCTIONAL ASSESSMENT: PAIN_FUNCTIONAL_ASSESSMENT: PREVENTS OR INTERFERES SOME ACTIVE ACTIVITIES AND ADLS

## 2024-09-13 ASSESSMENT — PAIN DESCRIPTION - DESCRIPTORS
DESCRIPTORS: ACHING
DESCRIPTORS: ACHING

## 2024-09-13 ASSESSMENT — PAIN SCALES - WONG BAKER: WONGBAKER_NUMERICALRESPONSE: NO HURT

## 2024-09-13 ASSESSMENT — PAIN DESCRIPTION - LOCATION
LOCATION: OTHER (COMMENT);HEAD
LOCATION: SHOULDER

## 2024-09-13 ASSESSMENT — PAIN SCALES - GENERAL
PAINLEVEL_OUTOF10: 3
PAINLEVEL_OUTOF10: 6

## 2024-09-13 ASSESSMENT — PAIN DESCRIPTION - ORIENTATION
ORIENTATION: OTHER (COMMENT)
ORIENTATION: RIGHT;LEFT

## 2024-09-16 DIAGNOSIS — I89.0 LYMPHEDEMA: Primary | ICD-10-CM

## 2024-09-24 RX ORDER — ALBUTEROL SULFATE 90 UG/1
INHALANT RESPIRATORY (INHALATION)
Qty: 8.5 G | OUTPATIENT
Start: 2024-09-24

## 2024-09-25 ENCOUNTER — TELEPHONE (OUTPATIENT)
Dept: PRIMARY CARE CLINIC | Age: 59
End: 2024-09-25

## 2024-09-25 DIAGNOSIS — J45.40 MODERATE PERSISTENT ASTHMA WITHOUT COMPLICATION: Primary | ICD-10-CM

## 2024-09-25 RX ORDER — ALBUTEROL SULFATE 90 UG/1
INHALANT RESPIRATORY (INHALATION)
Qty: 1 EACH | Refills: 4 | Status: SHIPPED | OUTPATIENT
Start: 2024-09-25

## 2024-09-26 ENCOUNTER — ANESTHESIA EVENT (OUTPATIENT)
Dept: ENDOSCOPY | Age: 59
End: 2024-09-26
Payer: MEDICARE

## 2024-09-26 ENCOUNTER — ANESTHESIA (OUTPATIENT)
Dept: ENDOSCOPY | Age: 59
End: 2024-09-26
Payer: MEDICARE

## 2024-09-26 ENCOUNTER — HOSPITAL ENCOUNTER (OUTPATIENT)
Age: 59
Setting detail: OUTPATIENT SURGERY
Discharge: HOME | End: 2024-09-26
Attending: INTERNAL MEDICINE | Admitting: INTERNAL MEDICINE
Payer: MEDICARE

## 2024-09-26 VITALS
HEIGHT: 70 IN | DIASTOLIC BLOOD PRESSURE: 62 MMHG | SYSTOLIC BLOOD PRESSURE: 148 MMHG | HEART RATE: 69 BPM | WEIGHT: 293 LBS | OXYGEN SATURATION: 96 % | TEMPERATURE: 97.2 F | BODY MASS INDEX: 41.95 KG/M2 | RESPIRATION RATE: 16 BRPM

## 2024-09-26 DIAGNOSIS — Z86.010 HISTORY OF COLON POLYPS: ICD-10-CM

## 2024-09-26 DIAGNOSIS — D64.9 ANEMIA, UNSPECIFIED TYPE: ICD-10-CM

## 2024-09-26 DIAGNOSIS — Z86.0100 HISTORY OF COLON POLYPS: ICD-10-CM

## 2024-09-26 PROCEDURE — 2580000003 HC RX 258: Performed by: ANESTHESIOLOGY

## 2024-09-26 PROCEDURE — 88305 TISSUE EXAM BY PATHOLOGIST: CPT

## 2024-09-26 PROCEDURE — 6360000002 HC RX W HCPCS: Performed by: NURSE ANESTHETIST, CERTIFIED REGISTERED

## 2024-09-26 PROCEDURE — 3700000001 HC ADD 15 MINUTES (ANESTHESIA): Performed by: INTERNAL MEDICINE

## 2024-09-26 PROCEDURE — 3700000000 HC ANESTHESIA ATTENDED CARE: Performed by: INTERNAL MEDICINE

## 2024-09-26 PROCEDURE — 2500000003 HC RX 250 WO HCPCS: Performed by: NURSE ANESTHETIST, CERTIFIED REGISTERED

## 2024-09-26 PROCEDURE — 7100000011 HC PHASE II RECOVERY - ADDTL 15 MIN: Performed by: INTERNAL MEDICINE

## 2024-09-26 PROCEDURE — 2580000003 HC RX 258: Performed by: NURSE ANESTHETIST, CERTIFIED REGISTERED

## 2024-09-26 PROCEDURE — 2709999900 HC NON-CHARGEABLE SUPPLY: Performed by: INTERNAL MEDICINE

## 2024-09-26 PROCEDURE — 3609010300 HC COLONOSCOPY W/BIOPSY SINGLE/MULTIPLE: Performed by: INTERNAL MEDICINE

## 2024-09-26 PROCEDURE — 7100000010 HC PHASE II RECOVERY - FIRST 15 MIN: Performed by: INTERNAL MEDICINE

## 2024-09-26 RX ORDER — LIDOCAINE HYDROCHLORIDE 20 MG/ML
INJECTION, SOLUTION INFILTRATION; PERINEURAL
Status: DISCONTINUED | OUTPATIENT
Start: 2024-09-26 | End: 2024-09-26 | Stop reason: SDUPTHER

## 2024-09-26 RX ORDER — SODIUM CHLORIDE, SODIUM LACTATE, POTASSIUM CHLORIDE, CALCIUM CHLORIDE 600; 310; 30; 20 MG/100ML; MG/100ML; MG/100ML; MG/100ML
INJECTION, SOLUTION INTRAVENOUS
Status: DISCONTINUED | OUTPATIENT
Start: 2024-09-26 | End: 2024-09-26 | Stop reason: SDUPTHER

## 2024-09-26 RX ORDER — PROPOFOL 10 MG/ML
INJECTION, EMULSION INTRAVENOUS
Status: DISCONTINUED | OUTPATIENT
Start: 2024-09-26 | End: 2024-09-26 | Stop reason: SDUPTHER

## 2024-09-26 RX ORDER — SODIUM CHLORIDE, SODIUM LACTATE, POTASSIUM CHLORIDE, CALCIUM CHLORIDE 600; 310; 30; 20 MG/100ML; MG/100ML; MG/100ML; MG/100ML
INJECTION, SOLUTION INTRAVENOUS CONTINUOUS
Status: DISCONTINUED | OUTPATIENT
Start: 2024-09-26 | End: 2024-09-26 | Stop reason: HOSPADM

## 2024-09-26 RX ADMIN — PROPOFOL 130 MCG/KG/MIN: 10 INJECTION, EMULSION INTRAVENOUS at 09:09

## 2024-09-26 RX ADMIN — PROPOFOL 100 MG: 10 INJECTION, EMULSION INTRAVENOUS at 09:10

## 2024-09-26 RX ADMIN — SODIUM CHLORIDE, POTASSIUM CHLORIDE, SODIUM LACTATE AND CALCIUM CHLORIDE: 600; 310; 30; 20 INJECTION, SOLUTION INTRAVENOUS at 08:19

## 2024-09-26 RX ADMIN — SODIUM CHLORIDE, SODIUM LACTATE, POTASSIUM CHLORIDE, AND CALCIUM CHLORIDE: .6; .31; .03; .02 INJECTION, SOLUTION INTRAVENOUS at 08:57

## 2024-09-26 RX ADMIN — LIDOCAINE HYDROCHLORIDE 100 MG: 20 INJECTION, SOLUTION INFILTRATION; PERINEURAL at 09:09

## 2024-09-26 ASSESSMENT — PAIN - FUNCTIONAL ASSESSMENT
PAIN_FUNCTIONAL_ASSESSMENT: 0-10
PAIN_FUNCTIONAL_ASSESSMENT: ACTIVITIES ARE NOT PREVENTED

## 2024-09-26 ASSESSMENT — PAIN DESCRIPTION - DESCRIPTORS: DESCRIPTORS: ACHING;DISCOMFORT

## 2024-09-26 ASSESSMENT — PAIN SCALES - GENERAL: PAINLEVEL_OUTOF10: 0

## 2024-09-26 NOTE — PROCEDURES
PROCEDURE NOTE  Date: 2024   Name: Dayami Miller  YOB: 1965    Procedures          Colonoscopy Procedure  Note          Patient: Dayami Miller  : 1965  CRN:  [unfilled]    Procedure: Colonoscopy with biopsy    Date:  2024    Surgeon:  Jaye Cuello MD, MD    Referring Physician:  Michelle Cheek MD    Preoperative Diagnosis: History of colon polyp    Postoperative Diagnosis: Mucosa throughout the colon and terminal ileum was normal.  Small polyp removed by cold biopsy polypectomy from the ascending colon.  Medium internal hemorrhoids    Anesthesia:  MAC    EBL: Minimal to none.    Indications: This is a 58 y.o. year old female who presents today with previous adenomatous polyp.      Procedure:   An informed consent was obtained from the patient after explanation of indications, benefits, possible risks and complications of the procedure.  The patient was then taken to the endoscopy suite, placed in the left lateral decubitus position, and the above IV anesthesia was administered.      Digital rectal examination was performed.  With the patient in the left lateral decubitus position the endoscope was inserted through the anorectal area into the rectum. The scope was then advanced through the length of the colon to the cecum, which was identified by the ileocecal valve and appendiceal orifice.  The quality of preparation was adequate.  The scope was carefully withdrawn and the mucosa was fully inspected including retroflexion in the rectum. Findings and interventions are described below.      The patient tolerated the procedure well and was taken to the PACU in good condition.  There were no immediate complications.      Impression:    -  Mucosa throughout the colon and terminal ileum was normal.  Small polyp removed by cold biopsy polypectomy from the ascending colon.    -Medium internal hemorrhoids    Recommendations:  Await pathology.  Repeat colonoscopy in 5 years.    Jaye

## 2024-09-26 NOTE — H&P
Gastroenterology Note             Pre-operative History and Physical    Patient: Dayami Miller  : 1965  CSN: [id number]    History Obtained From:  patient and/or guardian.     HISTORY OF PRESENT ILLNESS:    The patient is a 58 y.o. female  here for routine screening colonoscopy, history of polyps    Past Medical History:    Past Medical History:   Diagnosis Date    Acute alcoholic intoxication (HCC) 2022    Acute lateral meniscus tear of right knee 2019    Acute medial meniscus tear of right knee 2019    Anesthesia complication     woke up during one surgery    Anxiety     Arthritis     Asthma     Atrial fibrillation (HCC)     new dx 4 weeks ago, first of  2017    CHF (congestive heart failure) (HCC)     Edema     Fibromyalgia     GERD (gastroesophageal reflux disease)     reflux    Hiatal hernia     History of blood transfusion     History of suicide attempt     Hx of major depression     Hypertension     Idiopathic urticaria 10/29/2020    Insomnia     Mast cell activation syndrome ([facility])     Overdose 2013    Paroxysmal atrial fibrillation with rapid ventricular response ([facility])     PTSD (post-traumatic stress disorder)     SVT (supraventricular tachycardia) ([facility])     hx svt    Vocal cord dysfunction 2020     Past Surgical History:    Past Surgical History:   Procedure Laterality Date    ABLATION OF DYSRHYTHMIC FOCUS  2019    afib    BLADDER SUSPENSION      CERVICAL FUSION N/A 2022    ANTERIOR CERVICAL DISCECTOMY AND FUSION CERVICAL 5 - CERVICAL 6, CERVICAL 6 - CERVICAL 7 WITH DEPUY ALLOGRAFT, PLATE AND SCREWS AND EVOKES               **LATEX SENSITIVE** performed by Chente Harris MD at [facility] OR    COLONOSCOPY N/A 2022    COLONOSCOPY performed by Jaye Cuello at [facility] ENDOSCOPY    DENTAL SURGERY      HERNIA REPAIR      Ventral and umbilical    HYSTERECTOMY (CERVIX STATUS UNKNOWN)      Not for malignancy - for heavy bleeding    KNEE

## 2024-09-26 NOTE — DISCHARGE INSTRUCTIONS
cancer. For this reason, regular testing for colon polyps is important for people as they get older. It is also important for anyone who has an increased risk for colon cancer.  Polyps are usually found through routine colon cancer screening tests. Although most colon polyps are not cancerous, they are usually removed and then tested for cancer. Screening for colon cancer saves lives because the cancer can usually be cured if it is caught early.  If you have a polyp that is the type that can turn into cancer, you may need more tests to examine your entire colon. The doctor will remove any other polyps that are found, and you will be tested more often.  Follow-up care is a key part of your treatment and safety. Be sure to make and go to all appointments, and call your doctor if you are having problems. It's also a good idea to know your test results and keep a list of the medicines you take.  How can you care for yourself at home?  Regular exams to look for colon polyps are the best way to prevent polyps from turning into colon cancer. These can include stool tests, sigmoidoscopy, colonoscopy, and CT colonography. Talk with your doctor about a testing schedule that is right for you.  To prevent polyps  There is no home treatment that can prevent colon polyps. But these steps may help lower your risk for cancer.  Stay active. Being active can help you get to and stay at a healthy weight. Try to exercise on most days of the week. Walking is a good choice.  Eat well. Choose a variety of vegetables, fruits, legumes (such as peas and beans), fish, poultry, and whole grains.  Do not smoke. If you need help quitting, talk to your doctor about stop-smoking programs and medicines. These can increase your chances of quitting for good.  If you drink alcohol, limit how much you drink. Limit alcohol to 2 drinks a day for men and 1 drink a day for women.  When should you call for help?   Call your doctor now or seek immediate

## 2024-09-26 NOTE — PROGRESS NOTES
Ambulatory Surgery/Procedure Discharge Note    Vitals:    09/26/24 0934   BP: (!) 140/87   Pulse: 77   Resp: 16   Temp: 97.2 °F (36.2 °C)   SpO2: 95%     Pt ready for discharge per Savita score.     No intake/output data recorded.    Restroom use offered before discharge.  Yes    Pain assessment:  level of pain (1-10, 10 severe),  Pain Level: 0    Pt and S.O./family states \"ready to go home\". Pt alert and oriented x4. IV removed. Denies N/V or pain.  Pt tolerating po intake. Discharge instructions given to pt and DIL with pt permission. Pt and DIL verbalized understanding of all instructions. Left with all belongings, and discharge instructions.     Patient discharged to home/self care. Patient discharged via wheel chair by transporter to waiting family/S.O.       9/26/2024 9:47 AM

## 2024-09-27 ENCOUNTER — OFFICE VISIT (OUTPATIENT)
Dept: PRIMARY CARE CLINIC | Age: 59
End: 2024-09-27

## 2024-09-27 VITALS
SYSTOLIC BLOOD PRESSURE: 138 MMHG | DIASTOLIC BLOOD PRESSURE: 78 MMHG | HEIGHT: 70 IN | RESPIRATION RATE: 18 BRPM | HEART RATE: 74 BPM | WEIGHT: 293 LBS | OXYGEN SATURATION: 95 % | BODY MASS INDEX: 41.95 KG/M2 | TEMPERATURE: 98.2 F

## 2024-09-27 DIAGNOSIS — J45.40 MODERATE PERSISTENT ASTHMA WITHOUT COMPLICATION: Primary | ICD-10-CM

## 2024-09-27 DIAGNOSIS — Z23 IMMUNIZATION DUE: ICD-10-CM

## 2024-09-27 RX ORDER — NEBULIZER ACCESSORIES
1 KIT MISCELLANEOUS 4 TIMES DAILY
Qty: 1 KIT | Refills: 0 | Status: SHIPPED | OUTPATIENT
Start: 2024-09-27 | End: 2024-09-27 | Stop reason: SDUPTHER

## 2024-09-27 RX ORDER — NEBULIZER ACCESSORIES
1 KIT MISCELLANEOUS 4 TIMES DAILY
Qty: 1 KIT | Refills: 0 | Status: SHIPPED | OUTPATIENT
Start: 2024-09-27

## 2024-09-27 RX ORDER — FUROSEMIDE 40 MG
40 TABLET ORAL DAILY
COMMUNITY

## 2024-09-27 ASSESSMENT — ENCOUNTER SYMPTOMS: SHORTNESS OF BREATH: 1

## 2024-10-02 ENCOUNTER — TELEPHONE (OUTPATIENT)
Dept: ORTHOPEDIC SURGERY | Age: 59
End: 2024-10-02

## 2024-10-02 ENCOUNTER — TELEPHONE (OUTPATIENT)
Dept: CARDIOLOGY CLINIC | Age: 59
End: 2024-10-02

## 2024-10-02 NOTE — TELEPHONE ENCOUNTER
Faxed records for 1/1/23 to date to [facility] @ [phone]

## 2024-10-02 NOTE — TELEPHONE ENCOUNTER
Patient states she had a chest x-ray 9/7, and wants to know if Dr. Gandhi can look at the results and let her know if there is anything to be concerned about.     Please advise.     Callback: [phone]

## 2024-10-03 ENCOUNTER — TELEPHONE (OUTPATIENT)
Dept: PRIMARY CARE CLINIC | Age: 59
End: 2024-10-03

## 2024-10-03 NOTE — TELEPHONE ENCOUNTER
----- Message from Dr. Jaye Cuello MD sent at 10/3/2024  8:46 AM EDT -----  Dear     Thanks for reaching out, yes, based on biopsy beng just a routine tubular adenoma <10 mm in size repeat colon in 5 years.    Also thank you so much for the kind referral.    Warm regards  Jaye Cuello  ----- Message -----  From: Michelle Cheek MD  Sent: 9/27/2024  11:35 AM EDT  To: Jaye Cuello MD    Hello! Thank you for your care of this patient! Given the pathology, would the recall be 5 years? Wanted to confirm!

## 2024-10-03 NOTE — TELEPHONE ENCOUNTER
She may have had some extra fluid that was taken care of when she was hospitalized. Please make her aware. Is she having symptoms?

## 2024-10-03 NOTE — TELEPHONE ENCOUNTER
Called to give her the message from Shakira SALMERON. Left message on voicemail for her to return my call.

## 2024-11-04 ENCOUNTER — TELEPHONE (OUTPATIENT)
Dept: PRIMARY CARE CLINIC | Age: 59
End: 2024-11-04

## 2024-11-04 DIAGNOSIS — G25.81 RESTLESS LEG SYNDROME: Primary | ICD-10-CM

## 2024-11-04 DIAGNOSIS — M79.7 FIBROMYALGIA: ICD-10-CM

## 2024-11-04 RX ORDER — ROPINIROLE 1 MG/1
1.5 TABLET, FILM COATED ORAL 2 TIMES DAILY
Qty: 270 TABLET | Refills: 0 | Status: SHIPPED | OUTPATIENT
Start: 2024-11-04 | End: 2025-02-02

## 2024-11-04 RX ORDER — CYCLOBENZAPRINE HCL 5 MG
5 TABLET ORAL 3 TIMES DAILY PRN
Qty: 30 TABLET | Refills: 0 | Status: SHIPPED | OUTPATIENT
Start: 2024-11-04 | End: 2024-11-14

## 2024-11-04 NOTE — TELEPHONE ENCOUNTER
Contacted the PT was advised she would like an refill of  rOPINIRole (REQUIP) 1 MG tablet. PT is also requesting a muscle relaxer for her restless leg syndrome.

## 2024-11-04 NOTE — PROGRESS NOTES
Spoke with the patient, I am amenable to filling her RLS medication and a temp (10 day) supply for Flexeril but will need pain management for chronic management. Referral provided, she was amenable to this. She is also concerned about her weight - scheduled next week to address.

## 2024-11-04 NOTE — TELEPHONE ENCOUNTER
Pt called stating she was seen at the pain clinic and was told they will not prescribe the meds for her restless leg or muscle spasms at this time.  Pt sts she does not know what to do and does not want to go to another doctor.  Pt sts she is getting some tests done from the pain clinic and is supposed to be having a procedure done to where she, hopefully, won't need the meds. Pt is requesting for Dr Cheek to prescribe these meds short term to get her to where she can get through all this testing with the pain clinic.  Pt sts she would like to speak with Dr Cheek about this.

## 2024-11-12 ENCOUNTER — OFFICE VISIT (OUTPATIENT)
Dept: PRIMARY CARE CLINIC | Age: 59
End: 2024-11-12
Payer: MEDICARE

## 2024-11-12 VITALS
DIASTOLIC BLOOD PRESSURE: 80 MMHG | SYSTOLIC BLOOD PRESSURE: 136 MMHG | TEMPERATURE: 98.3 F | BODY MASS INDEX: 41.95 KG/M2 | HEART RATE: 72 BPM | HEIGHT: 70 IN | RESPIRATION RATE: 14 BRPM | OXYGEN SATURATION: 95 % | WEIGHT: 293 LBS

## 2024-11-12 DIAGNOSIS — R73.09 ELEVATED GLUCOSE: Primary | ICD-10-CM

## 2024-11-12 DIAGNOSIS — Z12.31 ENCOUNTER FOR SCREENING MAMMOGRAM FOR MALIGNANT NEOPLASM OF BREAST: ICD-10-CM

## 2024-11-12 DIAGNOSIS — G47.19 EXCESSIVE DAYTIME SLEEPINESS: ICD-10-CM

## 2024-11-12 DIAGNOSIS — E66.01 MORBID OBESITY WITH BMI OF 50.0-59.9, ADULT: ICD-10-CM

## 2024-11-12 PROBLEM — F19.921 DRUG INTOXICATION WITH DELIRIUM (HCC): Status: RESOLVED | Noted: 2024-09-07 | Resolved: 2024-11-12

## 2024-11-12 LAB — HBA1C MFR BLD: 5.5 %

## 2024-11-12 PROCEDURE — 83036 HEMOGLOBIN GLYCOSYLATED A1C: CPT | Performed by: FAMILY MEDICINE

## 2024-11-12 PROCEDURE — G8484 FLU IMMUNIZE NO ADMIN: HCPCS | Performed by: FAMILY MEDICINE

## 2024-11-12 PROCEDURE — 3075F SYST BP GE 130 - 139MM HG: CPT | Performed by: FAMILY MEDICINE

## 2024-11-12 PROCEDURE — 99213 OFFICE O/P EST LOW 20 MIN: CPT | Performed by: FAMILY MEDICINE

## 2024-11-12 PROCEDURE — G8427 DOCREV CUR MEDS BY ELIG CLIN: HCPCS | Performed by: FAMILY MEDICINE

## 2024-11-12 PROCEDURE — G8417 CALC BMI ABV UP PARAM F/U: HCPCS | Performed by: FAMILY MEDICINE

## 2024-11-12 PROCEDURE — 3079F DIAST BP 80-89 MM HG: CPT | Performed by: FAMILY MEDICINE

## 2024-11-12 PROCEDURE — 3017F COLORECTAL CA SCREEN DOC REV: CPT | Performed by: FAMILY MEDICINE

## 2024-11-12 PROCEDURE — 1036F TOBACCO NON-USER: CPT | Performed by: FAMILY MEDICINE

## 2024-11-12 ASSESSMENT — ENCOUNTER SYMPTOMS
COLOR CHANGE: 0
NAUSEA: 0
COUGH: 0
RHINORRHEA: 0
SHORTNESS OF BREATH: 0
DIARRHEA: 0
BACK PAIN: 1
ABDOMINAL PAIN: 0
VOMITING: 0
BLOOD IN STOOL: 0

## 2024-11-12 NOTE — ASSESSMENT & PLAN NOTE
Poorly controlled by the patient's goals. Normal TSH and no DM2. Counseled today on calorie tracking in moderation, with counseling on BMR and goals for what her daily calorie goal should be. Counseled for health and weight management hydration goals as well as to avoid soda/artificial sweeteners and processed foods and reviewed goals for safe weight loss and MyFitnessPal use. The patient was amenable to this plan. Referral also placed to Weight Management solutions at [facility] for options.

## 2024-11-12 NOTE — PATIENT INSTRUCTIONS
Calories: 2200 per day  [employer] Pal jasmin.  Water: 64 ounces per day    To schedule an appointment for your mammogram at any of our five imaging locations below, please call [phone]:    1) [facility]  [address] [place] [address]    2) [facility]  [address] [place] [address]    3) [facility]  [address], [address]  [place] [address]    4) [facility]  [address]  [place] [address]    5) [facility]  [address]  [place] [address]

## 2024-11-12 NOTE — ASSESSMENT & PLAN NOTE
Occasional and intermittent - sleeps well but also with difficulty losing weight. ?ASTRID eval. Referral provided today.

## 2024-11-12 NOTE — PROGRESS NOTES
CAD BILATERAL    (Results Pending)         ASSESSMENT & PLAN:    Dayami Miller is a 59 y.o. year old female here for Weight Management. The following is a summary of the plan made at this visit:    1. Elevated glucose  Assessment & Plan:  Noted on previous labs. POC HgbA1c to eval was normal in office today.  Orders:  -     POCT glycosylated hemoglobin (Hb A1C)  2. Morbid obesity with BMI of 50.0-59.9, adult  Assessment & Plan:  Poorly controlled by the patient's goals. Normal TSH and no DM2. Counseled today on calorie tracking in moderation, with counseling on BMR and goals for what her daily calorie goal should be. Counseled for health and weight management hydration goals as well as to avoid soda/artificial sweeteners and processed foods and reviewed goals for safe weight loss and MyFitnessPal use. The patient was amenable to this plan. Referral also placed to [facility] Management solutions at [facility] for options.    Orders:  -     [facility] (Bariatric Surgery), Alex  3. Excessive daytime sleepiness  Assessment & Plan:  Occasional and intermittent - sleeps well but also with difficulty losing weight. ?ASTRID eval. Referral provided today.  Orders:  -     [facility] Ezequiel Martini MD, Sleep Medicine, [facility]  4. Encounter for screening mammogram for malignant neoplasm of breast  Assessment & Plan:  Age appropriate screening provided as per orders below.    Orders:  -     [facility] DIGITAL SCREEN W OR WO CAD BILATERAL; Future

## 2024-12-12 PROBLEM — Z12.31 ENCOUNTER FOR SCREENING MAMMOGRAM FOR MALIGNANT NEOPLASM OF BREAST: Status: RESOLVED | Noted: 2024-11-12 | Resolved: 2024-12-12

## 2024-12-17 ENCOUNTER — TELEPHONE (OUTPATIENT)
Dept: PRIMARY CARE CLINIC | Age: 59
End: 2024-12-17

## 2024-12-17 NOTE — TELEPHONE ENCOUNTER
Pt called stating she has decided she would like for Dr Cheek to go ahead and send in a rx for Ozempic to [facility] and see if her insurance will cover it.

## 2024-12-18 DIAGNOSIS — R73.09 ELEVATED GLUCOSE: ICD-10-CM

## 2024-12-18 DIAGNOSIS — E66.01 MORBID OBESITY WITH BMI OF 50.0-59.9, ADULT: Primary | ICD-10-CM

## 2024-12-18 DIAGNOSIS — Z76.89 ENCOUNTER FOR WEIGHT MANAGEMENT: ICD-10-CM

## 2024-12-18 DIAGNOSIS — I50.42 CHRONIC COMBINED SYSTOLIC AND DIASTOLIC CONGESTIVE HEART FAILURE (HCC): Chronic | ICD-10-CM

## 2024-12-18 RX ORDER — SEMAGLUTIDE 0.68 MG/ML
0.25 INJECTION, SOLUTION SUBCUTANEOUS WEEKLY
Qty: 3 ML | Refills: 0 | Status: SHIPPED | OUTPATIENT
Start: 2024-12-18

## 2024-12-19 ENCOUNTER — TELEPHONE (OUTPATIENT)
Dept: ADMINISTRATIVE | Age: 59
End: 2024-12-19

## 2024-12-19 NOTE — TELEPHONE ENCOUNTER
Submitted PA for Ozempic (0.25 or 0.5 MG/DOSE) 2MG/3ML pen-injectors  Via CMM Key: [id number] STATUS: PENDING.    Follow up done daily; if no decision with in three days we will refax.  If another three days goes by with no decision will call the insurance for status.

## 2024-12-23 DIAGNOSIS — G47.33 OSA (OBSTRUCTIVE SLEEP APNEA): Primary | ICD-10-CM

## 2024-12-23 RX ORDER — TIRZEPATIDE 2.5 MG/.5ML
2.5 INJECTION, SOLUTION SUBCUTANEOUS WEEKLY
Qty: 2 ML | Refills: 0 | Status: SHIPPED | OUTPATIENT
Start: 2024-12-23

## 2024-12-29 ENCOUNTER — HOSPITAL ENCOUNTER (EMERGENCY)
Age: 59
Discharge: ANOTHER HOSPITAL | End: 2024-12-29
Attending: EMERGENCY MEDICINE
Payer: MEDICARE

## 2024-12-29 VITALS
OXYGEN SATURATION: 93 % | RESPIRATION RATE: 16 BRPM | TEMPERATURE: 98 F | DIASTOLIC BLOOD PRESSURE: 70 MMHG | BODY MASS INDEX: 41.95 KG/M2 | SYSTOLIC BLOOD PRESSURE: 138 MMHG | HEART RATE: 59 BPM | WEIGHT: 293 LBS | HEIGHT: 70 IN

## 2024-12-29 DIAGNOSIS — T50.902A MEDICATION OVERDOSE, INTENTIONAL SELF-HARM, INITIAL ENCOUNTER (HCC): ICD-10-CM

## 2024-12-29 DIAGNOSIS — R45.851 SUICIDAL IDEATION: Primary | ICD-10-CM

## 2024-12-29 LAB
ALBUMIN SERPL-MCNC: 3.6 G/DL (ref 3.4–5)
ALBUMIN/GLOB SERPL: 1.3 {RATIO} (ref 1.1–2.2)
ALP SERPL-CCNC: 100 U/L (ref 40–129)
ALT SERPL-CCNC: 16 U/L (ref 10–40)
ANION GAP SERPL CALCULATED.3IONS-SCNC: 8 MMOL/L (ref 3–16)
APAP SERPL-MCNC: <5 UG/ML (ref 10–30)
AST SERPL-CCNC: 25 U/L (ref 15–37)
BASOPHILS # BLD: 0.1 K/UL (ref 0–0.2)
BASOPHILS NFR BLD: 0.7 %
BILIRUB SERPL-MCNC: 0.5 MG/DL (ref 0–1)
BUN SERPL-MCNC: 17 MG/DL (ref 7–20)
CALCIUM SERPL-MCNC: 7.8 MG/DL (ref 8.3–10.6)
CHLORIDE SERPL-SCNC: 108 MMOL/L (ref 99–110)
CO2 SERPL-SCNC: 25 MMOL/L (ref 21–32)
CREAT SERPL-MCNC: 0.6 MG/DL (ref 0.6–1.1)
DEPRECATED RDW RBC AUTO: 14.7 % (ref 12.4–15.4)
EKG ATRIAL RATE: 63 BPM
EKG DIAGNOSIS: NORMAL
EKG P AXIS: 37 DEGREES
EKG P-R INTERVAL: 204 MS
EKG Q-T INTERVAL: 418 MS
EKG QRS DURATION: 88 MS
EKG QTC CALCULATION (BAZETT): 427 MS
EKG R AXIS: 14 DEGREES
EKG T AXIS: 34 DEGREES
EKG VENTRICULAR RATE: 63 BPM
EOSINOPHIL # BLD: 0.4 K/UL (ref 0–0.6)
EOSINOPHIL NFR BLD: 4.2 %
ETHANOLAMINE SERPL-MCNC: NORMAL MG/DL (ref 0–0.08)
GFR SERPLBLD CREATININE-BSD FMLA CKD-EPI: >90 ML/MIN/{1.73_M2}
GLUCOSE SERPL-MCNC: 97 MG/DL (ref 70–99)
HCT VFR BLD AUTO: 38 % (ref 36–48)
HGB BLD-MCNC: 12.5 G/DL (ref 12–16)
LYMPHOCYTES # BLD: 0.8 K/UL (ref 1–5.1)
LYMPHOCYTES NFR BLD: 8.9 %
MCH RBC QN AUTO: 29.6 PG (ref 26–34)
MCHC RBC AUTO-ENTMCNC: 32.9 G/DL (ref 31–36)
MCV RBC AUTO: 89.8 FL (ref 80–100)
MONOCYTES # BLD: 0.5 K/UL (ref 0–1.3)
MONOCYTES NFR BLD: 5.6 %
NEUTROPHILS # BLD: 7.3 K/UL (ref 1.7–7.7)
NEUTROPHILS NFR BLD: 80.6 %
PLATELET # BLD AUTO: 241 K/UL (ref 135–450)
PMV BLD AUTO: 9.5 FL (ref 5–10.5)
POTASSIUM SERPL-SCNC: 4.1 MMOL/L (ref 3.5–5.1)
PROT SERPL-MCNC: 6.3 G/DL (ref 6.4–8.2)
RBC # BLD AUTO: 4.23 M/UL (ref 4–5.2)
SALICYLATES SERPL-MCNC: <0.5 MG/DL (ref 15–30)
SODIUM SERPL-SCNC: 141 MMOL/L (ref 136–145)
WBC # BLD AUTO: 9.1 K/UL (ref 4–11)

## 2024-12-29 PROCEDURE — 80143 DRUG ASSAY ACETAMINOPHEN: CPT

## 2024-12-29 PROCEDURE — 82077 ASSAY SPEC XCP UR&BREATH IA: CPT

## 2024-12-29 PROCEDURE — 80053 COMPREHEN METABOLIC PANEL: CPT

## 2024-12-29 PROCEDURE — 99285 EMERGENCY DEPT VISIT HI MDM: CPT

## 2024-12-29 PROCEDURE — 85025 COMPLETE CBC W/AUTO DIFF WBC: CPT

## 2024-12-29 PROCEDURE — 93010 ELECTROCARDIOGRAM REPORT: CPT | Performed by: INTERNAL MEDICINE

## 2024-12-29 PROCEDURE — 93005 ELECTROCARDIOGRAM TRACING: CPT | Performed by: EMERGENCY MEDICINE

## 2024-12-29 PROCEDURE — 80179 DRUG ASSAY SALICYLATE: CPT

## 2024-12-29 PROCEDURE — 90792 PSYCH DIAG EVAL W/MED SRVCS: CPT

## 2024-12-29 RX ORDER — ACETAMINOPHEN 325 MG/1
650 TABLET ORAL EVERY 4 HOURS PRN
Status: DISCONTINUED | OUTPATIENT
Start: 2024-12-29 | End: 2024-12-30 | Stop reason: HOSPADM

## 2024-12-29 ASSESSMENT — LIFESTYLE VARIABLES
HOW MANY STANDARD DRINKS CONTAINING ALCOHOL DO YOU HAVE ON A TYPICAL DAY: 1 OR 2
HOW OFTEN DO YOU HAVE A DRINK CONTAINING ALCOHOL: MONTHLY OR LESS

## 2024-12-29 ASSESSMENT — PAIN SCALES - GENERAL: PAINLEVEL_OUTOF10: 0

## 2024-12-29 ASSESSMENT — PAIN - FUNCTIONAL ASSESSMENT: PAIN_FUNCTIONAL_ASSESSMENT: 0-10
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
## 2024-12-29 NOTE — ED NOTES
Patient refusing to give a urine specimen. Patient states she does not have to give us a \"fucking pee test\" or a \"fucking covid test.\" Dr. Sen notfied.

## 2024-12-29 NOTE — ED NOTES
Patient brought with her the bottle of Requip 1mg tablets. It was filled 11/4 with 255 pills. Directions are take 1.5 tablets BID. There were 74 tablets in the bottle. Dr. Doc ewing.

## 2024-12-29 NOTE — VIRTUAL HEALTH
neurological, musculoskeletal, or immunological symptoms today.   PSYCHIATRIC: See HPI above.    PSYCHIATRIC EXAMINATION / MENTAL STATUS EXAM    Level of consciousness:  within normal limits   Appearance:  hospital attire and lying in bed, obese.  Does appear stated age. No acute distress.  Behavior/Motor: psychomotor agitation and agitated  Attitude toward examiner:  argumentative, intense  SI/HI: reports both  Sleep: Decreased  Speech:  loud, pressured, and interrupting  Mood: angry, depressed, irritable, labile, and sad  Affect:  labile -  mood congruent  Thought Processes:  linear and goal directed.  No tangentiality or circumstantiality. No flight of ideas or loosening of associations.  Thought Content:  Suicidal Ideation:  with plan to overdose. No delusions or other perceptual abnormalities.  Hallucinations: Denies AVOT-H  Cognition:  oriented to person, place, and time   Concentration: intact  Memory: intact, though not formally tested.  Insight: fair   Judgement: poor   Fund of Knowledge: good      Risk Assessment:  Protective Factors:  Protective: none - female  Risk Factors:  Risk Factors: Depressed mood, Active suicidal ideation, Suicide plan, Suicide attempt, Prior suicide attempt, Highly impulsive behaviors, Chronic pain or medical illness, Social isolation, No outpatient services in place, Medication noncompliance, and Self- injurious/ Self-harm behavior    C-SSRS Score       12/29/2024     1:17 PM   C-SSRS Suicide Screening   1) Within the past month, have you wished you were dead or wished you could go to sleep and not wake up?  Yes   2) Have you actually had any thoughts of killing yourself?  Yes   3) Have you been thinking about how you might kill yourself?  Yes   4) Have you had these thoughts and had some intention of acting on them?  Yes   5) Have you started to work out or worked out the details of how to kill yourself? Do you intend to carry out this plan?  Yes   6) Have you ever done

## 2024-12-29 NOTE — ED NOTES
Patient yelling and cussing at staff when we attempted to do rapid covid. Patient states we are all brain washed and she is over Covid. Patient refused to be swabbed. Dr. Roach spoke with patient and patient still refusing.

## 2024-12-29 NOTE — ED NOTES
Patient ambulated to the bathroom but would not give a sample. Patient now not allowing staff to keep her on the heart monitor as well.

## 2024-12-29 NOTE — ED PROVIDER NOTES
Patient initially evaluated by Dr. Roach please refer to her documentation concerning the patient's diagnosis and anticipated disposition    The patient absolutely refuses COVID swabbing and to provide a urinalysis    The patient is medically cleared    Consultation with telepsych have been obtained the patient had been thereafter granted transfer and admission     Narendra Sen MD  12/31/24 [id number]    
   Potassium reflex Magnesium 4.1 3.5 - 5.1 mmol/L    Chloride 108 99 - 110 mmol/L    CO2 25 21 - 32 mmol/L    Anion Gap 8 3 - 16    Glucose 97 70 - 99 mg/dL    BUN 17 7 - 20 mg/dL    Creatinine 0.6 0.6 - 1.1 mg/dL    Est, Glom Filt Rate >90 >60    Calcium 7.8 (L) 8.3 - 10.6 mg/dL    Total Protein 6.3 (L) 6.4 - 8.2 g/dL    Albumin 3.6 3.4 - 5.0 g/dL    Albumin/Globulin Ratio 1.3 1.1 - 2.2    Total Bilirubin 0.5 0.0 - 1.0 mg/dL    Alkaline Phosphatase 100 40 - 129 U/L    ALT 16 10 - 40 U/L    AST 25 15 - 37 U/L   Ethanol   Result Value Ref Range    Ethanol Lvl None Detected mg/dL   Salicylate   Result Value Ref Range    Salicylate Lvl <0.5 (L) 15.0 - 30.0 mg/dL   Acetaminophen Level   Result Value Ref Range    Acetaminophen Level <5 (L) 10 - 30 ug/mL   EKG 12 Lead   Result Value Ref Range    Ventricular Rate 63 BPM    Atrial Rate 63 BPM    P-R Interval 204 ms    QRS Duration 88 ms    Q-T Interval 418 ms    QTc Calculation (Bazett) 427 ms    P Axis 37 degrees    R Axis 14 degrees    T Axis 34 degrees    Diagnosis       Normal sinus rhythmLow voltage QRSBorderline ECGWhen compared with ECG of 07-SEP-2024 17:29,No significant change was found       EKG  The Ekg interpreted as interpreted by me:  normal sinus rhythm with a rate of 63  Axis is   Normal  QTc is  normal  Intervals and Durations are unremarkable.      No specific ST-T wave changes appreciated.  No evidence of acute ischemia.           RADIOLOGY  X-RAYS: ALL IMAGES INCLUDING PLAIN FILMS, CT, ULTRASOUND AND MRI HAVE BEEN READ BY THE RADIOLOGIST.   No orders to display            I have personally reviewed Xray and Ultrasound images and radiology confirms the interpretation:  None      Medications administered. (Dose and Route):  None      Critical Care:I personally spent a total of 40 minutes of critical care time in obtaining history, performing a physical exam, bedside monitoring of interventions, collecting and interpreting tests and discussion with

## 2024-12-29 NOTE — ED NOTES
Patient presented to ED via EMS and Police WITH statement of belief signed by officer for evaluation. Explained process of securing patient belongings for patient/staff safety, patient verbalized understanding. Security at bedside, metal detector wanding completed. Patient changed into safety gown. All belongings itemized by  [employer], placed in labeled bag, removed from the patient care area, and taken to the security locker. Itemized list placed with belongings, in patient record, and a copy given to the patient.

## 2024-12-30 NOTE — ED NOTES
Patient is asking for food, informed DR. Shay if the patient is allowed to have food and said to inform the patient that we need to have the urine sample and covid swab before giving her food. Informed patient with the information given by the MD with sitter as a witness, the patient yell and telling me that she will call her  because we are holding her food because she don't want to give urine and covid swab and she said it is inhumane, she additionally added that wait for me until a discharge. JARON Sweet came in to help with the situation. Dr. Shay is aware with the situation.

## 2025-01-03 ENCOUNTER — TELEPHONE (OUTPATIENT)
Dept: PRIMARY CARE CLINIC | Age: 60
End: 2025-01-03

## 2025-01-03 NOTE — TELEPHONE ENCOUNTER
----- Message from Dr. Michelle Cheek MD sent at 1/3/2025  6:31 AM EST -----  Please call to check in and ensure she is scheduled with her mental health team please. Thank you!  ----- Message -----  From: Loly Roach MD  Sent: 12/30/2024  10:17 AM EST  To: Michelle Cheek MD

## 2025-01-16 ENCOUNTER — TELEPHONE (OUTPATIENT)
Dept: PRIMARY CARE CLINIC | Age: 60
End: 2025-01-16

## 2025-01-16 NOTE — TELEPHONE ENCOUNTER
Pt called requesting to speak with our LPN.  Pt sts she had a coupple questions for him about her weight loss medication and update about what may be needed.  Advised pt that Felipe was assisting a patient and a message will be sent back to him

## 2025-01-20 ENCOUNTER — TELEPHONE (OUTPATIENT)
Dept: ADMINISTRATIVE | Age: 60
End: 2025-01-20

## 2025-01-20 NOTE — TELEPHONE ENCOUNTER
Submitted PA for Zepbound 2.5MG/0.5ML pen-injectors   Via CMM Key: [id number]  STATUS: PENDING.    Follow up done daily; if no decision with in three days we will refax.  If another three days goes by with no decision will call the insurance for status.

## 2025-01-21 NOTE — TELEPHONE ENCOUNTER
The medication was DENIED; DENIAL letter is uploaded to MEDIA.    Generic Denial:  Other; please see Denial Letter.           Note :  ZEPBOUND INJ 2.5MG is denied because it is not on your plan's Drug List (formulary). Medication authorization requires the following: (1) Your provider submits medical records (for example: chart notes) confirming diagnosis of moderate to severe obstructive sleep apnea (for example: 15 or more obstructive respiratory events per hour of sleep confirmed by a sleep study).      If you want an APPEAL; please note in this encounter what new information you would like to APPEAL with.  Once complete route back to PA POOL.    If this requires a response please respond to the pool ( P MHCX PSC MEDICATION PRE-AUTH).      Thank you please advise patient.

## 2025-02-05 ENCOUNTER — TELEPHONE (OUTPATIENT)
Dept: PRIMARY CARE CLINIC | Age: 60
End: 2025-02-05

## 2025-02-05 NOTE — TELEPHONE ENCOUNTER
Spoke with pt and advised her that [facility] is not accepting any external referrals for rheum at this time.  Pt sts she would like a referral to [facility] placed.

## 2025-02-05 NOTE — TELEPHONE ENCOUNTER
PT called stating she would like to have a referral placed to rheum.    Pt sts she would like to be referred to:    [facility]  Phone [phone]      Pt sts this will be for arthritis, fibromyalgia and chronic pain

## 2025-02-18 DIAGNOSIS — G25.81 RESTLESS LEG SYNDROME: ICD-10-CM

## 2025-02-18 RX ORDER — ROPINIROLE 1 MG/1
1.5 TABLET, FILM COATED ORAL 2 TIMES DAILY
Qty: 270 TABLET | Refills: 0 | Status: SHIPPED | OUTPATIENT
Start: 2025-02-18 | End: 2025-05-19

## 2025-02-18 NOTE — TELEPHONE ENCOUNTER
Refill Request       Last Seen: Last Seen Department: 11/12/2024  Last Seen by PCP: 11/12/2024        Next Appointment:   Future Appointments   Date Time Provider Department Center   2/21/2025  2:00 PM Michelle Cheek MD [facility]   3/12/2025 10:40 AM Goldie Navarro APRN - CNP [facility]       Future appointment scheduled      Requested Prescriptions     Pending Prescriptions Disp Refills    rOPINIRole (REQUIP) 1 MG tablet 270 tablet 0     Sig: Take 1.5 tablets by mouth in the morning and at bedtime

## 2025-02-21 ENCOUNTER — OFFICE VISIT (OUTPATIENT)
Dept: PRIMARY CARE CLINIC | Age: 60
End: 2025-02-21
Payer: MEDICARE

## 2025-02-21 VITALS
OXYGEN SATURATION: 94 % | DIASTOLIC BLOOD PRESSURE: 72 MMHG | HEIGHT: 70 IN | SYSTOLIC BLOOD PRESSURE: 136 MMHG | TEMPERATURE: 98.4 F | RESPIRATION RATE: 17 BRPM | BODY MASS INDEX: 41.95 KG/M2 | WEIGHT: 293 LBS | HEART RATE: 67 BPM

## 2025-02-21 DIAGNOSIS — R73.09 ELEVATED GLUCOSE: ICD-10-CM

## 2025-02-21 DIAGNOSIS — I50.42 CHRONIC COMBINED SYSTOLIC AND DIASTOLIC CONGESTIVE HEART FAILURE (HCC): ICD-10-CM

## 2025-02-21 DIAGNOSIS — M17.11 PRIMARY OSTEOARTHRITIS OF RIGHT KNEE: Chronic | ICD-10-CM

## 2025-02-21 DIAGNOSIS — I48.92 ATRIAL FLUTTER, UNSPECIFIED TYPE (HCC): ICD-10-CM

## 2025-02-21 DIAGNOSIS — Z00.00 HEALTHCARE MAINTENANCE: Primary | ICD-10-CM

## 2025-02-21 DIAGNOSIS — F33.2 SEVERE EPISODE OF RECURRENT MAJOR DEPRESSIVE DISORDER, WITHOUT PSYCHOTIC FEATURES (HCC): ICD-10-CM

## 2025-02-21 DIAGNOSIS — I48.0 PAROXYSMAL ATRIAL FIBRILLATION (HCC): ICD-10-CM

## 2025-02-21 PROBLEM — I48.19 PERSISTENT ATRIAL FIBRILLATION (HCC): Status: ACTIVE | Noted: 2025-02-21

## 2025-02-21 PROBLEM — S93.402A MODERATE LEFT ANKLE SPRAIN: Status: RESOLVED | Noted: 2024-07-26 | Resolved: 2025-02-21

## 2025-02-21 PROBLEM — I10 HTN (HYPERTENSION): Chronic | Status: RESOLVED | Noted: 2018-05-30 | Resolved: 2025-02-21

## 2025-02-21 PROBLEM — S89.92XA INJURY OF LEFT LEG: Status: RESOLVED | Noted: 2024-07-24 | Resolved: 2025-02-21

## 2025-02-21 PROBLEM — K43.2 VENTRAL INCISIONAL HERNIA: Status: RESOLVED | Noted: 2019-08-24 | Resolved: 2025-02-21

## 2025-02-21 PROBLEM — I10 HYPERTENSION, ESSENTIAL: Chronic | Status: ACTIVE | Noted: 2024-09-08

## 2025-02-21 PROBLEM — S80.12XA LEG HEMATOMA, LEFT, INITIAL ENCOUNTER: Status: RESOLVED | Noted: 2024-07-26 | Resolved: 2025-02-21

## 2025-02-21 PROCEDURE — 3017F COLORECTAL CA SCREEN DOC REV: CPT | Performed by: FAMILY MEDICINE

## 2025-02-21 PROCEDURE — 3075F SYST BP GE 130 - 139MM HG: CPT | Performed by: FAMILY MEDICINE

## 2025-02-21 PROCEDURE — G0438 PPPS, INITIAL VISIT: HCPCS | Performed by: FAMILY MEDICINE

## 2025-02-21 PROCEDURE — 3078F DIAST BP <80 MM HG: CPT | Performed by: FAMILY MEDICINE

## 2025-02-21 RX ORDER — DULOXETIN HYDROCHLORIDE 30 MG/1
60 CAPSULE, DELAYED RELEASE ORAL DAILY
COMMUNITY
Start: 2025-01-03

## 2025-02-21 ASSESSMENT — PATIENT HEALTH QUESTIONNAIRE - PHQ9
SUM OF ALL RESPONSES TO PHQ QUESTIONS 1-9: 5
7. TROUBLE CONCENTRATING ON THINGS, SUCH AS READING THE NEWSPAPER OR WATCHING TELEVISION: SEVERAL DAYS
1. LITTLE INTEREST OR PLEASURE IN DOING THINGS: SEVERAL DAYS
6. FEELING BAD ABOUT YOURSELF - OR THAT YOU ARE A FAILURE OR HAVE LET YOURSELF OR YOUR FAMILY DOWN: NOT AT ALL
10. IF YOU CHECKED OFF ANY PROBLEMS, HOW DIFFICULT HAVE THESE PROBLEMS MADE IT FOR YOU TO DO YOUR WORK, TAKE CARE OF THINGS AT HOME, OR GET ALONG WITH OTHER PEOPLE: SOMEWHAT DIFFICULT
SUM OF ALL RESPONSES TO PHQ QUESTIONS 1-9: 5
5. POOR APPETITE OR OVEREATING: NOT AT ALL
4. FEELING TIRED OR HAVING LITTLE ENERGY: SEVERAL DAYS
2. FEELING DOWN, DEPRESSED OR HOPELESS: SEVERAL DAYS
8. MOVING OR SPEAKING SO SLOWLY THAT OTHER PEOPLE COULD HAVE NOTICED. OR THE OPPOSITE, BEING SO FIGETY OR RESTLESS THAT YOU HAVE BEEN MOVING AROUND A LOT MORE THAN USUAL: NOT AT ALL
SUM OF ALL RESPONSES TO PHQ QUESTIONS 1-9: 5
9. THOUGHTS THAT YOU WOULD BE BETTER OFF DEAD, OR OF HURTING YOURSELF: NOT AT ALL
3. TROUBLE FALLING OR STAYING ASLEEP: SEVERAL DAYS
SUM OF ALL RESPONSES TO PHQ9 QUESTIONS 1 & 2: 2
SUM OF ALL RESPONSES TO PHQ QUESTIONS 1-9: 5

## 2025-02-21 ASSESSMENT — ENCOUNTER SYMPTOMS
DIARRHEA: 0
ABDOMINAL PAIN: 0
VOMITING: 0
BLOOD IN STOOL: 0
BACK PAIN: 1
COUGH: 0
SHORTNESS OF BREATH: 0
RHINORRHEA: 0
NAUSEA: 0
COLOR CHANGE: 0

## 2025-02-21 NOTE — ASSESSMENT & PLAN NOTE
Well controlled, needs a new psychiatrist - mental health resources provided today. Denied SI/HI today. SI Hotline number provided today. Call back/ED precautions discussed.

## 2025-02-21 NOTE — ASSESSMENT & PLAN NOTE
Overall doing okay. Reminded to schedule eye exam when able. Other problems addressed today as otherwise noted.

## 2025-02-21 NOTE — PROGRESS NOTES
Dayami Miller is a 59 y.o. year old female here for:    Chief Complaint:    Chief Complaint   Patient presents with    Annual Exam     Patient's Sex: female Medicare Member ID: [id number] - (Medicare Managed)    Ethnicity:    Non- / Non   Race:   White (non-)    Provider Name:  Michelle Cheek M.D.  [facility]  [address] [place] [address]  Dept: [phone]  Dept Fax: [phone]    Provider ID Type: NPI  Billing Provider ID:  [id number]    Patient Care Team:  Michelle Cheek MD as PCP - General (Family Medicine)  Michelle Cheek MD as PCP - Empaneled Provider  Monty Carrillo MD as Consulting Physician (Pulmonology)  Vinay Wallace MD as Consulting Physician (Sleep Medicine Family Practice)  John Gandhi MD as Consulting Physician (Electrophysiology)  Kenny Wiseman MD as Consulting Physician (ALLERGY AND IMMUNOLOGY ALLERGY)    Current concerns:    Medical History:  Past Medical History:   Diagnosis Date    Acute alcoholic intoxication 07/06/2022    Acute lateral meniscus tear of right knee 02/2019    Acute medial meniscus tear of right knee 02/2019    Anesthesia complication     woke up during one surgery    Anxiety     Arthritis     Asthma     Atrial fibrillation (HCC)     new dx 4 weeks ago, first of  Sept 2017    CHF (congestive heart failure) (HCC)     Drug intoxication with delirium (HCC) 09/07/2024    Edema     Fibromyalgia     GERD (gastroesophageal reflux disease)     reflux    Hiatal hernia     History of blood transfusion     History of suicide attempt     HTN (hypertension) 05/30/2018    Hx of major depression     Hypertension     Idiopathic urticaria 10/29/2020    Injury of left leg 07/24/2024    Insomnia     Leg hematoma, left, initial encounter 07/26/2024    Mast cell activation syndrome     Moderate left ankle sprain 07/26/2024    Overdose 04/03/2013    Paroxysmal atrial fibrillation with rapid ventricular response

## 2025-02-21 NOTE — PATIENT INSTRUCTIONS
If you drink soda, consider a soda that has Stevia as the sweetener (and in fact, Stevia if a sweetener is needed to be added to food/drink). Examples of these sodas are Zevia or Olipop.     Reminder: Please call to schedule eye exam when able.    To schedule an appointment for your mammogram at any of our five imaging locations below, please call [phone]:    1) [facility]  [address] [place] [address]    2) [facility]  [address] [place] [address]    3) [facility]  [address]  [place] [address]    4) [facility]  [address]  [place] [address]    5) [facility]  [address]  [place] [address]     Below are some resources for Mental Health.  Before scheduling an appointment, please ensure that the office is in network with your insurance.    [facility]  2 locations:  ~ [place]  [address] [place] [address]  Phone: [phone]  ~[place]  [address] [place] [address]  Phone: [phone]    [facility]  [address] [place] [address]  Phone:  [phone]    [facility]  Many locations throughout [place], Can schedule appointment online at [employer]  or call [phone]    [place] [address],  [address],  [place] [address]  Phone:  [phone]    [employer]  ~Can schedule online or call [phone]  - [place] based resource for finding mental health resources and providers       [facility]  ~Telehealth appointments only.  Both medication and therapist.   Go to [email or web address]) to complete form and they will then reach out to schedule the appointment    Please find our [facility] Lab Location Guide below for your convenience!    CENTRAL LOCATIONS    1) [facility]  [address]

## 2025-03-17 ENCOUNTER — TELEPHONE (OUTPATIENT)
Dept: PRIMARY CARE CLINIC | Age: 60
End: 2025-03-17

## 2025-03-17 ENCOUNTER — OFFICE VISIT (OUTPATIENT)
Age: 60
End: 2025-03-17

## 2025-03-17 VITALS — BODY MASS INDEX: 41.95 KG/M2 | WEIGHT: 293 LBS | HEIGHT: 70 IN

## 2025-03-17 DIAGNOSIS — Z91.81 HISTORY OF RECENT FALL: ICD-10-CM

## 2025-03-17 DIAGNOSIS — M70.42 HEMORRHAGIC PREPATELLAR BURSITIS OF LEFT KNEE: Primary | ICD-10-CM

## 2025-03-17 DIAGNOSIS — R73.09 ELEVATED GLUCOSE: ICD-10-CM

## 2025-03-17 DIAGNOSIS — M25.562 ACUTE PAIN OF LEFT KNEE: ICD-10-CM

## 2025-03-17 DIAGNOSIS — Z96.652 S/P TOTAL KNEE REPLACEMENT, LEFT: ICD-10-CM

## 2025-03-17 RX ORDER — HYDROCODONE BITARTRATE AND ACETAMINOPHEN 5; 325 MG/1; MG/1
1 TABLET ORAL EVERY 6 HOURS PRN
Qty: 20 TABLET | Refills: 0 | Status: SHIPPED | OUTPATIENT
Start: 2025-03-17 | End: 2025-03-22

## 2025-03-17 RX ORDER — CEPHALEXIN 500 MG/1
500 CAPSULE ORAL 3 TIMES DAILY
Qty: 21 CAPSULE | Refills: 0 | Status: SHIPPED | OUTPATIENT
Start: 2025-03-17 | End: 2025-03-24

## 2025-03-17 NOTE — TELEPHONE ENCOUNTER
PT called stating she fell 2 weeks ago and had a cut on her knee.  Pt sts she didn't seek treatment and just steri stripped the wound herself.  Pt sts her knee is painful, red, and swollen.  Pt sts it feels like there is fluid buildup on her knee.  Pt requested a same day appt with Dr Cheek.      Message relayed to Dr Cheek and as per discussion with Dr Cheek, advised pt we could refer her to an orthopedist. Pt sts she would like to be seen ASAP.  Advised pt there is a walk in clinic at [facility].  Pt sts she will call them to see when she can get in today.

## 2025-03-17 NOTE — PROGRESS NOTES
needed (Leg swelling)      Respiratory Therapy Supplies (NEBULIZER/TUBING/MOUTHPIECE) KIT 1 kit by Does not apply route in the morning, at noon, in the evening, and at bedtime 1 kit 0    albuterol sulfate HFA (VENTOLIN HFA) 108 (90 Base) MCG/ACT inhaler USE 2 PUFFS BY MOUTH EVERY 4 TO 6 HOURS AS NEEDED 1 each 4    acetaminophen (TYLENOL) 500 MG tablet Take 2 tablets by mouth every 6 hours as needed for Pain 120 tablet 0    mometasone-formoterol (DULERA) 200-5 MCG/ACT inhaler Inhale 2 puffs into the lungs in the morning and 2 puffs in the evening.      ipratropium 0.5 mg-albuterol 2.5 mg (DUONEB) 0.5-2.5 (3) MG/3ML SOLN nebulizer solution Inhale 3 mLs into the lungs every 4 hours as needed for Shortness of Breath 360 mL 1    traZODone (DESYREL) 50 MG tablet Take 1 tablet by mouth nightly at bedtime.      cyclobenzaprine (FLEXERIL) 5 MG tablet Take 1 tablet by mouth 2 times daily as needed for Muscle spasms 20 tablet 0     No current facility-administered medications for this visit.      allergies, social and family histories were reviewed and updated as appropriate.    Review of Systems:  Relevant review of systems reviewed and available in the patient's chart and scanned in under the MEDIA tab today.    Vital Signs:  Ht 1.778 m (5' 10\")   Wt (!) 152 kg (335 lb) Comment: Patient notified  BMI 48.07 kg/m²     General Exam:   Constitutional: She is adequately groomed with no evidence of malnutrition, obesity present  Mental Status: She is oriented to time, place and person. Normal mentation and affect for age.  Lymphatic: The lymphatic examination bilaterally reveals all areas to be without enlargement or induration.  Vascular: Examination reveals no swelling or calf tenderness.  Peripheral pulses are palpable and 2+.  Neurological: She has good coordination and balance.  There is no focal weakness or sensory deficit.    Left Knee Examination:    Inspection:  Knee shows neutral alignment.  There is a moderate area of

## 2025-03-23 PROBLEM — Z00.00 HEALTHCARE MAINTENANCE: Status: RESOLVED | Noted: 2024-06-18 | Resolved: 2025-03-23

## 2025-03-26 ENCOUNTER — OFFICE VISIT (OUTPATIENT)
Age: 60
End: 2025-03-26

## 2025-03-26 VITALS — WEIGHT: 293 LBS | HEIGHT: 70 IN | BODY MASS INDEX: 41.95 KG/M2

## 2025-03-26 DIAGNOSIS — Z96.652 S/P TOTAL KNEE REPLACEMENT, LEFT: ICD-10-CM

## 2025-03-26 DIAGNOSIS — Z91.81 HISTORY OF RECENT FALL: ICD-10-CM

## 2025-03-26 DIAGNOSIS — M70.42 HEMORRHAGIC PREPATELLAR BURSITIS OF LEFT KNEE: Primary | ICD-10-CM

## 2025-03-27 ENCOUNTER — HOSPITAL ENCOUNTER (OUTPATIENT)
Dept: SLEEP CENTER | Age: 60
Discharge: HOME | End: 2025-03-27
Payer: MEDICARE

## 2025-03-27 ENCOUNTER — TELEMEDICINE (OUTPATIENT)
Dept: PRIMARY CARE CLINIC | Age: 60
End: 2025-03-27

## 2025-03-27 DIAGNOSIS — G47.33 OBSTRUCTIVE SLEEP APNEA SYNDROME: ICD-10-CM

## 2025-03-27 DIAGNOSIS — G89.29 CHRONIC BILATERAL LOW BACK PAIN WITH RIGHT-SIDED SCIATICA: Primary | ICD-10-CM

## 2025-03-27 DIAGNOSIS — M54.41 CHRONIC BILATERAL LOW BACK PAIN WITH RIGHT-SIDED SCIATICA: Primary | ICD-10-CM

## 2025-03-27 PROCEDURE — 95806 SLEEP STUDY UNATT&RESP EFFT: CPT

## 2025-03-27 RX ORDER — TRAZODONE HYDROCHLORIDE 50 MG/1
50 TABLET ORAL NIGHTLY
COMMUNITY
Start: 2025-03-24

## 2025-03-27 RX ORDER — CYCLOBENZAPRINE HCL 5 MG
5 TABLET ORAL 2 TIMES DAILY PRN
Qty: 20 TABLET | Refills: 0 | Status: SHIPPED | OUTPATIENT
Start: 2025-03-27 | End: 2025-04-06

## 2025-03-27 ASSESSMENT — ENCOUNTER SYMPTOMS
ABDOMINAL PAIN: 0
SHORTNESS OF BREATH: 0
DIARRHEA: 0
VOMITING: 0
COLOR CHANGE: 0
NAUSEA: 0
COUGH: 0
BLOOD IN STOOL: 0
BACK PAIN: 1
RHINORRHEA: 0

## 2025-03-27 NOTE — PROGRESS NOTES
Dayami Miller is a 59 y.o. year old female here for:     Dayami Miller, was evaluated through a synchronous (real-time) audio-video encounter. The patient (or guardian if applicable) is aware that this is a billable service, which includes applicable co-pays. This Virtual Visit was conducted with patient's (and/or legal guardian's) consent. Patient identification was verified, and a caregiver was present when appropriate.   The patient was located at Home: [address] [place] [address]  Provider was located at Other: in the state of [place]  Confirm you are appropriately licensed, registered, or certified to deliver care in the state where the patient is located as indicated above. If you are not or unsure, please re-schedule the visit: Yes, I confirm.      Chief Complaint:    Chief Complaint   Patient presents with    Back Pain     Subjective:    Today, her current concerns include:    HPI:    #Back Pain  - Onset: Years but worsening in the past month  - Location: Lower back  - Quality: Spasm  - Radiation: None  - Severity: At worst pain is 8/10, currently 2/10  - Timing/Duration: Intermittent and when present lasts 5-10 minutes  - Progression: Worsening over times  - Modifiers: Heating and rest helps, overexertion worsens this  - Associated Symptoms: Per ROS as otherwise stated in this note. Some incontinence of bowel - intermittently. Denied retention of bladder.    Past Medical History:   Diagnosis Date    Acute alcoholic intoxication 07/06/2022    Acute lateral meniscus tear of right knee 02/2019    Acute medial meniscus tear of right knee 02/2019    Anesthesia complication     woke up during one surgery    Anxiety     Arthritis     Asthma     Atrial fibrillation (HCC)     new dx 4 weeks ago, first of  Sept 2017    CHF (congestive heart failure) ([facility])     Drug intoxication with delirium (HCC) 09/07/2024    Edema     Fibromyalgia     GERD (gastroesophageal reflux disease)     reflux    Hiatal hernia

## 2025-03-27 NOTE — ASSESSMENT & PLAN NOTE
Poorly controlled, chronic and worsening. With alarm sx now. Referral to neurosurgery today. She opted to chat with them about imaging needed (I can order whatever they need imaging wise). Will treat conservatively with muscle relaxer for now (cautioned for sedation) as noted in orders below. Also encouraged heat/ice. She was amenable to this. Call back/ED precautions discussed.

## 2025-03-28 ENCOUNTER — TELEPHONE (OUTPATIENT)
Dept: PRIMARY CARE CLINIC | Age: 60
End: 2025-03-28

## 2025-03-28 DIAGNOSIS — G89.29 CHRONIC MIDLINE LOW BACK PAIN WITH RIGHT-SIDED SCIATICA: Primary | ICD-10-CM

## 2025-03-28 DIAGNOSIS — M54.41 CHRONIC MIDLINE LOW BACK PAIN WITH RIGHT-SIDED SCIATICA: Primary | ICD-10-CM

## 2025-03-30 PROBLEM — M70.42 HEMORRHAGIC PREPATELLAR BURSITIS OF LEFT KNEE: Status: ACTIVE | Noted: 2025-03-30

## 2025-04-07 ENCOUNTER — TELEPHONE (OUTPATIENT)
Dept: PULMONOLOGY | Age: 60
End: 2025-04-07

## 2025-04-07 DIAGNOSIS — G47.33 OBSTRUCTIVE SLEEP APNEA (ADULT) (PEDIATRIC): Primary | ICD-10-CM

## 2025-04-08 ENCOUNTER — OFFICE VISIT (OUTPATIENT)
Dept: PRIMARY CARE CLINIC | Age: 60
End: 2025-04-08
Payer: MEDICARE

## 2025-04-08 ENCOUNTER — TELEPHONE (OUTPATIENT)
Dept: SLEEP CENTER | Age: 60
End: 2025-04-08

## 2025-04-08 VITALS
RESPIRATION RATE: 17 BRPM | HEIGHT: 70 IN | HEART RATE: 70 BPM | WEIGHT: 293 LBS | TEMPERATURE: 98.1 F | BODY MASS INDEX: 41.95 KG/M2 | OXYGEN SATURATION: 97 %

## 2025-04-08 DIAGNOSIS — G47.19 EXCESSIVE DAYTIME SLEEPINESS: ICD-10-CM

## 2025-04-08 DIAGNOSIS — I50.42 CHRONIC COMBINED SYSTOLIC AND DIASTOLIC CONGESTIVE HEART FAILURE (HCC): Chronic | ICD-10-CM

## 2025-04-08 DIAGNOSIS — Z98.84 H/O GASTRIC BYPASS: ICD-10-CM

## 2025-04-08 DIAGNOSIS — I48.92 ATRIAL FLUTTER, UNSPECIFIED TYPE (HCC): ICD-10-CM

## 2025-04-08 DIAGNOSIS — M51.362 DEGENERATION OF INTERVERTEBRAL DISC OF LUMBAR REGION WITH DISCOGENIC BACK PAIN AND LOWER EXTREMITY PAIN: Primary | ICD-10-CM

## 2025-04-08 DIAGNOSIS — G47.33 OSA (OBSTRUCTIVE SLEEP APNEA): ICD-10-CM

## 2025-04-08 DIAGNOSIS — E55.9 VITAMIN D DEFICIENCY: ICD-10-CM

## 2025-04-08 PROBLEM — M51.369 DEGENERATIVE DISC DISEASE, LUMBAR: Status: ACTIVE | Noted: 2025-04-08

## 2025-04-08 PROCEDURE — G8417 CALC BMI ABV UP PARAM F/U: HCPCS | Performed by: FAMILY MEDICINE

## 2025-04-08 PROCEDURE — 1036F TOBACCO NON-USER: CPT | Performed by: FAMILY MEDICINE

## 2025-04-08 PROCEDURE — 99214 OFFICE O/P EST MOD 30 MIN: CPT | Performed by: FAMILY MEDICINE

## 2025-04-08 PROCEDURE — G8427 DOCREV CUR MEDS BY ELIG CLIN: HCPCS | Performed by: FAMILY MEDICINE

## 2025-04-08 PROCEDURE — 3017F COLORECTAL CA SCREEN DOC REV: CPT | Performed by: FAMILY MEDICINE

## 2025-04-08 ASSESSMENT — ENCOUNTER SYMPTOMS: BACK PAIN: 1

## 2025-04-08 NOTE — ASSESSMENT & PLAN NOTE
New ASTRID Dx. However, this continues poorly controlled. Hx of bypass. Will check labs to eval status as noted to be sure.

## 2025-04-08 NOTE — ASSESSMENT & PLAN NOTE
Poorly controlled, encouraged to get MRI and f/u with neurosurgery as planned. Disability paperwork completed today.

## 2025-04-08 NOTE — PROGRESS NOTES
Plan:  Will check vitamin levels as noted in orders below - unclear level of control, will replete PRN. She follows with [facility] for Fe infusions PRN.  Orders:  -     Comprehensive Metabolic Panel; Future  -     CBC; Future  -     Vitamin B12 & Folate; Future  -     Iron and TIBC; Future  -     Ferritin; Future  4. Vitamin D deficiency  Assessment & Plan:  Unclear level of control. S/p bypass surgery. Will check levels and replete PRN.  Orders:  -     Vitamin D 25 Hydroxy; Future  5. ASTRID (obstructive sleep apnea)  Assessment & Plan:  Poorly controlled, new Dx, awaiting CPAP. Will try getting Zepbound approved again.   Orders:  -     tirzepatide-weight management (ZEPBOUND) 2.5 MG/0.5ML SOAJ subCUTAneous auto-injector pen; Inject 2.5 mg into the skin every 7 days, Disp-2 mL, R-0Normal  6. Atrial flutter, unspecified type (HCC)  Assessment & Plan:  Well controlled. Follows with Dr. Gandhi for this (cardiologist). Defer to him on this issue.  7. Chronic combined systolic and diastolic congestive heart failure (HCC)  Assessment & Plan:  Well controlled. Chronic. Follows with Dr. Gandhi for this (cardiologist). Defer to him on this issue.

## 2025-04-08 NOTE — TELEPHONE ENCOUNTER
Called to schedule a CPAP per Rodrigo     hst done at [facility] for the pt to return my call     [facility] medicare insurance

## 2025-04-08 NOTE — ASSESSMENT & PLAN NOTE
Well controlled. Chronic. Follows with Dr. Gandhi for this (cardiologist). Defer to him on this issue.

## 2025-04-08 NOTE — ASSESSMENT & PLAN NOTE
Will check vitamin levels as noted in orders below - unclear level of control, will replete PRN. She follows with [place] for Fe infusions PRN.

## 2025-04-10 ENCOUNTER — TELEPHONE (OUTPATIENT)
Dept: ADMINISTRATIVE | Age: 60
End: 2025-04-10

## 2025-04-10 NOTE — TELEPHONE ENCOUNTER
[facility]    4/7/25  3:38 PM  Note     Spoke with pt to review HST results. Pt will need to schedule a titration study.

## 2025-04-10 NOTE — TELEPHONE ENCOUNTER
Submitted PA for Zepbound 2.5MG/0.5ML pen-injectors  Via CMM Key: [id number] STATUS: PENDING.    Follow up done daily; if no decision with in three days we will refax.  If another three days goes by with no decision will call the insurance for status.

## 2025-04-11 DIAGNOSIS — G47.33 OSA (OBSTRUCTIVE SLEEP APNEA): ICD-10-CM

## 2025-04-11 RX ORDER — TIRZEPATIDE 2.5 MG/.5ML
INJECTION, SOLUTION SUBCUTANEOUS
Qty: 2 ML | Refills: 0 | Status: SHIPPED | OUTPATIENT
Start: 2025-04-11

## 2025-04-11 NOTE — TELEPHONE ENCOUNTER
The medication is APPROVED.    Message from Plan  Request Reference Number: [id number]. ZEPBOUND INJ 2.5/0.5 is approved through 12/31/2025. Your patient may now fill this prescription and it will be covered.. Authorization Expiration Date: December 31, 2025.    If this requires a response please respond to the pool ( P MHCX P[facility] MEDICATION PRE-AUTH).      Thank you please advise patient.

## 2025-04-14 ENCOUNTER — HOSPITAL ENCOUNTER (OUTPATIENT)
Dept: MRI IMAGING | Age: 60
Discharge: HOME | End: 2025-04-14
Attending: FAMILY MEDICINE

## 2025-04-14 DIAGNOSIS — Z98.84 H/O GASTRIC BYPASS: ICD-10-CM

## 2025-04-14 DIAGNOSIS — E55.9 VITAMIN D DEFICIENCY: ICD-10-CM

## 2025-04-14 DIAGNOSIS — M54.41 CHRONIC MIDLINE LOW BACK PAIN WITH RIGHT-SIDED SCIATICA: ICD-10-CM

## 2025-04-14 DIAGNOSIS — G47.19 EXCESSIVE DAYTIME SLEEPINESS: ICD-10-CM

## 2025-04-14 DIAGNOSIS — G89.29 CHRONIC MIDLINE LOW BACK PAIN WITH RIGHT-SIDED SCIATICA: ICD-10-CM

## 2025-04-14 DIAGNOSIS — R73.09 ELEVATED GLUCOSE: ICD-10-CM

## 2025-04-14 LAB
25(OH)D3 SERPL-MCNC: 31.5 NG/ML
ALBUMIN SERPL-MCNC: 4.5 G/DL (ref 3.4–5)
ALBUMIN/GLOB SERPL: 1.7 {RATIO} (ref 1.1–2.2)
ALP SERPL-CCNC: 126 U/L (ref 40–129)
ALT SERPL-CCNC: 12 U/L (ref 10–40)
ANION GAP SERPL CALCULATED.3IONS-SCNC: 10 MMOL/L (ref 3–16)
AST SERPL-CCNC: 15 U/L (ref 15–37)
BILIRUB SERPL-MCNC: 1.2 MG/DL (ref 0–1)
BUN SERPL-MCNC: 18 MG/DL (ref 7–20)
CALCIUM SERPL-MCNC: 9.3 MG/DL (ref 8.3–10.6)
CHLORIDE SERPL-SCNC: 102 MMOL/L (ref 99–110)
CO2 SERPL-SCNC: 28 MMOL/L (ref 21–32)
CREAT SERPL-MCNC: 0.8 MG/DL (ref 0.6–1.1)
CREAT UR-MCNC: 212 MG/DL (ref 28–259)
DEPRECATED RDW RBC AUTO: 14 % (ref 12.4–15.4)
FERRITIN SERPL IA-MCNC: 67.9 NG/ML (ref 15–150)
FOLATE SERPL-MCNC: 16.1 NG/ML (ref 4.78–24.2)
GFR SERPLBLD CREATININE-BSD FMLA CKD-EPI: 85 ML/MIN/{1.73_M2}
GLUCOSE SERPL-MCNC: 89 MG/DL (ref 70–99)
HCT VFR BLD AUTO: 41.5 % (ref 36–48)
HGB BLD-MCNC: 13.7 G/DL (ref 12–16)
IRON SATN MFR SERPL: 22 % (ref 15–50)
IRON SERPL-MCNC: 70 UG/DL (ref 37–145)
MCH RBC QN AUTO: 30.4 PG (ref 26–34)
MCHC RBC AUTO-ENTMCNC: 33 G/DL (ref 31–36)
MCV RBC AUTO: 92.2 FL (ref 80–100)
MICROALBUMIN UR DL<=1MG/L-MCNC: <1.2 MG/DL
MICROALBUMIN/CREAT UR: NORMAL MG/G (ref 0–30)
PLATELET # BLD AUTO: 254 K/UL (ref 135–450)
PMV BLD AUTO: 9.6 FL (ref 5–10.5)
POTASSIUM SERPL-SCNC: 4.6 MMOL/L (ref 3.5–5.1)
PROT SERPL-MCNC: 7.2 G/DL (ref 6.4–8.2)
RBC # BLD AUTO: 4.5 M/UL (ref 4–5.2)
SODIUM SERPL-SCNC: 140 MMOL/L (ref 136–145)
TIBC SERPL-MCNC: 320 UG/DL (ref 260–445)
VIT B12 SERPL-MCNC: 243 PG/ML (ref 211–911)
WBC # BLD AUTO: 5.9 K/UL (ref 4–11)

## 2025-04-15 ENCOUNTER — TELEPHONE (OUTPATIENT)
Dept: PRIMARY CARE CLINIC | Age: 60
End: 2025-04-15

## 2025-04-15 DIAGNOSIS — R30.0 DYSURIA: Primary | ICD-10-CM

## 2025-04-15 RX ORDER — NITROFURANTOIN 25; 75 MG/1; MG/1
100 CAPSULE ORAL 2 TIMES DAILY
Qty: 10 CAPSULE | Refills: 0 | Status: SHIPPED | OUTPATIENT
Start: 2025-04-15 | End: 2025-04-20

## 2025-04-15 NOTE — TELEPHONE ENCOUNTER
Pt called stating she had her labs/urine collected yesterday, 4/14/25.  Pt sts today, she has been feeling symptoms of a UTI and c/o pain to bladder area.  Pt is requesting antibiotic to be prescribed.

## 2025-04-16 ENCOUNTER — RESULTS FOLLOW-UP (OUTPATIENT)
Dept: PRIMARY CARE CLINIC | Age: 60
End: 2025-04-16

## 2025-04-21 ENCOUNTER — TELEPHONE (OUTPATIENT)
Dept: PRIMARY CARE CLINIC | Age: 60
End: 2025-04-21

## 2025-04-21 NOTE — TELEPHONE ENCOUNTER
I Contacted [facility] they will do the Imaging tomorrow if needed. Contacted the PT advised message, PT confirmed understanding.

## 2025-04-21 NOTE — TELEPHONE ENCOUNTER
Pt called stating she is not able to do an MRI due to her stimulator.  Pt is requesting an order for CT of the lumbar spine to be placed.  Pt sts she plans to walk over to [place] to see if they can do the scan today so she can still keep her appt tomorrow with [facility].

## 2025-04-22 DIAGNOSIS — G25.81 RESTLESS LEG SYNDROME: ICD-10-CM

## 2025-04-22 RX ORDER — ROPINIROLE 1 MG/1
1.5 TABLET, FILM COATED ORAL 2 TIMES DAILY
Qty: 270 TABLET | Refills: 0 | Status: SHIPPED | OUTPATIENT
Start: 2025-04-22 | End: 2025-07-21

## 2025-06-05 ENCOUNTER — TELEPHONE (OUTPATIENT)
Dept: PRIMARY CARE CLINIC | Age: 60
End: 2025-06-05

## 2025-06-05 NOTE — TELEPHONE ENCOUNTER
Pt called to request an appt with Dr Cheek.  Pt sts about 5 days ago, she injured one of her toes and it has been swollen and painful since.  Advised pt that there weren't any available appts for 6/5/25 and if pt would like to be seen today, provided the [facility] information.    Pt sts she thinks she would like to go to to the clinic in [place].     Location information of [address] [place] [address]. [address]  [place] [address] was provided to pt along with the hours of M-F 5:00pm to 8:00pm and Saturdays 8:00am to Noon

## 2025-06-06 ENCOUNTER — TELEPHONE (OUTPATIENT)
Dept: PRIMARY CARE CLINIC | Age: 60
End: 2025-06-06

## 2025-06-06 ENCOUNTER — OFFICE VISIT (OUTPATIENT)
Dept: ORTHOPEDIC SURGERY | Age: 60
End: 2025-06-06
Payer: MEDICARE

## 2025-06-06 VITALS — WEIGHT: 293 LBS | HEIGHT: 70 IN | BODY MASS INDEX: 41.95 KG/M2

## 2025-06-06 DIAGNOSIS — G47.33 OSA (OBSTRUCTIVE SLEEP APNEA): ICD-10-CM

## 2025-06-06 DIAGNOSIS — M79.675 PAIN IN LEFT TOE(S): Primary | ICD-10-CM

## 2025-06-06 DIAGNOSIS — S90.122A CONTUSION OF SECOND TOE OF LEFT FOOT, INITIAL ENCOUNTER: ICD-10-CM

## 2025-06-06 PROCEDURE — 1036F TOBACCO NON-USER: CPT

## 2025-06-06 PROCEDURE — G8427 DOCREV CUR MEDS BY ELIG CLIN: HCPCS

## 2025-06-06 PROCEDURE — 99213 OFFICE O/P EST LOW 20 MIN: CPT

## 2025-06-06 PROCEDURE — MISCD124 DARCO POST-OP SHOE BRACE (RETAIL)

## 2025-06-06 PROCEDURE — 3017F COLORECTAL CA SCREEN DOC REV: CPT

## 2025-06-06 PROCEDURE — G8417 CALC BMI ABV UP PARAM F/U: HCPCS

## 2025-06-06 NOTE — TELEPHONE ENCOUNTER
Called and spoke with pt.  Pt sts she wound up not going to the ortho clinic yesterday (6/5/25).  Pt sts she plans to go today.  Pt sts her toe is still pretty swollen, painful & throbbing.    Advised pt to call our office if she needs anything.

## 2025-06-06 NOTE — TELEPHONE ENCOUNTER
Called pt no answer, left message to call the office, will also send provider response via my chart   [Negative] : Heme/Lymph

## 2025-06-06 NOTE — PROGRESS NOTES
Patient: Dayami Miller    MRN: [id number]  YOB: 1965          Age: 59 y.o.            Sex: female    Subjective     Chief Complaint:  Toe Pain (LEFT 2ND TOE)      History of Present Illness:  Dayami Miller is a 59 y.o. female presents with pain in the 2nd digit of her left foot. Reports she jammed her toe into the couch 5 days ago and since has experienced pain, swelling and loss of ROM. Pain is globally about the 2nd digit and does extend somewhat into the ball of her foot. 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 Reports that buddy taping to her 3rd digit seemed to make the pain worse so she discontinued this. Denies cracking, popping, or catching. Denies numbness or tingling. Her pain has not improved since onset prompting evaluation tonight.   No prior injury or surgery to the affected foot.       The pain assessment was noted & is as follows:  Pain Assessment  Location of Pain: Toe  Location Modifiers: Left  Severity of Pain: 5  Quality of Pain: Aching  Frequency of Pain: Constant      Medical History  Current Medications:   Current Outpatient Medications   Medication Sig Dispense Refill    tirzepatide-weight management (ZEPBOUND) 5 MG/0.5ML SOAJ subCUTAneous auto-injector pen Inject 5 mg into the skin every 7 days 2 mL 0    rOPINIRole (REQUIP) 1 MG tablet Take 1.5 tablets by mouth in the morning and at bedtime 270 tablet 0    traZODone (DESYREL) 50 MG tablet Take 1 tablet by mouth nightly at bedtime.      diclofenac sodium (VOLTAREN) 1 % GEL Apply 4 g topically 4 times daily 350 g 0    furosemide (LASIX) 40 MG tablet Take 1 tablet by mouth as needed (Leg swelling)      Respiratory Therapy Supplies (NEBULIZER/TUBING/MOUTHPIECE) KIT 1 kit by Does not apply route in the morning, at noon, in the evening, and at bedtime 1 kit 0    albuterol sulfate HFA (VENTOLIN HFA) 108 (90 Base) MCG/ACT inhaler USE 2 PUFFS

## 2025-06-06 NOTE — TELEPHONE ENCOUNTER
Pt called stating she would like refill of Zepbound to be sent to [facility].  Pt sts she has been tolerating this well and reports no issues.  Pt sts she would be fine with the dose increase if Dr Cheek feels it is appropriate.

## 2025-06-10 ENCOUNTER — OFFICE VISIT (OUTPATIENT)
Dept: PRIMARY CARE CLINIC | Age: 60
End: 2025-06-10
Payer: MEDICARE

## 2025-06-10 VITALS
HEIGHT: 70 IN | TEMPERATURE: 99 F | OXYGEN SATURATION: 98 % | HEART RATE: 84 BPM | WEIGHT: 293 LBS | BODY MASS INDEX: 41.95 KG/M2 | RESPIRATION RATE: 16 BRPM

## 2025-06-10 DIAGNOSIS — M79.675 TOE PAIN, LEFT: Primary | ICD-10-CM

## 2025-06-10 PROCEDURE — 3017F COLORECTAL CA SCREEN DOC REV: CPT | Performed by: FAMILY MEDICINE

## 2025-06-10 PROCEDURE — G8427 DOCREV CUR MEDS BY ELIG CLIN: HCPCS | Performed by: FAMILY MEDICINE

## 2025-06-10 PROCEDURE — G8417 CALC BMI ABV UP PARAM F/U: HCPCS | Performed by: FAMILY MEDICINE

## 2025-06-10 PROCEDURE — 99213 OFFICE O/P EST LOW 20 MIN: CPT | Performed by: FAMILY MEDICINE

## 2025-06-10 PROCEDURE — 1036F TOBACCO NON-USER: CPT | Performed by: FAMILY MEDICINE

## 2025-06-10 RX ORDER — DULOXETIN HYDROCHLORIDE 30 MG/1
30 CAPSULE, DELAYED RELEASE ORAL 2 TIMES DAILY
COMMUNITY

## 2025-06-10 RX ORDER — HYDROXYCHLOROQUINE SULFATE 200 MG/1
200 TABLET, FILM COATED ORAL 2 TIMES DAILY
COMMUNITY
Start: 2025-06-09

## 2025-06-10 RX ORDER — CLONIDINE HYDROCHLORIDE 0.2 MG/1
0.2 TABLET ORAL DAILY PRN
COMMUNITY
Start: 2025-05-15

## 2025-06-10 ASSESSMENT — ENCOUNTER SYMPTOMS
NAUSEA: 0
COUGH: 0
COLOR CHANGE: 0
ABDOMINAL PAIN: 0
VOMITING: 0
SHORTNESS OF BREATH: 0
DIARRHEA: 0
BLOOD IN STOOL: 0
RHINORRHEA: 0

## 2025-06-10 NOTE — PROGRESS NOTES
Dayami Miller is a 59 y.o. year old female here for:    Chief Complaint:    Chief Complaint   Patient presents with    Toe Injury     Subjective:    Today, her current concerns include:    HPI:  #Toe injury  - Onset: Almost 2 weeks ago  - Context: Walked into edge of couch and \"saw stars\". Went to ortho after hours and was seen for this. Negative imaging at that time and given boot.  - Location: Left foot 2nd digit  - Quality: Sharp  - Radiation: None  - Severity: At worst, pain is 10/10, currently 5/10  - Timing/Duration: Constant since onset  - Inciting Event: As above  - Progression: Worsening  - Modifiers: Resting and propping up helps pain. Ice helped minimally  - Associated Symptoms: Per ROS as otherwise stated in this note    Past Medical History:   Diagnosis Date    Acute alcoholic intoxication 07/06/2022    Acute lateral meniscus tear of right knee 02/2019    Acute medial meniscus tear of right knee 02/2019    Anesthesia complication     woke up during one surgery    Anxiety     Arthritis     Asthma     Atrial fibrillation (HCC)     new dx 4 weeks ago, first of  Sept 2017    CHF (congestive heart failure) (HCC)     Drug intoxication with delirium (HCC) 09/07/2024    Edema     Encounter for imaging to screen for metal prior to magnetic resonance imaging (MRI) 04/10/2025    MRI conditional nevro stimulator model [id number] pt has two leads both 1058-70B    Fibromyalgia     GERD (gastroesophageal reflux disease)     reflux    Hiatal hernia     History of blood transfusion     History of suicide attempt     HTN (hypertension) 05/30/2018    Hx of major depression     Hypertension     Idiopathic urticaria 10/29/2020    Injury of left leg 07/24/2024    Insomnia     Leg hematoma, left, initial encounter 07/26/2024    Mast cell activation syndrome     Moderate left ankle sprain 07/26/2024    Overdose 04/03/2013    Paroxysmal atrial fibrillation with rapid ventricular response (HCC)     PTSD (post-traumatic

## 2025-06-10 NOTE — ASSESSMENT & PLAN NOTE
Poorly controlled, in setting of injury and worsening. Will re-image to eval with formal radiology read. If negative, will continue boot and RICE. Can consider referral to podiatry and/or ortho if needed. She was amenable to this. Call back/ED precautions discussed.

## 2025-06-12 ENCOUNTER — HOSPITAL ENCOUNTER (OUTPATIENT)
Age: 60
Discharge: HOME | End: 2025-06-12
Payer: MEDICARE

## 2025-06-12 ENCOUNTER — RESULTS FOLLOW-UP (OUTPATIENT)
Dept: PRIMARY CARE CLINIC | Age: 60
End: 2025-06-12

## 2025-06-12 DIAGNOSIS — M19.90 ACUTE ARTHRITIS: ICD-10-CM

## 2025-06-12 DIAGNOSIS — M79.675 TOE PAIN, LEFT: ICD-10-CM

## 2025-06-12 PROCEDURE — 73130 X-RAY EXAM OF HAND: CPT

## 2025-06-12 PROCEDURE — 73630 X-RAY EXAM OF FOOT: CPT

## 2025-07-07 DIAGNOSIS — G47.33 OSA (OBSTRUCTIVE SLEEP APNEA): ICD-10-CM

## 2025-07-07 DIAGNOSIS — J45.40 MODERATE PERSISTENT ASTHMA WITHOUT COMPLICATION: ICD-10-CM

## 2025-07-07 RX ORDER — ALBUTEROL SULFATE 90 UG/1
INHALANT RESPIRATORY (INHALATION)
Qty: 1 EACH | Refills: 4 | Status: SHIPPED | OUTPATIENT
Start: 2025-07-07

## 2025-07-07 NOTE — TELEPHONE ENCOUNTER
Pt called stating she needs refills on her 2 inhalers and she can't get into her pulmonologist until Sept.  Pt sts she was told by their office to request the refills from PCP.    PT sts she would also like to increase her dose of Zepbound.  Pt sts she has tolerated the 5mg well and reports no side effects.      Pt would like all meds to go to [facility].

## 2025-07-07 NOTE — TELEPHONE ENCOUNTER
Called and spoke with pt.  Advised pt that the 7.5mg of Zepbound rx was sent to [facility] in [place].

## 2025-07-07 NOTE — TELEPHONE ENCOUNTER
Pt called stating she saw the rx for her inhalers were sent in but pt is also requsting the 7.5mg Zepbound to also be sent to the [facility] in [place].  Pt sts she tolerated the 5mg well with no side effects.

## 2025-07-11 ENCOUNTER — TELEPHONE (OUTPATIENT)
Dept: PRIMARY CARE CLINIC | Age: 60
End: 2025-07-11

## 2025-07-11 NOTE — TELEPHONE ENCOUNTER
Pt called requesting a referral to  Pain Management.  Pt requests the referral to be faxed to [phone]

## 2025-07-23 ENCOUNTER — OFFICE VISIT (OUTPATIENT)
Dept: ORTHOPEDIC SURGERY | Age: 60
End: 2025-07-23

## 2025-07-23 DIAGNOSIS — M21.40 PES PLANUS, UNSPECIFIED LATERALITY: ICD-10-CM

## 2025-07-23 DIAGNOSIS — S90.122A CONTUSION OF SECOND TOE OF LEFT FOOT, INITIAL ENCOUNTER: ICD-10-CM

## 2025-07-23 DIAGNOSIS — S90.122A CONTUSION OF SECOND TOE OF LEFT FOOT, INITIAL ENCOUNTER: Primary | ICD-10-CM

## 2025-07-23 DIAGNOSIS — S92.535A CLOSED NONDISPLACED FRACTURE OF DISTAL PHALANX OF LESSER TOE OF LEFT FOOT, INITIAL ENCOUNTER: ICD-10-CM

## 2025-07-23 RX ORDER — CELECOXIB 200 MG/1
200 CAPSULE ORAL DAILY
Qty: 30 CAPSULE | Refills: 3 | Status: SHIPPED | OUTPATIENT
Start: 2025-07-23 | End: 2025-07-23

## 2025-07-23 RX ORDER — CELECOXIB 200 MG/1
200 CAPSULE ORAL DAILY
Qty: 90 CAPSULE | Refills: 1 | Status: SHIPPED | OUTPATIENT
Start: 2025-07-23

## 2025-07-23 NOTE — PROGRESS NOTES
Patient: Dayami Miller    MRN: [id number]  YOB: 1965          Age: 59 y.o.            Sex: female    Subjective     Chief Complaint:  Foot Pain ([facility] LEFT FOOT )    Initial consultation ongoing left forefoot 2nd and 3rd toe pain with swelling status post significant contusion roughly 6/1/2025    History of Present Illness:  Dayami Miller is a 59 y.o. female presents with pain in the 2nd digit of her left foot. Reports she jammed her toe into the couch 5 days ago and since has experienced pain, swelling and loss of ROM. Pain is globally about the 2nd digit and does extend somewhat into the ball of her foot. 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 Reports that patrick taping to her 3rd digit seemed to make the pain worse so she discontinued this. Denies cracking, popping, or catching. Denies numbness or tingling. Her pain has not improved since onset prompting evaluation tonight.   No prior injury or surgery to the affected foot.     Dayami is a very pleasant 59-year-old white female who does have a history of fibromyalgia as well as CHF and history of atrial fibrillation in the past and is status post gastric bypass who is a patient of Dr. Cheryl Cheek who is being seen today upon referral from Sharri Ortiz PA-C for evaluation of ongoing pain to her left forefoot and 2nd and 3rd toes with persistent swelling status post a substantial contusion which occurred on 6/6/2025.  She originally saw Sharri on 6/6/2024 for x-rays were equivocal but a fracture cannot be excluded was treated appropriately with a postop shoe which she was not able to tolerate however due to persistence of pain and swelling with difficulty bearing weight, she is being seen today for orthopedic and sports consultation with updated imaging.  History of Present Illness  Dayami presents for a follow-up due to persistent pain and swelling in

## 2025-08-03 DIAGNOSIS — G47.33 OSA (OBSTRUCTIVE SLEEP APNEA): ICD-10-CM

## 2025-08-04 RX ORDER — TIRZEPATIDE 7.5 MG/.5ML
INJECTION, SOLUTION SUBCUTANEOUS
Qty: 2 ML | Refills: 0 | Status: SHIPPED | OUTPATIENT
Start: 2025-08-04

## 2025-08-11 ENCOUNTER — OFFICE VISIT (OUTPATIENT)
Dept: ORTHOPEDIC SURGERY | Age: 60
End: 2025-08-11
Payer: MEDICARE

## 2025-08-11 DIAGNOSIS — S92.535A CLOSED NONDISPLACED FRACTURE OF DISTAL PHALANX OF LESSER TOE OF LEFT FOOT, INITIAL ENCOUNTER: ICD-10-CM

## 2025-08-11 DIAGNOSIS — S90.122A CONTUSION OF SECOND TOE OF LEFT FOOT, INITIAL ENCOUNTER: Primary | ICD-10-CM

## 2025-08-11 PROCEDURE — 1036F TOBACCO NON-USER: CPT | Performed by: FAMILY MEDICINE

## 2025-08-11 PROCEDURE — 99213 OFFICE O/P EST LOW 20 MIN: CPT | Performed by: FAMILY MEDICINE

## 2025-08-11 PROCEDURE — G8428 CUR MEDS NOT DOCUMENT: HCPCS | Performed by: FAMILY MEDICINE

## 2025-08-11 PROCEDURE — G8417 CALC BMI ABV UP PARAM F/U: HCPCS | Performed by: FAMILY MEDICINE

## 2025-08-11 PROCEDURE — 3017F COLORECTAL CA SCREEN DOC REV: CPT | Performed by: FAMILY MEDICINE

## 2025-08-27 DIAGNOSIS — G47.33 OSA (OBSTRUCTIVE SLEEP APNEA): ICD-10-CM

## 2025-08-28 RX ORDER — TIRZEPATIDE 7.5 MG/.5ML
INJECTION, SOLUTION SUBCUTANEOUS
Qty: 2 ML | Refills: 0 | Status: SHIPPED | OUTPATIENT
Start: 2025-08-28

## 2025-09-02 ENCOUNTER — OFFICE VISIT (OUTPATIENT)
Dept: PULMONOLOGY | Age: 60
End: 2025-09-02
Payer: MEDICARE

## 2025-09-02 VITALS
HEART RATE: 68 BPM | OXYGEN SATURATION: 93 % | WEIGHT: 293 LBS | DIASTOLIC BLOOD PRESSURE: 68 MMHG | RESPIRATION RATE: 16 BRPM | BODY MASS INDEX: 41.95 KG/M2 | SYSTOLIC BLOOD PRESSURE: 122 MMHG | HEIGHT: 70 IN

## 2025-09-02 DIAGNOSIS — J45.40 MODERATE PERSISTENT ASTHMA WITHOUT COMPLICATION: Primary | ICD-10-CM

## 2025-09-02 PROCEDURE — 3078F DIAST BP <80 MM HG: CPT | Performed by: INTERNAL MEDICINE

## 2025-09-02 PROCEDURE — 3017F COLORECTAL CA SCREEN DOC REV: CPT | Performed by: INTERNAL MEDICINE

## 2025-09-02 PROCEDURE — G8427 DOCREV CUR MEDS BY ELIG CLIN: HCPCS | Performed by: INTERNAL MEDICINE

## 2025-09-02 PROCEDURE — 3074F SYST BP LT 130 MM HG: CPT | Performed by: INTERNAL MEDICINE

## 2025-09-02 PROCEDURE — G8417 CALC BMI ABV UP PARAM F/U: HCPCS | Performed by: INTERNAL MEDICINE

## 2025-09-02 PROCEDURE — 99214 OFFICE O/P EST MOD 30 MIN: CPT | Performed by: INTERNAL MEDICINE

## 2025-09-02 PROCEDURE — 1036F TOBACCO NON-USER: CPT | Performed by: INTERNAL MEDICINE

## 2025-09-02 RX ORDER — FLUTICASONE FUROATE, UMECLIDINIUM BROMIDE AND VILANTEROL TRIFENATATE 200; 62.5; 25 UG/1; UG/1; UG/1
1 POWDER RESPIRATORY (INHALATION) DAILY
Qty: 2 EACH | Refills: 0 | Status: SHIPPED | COMMUNITY
Start: 2025-09-02

## (undated) DEVICE — SOLUTION IV 1000ML 0.9% SOD CHL

## (undated) DEVICE — SOLUTION IV IRRIG POUR BRL 0.9% SODIUM CHL 2F7124

## (undated) DEVICE — CONVERTED USE 304929 SPONGE GAUZE CURITY 4X4IN 16-PLY ST

## (undated) DEVICE — GLOVE SURG SZ 75 L12IN FNGR THK79MIL GRN LTX FREE

## (undated) DEVICE — SUTURE VCRL SZ 0 L54IN ABSRB VLT W/O NDL POLYGLACTIN 910 J616H

## (undated) DEVICE — SCREW BNE L25MM DIA2.5MM KNEE FULL THRD HEX FEM PERSONA

## (undated) DEVICE — PLATE ES AD W 9FT CRD 2

## (undated) DEVICE — PACK PROCEDURE SURG ARTHSCP CUST

## (undated) DEVICE — COVER LT HNDL BLU PLAS

## (undated) DEVICE — TUBE IRRIG L8IN LNG PT W/ CONN FOR PMP SYS REDEUCE

## (undated) DEVICE — TROCAR: Brand: KII FIOS FIRST ENTRY

## (undated) DEVICE — HOOD, PEEL-AWAY: Brand: FLYTE

## (undated) DEVICE — SEALER/DIVIDER LAP SHFT L37CM JAW APER 11.4MM 315DEG ROT

## (undated) DEVICE — 2108 SERIES SAGITTAL BLADE FLARED, GROUND  (18.5 X 1.27 X 90.5MM)

## (undated) DEVICE — PENCIL SMK EVAC ALL IN 1 DSGN ENH VISIBILITY IMPROVED AIR

## (undated) DEVICE — Device

## (undated) DEVICE — NEEDLE HYPO 20GA L1.5IN YEL POLYPR HUB S STL REG BVL STR

## (undated) DEVICE — ZIMMER® STERILE DISPOSABLE TOURNIQUET CUFF WITH PLC, DUAL PORT, SINGLE BLADDER, 30 IN. (76 CM)

## (undated) DEVICE — 3.5 MM INCISOR STRAIGHT BLADES,                                    POWER/EP-1, MEDIUM GRAY, PACKAGED 6                                    PER BOX, STERILE

## (undated) DEVICE — 3 BONE CEMENT MIXER: Brand: MIXEVAC

## (undated) DEVICE — CHLORAPREP 26ML ORANGE

## (undated) DEVICE — AGENT HEMSTAT 3GM PURIFIED PLNT STARCH PWD ABSRB ARISTA AH

## (undated) DEVICE — TUBE SUCT L10IN MINI FLTR CRV KAMVAC

## (undated) DEVICE — DRAPE,UTILTY,TAPE,15X26, 4EA/PK: Brand: MEDLINE

## (undated) DEVICE — GLOVE ORANGE PI 7 1/2   MSG9075

## (undated) DEVICE — SOLUTION IRRIG 2000ML 0.9% SOD CHL USP UROMATIC PLAS CONT

## (undated) DEVICE — ZIMMER® A.T.S. CYCLINDRICAL TOURNIQUET CUFF, SINGLE PORT, SINGLE BLADDER 30 IN. 76 CM)

## (undated) DEVICE — STANDARD HYPODERMIC NEEDLE,POLYPROPYLENE HUB: Brand: MONOJECT

## (undated) DEVICE — 4.5 MM INCISOR PLUS STRAIGHT                                    BLADES, POWER/EP-1, VIOLET, PACKAGED                                    6 PER BOX, STERILE

## (undated) DEVICE — 3.5 X 48MM HEX HEADED SCREW

## (undated) DEVICE — DRAPE,LAP,CHOLE,W/TROUGHS,STERILE: Brand: MEDLINE

## (undated) DEVICE — SUTURE STRATAFIX SPRL SZ 3-0 L12IN ABSRB UD FS-1 L30X30CM SXMP2B410

## (undated) DEVICE — TRAY CATHETER 16FR F INCLUDE BARDX IC COMPLT CARE DRNGE BG

## (undated) DEVICE — STERILE POLYISOPRENE POWDER-FREE SURGICAL GLOVES: Brand: PROTEXIS

## (undated) DEVICE — TROCAR: Brand: KII OPTICAL ACCESS SYSTEM

## (undated) DEVICE — SOLUTION ANTIFOG VIS SYS CLEARIFY LAPSCP

## (undated) DEVICE — TZ MEDICAL TITANIUM DISTRACTION PINS 14MM: Brand: TZ MEDICAL TITANIUM DISTRACTION PINS

## (undated) DEVICE — HANDPIECE SET WITH HIGH FLOW TIP AND SUCTION TUBE: Brand: INTERPULSE

## (undated) DEVICE — SUTURE ETHLN SZ 4-0 L18IN NONABSORBABLE BLK L19MM PS-2 3/8 1667H

## (undated) DEVICE — HOOD: Brand: FLYTE

## (undated) DEVICE — SET ADMIN PRIMING 7ML L30IN 7.35LB 20 GTT 2ND RLER CLMP

## (undated) DEVICE — FORCEPS BX L240CM JAW DIA2.4MM ORNG L CAP W/ NDL DISP RAD

## (undated) DEVICE — GLOVE SURG SZ 65 L12IN FNGR THK94MIL STD WHT LTX FREE

## (undated) DEVICE — SURGICAL SET UP - SURE SET: Brand: MEDLINE INDUSTRIES, INC.

## (undated) DEVICE — NEEDLE SPNL 22GA L3.5IN BLK HUB S STL REG WALL FIT STYL W/

## (undated) DEVICE — 3M™ IOBAN™ 2 ANTIMICROBIAL INCISE DRAPE 6648EZ: Brand: IOBAN™ 2

## (undated) DEVICE — STRIP,CLOSURE,WOUND,MEDI-STRIP,1/2X4: Brand: MEDLINE

## (undated) DEVICE — Z DISCONTINUED USE 2275676 GLOVE SURG SZ 65 L12IN FNGR THK87MIL DK GRN LTX FREE ISOLEX

## (undated) DEVICE — SUTURE STRATAFIX SPRL SZ 1 L14IN ABSRB VLT L48CM CTX 1/2 SXPD2B405

## (undated) DEVICE — [HIGH FLOW INSUFFLATOR,  DO NOT USE IF PACKAGE IS DAMAGED,  KEEP DRY,  KEEP AWAY FROM SUNLIGHT,  PROTECT FROM HEAT AND RADIOACTIVE SOURCES.]: Brand: PNEUMOSURE

## (undated) DEVICE — SYRINGE 30ML MEDICAL LEUR LOCK TIP WO

## (undated) DEVICE — CATHETER IV 20GA L1.25IN PNK FEP SFTY STR HUB RADPQ DISP

## (undated) DEVICE — BLADE ES ELASTOMERIC COAT INSUL DURABLE BEND UPTO 90DEG

## (undated) DEVICE — PREVENA INCISION MANAGEMENT SYSTEM- PEEL & PLACE DRESSING: Brand: PREVENA™ PEEL & PLACE™

## (undated) DEVICE — GOWN,SIRUS,NON REINFRCD,LARGE,SET IN SL: Brand: MEDLINE

## (undated) DEVICE — LIMB HOLDER, WRIST/ANKLE: Brand: DEROYAL

## (undated) DEVICE — STERILE PVP: Brand: MEDLINE INDUSTRIES, INC.

## (undated) DEVICE — SOLUTION IV 1000ML LAC RINGERS PH 6.5 INJ USP VIAFLX PLAS

## (undated) DEVICE — ADHESIVE SKIN CLOSURE WND 8.661X1.5 IN 22 CM LIQUIBAND SECUR

## (undated) DEVICE — GLOVE,SURG,SENSICARE,ALOE,LF,PF,6: Brand: MEDLINE

## (undated) DEVICE — BINDER ABD H12IN FOR 62-74IN WAIST UNIV 4 PNL PREM DSGN E

## (undated) DEVICE — SUTURE VCRL + SZ 3-0 L18IN ABSRB UD SH 1/2 CIR TAPERCUT NDL VCP864D

## (undated) DEVICE — GLOVE SURG SZ 7 L12IN FNGR THK87MIL WHT LTX FREE

## (undated) DEVICE — CANNULA SAMP CO2 AD GRN 7FT CO2 AND 7FT O2 TBNG UNIV CONN

## (undated) DEVICE — GLOVE ORTHO 7 1/2   MSG9475

## (undated) DEVICE — TUBING PMP L8FT LNG W/ CONN FOR AR-6400 REDEUCE

## (undated) DEVICE — SCREW BNE L25MM DIA2.5MM KNEE FULL THRD HEX FEM PERSONA (NOT IMPLANTED)

## (undated) DEVICE — RESERVOIR,SUCTION,100CC,SILICONE: Brand: MEDLINE

## (undated) DEVICE — GAUZE,SPONGE,4"X4",8PLY,STRL,LF,10/TRAY: Brand: MEDLINE

## (undated) DEVICE — SURE SET-DOUBLE BASIN-LF: Brand: MEDLINE INDUSTRIES, INC.

## (undated) DEVICE — TURNOVER KIT RM INF CTRL TECH

## (undated) DEVICE — HEADLESS TROCHAR PIN 75MM: Brand: ZUK

## (undated) DEVICE — SHEET,DRAPE,53X77,STERILE: Brand: MEDLINE

## (undated) DEVICE — SUTURE STRATAFIX SPRL SZ 2 0 L14IN ABSRB UD MH L36MM 1 2 CIR SXMD2B401

## (undated) DEVICE — SYSTEM SMK EVAC LAP TBNG FILTER HSNG BENT STYL PNK SEE CLR

## (undated) DEVICE — ELECTROSURGICAL PENCIL ROCKER SWITCH NON COATED BLADE ELECTRODE 10 FT (3 M) CORD HOLSTER: Brand: MEGADYNE

## (undated) DEVICE — 1010 S-DRAPE TOWEL DRAPE 10/BX: Brand: STERI-DRAPE™

## (undated) DEVICE — STERILE PATIENT PROTECTIVE PAD FOR IMP® KNEE POSITIONERS & COHESIVE WRAP (10 / CASE): Brand: DE MAYO KNEE POSITIONER®

## (undated) DEVICE — CORD ES L10FT MPLR LAP

## (undated) DEVICE — Z INACTIVE USE 2863731 PAD POS KNEE FOAM COHESIVE WRP GRN W/ COBAN DISP DE MAYO 10/CASE

## (undated) DEVICE — PADDING CAST W4INXL4YD NONSTERILE COT RAYON MICROPLEATED

## (undated) DEVICE — BINDER ABD UNIV H9IN WAIST 45-62IN E SFT COT PREM 3 PNL

## (undated) DEVICE — SYSTEM FIX SHFT L36CM DIA5MM FAST L6.7MM POLY LACTIDE ABSRB

## (undated) DEVICE — PADDING CAST W6INXL4YD ST COT RAYON MICROPLEATED HIGHLY

## (undated) DEVICE — T-DRAPE,EXTREMITY,STERILE: Brand: MEDLINE

## (undated) DEVICE — COMFO-TEX ELASTIC BANDAGE LATEX FREE, 6INX5YD: Brand: COMFO-TEX™

## (undated) DEVICE — PACK COOL L W14XL6.5IN 3 LAYR CONSTR SFT CLP CLSR 4 TIE

## (undated) DEVICE — DRAIN SURG 10FR L1/8IN DIA3.2MM SIL CHN RND HUBLESS FULL

## (undated) DEVICE — SET GRAV VENT NVENT CK VLV 3 NDL FREE PRT 10 GTT

## (undated) DEVICE — SYRINGE MED 30ML STD CLR PLAS LUERLOCK TIP N CTRL DISP

## (undated) DEVICE — SOLUTION IRRIG 5L LAC R BG

## (undated) DEVICE — SUTURE PROL SZ 2-0 L18IN NONABSORBABLE BLU FS L26MM 3/8 CIR 8685H

## (undated) DEVICE — GARMENT,MEDLINE,DVT,INT,CALF,LG, GEN2: Brand: MEDLINE

## (undated) DEVICE — APPLICATOR SURG L14CM ARISTA AH FLEXTIP

## (undated) DEVICE — CUFF 44 DPSB DISP CNTOUR

## (undated) DEVICE — SUTURE MCRYL + SZ 4-0 L18IN ABSRB UD L19MM PS-2 3/8 CIR MCP496G

## (undated) DEVICE — GLOVE SURG SZ 8 L12IN FNGR THK94MIL STD WHT LTX FREE

## (undated) DEVICE — SUTURE VCRL + SZ 2-0 L18IN ABSRB UD CT1 L36MM 1/2 CIR VCP839D